# Patient Record
Sex: MALE | Race: BLACK OR AFRICAN AMERICAN | NOT HISPANIC OR LATINO | Employment: FULL TIME | URBAN - METROPOLITAN AREA
[De-identification: names, ages, dates, MRNs, and addresses within clinical notes are randomized per-mention and may not be internally consistent; named-entity substitution may affect disease eponyms.]

---

## 2017-01-03 ENCOUNTER — OFFICE VISIT (OUTPATIENT)
Dept: FAMILY MEDICINE | Facility: CLINIC | Age: 62
DRG: 205 | End: 2017-01-03
Payer: COMMERCIAL

## 2017-01-03 ENCOUNTER — HOSPITAL ENCOUNTER (EMERGENCY)
Facility: OTHER | Age: 62
Discharge: HOME | End: 2017-01-03
Attending: EMERGENCY MEDICINE
Payer: COMMERCIAL

## 2017-01-03 VITALS
BODY MASS INDEX: 24.91 KG/M2 | WEIGHT: 211 LBS | TEMPERATURE: 98 F | RESPIRATION RATE: 17 BRPM | OXYGEN SATURATION: 97 % | HEIGHT: 77 IN | DIASTOLIC BLOOD PRESSURE: 87 MMHG | SYSTOLIC BLOOD PRESSURE: 124 MMHG | HEART RATE: 87 BPM

## 2017-01-03 VITALS
TEMPERATURE: 99 F | HEART RATE: 116 BPM | HEIGHT: 77 IN | OXYGEN SATURATION: 95 % | BODY MASS INDEX: 22.82 KG/M2 | DIASTOLIC BLOOD PRESSURE: 96 MMHG | WEIGHT: 193.31 LBS | SYSTOLIC BLOOD PRESSURE: 131 MMHG

## 2017-01-03 DIAGNOSIS — J42 CHRONIC BRONCHITIS, UNSPECIFIED CHRONIC BRONCHITIS TYPE: ICD-10-CM

## 2017-01-03 DIAGNOSIS — C34.2 PRIMARY CANCER OF RIGHT MIDDLE LOBE OF LUNG: ICD-10-CM

## 2017-01-03 DIAGNOSIS — R06.02 SOB (SHORTNESS OF BREATH): Primary | ICD-10-CM

## 2017-01-03 DIAGNOSIS — J44.1 COPD EXACERBATION: Primary | ICD-10-CM

## 2017-01-03 PROCEDURE — 87040 BLOOD CULTURE FOR BACTERIA: CPT | Mod: 59

## 2017-01-03 PROCEDURE — 25000242 PHARM REV CODE 250 ALT 637 W/ HCPCS: Performed by: EMERGENCY MEDICINE

## 2017-01-03 PROCEDURE — 1159F MED LIST DOCD IN RCRD: CPT | Mod: S$GLB,,, | Performed by: NURSE PRACTITIONER

## 2017-01-03 PROCEDURE — 96365 THER/PROPH/DIAG IV INF INIT: CPT

## 2017-01-03 PROCEDURE — 99999 PR PBB SHADOW E&M-EST. PATIENT-LVL III: CPT | Mod: PBBFAC,,, | Performed by: NURSE PRACTITIONER

## 2017-01-03 PROCEDURE — 3075F SYST BP GE 130 - 139MM HG: CPT | Mod: S$GLB,,, | Performed by: NURSE PRACTITIONER

## 2017-01-03 PROCEDURE — 25000003 PHARM REV CODE 250: Performed by: EMERGENCY MEDICINE

## 2017-01-03 PROCEDURE — 99214 OFFICE O/P EST MOD 30 MIN: CPT | Mod: S$GLB,,, | Performed by: NURSE PRACTITIONER

## 2017-01-03 PROCEDURE — 3078F DIAST BP <80 MM HG: CPT | Mod: S$GLB,,, | Performed by: NURSE PRACTITIONER

## 2017-01-03 PROCEDURE — 94640 AIRWAY INHALATION TREATMENT: CPT

## 2017-01-03 PROCEDURE — 63600175 PHARM REV CODE 636 W HCPCS: Performed by: EMERGENCY MEDICINE

## 2017-01-03 PROCEDURE — 85025 COMPLETE CBC W/AUTO DIFF WBC: CPT

## 2017-01-03 PROCEDURE — 99284 EMERGENCY DEPT VISIT MOD MDM: CPT | Mod: 25

## 2017-01-03 RX ORDER — IPRATROPIUM BROMIDE AND ALBUTEROL SULFATE 2.5; .5 MG/3ML; MG/3ML
3 SOLUTION RESPIRATORY (INHALATION)
Status: COMPLETED | OUTPATIENT
Start: 2017-01-03 | End: 2017-01-03

## 2017-01-03 RX ORDER — ALBUTEROL SULFATE 90 UG/1
1 AEROSOL, METERED RESPIRATORY (INHALATION) EVERY 4 HOURS PRN
Qty: 1 INHALER | Refills: 1 | Status: ON HOLD | OUTPATIENT
Start: 2017-01-03 | End: 2017-01-07 | Stop reason: HOSPADM

## 2017-01-03 RX ORDER — AMOXICILLIN AND CLAVULANATE POTASSIUM 500; 125 MG/1; MG/1
1 TABLET, FILM COATED ORAL 3 TIMES DAILY
Qty: 21 TABLET | Refills: 0 | Status: SHIPPED | OUTPATIENT
Start: 2017-01-03 | End: 2017-01-03 | Stop reason: CLARIF

## 2017-01-03 RX ORDER — AMOXICILLIN AND CLAVULANATE POTASSIUM 875; 125 MG/1; MG/1
1 TABLET, FILM COATED ORAL 2 TIMES DAILY
Qty: 14 TABLET | Refills: 0 | Status: ON HOLD | OUTPATIENT
Start: 2017-01-03 | End: 2017-01-07 | Stop reason: HOSPADM

## 2017-01-03 RX ORDER — AZITHROMYCIN 250 MG/1
500 TABLET, FILM COATED ORAL DAILY
Qty: 5 TABLET | Refills: 0 | Status: ON HOLD | OUTPATIENT
Start: 2017-01-03 | End: 2017-01-07 | Stop reason: HOSPADM

## 2017-01-03 RX ORDER — AZITHROMYCIN 250 MG/1
500 TABLET, FILM COATED ORAL
Status: COMPLETED | OUTPATIENT
Start: 2017-01-03 | End: 2017-01-03

## 2017-01-03 RX ADMIN — AZITHROMYCIN 500 MG: 250 TABLET, FILM COATED ORAL at 01:01

## 2017-01-03 RX ADMIN — CEFTRIAXONE 1 G: 1 INJECTION, SOLUTION INTRAVENOUS at 01:01

## 2017-01-03 RX ADMIN — IPRATROPIUM BROMIDE AND ALBUTEROL SULFATE 3 ML: .5; 3 SOLUTION RESPIRATORY (INHALATION) at 12:01

## 2017-01-03 NOTE — PROGRESS NOTES
Subjective:       Patient ID: Angel Torres is a 61 y.o. male.    Chief Complaint: Shortness of Breath    Shortness of Breath   This is a recurrent problem. The current episode started in the past 7 days. The problem occurs intermittently. The problem has been gradually worsening. Associated symptoms include coryza and wheezing. Pertinent negatives include no abdominal pain, chest pain, claudication, ear pain, fever, headaches, hemoptysis, leg swelling, neck pain, orthopnea, PND, rhinorrhea, sore throat, sputum production, swollen glands, syncope or vomiting. The symptoms are aggravated by weather changes. The patient has no known risk factors for DVT/PE. He has tried nothing for the symptoms. (History of lung cancer)     Review of Systems   Constitutional: Negative for fever.   HENT: Negative for ear pain, rhinorrhea and sore throat.    Respiratory: Positive for shortness of breath and wheezing. Negative for hemoptysis and sputum production.    Cardiovascular: Negative for chest pain, orthopnea, claudication, leg swelling, syncope and PND.   Gastrointestinal: Negative for abdominal pain and vomiting.   Musculoskeletal: Negative for neck pain.   Neurological: Negative for headaches.       Objective:      Physical Exam   Constitutional: He is oriented to person, place, and time. Vital signs are normal. He appears well-developed and well-nourished.   HENT:   Head: Normocephalic and atraumatic.   Right Ear: Tympanic membrane, external ear and ear canal normal.   Left Ear: Tympanic membrane, external ear and ear canal normal.   Nose: Mucosal edema and rhinorrhea present. Right sinus exhibits no maxillary sinus tenderness and no frontal sinus tenderness. Left sinus exhibits no maxillary sinus tenderness and no frontal sinus tenderness.   Mouth/Throat: Oropharynx is clear and moist and mucous membranes are normal. No oropharyngeal exudate or posterior oropharyngeal erythema.   Cardiovascular: Normal rate, regular rhythm and  normal heart sounds.    Pulmonary/Chest: Effort normal. No respiratory distress. He has decreased breath sounds. He has wheezes. He has no rales.   Lymphadenopathy:     He has no cervical adenopathy.   Neurological: He is alert and oriented to person, place, and time.   Skin: Skin is warm, dry and intact. No rash noted.   Psychiatric: He has a normal mood and affect.   Vitals reviewed.      Assessment:       1. SOB (shortness of breath)    2. Primary cancer of right middle lobe of lung    3. Chronic bronchitis, unspecified chronic bronchitis type        Plan:       Angel was seen today for shortness of breath.    Diagnoses and all orders for this visit:    SOB (shortness of breath)    Primary cancer of right middle lobe of lung    Chronic bronchitis, unspecified chronic bronchitis type    Patient sent to ED for further evaluation of severe SOB and cough. Patient  Has a history of lung Cancer.

## 2017-01-03 NOTE — DISCHARGE INSTRUCTIONS
COPD Flare    You have had a flare-up of your COPD.  COPD, or chronic obstructive pulmonary disease, is a common lung disease. It causes your airways to become irritated and narrower. This makes it harder for you to breathe. Emphysema and chronic bronchitis are both types of COPD. This is a chronic condition, which means you always have it. Sometimes it gets worse. When this happens, it is called a flare-up.  Symptoms of COPD  People with COPD may have symptoms most of the time. In a flare-up, your symptoms get worse. These symptoms may mean you are having a flare-up:  · Shortness of breath, shallow or rapid breathing, or wheezing that gets worse  · Lung infection  · Cough that gets worse  · More mucus, thicker mucus or mucus of a different color  · Tiredness, decreased energy, or trouble doing your usual activities  · Fever  · Chest tightness  · Your symptoms dont get better even when you use your usual medicines, inhalers, and nebulizer  · Trouble talking  · You feel confused  Causes of flare-ups  Unfortunately, a flare-up can happen even though you did everything right, and you followed your doctors instructions. Some causes of flare-ups are:  · Smoking or secondhand smoke  · Colds, the flu, or respiratory infections  · Air pollution  · Sudden change in the weather  · Dust, irritating chemicals, or strong fumes  · Not taking your medicines as prescribed  Home care  Here are some things you can do at home to treat a flare-up:  · Try not to panic. This makes it harder to breathe, and keeps you from doing the right things.  · Dont smoke or be around others who are smoking.  · Try to drink more fluids than usual during a flare-up, unless your doctor has told you not to because of heart and kidney problems. More fluids can help loosen the mucus.  · Use your inhalers and nebulizer, if you have one, as you have been told to.  · If you were given antibiotics, take them until they are used up or your doctor tells  you to stop. Its important to finish the antibiotics, even though you feel better. This will make sure the infection has cleared.  · If you were given prednisone or another steroid, finish it even if you feel better.  Preventing a flare-up  Even though flare-ups happen, the best way to treat one is to prevent it before it starts. Here are some pointers:  · Dont smoke or be around others who are smoking.  · Take your medicines as you have been told.  · Talk with your doctor about getting a flu shot every year. Also find out if you need a pneumonia shot.  · If there is a weather advisory warning to stay indoors, try to stay inside when possible.  · Try to eat healthy and get plenty of sleep.  · Try to avoid things that usually set you off, like dust, chemical fumes, hairsprays, or strong perfumes.  Follow-up care  Follow up with your healthcare provider.  If a culture was done, you will be told if your treatment needs to be changed. You can call as directed for the results.  If X-rays were done, and a radiologist had not seen them while you were there, they will be reviewed. You will be told if there is a change in the reading, especially if it affects your treatment.  Call 911  Call 911 if any of these occur:  · You have trouble breathing  · You feel confused or its difficult to wake you up  · You faint or lose consciousness  · You have a rapid heart rate  · You have new pain in your chest, arm, shoulder, neck or upper back  When to seek medical advice  Call your healthcare provider right away if any of these occur:  · Wheezing or shortness of breath gets worse  · You need to use your inhalers more often than usual without relief  · Fever of 100.4°F (38ºC) or higher, or as directed  · Coughing up lots of dark-colored or bloody sputum (mucus)  · Chest pain with each breath  · You do not start to get better within 24 hours  · Swelling or your ankles gets worse  · Dizziness or weakness     © 3907-6319 [facility]  [employer] [address] [place] [address], [place] [address]. All rights reserved. This information is not intended as a substitute for professional medical care. Always follow your healthcare professional's instructions.

## 2017-01-03 NOTE — ED AVS SNAPSHOT
[facility] EMERGENCY DEPARTMENT  [address] [place] [address]               Angel Torres   1/3/2017 12:15 PM   ED    Description:  Male : 1955   Department:  [facility] Emergency Department           Your Care was Coordinated By:     Provider Role From To    Espinoza Phillips MD Attending Provider 17 1223 --      Reason for Visit     Shortness of Breath           Diagnoses this Visit        Comments    COPD exacerbation    -  Primary       ED Disposition     ED Disposition Condition Comment    Discharge             To Do List           Follow-up Information     Follow up with RADHA Moreno MD In 1 week(s).    Specialties:  Internal Medicine, Pediatrics    Contact information:    [address] [place] [address]  [phone]          Follow up with RADHA Moreno MD In 3 days.    Specialties:  Internal Medicine, Pediatrics    Contact information:    [address] [place] [address]  [phone]         These Medications        Disp Refills Start End    azithromycin (ZITHROMAX Z-RAYMUNDO) 250 MG tablet 5 tablet 0 1/3/2017 2017    Take 2 tablets (500 mg total) by mouth once daily. 1 tablet by mouth daily - Oral    [facility] [address] [place] [address] [address] AT [facility] [place] [address] Ph #: [phone]       albuterol 90 mcg/actuation inhaler 1 Inhaler 1 1/3/2017 1/3/2018    Inhale 1 puff into the lungs every 4 (four) hours as needed for Wheezing. - Inhalation    [facility] [address] [place] [address] [address] AT [facility] [address] Ph #: [phone]       amoxicillin-clavulanate 500-125mg (AUGMENTIN) 500-125 mg Tab 21 tablet 0 1/3/2017 1/10/2017    Take 1 tablet (500 mg total) by mouth 3 (three) times daily. - Oral    Pharmacy: [facility] [employer] [address] [place] [address] [address] AT [facility] [address] [facility] Ph #: [phone]         [facility]      [facility] - 24/7 Assistance  Registered nurses in the [facility] provide clinical advisement, health education, appointment booking, and other advisory services.  Call for this free service at [phone].             Medications           Message regarding Medications     Verify the changes and/or additions to your medication regime listed below are the same as discussed with your clinician today.  If any of these changes or additions are incorrect, please notify your healthcare provider.        START taking these NEW medications        Refills    azithromycin (ZITHROMAX Z-RAYMUNDO) 250 MG tablet 0    Sig: Take 2 tablets (500 mg total) by mouth once daily. 1 tablet by mouth daily    Class: Print    Route: Oral    albuterol 90 mcg/actuation inhaler 1    Sig: Inhale 1 puff into the lungs every 4 (four) hours as needed for Wheezing.    Class: Print    Route: Inhalation    amoxicillin-clavulanate 500-125mg (AUGMENTIN) 500-125 mg Tab 0    Sig: Take 1 tablet (500 mg total) by mouth 3 (three) times daily.    Class: Print    Route: Oral      These medications were administered today        Dose Freq    albuterol-ipratropium 2.5mg-0.5mg/3mL nebulizer solution 3 mL 3 mL ED 1 Time    Sig: Take 3 mLs by nebulization ED 1 Time.    Class: Normal    Route: Nebulization    cefTRIAXone (ROCEPHIN) 1 g in dextrose 5 % 50 mL IVPB 1 g ED 1 Time    Sig: Inject 50 mLs (1 g total) into the vein ED 1 Time.    Class: Normal    Route: Intravenous    azithromycin tablet 500 mg 500 mg ED 1 Time    Sig: Take 2 tablets (500 mg total) by mouth ED 1 Time.    Class: Normal    Route: Oral           Verify that the below list of medications is an accurate representation of the medications you are currently taking.  If none reported, the list may be blank. If incorrect, please contact your healthcare provider. Carry this list with you in case of emergency.           Current Medications     albuterol 90 mcg/actuation  "inhaler Inhale 2 puffs into the lungs every 6 (six) hours as needed for Wheezing.    amlodipine (NORVASC) 10 MG tablet TAKE 1 TABLET BY MOUTH EVERY DAY    folic acid (FOLVITE) 400 MCG tablet Take 1 tablet (400 mcg total) by mouth once daily.    albuterol 90 mcg/actuation inhaler Inhale 2 puffs into the lungs every 6 (six) hours as needed for Wheezing.    albuterol 90 mcg/actuation inhaler Inhale 1 puff into the lungs every 4 (four) hours as needed for Wheezing.    amoxicillin-clavulanate 500-125mg (AUGMENTIN) 500-125 mg Tab Take 1 tablet (500 mg total) by mouth 3 (three) times daily.    azithromycin (ZITHROMAX Z-RAYMUNDO) 250 MG tablet Take 2 tablets (500 mg total) by mouth once daily. 1 tablet by mouth daily    cefTRIAXone (ROCEPHIN) 1 g in dextrose 5 % 50 mL IVPB Inject 50 mLs (1 g total) into the vein ED 1 Time.    dexamethasone (DECADRON) 4 MG Tab Take 1 tablet (4 mg total) by mouth every 12 (twelve) hours. Take 1 tab twice daily starting the day prior to chemotherapy for 3 days.    docusate sodium (COLACE) 100 MG capsule Take 1 capsule (100 mg total) by mouth 2 (two) times daily as needed for Constipation.    hydrOXYzine HCl (ATARAX) 25 MG tablet Take 1 tablet (25 mg total) by mouth 3 (three) times daily as needed for Itching.    ondansetron (ZOFRAN, AS HYDROCHLORIDE,) 4 MG tablet Take 1 tablet (4 mg total) by mouth every 8 (eight) hours as needed for Nausea.    ondansetron (ZOFRAN-ODT) 8 MG TbDL Dissolve under tongue every 8 hours as needed for nausea    triamcinolone acetonide 0.1% (KENALOG) 0.1 % cream Apply topically 2 (two) times daily.           Clinical Reference Information           Your Vitals Were     BP Pulse Temp Resp Height Weight    147/87 (BP Location: Left arm, Patient Position: Sitting, BP Method: Automatic) 107 97.5 °F (36.4 °C) (Oral) 17 6' 5" (1.956 m) 95.7 kg (211 lb)    SpO2 BMI             98% 25.02 kg/m2         Allergies as of 1/3/2017     No Known Allergies      Immunizations Administered " on Date of Encounter - 1/3/2017     None      ED Micro, Lab, POCT     Start Ordered       Status Ordering Provider    01/03/17 1307 01/03/17 1307  POCT CBC  Once      Acknowledged     01/03/17 1307 01/03/17 1307  Blood culture #1 (Send Out)  STAT     Comments:  Blood Culture #1    In process     01/03/17 1307 01/03/17 1307  Blood culture #2 (Send Out)  STAT     Comments:  Blood Culture #2    In process       ED Imaging Orders     Start Ordered       Status Ordering Provider    01/03/17 1227 01/03/17 1226  X-Ray Chest PA And Lateral  1 time imaging      Final result         Discharge Instructions           COPD Flare    You have had a flare-up of your COPD.  COPD, or chronic obstructive pulmonary disease, is a common lung disease. It causes your airways to become irritated and narrower. This makes it harder for you to breathe. Emphysema and chronic bronchitis are both types of COPD. This is a chronic condition, which means you always have it. Sometimes it gets worse. When this happens, it is called a flare-up.  Symptoms of COPD  People with COPD may have symptoms most of the time. In a flare-up, your symptoms get worse. These symptoms may mean you are having a flare-up:  · Shortness of breath, shallow or rapid breathing, or wheezing that gets worse  · Lung infection  · Cough that gets worse  · More mucus, thicker mucus or mucus of a different color  · Tiredness, decreased energy, or trouble doing your usual activities  · Fever  · Chest tightness  · Your symptoms dont get better even when you use your usual medicines, inhalers, and nebulizer  · Trouble talking  · You feel confused  Causes of flare-ups  Unfortunately, a flare-up can happen even though you did everything right, and you followed your doctors instructions. Some causes of flare-ups are:  · Smoking or secondhand smoke  · Colds, the flu, or respiratory infections  · Air pollution  · Sudden change in the weather  · Dust, irritating chemicals, or strong  fumes  · Not taking your medicines as prescribed  Home care  Here are some things you can do at home to treat a flare-up:  · Try not to panic. This makes it harder to breathe, and keeps you from doing the right things.  · Dont smoke or be around others who are smoking.  · Try to drink more fluids than usual during a flare-up, unless your doctor has told you not to because of heart and kidney problems. More fluids can help loosen the mucus.  · Use your inhalers and nebulizer, if you have one, as you have been told to.  · If you were given antibiotics, take them until they are used up or your doctor tells you to stop. Its important to finish the antibiotics, even though you feel better. This will make sure the infection has cleared.  · If you were given prednisone or another steroid, finish it even if you feel better.  Preventing a flare-up  Even though flare-ups happen, the best way to treat one is to prevent it before it starts. Here are some pointers:  · Dont smoke or be around others who are smoking.  · Take your medicines as you have been told.  · Talk with your doctor about getting a flu shot every year. Also find out if you need a pneumonia shot.  · If there is a weather advisory warning to stay indoors, try to stay inside when possible.  · Try to eat healthy and get plenty of sleep.  · Try to avoid things that usually set you off, like dust, chemical fumes, hairsprays, or strong perfumes.  Follow-up care  Follow up with your healthcare provider.  If a culture was done, you will be told if your treatment needs to be changed. You can call as directed for the results.  If X-rays were done, and a radiologist had not seen them while you were there, they will be reviewed. You will be told if there is a change in the reading, especially if it affects your treatment.  Call 911  Call 911 if any of these occur:  · You have trouble breathing  · You feel confused or its difficult to wake you up  · You faint or lose  consciousness  · You have a rapid heart rate  · You have new pain in your chest, arm, shoulder, neck or upper back  When to seek medical advice  Call your healthcare provider right away if any of these occur:  · Wheezing or shortness of breath gets worse  · You need to use your inhalers more often than usual without relief  · Fever of 100.4°F (38ºC) or higher, or as directed  · Coughing up lots of dark-colored or bloody sputum (mucus)  · Chest pain with each breath  · You do not start to get better within 24 hours  · Swelling or your ankles gets worse  · Dizziness or weakness     © 7731-0782 [employer] [address] [place] [address], [place] [address]. All rights reserved. This information is not intended as a substitute for professional medical care. Always follow your healthcare professional's instructions.          Your Scheduled Appointments     Jan 09, 2017  8:00 AM CST   Pet CT Whole Body with [facility] PET CT LIMIT 400 LBS   [facility]    [address] [facility] [id number] [phone]            Jan 09, 2017 12:00 PM CST   Non-Fasting Lab with LAB, HEMONC SAME DAY   [facility] ([facility])    [facility]  [place] [id number] [phone]            Jan 09, 2017  1:00 PM CST   Established Patient Visit with Dano Fernandez DO, FACP [facility] ([facility])    [facility]  [place] [id number] [phone]              Smoking Cessation     If you would like to quit smoking:   You may be eligible for free services if you are a [place] resident and started smoking cigarettes before September 1, 1988.  Call the Smoking Cessation Trust (SCT) toll free at (969) 925-0524 or (407) 540-4851.   Call 1-800-QUIT-NOW if you do not meet the above criteria.             [facility] complies with applicable Federal civil rights laws and does not  discriminate on the basis of race, color, national origin, age, disability, or sex.        Language Assistance Services     ATTENTION: Language assistance services are available, free of charge. Please call [phone].      ATENCIÓN: Si sarah dallas, tiene a zhang disposición servicios gratuitos de asistencia lingüística. Llame al [phone].     CHÚ Ý: N?u b?n nói Ti?ng Vi?t, có các d?ch v? h? tr? ngôn ng? mi?n phí dành cho b?n. G?i s? [phone].

## 2017-01-03 NOTE — ED PROVIDER NOTES
Encounter Date: 1/3/2017       History     Chief Complaint   Patient presents with    Shortness of Breath     reports began Saturday, reports pmhx of COPD, reports using inhalers with no relief     Review of patient's allergies indicates:  No Known Allergies  Patient is a 61 y.o. male presenting with the following complaint: shortness of breath. The history is provided by the patient.   Shortness of Breath   This is a recurrent problem. The problem occurs intermittently.The current episode started more than 2 days ago. The problem has not changed since onset.Associated symptoms include wheezing. Pertinent negatives include no sore throat, no cough, no sputum production, no PND, no orthopnea, no chest pain, no syncope, no leg pain, no leg swelling and no claudication. The problem's precipitants include weather changes. He has tried nothing for the symptoms. He has had no prior hospitalizations. He has had prior ED visits. He has had no prior ICU admissions. Associated medical issues include COPD.     Past Medical History   Diagnosis Date    Chemotherapy follow-up examination 9/7/2016    COPD (chronic obstructive pulmonary disease)     Hearing loss     Hypertension 2/9/2015    Primary cancer of right middle lobe of lung 7/11/2016    Rash 9/7/2016     Past Medical History Pertinent Negatives   Diagnosis Date Noted    Amblyopia 11/10/2014    Arthritis 11/10/2014    Cataract 11/10/2014    Diabetes mellitus 11/10/2014    Diabetic retinopathy 11/10/2014    Glaucoma 11/10/2014    Macular degeneration 11/10/2014    Retinal detachment 11/10/2014    Sickle cell anemia 2/9/2015    Sickle cell trait 2/9/2015    Strabismus 11/10/2014    Uveitis 11/10/2014     Past Surgical History   Procedure Laterality Date    Knee surgery      Inguinal hernia repair Bilateral      Family History   Problem Relation Age of Onset    Cancer Mother      lung, breast    Cancer Sister      lung    Cancer Maternal Grandfather      No Known Problems Father     No Known Problems Brother     No Known Problems Maternal Aunt     No Known Problems Maternal Uncle     No Known Problems Paternal Aunt     No Known Problems Paternal Uncle     No Known Problems Maternal Grandmother     No Known Problems Paternal Grandmother     No Known Problems Paternal Grandfather     Amblyopia Neg Hx     Blindness Neg Hx     Cataracts Neg Hx     Diabetes Neg Hx     Glaucoma Neg Hx     Hypertension Neg Hx     Macular degeneration Neg Hx     Retinal detachment Neg Hx     Strabismus Neg Hx     Stroke Neg Hx     Thyroid disease Neg Hx      Social History   Substance Use Topics    Smoking status: Former Smoker     Packs/day: 2.00     Years: 40.00     Quit date: 11/16/2013    Smokeless tobacco: None    Alcohol use No     Review of Systems   Constitutional: Negative.    HENT: Negative.  Negative for sore throat.    Eyes: Negative.    Respiratory: Positive for shortness of breath and wheezing. Negative for cough and sputum production.    Cardiovascular: Negative.  Negative for chest pain, orthopnea, claudication, leg swelling, syncope and PND.   Gastrointestinal: Negative.    Endocrine: Negative.    Genitourinary: Negative.    Musculoskeletal: Negative.    Skin: Negative.    Allergic/Immunologic: Negative.    Neurological: Negative.    Hematological: Negative.    Psychiatric/Behavioral: Negative.    All other systems reviewed and are negative.      Physical Exam   Initial Vitals   BP Pulse Resp Temp SpO2   01/03/17 1216 01/03/17 1216 01/03/17 1216 01/03/17 1216 01/03/17 1216   123/86 105 20 97.5 °F (36.4 °C) 97 %     Physical Exam    Nursing note and vitals reviewed.  Constitutional: Vital signs are normal. He appears well-developed. He is active and cooperative.   HENT:   Head: Normocephalic and atraumatic.   Eyes: Conjunctivae, EOM and lids are normal. Pupils are equal, round, and reactive to light.   Neck: Trachea normal and full passive range of  motion without pain. Neck supple. No thyroid mass present.   Cardiovascular: Normal rate, regular rhythm, S1 normal, S2 normal, normal heart sounds, intact distal pulses and normal pulses.   Pulmonary/Chest: No respiratory distress. He has wheezes. He has no rhonchi. He has no rales. He exhibits no tenderness.   Abdominal: Soft. Normal appearance, normal aorta and bowel sounds are normal.   Musculoskeletal: Normal range of motion.   Lymphadenopathy:     He has no axillary adenopathy.   Neurological: He is alert and oriented to person, place, and time.   Skin: Skin is warm, dry and intact.   Psychiatric: He has a normal mood and affect. His speech is normal and behavior is normal. Judgment and thought content normal. Cognition and memory are normal.         ED Course   Procedures  Labs Reviewed - No data to display          Medical Decision Making:   Clinical Tests:   Lab Tests: Ordered and Reviewed  The following lab test(s) were unremarkable: CBC       <> Summary of Lab: CBC was unremarkable  Radiological Study: Ordered and Reviewed  ED Management:  X-ray revealed a right lower lobe pneumonia.  The patient was not hypoxic decision was made to treat him as an outpatient.  Patient received Rocephin 1 g IV and 500 mg azithromycin by mouth while in the department.  He will be discharged and accompanied by his daughter.                   ED Course     Clinical Impression:   The encounter diagnosis was COPD exacerbation.          Espinoza Phillips MD  01/03/17 5512

## 2017-01-03 NOTE — ED TRIAGE NOTES
Pt reports having SOB since Saturday. Reports SOB is worst on exertion. Reports using inhalers but meds not effective. Pt reports going to [facility] before coming here and was told to come here for further evaluation.

## 2017-01-04 ENCOUNTER — HOSPITAL ENCOUNTER (EMERGENCY)
Facility: OTHER | Age: 62
Discharge: ANOTHER HOSPITAL | End: 2017-01-05
Attending: EMERGENCY MEDICINE
Payer: COMMERCIAL

## 2017-01-04 DIAGNOSIS — R06.02 SOB (SHORTNESS OF BREATH): Primary | ICD-10-CM

## 2017-01-04 DIAGNOSIS — C34.2 MALIGNANT NEOPLASM OF MIDDLE LOBE OF RIGHT LUNG: ICD-10-CM

## 2017-01-04 DIAGNOSIS — J18.9 PNEUMONIA OF RIGHT MIDDLE LOBE DUE TO INFECTIOUS ORGANISM: ICD-10-CM

## 2017-01-04 PROCEDURE — 96365 THER/PROPH/DIAG IV INF INIT: CPT

## 2017-01-04 PROCEDURE — 25000242 PHARM REV CODE 250 ALT 637 W/ HCPCS: Performed by: EMERGENCY MEDICINE

## 2017-01-04 PROCEDURE — 63600175 PHARM REV CODE 636 W HCPCS: Performed by: EMERGENCY MEDICINE

## 2017-01-04 PROCEDURE — 93010 ELECTROCARDIOGRAM REPORT: CPT | Mod: ,,, | Performed by: INTERNAL MEDICINE

## 2017-01-04 PROCEDURE — 96366 THER/PROPH/DIAG IV INF ADDON: CPT

## 2017-01-04 PROCEDURE — 94640 AIRWAY INHALATION TREATMENT: CPT

## 2017-01-04 PROCEDURE — 99285 EMERGENCY DEPT VISIT HI MDM: CPT

## 2017-01-04 PROCEDURE — 93005 ELECTROCARDIOGRAM TRACING: CPT

## 2017-01-04 RX ORDER — MOXIFLOXACIN HYDROCHLORIDE 400 MG/250ML
400 INJECTION, SOLUTION INTRAVENOUS
Status: COMPLETED | OUTPATIENT
Start: 2017-01-04 | End: 2017-01-05

## 2017-01-04 RX ORDER — ALBUTEROL SULFATE 2.5 MG/.5ML
15 SOLUTION RESPIRATORY (INHALATION) ONCE
Status: COMPLETED | OUTPATIENT
Start: 2017-01-04 | End: 2017-01-04

## 2017-01-04 RX ADMIN — MOXIFLOXACIN HYDROCHLORIDE 400 MG: 400 INJECTION, SOLUTION INTRAVENOUS at 11:01

## 2017-01-04 RX ADMIN — ALBUTEROL SULFATE 15 MG: 2.5 SOLUTION RESPIRATORY (INHALATION) at 10:01

## 2017-01-05 ENCOUNTER — HOSPITAL ENCOUNTER (INPATIENT)
Facility: HOSPITAL | Age: 62
LOS: 2 days | Discharge: HOME | DRG: 205 | End: 2017-01-07
Attending: EMERGENCY MEDICINE | Admitting: HOSPITALIST
Payer: COMMERCIAL

## 2017-01-05 VITALS
TEMPERATURE: 99 F | SYSTOLIC BLOOD PRESSURE: 129 MMHG | WEIGHT: 211 LBS | OXYGEN SATURATION: 97 % | BODY MASS INDEX: 24.91 KG/M2 | RESPIRATION RATE: 20 BRPM | HEART RATE: 114 BPM | DIASTOLIC BLOOD PRESSURE: 77 MMHG | HEIGHT: 77 IN

## 2017-01-05 DIAGNOSIS — J18.9 PNEUMONIA: ICD-10-CM

## 2017-01-05 DIAGNOSIS — J18.9 PNEUMONIA OF RIGHT LOWER LOBE DUE TO INFECTIOUS ORGANISM: Primary | ICD-10-CM

## 2017-01-05 DIAGNOSIS — A41.9 SEPSIS, DUE TO UNSPECIFIED ORGANISM: ICD-10-CM

## 2017-01-05 PROBLEM — J44.9 COPD (CHRONIC OBSTRUCTIVE PULMONARY DISEASE): Status: ACTIVE | Noted: 2017-01-05

## 2017-01-05 PROBLEM — R91.1 LUNG NODULE: Status: ACTIVE | Noted: 2017-01-05

## 2017-01-05 PROBLEM — R06.02 SHORTNESS OF BREATH: Status: ACTIVE | Noted: 2017-01-05

## 2017-01-05 PROBLEM — R09.81 SINUS CONGESTION: Status: ACTIVE | Noted: 2017-01-05

## 2017-01-05 LAB
ABO + RH BLD: NORMAL
ALBUMIN SERPL BCP-MCNC: 3.1 G/DL
ALP SERPL-CCNC: 66 U/L
ALT SERPL W/O P-5'-P-CCNC: 12 U/L
ANION GAP SERPL CALC-SCNC: 13 MMOL/L
APTT BLDCRRT: <21 SEC
AST SERPL-CCNC: 14 U/L
BASOPHILS # BLD AUTO: 0.03 K/UL
BASOPHILS NFR BLD: 0.4 %
BILIRUB SERPL-MCNC: 0.4 MG/DL
BILIRUB UR QL STRIP: NEGATIVE
BLD GP AB SCN CELLS X3 SERPL QL: NORMAL
BNP SERPL-MCNC: <10 PG/ML
BUN SERPL-MCNC: 15 MG/DL
CALCIUM SERPL-MCNC: 8.6 MG/DL
CHLORIDE SERPL-SCNC: 108 MMOL/L
CLARITY UR REFRACT.AUTO: CLEAR
CO2 SERPL-SCNC: 18 MMOL/L
COLOR UR AUTO: YELLOW
CREAT SERPL-MCNC: 1 MG/DL
DIFFERENTIAL METHOD: ABNORMAL
EOSINOPHIL # BLD AUTO: 0 K/UL
EOSINOPHIL NFR BLD: 0.5 %
ERYTHROCYTE [DISTWIDTH] IN BLOOD BY AUTOMATED COUNT: 12.7 %
EST. GFR  (AFRICAN AMERICAN): >60 ML/MIN/1.73 M^2
EST. GFR  (NON AFRICAN AMERICAN): >60 ML/MIN/1.73 M^2
FLUAV AG SPEC QL IA: NEGATIVE
FLUBV AG SPEC QL IA: NEGATIVE
GLUCOSE SERPL-MCNC: 90 MG/DL
GLUCOSE UR QL STRIP: NEGATIVE
HCT VFR BLD AUTO: 39.5 %
HGB BLD-MCNC: 13.4 G/DL
HGB UR QL STRIP: NEGATIVE
INR PPP: 1
KETONES UR QL STRIP: ABNORMAL
LACTATE SERPL-SCNC: 1 MMOL/L
LACTATE SERPL-SCNC: 1.8 MMOL/L
LEUKOCYTE ESTERASE UR QL STRIP: NEGATIVE
LIPASE SERPL-CCNC: 8 U/L
LYMPHOCYTES # BLD AUTO: 0.6 K/UL
LYMPHOCYTES NFR BLD: 7 %
MAGNESIUM SERPL-MCNC: 2 MG/DL
MCH RBC QN AUTO: 29.8 PG
MCHC RBC AUTO-ENTMCNC: 33.9 %
MCV RBC AUTO: 88 FL
MONOCYTES # BLD AUTO: 0.8 K/UL
MONOCYTES NFR BLD: 9.4 %
NEUTROPHILS # BLD AUTO: 6.7 K/UL
NEUTROPHILS NFR BLD: 82.1 %
NITRITE UR QL STRIP: NEGATIVE
PH UR STRIP: 6 [PH] (ref 5–8)
PHOSPHATE SERPL-MCNC: 3.9 MG/DL
PLATELET # BLD AUTO: 343 K/UL
PMV BLD AUTO: 10.2 FL
POTASSIUM SERPL-SCNC: 3.9 MMOL/L
PROT SERPL-MCNC: 7.7 G/DL
PROT UR QL STRIP: NEGATIVE
PROTHROMBIN TIME: 11 SEC
RBC # BLD AUTO: 4.5 M/UL
SODIUM SERPL-SCNC: 139 MMOL/L
SP GR UR STRIP: 1.01 (ref 1–1.03)
SPECIMEN SOURCE: NORMAL
TROPONIN I SERPL DL<=0.01 NG/ML-MCNC: <0.006 NG/ML
URN SPEC COLLECT METH UR: ABNORMAL
UROBILINOGEN UR STRIP-ACNC: NEGATIVE EU/DL
WBC # BLD AUTO: 8.18 K/UL

## 2017-01-05 PROCEDURE — 86850 RBC ANTIBODY SCREEN: CPT

## 2017-01-05 PROCEDURE — 99223 1ST HOSP IP/OBS HIGH 75: CPT | Mod: ,,, | Performed by: HOSPITALIST

## 2017-01-05 PROCEDURE — 99285 EMERGENCY DEPT VISIT HI MDM: CPT | Mod: ,,, | Performed by: EMERGENCY MEDICINE

## 2017-01-05 PROCEDURE — 87086 URINE CULTURE/COLONY COUNT: CPT

## 2017-01-05 PROCEDURE — 87040 BLOOD CULTURE FOR BACTERIA: CPT

## 2017-01-05 PROCEDURE — 25500020 PHARM REV CODE 255: Performed by: HOSPITALIST

## 2017-01-05 PROCEDURE — 87205 SMEAR GRAM STAIN: CPT

## 2017-01-05 PROCEDURE — 83880 ASSAY OF NATRIURETIC PEPTIDE: CPT

## 2017-01-05 PROCEDURE — 85025 COMPLETE CBC W/AUTO DIFF WBC: CPT

## 2017-01-05 PROCEDURE — 27000221 HC OXYGEN, UP TO 24 HOURS

## 2017-01-05 PROCEDURE — 87070 CULTURE OTHR SPECIMN AEROBIC: CPT

## 2017-01-05 PROCEDURE — 94760 N-INVAS EAR/PLS OXIMETRY 1: CPT

## 2017-01-05 PROCEDURE — 80053 COMPREHEN METABOLIC PANEL: CPT

## 2017-01-05 PROCEDURE — 25000003 PHARM REV CODE 250: Performed by: STUDENT IN AN ORGANIZED HEALTH CARE EDUCATION/TRAINING PROGRAM

## 2017-01-05 PROCEDURE — 25000003 PHARM REV CODE 250: Performed by: EMERGENCY MEDICINE

## 2017-01-05 PROCEDURE — 25000242 PHARM REV CODE 250 ALT 637 W/ HCPCS: Performed by: INTERNAL MEDICINE

## 2017-01-05 PROCEDURE — 94640 AIRWAY INHALATION TREATMENT: CPT

## 2017-01-05 PROCEDURE — 87400 INFLUENZA A/B EACH AG IA: CPT

## 2017-01-05 PROCEDURE — 83690 ASSAY OF LIPASE: CPT

## 2017-01-05 PROCEDURE — 84484 ASSAY OF TROPONIN QUANT: CPT

## 2017-01-05 PROCEDURE — 25000003 PHARM REV CODE 250: Performed by: INTERNAL MEDICINE

## 2017-01-05 PROCEDURE — 11000001 HC ACUTE MED/SURG PRIVATE ROOM

## 2017-01-05 PROCEDURE — 83735 ASSAY OF MAGNESIUM: CPT

## 2017-01-05 PROCEDURE — 86900 BLOOD TYPING SEROLOGIC ABO: CPT

## 2017-01-05 PROCEDURE — 93010 ELECTROCARDIOGRAM REPORT: CPT | Mod: ,,, | Performed by: INTERNAL MEDICINE

## 2017-01-05 PROCEDURE — 85610 PROTHROMBIN TIME: CPT

## 2017-01-05 PROCEDURE — 85730 THROMBOPLASTIN TIME PARTIAL: CPT

## 2017-01-05 PROCEDURE — 63600175 PHARM REV CODE 636 W HCPCS: Performed by: INTERNAL MEDICINE

## 2017-01-05 PROCEDURE — 83605 ASSAY OF LACTIC ACID: CPT

## 2017-01-05 PROCEDURE — 81003 URINALYSIS AUTO W/O SCOPE: CPT

## 2017-01-05 PROCEDURE — 84100 ASSAY OF PHOSPHORUS: CPT

## 2017-01-05 RX ORDER — AMLODIPINE BESYLATE 10 MG/1
10 TABLET ORAL DAILY
Status: DISCONTINUED | OUTPATIENT
Start: 2017-01-06 | End: 2017-01-07 | Stop reason: HOSPADM

## 2017-01-05 RX ORDER — GLUCAGON 1 MG
1 KIT INJECTION
Status: DISCONTINUED | OUTPATIENT
Start: 2017-01-05 | End: 2017-01-07 | Stop reason: HOSPADM

## 2017-01-05 RX ORDER — FOLIC ACID 1 MG/1
1 TABLET ORAL DAILY
Status: DISCONTINUED | OUTPATIENT
Start: 2017-01-05 | End: 2017-01-07 | Stop reason: HOSPADM

## 2017-01-05 RX ORDER — IBUPROFEN 200 MG
16 TABLET ORAL
Status: DISCONTINUED | OUTPATIENT
Start: 2017-01-05 | End: 2017-01-07 | Stop reason: HOSPADM

## 2017-01-05 RX ORDER — FLUTICASONE PROPIONATE 50 MCG
2 SPRAY, SUSPENSION (ML) NASAL DAILY
Status: DISCONTINUED | OUTPATIENT
Start: 2017-01-06 | End: 2017-01-07 | Stop reason: HOSPADM

## 2017-01-05 RX ORDER — MOXIFLOXACIN HYDROCHLORIDE 400 MG/250ML
400 INJECTION, SOLUTION INTRAVENOUS
Status: DISCONTINUED | OUTPATIENT
Start: 2017-01-05 | End: 2017-01-05

## 2017-01-05 RX ORDER — ONDANSETRON 8 MG/1
8 TABLET, ORALLY DISINTEGRATING ORAL EVERY 8 HOURS PRN
Status: DISCONTINUED | OUTPATIENT
Start: 2017-01-05 | End: 2017-01-05

## 2017-01-05 RX ORDER — ENOXAPARIN SODIUM 100 MG/ML
40 INJECTION SUBCUTANEOUS EVERY 24 HOURS
Status: DISCONTINUED | OUTPATIENT
Start: 2017-01-05 | End: 2017-01-07 | Stop reason: HOSPADM

## 2017-01-05 RX ORDER — MOXIFLOXACIN HYDROCHLORIDE 400 MG/1
400 TABLET ORAL DAILY
Status: DISCONTINUED | OUTPATIENT
Start: 2017-01-05 | End: 2017-01-06

## 2017-01-05 RX ORDER — IPRATROPIUM BROMIDE AND ALBUTEROL SULFATE 2.5; .5 MG/3ML; MG/3ML
3 SOLUTION RESPIRATORY (INHALATION) EVERY 6 HOURS PRN
Status: DISCONTINUED | OUTPATIENT
Start: 2017-01-05 | End: 2017-01-06

## 2017-01-05 RX ORDER — IBUPROFEN 200 MG
24 TABLET ORAL
Status: DISCONTINUED | OUTPATIENT
Start: 2017-01-05 | End: 2017-01-07 | Stop reason: HOSPADM

## 2017-01-05 RX ORDER — ACETAMINOPHEN 325 MG/1
650 TABLET ORAL EVERY 4 HOURS PRN
Status: DISCONTINUED | OUTPATIENT
Start: 2017-01-05 | End: 2017-01-07 | Stop reason: HOSPADM

## 2017-01-05 RX ORDER — GUAIFENESIN 600 MG/1
600 TABLET, EXTENDED RELEASE ORAL 2 TIMES DAILY
Status: DISCONTINUED | OUTPATIENT
Start: 2017-01-05 | End: 2017-01-07 | Stop reason: HOSPADM

## 2017-01-05 RX ADMIN — IPRATROPIUM BROMIDE AND ALBUTEROL SULFATE 3 ML: .5; 3 SOLUTION RESPIRATORY (INHALATION) at 06:01

## 2017-01-05 RX ADMIN — ENOXAPARIN SODIUM 40 MG: 100 INJECTION SUBCUTANEOUS at 02:01

## 2017-01-05 RX ADMIN — FOLIC ACID 1 MG: 1 TABLET ORAL at 10:01

## 2017-01-05 RX ADMIN — PANTOPRAZOLE SODIUM 600 MG: 40 TABLET, DELAYED RELEASE ORAL at 09:01

## 2017-01-05 RX ADMIN — MOXIFLOXACIN HYDROCHLORIDE 400 MG: 400 TABLET, FILM COATED ORAL at 09:01

## 2017-01-05 RX ADMIN — IOHEXOL 75 ML: 350 INJECTION, SOLUTION INTRAVENOUS at 01:01

## 2017-01-05 RX ADMIN — SODIUM CHLORIDE 1000 ML: 0.9 INJECTION, SOLUTION INTRAVENOUS at 03:01

## 2017-01-05 NOTE — ED NOTES
Pt identifiers checked and correct. Verified name and .     LOC: The patient is awake, alert and aware of environment with an appropriate affect, the patient is oriented x 3 and speaking appropriately.   APPEARANCE: Patient appears to be in no acute distress, patient is clean and well groomed, patient's clothing is properly fastened.   SKIN: The skin is warm and dry, color consistent with ethnicity, patient has normal skin turgor and moist mucus membranes, skin intact, no breakdown or bruising noted.   MUSCULOSKELETAL: Patient moving all extremities spontaneously, no obvious swelling or deformities noted.   RESPIRATORY: Airway is open and patent, respirations are spontaneous, patient has a normal effort and rate, no accessory muscle use noted. Diminished lung sounds  CARDIAC: Patient has a normal rate and regular rhythm, no periphreal edema noted, capillary refill < 3 seconds.   ABDOMEN: Soft and non tender to palpation, no distention noted, active bowel sounds present in all four quadrants.   NEUROLOGIC: PERRL, Eyes open spontaneously, behavior appropriate to situation, follows commands, facial expression symmetrical, bilateral hand grasp equal and even, purposeful motor response noted, normal sensation in all extremities when touched with a finger.

## 2017-01-05 NOTE — ED NOTES
md speaking to transfer line. Pt accepted by gavino dillard at [facility]. awaiting bed assignment.

## 2017-01-05 NOTE — ED NOTES
[facility] transfers line called to check bed status states in progress. Will call once bed is obtained. Family at bedside updated on status.

## 2017-01-05 NOTE — SUBJECTIVE & OBJECTIVE
Past Medical History   Diagnosis Date    Chemotherapy follow-up examination 9/7/2016    COPD (chronic obstructive pulmonary disease)     Hearing loss     Hypertension 2/9/2015    Primary cancer of right middle lobe of lung 7/11/2016    Rash 9/7/2016       Past Surgical History   Procedure Laterality Date    Knee surgery      Inguinal hernia repair Bilateral        Review of patient's allergies indicates:  No Known Allergies    Current Facility-Administered Medications on File Prior to Encounter   Medication    [COMPLETED] albuterol sulfate nebulizer solution 15 mg    cyanocobalamin injection 1,000 mcg    [COMPLETED] moxifloxacin 400 mg/250 mL IVPB 400 mg     Current Outpatient Prescriptions on File Prior to Encounter   Medication Sig    albuterol 90 mcg/actuation inhaler Inhale 2 puffs into the lungs every 6 (six) hours as needed for Wheezing.    albuterol 90 mcg/actuation inhaler Inhale 2 puffs into the lungs every 6 (six) hours as needed for Wheezing.    albuterol 90 mcg/actuation inhaler Inhale 1 puff into the lungs every 4 (four) hours as needed for Wheezing.    amlodipine (NORVASC) 10 MG tablet TAKE 1 TABLET BY MOUTH EVERY DAY    amoxicillin-clavulanate 875-125mg (AUGMENTIN) 875-125 mg per tablet Take 1 tablet by mouth 2 (two) times daily.    azithromycin (ZITHROMAX Z-RAYMUNDO) 250 MG tablet Take 2 tablets (500 mg total) by mouth once daily. 1 tablet by mouth daily    dexamethasone (DECADRON) 4 MG Tab Take 1 tablet (4 mg total) by mouth every 12 (twelve) hours. Take 1 tab twice daily starting the day prior to chemotherapy for 3 days.    docusate sodium (COLACE) 100 MG capsule Take 1 capsule (100 mg total) by mouth 2 (two) times daily as needed for Constipation.    folic acid (FOLVITE) 400 MCG tablet Take 1 tablet (400 mcg total) by mouth once daily.    hydrOXYzine HCl (ATARAX) 25 MG tablet Take 1 tablet (25 mg total) by mouth 3 (three) times daily as needed for Itching.    ondansetron (ZOFRAN,  AS HYDROCHLORIDE,) 4 MG tablet Take 1 tablet (4 mg total) by mouth every 8 (eight) hours as needed for Nausea.    ondansetron (ZOFRAN-ODT) 8 MG TbDL Dissolve under tongue every 8 hours as needed for nausea    triamcinolone acetonide 0.1% (KENALOG) 0.1 % cream Apply topically 2 (two) times daily.     Family History     Problem Relation (Age of Onset)    Cancer Mother, Sister, Maternal Grandfather    No Known Problems Father, Brother, Maternal Aunt, Maternal Uncle, Paternal Aunt, Paternal Uncle, Maternal Grandmother, Paternal Grandmother, Paternal Grandfather        Social History Main Topics    Smoking status: Former Smoker     Packs/day: 2.00     Years: 40.00     Quit date: 11/16/2013    Smokeless tobacco: Not on file    Alcohol use No    Drug use: No    Sexual activity: Yes     Partners: Female     Review of Systems   Constitutional: Negative for chills and fever.   HENT: Positive for congestion.    Respiratory: Positive for cough and shortness of breath.    Cardiovascular: Negative for chest pain, palpitations and leg swelling.   Gastrointestinal: Negative for abdominal pain, constipation and diarrhea.   Genitourinary: Negative for dysuria.   Musculoskeletal: Negative for myalgias.   Neurological: Negative for dizziness, weakness and headaches.     Objective:     Vital Signs (Most Recent):  Pulse: 108 (01/05/17 1301)  Resp: 20 (01/05/17 1301)  BP: 135/83 (01/05/17 1301)  SpO2: 97 % (01/05/17 1301) Vital Signs (24h Range):  Temp:  [97.7 °F (36.5 °C)-99.3 °F (37.4 °C)] 99.3 °F (37.4 °C)  Pulse:  [101-125] 108  Resp:  [18-45] 20  BP: (109-161)/(62-93) 135/83     Weight: 95.7 kg (211 lb)  Body mass index is 27.84 kg/(m^2).    Physical Exam   Constitutional: He is oriented to person, place, and time. He appears well-developed and well-nourished.   HENT:   Head: Normocephalic and atraumatic.   Right Ear: External ear normal.   Left Ear: External ear normal.   Eyes: EOM are normal.   Neck: Normal range of motion.  Neck supple. No JVD present.   Cardiovascular: Regular rhythm and intact distal pulses.    Tachycardic. Somewhat distant heart sounds   Pulmonary/Chest: Effort normal. No respiratory distress. He exhibits no tenderness.   Decreased breath sounds in right lower lung    Abdominal: Soft. Bowel sounds are normal. There is no tenderness.   Musculoskeletal: Normal range of motion. He exhibits no edema.   Lymphadenopathy:     He has no cervical adenopathy (no palpable cervical or supraclavicular lymphadenopathy ).   Neurological: He is alert and oriented to person, place, and time.   Skin: Skin is warm and dry.   Psychiatric: He has a normal mood and affect. His behavior is normal.        Significant Labs: All pertinent labs within the past 24 hours have been reviewed.    Significant Imaging: I have reviewed all pertinent imaging results/findings within the past 24 hours.

## 2017-01-05 NOTE — ED TRIAGE NOTES
Pt presents to ED via EMS from [facility] for SOB. Pt stated he has been SOB for the past 3 days. Pt was diagnosed with pneumonia at [facility] and was transferred here. Pt stated that SOB is intermittent. Pt denies CP, N/V, and cough.     LOC: Patient name and date of birth verified.  The patient is awake, alert and aware of environment with an appropriate affect, the patient is oriented x 3 and speaking appropriately.  Pt in NAD.    APPEARANCE: Patient resting comfortably and in no acute distress, patient is clean and well groomed, patient's clothing is properly fastened.  SKIN: The skin is warm and dry, color consistent with ethnicity, patient has normal skin turgor and moist mucus membranes, skin intact, no breakdown or brusing noted.  MUSCULOSKELETAL: Patient moving all extremities well, no obvious swelling or deformities noted.  RESPIRATORY: Airway is open and patent, respirations are spontaneous, patient has a normal effort and rate, no accessory muscle use noted.  CARDIAC: Patient has a normal rate and rhythm, no periphreal edema noted, capillary refill < 3 seconds.  ABDOMEN: Soft and non tender to palpation, no distention noted. Bowel sounds present in all four quadrants.  NEUROLOGIC: Eyes open spontaneously, behavior appropriate to situation, follows commands, facial expression symmetrical, bilateral hand grasp equal and even, purposeful motor response noted, normal sensation in all extremities when touched with a finger.

## 2017-01-05 NOTE — H&P
[facility] Hospital Medicine  History & Physical    Patient Name: Angel Torres  MRN: [id number]  Admission Date: 1/5/2017  Attending Physician: Yessenia Patton MD   Primary Care Provider: RADHA Moreno MD    Hospital Medicine Team: [facility] HOSP MED 5 Bashir Kruse MD     Patient information was obtained from patient and ER records.     Subjective:     Principal Problem:Pneumonia    Chief Complaint:  Shortness of breath      HPI: 61 male with PMH of adenocarcinoma of lung (s/p chemo and radiation)and COPD who presents to [facility] as a transfer from [facility] ED for dyspnea on exertion and failed outpatient treatment of pneumonia. He has been having shortness of breath on Saturday which he attributed to nasal congestion that he had been having the day prior. He tried to use his albuterol inhaler but it did not give him much  He noted that following day that his shortness of breath increased while climbing a set of stairs. Patient also says that he had been having intermittent productive cough of yellow sputum but not for the past 2 days. He denies fevers or chills during these episodes. Patient visited ED on 1/2, was diagnosed with pneumonia, treated with IV rocephin and po azithromycin, and discharged on po augmentin. Patient returned to ED for persistent shortness of breath with no improvement and was transferred to [facility] for further management and treatment.    Patient was diagnosed with Stage III adenocarcinoma of lung in 6/2016. He is s/p chemo and radiation, last dose of chemo with concurrent radiation was 10/3/16. Patient of Dr. Fernandez.    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 He has an 80 pack-year smoking history.      Past Medical History   Diagnosis Date    Chemotherapy follow-up examination 9/7/2016    COPD (chronic obstructive pulmonary disease)     Hearing loss     Hypertension 2/9/2015    Primary cancer  of right middle lobe of lung 7/11/2016    Rash 9/7/2016       Past Surgical History   Procedure Laterality Date    Knee surgery      Inguinal hernia repair Bilateral        Review of patient's allergies indicates:  No Known Allergies    Current Facility-Administered Medications on File Prior to Encounter   Medication    [COMPLETED] albuterol sulfate nebulizer solution 15 mg    cyanocobalamin injection 1,000 mcg    [COMPLETED] moxifloxacin 400 mg/250 mL IVPB 400 mg     Current Outpatient Prescriptions on File Prior to Encounter   Medication Sig    albuterol 90 mcg/actuation inhaler Inhale 2 puffs into the lungs every 6 (six) hours as needed for Wheezing.    albuterol 90 mcg/actuation inhaler Inhale 2 puffs into the lungs every 6 (six) hours as needed for Wheezing.    albuterol 90 mcg/actuation inhaler Inhale 1 puff into the lungs every 4 (four) hours as needed for Wheezing.    amlodipine (NORVASC) 10 MG tablet TAKE 1 TABLET BY MOUTH EVERY DAY    amoxicillin-clavulanate 875-125mg (AUGMENTIN) 875-125 mg per tablet Take 1 tablet by mouth 2 (two) times daily.    azithromycin (ZITHROMAX Z-RAYMUNDO) 250 MG tablet Take 2 tablets (500 mg total) by mouth once daily. 1 tablet by mouth daily    dexamethasone (DECADRON) 4 MG Tab Take 1 tablet (4 mg total) by mouth every 12 (twelve) hours. Take 1 tab twice daily starting the day prior to chemotherapy for 3 days.    docusate sodium (COLACE) 100 MG capsule Take 1 capsule (100 mg total) by mouth 2 (two) times daily as needed for Constipation.    folic acid (FOLVITE) 400 MCG tablet Take 1 tablet (400 mcg total) by mouth once daily.    hydrOXYzine HCl (ATARAX) 25 MG tablet Take 1 tablet (25 mg total) by mouth 3 (three) times daily as needed for Itching.    ondansetron (ZOFRAN, AS HYDROCHLORIDE,) 4 MG tablet Take 1 tablet (4 mg total) by mouth every 8 (eight) hours as needed for Nausea.    ondansetron (ZOFRAN-ODT) 8 MG TbDL Dissolve under tongue every 8 hours as needed for  nausea    triamcinolone acetonide 0.1% (KENALOG) 0.1 % cream Apply topically 2 (two) times daily.     Family History     Problem Relation (Age of Onset)    Cancer Mother, Sister, Maternal Grandfather    No Known Problems Father, Brother, Maternal Aunt, Maternal Uncle, Paternal Aunt, Paternal Uncle, Maternal Grandmother, Paternal Grandmother, Paternal Grandfather        Social History Main Topics    Smoking status: Former Smoker     Packs/day: 2.00     Years: 40.00     Quit date: 11/16/2013    Smokeless tobacco: Not on file    Alcohol use No    Drug use: No    Sexual activity: Yes     Partners: Female     Review of Systems   Constitutional: Negative for chills and fever.   HENT: Positive for congestion.    Respiratory: Positive for cough and shortness of breath.    Cardiovascular: Negative for chest pain, palpitations and leg swelling.   Gastrointestinal: Negative for abdominal pain, constipation and diarrhea.   Genitourinary: Negative for dysuria.   Musculoskeletal: Negative for myalgias.   Neurological: Negative for dizziness, weakness and headaches.     Objective:     Vital Signs (Most Recent):  Pulse: 108 (01/05/17 1301)  Resp: 20 (01/05/17 1301)  BP: 135/83 (01/05/17 1301)  SpO2: 97 % (01/05/17 1301) Vital Signs (24h Range):  Temp:  [97.7 °F (36.5 °C)-99.3 °F (37.4 °C)] 99.3 °F (37.4 °C)  Pulse:  [101-125] 108  Resp:  [18-45] 20  BP: (109-161)/(62-93) 135/83     Weight: 95.7 kg (211 lb)  Body mass index is 27.84 kg/(m^2).    Physical Exam   Constitutional: He is oriented to person, place, and time. He appears well-developed and well-nourished.   HENT:   Head: Normocephalic and atraumatic.   Right Ear: External ear normal.   Left Ear: External ear normal.   Eyes: EOM are normal.   Neck: Normal range of motion. Neck supple. No JVD present.   Cardiovascular: Regular rhythm and intact distal pulses.    Tachycardic. Somewhat distant heart sounds   Pulmonary/Chest: Effort normal. No respiratory distress. He  exhibits no tenderness.   Decreased breath sounds in right lower lung    Abdominal: Soft. Bowel sounds are normal. There is no tenderness.   Musculoskeletal: Normal range of motion. He exhibits no edema.   Lymphadenopathy:     He has no cervical adenopathy (no palpable cervical or supraclavicular lymphadenopathy ).   Neurological: He is alert and oriented to person, place, and time.   Skin: Skin is warm and dry.   Psychiatric: He has a normal mood and affect. His behavior is normal.        Significant Labs: All pertinent labs within the past 24 hours have been reviewed.    Significant Imaging: I have reviewed all pertinent imaging results/findings within the past 24 hours.    Assessment/Plan:     * Pneumonia  - CXR shows right middle lobe opacity concerning for pneumonia, though there appears to be persistent consolidation in that region (scarring from radiation?). No pleural effusion  - CURB-65 score is 1, but patient failed outpatient therapy  - Given IV moxifloxacin 400 at [facility]  - Will transition to po moxifloxacin       Essential hypertension  - continue home amlodipine 10 mg    COPD (chronic obstructive pulmonary disease)  - no PFTs in records. Takes albuterol at home as needed  - duo-nebs prn       Shortness of breath  - History of adenocarcinoma of right lung. 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

## 2017-01-05 NOTE — ED NOTES
Call back received from [facility] states no inpatient med surgical beds available to ask patient if they would like to go to [facility] pt stated they would like to go to [facility] where his primary  md is. md notified. Pt accepted by dr. Espinoza Sandhu pt informed of long wait in ed and unknown time frame of bed assignment pt verbalized understanding.

## 2017-01-05 NOTE — ED NOTES
Pt report called given to azael skinner at [facility] . Awaiting aasi to [facility].

## 2017-01-05 NOTE — PROGRESS NOTES
Attempted to call report to report to Allie. Nurse stated she was cleaning someone up and would call back.

## 2017-01-05 NOTE — IP AVS SNAPSHOT
[address]  [place] [address]  Phone: [phone]           I have received a copy of my After Visit Summary and discharge instructions from [facility].    INSTRUCTIONS RECEIVED AND UNDERSTOOD BY:                     Patient/Patient Representative: ________________________________________________________________     Date/Time: ________________________________________________________________                     Instructions Given By: ________________________________________________________________     Date/Time: ________________________________________________________________

## 2017-01-05 NOTE — ED PROVIDER NOTES
Encounter Date: 1/4/2017       History     Chief Complaint   Patient presents with    Shortness of Breath     pt. seen yesterday in the ER for SOB. Dx with Pneumonia Hx of lung cancer and COPD c/o of progressive SOB tonight . Patient able to speak in complete sentences. + yellow sputum. Walking making him feel more SOB.  + congestion after sleeping flat unable to lie flat for a long period.      Review of patient's allergies indicates:  No Known Allergies  The history is provided by the patient, a relative and medical records.   61 -year-old gentleman who complains of shortness of breath.  Patient was seen here yesterday and diagnosed with a right middle lobe pneumonia.  He was treated with Rocephin and azithromycin and discharged home on Augmentin and an inhaler.  Patient said that he still feels short of breath at times.  He's had a cough productive of yellow sputum.  No fever.  He reports a normal appetite.  Patient's shortness of breath worsens with exertion.  Patient has a history of COPD.  He is a former smoker.  He is not on home oxygen.  He also has a history of lung cancer and is status post radiation and chemotherapy to the right lung.  Patient .  He denies chest  pain.  No pain or swelling to his legs. he did not have surgery on the lung.    Past Medical History   Diagnosis Date    Chemotherapy follow-up examination 9/7/2016    COPD (chronic obstructive pulmonary disease)     Hearing loss     Hypertension 2/9/2015    Primary cancer of right middle lobe of lung 7/11/2016    Rash 9/7/2016     Past Medical History Pertinent Negatives   Diagnosis Date Noted    Amblyopia 11/10/2014    Arthritis 11/10/2014    Cataract 11/10/2014    Diabetes mellitus 11/10/2014    Diabetic retinopathy 11/10/2014    Glaucoma 11/10/2014    Macular degeneration 11/10/2014    Retinal detachment 11/10/2014    Sickle cell anemia 2/9/2015    Sickle cell trait 2/9/2015    Strabismus 11/10/2014    Uveitis 11/10/2014      Past Surgical History   Procedure Laterality Date    Knee surgery      Inguinal hernia repair Bilateral      Family History   Problem Relation Age of Onset    Cancer Mother      lung, breast    Cancer Sister      lung    Cancer Maternal Grandfather     No Known Problems Father     No Known Problems Brother     No Known Problems Maternal Aunt     No Known Problems Maternal Uncle     No Known Problems Paternal Aunt     No Known Problems Paternal Uncle     No Known Problems Maternal Grandmother     No Known Problems Paternal Grandmother     No Known Problems Paternal Grandfather     Amblyopia Neg Hx     Blindness Neg Hx     Cataracts Neg Hx     Diabetes Neg Hx     Glaucoma Neg Hx     Hypertension Neg Hx     Macular degeneration Neg Hx     Retinal detachment Neg Hx     Strabismus Neg Hx     Stroke Neg Hx     Thyroid disease Neg Hx      Social History   Substance Use Topics    Smoking status: Former Smoker     Packs/day: 2.00     Years: 40.00     Quit date: 11/16/2013    Smokeless tobacco: None    Alcohol use No     Review of Systems   Constitutional: Negative for appetite change and fever.   HENT: Positive for congestion. Negative for sore throat.    Eyes: Negative for pain.   Respiratory: Positive for cough and shortness of breath.    Cardiovascular: Negative for chest pain.   Gastrointestinal: Negative for abdominal pain.   Genitourinary: Negative for dysuria.   Musculoskeletal: Negative for back pain.   Skin: Negative for rash.   Neurological: Negative for headaches.       Physical Exam   Initial Vitals   BP Pulse Resp Temp SpO2   01/04/17 1920 01/04/17 1920 01/04/17 1920 01/04/17 1920 01/04/17 1920   124/87 111 18 97.7 °F (36.5 °C) 96 %     Physical Exam    Nursing note and vitals reviewed.  Constitutional: He appears well-developed and well-nourished.   HENT:   Head: Normocephalic and atraumatic.   Eyes: EOM are normal. Pupils are equal, round, and reactive to light.   Neck: Normal  range of motion. Neck supple.   Cardiovascular: Normal rate and regular rhythm.   Pulmonary/Chest: Breath sounds normal. No respiratory distress.   Decreased breath sounds bilateral lower lungs   Abdominal: Soft. There is no rebound and no guarding.   Musculoskeletal: Normal range of motion. He exhibits no edema or tenderness.   Neurological: He is alert and oriented to person, place, and time. No cranial nerve deficit.   Skin: Skin is warm and dry.   Psychiatric: He has a normal mood and affect.         ED Course   Critical Care  Date/Time: 1/5/2017 1:03 AM  Performed by: ABRAHAM DAILY.  Authorized by: ABRAHAM DAILY   Direct patient critical care time: 20 minutes  Ordering / reviewing critical care time: 8 minutes  Documentation critical care time: 8 minutes  Consulting other physicians critical care time: 5 minutes  Total critical care time (exclusive of procedural time) : 41 minutes  Critical care was time spent personally by me on the following activities: development of treatment plan with patient or surrogate, discussions with consultants, examination of patient, ordering and review of radiographic studies, ordering and review of laboratory studies, evaluation of patient's response to treatment, re-evaluation of patient's condition, review of old charts, obtaining history from patient or surrogate and pulse oximetry.        Labs Reviewed   POCT CMP   POCT CBC     EKG Readings: (Independently Interpreted)   Sinus tachycardia, heart rate 117, right bundle-branch block, nonspecific ST changes, no ST segment elevation          Medical Decision Making:   Initial Assessment:   7-year-old with a history of lung cancer and recent pneumonia complaining of continued shortness of breath despite inhalers and antibiotics.  Patient is speaking in complete sentences.  He has decreased breath sounds to bilateral lower lobes.  Oxygen saturation 96%.  Clinical Tests:   Lab Tests: Ordered and Reviewed  The following lab  test(s) were unremarkable: CBC and CMP  Radiological Study: Ordered and Reviewed  Medical Tests: Ordered and Reviewed  ED Management:  Patient was given a nebulizer treatment.  Labs were without significant abnormalities.  Repeat chest x-ray continues to show the right middle lobe pneumonia.  Secondary to the patient's recurrence of symptoms and his history I feel that he should be admitted.  I discussed the patient with Dr. Sol at [facility] who accepts  the patient in transfer.  Patient appears well                   ED Course     Clinical Impression:   The primary encounter diagnosis was SOB (shortness of breath). Diagnoses of Pneumonia of right middle lobe due to infectious organism and Malignant neoplasm of middle lobe of right lung were also pertinent to this visit.          Tracy Carrillo MD  01/05/17 0103

## 2017-01-05 NOTE — ED NOTES
Pt resting comfortably and independently repositioned in stretcher with bed locked in lowest position for safety. NAD noted at this time. Respirations even and unlabored and visible chest rise noted.  Patient offered bathroom assistance and denies need at this time. Pt instructed to call if assistance is needed. Pt on continuous cardiac, BP, and O2 monitoring. Call light within reach. Family at bedside. No needs at this time. Will continue to monitor.

## 2017-01-05 NOTE — PROGRESS NOTES
Attempted to call nurse to get report, unable to reach her at this time  stated nurse may be in stroke code. Gave ED  spectra link to call back.

## 2017-01-05 NOTE — IP AVS SNAPSHOT
[facility]  [address]  [facility] [address]  Phone: [phone]           Patient Discharge Instructions     Our goal is to set you up for success. This packet includes information on your condition, medications, and your home care. It will help you to care for yourself so you don't get sicker and need to go back to the hospital.     Please ask your nurse if you have any questions.        There are many details to remember when preparing to leave the hospital. Here is what you will need to do:    1. Take your medicine. If you are prescribed medications, review your Medication List in the following pages. You may have new medications to  at the pharmacy and others that you'll need to stop taking. Review the instructions for how and when to take your medications. Talk with your doctor or nurses if you are unsure of what to do.     2. Go to your follow-up appointments. Specific follow-up information is listed in the following pages. Your may be contacted by a transition nurse or clinical provider about future appointments. Be sure we have all of the phone numbers to reach you, if needed. Please contact your provider's office if you are unable to make an appointment.     3. Watch for warning signs. Your doctor or nurse will give you detailed warning signs to watch for and when to call for assistance. These instructions may also include educational information about your condition. If you experience any of warning signs to your health, call your doctor.               [facility]  Unless otherwise directed by your provider, please contact [facility], our nurse care line that is available for 24/7 assistance.     [phone] (toll-free)    Registered nurses in the [facility] provide clinical advisement, health education, appointment booking, and other advisory services.                    ** Verify the list of medication(s) below is accurate and up  to date. Carry this with you in case of emergency. If your medications have changed, please notify your healthcare provider.             Medication List      START taking these medications        Additional Info    Begin Date AM Noon PM Bedtime    cetirizine 10 MG tablet   Commonly known as:  ZYRTEC   Refills:  0   Dose:  10 mg    Last time this was given:  10 mg on 1/7/2017  9:40 AM   Instructions:  Take 1 tablet (10 mg total) by mouth once daily.         9am 11/8                   doxycycline 100 MG tablet   Commonly known as:  VIBRA-TABS   Quantity:  8 tablet   Refills:  0   Dose:  100 mg   Indications:  Pneumonia   Notes to Patient:  Antibiotic    Last time this was given:  100 mg on 1/7/2017  9:40 AM   Instructions:  Take 1 tablet (100 mg total) by mouth every 12 (twelve) hours.                 9pm 11/7           fluticasone 50 mcg/actuation nasal spray   Commonly known as:  FLONASE   Quantity:  1 Bottle   Refills:  0   Dose:  2 spray    Last time this was given:  2 sprays on 1/7/2017  9:42 AM   Instructions:  2 sprays by Each Nare route once daily.         9am 11/8                   guaifenesin 600 mg 12 hr tablet   Commonly known as:  MUCINEX   Quantity:  20 tablet   Refills:  0   Dose:  600 mg    Last time this was given:  600 mg on 1/7/2017  9:40 AM   Instructions:  Take 1 tablet (600 mg total) by mouth 2 (two) times daily.                 9pm 11/7           predniSONE 20 MG tablet   Commonly known as:  DELTASONE   Quantity:  8 tablet   Refills:  0   Dose:  40 mg    Last time this was given:  40 mg on 1/7/2017  9:40 AM   Instructions:  Take 2 tablets (40 mg total) by mouth once daily.         9am 11/8                        CHANGE how you take these medications        Additional Info    Begin Date AM Noon PM Bedtime    albuterol 90 mcg/actuation inhaler   Quantity:  18 g   Refills:  0   Dose:  2 puff   What changed:  Another medication with the same name was removed. Continue taking this medication, and  follow the directions you see here.    Instructions:  Inhale 2 puffs into the lungs every 6 (six) hours as needed for Wheezing.                              CONTINUE taking these medications        Additional Info    Begin Date AM Noon PM Bedtime    amlodipine 10 MG tablet   Commonly known as:  NORVASC   Quantity:  30 tablet   Refills:  0    Last time this was given:  10 mg on 1/7/2017  9:40 AM   Instructions:  TAKE 1 TABLET BY MOUTH EVERY DAY                            dexamethasone 4 MG Tab   Commonly known as:  DECADRON   Quantity:  24 tablet   Refills:  1   Dose:  4 mg    Instructions:  Take 1 tablet (4 mg total) by mouth every 12 (twelve) hours. Take 1 tab twice daily starting the day prior to chemotherapy for 3 days.                            docusate sodium 100 MG capsule   Commonly known as:  COLACE   Quantity:  60 capsule   Refills:  0   Dose:  100 mg    Instructions:  Take 1 capsule (100 mg total) by mouth 2 (two) times daily as needed for Constipation.                            folic acid 400 MCG tablet   Commonly known as:  FOLVITE   Quantity:  100 tablet   Refills:  3   Dose:  400 mcg    Last time this was given:  1 mg on 1/7/2017  9:40 AM   Instructions:  Take 1 tablet (400 mcg total) by mouth once daily.                            hydrOXYzine HCl 25 MG tablet   Commonly known as:  ATARAX   Quantity:  20 tablet   Refills:  0   Dose:  25 mg    Instructions:  Take 1 tablet (25 mg total) by mouth 3 (three) times daily as needed for Itching.                            ondansetron 4 MG tablet   Commonly known as:  ZOFRAN (AS HYDROCHLORIDE)   Quantity:  60 tablet   Refills:  1   Dose:  4 mg    Instructions:  Take 1 tablet (4 mg total) by mouth every 8 (eight) hours as needed for Nausea.                              STOP taking these medications     amoxicillin-clavulanate 875-125mg 875-125 mg per tablet   Commonly known as:  AUGMENTIN       azithromycin 250 MG tablet   Commonly known as:  ZITHROMAX Z-RAYMUNDO        ondansetron 8 MG Tbdl   Commonly known as:  ZOFRAN-ODT       triamcinolone acetonide 0.1% 0.1 % cream   Commonly known as:  KENALOG            Where to Get Your Medications      These medications were sent to [id number] - [place], [place] - [address] AT [facility]  [address], [place] [address]    Hours:  24-hours Phone:  [phone]     doxycycline 100 MG tablet    fluticasone 50 mcg/actuation nasal spray    predniSONE 20 MG tablet         You can get these medications from any pharmacy     You don't need a prescription for these medications     cetirizine 10 MG tablet    guaifenesin 600 mg 12 hr tablet                  Please bring to all follow up appointments:    1. A copy of your discharge instructions.  2. All medicines you are currently taking in their original bottles.  3. Identification and insurance card.    Please arrive 15 minutes ahead of scheduled appointment time.    Please call 24 hours in advance if you must reschedule your appointment and/or time.        Your Scheduled Appointments     Jan 09, 2017  8:00 AM CST   Pet CT Whole Body with [facility] PET CT LIMIT 400 LBS   [facility] ([facility])    [address] [facility] [address] [phone]            Jan 09, 2017 12:00 PM CST   Non-Fasting Lab with LAB, HEMONC SAME DAY   [facility] ([facility])    [address]  [place] [address] [phone]            Jan 09, 2017  1:00 PM CST   Established Patient Visit with Dano Fernandez DO, FACP   [facility] ([facility])    [address]  [place] [address] [phone]              Follow-up Information     Follow up with Dano Fernandez DO, FACP In 2 days.    Specialty:  Hematology and Oncology    Contact information:    [facility] [facility] [address] [phone]          Discharge  "Instructions     Future Orders    Diet general     Questions:    Total calories:      Fat restriction, if any:      Protein restriction, if any:      Na restriction, if any:      Fluid restriction:      Additional restrictions:          Primary Diagnosis     Your primary diagnosis was:  Radiation Disease      Admission Information     Date & Time Provider Department CSN    1/5/2017  3:17 AM Yessenia Patton MD [facility] [id number]      Care Providers     Provider Role Specialty Primary office phone    Yessenia Patton MD Attending Provider Hospitalist [phone]    Yessenia Patton MD Team Attending  Hospitalist [phone]      Your Vitals Were     BP Pulse Temp Resp Height Weight    119/72 (BP Location: Right arm, Patient Position: Lying, BP Method: Automatic) 80 97.4 °F (36.3 °C) (Oral) 18 6' 1" (1.854 m) 95.7 kg (211 lb)    SpO2 BMI             96% 27.84 kg/m2         Recent Lab Values     No lab values to display.      Pending Labs     Order Current Status    Troponin I In process    Blood culture x two cultures. Draw prior to antibiotics. Preliminary result    Blood culture x two cultures. Draw prior to antibiotics. Preliminary result      Allergies as of 1/7/2017     No Known Allergies      Advance Directives     An advance directive is a document which, in the event you are no longer able to make decisions for yourself, tells your healthcare team what kind of treatment you do or do not want to receive, or who you would like to make those decisions for you.  If you do not currently have an advance directive, [facility] encourages you to create one.  For more information call:  [phone]), [phone] ([phone]),  or log on to [email or web address].        Smoking Cessation     If you would like to quit smoking:   You may be eligible for free services if you are a [place] resident and started smoking cigarettes before September 1, 1988.  Call the Smoking Cessation " Trust ([facility]) toll free at (100) 884-4232 or (916) 541-5366.   Call 1-800-QUIT-NOW if you do not meet the above criteria.            Language Assistance Services     ATTENTION: Language assistance services are available, free of charge. Please call [phone].      ATENCIÓN: Si habla español, tiene a zhang disposición servicios gratuitos de asistencia lingüística. Llame al [phone].     CHÚ Ý: N?u b?n nói Ti?ng Vi?t, có các d?ch v? h? tr? ngôn ng? mi?n phí dành cho b?n. G?i s? [phone].        Pneumonmia Discharge Instructions                 [facility] complies with applicable Federal civil rights laws and does not discriminate on the basis of race, color, national origin, age, disability, or sex.

## 2017-01-05 NOTE — ED NOTES
Report received from Hattie. Patient awake and alert. Side rails up x2. Call light within reach. Numeric pain scale 0. Will continue to monitor

## 2017-01-05 NOTE — ASSESSMENT & PLAN NOTE
- History of adenocarcinoma of right lung. 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

## 2017-01-05 NOTE — ASSESSMENT & PLAN NOTE
- CXR shows right middle lobe opacity concerning for pneumonia, though there appears to be persistent consolidation in that region (scarring from radiation?). No pleural effusion  - CURB-65 score is 1, but patient failed outpatient therapy  - Given IV moxifloxacin 400 at [place] [facility]  - Will transition to po moxifloxacin

## 2017-01-06 PROBLEM — F41.9 ANXIETY: Status: ACTIVE | Noted: 2017-01-06

## 2017-01-06 PROBLEM — J96.01 ACUTE HYPOXEMIC RESPIRATORY FAILURE: Status: ACTIVE | Noted: 2017-01-06

## 2017-01-06 PROBLEM — J70.0 POST-RADIATION PNEUMONITIS: Status: ACTIVE | Noted: 2017-01-06

## 2017-01-06 LAB
ALBUMIN SERPL BCP-MCNC: 3 G/DL
ALP SERPL-CCNC: 61 U/L
ALT SERPL W/O P-5'-P-CCNC: 9 U/L
ANION GAP SERPL CALC-SCNC: 9 MMOL/L
AST SERPL-CCNC: 14 U/L
BACTERIA UR CULT: NO GROWTH
BASOPHILS # BLD AUTO: 0.02 K/UL
BASOPHILS NFR BLD: 0.3 %
BILIRUB SERPL-MCNC: 0.3 MG/DL
BUN SERPL-MCNC: 12 MG/DL
CALCIUM SERPL-MCNC: 9.1 MG/DL
CHLORIDE SERPL-SCNC: 109 MMOL/L
CO2 SERPL-SCNC: 20 MMOL/L
CREAT SERPL-MCNC: 1.1 MG/DL
DIASTOLIC DYSFUNCTION: NO
DIFFERENTIAL METHOD: ABNORMAL
EOSINOPHIL # BLD AUTO: 0.2 K/UL
EOSINOPHIL NFR BLD: 2.1 %
ERYTHROCYTE [DISTWIDTH] IN BLOOD BY AUTOMATED COUNT: 12.7 %
EST. GFR  (AFRICAN AMERICAN): >60 ML/MIN/1.73 M^2
EST. GFR  (NON AFRICAN AMERICAN): >60 ML/MIN/1.73 M^2
ESTIMATED PA SYSTOLIC PRESSURE: 20
GLUCOSE SERPL-MCNC: 77 MG/DL
HCT VFR BLD AUTO: 36.3 %
HGB BLD-MCNC: 12.2 G/DL
LYMPHOCYTES # BLD AUTO: 0.8 K/UL
LYMPHOCYTES NFR BLD: 11.3 %
MAGNESIUM SERPL-MCNC: 2.1 MG/DL
MCH RBC QN AUTO: 29.6 PG
MCHC RBC AUTO-ENTMCNC: 33.6 %
MCV RBC AUTO: 88 FL
MITRAL VALVE MOBILITY: NORMAL
MONOCYTES # BLD AUTO: 0.8 K/UL
MONOCYTES NFR BLD: 10.6 %
NEUTROPHILS # BLD AUTO: 5.5 K/UL
NEUTROPHILS NFR BLD: 75.4 %
PHOSPHATE SERPL-MCNC: 3.9 MG/DL
PLATELET # BLD AUTO: 398 K/UL
PMV BLD AUTO: 9.8 FL
POTASSIUM SERPL-SCNC: 4.5 MMOL/L
PROT SERPL-MCNC: 7.1 G/DL
RBC # BLD AUTO: 4.12 M/UL
RETIRED EF AND QEF - SEE NOTES: 50 (ref 55–65)
SODIUM SERPL-SCNC: 138 MMOL/L
TRICUSPID VALVE REGURGITATION: NORMAL
WBC # BLD AUTO: 7.28 K/UL

## 2017-01-06 PROCEDURE — 85025 COMPLETE CBC W/AUTO DIFF WBC: CPT

## 2017-01-06 PROCEDURE — 83735 ASSAY OF MAGNESIUM: CPT

## 2017-01-06 PROCEDURE — 93010 ELECTROCARDIOGRAM REPORT: CPT | Mod: ,,, | Performed by: INTERNAL MEDICINE

## 2017-01-06 PROCEDURE — 63600175 PHARM REV CODE 636 W HCPCS: Performed by: INTERNAL MEDICINE

## 2017-01-06 PROCEDURE — 84100 ASSAY OF PHOSPHORUS: CPT

## 2017-01-06 PROCEDURE — 93005 ELECTROCARDIOGRAM TRACING: CPT

## 2017-01-06 PROCEDURE — 93306 TTE W/DOPPLER COMPLETE: CPT | Mod: 26,,, | Performed by: INTERNAL MEDICINE

## 2017-01-06 PROCEDURE — 93306 TTE W/DOPPLER COMPLETE: CPT

## 2017-01-06 PROCEDURE — 11000001 HC ACUTE MED/SURG PRIVATE ROOM

## 2017-01-06 PROCEDURE — 25000003 PHARM REV CODE 250: Performed by: INTERNAL MEDICINE

## 2017-01-06 PROCEDURE — 80053 COMPREHEN METABOLIC PANEL: CPT

## 2017-01-06 PROCEDURE — 25000003 PHARM REV CODE 250: Performed by: STUDENT IN AN ORGANIZED HEALTH CARE EDUCATION/TRAINING PROGRAM

## 2017-01-06 PROCEDURE — 99253 IP/OBS CNSLTJ NEW/EST LOW 45: CPT | Mod: ,,, | Performed by: INTERNAL MEDICINE

## 2017-01-06 PROCEDURE — 36415 COLL VENOUS BLD VENIPUNCTURE: CPT

## 2017-01-06 PROCEDURE — 99233 SBSQ HOSP IP/OBS HIGH 50: CPT | Mod: ,,, | Performed by: HOSPITALIST

## 2017-01-06 RX ORDER — PREDNISONE 20 MG/1
40 TABLET ORAL DAILY
Status: DISCONTINUED | OUTPATIENT
Start: 2017-01-06 | End: 2017-01-07 | Stop reason: HOSPADM

## 2017-01-06 RX ORDER — DOXYCYCLINE HYCLATE 100 MG
100 TABLET ORAL EVERY 12 HOURS
Status: DISCONTINUED | OUTPATIENT
Start: 2017-01-06 | End: 2017-01-07 | Stop reason: HOSPADM

## 2017-01-06 RX ORDER — CETIRIZINE HYDROCHLORIDE 5 MG/1
10 TABLET ORAL DAILY
Status: DISCONTINUED | OUTPATIENT
Start: 2017-01-06 | End: 2017-01-07 | Stop reason: HOSPADM

## 2017-01-06 RX ORDER — IPRATROPIUM BROMIDE AND ALBUTEROL SULFATE 2.5; .5 MG/3ML; MG/3ML
3 SOLUTION RESPIRATORY (INHALATION) EVERY 4 HOURS PRN
Status: DISCONTINUED | OUTPATIENT
Start: 2017-01-06 | End: 2017-01-07 | Stop reason: HOSPADM

## 2017-01-06 RX ORDER — HYDROXYZINE HYDROCHLORIDE 25 MG/1
25 TABLET, FILM COATED ORAL 3 TIMES DAILY PRN
Status: DISCONTINUED | OUTPATIENT
Start: 2017-01-06 | End: 2017-01-07 | Stop reason: HOSPADM

## 2017-01-06 RX ORDER — OXYMETAZOLINE HCL 0.05 %
2 SPRAY, NON-AEROSOL (ML) NASAL 2 TIMES DAILY
Status: DISCONTINUED | OUTPATIENT
Start: 2017-01-06 | End: 2017-01-07 | Stop reason: HOSPADM

## 2017-01-06 RX ADMIN — PREDNISONE 40 MG: 20 TABLET ORAL at 01:01

## 2017-01-06 RX ADMIN — DOXYCYCLINE HYCLATE 100 MG: 100 TABLET, COATED ORAL at 12:01

## 2017-01-06 RX ADMIN — OXYMETAZOLINE HYDROCHLORIDE 2 SPRAY: 5 SPRAY NASAL at 08:01

## 2017-01-06 RX ADMIN — FOLIC ACID 1 MG: 1 TABLET ORAL at 08:01

## 2017-01-06 RX ADMIN — DOXYCYCLINE HYCLATE 100 MG: 100 TABLET, COATED ORAL at 08:01

## 2017-01-06 RX ADMIN — ENOXAPARIN SODIUM 40 MG: 100 INJECTION SUBCUTANEOUS at 12:01

## 2017-01-06 RX ADMIN — AMLODIPINE BESYLATE 10 MG: 10 TABLET ORAL at 08:01

## 2017-01-06 RX ADMIN — FLUTICASONE PROPIONATE 2 SPRAY: 50 SPRAY, METERED NASAL at 08:01

## 2017-01-06 RX ADMIN — OXYMETAZOLINE HYDROCHLORIDE 2 SPRAY: 5 SPRAY NASAL at 01:01

## 2017-01-06 RX ADMIN — PANTOPRAZOLE SODIUM 600 MG: 40 TABLET, DELAYED RELEASE ORAL at 08:01

## 2017-01-06 RX ADMIN — CETIRIZINE HYDROCHLORIDE 10 MG: 5 TABLET, FILM COATED ORAL at 12:01

## 2017-01-06 NOTE — PLAN OF CARE
Problem: Patient Care Overview  Goal: Plan of Care Review  Outcome: Ongoing (interventions implemented as appropriate)  Pt free of falls/trauma/injuries. Bed in low position, wheels locked, and call light within reach. Skin integrity remains unchanged. VSS and afebrile. No distress noted or reported at this time. Will continue to monitor.

## 2017-01-06 NOTE — PLAN OF CARE
01/06/17 0956   Discharge Assessment   Assessment Type Discharge Planning Assessment   Confirmed/corrected address and phone number on facesheet? Yes   Assessment information obtained from? Patient   Prior to hospitilization cognitive status: Alert/Oriented   Prior to hospitalization functional status: Independent   Current cognitive status: Alert/Oriented   Current Functional Status: Independent   [facility] (see comments)  [facility]   Lives With child(mayo), adult   Able to Return to Prior Arrangements yes   Is patient able to care for self after discharge? Yes   How many people do you have in your home that can help with your care after discharge? 1  (Magaly WILL) [phone])   Patient's perception of discharge disposition home or selfcare   Readmission Within The Last 30 Days no previous admission in last 30 days   Patient currently being followed by outpatient case management? No   Patient currently receives home health services? No   Does the patient currently use HME? No   Patient currently receives private duty nursing? No   Patient currently receives any other outside agency services? No   Equipment Currently Used at Home none   Do you have any problems affording any of your prescribed medications? No   Is the patient taking medications as prescribed? yes   Do you have any financial concerns preventing you from receiving the healthcare you need? No   Does the patient have transportation to healthcare appointments? Yes   Transportation Available car;family or friend will provide   On Dialysis? No   Does the patient receive services at the [facility]mad[facility] Clinic? No   Are there any open cases? No   Discharge Plan A Home;Home with family   Discharge Plan B Home;Home with family;Home Health   Patient/Family In Agreement With Plan yes

## 2017-01-06 NOTE — CONSULTS
Consult Note  Hematology/Oncology      Consult Requested By: Yessenia Patton MD    Reason for Consult:     SUBJECTIVE:     History of Present Illness:  Patient is a 61 y.o. male pmhx adenocarcinom of lung s/p chemo and radiation, COPD presents with dyspnea on exertion. BURDEN began on Saturday, a/w productive cough, yellow sputum. Patient tried increasing his inhaler use without any improvement. Patient went to OSH ED was given IV rocephin and azithromycin, discharged on PO augmentin. Patient returned home and did not have any improvement in sx.     Patient of Dr. Fernandez,  Stage III adenocarcinoma of lung received Cisplatin and Pemetrexed 8/15/16 last dose 10/3/16. Completed 64 Gy of concurrent radiation. Patient scheduled to see Dr. Fernandez in clinic on Monday.    Patient has 80 yr smoking history. Quit smoking approx 4 yrs ago.  Continuous Infusions:   Scheduled Meds:   amlodipine  10 mg Oral Daily    enoxaparin  40 mg Subcutaneous Daily    fluticasone  2 spray Each Nare Daily    folic acid  1 mg Oral Daily    guaifenesin  600 mg Oral BID    moxifloxacin  400 mg Oral Daily     PRN Meds:acetaminophen, albuterol-ipratropium 2.5mg-0.5mg/3mL, dextrose 50%, dextrose 50%, glucagon (human recombinant), glucose, glucose    Past Medical History   Diagnosis Date    Chemotherapy follow-up examination 9/7/2016    COPD (chronic obstructive pulmonary disease)     Hearing loss     Hypertension 2/9/2015    Primary cancer of right middle lobe of lung 7/11/2016    Rash 9/7/2016     Past Surgical History   Procedure Laterality Date    Knee surgery      Inguinal hernia repair Bilateral      Family History   Problem Relation Age of Onset    Cancer Mother      lung, breast    Cancer Sister      lung    Cancer Maternal Grandfather     No Known Problems Father     No Known Problems Brother     No Known Problems Maternal Aunt     No Known Problems Maternal Uncle     No Known Problems Paternal Aunt     No Known  Problems Paternal Uncle     No Known Problems Maternal Grandmother     No Known Problems Paternal Grandmother     No Known Problems Paternal Grandfather     Amblyopia Neg Hx     Blindness Neg Hx     Cataracts Neg Hx     Diabetes Neg Hx     Glaucoma Neg Hx     Hypertension Neg Hx     Macular degeneration Neg Hx     Retinal detachment Neg Hx     Strabismus Neg Hx     Stroke Neg Hx     Thyroid disease Neg Hx      Social History   Substance Use Topics    Smoking status: Former Smoker     Packs/day: 2.00     Years: 40.00     Quit date: 2013    Smokeless tobacco: None    Alcohol use No       Review of patient's allergies indicates:  No Known Allergies     Review of Systems:  Constitutional: no fever or chills, ECO  Eyes: no visual changes  ENT: no nasal congestion or sore throat  Respiratory: positive for cough and dyspnea on exertion, negative for hemoptysis and pleurisy/chest pain  Cardiovascular: no chest pain or palpitations  Gastrointestinal: no nausea or vomiting, no abdominal pain or change in bowel habits  Genitourinary: no hematuria or dysuria  Hematologic/Lymphatic: no easy bruising or lymphadenopathy  Musculoskeletal: no arthralgias or myalgias  Neurological: no seizures or tremors  Behavioral/Psych: no auditory or visual hallucinations    OBJECTIVE:     Vital Signs (Most Recent)  Temp: 99.1 °F (37.3 °C) (17 0748)  Pulse: 110 (17 0900)  Resp: 17 (17 0748)  BP: 133/69 (17 0748)  SpO2: 96 % (17)    Pain Assessment: No pain reported at this time    Vital Signs Range (Last 24H):  Temp:  [97.5 °F (36.4 °C)-99.1 °F (37.3 °C)]   Pulse:  []   Resp:  [16-45]   BP: (114-161)/(69-93)   SpO2:  [94 %-99 %]     Physical Exam:  General: well developed, well nourished  HENT: Head:normocephalic, atraumatic. Ears:bilateral TM's and external ear canals normal, not examined. Nose: Nares normal. Septum midline. Mucosa normal. No drainage or sinus tenderness., no  discharge. Throat: lips, mucosa, and tongue normal; teeth and gums normal and no throat erythema.  Eyes: conjunctivae/corneas clear. PERRL.   Neck: supple, symmetrical, trachea midline, no JVD and thyroid not enlarged, symmetric, no tenderness/mass/nodules  Lungs:  normal respiratory effort and wheezes apex - left  Cardiovascular: Heart: regular rate and rhythm, S1, S2 normal, no murmur, click, rub or gallop. Chest Wall: no tenderness. Extremities: no cyanosis or edema, or clubbing. Pulses: 2+ and symmetric.  Abdomen/Rectal: Abdomen: soft, non-tender non-distented; bowel sounds normal; no masses,  no organomegaly. Rectal: normal tone, no masses or tenderness and not examined  Skin: Skin color, texture, turgor normal. No rashes or lesions  Musculoskeletal:full range of motion of joints: right upper extremity, left upper extremity, right lower extremity and left lower extremity    Lymph Nodes: No cervical or supraclavicular adenopathy  Neurologic: Normal strength and tone. No focal numbness or weakness  Psych/Behavioral:  Normal. and Alert and oriented, appropriate affect.    Laboratory:  CBC with Differential:    Recent Labs  Lab 01/06/17 0430   WBC 7.28   LYMPH 11.3*  0.8*   BASOPHIL 0.3   RBC 4.12*   HCT 36.3*   HGB 12.2*   MCV 88   MCH 29.6   RDW 12.7   *   MPV 9.8     CMP:    Recent Labs  Lab 01/06/17  0430   GLU 77   CALCIUM 9.1   ALBUMIN 3.0*   PROT 7.1      K 4.5   CO2 20*      BUN 12   CREATININE 1.1   ALKPHOS 61   ALT 9*   AST 14   BILITOT 0.3     BMP:   Recent Labs  Lab 01/06/17  0430   GLU 77   CALCIUM 9.1      K 4.5   CO2 20*      BUN 12   CREATININE 1.1     LFTs:   Recent Labs  Lab 01/06/17  0430   ALT 9*   AST 14   ALKPHOS 61   BILITOT 0.3   PROT 7.1   ALBUMIN 3.0*       Diagnostic Results:  CT Chest    1. No evidence of pulmonary thromboembolism. No evidence of pulmonary parenchymal infarction.    2. Improving opacification/consolidation within the right middle lobe  with improved patency of the right middle lobe bronchus and medial segmental bronchus, suggesting response to ongoing radiation therapy in this patient with a history of lung cancer.    3. Persistent reticulation of the basal segments of the right lower lobe likely represents post radiation change.    4. 1.1 cm nodule within the right lower lobe medial basal segment. Unclear if new or course blunting to disease activity on previous PET/CT. Further followup as clinically warranted.    5. Nonspecific, unchanged hepatic hypodensities, not well evaluated secondary to the exam technique. If clinically warranted, further evaluation could be pursued with dedicated CT abdomen, liver mass protocol.    6. Unchanged left adrenal hypodensity.  ASSESSMENT/PLAN:     62 yo AA male pmhx stage III adenocarcinoma s/p chemo and radiation, COPD presenting with BURDEN with some wheezing on exam.     Plan:   -Agree with treatment for COPD exacerbation-steroids, inhalers and abx. Can consider adding breo for COPD management long term.  -Possible that this is radiation pneumonitis, reviewed film with radiology  -1 cm nodule in RLL is stable from prior imaging    Thank you for this consult. Case discussed with staff.    Espinoza Thomas MD PGY-1        ATTENDING NOTE, HEMATOLOGY CONSULT TEAM    As above; please refer to my note of same day for our recommendations.  Case also discussed with the inpatient attending.

## 2017-01-06 NOTE — PROGRESS NOTES
ATTENDING NOTE, ONCOLOGY CONSULT TEAM      Patient seen and examined, chart reviewed, radiologic studies reviewed in the Radiology Department.  Full note to follow by house staff.  He states that his breathing today is better compared to when he came in.  On examination he is alert, oriented, in NAD.  He has crackles in the right base posteriorly.  Abdomen is soft, nontender.  Labs reviewed.    RECOMMEND  Agree with current management with steroids, inhalers, and empiric antibiotics.  It is possible that this is radiation pneumonitis judging from the linear distribution within the right lung field.  The 1 cm nodule in the RLL was present on his PET scan in July and appears unchanged.  It would be reasonable to transfer this patient to the oncology Service since his presentation and his symptoms may be related to his prior treatment.  His questions were answered to his satisfaction.

## 2017-01-07 VITALS
WEIGHT: 211 LBS | HEIGHT: 73 IN | RESPIRATION RATE: 18 BRPM | DIASTOLIC BLOOD PRESSURE: 72 MMHG | OXYGEN SATURATION: 96 % | BODY MASS INDEX: 27.96 KG/M2 | SYSTOLIC BLOOD PRESSURE: 119 MMHG | TEMPERATURE: 97 F | HEART RATE: 112 BPM

## 2017-01-07 LAB
ALBUMIN SERPL BCP-MCNC: 3 G/DL
ALP SERPL-CCNC: 64 U/L
ALT SERPL W/O P-5'-P-CCNC: 9 U/L
ANION GAP SERPL CALC-SCNC: 11 MMOL/L
AST SERPL-CCNC: 14 U/L
BACTERIA SPEC AEROBE CULT: NORMAL
BASOPHILS # BLD AUTO: 0.01 K/UL
BASOPHILS NFR BLD: 0.1 %
BILIRUB SERPL-MCNC: 0.2 MG/DL
BUN SERPL-MCNC: 14 MG/DL
CALCIUM SERPL-MCNC: 9.3 MG/DL
CHLORIDE SERPL-SCNC: 108 MMOL/L
CO2 SERPL-SCNC: 20 MMOL/L
CREAT SERPL-MCNC: 1.1 MG/DL
DIFFERENTIAL METHOD: ABNORMAL
EOSINOPHIL # BLD AUTO: 0 K/UL
EOSINOPHIL NFR BLD: 0.3 %
ERYTHROCYTE [DISTWIDTH] IN BLOOD BY AUTOMATED COUNT: 12.5 %
EST. GFR  (AFRICAN AMERICAN): >60 ML/MIN/1.73 M^2
EST. GFR  (NON AFRICAN AMERICAN): >60 ML/MIN/1.73 M^2
GLUCOSE SERPL-MCNC: 124 MG/DL
GRAM STN SPEC: NORMAL
HCT VFR BLD AUTO: 37.6 %
HGB BLD-MCNC: 12.5 G/DL
LYMPHOCYTES # BLD AUTO: 0.7 K/UL
LYMPHOCYTES NFR BLD: 9.3 %
MAGNESIUM SERPL-MCNC: 2.2 MG/DL
MCH RBC QN AUTO: 29.6 PG
MCHC RBC AUTO-ENTMCNC: 33.2 %
MCV RBC AUTO: 89 FL
MONOCYTES # BLD AUTO: 0.5 K/UL
MONOCYTES NFR BLD: 6.8 %
NEUTROPHILS # BLD AUTO: 6.2 K/UL
NEUTROPHILS NFR BLD: 83.4 %
PHOSPHATE SERPL-MCNC: 3.8 MG/DL
PLATELET # BLD AUTO: 404 K/UL
PMV BLD AUTO: 9.6 FL
POTASSIUM SERPL-SCNC: 4.2 MMOL/L
PROT SERPL-MCNC: 7.4 G/DL
RBC # BLD AUTO: 4.23 M/UL
SODIUM SERPL-SCNC: 139 MMOL/L
WBC # BLD AUTO: 7.4 K/UL

## 2017-01-07 PROCEDURE — 25000003 PHARM REV CODE 250: Performed by: INTERNAL MEDICINE

## 2017-01-07 PROCEDURE — 25000003 PHARM REV CODE 250: Performed by: STUDENT IN AN ORGANIZED HEALTH CARE EDUCATION/TRAINING PROGRAM

## 2017-01-07 PROCEDURE — 99238 HOSP IP/OBS DSCHRG MGMT 30/<: CPT | Mod: ,,, | Performed by: HOSPITALIST

## 2017-01-07 PROCEDURE — 80053 COMPREHEN METABOLIC PANEL: CPT

## 2017-01-07 PROCEDURE — 83735 ASSAY OF MAGNESIUM: CPT

## 2017-01-07 PROCEDURE — 36415 COLL VENOUS BLD VENIPUNCTURE: CPT

## 2017-01-07 PROCEDURE — 85025 COMPLETE CBC W/AUTO DIFF WBC: CPT

## 2017-01-07 PROCEDURE — 84100 ASSAY OF PHOSPHORUS: CPT

## 2017-01-07 PROCEDURE — 63600175 PHARM REV CODE 636 W HCPCS: Performed by: INTERNAL MEDICINE

## 2017-01-07 RX ORDER — GUAIFENESIN 600 MG/1
600 TABLET, EXTENDED RELEASE ORAL 2 TIMES DAILY
Qty: 20 TABLET | Refills: 0 | COMMUNITY
Start: 2017-01-07 | End: 2017-01-17

## 2017-01-07 RX ORDER — PREDNISONE 20 MG/1
40 TABLET ORAL DAILY
Qty: 8 TABLET | Refills: 0 | Status: SHIPPED | OUTPATIENT
Start: 2017-01-07 | End: 2017-01-11

## 2017-01-07 RX ORDER — CETIRIZINE HYDROCHLORIDE 10 MG/1
10 TABLET ORAL DAILY
Refills: 0 | COMMUNITY
Start: 2017-01-07 | End: 2018-01-07

## 2017-01-07 RX ORDER — DOXYCYCLINE HYCLATE 100 MG
100 TABLET ORAL EVERY 12 HOURS
Qty: 8 TABLET | Refills: 0 | Status: SHIPPED | OUTPATIENT
Start: 2017-01-07 | End: 2017-01-11

## 2017-01-07 RX ORDER — FLUTICASONE PROPIONATE 50 MCG
2 SPRAY, SUSPENSION (ML) NASAL DAILY
Qty: 1 BOTTLE | Refills: 0 | Status: SHIPPED | OUTPATIENT
Start: 2017-01-07 | End: 2017-02-03 | Stop reason: SDUPTHER

## 2017-01-07 RX ADMIN — FOLIC ACID 1 MG: 1 TABLET ORAL at 09:01

## 2017-01-07 RX ADMIN — DOXYCYCLINE HYCLATE 100 MG: 100 TABLET, COATED ORAL at 09:01

## 2017-01-07 RX ADMIN — FLUTICASONE PROPIONATE 2 SPRAY: 50 SPRAY, METERED NASAL at 09:01

## 2017-01-07 RX ADMIN — PANTOPRAZOLE SODIUM 600 MG: 40 TABLET, DELAYED RELEASE ORAL at 09:01

## 2017-01-07 RX ADMIN — CETIRIZINE HYDROCHLORIDE 10 MG: 5 TABLET, FILM COATED ORAL at 09:01

## 2017-01-07 RX ADMIN — PREDNISONE 40 MG: 20 TABLET ORAL at 09:01

## 2017-01-07 RX ADMIN — OXYMETAZOLINE HYDROCHLORIDE 2 SPRAY: 5 SPRAY NASAL at 09:01

## 2017-01-07 RX ADMIN — AMLODIPINE BESYLATE 10 MG: 10 TABLET ORAL at 09:01

## 2017-01-07 NOTE — ASSESSMENT & PLAN NOTE
- Will discharge patient on prednisone 40 mg daily for 4 more days.  - can continue his albuterol inhaler on discharge.   - Can discuss options for a LABA with his oncologist as outpatient. SOB more related to anxiety and sinus congestion rather than overt wheezing.

## 2017-01-07 NOTE — ASSESSMENT & PLAN NOTE
-Patient received radiation concurrently with chemo from 8/2016 to 10/2016 for right lung cancer (adenocarcinoma)  -Consulted Hem/Onc. They believe radiation pneumonitis is likely given judging from the linear distribution within the right lung field on CT  -Continue prednisone (5 days) and duo nebs prn

## 2017-01-07 NOTE — ASSESSMENT & PLAN NOTE
- more likely radiation pneumonitis rather than CAP. Will continue patient on doxycycline for 4 more days and then discharge  - patient currently on room air.

## 2017-01-07 NOTE — PROGRESS NOTES
[facility] Hospital Medicine  Progress Note    Patient Name: Angel Torres  MRN: [id number]  Patient Class: IP- Inpatient   Admission Date: 1/5/2017  Length of Stay: 2 days  Expected Discharge Date: 1/7/2017  Attending Physician: Yessenia Patton MD  Primary Care Provider: RADHA Moreno MD    [facility] Medicine Team: [facility] [place] HOSP MED 5 Ila Higgins MD    Subjective:     Principal Problem:Post-radiation pneumonitis    HPI:  61 male with PMH of adenocarcinoma of lung (s/p chemo and radiation)and COPD who presents to [facility] [place] as a transfer from [facility] ED for dyspnea on exertion and failed outpatient treatment of pneumonia. He has been having shortness of breath on Saturday which he attributed to nasal congestion that he had been having the day prior. He tried to use his albuterol inhaler but it did not give him much  He noted that following day that his shortness of breath increased while climbing a set of stairs. Patient also says that he had been having intermittent productive cough of yellow sputum but not for the past 2 days. He denies fevers or chills during these episodes. Patient visited ED on 1/2, was diagnosed with pneumonia, treated with IV rocephin and po azithromycin, and discharged on po augmentin. Patient returned to ED for persistent shortness of breath with no improvement and was transferred to [facility] [place] for further management and treatment.    Patient was diagnosed with Stage III adenocarcinoma of lung in 6/2016. He is s/p chemo and radiation, last dose of chemo with concurrent radiation was 10/3/16. Patient of Dr. Fernandez.    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 He has an 80 pack-year smoking history.      Hospital Course:  CTA was ordered to rule out PE given recent cancer history and history of immobility with work () but was negative for PE. Patient was started on po moxifloxacin but changed to po  doxycycline. He complained of nasal congestion and cough so was started on flonase and mucinex He remained anxious during admission about his health status. 1/6/17 patient de-sated to 90% on room air while sitting on side of bed but continued to maintain O2 sat in mid 90s on 2 L NC while resting. He was started on prednisone 40 gm daily for 5 days starting 1/6/17 to cover for possible COPD exacerbation.    On 01/07/17 the patient was discharged on a course of 4 more days of prednisone, 4 more days of doxycycline, mucinex and flonase. Patient was told to continue his zyrtec. In addition the patient was told to follow up with his Oncologist, Dr. Fernandez on Monday in Clinic for additional options for inhalers (possibly a Breo Inhaler).       Interval History:   Patient has no complaints currently and states that his congestion and SOB are much improved.     Review of Systems   Constitutional: Negative for chills and fever.   HENT: Positive for congestion.         Congestion improved.    Respiratory: Negative for cough and shortness of breath.    Cardiovascular: Negative for chest pain, palpitations and leg swelling.   Gastrointestinal: Negative for abdominal pain, constipation and diarrhea.   Genitourinary: Negative for dysuria.   Musculoskeletal: Negative for myalgias.   Neurological: Negative for dizziness, weakness and headaches.     Objective:     Vital Signs (Most Recent):  Temp: 97.4 °F (36.3 °C) (01/07/17 0814)  Pulse: 80 (01/07/17 0814)  Resp: 18 (01/07/17 0814)  BP: 119/72 (01/07/17 0814)  SpO2: 96 % (01/07/17 0814) Vital Signs (24h Range):  Temp:  [97.4 °F (36.3 °C)-98.9 °F (37.2 °C)] 97.4 °F (36.3 °C)  Pulse:  [] 80  Resp:  [17-18] 18  BP: (119-134)/(72-88) 119/72     Weight: 95.7 kg (211 lb)  Body mass index is 27.84 kg/(m^2).  No intake or output data in the 24 hours ending 01/07/17 1140   Physical Exam   Constitutional: He is oriented to person, place, and time. He appears well-developed and  well-nourished.   HENT:   Head: Normocephalic and atraumatic.   Right Ear: External ear normal.   Left Ear: External ear normal.   Eyes: EOM are normal.   Neck: Normal range of motion. Neck supple. No JVD present.   Cardiovascular: Regular rhythm, normal heart sounds and intact distal pulses.    Pulmonary/Chest: Effort normal and breath sounds normal. No respiratory distress. He exhibits no tenderness.   Abdominal: Soft. Bowel sounds are normal. There is no tenderness.   Musculoskeletal: Normal range of motion. He exhibits no edema.   Lymphadenopathy:     He has no cervical adenopathy (no palpable cervical or supraclavicular lymphadenopathy ).   Neurological: He is alert and oriented to person, place, and time.   Skin: Skin is warm and dry.   Psychiatric: He has a normal mood and affect. His behavior is normal.       Significant Labs: All pertinent labs within the past 24 hours have been reviewed.    Significant Imaging: I have reviewed and interpreted all pertinent imaging results/findings within the past 24 hours.    Assessment/Plan:      * Post-radiation pneumonitis  -Patient received radiation concurrently with chemo from 8/2016 to 10/2016 for right lung cancer (adenocarcinoma)  -Consulted Hem/Onc. They believe radiation pneumonitis is likely given judging from the linear distribution within the right lung field on CT  -Continue prednisone (4 days) and albuterol inhaler on discharge.       Essential hypertension  - continue home amlodipine 10 mg    Primary cancer of right middle lobe of lung  F/u with Dr. Fernandez on Monday clinic.       Pneumonia  - more likely radiation pneumonitis rather than CAP. Will continue patient on doxycycline for 4 more days and then discharge  - patient currently on room air.       COPD (chronic obstructive pulmonary disease)  - Will discharge patient on prednisone 40 mg daily for 4 more days.  - can continue his albuterol inhaler on discharge.   - Can discuss options for a LABA with  his oncologist as outpatient. SOB more related to anxiety and sinus congestion rather than overt wheezing.       Lung nodule  F/u with Dr. Fernandez on Monday.       Sinus congestion  - continue Flonase and mucinex  - added Afrin spray to daily regiment       Acute hypoxemic respiratory failure  - Patient desatted to 90% on admission.   - Resolved on discharge  - History of adenocarcinoma of right lung. Last chem+radiation 10/3/16  - DDx: pneumonia vs PE vs pericardial effusion vs COPD exacerbation vs radiation scarring (see section about radiation pneumonitis most likely)  - Pneumonia does not seem convincing given that patient is afebrile, denies cough, and has no elev WBC, but will continued patient on po doxycycline 100 mg for 4 more days   - CTA negative for PE  - 2D Echo negative for effusion  - WIll treat for a possible COPD exacerbation with steroids (prednisone 40 x 4 days)  - Could also be exacerbated  anxiety about health status along with nasal congestion     Anxiety  - hydroxyzine prn continued on discharge.      VTE Risk Mitigation         Ordered     enoxaparin injection 40 mg  Daily     Route:  Subcutaneous        01/05/17 1437     Medium Risk of VTE  Once      01/05/17 0737          Ila Higgins MD  Department of Hospital Medicine   [facility]

## 2017-01-07 NOTE — ASSESSMENT & PLAN NOTE
- Patient desatted to 90% on admission.   - Resolved on discharge  - History of adenocarcinoma of right lung. Last chem+radiation 10/3/16  - DDx: pneumonia vs PE vs pericardial effusion vs COPD exacerbation vs radiation scarring (see section about radiation pneumonitis most likely)  - Pneumonia does not seem convincing given that patient is afebrile, denies cough, and has no elev WBC, but will continued patient on po doxycycline 100 mg for 4 more days   - CTA negative for PE  - 2D Echo negative for effusion  - WIll treat for a possible COPD exacerbation with steroids (prednisone 40 x 4 days)  - Could also be exacerbated  anxiety about health status along with nasal congestion

## 2017-01-07 NOTE — ASSESSMENT & PLAN NOTE
-Patient received radiation concurrently with chemo from 8/2016 to 10/2016 for right lung cancer (adenocarcinoma)  -Consulted Hem/Onc. They believe radiation pneumonitis is likely given judging from the linear distribution within the right lung field on CT  -Continue prednisone (4 days) and albuterol inhaler on discharge.

## 2017-01-07 NOTE — SUBJECTIVE & OBJECTIVE
Interval History:   Patient has no complaints currently and states that his congestion and SOB are much improved.     Review of Systems   Constitutional: Negative for chills and fever.   HENT: Positive for congestion.         Congestion improved.    Respiratory: Negative for cough and shortness of breath.    Cardiovascular: Negative for chest pain, palpitations and leg swelling.   Gastrointestinal: Negative for abdominal pain, constipation and diarrhea.   Genitourinary: Negative for dysuria.   Musculoskeletal: Negative for myalgias.   Neurological: Negative for dizziness, weakness and headaches.     Objective:     Vital Signs (Most Recent):  Temp: 97.4 °F (36.3 °C) (01/07/17 0814)  Pulse: 80 (01/07/17 0814)  Resp: 18 (01/07/17 0814)  BP: 119/72 (01/07/17 0814)  SpO2: 96 % (01/07/17 0814) Vital Signs (24h Range):  Temp:  [97.4 °F (36.3 °C)-98.9 °F (37.2 °C)] 97.4 °F (36.3 °C)  Pulse:  [] 80  Resp:  [17-18] 18  BP: (119-134)/(72-88) 119/72     Weight: 95.7 kg (211 lb)  Body mass index is 27.84 kg/(m^2).  No intake or output data in the 24 hours ending 01/07/17 1140   Physical Exam   Constitutional: He is oriented to person, place, and time. He appears well-developed and well-nourished.   HENT:   Head: Normocephalic and atraumatic.   Right Ear: External ear normal.   Left Ear: External ear normal.   Eyes: EOM are normal.   Neck: Normal range of motion. Neck supple. No JVD present.   Cardiovascular: Regular rhythm, normal heart sounds and intact distal pulses.    Pulmonary/Chest: Effort normal and breath sounds normal. No respiratory distress. He exhibits no tenderness.   Abdominal: Soft. Bowel sounds are normal. There is no tenderness.   Musculoskeletal: Normal range of motion. He exhibits no edema.   Lymphadenopathy:     He has no cervical adenopathy (no palpable cervical or supraclavicular lymphadenopathy ).   Neurological: He is alert and oriented to person, place, and time.   Skin: Skin is warm and dry.    Psychiatric: He has a normal mood and affect. His behavior is normal.       Significant Labs: All pertinent labs within the past 24 hours have been reviewed.    Significant Imaging: I have reviewed and interpreted all pertinent imaging results/findings within the past 24 hours.

## 2017-01-07 NOTE — PROGRESS NOTES
Pt awaiting his ride.Discharge instructions explained. Verbalizes understanding. VSS. Denies pain.

## 2017-01-07 NOTE — ASSESSMENT & PLAN NOTE
- Patient desatted to 90% on RA today  - History of adenocarcinoma of right lung. Last chem+radiation 10/3/16  - DDx: pneumonia vs PE vs pericardial effusion vs COPD exacerbation vs radiation scarring (see section about radiation pneumonitis)  - Pneumonia does not seem convincing given that patient is afebrile, denies cough, and has no elev WBC, but will continue patient on po doxycycline 100 mg for 5 more days (received 2 doses of moxifloxacin)  - CTA negative for PE  - 2D Echo negative for effusion  - WIll treat for a possible COPD exacerbation with steroids (prednisone 40 x 5 days)  - Could also be exacerbated  anxiety about health status along with nasal congestion

## 2017-01-07 NOTE — PLAN OF CARE
01/07/17 1228   Final Note   Assessment Type Final Discharge Note   Discharge Disposition Home   Hospital Follow Up  Appt(s) scheduled? Yes   Offered [facility] -- Bedside Delivery? n/a   Discharge/Hospital Encounter Summary to (non[facility]) PCP n/a   Referral to Outpatient Case Management complete? n/a   Referral to / orders for Home Health Complete? n/a   30 day supply of medicines given at discharge, if documented non-compliance / non-adherence? n/a   Any social issues identified prior to discharge? n/a   Did you assess the readiness or willingness of the family or caregiver to support self management of care? n/a   Right Care Referral Info   Post Acute Recommendation No Care

## 2017-01-07 NOTE — PROGRESS NOTES
[facility] Hospital Medicine  Progress Note    Patient Name: Angel Torres  MRN: [id number]  Patient Class: IP- Inpatient   Admission Date: 1/5/2017  Length of Stay: 1 days  Expected Discharge Date: 1/8/2017  Attending Physician: Yessenia Patton MD  Primary Care Provider: RADHA Moreno MD    Hospital Medicine Team: [facility] HOSP MED 5 Bashir Kruse MD    Subjective:     Principal Problem:Post-radiation pneumonitis    HPI:  61 male with PMH of adenocarcinoma of lung (s/p chemo and radiation)and COPD who presents to [facility] as a transfer from [facility] ED for dyspnea on exertion and failed outpatient treatment of pneumonia. He has been having shortness of breath on Saturday which he attributed to nasal congestion that he had been having the day prior. He tried to use his albuterol inhaler but it did not give him much  He noted that following day that his shortness of breath increased while climbing a set of stairs. Patient also says that he had been having intermittent productive cough of yellow sputum but not for the past 2 days. He denies fevers or chills during these episodes. Patient visited ED on 1/2, was diagnosed with pneumonia, treated with IV rocephin and po azithromycin, and discharged on po augmentin. Patient returned to ED for persistent shortness of breath with no improvement and was transferred to [facility] for further management and treatment.    Patient was diagnosed with Stage III adenocarcinoma of lung in 6/2016. He is s/p chemo and radiation, last dose of chemo with concurrent radiation was 10/3/16. Patient of Dr. Fernandez.    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 He has an 80 pack-year smoking history.      Hospital Course:  CTA was ordered to rule out PE given recent cancer history and history of immobility with work () but was negative for PE. Patient was started on po moxifloxacin but changed to  "po doxycycline. He complained of nasal congestion and cough so was started on flonase and mucinex He remained anxious during admission about his health status. 1/6/17 patient de-sated to 90% on room air while sitting on side of bed but continued to maintain O2 sat in mid 90s on 2 L NC while resting. He was started on prednisone 40 gm daily for 5 days starting 1/6/17 to cover for possible COPD exacerbation     Interval History: Patient was anxious overnight about breathing. He denies shortness of breath at rest but endorses it when moving. He complains about "congestion" in his left nares    Review of Systems   Constitutional: Negative for chills and fever.   HENT: Positive for congestion.    Respiratory: Positive for shortness of breath. Negative for cough.    Cardiovascular: Negative for chest pain, palpitations and leg swelling.   Gastrointestinal: Negative for abdominal pain, constipation and diarrhea.   Genitourinary: Negative for dysuria.   Musculoskeletal: Negative for myalgias.   Neurological: Negative for dizziness, weakness and headaches.     Objective:     Vital Signs (Most Recent):  Temp: 98.3 °F (36.8 °C) (01/06/17 1948)  Pulse: 96 (01/06/17 1948)  Resp: 18 (01/06/17 1948)  BP: 133/88 (01/06/17 1948)  SpO2: 97 % (01/06/17 1948) Vital Signs (24h Range):  Temp:  [97.7 °F (36.5 °C)-99.1 °F (37.3 °C)] 98.3 °F (36.8 °C)  Pulse:  [] 96  Resp:  [16-18] 18  BP: (116-134)/(69-88) 133/88     Weight: 95.7 kg (211 lb)  Body mass index is 27.84 kg/(m^2).    Intake/Output Summary (Last 24 hours) at 01/06/17 2112  Last data filed at 01/05/17 2335   Gross per 24 hour   Intake                0 ml   Output              200 ml   Net             -200 ml      Physical Exam   Constitutional: He is oriented to person, place, and time. He appears well-developed and well-nourished.   HENT:   Head: Normocephalic and atraumatic.   Right Ear: External ear normal.   Left Ear: External ear normal.   Eyes: EOM are normal. "   Neck: Normal range of motion. Neck supple. No JVD present.   Cardiovascular: Regular rhythm, normal heart sounds and intact distal pulses.    Tachycardic   Pulmonary/Chest: Effort normal. No respiratory distress. He exhibits no tenderness.   Decreased breath sounds in right lower lung    Abdominal: Soft. Bowel sounds are normal. There is no tenderness.   Musculoskeletal: Normal range of motion. He exhibits no edema.   Lymphadenopathy:     He has no cervical adenopathy (no palpable cervical or supraclavicular lymphadenopathy ).   Neurological: He is alert and oriented to person, place, and time.   Skin: Skin is warm and dry.   Psychiatric: He has a normal mood and affect. His behavior is normal.       Significant Labs: All pertinent labs within the past 24 hours have been reviewed.    Significant Imaging: I have reviewed all pertinent imaging results/findings within the past 24 hours.    Assessment/Plan:      * Post-radiation pneumonitis  -Patient received radiation concurrently with chemo from 8/2016 to 10/2016 for right lung cancer (adenocarcinoma)  -Consulted Hem/Onc. They believe radiation pneumonitis is likely given judging from the linear distribution within the right lung field on CT  -Continue prednisone (5 days) and duo nebs prn       Essential hypertension  - continue home amlodipine 10 mg    Pneumonia  - CXR shows right middle lobe opacity concerning for pneumonia, though there appears to be persistent consolidation in that region (scarring from radiation?). No pleural effusion  - CURB-65 score is 1, but patient failed outpatient therapy  - Will change moxifloxacin to doxycycline 100 mg q12      COPD (chronic obstructive pulmonary disease)  - Will add prednisone 40 mg daily for 5 days  - duo-nebs prn       Shortness of breath          Sinus congestion  - continue Flonase and mucinex  - added Afrin spray to daily regiment       Acute hypoxemic respiratory failure  - Patient desatted to 90% on RA today  -  History of adenocarcinoma of right lung. Last chem+radiation 10/3/16  - DDx: pneumonia vs PE vs pericardial effusion vs COPD exacerbation vs radiation scarring (see section about radiation pneumonitis)  - Pneumonia does not seem convincing given that patient is afebrile, denies cough, and has no elev WBC, but will continue patient on po doxycycline 100 mg for 5 more days (received 2 doses of moxifloxacin)  - CTA negative for PE  - 2D Echo negative for effusion  - WIll treat for a possible COPD exacerbation with steroids (prednisone 40 x 5 days)  - Could also be exacerbated  anxiety about health status along with nasal congestion     Anxiety  - hydroxyzine prn        VTE Risk Mitigation         Ordered     enoxaparin injection 40 mg  Daily     Route:  Subcutaneous        01/05/17 1437     Medium Risk of VTE  Once      01/05/17 0737          Bashir Kruse MD  Department of Hospital Medicine   [facility]

## 2017-01-07 NOTE — DISCHARGE SUMMARY
[facility] Hospital Medicine  Discharge Summary      Patient Name: Angel Torres  MRN: [id number]  Admission Date: 1/5/2017  Hospital Length of Stay: 2 days  Discharge Date and Time: 1/7/2017 11:50 AM  Attending Physician: Yessenia Patton MD   Discharging Provider: Ila Higgins MD  Primary Care Provider: RADHA Moreno MD  [facility] Medicine Team: [facility] HOSP MED 5 Ila Higgins MD    HPI:   61 male with PMH of adenocarcinoma of lung (s/p chemo and radiation)and COPD who presents to [facility] as a transfer from [facility] for dyspnea on exertion and failed outpatient treatment of pneumonia. He has been having shortness of breath on Saturday which he attributed to nasal congestion that he had been having the day prior. He tried to use his albuterol inhaler but it did not give him much  He noted that following day that his shortness of breath increased while climbing a set of stairs. Patient also says that he had been having intermittent productive cough of yellow sputum but not for the past 2 days. He denies fevers or chills during these episodes. Patient visited ED on 1/2, was diagnosed with pneumonia, treated with IV rocephin and po azithromycin, and discharged on po augmentin. Patient returned to ED for persistent shortness of breath with no improvement and was transferred to [facility] for further management and treatment.    Patient was diagnosed with Stage III adenocarcinoma of lung in 6/2016. He is s/p chemo and radiation, last dose of chemo with concurrent radiation was 10/3/16. Patient of Dr. Fernandez.    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 He has an 80 pack-year smoking history.      * No surgery found *      Indwelling Lines/Drains at time of discharge:   Lines/Drains/Airways          No matching active lines, drains, or airways        Hospital Course:   CTA was ordered to rule out PE given recent cancer history and  history of immobility with work () but was negative for PE. Patient was started on po moxifloxacin but changed to po doxycycline. He complained of nasal congestion and cough so was started on flonase and mucinex He remained anxious during admission about his health status. 1/6/17 patient de-sated to 90% on room air while sitting on side of bed but continued to maintain O2 sat in mid 90s on 2 L NC while resting. He was started on prednisone 40 gm daily for 5 days starting 1/6/17 to cover for possible COPD exacerbation.    On 01/07/17 the patient was discharged on a course of 4 more days of prednisone, 4 more days of doxycycline, mucinex and flonase. Patient was told to continue his zyrtec. In addition the patient was told to follow up with his Oncologist, Dr. Fernandez on Monday in Clinic for additional options for inhalers (possibly a Breo Inhaler).        Consults:   Consults         Status Ordering Provider     Inpatient consult to Hematology/Oncology  Once     Provider:  (Not yet assigned)    MOSES Woodruff          Significant Diagnostic Studies: Labs:   BMP:   Recent Labs  Lab 01/06/17  0430 01/07/17  0412   GLU 77 124*    139   K 4.5 4.2    108   CO2 20* 20*   BUN 12 14   CREATININE 1.1 1.1   CALCIUM 9.1 9.3   MG 2.1 2.2   , CBC   Recent Labs  Lab 01/06/17  0430 01/07/17  0412   WBC 7.28 7.40   HGB 12.2* 12.5*   HCT 36.3* 37.6*   * 404*    and INR   Lab Results   Component Value Date    INR 1.0 01/05/2017     Microbiology:   Sputum Culture   Lab Results   Component Value Date    GSRESP <10 epithelial cells per low power field. 01/05/2017    GSRESP No WBC's 01/05/2017    GSRESP Rare yeast 01/05/2017    GSRESP Rare Gram positive cocci 01/05/2017    RESPIRATORYC Normal respiratory ruslan 01/05/2017     Radiology: X-Ray: CXR: X-Ray Chest 1 View (CXR): No results found for this visit on 01/05/17.    Pending Diagnostic Studies:     Procedure Component Value Units  Date/Time    Troponin I [[id number]] Collected:  01/05/17 1330    Order Status:  Sent Lab Status:  In process Updated:  01/05/17 1353    Specimen:  Blood from Blood         Final Active Diagnoses:    Diagnosis Date Noted POA    PRINCIPAL PROBLEM:  Post-radiation pneumonitis [J70.0] 01/06/2017 Yes    Acute hypoxemic respiratory failure [J96.01] 01/06/2017 Yes    Anxiety [F41.9] 01/06/2017 Yes    COPD (chronic obstructive pulmonary disease) [J44.9] 01/05/2017 Yes    Lung nodule [R91.1] 01/05/2017 Yes    Sinus congestion [R09.81] 01/05/2017 Yes    Pneumonia [J18.9] 01/04/2017 Yes    Primary cancer of right middle lobe of lung [C34.2] 07/11/2016 Yes    Essential hypertension [I10] 02/09/2015 Yes     Chronic      Problems Resolved During this Admission:    Diagnosis Date Noted Date Resolved POA      * Post-radiation pneumonitis  -Patient received radiation concurrently with chemo from 8/2016 to 10/2016 for right lung cancer (adenocarcinoma)  -Consulted Hem/Onc. They believe radiation pneumonitis is likely given judging from the linear distribution within the right lung field on CT  -Continue prednisone (4 days) and albuterol inhaler on discharge.       Essential hypertension  - continue home amlodipine 10 mg    Primary cancer of right middle lobe of lung  F/u with Dr. Fernandez on Monday clinic.       Pneumonia  - more likely radiation pneumonitis rather than CAP. Will continue patient on doxycycline for 4 more days and then discharge  - patient currently on room air.       COPD (chronic obstructive pulmonary disease)  - Will discharge patient on prednisone 40 mg daily for 4 more days.  - can continue his albuterol inhaler on discharge.   - Can discuss options for a LABA with his oncologist as outpatient. SOB more related to anxiety and sinus congestion rather than overt wheezing.       Lung nodule  F/u with Dr. Fernandez on Monday.       Sinus congestion  - continue Flonase and mucinex  - added Afrin spray to daily  regiment       Acute hypoxemic respiratory failure  - Patient desatted to 90% on admission.   - Resolved on discharge  - History of adenocarcinoma of right lung. Last chem+radiation 10/3/16  - DDx: pneumonia vs PE vs pericardial effusion vs COPD exacerbation vs radiation scarring (see section about radiation pneumonitis most likely)  - Pneumonia does not seem convincing given that patient is afebrile, denies cough, and has no elev WBC, but will continued patient on po doxycycline 100 mg for 4 more days   - CTA negative for PE  - 2D Echo negative for effusion  - WIll treat for a possible COPD exacerbation with steroids (prednisone 40 x 4 days)  - Could also be exacerbated  anxiety about health status along with nasal congestion     Anxiety  - hydroxyzine prn continued on discharge.        Discharged Condition: good    Disposition: Home or Self Care    Follow Up:  Follow-up Information     Follow up with Dano Fernandez DO, FACP In 2 days.    Specialty:  Hematology and Oncology    Contact information:    [address] [facility] [address]  [phone]          Patient Instructions:     Diet general       Medications:  Reconciled Home Medications:   Current Discharge Medication List      START taking these medications    Details   cetirizine (ZYRTEC) 10 MG tablet Take 1 tablet (10 mg total) by mouth once daily.  Refills: 0      doxycycline (VIBRA-TABS) 100 MG tablet Take 1 tablet (100 mg total) by mouth every 12 (twelve) hours.  Qty: 8 tablet, Refills: 0      fluticasone (FLONASE) 50 mcg/actuation nasal spray 2 sprays by Each Nare route once daily.  Qty: 1 Bottle, Refills: 0      guaifenesin (MUCINEX) 600 mg 12 hr tablet Take 1 tablet (600 mg total) by mouth 2 (two) times daily.  Qty: 20 tablet, Refills: 0      predniSONE (DELTASONE) 20 MG tablet Take 2 tablets (40 mg total) by mouth once daily.  Qty: 8 tablet, Refills: 0         CONTINUE these medications which have NOT CHANGED    Details   albuterol 90  mcg/actuation inhaler Inhale 2 puffs into the lungs every 6 (six) hours as needed for Wheezing.  Qty: 18 g, Refills: 0    Associated Diagnoses: Chronic obstructive pulmonary disease, unspecified COPD type      amlodipine (NORVASC) 10 MG tablet TAKE 1 TABLET BY MOUTH EVERY DAY  Qty: 30 tablet, Refills: 0      dexamethasone (DECADRON) 4 MG Tab Take 1 tablet (4 mg total) by mouth every 12 (twelve) hours. Take 1 tab twice daily starting the day prior to chemotherapy for 3 days.  Qty: 24 tablet, Refills: 1    Associated Diagnoses: Chronic obstructive pulmonary disease, unspecified COPD type; Primary cancer of right middle lobe of lung; Mediastinal adenopathy      docusate sodium (COLACE) 100 MG capsule Take 1 capsule (100 mg total) by mouth 2 (two) times daily as needed for Constipation.  Qty: 60 capsule, Refills: 0      folic acid (FOLVITE) 400 MCG tablet Take 1 tablet (400 mcg total) by mouth once daily.  Qty: 100 tablet, Refills: 3    Associated Diagnoses: Chronic obstructive pulmonary disease, unspecified COPD type; Primary cancer of right middle lobe of lung; Mediastinal adenopathy      hydrOXYzine HCl (ATARAX) 25 MG tablet Take 1 tablet (25 mg total) by mouth 3 (three) times daily as needed for Itching.  Qty: 20 tablet, Refills: 0      ondansetron (ZOFRAN, AS HYDROCHLORIDE,) 4 MG tablet Take 1 tablet (4 mg total) by mouth every 8 (eight) hours as needed for Nausea.  Qty: 60 tablet, Refills: 1    Associated Diagnoses: Chronic obstructive pulmonary disease, unspecified COPD type; Primary cancer of right middle lobe of lung; Mediastinal adenopathy         STOP taking these medications       amoxicillin-clavulanate 875-125mg (AUGMENTIN) 875-125 mg per tablet Comments:   Reason for Stopping:         azithromycin (ZITHROMAX Z-RAYMUNDO) 250 MG tablet Comments:   Reason for Stopping:         ondansetron (ZOFRAN-ODT) 8 MG TbDL Comments:   Reason for Stopping:         triamcinolone acetonide 0.1% (KENALOG) 0.1 % cream Comments:    Reason for Stopping:             Time spent on the discharge of patient: 15 minutes    Ila Higgins MD  Department of Hospital Medicine  [facility]

## 2017-01-07 NOTE — SUBJECTIVE & OBJECTIVE
"Interval History: Patient was anxious overnight about breathing. He denies shortness of breath at rest but endorses it when moving. He complains about "congestion" in his left nares    Review of Systems   Constitutional: Negative for chills and fever.   HENT: Positive for congestion.    Respiratory: Positive for shortness of breath. Negative for cough.    Cardiovascular: Negative for chest pain, palpitations and leg swelling.   Gastrointestinal: Negative for abdominal pain, constipation and diarrhea.   Genitourinary: Negative for dysuria.   Musculoskeletal: Negative for myalgias.   Neurological: Negative for dizziness, weakness and headaches.     Objective:     Vital Signs (Most Recent):  Temp: 98.3 °F (36.8 °C) (01/06/17 1948)  Pulse: 96 (01/06/17 1948)  Resp: 18 (01/06/17 1948)  BP: 133/88 (01/06/17 1948)  SpO2: 97 % (01/06/17 1948) Vital Signs (24h Range):  Temp:  [97.7 °F (36.5 °C)-99.1 °F (37.3 °C)] 98.3 °F (36.8 °C)  Pulse:  [] 96  Resp:  [16-18] 18  BP: (116-134)/(69-88) 133/88     Weight: 95.7 kg (211 lb)  Body mass index is 27.84 kg/(m^2).    Intake/Output Summary (Last 24 hours) at 01/06/17 2112  Last data filed at 01/05/17 2335   Gross per 24 hour   Intake                0 ml   Output              200 ml   Net             -200 ml      Physical Exam   Constitutional: He is oriented to person, place, and time. He appears well-developed and well-nourished.   HENT:   Head: Normocephalic and atraumatic.   Right Ear: External ear normal.   Left Ear: External ear normal.   Eyes: EOM are normal.   Neck: Normal range of motion. Neck supple. No JVD present.   Cardiovascular: Regular rhythm, normal heart sounds and intact distal pulses.    Tachycardic   Pulmonary/Chest: Effort normal. No respiratory distress. He exhibits no tenderness.   Decreased breath sounds in right lower lung    Abdominal: Soft. Bowel sounds are normal. There is no tenderness.   Musculoskeletal: Normal range of motion. He exhibits no " edema.   Lymphadenopathy:     He has no cervical adenopathy (no palpable cervical or supraclavicular lymphadenopathy ).   Neurological: He is alert and oriented to person, place, and time.   Skin: Skin is warm and dry.   Psychiatric: He has a normal mood and affect. His behavior is normal.       Significant Labs: All pertinent labs within the past 24 hours have been reviewed.    Significant Imaging: I have reviewed all pertinent imaging results/findings within the past 24 hours.

## 2017-01-08 LAB
BACTERIA BLD CULT: NORMAL
BACTERIA BLD CULT: NORMAL

## 2017-01-08 RX ORDER — AMLODIPINE BESYLATE 10 MG/1
TABLET ORAL
Qty: 30 TABLET | Refills: 0 | Status: SHIPPED | OUTPATIENT
Start: 2017-01-08 | End: 2017-02-03 | Stop reason: SDUPTHER

## 2017-01-09 ENCOUNTER — HOSPITAL ENCOUNTER (OUTPATIENT)
Dept: RADIOLOGY | Facility: HOSPITAL | Age: 62
Discharge: HOME | End: 2017-01-09
Attending: INTERNAL MEDICINE
Payer: COMMERCIAL

## 2017-01-09 ENCOUNTER — LAB VISIT (OUTPATIENT)
Dept: LAB | Facility: HOSPITAL | Age: 62
End: 2017-01-09
Attending: INTERNAL MEDICINE
Payer: COMMERCIAL

## 2017-01-09 ENCOUNTER — OFFICE VISIT (OUTPATIENT)
Dept: HEMATOLOGY/ONCOLOGY | Facility: CLINIC | Age: 62
End: 2017-01-09
Payer: COMMERCIAL

## 2017-01-09 VITALS
BODY MASS INDEX: 23.43 KG/M2 | OXYGEN SATURATION: 93 % | HEART RATE: 112 BPM | TEMPERATURE: 98 F | RESPIRATION RATE: 18 BRPM | HEIGHT: 77 IN | DIASTOLIC BLOOD PRESSURE: 70 MMHG | WEIGHT: 198.44 LBS | SYSTOLIC BLOOD PRESSURE: 111 MMHG

## 2017-01-09 DIAGNOSIS — R59.0 MEDIASTINAL ADENOPATHY: ICD-10-CM

## 2017-01-09 DIAGNOSIS — C34.2 PRIMARY CANCER OF RIGHT MIDDLE LOBE OF LUNG: ICD-10-CM

## 2017-01-09 DIAGNOSIS — M89.9 BONE LESION: ICD-10-CM

## 2017-01-09 DIAGNOSIS — Z09 CHEMOTHERAPY FOLLOW-UP EXAMINATION: ICD-10-CM

## 2017-01-09 DIAGNOSIS — C34.2 PRIMARY CANCER OF RIGHT MIDDLE LOBE OF LUNG: Primary | ICD-10-CM

## 2017-01-09 DIAGNOSIS — J44.9 CHRONIC OBSTRUCTIVE PULMONARY DISEASE, UNSPECIFIED COPD TYPE: ICD-10-CM

## 2017-01-09 LAB
ALBUMIN SERPL BCP-MCNC: 3.2 G/DL
ALP SERPL-CCNC: 65 U/L
ALT SERPL W/O P-5'-P-CCNC: 18 U/L
ANION GAP SERPL CALC-SCNC: 8 MMOL/L
AST SERPL-CCNC: 18 U/L
BILIRUB SERPL-MCNC: 0.2 MG/DL
BUN SERPL-MCNC: 16 MG/DL
CALCIUM SERPL-MCNC: 9.6 MG/DL
CHLORIDE SERPL-SCNC: 106 MMOL/L
CO2 SERPL-SCNC: 26 MMOL/L
CREAT SERPL-MCNC: 1.2 MG/DL
ERYTHROCYTE [DISTWIDTH] IN BLOOD BY AUTOMATED COUNT: 12.6 %
EST. GFR  (AFRICAN AMERICAN): >60 ML/MIN/1.73 M^2
EST. GFR  (NON AFRICAN AMERICAN): >60 ML/MIN/1.73 M^2
GLUCOSE SERPL-MCNC: 88 MG/DL
HCT VFR BLD AUTO: 39.6 %
HGB BLD-MCNC: 13.2 G/DL
MAGNESIUM SERPL-MCNC: 2.1 MG/DL
MCH RBC QN AUTO: 29.4 PG
MCHC RBC AUTO-ENTMCNC: 33.3 %
MCV RBC AUTO: 88 FL
NEUTROPHILS # BLD AUTO: 7.4 K/UL
PLATELET # BLD AUTO: 414 K/UL
PMV BLD AUTO: 9.6 FL
POTASSIUM SERPL-SCNC: 3.8 MMOL/L
PROT SERPL-MCNC: 7.7 G/DL
RBC # BLD AUTO: 4.49 M/UL
SODIUM SERPL-SCNC: 140 MMOL/L
WBC # BLD AUTO: 9.65 K/UL

## 2017-01-09 PROCEDURE — 1159F MED LIST DOCD IN RCRD: CPT | Mod: S$GLB,,, | Performed by: INTERNAL MEDICINE

## 2017-01-09 PROCEDURE — A9552 F18 FDG: HCPCS

## 2017-01-09 PROCEDURE — 3078F DIAST BP <80 MM HG: CPT | Mod: S$GLB,,, | Performed by: INTERNAL MEDICINE

## 2017-01-09 PROCEDURE — 83735 ASSAY OF MAGNESIUM: CPT

## 2017-01-09 PROCEDURE — 36415 COLL VENOUS BLD VENIPUNCTURE: CPT

## 2017-01-09 PROCEDURE — 3074F SYST BP LT 130 MM HG: CPT | Mod: S$GLB,,, | Performed by: INTERNAL MEDICINE

## 2017-01-09 PROCEDURE — 99214 OFFICE O/P EST MOD 30 MIN: CPT | Mod: S$GLB,,, | Performed by: INTERNAL MEDICINE

## 2017-01-09 PROCEDURE — 80053 COMPREHEN METABOLIC PANEL: CPT

## 2017-01-09 PROCEDURE — 99999 PR PBB SHADOW E&M-EST. PATIENT-LVL IV: CPT | Mod: PBBFAC,,, | Performed by: INTERNAL MEDICINE

## 2017-01-09 PROCEDURE — 78815 PET IMAGE W/CT SKULL-THIGH: CPT | Mod: 26,PS,, | Performed by: RADIOLOGY

## 2017-01-09 PROCEDURE — 85027 COMPLETE CBC AUTOMATED: CPT

## 2017-01-09 PROCEDURE — 78815 PET IMAGE W/CT SKULL-THIGH: CPT | Mod: TC

## 2017-01-09 RX ORDER — AZITHROMYCIN 500 MG/1
TABLET, FILM COATED ORAL
COMMUNITY
Start: 2017-01-03 | End: 2017-04-24 | Stop reason: ALTCHOICE

## 2017-01-09 NOTE — PROGRESS NOTES
PATIENT: Angel Torres  MRN: [id number]  DATE: 1/9/2017      Diagnosis:   1. Primary cancer of right middle lobe of lung    2. Bone lesion        Chief Complaint: Lung Cancer      Oncologic History:      Oncologic History Non-small cell lung cancer diagnosed 6/2016     Oncologic Treatment Cisplatin/Pemetrexed initiated 8/15/16 with concurrent radiation completed 3 cycles 10/3/16; 64 Gy    Pathology Adenocarcinoma, T2b, N2, M0, Stage IIIA          Subjective:    Interval History: Mr. Torres is a 61 y.o. male who was seen in follow-up for lung cancer.  He was hospitalized recently with a COPD exacerbation and pneumonia.  He states since discharge she has been feeling better and less short of breath.  No other new complaints.    His history dates to approximately 4/2016 when he noted a worsening cough which was associated with clear sputum.  He had a chest x-ray in 5/16 showing a right middle lobe opacity.  Subsequent CT of the chest was performed on 5/30/16 showing a 5.6 cm lesion in the right middle lobe with hilar and mediastinal lymphadenopathy along with hepatic lesions and a left adrenal mass..  He underwent bronchoscopy which was nonrevealing and transbronchial biopsies were nondiagnostic.  He then underwent EBUS with biopsy of mediastinal lymph nodes with pathology showing non-small cell lung cancer consistent with adenocarcinoma.  He was started on concurrent chemotherapy and radiation with cisplatin and pemetrexed on 8/15/16.  He completed 64 Gy of concurrent radiation with cisplatin and pemetrexed with chemotherapy completed on 10/3/16.    Past Medical History:   Past Medical History   Diagnosis Date    Chemotherapy follow-up examination 9/7/2016    COPD (chronic obstructive pulmonary disease)     Hearing loss     Hypertension 2/9/2015    Primary cancer of right middle lobe of lung 7/11/2016    Rash 9/7/2016       Past Surgical HIstory:   Past Surgical History   Procedure Laterality Date    Knee surgery       Inguinal hernia repair Bilateral        Family History:   Family History   Problem Relation Age of Onset    Cancer Mother      lung, breast    Cancer Sister      lung    Cancer Maternal Grandfather     No Known Problems Father     No Known Problems Brother     No Known Problems Maternal Aunt     No Known Problems Maternal Uncle     No Known Problems Paternal Aunt     No Known Problems Paternal Uncle     No Known Problems Maternal Grandmother     No Known Problems Paternal Grandmother     No Known Problems Paternal Grandfather     Amblyopia Neg Hx     Blindness Neg Hx     Cataracts Neg Hx     Diabetes Neg Hx     Glaucoma Neg Hx     Hypertension Neg Hx     Macular degeneration Neg Hx     Retinal detachment Neg Hx     Strabismus Neg Hx     Stroke Neg Hx     Thyroid disease Neg Hx        Social History:  reports that he quit smoking about 3 years ago. He has a 80.00 pack-year smoking history. He does not have any smokeless tobacco history on file. He reports that he does not drink alcohol or use illicit drugs.    Allergies:  Review of patient's allergies indicates:  No Known Allergies    Medications:  Current Outpatient Prescriptions   Medication Sig Dispense Refill    albuterol 90 mcg/actuation inhaler Inhale 2 puffs into the lungs every 6 (six) hours as needed for Wheezing. 18 g 0    amlodipine (NORVASC) 10 MG tablet TAKE 1 TABLET BY MOUTH EVERY DAY 30 tablet 0    cetirizine (ZYRTEC) 10 MG tablet Take 1 tablet (10 mg total) by mouth once daily.  0    dexamethasone (DECADRON) 4 MG Tab Take 1 tablet (4 mg total) by mouth every 12 (twelve) hours. Take 1 tab twice daily starting the day prior to chemotherapy for 3 days. 24 tablet 1    docusate sodium (COLACE) 100 MG capsule Take 1 capsule (100 mg total) by mouth 2 (two) times daily as needed for Constipation. 60 capsule 0    doxycycline (VIBRA-TABS) 100 MG tablet Take 1 tablet (100 mg total) by mouth every 12 (twelve) hours. 8 tablet 0     fluticasone (FLONASE) 50 mcg/actuation nasal spray 2 sprays by Each Nare route once daily. 1 Bottle 0    folic acid (FOLVITE) 400 MCG tablet Take 1 tablet (400 mcg total) by mouth once daily. 100 tablet 3    guaifenesin (MUCINEX) 600 mg 12 hr tablet Take 1 tablet (600 mg total) by mouth 2 (two) times daily. 20 tablet 0    hydrOXYzine HCl (ATARAX) 25 MG tablet Take 1 tablet (25 mg total) by mouth 3 (three) times daily as needed for Itching. 20 tablet 0    ondansetron (ZOFRAN, AS HYDROCHLORIDE,) 4 MG tablet Take 1 tablet (4 mg total) by mouth every 8 (eight) hours as needed for Nausea. 60 tablet 1    predniSONE (DELTASONE) 20 MG tablet Take 2 tablets (40 mg total) by mouth once daily. 8 tablet 0    azithromycin (ZITHROMAX) 500 MG tablet        Current Facility-Administered Medications   Medication Dose Route Frequency Provider Last Rate Last Dose    cyanocobalamin injection 1,000 mcg  1,000 mcg Intramuscular 1 time in Clinic/HOD Dano Fernandze DO, FACP           Review of Systems   Constitutional: Negative for appetite change, chills, fatigue, fever and unexpected weight change.        Normal weight 220   HENT: Negative for dental problem, sinus pressure, sneezing and trouble swallowing.    Eyes: Negative for visual disturbance.   Respiratory: Positive for cough. Negative for choking, chest tightness, shortness of breath and wheezing.    Cardiovascular: Negative for chest pain and leg swelling.   Gastrointestinal: Negative for abdominal pain, blood in stool, constipation, diarrhea and nausea.   Genitourinary: Negative for difficulty urinating and dysuria.   Musculoskeletal: Negative for arthralgias and back pain.   Skin: Negative for rash and wound.   Neurological: Negative for dizziness, light-headedness and headaches.   Hematological: Negative for adenopathy. Does not bruise/bleed easily.   Psychiatric/Behavioral: Negative for sleep disturbance. The patient is not nervous/anxious.        ECOG Performance  "Status: 0   Objective:      Vitals:   Vitals:    01/09/17 1342   BP: 111/70   Pulse: (!) 112   Resp: 18   Temp: 97.9 °F (36.6 °C)   TempSrc: Oral   SpO2: (!) 93%   Weight: 90 kg (198 lb 6.6 oz)   Height: 6' 5" (1.956 m)     BMI: Body mass index is 23.53 kg/(m^2).    Physical Exam   Constitutional: He is oriented to person, place, and time. He appears well-developed and well-nourished.   HENT:   Head: Normocephalic and atraumatic.   Eyes: Pupils are equal, round, and reactive to light.   Neck: Normal range of motion. Neck supple.   Cardiovascular: Normal rate and regular rhythm.    Pulmonary/Chest: Effort normal. No respiratory distress. He has no rales.   Abdominal: Soft. He exhibits no distension. There is no tenderness.   Musculoskeletal: He exhibits no edema or tenderness.   Lymphadenopathy:     He has no cervical adenopathy.   Neurological: He is alert and oriented to person, place, and time. No cranial nerve deficit.   Skin: Skin is warm and dry. No rash noted.   Psychiatric: He has a normal mood and affect. His behavior is normal.       Laboratory Data:  Lab Visit on 01/09/2017   Component Date Value Ref Range Status    WBC 01/09/2017 9.65  3.90 - 12.70 K/uL Final    RBC 01/09/2017 4.49* 4.60 - 6.20 M/uL Final    Hemoglobin 01/09/2017 13.2* 14.0 - 18.0 g/dL Final    Hematocrit 01/09/2017 39.6* 40.0 - 54.0 % Final    MCV 01/09/2017 88  82 - 98 fL Final    MCH 01/09/2017 29.4  27.0 - 31.0 pg Final    MCHC 01/09/2017 33.3  32.0 - 36.0 % Final    RDW 01/09/2017 12.6  11.5 - 14.5 % Final    Platelets 01/09/2017 414* 150 - 350 K/uL Final    MPV 01/09/2017 9.6  9.2 - 12.9 fL Final    Gran # 01/09/2017 7.4  1.8 - 7.7 K/uL Final    Comment: The ANC is based on a white cell differential from an   automated cell counter. It has not been microscopically   reviewed for the presence of abnormal cells. Clinical   correlation is required.      Sodium 01/09/2017 140  136 - 145 mmol/L Final    Potassium 01/09/2017 " 3.8  3.5 - 5.1 mmol/L Final    Chloride 01/09/2017 106  95 - 110 mmol/L Final    CO2 01/09/2017 26  23 - 29 mmol/L Final    Glucose 01/09/2017 88  70 - 110 mg/dL Final    BUN, Bld 01/09/2017 16  6 - 20 mg/dL Final    Creatinine 01/09/2017 1.2  0.5 - 1.4 mg/dL Final    Calcium 01/09/2017 9.6  8.7 - 10.5 mg/dL Final    Total Protein 01/09/2017 7.7  6.0 - 8.4 g/dL Final    Albumin 01/09/2017 3.2* 3.5 - 5.2 g/dL Final    Total Bilirubin 01/09/2017 0.2  0.1 - 1.0 mg/dL Final    Comment: For infants and newborns, interpretation of results should be based  on gestational age, weight and in agreement with clinical  observations.  Premature Infant recommended reference ranges:  Up to 24 hours.............<8.0 mg/dL  Up to 48 hours............<12.0 mg/dL  3-5 days..................<15.0 mg/dL  6-29 days.................<15.0 mg/dL      Alkaline Phosphatase 01/09/2017 65  55 - 135 U/L Final    AST 01/09/2017 18  10 - 40 U/L Final    ALT 01/09/2017 18  10 - 44 U/L Final    Anion Gap 01/09/2017 8  8 - 16 mmol/L Final    eGFR if African American 01/09/2017 >60.0  >60 mL/min/1.73 m^2 Final    eGFR if non African American 01/09/2017 >60.0  >60 mL/min/1.73 m^2 Final    Comment: Calculation used to obtain the estimated glomerular filtration  rate (eGFR) is the CKD-EPI equation. Since race is unknown   in our information system, the eGFR values for   -American and Non--American patients are given   for each creatinine result.      Magnesium 01/09/2017 2.1  1.6 - 2.6 mg/dL Final   Admission on 01/05/2017, Discharged on 01/07/2017   Component Date Value Ref Range Status    aPTT 01/05/2017 <21.0  21.0 - 32.0 sec Final    aPTT therapeutic range = 39-69 seconds    Blood Culture, Routine 01/05/2017 No Growth to date   Preliminary    Blood Culture, Routine 01/05/2017 No Growth to date   Preliminary    Blood Culture, Routine 01/05/2017 No Growth to date   Preliminary    Blood Culture, Routine 01/05/2017 No  Growth to date   Preliminary    Blood Culture, Routine 01/05/2017 No Growth to date   Preliminary    Blood Culture, Routine 01/05/2017 No Growth to date   Preliminary    Blood Culture, Routine 01/05/2017 No Growth to date   Preliminary    Blood Culture, Routine 01/05/2017 No Growth to date   Preliminary    Blood Culture, Routine 01/05/2017 No Growth to date   Preliminary    Blood Culture, Routine 01/05/2017 No Growth to date   Preliminary    WBC 01/05/2017 8.18  3.90 - 12.70 K/uL Final    RBC 01/05/2017 4.50* 4.60 - 6.20 M/uL Final    Hemoglobin 01/05/2017 13.4* 14.0 - 18.0 g/dL Final    Hematocrit 01/05/2017 39.5* 40.0 - 54.0 % Final    MCV 01/05/2017 88  82 - 98 fL Final    MCH 01/05/2017 29.8  27.0 - 31.0 pg Final    MCHC 01/05/2017 33.9  32.0 - 36.0 % Final    RDW 01/05/2017 12.7  11.5 - 14.5 % Final    Platelets 01/05/2017 343  150 - 350 K/uL Final    MPV 01/05/2017 10.2  9.2 - 12.9 fL Final    Gran # 01/05/2017 6.7  1.8 - 7.7 K/uL Final    Lymph # 01/05/2017 0.6* 1.0 - 4.8 K/uL Final    Mono # 01/05/2017 0.8  0.3 - 1.0 K/uL Final    Eos # 01/05/2017 0.0  0.0 - 0.5 K/uL Final    Baso # 01/05/2017 0.03  0.00 - 0.20 K/uL Final    Gran% 01/05/2017 82.1* 38.0 - 73.0 % Final    Lymph% 01/05/2017 7.0* 18.0 - 48.0 % Final    Mono% 01/05/2017 9.4  4.0 - 15.0 % Final    Eosinophil% 01/05/2017 0.5  0.0 - 8.0 % Final    Basophil% 01/05/2017 0.4  0.0 - 1.9 % Final    Differential Method 01/05/2017 Automated   Final    Sodium 01/05/2017 139  136 - 145 mmol/L Final    Potassium 01/05/2017 3.9  3.5 - 5.1 mmol/L Final    Chloride 01/05/2017 108  95 - 110 mmol/L Final    CO2 01/05/2017 18* 23 - 29 mmol/L Final    Glucose 01/05/2017 90  70 - 110 mg/dL Final    BUN, Bld 01/05/2017 15  6 - 20 mg/dL Final    Creatinine 01/05/2017 1.0  0.5 - 1.4 mg/dL Final    Calcium 01/05/2017 8.6* 8.7 - 10.5 mg/dL Final    Total Protein 01/05/2017 7.7  6.0 - 8.4 g/dL Final    Albumin 01/05/2017 3.1* 3.5 - 5.2  g/dL Final    Total Bilirubin 01/05/2017 0.4  0.1 - 1.0 mg/dL Final    Comment: For infants and newborns, interpretation of results should be based  on gestational age, weight and in agreement with clinical  observations.  Premature Infant recommended reference ranges:  Up to 24 hours.............<8.0 mg/dL  Up to 48 hours............<12.0 mg/dL  3-5 days..................<15.0 mg/dL  6-29 days.................<15.0 mg/dL      Alkaline Phosphatase 01/05/2017 66  55 - 135 U/L Final    AST 01/05/2017 14  10 - 40 U/L Final    ALT 01/05/2017 12  10 - 44 U/L Final    Anion Gap 01/05/2017 13  8 - 16 mmol/L Final    eGFR if African American 01/05/2017 >60.0  >60 mL/min/1.73 m^2 Final    eGFR if non African American 01/05/2017 >60.0  >60 mL/min/1.73 m^2 Final    Comment: Calculation used to obtain the estimated glomerular filtration  rate (eGFR) is the CKD-EPI equation. Since race is unknown   in our information system, the eGFR values for   -American and Non--American patients are given   for each creatinine result.      Lactate (Lactic Acid) 01/05/2017 1.8  0.5 - 2.2 mmol/L Final    Comment: Falsely low lactic acid results can be found in samples   containing >=13.0 mg/dL total bilirubin and/or >=3.5 mg/dL   direct bilirubin.      Magnesium 01/05/2017 2.0  1.6 - 2.6 mg/dL Final    Phosphorus 01/05/2017 3.9  2.7 - 4.5 mg/dL Final    Prothrombin Time 01/05/2017 11.0  9.0 - 12.5 sec Final    INR 01/05/2017 1.0  0.8 - 1.2 Final    Comment: Coumadin Therapy:  2.0 - 3.0 for INR for all indicators except mechanical heart valves  and antiphospholipid syndromes which should use 2.5 - 3.5.      Troponin I 01/05/2017 <0.006  0.000 - 0.026 ng/mL Final    Comment: The reference interval for Troponin I represents the 99th percentile   cutoff   for our facility and is consistent with 3rd generation assay   performance.      Group & Rh 01/05/2017 O POS   Final    Indirect Esteban 01/05/2017 NEG   Final    BNP  01/05/2017 <10  0 - 99 pg/mL Final    Values of less than 100 pg/ml are consistent with non-CHF populations.    Influenza A Ag, EIA 01/05/2017 Negative  Negative Final    Influenza B Ag, EIA 01/05/2017 Negative  Negative Final    Flu A & B Source 01/05/2017 Nasopharyngeal Swab   Final    Lipase 01/05/2017 8  4 - 60 U/L Final    Urine Culture, Routine 01/05/2017 No growth   Final    Specimen UA 01/05/2017 Urine, Clean Catch   Final    Color, UA 01/05/2017 Yellow  Yellow, Straw, Lauren Final    Appearance, UA 01/05/2017 Clear  Clear Final    pH, UA 01/05/2017 6.0  5.0 - 8.0 Final    Specific Gravity, UA 01/05/2017 1.015  1.005 - 1.030 Final    Protein, UA 01/05/2017 Negative  Negative Final    Comment: Recommend a 24 hour urine protein or a urine   protein/creatinine ratio if globulin induced proteinuria is  clinically suspected.      Glucose, UA 01/05/2017 Negative  Negative Final    Ketones, UA 01/05/2017 1+* Negative Final    Bilirubin (UA) 01/05/2017 Negative  Negative Final    Occult Blood UA 01/05/2017 Negative  Negative Final    Nitrite, UA 01/05/2017 Negative  Negative Final    Urobilinogen, UA 01/05/2017 Negative  <2.0 EU/dL Final    Leukocytes, UA 01/05/2017 Negative  Negative Final    Lactate (Lactic Acid) 01/05/2017 1.0  0.5 - 2.2 mmol/L Final    Comment: Falsely low lactic acid results can be found in samples   containing >=13.0 mg/dL total bilirubin and/or >=3.5 mg/dL   direct bilirubin.      Respiratory Culture 01/05/2017 Normal respiratory ruslan   Final    Gram Stain (Respiratory) 01/05/2017 <10 epithelial cells per low power field.   Final    Gram Stain (Respiratory) 01/05/2017 No WBC's   Final    Gram Stain (Respiratory) 01/05/2017 Rare yeast   Final    Gram Stain (Respiratory) 01/05/2017 Rare Gram positive cocci   Final    EF 01/06/2017 50  55 - 65 Final    Diastolic Dysfunction 01/06/2017 No   Final    Est. PA Systolic Pressure 01/06/2017 20   Final    Mitral Valve  Mobility 01/06/2017 NORMAL   Final    Tricuspid Valve Regurgitation 01/06/2017 TRIVIAL   Final    Sodium 01/06/2017 138  136 - 145 mmol/L Final    Potassium 01/06/2017 4.5  3.5 - 5.1 mmol/L Final    Chloride 01/06/2017 109  95 - 110 mmol/L Final    CO2 01/06/2017 20* 23 - 29 mmol/L Final    Glucose 01/06/2017 77  70 - 110 mg/dL Final    BUN, Bld 01/06/2017 12  6 - 20 mg/dL Final    Creatinine 01/06/2017 1.1  0.5 - 1.4 mg/dL Final    Calcium 01/06/2017 9.1  8.7 - 10.5 mg/dL Final    Total Protein 01/06/2017 7.1  6.0 - 8.4 g/dL Final    Albumin 01/06/2017 3.0* 3.5 - 5.2 g/dL Final    Total Bilirubin 01/06/2017 0.3  0.1 - 1.0 mg/dL Final    Comment: For infants and newborns, interpretation of results should be based  on gestational age, weight and in agreement with clinical  observations.  Premature Infant recommended reference ranges:  Up to 24 hours.............<8.0 mg/dL  Up to 48 hours............<12.0 mg/dL  3-5 days..................<15.0 mg/dL  6-29 days.................<15.0 mg/dL      Alkaline Phosphatase 01/06/2017 61  55 - 135 U/L Final    AST 01/06/2017 14  10 - 40 U/L Final    ALT 01/06/2017 9* 10 - 44 U/L Final    Anion Gap 01/06/2017 9  8 - 16 mmol/L Final    eGFR if African American 01/06/2017 >60.0  >60 mL/min/1.73 m^2 Final    eGFR if non African American 01/06/2017 >60.0  >60 mL/min/1.73 m^2 Final    Comment: Calculation used to obtain the estimated glomerular filtration  rate (eGFR) is the CKD-EPI equation. Since race is unknown   in our information system, the eGFR values for   -American and Non--American patients are given   for each creatinine result.      Magnesium 01/06/2017 2.1  1.6 - 2.6 mg/dL Final    Phosphorus 01/06/2017 3.9  2.7 - 4.5 mg/dL Final    WBC 01/06/2017 7.28  3.90 - 12.70 K/uL Final    RBC 01/06/2017 4.12* 4.60 - 6.20 M/uL Final    Hemoglobin 01/06/2017 12.2* 14.0 - 18.0 g/dL Final    Hematocrit 01/06/2017 36.3* 40.0 - 54.0 % Final    MCV  01/06/2017 88  82 - 98 fL Final    MCH 01/06/2017 29.6  27.0 - 31.0 pg Final    MCHC 01/06/2017 33.6  32.0 - 36.0 % Final    RDW 01/06/2017 12.7  11.5 - 14.5 % Final    Platelets 01/06/2017 398* 150 - 350 K/uL Final    MPV 01/06/2017 9.8  9.2 - 12.9 fL Final    Gran # 01/06/2017 5.5  1.8 - 7.7 K/uL Final    Lymph # 01/06/2017 0.8* 1.0 - 4.8 K/uL Final    Mono # 01/06/2017 0.8  0.3 - 1.0 K/uL Final    Eos # 01/06/2017 0.2  0.0 - 0.5 K/uL Final    Baso # 01/06/2017 0.02  0.00 - 0.20 K/uL Final    Gran% 01/06/2017 75.4* 38.0 - 73.0 % Final    Lymph% 01/06/2017 11.3* 18.0 - 48.0 % Final    Mono% 01/06/2017 10.6  4.0 - 15.0 % Final    Eosinophil% 01/06/2017 2.1  0.0 - 8.0 % Final    Basophil% 01/06/2017 0.3  0.0 - 1.9 % Final    Differential Method 01/06/2017 Automated   Final    Sodium 01/07/2017 139  136 - 145 mmol/L Final    Potassium 01/07/2017 4.2  3.5 - 5.1 mmol/L Final    Chloride 01/07/2017 108  95 - 110 mmol/L Final    CO2 01/07/2017 20* 23 - 29 mmol/L Final    Glucose 01/07/2017 124* 70 - 110 mg/dL Final    BUN, Bld 01/07/2017 14  6 - 20 mg/dL Final    Creatinine 01/07/2017 1.1  0.5 - 1.4 mg/dL Final    Calcium 01/07/2017 9.3  8.7 - 10.5 mg/dL Final    Total Protein 01/07/2017 7.4  6.0 - 8.4 g/dL Final    Albumin 01/07/2017 3.0* 3.5 - 5.2 g/dL Final    Total Bilirubin 01/07/2017 0.2  0.1 - 1.0 mg/dL Final    Comment: For infants and newborns, interpretation of results should be based  on gestational age, weight and in agreement with clinical  observations.  Premature Infant recommended reference ranges:  Up to 24 hours.............<8.0 mg/dL  Up to 48 hours............<12.0 mg/dL  3-5 days..................<15.0 mg/dL  6-29 days.................<15.0 mg/dL      Alkaline Phosphatase 01/07/2017 64  55 - 135 U/L Final    AST 01/07/2017 14  10 - 40 U/L Final    ALT 01/07/2017 9* 10 - 44 U/L Final    Anion Gap 01/07/2017 11  8 - 16 mmol/L Final    eGFR if  01/07/2017  >60.0  >60 mL/min/1.73 m^2 Final    eGFR if non African American 01/07/2017 >60.0  >60 mL/min/1.73 m^2 Final    Comment: Calculation used to obtain the estimated glomerular filtration  rate (eGFR) is the CKD-EPI equation. Since race is unknown   in our information system, the eGFR values for   -American and Non--American patients are given   for each creatinine result.      Magnesium 01/07/2017 2.2  1.6 - 2.6 mg/dL Final    Phosphorus 01/07/2017 3.8  2.7 - 4.5 mg/dL Final    WBC 01/07/2017 7.40  3.90 - 12.70 K/uL Final    RBC 01/07/2017 4.23* 4.60 - 6.20 M/uL Final    Hemoglobin 01/07/2017 12.5* 14.0 - 18.0 g/dL Final    Hematocrit 01/07/2017 37.6* 40.0 - 54.0 % Final    MCV 01/07/2017 89  82 - 98 fL Final    MCH 01/07/2017 29.6  27.0 - 31.0 pg Final    MCHC 01/07/2017 33.2  32.0 - 36.0 % Final    RDW 01/07/2017 12.5  11.5 - 14.5 % Final    Platelets 01/07/2017 404* 150 - 350 K/uL Final    MPV 01/07/2017 9.6  9.2 - 12.9 fL Final    Gran # 01/07/2017 6.2  1.8 - 7.7 K/uL Final    Lymph # 01/07/2017 0.7* 1.0 - 4.8 K/uL Final    Mono # 01/07/2017 0.5  0.3 - 1.0 K/uL Final    Eos # 01/07/2017 0.0  0.0 - 0.5 K/uL Final    Baso # 01/07/2017 0.01  0.00 - 0.20 K/uL Final    Gran% 01/07/2017 83.4* 38.0 - 73.0 % Final    Lymph% 01/07/2017 9.3* 18.0 - 48.0 % Final    Mono% 01/07/2017 6.8  4.0 - 15.0 % Final    Eosinophil% 01/07/2017 0.3  0.0 - 8.0 % Final    Basophil% 01/07/2017 0.1  0.0 - 1.9 % Final    Differential Method 01/07/2017 Automated   Final   Admission on 01/03/2017, Discharged on 01/03/2017   Component Date Value Ref Range Status    Blood Culture, Routine 01/03/2017 No growth after 5 days.   Final    Blood Culture, Routine 01/03/2017 No growth after 5 days.   Final     Supplemental Diagnosis 6/30/16  Immunohistochemical stains are completed on the Station 4L lymph node cell block material and reveal the tumor  cells to stain positively with cytokeratin 7 and TTF-1. The tumor  cells show nonspecific blush staining with  cytokeratin 5/6 and are negative for p63. This staining profile supports a diagnosis of non-small cell carcinoma  consistent with adenocarcinoma.  Cell block material will be sent for EGFR, ALK and ROS-1 testing and results will follow in a supplemental report.  (Electronically Signed: 2016-06-30 11:50:31 )  Diagnosed by: Irene Amaro M.D.  FINAL PATHOLOGIC DIAGNOSIS  1. Lymph node, Station 4L, EBUS guided fine needle aspiration:  Positive for malignant cells, non-small cell carcinoma.  Rare background lymphocytes are present.  Immunohistochemical staining be completed to further characterize this malignancy and results will follow in a  supplemental report.  2. Lymph node, Station 7, EBUS guided fine needle aspiration:  Positive for malignant cells, non-small cell carcinoma tumor identical to that seen in specimen #1.  Background lymphocytes are present.    Imaging:   PET/CT 1/9/17  FDG PET-CT IMAGING :     History: Lung cancer, right middle lobe    Additional clinical history: Status post chemoradiation    Comparison: CTA chest 1/5/2017, PET/CT 7/2016    Technique:   The fasting blood glucose level was 75 mg/dl. 10.59 mCi of F18-FDG was administered intravenously in the right hand. After an approximately 60 min distribution time, PET/CT images were acquired from the skull base to the mid thigh. Transmission images were acquired to correct for attenuation using a whole body low-dose CT scan without contrast with the arms positioned above the patient's head.     Findings:    Quality of the study: Adequate.     There is increased radiotracer uptake in the region of the right lower lobe infiltrate SUV max 5.95 (previously 8.66). Subcarinal and right lower pretracheal lymph nodes have returned to normal background activity and have decreased in size compared to prior PET/CT. Focus of increased radiotracer uptake at the anterolateral right 8th rib likely post traumatic.   Focal increased uptake underlying the lower rectus muscule likely reflects misregistration artifact.    Physiologic uptake of the tracer within the salivary glands, myocardium, GI, and  tracts is seen.     Incidental CT findings:  Prominent right hilar node without increased hypermetabolic activity, geographic right lower lobe reticular opacity likely post radiation change, noncalcified pulmonary opacity in the medial basal segment of the right lower lobe measuring 0.8 cm (previously 1.1 cm 1/5/2017) without increased hypermetabolic activity, aortic atherosclerotic calcification, focal low attenuation area in the spleen with simple fluid attenuation likely a cyst, few low attenuation foci in both hepatic lobes too small to characterize, most likely hepatic cysts, mild prostatomegaly with dystrophic calcification, probable bladder diverticulum,   Impression       Interval improvement in hypermetabolic activity in the right middle lobe consolidation corresponding to known lung primary status post chemoradiation.     Subcarinal and pretracheal lymph nodes demonstrate normal background activity with interval decrease in size compared to prior PET/CT.    Geographic right lower lobe reticular opacity likely post radiation change.                      Assessment:       1. Primary cancer of right middle lobe of lung    2. Bone lesion           Plan:     Mr. Torres is improving from his COPD exacerbation.  Review of his PET/CT shows improvement, however, he does have a bone lesion in his right eighth rib.  He denies trauma.  I will discuss his case in our thoracic tumor Board for further recommendations.  Otherwise, plan to repeat CT of the chest in 3 months and see back at that time.    Dano Fernandez DO, FACP  Hematology & Oncology  [facility] [place] [address]  ph. [phone]  Fax. [phone]    25 minutes were spent in coordination of patient's care, record review and counseling.  More than 50% of  the time was face-to-face.

## 2017-01-10 LAB
BACTERIA BLD CULT: NORMAL
BACTERIA BLD CULT: NORMAL

## 2017-01-12 ENCOUNTER — DOCUMENTATION ONLY (OUTPATIENT)
Dept: CARDIOTHORACIC SURGERY | Facility: CLINIC | Age: 62
End: 2017-01-12

## 2017-01-12 NOTE — PATIENT CARE CONFERENCE
"[facility]      THORACIC MULTIDISCIPLINARY TUMOR BOARD  PATIENT REVIEW FORM  ________________________________________________________________________    CLINIC #: [id number]  DATE: 1/11/2016    TUMOR SITE:   NSCLC    PRESENTER:   Dr. Fernandez    PATIENT SUMMARY:   62 y/o who presented with worsening cough in 4/2016. CXR noted a right middle lobe opacity. Subsequent CT of the chest was performed on 5/30/16 showing a 5.6 cm lesion in the right middle lobe with hilar and mediastinal lymphadenopathy along with hepatic lesions and a left adrenal mass. Underwent bronchoscopy which was nonrevealing and transbronchial biopsies were nondiagnostic. Underwent EBUS with biopsy of mediastinal lymph nodes with pathology showing non-small cell lung cancer consistent with adenocarcinoma. He was started on concurrent chemotherapy and radiation with cisplatin and pemetrexed on 8/15/16. He completed 64 Gy of concurrent radiation with cisplatin and pemetrexed with chemotherapy completed on 10/3/16.   - CT chest on 1/5/17 revealed "Improving opacification/consolidation within the right middle lobe with improved patency of the right middle lobe bronchus and medial segmental bronchus, suggesting response to ongoing radiation therapy in this patient with a history of lung cancer. 1.1 cm nodule within the right lower lobe medial basal segment. Unclear if new or course blunting to disease activity on previous PET/CT."   - PET on 1/9/17 revealed "increased radiotracer uptake in the region of the right lower lobe infiltrate SUV max 5.95 (previously 8.66). Prominent right hilar node without increased hypermetabolic activity, geographic right lower lobe reticular opacity likely post radiation change, noncalcified pulmonary opacity in the medial basal segment of the right lower lobe measuring 0.8 cm (previously 1.1 cm 1/5/2017)".    BOARD RECOMMENDATIONS:   Repeat CT chest in 2-3 months.    CONSULT NEEDED:     [] Surgery    [] Hem/Onc    [] " Rad/Onc    [] Dietary                 [] Social Service    [] Psychology       [] Pulmonology    Clinical Stage: Tumor  Node(s)  Metastasis   Pathologic Stage: T2b, N2, M0  Thyroid transcription factor (TTF): Positive   CK7: Positive      GROUP STAGE:     [] O    [] 1A    [] IB    [] IIA    [] IIB     [x] IIIA     [] IIIB     [] IIIC    [] IV                               [] Local recurrence     [] Regional recurrence     [] Distant recurrence                   [x] NSCLC     [] SCLC     Tumor type: Adenocarcinoma    Unstageable:      [] Yes     [] No  Metastatic site(s):          [x] Marlene'l Treatment Guidelines reviewed and care planned is consistent with guidelines.         (i.e., NCCN, NCI, PD, ACO, AUA, etc.)    PRESENTATION AT CANCER CONFERENCE:         [] Prospective    [] Retrospective     [] Follow-Up          [] Eligible for clinical trial

## 2017-02-03 RX ORDER — AMLODIPINE BESYLATE 10 MG/1
TABLET ORAL
Qty: 30 TABLET | Refills: 0 | Status: SHIPPED | OUTPATIENT
Start: 2017-02-03 | End: 2017-03-05 | Stop reason: SDUPTHER

## 2017-02-05 ENCOUNTER — HOSPITAL ENCOUNTER (EMERGENCY)
Facility: HOSPITAL | Age: 62
Discharge: HOME | End: 2017-02-06
Attending: EMERGENCY MEDICINE
Payer: COMMERCIAL

## 2017-02-05 DIAGNOSIS — R09.81 SINUS CONGESTION: ICD-10-CM

## 2017-02-05 DIAGNOSIS — R06.02 SOB (SHORTNESS OF BREATH): Primary | ICD-10-CM

## 2017-02-05 LAB
ALBUMIN SERPL BCP-MCNC: 3.4 G/DL
ALP SERPL-CCNC: 76 U/L
ALT SERPL W/O P-5'-P-CCNC: 18 U/L
ANION GAP SERPL CALC-SCNC: 7 MMOL/L
AST SERPL-CCNC: 15 U/L
BASOPHILS # BLD AUTO: 0.02 K/UL
BASOPHILS NFR BLD: 0.3 %
BILIRUB SERPL-MCNC: 0.3 MG/DL
BUN SERPL-MCNC: 12 MG/DL
CALCIUM SERPL-MCNC: 9.2 MG/DL
CHLORIDE SERPL-SCNC: 106 MMOL/L
CO2 SERPL-SCNC: 25 MMOL/L
CREAT SERPL-MCNC: 1.1 MG/DL
DIFFERENTIAL METHOD: ABNORMAL
EOSINOPHIL # BLD AUTO: 0.1 K/UL
EOSINOPHIL NFR BLD: 1.2 %
ERYTHROCYTE [DISTWIDTH] IN BLOOD BY AUTOMATED COUNT: 13 %
EST. GFR  (AFRICAN AMERICAN): >60 ML/MIN/1.73 M^2
EST. GFR  (NON AFRICAN AMERICAN): >60 ML/MIN/1.73 M^2
GLUCOSE SERPL-MCNC: 103 MG/DL
HCT VFR BLD AUTO: 38.5 %
HGB BLD-MCNC: 12.9 G/DL
LYMPHOCYTES # BLD AUTO: 0.8 K/UL
LYMPHOCYTES NFR BLD: 12.1 %
MCH RBC QN AUTO: 28.8 PG
MCHC RBC AUTO-ENTMCNC: 33.5 %
MCV RBC AUTO: 86 FL
MONOCYTES # BLD AUTO: 0.6 K/UL
MONOCYTES NFR BLD: 9.1 %
NEUTROPHILS # BLD AUTO: 5 K/UL
NEUTROPHILS NFR BLD: 77 %
PLATELET # BLD AUTO: 408 K/UL
PMV BLD AUTO: 9.2 FL
POTASSIUM SERPL-SCNC: 3.8 MMOL/L
PROT SERPL-MCNC: 7.4 G/DL
RBC # BLD AUTO: 4.48 M/UL
SODIUM SERPL-SCNC: 138 MMOL/L
WBC # BLD AUTO: 6.47 K/UL

## 2017-02-05 PROCEDURE — 93010 ELECTROCARDIOGRAM REPORT: CPT | Mod: ,,, | Performed by: INTERNAL MEDICINE

## 2017-02-05 PROCEDURE — 80053 COMPREHEN METABOLIC PANEL: CPT

## 2017-02-05 PROCEDURE — 85025 COMPLETE CBC W/AUTO DIFF WBC: CPT

## 2017-02-05 PROCEDURE — 93005 ELECTROCARDIOGRAM TRACING: CPT

## 2017-02-05 PROCEDURE — 99284 EMERGENCY DEPT VISIT MOD MDM: CPT | Mod: 25

## 2017-02-05 PROCEDURE — 99284 EMERGENCY DEPT VISIT MOD MDM: CPT | Mod: ,,, | Performed by: EMERGENCY MEDICINE

## 2017-02-05 RX ORDER — PREDNISONE 20 MG/1
40 TABLET ORAL
Status: COMPLETED | OUTPATIENT
Start: 2017-02-05 | End: 2017-02-06

## 2017-02-05 RX ORDER — PREDNISONE 20 MG/1
40 TABLET ORAL DAILY
Qty: 8 TABLET | Refills: 0 | Status: SHIPPED | OUTPATIENT
Start: 2017-02-05 | End: 2017-02-09

## 2017-02-05 RX ORDER — DOXYCYCLINE 100 MG/1
100 CAPSULE ORAL 2 TIMES DAILY
Qty: 14 CAPSULE | Refills: 0 | Status: SHIPPED | OUTPATIENT
Start: 2017-02-05 | End: 2017-02-12

## 2017-02-05 RX ORDER — FLUTICASONE PROPIONATE 50 MCG
SPRAY, SUSPENSION (ML) NASAL
Qty: 1 BOTTLE | Refills: 1 | Status: SHIPPED | OUTPATIENT
Start: 2017-02-05 | End: 2017-04-24 | Stop reason: SDUPTHER

## 2017-02-05 RX ORDER — DOXYCYCLINE HYCLATE 100 MG
100 TABLET ORAL
Status: COMPLETED | OUTPATIENT
Start: 2017-02-05 | End: 2017-02-06

## 2017-02-05 NOTE — ED AVS SNAPSHOT
[facility]  [address] [address]               Angel Torres   2017 10:11 PM   ED    Description:  Male : 1955   Department:  [facility]           Your Care was Coordinated By:     Provider Role From To    Jack Murray MD Attending Provider 17 --    Ramesh Vargas MD Resident 17 --      Reason for Visit     Shortness of Breath           Diagnoses this Visit        Comments    SOB (shortness of breath)    -  Primary     Sinus congestion           ED Disposition     ED Disposition Condition Comment    Discharge             To Do List           Follow-up Information     Schedule an appointment as soon as possible for a visit with Dano Fernandez DO, FACP.    Specialty:  Hematology and Oncology    Contact information:    [address] [place] [address]  [phone]          [facility]    Specialty:  Emergency Medicine    Why:  As needed, If symptoms worsen    Contact information:    [address] [place] [id number] [phone]       These Medications        Disp Refills Start End    predniSONE (DELTASONE) 20 MG tablet 8 tablet 0 2017    Take 2 tablets (40 mg total) by mouth once daily. - Oral    Pharmacy: [facility] [address] [place] [address] [address] AT [facility] Ph #: [phone]       doxycycline (VIBRAMYCIN) 100 MG Cap 14 capsule 0 2017    Take 1 capsule (100 mg total) by mouth 2 (two) times daily. - Oral    Pharmacy: [facility] [id number] [place], [place] [address] [address] [facility] Ph #: [phone]         [facility]     [facility] -  Assistance  Registered nurses in the [facility] provide clinical advisement, health education, appointment booking, and other advisory  services.  Call for this free service at [phone].             Medications           Message regarding Medications     Verify the changes and/or additions to your medication regime listed below are the same as discussed with your clinician today.  If any of these changes or additions are incorrect, please notify your healthcare provider.        START taking these NEW medications        Refills    predniSONE (DELTASONE) 20 MG tablet 0    Sig: Take 2 tablets (40 mg total) by mouth once daily.    Class: Print    Route: Oral    doxycycline (VIBRAMYCIN) 100 MG Cap 0    Sig: Take 1 capsule (100 mg total) by mouth 2 (two) times daily.    Class: Print    Route: Oral      These medications were administered today        Dose Freq    predniSONE tablet 40 mg 40 mg ED 1 Time    Sig: Take 2 tablets (40 mg total) by mouth ED 1 Time.    Class: Normal    Route: Oral    doxycycline tablet 100 mg 100 mg ED 1 Time    Sig: Take 1 tablet (100 mg total) by mouth ED 1 Time.    Class: Normal    Route: Oral           Verify that the below list of medications is an accurate representation of the medications you are currently taking.  If none reported, the list may be blank. If incorrect, please contact your healthcare provider. Carry this list with you in case of emergency.           Current Medications     albuterol 90 mcg/actuation inhaler Inhale 2 puffs into the lungs every 6 (six) hours as needed for Wheezing.    amlodipine (NORVASC) 10 MG tablet TAKE 1 TABLET BY MOUTH EVERY DAY    azithromycin (ZITHROMAX) 500 MG tablet     cetirizine (ZYRTEC) 10 MG tablet Take 1 tablet (10 mg total) by mouth once daily.    cyanocobalamin injection 1,000 mcg Inject 1 mL (1,000 mcg total) into the muscle one time.    dexamethasone (DECADRON) 4 MG Tab Take 1 tablet (4 mg total) by mouth every 12 (twelve) hours. Take 1 tab twice daily starting the day prior to chemotherapy for 3 days.    docusate sodium (COLACE) 100 MG capsule Take 1 capsule (100 mg  "total) by mouth 2 (two) times daily as needed for Constipation.    doxycycline (VIBRAMYCIN) 100 MG Cap Take 1 capsule (100 mg total) by mouth 2 (two) times daily.    doxycycline tablet 100 mg Take 1 tablet (100 mg total) by mouth ED 1 Time.    fluticasone (FLONASE) 50 mcg/actuation nasal spray USE 2 SPRAYS IN EACH NOSTRIL ONCE DAILY    folic acid (FOLVITE) 400 MCG tablet Take 1 tablet (400 mcg total) by mouth once daily.    hydrOXYzine HCl (ATARAX) 25 MG tablet Take 1 tablet (25 mg total) by mouth 3 (three) times daily as needed for Itching.    ondansetron (ZOFRAN, AS HYDROCHLORIDE,) 4 MG tablet Take 1 tablet (4 mg total) by mouth every 8 (eight) hours as needed for Nausea.    predniSONE (DELTASONE) 20 MG tablet Take 2 tablets (40 mg total) by mouth once daily.    predniSONE tablet 40 mg Take 2 tablets (40 mg total) by mouth ED 1 Time.           Clinical Reference Information           Your Vitals Were     BP Pulse Temp Resp Height Weight    124/83 (BP Location: Left arm, Patient Position: Lying, BP Method: Automatic) 102 98.4 °F (36.9 °C) (Oral) 16 6' 5" (1.956 m) 88.5 kg (195 lb)    SpO2 BMI             94% 23.12 kg/m2         Allergies as of 2/5/2017     No Known Allergies      Immunizations Administered on Date of Encounter - 2/5/2017     None      ED Micro, Lab, POCT     Start Ordered       Status Ordering Provider    02/05/17 2224 02/05/17 2224  CBC auto differential  STAT      Final result     02/05/17 2224 02/05/17 2224  Comprehensive metabolic panel  STAT      Final result       ED Imaging Orders     Start Ordered       Status Ordering Provider    02/05/17 2223 02/05/17 2224  X-Ray Chest PA And Lateral  1 time imaging      Final result         Discharge Instructions         Sinusitis (Antibiotic Treatment)    The sinuses are air-filled spaces within the bones of the face. They connect to the inside of the nose. Sinusitis is an inflammation of the tissue lining the sinus cavity. Sinus inflammation can occur " during a cold. It can also be due to allergies to pollens and other particles in the air. Sinusitis can cause symptoms of sinus congestion and fullness. A sinus infection causes fever, headache and facial pain. There is often green or yellow drainage from the nose or into the back of the throat (post-nasal drip). You have been given antibiotics to treat this condition.  Home care:  · Take the full course of antibiotics as instructed. Do not stop taking them, even if you feel better.  · Drink plenty of water, hot tea, and other liquids. This may help thin mucus. It also may promote sinus drainage.  · Heat may help soothe painful areas of the face. Use a towel soaked in hot water. Or,  the shower and direct the hot spray onto your face. Using a vaporizer along with a menthol rub at night may also help.   · An expectorant containing guaifenesin may help thin the mucus and promote drainage from the sinuses.  · Over-the-counter decongestants may be used unless a similar medicine was prescribed. Nasal sprays work the fastest. Use one that contains phenylephrine or oxymetazoline. First blow the nose gently. Then use the spray. Do not use these medicines more often than directed on the label or symptoms may get worse. You may also use tablets containing pseudoephedrine. Avoid products that combine ingredients, because side effects may be increased. Read labels. You can also ask the pharmacist for help. (NOTE: Persons with high blood pressure should not use decongestants. They can raise blood pressure.)  · Over-the-counter antihistamines may help if allergies contributed to your sinusitis.    · Do not use nasal rinses or irrigation during an acute sinus infection, unless told to by your health care provider. Rinsing may spread the infection to other sinuses.  · Use acetaminophen or ibuprofen to control pain, unless another pain medicine was prescribed. (If you have chronic liver or kidney disease or ever had a  stomach ulcer, talk with your doctor before using these medicines. Aspirin should never be used in anyone under 18 years of age who is ill with a fever. It may cause severe liver damage.)  · Don't smoke. This can worsen symptoms.  Follow-up care  Follow up with your healthcare provider or our staff if you are not improving within the next week.  When to seek medical advice  Call your healthcare provider if any of these occur:  · Facial pain or headache becoming more severe  · Stiff neck  · Unusual drowsiness or confusion  · Swelling of the forehead or eyelids  · Vision problems, including blurred or double vision  · Fever of 100.4ºF (38ºC) or higher, or as directed by your healthcare provider  · Seizure  · Breathing problems  · Symptoms not resolving within 10 days  Date Last Reviewed: 4/13/2015  © 0380-1979 [employer]. [address] [place] [address]. All rights reserved. This information is not intended as a substitute for professional medical care. Always follow your healthcare professional's instructions.          Your Scheduled Appointments     Mar 13, 2017 10:15 AM CDT   New Patient with [place] MD Alejandro Colno III [facility] - Otorhinolaryngology ([facility] )    [address]  [place] [address] [phone]            Apr 12, 2017  8:45 AM CDT   CT CHEST W/OUT CONTRAST with [facility] CT2 64- LIMIT 600 LBS   [facility] ([facility] )    [address] [facility] [id number] [phone]              Smoking Cessation     If you would like to quit smoking:   You may be eligible for free services if you are a [place] resident and started smoking cigarettes before September 1, 1988.  Call the Smoking Cessation Trust (SCT) toll free at (399) 855-5992 or (898) 380-6434.   Call 1-800-QUIT-NOW if you do not meet the above criteria.             [facility] complies with applicable Federal civil rights laws and does not  discriminate on the basis of race, color, national origin, age, disability, or sex.        Language Assistance Services     ATTENTION: Language assistance services are available, free of charge. Please call [phone].      ATENCIÓN: Si sarah dallas, tiene a zhang disposición servicios gratuitos de asistencia lingüística. Llame al [phone].     CHÚ Ý: N?u b?n nói Ti?ng Vi?t, có các d?ch v? h? tr? ngôn ng? mi?n phí dành cho b?n. G?i s? [phone].

## 2017-02-06 VITALS
RESPIRATION RATE: 16 BRPM | HEIGHT: 77 IN | TEMPERATURE: 99 F | OXYGEN SATURATION: 94 % | HEART RATE: 104 BPM | BODY MASS INDEX: 23.02 KG/M2 | SYSTOLIC BLOOD PRESSURE: 146 MMHG | DIASTOLIC BLOOD PRESSURE: 93 MMHG | WEIGHT: 195 LBS

## 2017-02-06 PROCEDURE — 63600175 PHARM REV CODE 636 W HCPCS: Performed by: EMERGENCY MEDICINE

## 2017-02-06 PROCEDURE — 25000003 PHARM REV CODE 250: Performed by: EMERGENCY MEDICINE

## 2017-02-06 RX ADMIN — PREDNISONE 40 MG: 20 TABLET ORAL at 12:02

## 2017-02-06 RX ADMIN — DOXYCYCLINE HYCLATE 100 MG: 100 TABLET, COATED ORAL at 12:02

## 2017-02-06 NOTE — DISCHARGE INSTRUCTIONS
Sinusitis (Antibiotic Treatment)    The sinuses are air-filled spaces within the bones of the face. They connect to the inside of the nose. Sinusitis is an inflammation of the tissue lining the sinus cavity. Sinus inflammation can occur during a cold. It can also be due to allergies to pollens and other particles in the air. Sinusitis can cause symptoms of sinus congestion and fullness. A sinus infection causes fever, headache and facial pain. There is often green or yellow drainage from the nose or into the back of the throat (post-nasal drip). You have been given antibiotics to treat this condition.  Home care:  · Take the full course of antibiotics as instructed. Do not stop taking them, even if you feel better.  · Drink plenty of water, hot tea, and other liquids. This may help thin mucus. It also may promote sinus drainage.  · Heat may help soothe painful areas of the face. Use a towel soaked in hot water. Or,  the shower and direct the hot spray onto your face. Using a vaporizer along with a menthol rub at night may also help.   · An expectorant containing guaifenesin may help thin the mucus and promote drainage from the sinuses.  · Over-the-counter decongestants may be used unless a similar medicine was prescribed. Nasal sprays work the fastest. Use one that contains phenylephrine or oxymetazoline. First blow the nose gently. Then use the spray. Do not use these medicines more often than directed on the label or symptoms may get worse. You may also use tablets containing pseudoephedrine. Avoid products that combine ingredients, because side effects may be increased. Read labels. You can also ask the pharmacist for help. (NOTE: Persons with high blood pressure should not use decongestants. They can raise blood pressure.)  · Over-the-counter antihistamines may help if allergies contributed to your sinusitis.    · Do not use nasal rinses or irrigation during an acute sinus infection, unless told to by  your health care provider. Rinsing may spread the infection to other sinuses.  · Use acetaminophen or ibuprofen to control pain, unless another pain medicine was prescribed. (If you have chronic liver or kidney disease or ever had a stomach ulcer, talk with your doctor before using these medicines. Aspirin should never be used in anyone under 18 years of age who is ill with a fever. It may cause severe liver damage.)  · Don't smoke. This can worsen symptoms.  Follow-up care  Follow up with your healthcare provider or our staff if you are not improving within the next week.  When to seek medical advice  Call your healthcare provider if any of these occur:  · Facial pain or headache becoming more severe  · Stiff neck  · Unusual drowsiness or confusion  · Swelling of the forehead or eyelids  · Vision problems, including blurred or double vision  · Fever of 100.4ºF (38ºC) or higher, or as directed by your healthcare provider  · Seizure  · Breathing problems  · Symptoms not resolving within 10 days  Date Last Reviewed: 4/13/2015  © 8195-8886 [employer]. [address] [place] [address], [place] [address]. All rights reserved. This information is not intended as a substitute for professional medical care. Always follow your healthcare professional's instructions.

## 2017-02-06 NOTE — PROVIDER PROGRESS NOTES - EMERGENCY DEPT.
Encounter Date: 2/5/2017    ED Physician Progress Notes         EKG - STEMI Decision  Initial Reading: No STEMI present.  Response: No Action Needed.

## 2017-02-06 NOTE — ED NOTES
LOC: The patient is awake, alert, and oriented to place, time, situation. Affect is appropriate. Speech is appropriate and clear.       APPEARANCE: Patient resting comfortably in no acute distress. Patient is clean and well groomed.     SKIN: The skin is warm and dry; color consistent with ethnicity. Patient has normal skin turgor and moist mucus membranes. Skin intact; no breakdown or bruising noted.      MUSCULOSKELETAL: Patient moving upper and lower extremities without difficulty. Denies weakness.       RESPIRATORY: Airway is open and patent. Respirations spontaneous, even, easy, and non-labored. Patient has a normal effort and rate. No accessory muscle use noted. Reports productive cough. Crackles to the right lower lobe.      CARDIAC: Tachycardic. . No peripheral edema noted. No complaints of chest pain.       ABDOMEN: Soft and non tender to palpation. No distention noted.       NEUROLOGIC: Eyes open spontaneously. Behavior appropriate to situation. Follows commands; facial expression symmetrical. Purposeful motor response noted; normal sensation in all extremities.

## 2017-02-06 NOTE — ED PROVIDER NOTES
Encounter Date: 2/5/2017    SCRIBE #1 NOTE: I, Suzy Arthur, am scribing for, and in the presence of,  Dr. Murray. I have scribed the following portions of the note - the Resident attestation and the EKG reading. Other sections scribed: X-ray.       History     Chief Complaint   Patient presents with    Shortness of Breath     Pt reports SOB since January. Has recently been dx with pneumonia. Hx of Lung Cancer. Denies chest pain.     Review of patient's allergies indicates:  No Known Allergies  HPI Comments: Patient is a 62 y.o. Male with history of lung cancer s/p chemo and radiation presents with SOB and sinus congestion x1 month. Patient says he feels fullness in his maxillary sinus and feels congested. When he is congested, he feels like he is SOB. SOB improves with blowing his nose. He has had a chronic 2/2 malignancy that has greatly improved since treatment. No acute change in cough or sputum. He denies fever, chills, and chest pain. He had been on a nasal spray but has been out for 2-3 weeks.     Past Medical History   Diagnosis Date    Chemotherapy follow-up examination 9/7/2016    COPD (chronic obstructive pulmonary disease)     Hearing loss     Hypertension 2/9/2015    Primary cancer of right middle lobe of lung 7/11/2016    Rash 9/7/2016     Past Medical History Pertinent Negatives   Diagnosis Date Noted    Amblyopia 11/10/2014    Arthritis 11/10/2014    Cataract 11/10/2014    Diabetes mellitus 11/10/2014    Diabetic retinopathy 11/10/2014    Glaucoma 11/10/2014    Macular degeneration 11/10/2014    Retinal detachment 11/10/2014    Sickle cell anemia 2/9/2015    Sickle cell trait 2/9/2015    Strabismus 11/10/2014    Uveitis 11/10/2014     Past Surgical History   Procedure Laterality Date    Knee surgery      Inguinal hernia repair Bilateral      Family History   Problem Relation Age of Onset    Cancer Mother      lung, breast    Cancer Sister      lung    Cancer Maternal  Grandfather     No Known Problems Father     No Known Problems Brother     No Known Problems Maternal Aunt     No Known Problems Maternal Uncle     No Known Problems Paternal Aunt     No Known Problems Paternal Uncle     No Known Problems Maternal Grandmother     No Known Problems Paternal Grandmother     No Known Problems Paternal Grandfather     Amblyopia Neg Hx     Blindness Neg Hx     Cataracts Neg Hx     Diabetes Neg Hx     Glaucoma Neg Hx     Hypertension Neg Hx     Macular degeneration Neg Hx     Retinal detachment Neg Hx     Strabismus Neg Hx     Stroke Neg Hx     Thyroid disease Neg Hx      Social History   Substance Use Topics    Smoking status: Former Smoker     Packs/day: 2.00     Years: 40.00     Quit date: 11/16/2013    Smokeless tobacco: None    Alcohol use No     Review of Systems   Constitutional: Negative for fever.   HENT: Positive for congestion. Negative for sore throat.    Respiratory: Positive for cough and shortness of breath.    Cardiovascular: Negative for chest pain, palpitations and leg swelling.   Gastrointestinal: Negative for nausea.   Genitourinary: Negative for dysuria.   Musculoskeletal: Negative for back pain.   Skin: Negative for rash.   Neurological: Negative for weakness.   Hematological: Does not bruise/bleed easily.       Physical Exam   Initial Vitals   BP Pulse Resp Temp SpO2   02/05/17 2203 02/05/17 2203 02/05/17 2203 02/05/17 2203 02/05/17 2203   133/78 98 20 98.4 °F (36.9 °C) 91 %     Physical Exam    Nursing note and vitals reviewed.  Constitutional: He appears well-developed and well-nourished. He is not diaphoretic. No distress.   HENT:   Head: Normocephalic and atraumatic.   Nose: Mucosal edema and rhinorrhea present.   Mouth/Throat: Oropharynx is clear and moist. No oropharyngeal exudate, posterior oropharyngeal edema, posterior oropharyngeal erythema or tonsillar abscesses.   Boggy nasal turbinates   Eyes: Conjunctivae and EOM are normal.  Pupils are equal, round, and reactive to light.   Neck: Normal range of motion. Neck supple.   Cardiovascular: Normal rate, regular rhythm, normal heart sounds and intact distal pulses. Exam reveals no gallop and no friction rub.    No murmur heard.  Pulmonary/Chest: Effort normal. No accessory muscle usage or stridor. No tachypnea. No respiratory distress. He has no wheezes. He has no rhonchi. He has rales in the right lower field.   Speaking in full sentences   Abdominal: Soft. Bowel sounds are normal. He exhibits no distension. There is no tenderness. There is no rebound and no guarding.   Musculoskeletal: He exhibits no edema or tenderness.   Neurological: He is alert and oriented to person, place, and time. He displays normal reflexes. No cranial nerve deficit or sensory deficit.   Skin: Skin is warm and dry.         ED Course   Procedures  Labs Reviewed   CBC W/ AUTO DIFFERENTIAL - Abnormal; Notable for the following:        Result Value    RBC 4.48 (*)     Hemoglobin 12.9 (*)     Hematocrit 38.5 (*)     Platelets 408 (*)     Lymph # 0.8 (*)     Gran% 77.0 (*)     Lymph% 12.1 (*)     All other components within normal limits   COMPREHENSIVE METABOLIC PANEL - Abnormal; Notable for the following:     Albumin 3.4 (*)     Anion Gap 7 (*)     All other components within normal limits     EKG Readings: (Independently Interpreted)   Sinus tachycardia with right axis deviation and right bundle branch block. Rate 111. Occasional PACs.        X-Rays:   Independently Interpreted Readings:   Chest X-Ray: Focal opacity noted to right middle lobe consistent with history of malignancy     Imaging Results         X-Ray Chest PA And Lateral (Final result) Result time:  02/05/17 23:05:12    Final result by Ector Cano MD (02/05/17 23:05:12)    Impression:        Similar opacity within the right middle lobe in keeping with known malignancy.  No pneumothorax.    Emphysema.      Electronically signed by: ECTOR CANO  MD  Date:     02/05/17  Time:    23:05     Narrative:    Technique: PA and LAT chest radiographs.    Comparison: PET/CT 01/09/2017, CTA 01/05/2017, radiograph 01/05/2017.    Findings:    Mediastinal structures are midline.  Cardiac silhouette is normal in size.  Lung volumes are relatively symmetric.  There is a persistent focal subsegmental opacity within the right middle lobe which was noted to be hypermetabolic on previous PET/CT and demonstrates subtle increased the mass lesion when compared to the previous radiograph.  Emphysematous lung changes identified.  No pneumothorax or pleural effusions.  Note is made of scattered gas adjacent to the left hemidiaphragm, presumably within the stomach bubble.  No acute osseous abnormalities.              Medical Decision Making:   History:   Old Medical Records: I decided to obtain old medical records.  Independently Interpreted Test(s):   I have ordered and independently interpreted X-rays - see prior notes.  I have ordered and independently interpreted EKG Reading(s) - see prior notes  Clinical Tests:   Lab Tests: Ordered and Reviewed  Radiological Study: Ordered and Reviewed  Medical Tests: Ordered and Reviewed       APC / Resident Notes:   Patient is a 62 y.o. Male with history of lung CA presenting with SOB and sinus congestion. DDX includes but not limited to PNA, PTX, worsening tumor burden, sinusitis, rhinitis. CXR shows stable RLL mass with no acute abnormalities. Labs reveal normal white count, baseline anemia, normal lytes, and normal renal function. Triage O2 sat 91% but patient 97% on RA in room and does not desat with exertion. Will t reat with Doxycycline and Prednisone for sinusitis. PCP called in Flonase PTA. Advised to f/u with PCP and given return precautions.  Ramesh Vargas M.D.  02/06/2017 12:03 AM         Scribe Attestation:   Scribe #1: I performed the above scribed service and the documentation accurately describes the services I performed. I attest  to the accuracy of the note.    Attending Attestation:   Physician Attestation Statement for Resident:  As the supervising MD   Physician Attestation Statement: I have personally seen and examined this patient.   I agree with the above history. -: 62 y.o. male with history of COPD and lung cancer presents for evaluation of sinus congestion and increased cough.    As the supervising MD I agree with the above PE.    As the supervising MD I agree with the above treatment, course, plan, and disposition.  I have reviewed and agree with the residents interpretation of the following: lab data, x-rays and EKG.          Physician Attestation for Scribe:  Physician Attestation Statement for Scribe #1: I, Dr. Murray, reviewed documentation, as scribed by Suzy Arthur in my presence, and it is both accurate and complete.                 ED Course     Clinical Impression:   The primary encounter diagnosis was SOB (shortness of breath). A diagnosis of Sinus congestion was also pertinent to this visit.    Disposition:   Disposition: Discharged  Condition: Stable       Ramesh Vargas MD  Resident  02/06/17 0006       Jack Murray MD  02/06/17 0026

## 2017-02-07 NOTE — TELEPHONE ENCOUNTER
Called patient advised of physician's recommendation. Patient verbalized an understanding. patient states  He will call for an appointlmlent.

## 2017-03-06 ENCOUNTER — PATIENT MESSAGE (OUTPATIENT)
Dept: FAMILY MEDICINE | Facility: CLINIC | Age: 62
End: 2017-03-06

## 2017-03-06 RX ORDER — AMLODIPINE BESYLATE 10 MG/1
TABLET ORAL
Qty: 30 TABLET | Refills: 0 | Status: SHIPPED | OUTPATIENT
Start: 2017-03-06 | End: 2017-04-24 | Stop reason: SDUPTHER

## 2017-03-13 ENCOUNTER — OFFICE VISIT (OUTPATIENT)
Dept: OTOLARYNGOLOGY | Facility: CLINIC | Age: 62
End: 2017-03-13
Payer: COMMERCIAL

## 2017-03-13 VITALS
HEART RATE: 105 BPM | SYSTOLIC BLOOD PRESSURE: 139 MMHG | HEIGHT: 77 IN | WEIGHT: 199.06 LBS | DIASTOLIC BLOOD PRESSURE: 94 MMHG | BODY MASS INDEX: 23.5 KG/M2

## 2017-03-13 DIAGNOSIS — J32.9 CHRONIC RECURRENT SINUSITIS: Primary | ICD-10-CM

## 2017-03-13 DIAGNOSIS — R04.0 EPISTAXIS: ICD-10-CM

## 2017-03-13 DIAGNOSIS — G47.9 SLEEP DISTURBANCE: ICD-10-CM

## 2017-03-13 PROCEDURE — 3075F SYST BP GE 130 - 139MM HG: CPT | Mod: S$GLB,,, | Performed by: OTOLARYNGOLOGY

## 2017-03-13 PROCEDURE — 99999 PR PBB SHADOW E&M-EST. PATIENT-LVL III: CPT | Mod: PBBFAC,,, | Performed by: OTOLARYNGOLOGY

## 2017-03-13 PROCEDURE — 3080F DIAST BP >= 90 MM HG: CPT | Mod: S$GLB,,, | Performed by: OTOLARYNGOLOGY

## 2017-03-13 PROCEDURE — 1160F RVW MEDS BY RX/DR IN RCRD: CPT | Mod: S$GLB,,, | Performed by: OTOLARYNGOLOGY

## 2017-03-13 PROCEDURE — 99204 OFFICE O/P NEW MOD 45 MIN: CPT | Mod: S$GLB,,, | Performed by: OTOLARYNGOLOGY

## 2017-03-16 NOTE — CONSULTS
"Mr. Torres presents for consultation.    VITAL SIGNS:  Per nurses' notes.    CHIEF COMPLAINT:  Nasal obstruction and "sinus congestion."    HISTORY OF PRESENT ILLNESS:  This is a 62-year-old black male who complains of   symptoms consistent with sleep disturbance.  He does awaken himself at night,   does have persistent nasal obstruction.  He also has symptoms of chronic sinus   infections with pains in his maxillary region and bridge of nose.  He has been   on three weeks of Flonase and has developed some crusting and nasal bleeding.    He has a significant past history of lung cancer and has undergone six weeks of   radiation therapy and three of chemotherapy in 2016.  Additional history   includes COPD as well as hypertension.    REVIEW OF SYSTEMS:  CONSTITUTIONAL: Weight loss or weight gain: Negative.  ALLERGY/IMMUNOLOGIC: Negative.  ENT/Mouth:  Hearing Loss/Dizziness/Tinnitus: Negative.  Ear Infections/Otalgia: Negative.  Rhinitis/Sinusitis/Epistaxis: Negative.  Headache/Facial Pain: Negative.  Nasal Obstruction/Snoring/JAROD: Negative.  Throat: Infections/Pain: Negative.  Hoarseness/Speech Disturbance: Negative.  Salivary Glands Disorder: Negative.  Trauma: Hx: Negative.    Cardiovascular:  MI/Angina: Negative.  Hypertension: Negative.  Endo: DM/Steroids: Negative.  Eyes: Negative.  GI: Dysphagia/Reflux: Negative.  : GYN Pregnancy: Negative.      Renal: Dialysis: Negative.  Lymph: Neck Mass/Lymphadenopathy: Negative.  Musculoskeletal: Negative.  Hem: Bleeding Disorders/Anemia: Negative.  Neuro: Cranial/Neuralgia: Negative.  Pulm: Asthma/SOB/Cough: Negative.  Skin/Breast: Negative.    PAST MEDICAL/FAMILY/SOCIAL HISTORY:    Past Medical History   ENT Surgery: Negative.   Occupational Exposure: Negative.    Problems: Negative.   Cancer: Negative.    Past Family History   Family history hearing loss: Negative.   Family history cancer: Negative.    Past Social History   Tobacco: He is a former smoker " who quit in 2013.   Alcohol: Negative.    MEDICATIONS:  Per MedCard.    ALLERGIES:  Per Med[employer].    EXAMINATION:  General Appearance:  Well-developed, well-nourished 62-year-old black male in no   apparent distress.  Communication Ability:  Good.  EARS, NOSE, THROAT, MOUTH;  EARS: Clear.   External auditory canals: Clear.   Hearing: Grossly Intact.   Tympanic membranes: Clear.  NOSE:   External: Normal.   Intranasal: He has bilateral erosions of his anterior septum with crusting.  MOUTH:   Intraorally: Lips, teeth and gums: Normal.   Oropharynx: Normal.   Mucosa: Normal.  THROAT:   Tongue: Normal.   Palate: Enlarged with a redundant uvula.   Tonsils: 1-2+.   Posterior pharynx: Normal.  HEAD/FACE INSPECTION: Normal and atraumatic.   Palpation/Percussion:  He is somewhat tender to palpation in the maxillary and   ethmoid regions.   Facial Strength: Normal and symmetric.   Salivary glands: Normal.    NECK: Supple.  THYROID: No masses.  LYMPHATICS: No nodes.  RESPIRATORY:   Effort: Normal.  EYES:   Ocular Mobility: Normal.   Vision: Grossly intact.  NEURO/PSYCH:   Cranial nerves: 2-12 grossly intact.   Orientation: x3.   Mood/Affect: Normal.    RECOMMENDATION:  I have discussed my findings with he and his wife in detail as   well as my recommendations for treatment.  I have discussed epistaxis   precautions with him and I have gone over these in detail.  We have also   discussed sinus rinses utilizing distilled water and I have given him   information on this.  I have asked him to discontinue his Flonase for the time   being.  We will obtain a CT scan of his sinuses.  He will return to clinic in   three to four weeks to allow for healing of his septum and he will return for   reevaluation at that time.      HG/HN  dd: 03/15/2017 08:09:37 (CDT)  td: 03/16/2017 01:25:46 (CDT)  Doc ID   #[id number]  Job ID #[id number]    CC:

## 2017-04-02 RX ORDER — FLUTICASONE PROPIONATE 50 MCG
SPRAY, SUSPENSION (ML) NASAL
Refills: 0 | OUTPATIENT
Start: 2017-04-02

## 2017-04-11 ENCOUNTER — TELEPHONE (OUTPATIENT)
Dept: FAMILY MEDICINE | Facility: CLINIC | Age: 62
End: 2017-04-11

## 2017-04-11 NOTE — TELEPHONE ENCOUNTER
----- Message from Pepper Begum sent at 4/11/2017 11:45 AM CDT -----  Contact: [phone] Diamond (wife)  Pt wife is returning the phone call Please call at your earliest convenience.  Thanks !

## 2017-04-11 NOTE — TELEPHONE ENCOUNTER
Left a message asking pt to call back regarding scheduling a new PCP so that we could send in his medication to his pharmacy.

## 2017-04-12 ENCOUNTER — PATIENT MESSAGE (OUTPATIENT)
Dept: OTOLARYNGOLOGY | Facility: CLINIC | Age: 62
End: 2017-04-12

## 2017-04-12 ENCOUNTER — TELEPHONE (OUTPATIENT)
Dept: ADMINISTRATIVE | Facility: OTHER | Age: 62
End: 2017-04-12

## 2017-04-12 ENCOUNTER — HOSPITAL ENCOUNTER (OUTPATIENT)
Dept: RADIOLOGY | Facility: HOSPITAL | Age: 62
Discharge: HOME | End: 2017-04-12
Attending: INTERNAL MEDICINE
Payer: COMMERCIAL

## 2017-04-12 ENCOUNTER — HOSPITAL ENCOUNTER (OUTPATIENT)
Dept: RADIOLOGY | Facility: HOSPITAL | Age: 62
Discharge: HOME | End: 2017-04-12
Attending: OTOLARYNGOLOGY
Payer: COMMERCIAL

## 2017-04-12 DIAGNOSIS — J32.9 CHRONIC RECURRENT SINUSITIS: ICD-10-CM

## 2017-04-12 DIAGNOSIS — M89.9 BONE LESION: ICD-10-CM

## 2017-04-12 DIAGNOSIS — C34.2 PRIMARY CANCER OF RIGHT MIDDLE LOBE OF LUNG: ICD-10-CM

## 2017-04-12 PROCEDURE — 71250 CT THORAX DX C-: CPT | Mod: 26,,, | Performed by: RADIOLOGY

## 2017-04-12 PROCEDURE — 70486 CT MAXILLOFACIAL W/O DYE: CPT | Mod: TC

## 2017-04-12 PROCEDURE — 70486 CT MAXILLOFACIAL W/O DYE: CPT | Mod: 26,,, | Performed by: RADIOLOGY

## 2017-04-12 PROCEDURE — 71250 CT THORAX DX C-: CPT | Mod: TC

## 2017-04-12 NOTE — TELEPHONE ENCOUNTER
----- Message from Caleb Haji sent at 4/11/2017  4:21 PM CDT -----  Contact: pt's wife Diamond [phone]      ----- Message -----     From: Susan Faria     Sent: 4/11/2017   3:49 PM       To: Jim Matson Staff-[facility]    Pt's wife called after noticing that her  was not scheduled to see Jim after the CT scan like he normally is.  Pt's wife stated that Dr Fernandez normally checks on him after the test.  Ms Fields is asking for a call from staff at [phone]  Thanks  bradley  Pt's wife asked that I make this message High priority

## 2017-04-18 ENCOUNTER — TELEPHONE (OUTPATIENT)
Dept: HEMATOLOGY/ONCOLOGY | Facility: CLINIC | Age: 62
End: 2017-04-18

## 2017-04-18 NOTE — TELEPHONE ENCOUNTER
----- Message from Cecilia Ladd sent at 4/18/2017  1:02 PM CDT -----  Contact: Wife   Needs help understanding test results. Please call Diamond [phone]

## 2017-04-21 RX ORDER — FOLIC ACID 0.4 MG
TABLET ORAL
COMMUNITY
Start: 2017-02-05 | End: 2017-04-24 | Stop reason: SDUPTHER

## 2017-04-21 RX ORDER — FOLIC ACID 0.4 MG
TABLET ORAL
COMMUNITY
Start: 2017-03-06 | End: 2017-04-24 | Stop reason: SDUPTHER

## 2017-04-24 ENCOUNTER — OFFICE VISIT (OUTPATIENT)
Dept: FAMILY MEDICINE | Facility: CLINIC | Age: 62
End: 2017-04-24
Payer: COMMERCIAL

## 2017-04-24 ENCOUNTER — OFFICE VISIT (OUTPATIENT)
Dept: HEMATOLOGY/ONCOLOGY | Facility: CLINIC | Age: 62
End: 2017-04-24
Payer: COMMERCIAL

## 2017-04-24 VITALS
DIASTOLIC BLOOD PRESSURE: 83 MMHG | HEIGHT: 77 IN | TEMPERATURE: 98 F | RESPIRATION RATE: 18 BRPM | HEART RATE: 101 BPM | OXYGEN SATURATION: 97 % | SYSTOLIC BLOOD PRESSURE: 138 MMHG | BODY MASS INDEX: 23.69 KG/M2 | WEIGHT: 200.63 LBS

## 2017-04-24 VITALS
OXYGEN SATURATION: 95 % | TEMPERATURE: 98 F | HEART RATE: 96 BPM | WEIGHT: 200.31 LBS | BODY MASS INDEX: 23.65 KG/M2 | SYSTOLIC BLOOD PRESSURE: 130 MMHG | DIASTOLIC BLOOD PRESSURE: 84 MMHG | HEIGHT: 77 IN

## 2017-04-24 DIAGNOSIS — R06.02 SHORTNESS OF BREATH: ICD-10-CM

## 2017-04-24 DIAGNOSIS — J30.9 ALLERGIC RHINITIS, UNSPECIFIED ALLERGIC RHINITIS TRIGGER, UNSPECIFIED RHINITIS SEASONALITY: ICD-10-CM

## 2017-04-24 DIAGNOSIS — R51.9 HEADACHE, UNSPECIFIED HEADACHE TYPE: ICD-10-CM

## 2017-04-24 DIAGNOSIS — C34.2 PRIMARY CANCER OF RIGHT MIDDLE LOBE OF LUNG: Primary | ICD-10-CM

## 2017-04-24 DIAGNOSIS — J43.2 CENTRILOBULAR EMPHYSEMA: ICD-10-CM

## 2017-04-24 DIAGNOSIS — R91.1 LUNG NODULE: ICD-10-CM

## 2017-04-24 DIAGNOSIS — J44.9 CHRONIC OBSTRUCTIVE PULMONARY DISEASE, UNSPECIFIED COPD TYPE: Primary | ICD-10-CM

## 2017-04-24 DIAGNOSIS — I10 ESSENTIAL HYPERTENSION: Chronic | ICD-10-CM

## 2017-04-24 PROBLEM — J70.0 POST-RADIATION PNEUMONITIS: Status: RESOLVED | Noted: 2017-01-06 | Resolved: 2017-04-24

## 2017-04-24 PROBLEM — J96.01 ACUTE HYPOXEMIC RESPIRATORY FAILURE: Status: RESOLVED | Noted: 2017-01-06 | Resolved: 2017-04-24

## 2017-04-24 PROBLEM — R09.81 SINUS CONGESTION: Status: RESOLVED | Noted: 2017-01-05 | Resolved: 2017-04-24

## 2017-04-24 PROBLEM — J18.9 PNEUMONIA: Status: RESOLVED | Noted: 2017-01-04 | Resolved: 2017-04-24

## 2017-04-24 PROCEDURE — 3075F SYST BP GE 130 - 139MM HG: CPT | Mod: S$GLB,,, | Performed by: FAMILY MEDICINE

## 2017-04-24 PROCEDURE — 1160F RVW MEDS BY RX/DR IN RCRD: CPT | Mod: S$GLB,,, | Performed by: INTERNAL MEDICINE

## 2017-04-24 PROCEDURE — 3079F DIAST BP 80-89 MM HG: CPT | Mod: S$GLB,,, | Performed by: INTERNAL MEDICINE

## 2017-04-24 PROCEDURE — 3079F DIAST BP 80-89 MM HG: CPT | Mod: S$GLB,,, | Performed by: FAMILY MEDICINE

## 2017-04-24 PROCEDURE — 3075F SYST BP GE 130 - 139MM HG: CPT | Mod: S$GLB,,, | Performed by: INTERNAL MEDICINE

## 2017-04-24 PROCEDURE — 99214 OFFICE O/P EST MOD 30 MIN: CPT | Mod: S$GLB,,, | Performed by: INTERNAL MEDICINE

## 2017-04-24 PROCEDURE — 99999 PR PBB SHADOW E&M-EST. PATIENT-LVL III: CPT | Mod: PBBFAC,,, | Performed by: FAMILY MEDICINE

## 2017-04-24 PROCEDURE — 99214 OFFICE O/P EST MOD 30 MIN: CPT | Mod: S$GLB,,, | Performed by: FAMILY MEDICINE

## 2017-04-24 PROCEDURE — 99999 PR PBB SHADOW E&M-EST. PATIENT-LVL IV: CPT | Mod: PBBFAC,,, | Performed by: INTERNAL MEDICINE

## 2017-04-24 PROCEDURE — 1160F RVW MEDS BY RX/DR IN RCRD: CPT | Mod: S$GLB,,, | Performed by: FAMILY MEDICINE

## 2017-04-24 RX ORDER — AMLODIPINE BESYLATE 10 MG/1
10 TABLET ORAL DAILY
Qty: 90 TABLET | Refills: 3 | Status: SHIPPED | OUTPATIENT
Start: 2017-04-24 | End: 2018-05-25 | Stop reason: SDUPTHER

## 2017-04-24 RX ORDER — FOLIC ACID 0.4 MG
TABLET ORAL
COMMUNITY
Start: 2017-04-12 | End: 2017-04-24 | Stop reason: SDUPTHER

## 2017-04-24 RX ORDER — FLUTICASONE PROPIONATE 50 MCG
2 SPRAY, SUSPENSION (ML) NASAL DAILY
Qty: 1 BOTTLE | Refills: 3 | Status: SHIPPED | OUTPATIENT
Start: 2017-04-24 | End: 2017-05-21 | Stop reason: SDUPTHER

## 2017-04-24 RX ORDER — ALBUTEROL SULFATE 90 UG/1
2 AEROSOL, METERED RESPIRATORY (INHALATION) EVERY 6 HOURS PRN
Qty: 18 G | Refills: 0 | Status: SHIPPED | OUTPATIENT
Start: 2017-04-24 | End: 2017-12-27 | Stop reason: SDUPTHER

## 2017-04-24 RX ORDER — NAPROXEN 500 MG/1
500 TABLET ORAL 2 TIMES DAILY PRN
Qty: 60 TABLET | Refills: 0 | Status: SHIPPED | OUTPATIENT
Start: 2017-04-24 | End: 2017-08-16 | Stop reason: SDUPTHER

## 2017-04-24 RX ORDER — IPRATROPIUM BROMIDE AND ALBUTEROL SULFATE 2.5; .5 MG/3ML; MG/3ML
3 SOLUTION RESPIRATORY (INHALATION) EVERY 6 HOURS PRN
Qty: 100 VIAL | Refills: 1 | Status: SHIPPED | OUTPATIENT
Start: 2017-04-24 | End: 2017-08-02 | Stop reason: SDUPTHER

## 2017-04-24 NOTE — PROGRESS NOTES
[facility]  Progress Note    SUBJECTIVE:     Chief Complaint   Patient presents with    Hypertension       HPI   Angel Torres  is a 62 y.o. male with lung cancer of right middle lobe, s/p chemo and radiation, here to establish care. He says having shortness of breath on exertion. Resting does make it better. The albuterol inhaler somewhat helps but not on any maintenance therapy. He drives for a living. He has appt with oncology this afternoon.    Review of patient's allergies indicates:  No Known Allergies    Past Medical History:   Diagnosis Date    Chemotherapy follow-up examination 9/7/2016    COPD (chronic obstructive pulmonary disease)     Hearing loss     Hypertension 2/9/2015    Primary cancer of right middle lobe of lung 7/11/2016    Rash 9/7/2016     Past Surgical History:   Procedure Laterality Date    INGUINAL HERNIA REPAIR Bilateral     KNEE SURGERY       Family History   Problem Relation Age of Onset    Cancer Mother      lung, breast    Cancer Sister      lung    Cancer Maternal Grandfather     No Known Problems Father     No Known Problems Brother     No Known Problems Maternal Aunt     No Known Problems Maternal Uncle     No Known Problems Paternal Aunt     No Known Problems Paternal Uncle     No Known Problems Maternal Grandmother     No Known Problems Paternal Grandmother     No Known Problems Paternal Grandfather     Amblyopia Neg Hx     Blindness Neg Hx     Cataracts Neg Hx     Diabetes Neg Hx     Glaucoma Neg Hx     Hypertension Neg Hx     Macular degeneration Neg Hx     Retinal detachment Neg Hx     Strabismus Neg Hx     Stroke Neg Hx     Thyroid disease Neg Hx      Social History   Substance Use Topics    Smoking status: Former Smoker     Packs/day: 2.00     Years: 40.00     Quit date: 11/16/2013    Smokeless tobacco: None    Alcohol use No        Review of Systems   Constitutional: Negative for chills, fever and malaise/fatigue.   Respiratory:  Positive for shortness of breath (on exertion). Negative for cough, hemoptysis, sputum production and wheezing.    Cardiovascular: Negative.  Negative for chest pain.   Gastrointestinal: Negative.  Negative for abdominal pain, nausea and vomiting.   Genitourinary: Negative.    Neurological: Positive for headaches. Negative for weakness.   All other systems reviewed and are negative.    OBJECTIVE:     Vitals:    04/24/17 1045   BP: 130/84   Pulse: 96   Temp: 97.8 °F (36.6 °C)     Body mass index is 23.75 kg/(m^2).    Physical Exam   Constitutional: He is oriented to person, place, and time and well-developed, well-nourished, and in no distress. No distress.   HENT:   Head: Normocephalic and atraumatic.   Eyes: Conjunctivae and EOM are normal.   Cardiovascular: Normal rate, regular rhythm and normal heart sounds.  Exam reveals no gallop and no friction rub.    No murmur heard.  Pulmonary/Chest: Effort normal and breath sounds normal. No respiratory distress. He has no wheezes. He has no rales.   Abdominal: Soft. Bowel sounds are normal. He exhibits no distension. There is no tenderness.   Neurological: He is alert and oriented to person, place, and time.   CN 2-12 grossly intact   Skin: Skin is warm. He is not diaphoretic.       Old records were reviewed. Labs and/or images were independently reviewed.    ASSESSMENT     1. Chronic obstructive pulmonary disease, unspecified COPD type    2. Allergic rhinitis, unspecified allergic rhinitis trigger, unspecified rhinitis seasonality    3. Essential hypertension    4. Headache, unspecified headache type        PLAN:     Chronic obstructive pulmonary disease, unspecified COPD type  -     albuterol 90 mcg/actuation inhaler; Inhale 2 puffs into the lungs every 6 (six) hours as needed for Wheezing.  Dispense: 18 g; Refill: 0  -     NEBULIZER FOR HOME USE  -     NEBULIZER KIT (SUPPLIES) FOR HOME USE  -     albuterol-ipratropium 2.5mg-0.5mg/3mL (DUO-NEB) 0.5 mg-3 mg(2.5 mg  base)/3 mL nebulizer solution; Take 3 mLs by nebulization every 6 (six) hours as needed for Wheezing or Shortness of Breath.  Dispense: 100 vial; Refill: 1  -     Rx for nebulizer, advised to take to [facility]. Will give duonebs to see if this helps with patient's breathing. Consider maintenance therapy.     Allergic rhinitis, unspecified allergic rhinitis trigger, unspecified rhinitis seasonality  -     fluticasone (FLONASE) 50 mcg/actuation nasal spray; 2 sprays by Each Nare route once daily.  Dispense: 1 Bottle; Refill: 3  -     Stable. Continue current regimen.    Essential hypertension  -     amlodipine (NORVASC) 10 MG tablet; Take 1 tablet (10 mg total) by mouth once daily.  Dispense: 90 tablet; Refill: 3  -     Stable. Continue current regimen.    Headache, unspecified headache type  -     naproxen (NAPROSYN) 500 MG tablet; Take 1 tablet (500 mg total) by mouth 2 (two) times daily as needed (headache).  Dispense: 60 tablet; Refill: 0    RTC EMILI Beltran MD  04/24/2017 11:13 AM

## 2017-04-24 NOTE — PROGRESS NOTES
PATIENT: Angel Torres  MRN: [id number]  DATE: 4/24/2017      Diagnosis:   1. Primary cancer of right middle lobe of lung    2. Centrilobular emphysema    3. Lung nodule    4. Shortness of breath        Chief Complaint: Lung Cancer      Oncologic History:      Oncologic History Non-small cell lung cancer diagnosed 6/2016     Oncologic Treatment Cisplatin/Pemetrexed initiated 8/15/16 with concurrent radiation completed 3 cycles 10/3/16; 64 Gy    Pathology Adenocarcinoma, T2b, N2, M0, Stage IIIA          Subjective:    Interval History: Mr. Torres is a 62 y.o. male who was seen in follow-up for lung cancer.  He complains of some ongoing shortness of breath and cough which is relatively unchanged.  His weight has been stable.  He is without other new complaints.    His history dates to approximately 4/2016 when he noted a worsening cough which was associated with clear sputum.  He had a chest x-ray in 5/16 showing a right middle lobe opacity.  Subsequent CT of the chest was performed on 5/30/16 showing a 5.6 cm lesion in the right middle lobe with hilar and mediastinal lymphadenopathy along with hepatic lesions and a left adrenal mass..  He underwent bronchoscopy which was nonrevealing and transbronchial biopsies were nondiagnostic.  He then underwent EBUS with biopsy of mediastinal lymph nodes with pathology showing non-small cell lung cancer consistent with adenocarcinoma.  He was started on concurrent chemotherapy and radiation with cisplatin and pemetrexed on 8/15/16.  He completed 64 Gy of concurrent radiation with cisplatin and pemetrexed with chemotherapy completed on 10/3/16.    Past Medical History:   Past Medical History:   Diagnosis Date    Chemotherapy follow-up examination 9/7/2016    COPD (chronic obstructive pulmonary disease)     Hearing loss     Hypertension 2/9/2015    Primary cancer of right middle lobe of lung 7/11/2016    Rash 9/7/2016       Past Surgical HIstory:   Past Surgical History:    Procedure Laterality Date    INGUINAL HERNIA REPAIR Bilateral     KNEE SURGERY         Family History:   Family History   Problem Relation Age of Onset    Cancer Mother      lung, breast    Cancer Sister      lung    Cancer Maternal Grandfather     No Known Problems Father     No Known Problems Brother     No Known Problems Maternal Aunt     No Known Problems Maternal Uncle     No Known Problems Paternal Aunt     No Known Problems Paternal Uncle     No Known Problems Maternal Grandmother     No Known Problems Paternal Grandmother     No Known Problems Paternal Grandfather     Amblyopia Neg Hx     Blindness Neg Hx     Cataracts Neg Hx     Diabetes Neg Hx     Glaucoma Neg Hx     Hypertension Neg Hx     Macular degeneration Neg Hx     Retinal detachment Neg Hx     Strabismus Neg Hx     Stroke Neg Hx     Thyroid disease Neg Hx        Social History:  reports that he quit smoking about 3 years ago. He has a 80.00 pack-year smoking history. He does not have any smokeless tobacco history on file. He reports that he does not drink alcohol or use illicit drugs.    Allergies:  Review of patient's allergies indicates:  No Known Allergies    Medications:  Current Outpatient Prescriptions   Medication Sig Dispense Refill    albuterol 90 mcg/actuation inhaler Inhale 2 puffs into the lungs every 6 (six) hours as needed for Wheezing. 18 g 0    albuterol-ipratropium 2.5mg-0.5mg/3mL (DUO-NEB) 0.5 mg-3 mg(2.5 mg base)/3 mL nebulizer solution Take 3 mLs by nebulization every 6 (six) hours as needed for Wheezing or Shortness of Breath. 100 vial 1    amlodipine (NORVASC) 10 MG tablet Take 1 tablet (10 mg total) by mouth once daily. 90 tablet 3    cetirizine (ZYRTEC) 10 MG tablet Take 1 tablet (10 mg total) by mouth once daily.  0    dexamethasone (DECADRON) 4 MG Tab Take 1 tablet (4 mg total) by mouth every 12 (twelve) hours. Take 1 tab twice daily starting the day prior to chemotherapy for 3 days. 24  tablet 1    docusate sodium (COLACE) 100 MG capsule Take 1 capsule (100 mg total) by mouth 2 (two) times daily as needed for Constipation. 60 capsule 0    fluticasone (FLONASE) 50 mcg/actuation nasal spray 2 sprays by Each Nare route once daily. 1 Bottle 3    folic acid (FOLVITE) 400 MCG tablet Take 1 tablet (400 mcg total) by mouth once daily. 100 tablet 3    hydrOXYzine HCl (ATARAX) 25 MG tablet Take 1 tablet (25 mg total) by mouth 3 (three) times daily as needed for Itching. 20 tablet 0    naproxen (NAPROSYN) 500 MG tablet Take 1 tablet (500 mg total) by mouth 2 (two) times daily as needed (headache). 60 tablet 0    ondansetron (ZOFRAN, AS HYDROCHLORIDE,) 4 MG tablet Take 1 tablet (4 mg total) by mouth every 8 (eight) hours as needed for Nausea. 60 tablet 1     Current Facility-Administered Medications   Medication Dose Route Frequency Provider Last Rate Last Dose    cyanocobalamin injection 1,000 mcg  1,000 mcg Intramuscular 1 time in Clinic/HOD Dano Fernandez DO, FACP           Review of Systems   Constitutional: Negative for appetite change, chills, fatigue, fever and unexpected weight change.        Normal weight 220   HENT: Negative for dental problem, sinus pressure, sneezing and trouble swallowing.    Eyes: Negative for visual disturbance.   Respiratory: Positive for cough and shortness of breath. Negative for choking, chest tightness and wheezing.    Cardiovascular: Negative for chest pain and leg swelling.   Gastrointestinal: Negative for abdominal pain, blood in stool, constipation, diarrhea and nausea.   Genitourinary: Negative for difficulty urinating and dysuria.   Musculoskeletal: Negative for arthralgias and back pain.   Skin: Negative for rash and wound.   Neurological: Negative for dizziness, light-headedness and headaches.   Hematological: Negative for adenopathy. Does not bruise/bleed easily.   Psychiatric/Behavioral: Negative for sleep disturbance. The patient is not  "nervous/anxious.        ECOG Performance Status: 0   Objective:      Vitals:   Vitals:    04/24/17 1449   BP: 138/83   BP Location: Right arm   Patient Position: Sitting   BP Method: Automatic   Pulse: 101   Resp: 18   Temp: 98 °F (36.7 °C)   TempSrc: Oral   SpO2: 97%   Weight: 91 kg (200 lb 9.9 oz)   Height: 6' 5" (1.956 m)     BMI: Body mass index is 23.79 kg/(m^2).    Physical Exam   Constitutional: He is oriented to person, place, and time. He appears well-developed and well-nourished.   HENT:   Head: Normocephalic and atraumatic.   Eyes: Pupils are equal, round, and reactive to light.   Neck: Normal range of motion. Neck supple.   Cardiovascular: Normal rate and regular rhythm.    Pulmonary/Chest: Effort normal. No respiratory distress. He has no rales.   Abdominal: Soft. He exhibits no distension. There is no tenderness.   Musculoskeletal: He exhibits no edema or tenderness.   Lymphadenopathy:     He has no cervical adenopathy.   Neurological: He is alert and oriented to person, place, and time. No cranial nerve deficit.   Skin: Skin is warm and dry. No rash noted.   Psychiatric: He has a normal mood and affect. His behavior is normal.       Laboratory Data:  No visits with results within 1 Month(s) from this visit.  Latest known visit with results is:    Admission on 02/05/2017, Discharged on 02/06/2017   Component Date Value Ref Range Status    WBC 02/05/2017 6.47  3.90 - 12.70 K/uL Final    RBC 02/05/2017 4.48* 4.60 - 6.20 M/uL Final    Hemoglobin 02/05/2017 12.9* 14.0 - 18.0 g/dL Final    Hematocrit 02/05/2017 38.5* 40.0 - 54.0 % Final    MCV 02/05/2017 86  82 - 98 fL Final    MCH 02/05/2017 28.8  27.0 - 31.0 pg Final    MCHC 02/05/2017 33.5  32.0 - 36.0 % Final    RDW 02/05/2017 13.0  11.5 - 14.5 % Final    Platelets 02/05/2017 408* 150 - 350 K/uL Final    MPV 02/05/2017 9.2  9.2 - 12.9 fL Final    Gran # 02/05/2017 5.0  1.8 - 7.7 K/uL Final    Lymph # 02/05/2017 0.8* 1.0 - 4.8 K/uL Final    " Mono # 02/05/2017 0.6  0.3 - 1.0 K/uL Final    Eos # 02/05/2017 0.1  0.0 - 0.5 K/uL Final    Baso # 02/05/2017 0.02  0.00 - 0.20 K/uL Final    Gran% 02/05/2017 77.0* 38.0 - 73.0 % Final    Lymph% 02/05/2017 12.1* 18.0 - 48.0 % Final    Mono% 02/05/2017 9.1  4.0 - 15.0 % Final    Eosinophil% 02/05/2017 1.2  0.0 - 8.0 % Final    Basophil% 02/05/2017 0.3  0.0 - 1.9 % Final    Differential Method 02/05/2017 Automated   Final    Sodium 02/05/2017 138  136 - 145 mmol/L Final    Potassium 02/05/2017 3.8  3.5 - 5.1 mmol/L Final    Chloride 02/05/2017 106  95 - 110 mmol/L Final    CO2 02/05/2017 25  23 - 29 mmol/L Final    Glucose 02/05/2017 103  70 - 110 mg/dL Final    BUN, Bld 02/05/2017 12  8 - 23 mg/dL Final    Creatinine 02/05/2017 1.1  0.5 - 1.4 mg/dL Final    Calcium 02/05/2017 9.2  8.7 - 10.5 mg/dL Final    Total Protein 02/05/2017 7.4  6.0 - 8.4 g/dL Final    Albumin 02/05/2017 3.4* 3.5 - 5.2 g/dL Final    Total Bilirubin 02/05/2017 0.3  0.1 - 1.0 mg/dL Final    Comment: For infants and newborns, interpretation of results should be based  on gestational age, weight and in agreement with clinical  observations.  Premature Infant recommended reference ranges:  Up to 24 hours.............<8.0 mg/dL  Up to 48 hours............<12.0 mg/dL  3-5 days..................<15.0 mg/dL  6-29 days.................<15.0 mg/dL      Alkaline Phosphatase 02/05/2017 76  55 - 135 U/L Final    AST 02/05/2017 15  10 - 40 U/L Final    ALT 02/05/2017 18  10 - 44 U/L Final    Anion Gap 02/05/2017 7* 8 - 16 mmol/L Final    eGFR if African American 02/05/2017 >60.0  >60 mL/min/1.73 m^2 Final    eGFR if non African American 02/05/2017 >60.0  >60 mL/min/1.73 m^2 Final    Comment: Calculation used to obtain the estimated glomerular filtration  rate (eGFR) is the CKD-EPI equation. Since race is unknown   in our information system, the eGFR values for   -American and Non--American patients are given   for each  creatinine result.       Supplemental Diagnosis 6/30/16  Immunohistochemical stains are completed on the Station 4L lymph node cell block material and reveal the tumor  cells to stain positively with cytokeratin 7 and TTF-1. The tumor cells show nonspecific blush staining with  cytokeratin 5/6 and are negative for p63. This staining profile supports a diagnosis of non-small cell carcinoma  consistent with adenocarcinoma.  Cell block material will be sent for EGFR, ALK and ROS-1 testing and results will follow in a supplemental report.  (Electronically Signed: 2016-06-30 11:50:31 )  Diagnosed by: Irene Amaro M.D.  FINAL PATHOLOGIC DIAGNOSIS  1. Lymph node, Station 4L, EBUS guided fine needle aspiration:  Positive for malignant cells, non-small cell carcinoma.  Rare background lymphocytes are present.  Immunohistochemical staining be completed to further characterize this malignancy and results will follow in a  supplemental report.  2. Lymph node, Station 7, EBUS guided fine needle aspiration:  Positive for malignant cells, non-small cell carcinoma tumor identical to that seen in specimen #1.  Background lymphocytes are present.    Imaging:   CT 4/12/17  Findings:  Visualized neck is unremarkable.  The mediastinal structures are midline.  There is a small quantity of right pleural fluid.  Trace pericardial fluid also present.  There are coronary artery calcifications.  The aorta maintains normal caliber and there is no mediastinal, axillary, or hilar adenopathy.    Subcentimeter hypodensities in both lobes of the liver are similar to prior studies and not fully characterize this examination.  Stable left adrenal adenoma.  The bones show no abnormalities.  Superficial soft tissues appear within normal limits.    The large airways are patent.  The left lung is slightly hyperexpanded compared to the right.  There is a 4.1 x 2.4 cm soft tissue mass in the right middle lobe (previously 5.6 x 4.8 cm).  This mass is  occluding the lateral segment right middle lobe bronchus and encasing the medial segment right middle lobe bronchus.  Otherwise the right lung demonstrates scattered groundglass and reticular opacities in keeping with radiation change.  There is emphysema.  The left lung is clear.  A 1.2 cm solid pulmonary nodule is present in the medial basal segment of the right lower lobe (0.8 cm on 1/9/17).  Other small nodules are noted in this region, some of which have increased in size compared to prior.  There is pleural thickening along the right lung base.  Several areas of pleural nodularity/thickening are seen about the right lower lobe laterally as well.   Impression     4.1 x 2.4 cm right middle lobe mass has decreased in size compared to 1/5/17.  However, it is difficult to assess how much of this may be related to adjacent atelectasis noting that this mass obliterates the lateral segmental right middle lobe bronchus.    Extensive reticular and nodular opacities in the right lower lobe are probably in part related to radiation change.  However, several areas of nodularity involving pleural margins raise the question of lymphangitic carcinomatosis.  At least one medial basal segment right lower lobe nodule has increased in size.    Stable left adenoma and nonspecific hepatic hypodensities.  ______________________________________               Assessment:       1. Primary cancer of right middle lobe of lung    2. Centrilobular emphysema    3. Lung nodule    4. Shortness of breath           Plan:     Mr. Torres is clinically unchanged, however, it does appear that he has some pulmonary nodules that appear to have increased in size.  I am concerned about disease progression.  I will discuss his case in thoracic tumor Board.  I will request PD-L1 testing on his prior biopsy specimen.  Follow back up with me in 3 weeks.    Dano Fernandez DO, FACP  Hematology & Oncology  [facility] [place] [address]  ph.  [phone]  Fax. [phone]    25 minutes were spent in coordination of patient's care, record review and counseling.  More than 50% of the time was face-to-face.

## 2017-04-24 NOTE — Clinical Note
Please have pathology send PD-L1 testing on his biopsy from 6/28/16 Follow-up with me in 3 weeks Taylor, please add him to thoracic tumor Board.  We can discuss next week since its late in the week already

## 2017-04-26 ENCOUNTER — DOCUMENTATION ONLY (OUTPATIENT)
Dept: CARDIOTHORACIC SURGERY | Facility: CLINIC | Age: 62
End: 2017-04-26

## 2017-04-26 NOTE — PATIENT CARE CONFERENCE
[facility]      THORACIC MULTIDISCIPLINARY TUMOR BOARD  PATIENT REVIEW FORM  ________________________________________________________________________    CLINIC #: [id number]  DATE: 04/26/2017    TUMOR SITE:   NSCLC    PRESENTER:   Dr. Fernandez    PATIENT SUMMARY:   63 y/o who presented with worsening cough in 4/2016. CXR noted a right middle lobe opacity. Subsequent CT of the chest was performed on 5/30/16 showing a 5.6 cm lesion in the right middle lobe with hilar and mediastinal lymphadenopathy along with hepatic lesions and a left adrenal mass.   Underwent bronchoscopy which was nonrevealing and transbronchial biopsies were nondiagnostic.   Underwent EBUS with biopsy of mediastinal lymph nodes with pathology showing non-small cell lung cancer consistent with adenocarcinoma. He was started on concurrent chemotherapy and radiation with cisplatin and pemetrexed on 8/15/16. He completed 64 Gy of concurrent radiation with cisplatin and pemetrexed with chemotherapy completed on 10/3/16.   Patients case was reviewed in thoracic tumor board conference on 1/12/17, it was recommended to repeat CT chest in 3 months based on imaging at that time.   CT chest on 4/12/17 4.1 x 2.4 cm right middle lobe mass has decreased in size compared to 1/5/17.  However, it is difficult to assess how much of this may be related to adjacent atelectasis noting that this mass obliterates the lateral segmental right middle lobe bronchus. Extensive reticular and nodular opacities in the right lower lobe are probably in part related to radiation change. However, several areas of nodularity involving pleural margins raise the question of lymphangitic carcinomatosis. At least one medial basal segment right lower lobe nodule has increased in size.     BOARD RECOMMENDATIONS:   Send for PD-L1, start systemic treatment.      CONSULT NEEDED:     [] Surgery    [] Hem/Onc    [] Rad/Onc    [] Dietary                 [] Social Service    []  Psychology       [] Pulmonology    Clinical Stage:  Stage IIIA  Pathologic Stage: T2b, N2, M0  Thyroid transcription factor (TTF): Positive  CK7: Positive      GROUP STAGE:     [] O    [] 1A    [] IB    [] IIA    [] IIB     [] IIIA     [] IIIB     [] IIIC    [] IV                               [] Local recurrence     [] Regional recurrence     [] Distant recurrence                   [x] NSCLC     [] SCLC     Tumor type: Adenocarcinoma    Unstageable:      [] Yes     [] No  Metastatic site(s):          [x] [facility]lene[facility] Treatment Guidelines reviewed and care planned is consistent with guidelines.         (i.e., NCCN, NCI, PD, ACO, AUA, etc.)    PRESENTATION AT CANCER CONFERENCE:         [] Prospective    [] Retrospective     [] Follow-Up          [] Eligible for clinical trial

## 2017-05-01 ENCOUNTER — TELEPHONE (OUTPATIENT)
Dept: FAMILY MEDICINE | Facility: CLINIC | Age: 62
End: 2017-05-01

## 2017-05-01 ENCOUNTER — TELEPHONE (OUTPATIENT)
Dept: ADMINISTRATIVE | Facility: OTHER | Age: 62
End: 2017-05-01

## 2017-05-01 NOTE — TELEPHONE ENCOUNTER
----- Message from Caleb Haji sent at 4/26/2017  4:15 PM CDT -----  Contact: pt's wife [phone]      ----- Message -----     From: Susan Faria     Sent: 4/26/2017   4:07 PM       To: Jim Matson [facility]    Pt's wife called requesting a call back about her 's upcoming apt in may.  Mrs Torres states she is only off on Wednesdays.  Pt hoping she can work out Monday when she may be able to leave early.    Pt can be contacted at this temporary phone [phone]    Thanks  bradley

## 2017-05-01 NOTE — TELEPHONE ENCOUNTER
----- Message from Kim Avelar sent at 5/1/2017  1:28 PM CDT -----  Contact: Armani Gavin  Please fax clinical notes to [phone]. Please call Romaine to confirm @ [phone]

## 2017-05-21 DIAGNOSIS — J30.9 ALLERGIC RHINITIS, UNSPECIFIED ALLERGIC RHINITIS TRIGGER, UNSPECIFIED RHINITIS SEASONALITY: ICD-10-CM

## 2017-05-22 RX ORDER — FLUTICASONE PROPIONATE 50 MCG
SPRAY, SUSPENSION (ML) NASAL
Qty: 16 G | Refills: 1 | Status: SHIPPED | OUTPATIENT
Start: 2017-05-22 | End: 2017-08-06 | Stop reason: SDUPTHER

## 2017-05-31 ENCOUNTER — OFFICE VISIT (OUTPATIENT)
Dept: HEMATOLOGY/ONCOLOGY | Facility: CLINIC | Age: 62
End: 2017-05-31
Payer: COMMERCIAL

## 2017-05-31 VITALS
TEMPERATURE: 99 F | HEART RATE: 94 BPM | SYSTOLIC BLOOD PRESSURE: 128 MMHG | HEIGHT: 77 IN | WEIGHT: 202.63 LBS | OXYGEN SATURATION: 94 % | RESPIRATION RATE: 18 BRPM | BODY MASS INDEX: 23.92 KG/M2 | DIASTOLIC BLOOD PRESSURE: 69 MMHG

## 2017-05-31 DIAGNOSIS — C34.2 PRIMARY CANCER OF RIGHT MIDDLE LOBE OF LUNG: Primary | ICD-10-CM

## 2017-05-31 DIAGNOSIS — R91.1 LUNG NODULE: ICD-10-CM

## 2017-05-31 PROCEDURE — 99214 OFFICE O/P EST MOD 30 MIN: CPT | Mod: PBBFAC | Performed by: INTERNAL MEDICINE

## 2017-05-31 PROCEDURE — 99999 PR PBB SHADOW E&M-EST. PATIENT-LVL IV: CPT | Mod: PBBFAC,,, | Performed by: INTERNAL MEDICINE

## 2017-05-31 PROCEDURE — 99215 OFFICE O/P EST HI 40 MIN: CPT | Mod: S$GLB,,, | Performed by: INTERNAL MEDICINE

## 2017-05-31 NOTE — PROGRESS NOTES
PATIENT: Angel Torres  MRN: [id number]  DATE: 5/31/2017      Diagnosis:   1. Primary cancer of right middle lobe of lung    2. Lung nodule        Chief Complaint: Lung Cancer      Oncologic History:      Oncologic History Non-small cell lung cancer diagnosed 6/2016     Oncologic Treatment Cisplatin/Pemetrexed initiated 8/15/16 with concurrent radiation completed 3 cycles 10/3/16; 64 Gy    Pathology Adenocarcinoma, T2b, N2, M0, Stage IIIA          Subjective:    Interval History: Mr. Torres is a 62 y.o. male who was seen in follow-up for lung cancer.  He still has some cough which is relatively unchanged.  The shortness of breath has improved.  No other new complaints.    His history dates to approximately 4/2016 when he noted a worsening cough which was associated with clear sputum.  He had a chest x-ray in 5/16 showing a right middle lobe opacity.  Subsequent CT of the chest was performed on 5/30/16 showing a 5.6 cm lesion in the right middle lobe with hilar and mediastinal lymphadenopathy along with hepatic lesions and a left adrenal mass..  He underwent bronchoscopy which was nonrevealing and transbronchial biopsies were nondiagnostic.  He then underwent EBUS with biopsy of mediastinal lymph nodes with pathology showing non-small cell lung cancer consistent with adenocarcinoma.  He was started on concurrent chemotherapy and radiation with cisplatin and pemetrexed on 8/15/16.  He completed 64 Gy of concurrent radiation with cisplatin and pemetrexed with chemotherapy completed on 10/3/16.  Scans in 4/2016 had indicated progressive disease in the lung.    Past Medical History:   Past Medical History:   Diagnosis Date    Chemotherapy follow-up examination 9/7/2016    COPD (chronic obstructive pulmonary disease)     Hearing loss     Hypertension 2/9/2015    Primary cancer of right middle lobe of lung 7/11/2016    Rash 9/7/2016       Past Surgical HIstory:   Past Surgical History:   Procedure Laterality Date     INGUINAL HERNIA REPAIR Bilateral     KNEE SURGERY         Family History:   Family History   Problem Relation Age of Onset    Cancer Mother      lung, breast    Cancer Sister      lung    Cancer Maternal Grandfather     No Known Problems Father     No Known Problems Brother     No Known Problems Maternal Aunt     No Known Problems Maternal Uncle     No Known Problems Paternal Aunt     No Known Problems Paternal Uncle     No Known Problems Maternal Grandmother     No Known Problems Paternal Grandmother     No Known Problems Paternal Grandfather     Amblyopia Neg Hx     Blindness Neg Hx     Cataracts Neg Hx     Diabetes Neg Hx     Glaucoma Neg Hx     Hypertension Neg Hx     Macular degeneration Neg Hx     Retinal detachment Neg Hx     Strabismus Neg Hx     Stroke Neg Hx     Thyroid disease Neg Hx        Social History:  reports that he quit smoking about 3 years ago. He has a 80.00 pack-year smoking history. He does not have any smokeless tobacco history on file. He reports that he does not drink alcohol or use drugs.    Allergies:  Review of patient's allergies indicates:  No Known Allergies    Medications:  Current Outpatient Prescriptions   Medication Sig Dispense Refill    albuterol 90 mcg/actuation inhaler Inhale 2 puffs into the lungs every 6 (six) hours as needed for Wheezing. 18 g 0    albuterol-ipratropium 2.5mg-0.5mg/3mL (DUO-NEB) 0.5 mg-3 mg(2.5 mg base)/3 mL nebulizer solution Take 3 mLs by nebulization every 6 (six) hours as needed for Wheezing or Shortness of Breath. 100 vial 1    amlodipine (NORVASC) 10 MG tablet Take 1 tablet (10 mg total) by mouth once daily. 90 tablet 3    cetirizine (ZYRTEC) 10 MG tablet Take 1 tablet (10 mg total) by mouth once daily.  0    dexamethasone (DECADRON) 4 MG Tab Take 1 tablet (4 mg total) by mouth every 12 (twelve) hours. Take 1 tab twice daily starting the day prior to chemotherapy for 3 days. 24 tablet 1    docusate sodium (COLACE) 100 MG  capsule Take 1 capsule (100 mg total) by mouth 2 (two) times daily as needed for Constipation. 60 capsule 0    fluticasone (FLONASE) 50 mcg/actuation nasal spray SHAKE LIQUID AND USE 2 SPRAYS IN EACH NOSTRIL EVERY DAY 16 g 1    folic acid (FOLVITE) 400 MCG tablet Take 1 tablet (400 mcg total) by mouth once daily. 100 tablet 3    hydrOXYzine HCl (ATARAX) 25 MG tablet Take 1 tablet (25 mg total) by mouth 3 (three) times daily as needed for Itching. 20 tablet 0    naproxen (NAPROSYN) 500 MG tablet Take 1 tablet (500 mg total) by mouth 2 (two) times daily as needed (headache). 60 tablet 0    ondansetron (ZOFRAN, AS HYDROCHLORIDE,) 4 MG tablet Take 1 tablet (4 mg total) by mouth every 8 (eight) hours as needed for Nausea. 60 tablet 1     Current Facility-Administered Medications   Medication Dose Route Frequency Provider Last Rate Last Dose    cyanocobalamin injection 1,000 mcg  1,000 mcg Intramuscular 1 time in Clinic/HOD Dano Fernandez DO, FACP           Review of Systems   Constitutional: Negative for appetite change, chills, fatigue, fever and unexpected weight change.        Normal weight 220   HENT: Negative for dental problem, sinus pressure, sneezing and trouble swallowing.    Eyes: Negative for visual disturbance.   Respiratory: Positive for cough and shortness of breath. Negative for choking, chest tightness and wheezing.    Cardiovascular: Negative for chest pain and leg swelling.   Gastrointestinal: Negative for abdominal pain, blood in stool, constipation, diarrhea and nausea.   Genitourinary: Negative for difficulty urinating and dysuria.   Musculoskeletal: Negative for arthralgias and back pain.   Skin: Negative for rash and wound.   Neurological: Negative for dizziness, light-headedness and headaches.   Hematological: Negative for adenopathy. Does not bruise/bleed easily.   Psychiatric/Behavioral: Negative for sleep disturbance. The patient is not nervous/anxious.        ECOG Performance Status:  "1   Objective:      Vitals:   Vitals:    05/31/17 1108   BP: 128/69   BP Location: Right arm   Patient Position: Sitting   BP Method: Automatic   Pulse: 94   Resp: 18   Temp: 99 °F (37.2 °C)   TempSrc: Oral   SpO2: (!) 94%   Weight: 91.9 kg (202 lb 9.6 oz)   Height: 6' 5" (1.956 m)     BMI: Body mass index is 24.03 kg/m².    Physical Exam   Constitutional: He is oriented to person, place, and time. He appears well-developed and well-nourished.   HENT:   Head: Normocephalic and atraumatic.   Eyes: Pupils are equal, round, and reactive to light.   Neck: Normal range of motion. Neck supple.   Cardiovascular: Normal rate and regular rhythm.    Pulmonary/Chest: Effort normal. No respiratory distress. He has no rales.   Abdominal: Soft. He exhibits no distension. There is no tenderness.   Musculoskeletal: He exhibits no edema or tenderness.   Lymphadenopathy:     He has no cervical adenopathy.   Neurological: He is alert and oriented to person, place, and time. No cranial nerve deficit.   Skin: Skin is warm and dry. No rash noted.   Psychiatric: He has a normal mood and affect. His behavior is normal.       Laboratory Data:  No visits with results within 1 Month(s) from this visit.   Latest known visit with results is:   Admission on 02/05/2017, Discharged on 02/06/2017   Component Date Value Ref Range Status    WBC 02/05/2017 6.47  3.90 - 12.70 K/uL Final    RBC 02/05/2017 4.48* 4.60 - 6.20 M/uL Final    Hemoglobin 02/05/2017 12.9* 14.0 - 18.0 g/dL Final    Hematocrit 02/05/2017 38.5* 40.0 - 54.0 % Final    MCV 02/05/2017 86  82 - 98 fL Final    MCH 02/05/2017 28.8  27.0 - 31.0 pg Final    MCHC 02/05/2017 33.5  32.0 - 36.0 % Final    RDW 02/05/2017 13.0  11.5 - 14.5 % Final    Platelets 02/05/2017 408* 150 - 350 K/uL Final    MPV 02/05/2017 9.2  9.2 - 12.9 fL Final    Gran # 02/05/2017 5.0  1.8 - 7.7 K/uL Final    Lymph # 02/05/2017 0.8* 1.0 - 4.8 K/uL Final    Mono # 02/05/2017 0.6  0.3 - 1.0 K/uL Final    " Eos # 02/05/2017 0.1  0.0 - 0.5 K/uL Final    Baso # 02/05/2017 0.02  0.00 - 0.20 K/uL Final    Gran% 02/05/2017 77.0* 38.0 - 73.0 % Final    Lymph% 02/05/2017 12.1* 18.0 - 48.0 % Final    Mono% 02/05/2017 9.1  4.0 - 15.0 % Final    Eosinophil% 02/05/2017 1.2  0.0 - 8.0 % Final    Basophil% 02/05/2017 0.3  0.0 - 1.9 % Final    Differential Method 02/05/2017 Automated   Final    Sodium 02/05/2017 138  136 - 145 mmol/L Final    Potassium 02/05/2017 3.8  3.5 - 5.1 mmol/L Final    Chloride 02/05/2017 106  95 - 110 mmol/L Final    CO2 02/05/2017 25  23 - 29 mmol/L Final    Glucose 02/05/2017 103  70 - 110 mg/dL Final    BUN, Bld 02/05/2017 12  8 - 23 mg/dL Final    Creatinine 02/05/2017 1.1  0.5 - 1.4 mg/dL Final    Calcium 02/05/2017 9.2  8.7 - 10.5 mg/dL Final    Total Protein 02/05/2017 7.4  6.0 - 8.4 g/dL Final    Albumin 02/05/2017 3.4* 3.5 - 5.2 g/dL Final    Total Bilirubin 02/05/2017 0.3  0.1 - 1.0 mg/dL Final    Comment: For infants and newborns, interpretation of results should be based  on gestational age, weight and in agreement with clinical  observations.  Premature Infant recommended reference ranges:  Up to 24 hours.............<8.0 mg/dL  Up to 48 hours............<12.0 mg/dL  3-5 days..................<15.0 mg/dL  6-29 days.................<15.0 mg/dL      Alkaline Phosphatase 02/05/2017 76  55 - 135 U/L Final    AST 02/05/2017 15  10 - 40 U/L Final    ALT 02/05/2017 18  10 - 44 U/L Final    Anion Gap 02/05/2017 7* 8 - 16 mmol/L Final    eGFR if African American 02/05/2017 >60.0  >60 mL/min/1.73 m^2 Final    eGFR if non African American 02/05/2017 >60.0  >60 mL/min/1.73 m^2 Final    Comment: Calculation used to obtain the estimated glomerular filtration  rate (eGFR) is the CKD-EPI equation. Since race is unknown   in our information system, the eGFR values for   -American and Non--American patients are given   for each creatinine result.       Supplemental Diagnosis  6/30/16  Immunohistochemical stains are completed on the Station 4L lymph node cell block material and reveal the tumor  cells to stain positively with cytokeratin 7 and TTF-1. The tumor cells show nonspecific blush staining with  cytokeratin 5/6 and are negative for p63. This staining profile supports a diagnosis of non-small cell carcinoma  consistent with adenocarcinoma.  Cell block material will be sent for EGFR, ALK and ROS-1 testing and results will follow in a supplemental report.  (Electronically Signed: 2016-06-30 11:50:31 )  Diagnosed by: Irene Amaro M.D.  FINAL PATHOLOGIC DIAGNOSIS  1. Lymph node, Station 4L, EBUS guided fine needle aspiration:  Positive for malignant cells, non-small cell carcinoma.  Rare background lymphocytes are present.  Immunohistochemical staining be completed to further characterize this malignancy and results will follow in a  supplemental report.  2. Lymph node, Station 7, EBUS guided fine needle aspiration:  Positive for malignant cells, non-small cell carcinoma tumor identical to that seen in specimen #1.  Background lymphocytes are present.    Imaging:   CT 4/12/17  Findings:  Visualized neck is unremarkable.  The mediastinal structures are midline.  There is a small quantity of right pleural fluid.  Trace pericardial fluid also present.  There are coronary artery calcifications.  The aorta maintains normal caliber and there is no mediastinal, axillary, or hilar adenopathy.    Subcentimeter hypodensities in both lobes of the liver are similar to prior studies and not fully characterize this examination.  Stable left adrenal adenoma.  The bones show no abnormalities.  Superficial soft tissues appear within normal limits.    The large airways are patent.  The left lung is slightly hyperexpanded compared to the right.  There is a 4.1 x 2.4 cm soft tissue mass in the right middle lobe (previously 5.6 x 4.8 cm).  This mass is occluding the lateral segment right middle lobe  bronchus and encasing the medial segment right middle lobe bronchus.  Otherwise the right lung demonstrates scattered groundglass and reticular opacities in keeping with radiation change.  There is emphysema.  The left lung is clear.  A 1.2 cm solid pulmonary nodule is present in the medial basal segment of the right lower lobe (0.8 cm on 1/9/17).  Other small nodules are noted in this region, some of which have increased in size compared to prior.  There is pleural thickening along the right lung base.  Several areas of pleural nodularity/thickening are seen about the right lower lobe laterally as well.   Impression     4.1 x 2.4 cm right middle lobe mass has decreased in size compared to 1/5/17.  However, it is difficult to assess how much of this may be related to adjacent atelectasis noting that this mass obliterates the lateral segmental right middle lobe bronchus.    Extensive reticular and nodular opacities in the right lower lobe are probably in part related to radiation change.  However, several areas of nodularity involving pleural margins raise the question of lymphangitic carcinomatosis.  At least one medial basal segment right lower lobe nodule has increased in size.    Stable left adenoma and nonspecific hepatic hypodensities.  ______________________________________               Assessment:       1. Primary cancer of right middle lobe of lung    2. Lung nodule           Plan:     I have had a long discussion with Mr. Torres and his wife today concerning tumor Board recommendations for systemic therapy for recurrent disease.  I have discussed the role of chemotherapy with or without combining immunotherapy.  I've also discussed the role of clinical trials and we may have options for him.  If he wants to proceed with standard of care I would recommend placing him back on carboplatin, pemetrexed and adding pembrolizumab.  I've discussed the rationale, alternatives and potential side effects of treatment  with him.  I've given him written information on this regimen.  He wishes to think about this more before making a final decision.  Additionally, he is interested in hearing more about clinical trials and I will have our research nurse reach out to him to explain his trial options which may require another biopsy.  I will plan to see him back in one week for further discussion.    Dano Fernandez DO, FACP  Hematology & Oncology  [facility] [place] [address]  ph. [phone]  Fax. [phone]    40 minutes were spent in coordination of patient's care, record review and counseling.  More than 50% of the time was face-to-face.

## 2017-06-02 ENCOUNTER — TELEPHONE (OUTPATIENT)
Dept: HEMATOLOGY/ONCOLOGY | Facility: CLINIC | Age: 62
End: 2017-06-02

## 2017-06-02 ENCOUNTER — RESEARCH ENCOUNTER (OUTPATIENT)
Dept: RESEARCH | Facility: HOSPITAL | Age: 62
End: 2017-06-02

## 2017-06-02 NOTE — TELEPHONE ENCOUNTER
----- Message from Monika Arias sent at 6/2/2017 11:41 AM CDT -----  Contact: Pt's wife   Pt's wife is calling to r/s harinder ton 6/7/17 for a later time if possible and can be reached at [phone].    Thank you  Per patient wife, appointment changed to 6/14 at 10:00 AM.

## 2017-06-02 NOTE — PROGRESS NOTES
Dr. Fernandez told patient that someone from research with contact him regarding possible clinical trials. I reached out to patient on his home number to make him aware of clinical trials that may be available to him. My contact information was left on patient's answering machine for any questions he may have.

## 2017-06-14 ENCOUNTER — LAB VISIT (OUTPATIENT)
Dept: LAB | Facility: HOSPITAL | Age: 62
End: 2017-06-14
Attending: INTERNAL MEDICINE
Payer: MEDICAID

## 2017-06-14 ENCOUNTER — OFFICE VISIT (OUTPATIENT)
Dept: HEMATOLOGY/ONCOLOGY | Facility: CLINIC | Age: 62
End: 2017-06-14
Payer: COMMERCIAL

## 2017-06-14 VITALS
RESPIRATION RATE: 18 BRPM | HEART RATE: 87 BPM | SYSTOLIC BLOOD PRESSURE: 137 MMHG | BODY MASS INDEX: 23.72 KG/M2 | WEIGHT: 200 LBS | TEMPERATURE: 98 F | DIASTOLIC BLOOD PRESSURE: 75 MMHG | OXYGEN SATURATION: 100 %

## 2017-06-14 DIAGNOSIS — C34.2 PRIMARY CANCER OF RIGHT MIDDLE LOBE OF LUNG: Primary | ICD-10-CM

## 2017-06-14 DIAGNOSIS — C34.2 PRIMARY CANCER OF RIGHT MIDDLE LOBE OF LUNG: ICD-10-CM

## 2017-06-14 DIAGNOSIS — R59.0 MEDIASTINAL ADENOPATHY: ICD-10-CM

## 2017-06-14 LAB
ALBUMIN SERPL BCP-MCNC: 3.8 G/DL
ALP SERPL-CCNC: 61 U/L
ALT SERPL W/O P-5'-P-CCNC: 16 U/L
ANION GAP SERPL CALC-SCNC: 8 MMOL/L
AST SERPL-CCNC: 17 U/L
BILIRUB SERPL-MCNC: 0.3 MG/DL
BUN SERPL-MCNC: 15 MG/DL
CALCIUM SERPL-MCNC: 9.4 MG/DL
CHLORIDE SERPL-SCNC: 107 MMOL/L
CO2 SERPL-SCNC: 25 MMOL/L
CREAT SERPL-MCNC: 1.1 MG/DL
ERYTHROCYTE [DISTWIDTH] IN BLOOD BY AUTOMATED COUNT: 14.1 %
EST. GFR  (AFRICAN AMERICAN): >60 ML/MIN/1.73 M^2
EST. GFR  (NON AFRICAN AMERICAN): >60 ML/MIN/1.73 M^2
GLUCOSE SERPL-MCNC: 92 MG/DL
HCT VFR BLD AUTO: 41.9 %
HGB BLD-MCNC: 13.5 G/DL
MAGNESIUM SERPL-MCNC: 2.2 MG/DL
MCH RBC QN AUTO: 27.9 PG
MCHC RBC AUTO-ENTMCNC: 32.2 %
MCV RBC AUTO: 87 FL
NEUTROPHILS # BLD AUTO: 3.7 K/UL
PLATELET # BLD AUTO: 268 K/UL
PMV BLD AUTO: 9.6 FL
POTASSIUM SERPL-SCNC: 4.3 MMOL/L
PROT SERPL-MCNC: 7.7 G/DL
RBC # BLD AUTO: 4.84 M/UL
SODIUM SERPL-SCNC: 140 MMOL/L
WBC # BLD AUTO: 5.34 K/UL

## 2017-06-14 PROCEDURE — 83735 ASSAY OF MAGNESIUM: CPT

## 2017-06-14 PROCEDURE — 36415 COLL VENOUS BLD VENIPUNCTURE: CPT

## 2017-06-14 PROCEDURE — 80053 COMPREHEN METABOLIC PANEL: CPT

## 2017-06-14 PROCEDURE — 99214 OFFICE O/P EST MOD 30 MIN: CPT | Mod: S$GLB,,, | Performed by: INTERNAL MEDICINE

## 2017-06-14 PROCEDURE — 85027 COMPLETE CBC AUTOMATED: CPT

## 2017-06-14 PROCEDURE — 99999 PR PBB SHADOW E&M-EST. PATIENT-LVL III: CPT | Mod: PBBFAC,,, | Performed by: INTERNAL MEDICINE

## 2017-06-14 RX ORDER — CYANOCOBALAMIN 1000 UG/ML
1000 INJECTION, SOLUTION INTRAMUSCULAR; SUBCUTANEOUS
Status: COMPLETED | OUTPATIENT
Start: 2017-06-14 | End: 2017-06-14

## 2017-06-14 RX ADMIN — CYANOCOBALAMIN 1000 MCG: 1000 INJECTION, SOLUTION INTRAMUSCULAR; SUBCUTANEOUS at 11:06

## 2017-06-14 NOTE — PROGRESS NOTES
PATIENT: Angel Torres  MRN: [id number]  DATE: 6/14/2017      Diagnosis:   1. Primary cancer of right middle lobe of lung    2. Mediastinal adenopathy        Chief Complaint: Lung Cancer      Oncologic History:      Oncologic History Non-small cell lung cancer diagnosed 6/2016   Recurrent disease to lung 4/2017     Oncologic Treatment Cisplatin/Pemetrexed initiated 8/15/16 with concurrent radiation completed 3 cycles 10/3/16; 64 Gy    Pathology Adenocarcinoma, T2b, N2, M0, Stage IIIA          Subjective:    Interval History: Mr. Torres is a 62 y.o. male who was seen in follow-up for lung cancer.  States he has ongoing cough and some intermittent shortness of breath.  No other new complaints.    His history dates to approximately 4/2016 when he noted a worsening cough which was associated with clear sputum.  He had a chest x-ray in 5/16 showing a right middle lobe opacity.  Subsequent CT of the chest was performed on 5/30/16 showing a 5.6 cm lesion in the right middle lobe with hilar and mediastinal lymphadenopathy along with hepatic lesions and a left adrenal mass..  He underwent bronchoscopy which was nonrevealing and transbronchial biopsies were nondiagnostic.  He then underwent EBUS with biopsy of mediastinal lymph nodes with pathology showing non-small cell lung cancer consistent with adenocarcinoma.  He was started on concurrent chemotherapy and radiation with cisplatin and pemetrexed on 8/15/16.  He completed 64 Gy of concurrent radiation with cisplatin and pemetrexed with chemotherapy completed on 10/3/16.  Scans in 4/2017 had indicated progressive disease in the lung.    Past Medical History:   Past Medical History:   Diagnosis Date    Chemotherapy follow-up examination 9/7/2016    COPD (chronic obstructive pulmonary disease)     Hearing loss     Hypertension 2/9/2015    Primary cancer of right middle lobe of lung 7/11/2016    Rash 9/7/2016       Past Surgical HIstory:   Past Surgical History:   Procedure  Laterality Date    INGUINAL HERNIA REPAIR Bilateral     KNEE SURGERY         Family History:   Family History   Problem Relation Age of Onset    Cancer Mother      lung, breast    Cancer Sister      lung    Cancer Maternal Grandfather     No Known Problems Father     No Known Problems Brother     No Known Problems Maternal Aunt     No Known Problems Maternal Uncle     No Known Problems Paternal Aunt     No Known Problems Paternal Uncle     No Known Problems Maternal Grandmother     No Known Problems Paternal Grandmother     No Known Problems Paternal Grandfather     Amblyopia Neg Hx     Blindness Neg Hx     Cataracts Neg Hx     Diabetes Neg Hx     Glaucoma Neg Hx     Hypertension Neg Hx     Macular degeneration Neg Hx     Retinal detachment Neg Hx     Strabismus Neg Hx     Stroke Neg Hx     Thyroid disease Neg Hx        Social History:  reports that he quit smoking about 3 years ago. He has a 80.00 pack-year smoking history. He does not have any smokeless tobacco history on file. He reports that he does not drink alcohol or use drugs.    Allergies:  Review of patient's allergies indicates:  No Known Allergies    Medications:  Current Outpatient Prescriptions   Medication Sig Dispense Refill    albuterol 90 mcg/actuation inhaler Inhale 2 puffs into the lungs every 6 (six) hours as needed for Wheezing. 18 g 0    albuterol-ipratropium 2.5mg-0.5mg/3mL (DUO-NEB) 0.5 mg-3 mg(2.5 mg base)/3 mL nebulizer solution Take 3 mLs by nebulization every 6 (six) hours as needed for Wheezing or Shortness of Breath. 100 vial 1    amlodipine (NORVASC) 10 MG tablet Take 1 tablet (10 mg total) by mouth once daily. 90 tablet 3    cetirizine (ZYRTEC) 10 MG tablet Take 1 tablet (10 mg total) by mouth once daily.  0    dexamethasone (DECADRON) 4 MG Tab Take 1 tablet (4 mg total) by mouth every 12 (twelve) hours. Take 1 tab twice daily starting the day prior to chemotherapy for 3 days. 24 tablet 1    docusate  sodium (COLACE) 100 MG capsule Take 1 capsule (100 mg total) by mouth 2 (two) times daily as needed for Constipation. 60 capsule 0    fluticasone (FLONASE) 50 mcg/actuation nasal spray SHAKE LIQUID AND USE 2 SPRAYS IN EACH NOSTRIL EVERY DAY 16 g 1    folic acid (FOLVITE) 400 MCG tablet Take 1 tablet (400 mcg total) by mouth once daily. 100 tablet 3    hydrOXYzine HCl (ATARAX) 25 MG tablet Take 1 tablet (25 mg total) by mouth 3 (three) times daily as needed for Itching. 20 tablet 0    naproxen (NAPROSYN) 500 MG tablet Take 1 tablet (500 mg total) by mouth 2 (two) times daily as needed (headache). 60 tablet 0    ondansetron (ZOFRAN, AS HYDROCHLORIDE,) 4 MG tablet Take 1 tablet (4 mg total) by mouth every 8 (eight) hours as needed for Nausea. 60 tablet 1     Current Facility-Administered Medications   Medication Dose Route Frequency Provider Last Rate Last Dose    cyanocobalamin injection 1,000 mcg  1,000 mcg Intramuscular 1 time in Clinic/HOD Dano Fernandez DO, DIONISIO        cyanocobalamin injection 1,000 mcg  1,000 mcg Intramuscular 1 time in Clinic/HOD Dano Fernandez DO, FACP           Review of Systems   Constitutional: Negative for appetite change, chills, fatigue, fever and unexpected weight change.        Normal weight 220   HENT: Negative for dental problem, sinus pressure, sneezing and trouble swallowing.    Eyes: Negative for visual disturbance.   Respiratory: Positive for cough and shortness of breath. Negative for choking, chest tightness and wheezing.    Cardiovascular: Negative for chest pain and leg swelling.   Gastrointestinal: Negative for abdominal pain, blood in stool, constipation, diarrhea and nausea.   Genitourinary: Negative for difficulty urinating and dysuria.   Musculoskeletal: Negative for arthralgias and back pain.   Skin: Negative for rash and wound.   Neurological: Negative for dizziness, light-headedness and headaches.   Hematological: Negative for adenopathy. Does not  bruise/bleed easily.   Psychiatric/Behavioral: Negative for sleep disturbance. The patient is not nervous/anxious.        ECOG Performance Status: 1   Objective:      Vitals:   Vitals:    06/14/17 1040   BP: 137/75   BP Location: Right arm   Patient Position: Sitting   BP Method: Automatic   Pulse: 87   Resp: 18   Temp: 98 °F (36.7 °C)   TempSrc: Oral   SpO2: 100%   Weight: 90.7 kg (200 lb)     BMI: Body mass index is 23.72 kg/m².    Physical Exam   Constitutional: He is oriented to person, place, and time. He appears well-developed and well-nourished.   HENT:   Head: Normocephalic and atraumatic.   Eyes: Pupils are equal, round, and reactive to light.   Neck: Normal range of motion. Neck supple.   Cardiovascular: Normal rate and regular rhythm.    Pulmonary/Chest: Effort normal. No respiratory distress. He has no rales.   Abdominal: Soft. He exhibits no distension. There is no tenderness.   Musculoskeletal: He exhibits no edema or tenderness.   Lymphadenopathy:     He has no cervical adenopathy.   Neurological: He is alert and oriented to person, place, and time. No cranial nerve deficit.   Skin: Skin is warm and dry. No rash noted.   Psychiatric: He has a normal mood and affect. His behavior is normal.       Laboratory Data:  No visits with results within 1 Month(s) from this visit.   Latest known visit with results is:   Admission on 02/05/2017, Discharged on 02/06/2017   Component Date Value Ref Range Status    WBC 02/05/2017 6.47  3.90 - 12.70 K/uL Final    RBC 02/05/2017 4.48* 4.60 - 6.20 M/uL Final    Hemoglobin 02/05/2017 12.9* 14.0 - 18.0 g/dL Final    Hematocrit 02/05/2017 38.5* 40.0 - 54.0 % Final    MCV 02/05/2017 86  82 - 98 fL Final    MCH 02/05/2017 28.8  27.0 - 31.0 pg Final    MCHC 02/05/2017 33.5  32.0 - 36.0 % Final    RDW 02/05/2017 13.0  11.5 - 14.5 % Final    Platelets 02/05/2017 408* 150 - 350 K/uL Final    MPV 02/05/2017 9.2  9.2 - 12.9 fL Final    Gran # 02/05/2017 5.0  1.8 - 7.7  K/uL Final    Lymph # 02/05/2017 0.8* 1.0 - 4.8 K/uL Final    Mono # 02/05/2017 0.6  0.3 - 1.0 K/uL Final    Eos # 02/05/2017 0.1  0.0 - 0.5 K/uL Final    Baso # 02/05/2017 0.02  0.00 - 0.20 K/uL Final    Gran% 02/05/2017 77.0* 38.0 - 73.0 % Final    Lymph% 02/05/2017 12.1* 18.0 - 48.0 % Final    Mono% 02/05/2017 9.1  4.0 - 15.0 % Final    Eosinophil% 02/05/2017 1.2  0.0 - 8.0 % Final    Basophil% 02/05/2017 0.3  0.0 - 1.9 % Final    Differential Method 02/05/2017 Automated   Final    Sodium 02/05/2017 138  136 - 145 mmol/L Final    Potassium 02/05/2017 3.8  3.5 - 5.1 mmol/L Final    Chloride 02/05/2017 106  95 - 110 mmol/L Final    CO2 02/05/2017 25  23 - 29 mmol/L Final    Glucose 02/05/2017 103  70 - 110 mg/dL Final    BUN, Bld 02/05/2017 12  8 - 23 mg/dL Final    Creatinine 02/05/2017 1.1  0.5 - 1.4 mg/dL Final    Calcium 02/05/2017 9.2  8.7 - 10.5 mg/dL Final    Total Protein 02/05/2017 7.4  6.0 - 8.4 g/dL Final    Albumin 02/05/2017 3.4* 3.5 - 5.2 g/dL Final    Total Bilirubin 02/05/2017 0.3  0.1 - 1.0 mg/dL Final    Comment: For infants and newborns, interpretation of results should be based  on gestational age, weight and in agreement with clinical  observations.  Premature Infant recommended reference ranges:  Up to 24 hours.............<8.0 mg/dL  Up to 48 hours............<12.0 mg/dL  3-5 days..................<15.0 mg/dL  6-29 days.................<15.0 mg/dL      Alkaline Phosphatase 02/05/2017 76  55 - 135 U/L Final    AST 02/05/2017 15  10 - 40 U/L Final    ALT 02/05/2017 18  10 - 44 U/L Final    Anion Gap 02/05/2017 7* 8 - 16 mmol/L Final    eGFR if African American 02/05/2017 >60.0  >60 mL/min/1.73 m^2 Final    eGFR if non African American 02/05/2017 >60.0  >60 mL/min/1.73 m^2 Final    Comment: Calculation used to obtain the estimated glomerular filtration  rate (eGFR) is the CKD-EPI equation. Since race is unknown   in our information system, the eGFR values for    -American and Non--American patients are given   for each creatinine result.       Supplemental Diagnosis 6/30/16  Immunohistochemical stains are completed on the Station 4L lymph node cell block material and reveal the tumor  cells to stain positively with cytokeratin 7 and TTF-1. The tumor cells show nonspecific blush staining with  cytokeratin 5/6 and are negative for p63. This staining profile supports a diagnosis of non-small cell carcinoma  consistent with adenocarcinoma.  Cell block material will be sent for EGFR, ALK and ROS-1 testing and results will follow in a supplemental report.  (Electronically Signed: 2016-06-30 11:50:31 )  Diagnosed by: Irene Amaro M.D.  FINAL PATHOLOGIC DIAGNOSIS  1. Lymph node, Station 4L, EBUS guided fine needle aspiration:  Positive for malignant cells, non-small cell carcinoma.  Rare background lymphocytes are present.  Immunohistochemical staining be completed to further characterize this malignancy and results will follow in a  supplemental report.  2. Lymph node, Station 7, EBUS guided fine needle aspiration:  Positive for malignant cells, non-small cell carcinoma tumor identical to that seen in specimen #1.  Background lymphocytes are present.    Imaging:   CT 4/12/17  Findings:  Visualized neck is unremarkable.  The mediastinal structures are midline.  There is a small quantity of right pleural fluid.  Trace pericardial fluid also present.  There are coronary artery calcifications.  The aorta maintains normal caliber and there is no mediastinal, axillary, or hilar adenopathy.    Subcentimeter hypodensities in both lobes of the liver are similar to prior studies and not fully characterize this examination.  Stable left adrenal adenoma.  The bones show no abnormalities.  Superficial soft tissues appear within normal limits.    The large airways are patent.  The left lung is slightly hyperexpanded compared to the right.  There is a 4.1 x 2.4 cm soft tissue  mass in the right middle lobe (previously 5.6 x 4.8 cm).  This mass is occluding the lateral segment right middle lobe bronchus and encasing the medial segment right middle lobe bronchus.  Otherwise the right lung demonstrates scattered groundglass and reticular opacities in keeping with radiation change.  There is emphysema.  The left lung is clear.  A 1.2 cm solid pulmonary nodule is present in the medial basal segment of the right lower lobe (0.8 cm on 1/9/17).  Other small nodules are noted in this region, some of which have increased in size compared to prior.  There is pleural thickening along the right lung base.  Several areas of pleural nodularity/thickening are seen about the right lower lobe laterally as well.   Impression     4.1 x 2.4 cm right middle lobe mass has decreased in size compared to 1/5/17.  However, it is difficult to assess how much of this may be related to adjacent atelectasis noting that this mass obliterates the lateral segmental right middle lobe bronchus.    Extensive reticular and nodular opacities in the right lower lobe are probably in part related to radiation change.  However, several areas of nodularity involving pleural margins raise the question of lymphangitic carcinomatosis.  At least one medial basal segment right lower lobe nodule has increased in size.    Stable left adenoma and nonspecific hepatic hypodensities.  ______________________________________               Assessment:       1. Primary cancer of right middle lobe of lung    2. Mediastinal adenopathy           Plan:     Mr. Torres is doing well clinically clinical perspective, however, review of his images does show progressive disease.  I discussed this in detail with him and his wife today and have recommended systemic therapy.  He was also discussed in thoracic tumor Board with this recommendation.  I have recommended combination treatment with carboplatin, pemetrexed and pembrolizumab.  I've previously given  him written information on this regimen.  He is agreeable to proceed and has signed informed consent.  He will get a B12 injection today.  He will continue on with folic acid.  I will check imaging on him prior to initiation of treatment including a PET/CT and also MRI the brain.  I discussed the role of clinical trials with him and he is not interested in the trials that we have.  Follow back up with me with cycle 2.  Multiple questions were answered and he is agreeable with this plan.    Dano Fernandez DO, FACP  Hematology & Oncology  [address] [place] [id number]  ph. [phone]  Fax. [phone]    25 minutes were spent in coordination of patient's care, record review and counseling.  More than 50% of the time was face-to-face.

## 2017-06-14 NOTE — Clinical Note
Not sure if i sent already. b12 injection today Schedule pet/MRI Chemo to start (jose is building protocol) F/u with cycle 2 (3 week cycles)

## 2017-06-14 NOTE — Clinical Note
Labs today B12 injection today Schedule MRI and PET/CT, they wish to have this next Wednesday Then schedule chemotherapy

## 2017-06-15 RX ORDER — HEPARIN 100 UNIT/ML
500 SYRINGE INTRAVENOUS
Status: CANCELLED | OUTPATIENT
Start: 2017-06-19

## 2017-06-15 RX ORDER — SODIUM CHLORIDE 0.9 % (FLUSH) 0.9 %
10 SYRINGE (ML) INJECTION
Status: CANCELLED | OUTPATIENT
Start: 2017-06-19

## 2017-06-23 ENCOUNTER — TELEPHONE (OUTPATIENT)
Dept: ADMINISTRATIVE | Facility: OTHER | Age: 62
End: 2017-06-23

## 2017-06-23 NOTE — TELEPHONE ENCOUNTER
----- Message from Dano Fernandez DO, FACP sent at 6/14/2017  3:56 PM CDT -----  Not sure if i sent already.  b12 injection today  Schedule pet/MRI  Chemo to start (jose is building protocol)  F/u with cycle 2 (3 week cycles)

## 2017-06-28 ENCOUNTER — HOSPITAL ENCOUNTER (OUTPATIENT)
Dept: RADIOLOGY | Facility: HOSPITAL | Age: 62
Discharge: HOME | End: 2017-06-28
Attending: INTERNAL MEDICINE
Payer: MEDICAID

## 2017-06-28 DIAGNOSIS — C34.2 PRIMARY CANCER OF RIGHT MIDDLE LOBE OF LUNG: ICD-10-CM

## 2017-06-28 DIAGNOSIS — R59.0 MEDIASTINAL ADENOPATHY: ICD-10-CM

## 2017-06-28 PROCEDURE — 70553 MRI BRAIN STEM W/O & W/DYE: CPT | Mod: TC

## 2017-06-28 PROCEDURE — 70553 MRI BRAIN STEM W/O & W/DYE: CPT | Mod: 26,,, | Performed by: RADIOLOGY

## 2017-06-28 PROCEDURE — 25500020 PHARM REV CODE 255: Performed by: INTERNAL MEDICINE

## 2017-06-28 PROCEDURE — A9585 GADOBUTROL INJECTION: HCPCS | Performed by: INTERNAL MEDICINE

## 2017-06-28 RX ORDER — GADOBUTROL 604.72 MG/ML
10 INJECTION INTRAVENOUS
Status: COMPLETED | OUTPATIENT
Start: 2017-06-28 | End: 2017-06-28

## 2017-06-28 RX ADMIN — GADOBUTROL 10 ML: 604.72 INJECTION INTRAVENOUS at 07:06

## 2017-06-29 ENCOUNTER — TELEPHONE (OUTPATIENT)
Dept: HEMATOLOGY/ONCOLOGY | Facility: CLINIC | Age: 62
End: 2017-06-29

## 2017-06-29 ENCOUNTER — DOCUMENTATION ONLY (OUTPATIENT)
Dept: INFUSION THERAPY | Facility: HOSPITAL | Age: 62
End: 2017-06-29

## 2017-06-29 NOTE — TELEPHONE ENCOUNTER
Called by Jayashree in chemo infusion who stated she spoke to patient this morning and he will not be coming to chemo because he has not had his PET scan yet.  I called the patient and spoke to his wife and I noted in his chart that he had Medicaid.  I asked the wife about this and she said they called with their new insurance information and have been waiting for PET/CT department to call her. I told her I would investigate but asked if they could still come in for chemo.  She reports they made different plans and we will reschedule.

## 2017-06-29 NOTE — PROGRESS NOTES
Called patient regarding 0800 chemo appointment. Spoke with wife. Patient was supposed to have mri and pet scan prior to starting chemo so did not plan on coming in today due to not having either done yet due to insurance issues. Called and spoke with Lauren Vegas RN to update. Will reschedule appointments and contact patient.

## 2017-07-10 ENCOUNTER — TELEPHONE (OUTPATIENT)
Dept: HEMATOLOGY/ONCOLOGY | Facility: CLINIC | Age: 62
End: 2017-07-10

## 2017-07-10 DIAGNOSIS — C34.2 MALIGNANT NEOPLASM OF MIDDLE LOBE, BRONCHUS OR LUNG: Primary | ICD-10-CM

## 2017-07-10 NOTE — TELEPHONE ENCOUNTER
Spoke to patient's wife about PET scan.  Medicaid is still listed as patient's primary, she states that it is a secondary insurance and [employer] is the primary insurance. I gave her Batsheva's number to call to give her his correct insurance.  He Ok'd a CT of the chest prior to chemo starting while we wait for a PET scan.  I relayed this to the patient's wife

## 2017-07-10 NOTE — TELEPHONE ENCOUNTER
----- Message from Susan Faria sent at 7/10/2017  9:57 AM CDT -----  Contact: pt's wife Diamond [phone]  Pt's wife called requesting a call back from Dr Fernandez' nurse about pt's PET scan.    Please call her at [phone]    Thanks  [place]

## 2017-07-12 ENCOUNTER — HOSPITAL ENCOUNTER (OUTPATIENT)
Dept: RADIOLOGY | Facility: HOSPITAL | Age: 62
Discharge: HOME | End: 2017-07-12
Attending: INTERNAL MEDICINE
Payer: COMMERCIAL

## 2017-07-12 DIAGNOSIS — C34.2 MALIGNANT NEOPLASM OF MIDDLE LOBE, BRONCHUS OR LUNG: ICD-10-CM

## 2017-07-12 PROCEDURE — 71250 CT THORAX DX C-: CPT | Mod: TC

## 2017-07-12 PROCEDURE — 71250 CT THORAX DX C-: CPT | Mod: 26,,, | Performed by: RADIOLOGY

## 2017-07-13 ENCOUNTER — TELEPHONE (OUTPATIENT)
Dept: ADMINISTRATIVE | Facility: OTHER | Age: 62
End: 2017-07-13

## 2017-07-13 NOTE — TELEPHONE ENCOUNTER
----- Message from Cecilia Ladd sent at 7/13/2017 12:20 PM CDT -----  Contact: Self   Pt needs to have chemo on Weds 07/19 since the wife will be off that day.   Call Diamond [phone]

## 2017-07-17 DIAGNOSIS — R59.0 MEDIASTINAL ADENOPATHY: ICD-10-CM

## 2017-07-17 DIAGNOSIS — J44.9 CHRONIC OBSTRUCTIVE PULMONARY DISEASE, UNSPECIFIED COPD TYPE: ICD-10-CM

## 2017-07-17 DIAGNOSIS — C34.2 PRIMARY CANCER OF RIGHT MIDDLE LOBE OF LUNG: ICD-10-CM

## 2017-07-17 RX ORDER — DEXAMETHASONE 4 MG/1
4 TABLET ORAL EVERY 12 HOURS
Qty: 24 TABLET | Refills: 1 | Status: SHIPPED | OUTPATIENT
Start: 2017-07-17 | End: 2017-11-08

## 2017-07-17 NOTE — TELEPHONE ENCOUNTER
"Patient's wife called to see if he can hold off on chemo until he has his PET/CT.  I told her he is having the scan the day prior to his chemo and we can either have Dr. Church call him with the results or he can get them the following week when he sees Dr. Fernandez.  Either way, the scan will tell him "where the cancer has gone" prior to him starting chemo.     "

## 2017-07-17 NOTE — TELEPHONE ENCOUNTER
Spoke to Krystle Roy about auth for PET because wife states [employer] is the primary insurance for patient, but it is  when run for auth.      LVM for patient's wife that if there is a new [employer] card, we need it ASAP, if not, we need to order a CT of Abd/Pel.  Phone number given.

## 2017-07-17 NOTE — TELEPHONE ENCOUNTER
----- Message from Cecilia Ladd sent at 7/17/2017  9:26 AM CDT -----  Contact: Wife  They want to know if the pt can take the chemo next week, after they get pet scan results.   Call Diamond [phone]

## 2017-07-18 ENCOUNTER — TELEPHONE (OUTPATIENT)
Dept: HEMATOLOGY/ONCOLOGY | Facility: CLINIC | Age: 62
End: 2017-07-18

## 2017-07-18 ENCOUNTER — DOCUMENTATION ONLY (OUTPATIENT)
Dept: HEMATOLOGY/ONCOLOGY | Facility: CLINIC | Age: 62
End: 2017-07-18

## 2017-07-18 NOTE — TELEPHONE ENCOUNTER
----- Message from Gosia Sandhu sent at 7/18/2017  9:45 AM CDT -----  Contact: pt wife  Pt wife would like to speak with someone in regards to pt chemo schedule         Pt can be contacted at [phone]

## 2017-07-18 NOTE — PROGRESS NOTES
Diagnosis:   1. Primary cancer of right middle lobe of lung    2. Mediastinal adenopathy          Chief Complaint: Lung Cancer        Oncologic History:       Oncologic History Non-small cell lung cancer diagnosed 6/2016   Recurrent disease to lung 4/2017     Oncologic Treatment Cisplatin/Pemetrexed initiated 8/15/16 with concurrent radiation completed 3 cycles 10/3/16; 64 Gy    Pathology Adenocarcinoma, T2b, N2, M0, Stage IIIA       Interval History: Mr. Torres is a 62 y.o. male who was seen in follow-up for lung cancer.  States he has ongoing cough and some intermittent shortness of breath.  No other new complaints.     His history dates to approximately 4/2016 when he noted a worsening cough which was associated with clear sputum.  He had a chest x-ray in 5/16 showing a right middle lobe opacity.  Subsequent CT of the chest was performed on 5/30/16 showing a 5.6 cm lesion in the right middle lobe with hilar and mediastinal lymphadenopathy along with hepatic lesions and a left adrenal mass..  He underwent bronchoscopy which was nonrevealing and transbronchial biopsies were nondiagnostic.  He then underwent EBUS with biopsy of mediastinal lymph nodes with pathology showing non-small cell lung cancer consistent with adenocarcinoma.  He was started on concurrent chemotherapy and radiation with cisplatin and pemetrexed on 8/15/16.  He completed 64 Gy of concurrent radiation with cisplatin and pemetrexed with chemotherapy completed on 10/3/16.  Scans in 4/2017 had indicated progressive disease in the lung.    Review of Systems   Constitutional: Negative for chills, fever, malaise/fatigue and weight loss.   HENT: Negative for congestion and nosebleeds.    Eyes: Negative for blurred vision and double vision.   Respiratory: Negative for cough, hemoptysis and sputum production.    Cardiovascular: Negative for chest pain, palpitations and leg swelling.   Gastrointestinal: Negative for abdominal pain, diarrhea, heartburn  and nausea.   Genitourinary: Negative for dysuria.   Musculoskeletal: Negative for myalgias and neck pain.   Neurological: Negative for dizziness, sensory change, speech change and headaches.   Endo/Heme/Allergies: Does not bruise/bleed easily.   Psychiatric/Behavioral: Negative for depression.   All other systems reviewed and are negative.      Current Outpatient Prescriptions   Medication Sig    albuterol 90 mcg/actuation inhaler Inhale 2 puffs into the lungs every 6 (six) hours as needed for Wheezing.    albuterol-ipratropium 2.5mg-0.5mg/3mL (DUO-NEB) 0.5 mg-3 mg(2.5 mg base)/3 mL nebulizer solution Take 3 mLs by nebulization every 6 (six) hours as needed for Wheezing or Shortness of Breath.    amlodipine (NORVASC) 10 MG tablet Take 1 tablet (10 mg total) by mouth once daily.    cetirizine (ZYRTEC) 10 MG tablet Take 1 tablet (10 mg total) by mouth once daily.    dexamethasone (DECADRON) 4 MG Tab Take 1 tablet (4 mg total) by mouth every 12 (twelve) hours. Take 1 tab twice daily starting the day prior to chemotherapy for 3 days.    docusate sodium (COLACE) 100 MG capsule Take 1 capsule (100 mg total) by mouth 2 (two) times daily as needed for Constipation.    fluticasone (FLONASE) 50 mcg/actuation nasal spray SHAKE LIQUID AND USE 2 SPRAYS IN EACH NOSTRIL EVERY DAY    folic acid (FOLVITE) 400 MCG tablet Take 1 tablet (400 mcg total) by mouth once daily.    hydrOXYzine HCl (ATARAX) 25 MG tablet Take 1 tablet (25 mg total) by mouth 3 (three) times daily as needed for Itching.    naproxen (NAPROSYN) 500 MG tablet Take 1 tablet (500 mg total) by mouth 2 (two) times daily as needed (headache).    ondansetron (ZOFRAN, AS HYDROCHLORIDE,) 4 MG tablet Take 1 tablet (4 mg total) by mouth every 8 (eight) hours as needed for Nausea.     Current Facility-Administered Medications   Medication    cyanocobalamin injection 1,000 mcg       Vitals:    07/19/17 1022   BP: 121/76   Pulse: 97   Resp: 18   Temp: 98.1 °F (36.7  °C)       Physical Exam   Constitutional: He is oriented to person, place, and time. He appears well-developed and well-nourished.   HENT:   Head: Normocephalic and atraumatic.   Eyes: Pupils are equal, round, and reactive to light. No scleral icterus.   Neck: Normal range of motion.   Cardiovascular: Normal rate and regular rhythm.    Pulmonary/Chest: Effort normal and breath sounds normal. No respiratory distress.   Abdominal: Soft.   Musculoskeletal: He exhibits no edema.   Neurological: He is alert and oriented to person, place, and time.   Skin: Skin is warm.   Psychiatric: He has a normal mood and affect.     Component      Latest Ref Rng & Units 7/19/2017   Sodium      136 - 145 mmol/L 139   Potassium      3.5 - 5.1 mmol/L 3.9   Chloride      95 - 110 mmol/L 108   CO2      23 - 29 mmol/L 21 (L)   Glucose      70 - 110 mg/dL 88   BUN, Bld      8 - 23 mg/dL 14   Creatinine      0.5 - 1.4 mg/dL 1.2   Calcium      8.7 - 10.5 mg/dL 9.1   Total Protein      6.0 - 8.4 g/dL 7.2   Albumin      3.5 - 5.2 g/dL 3.7   Total Bilirubin      0.1 - 1.0 mg/dL 0.7   Alkaline Phosphatase      55 - 135 U/L 59   AST      10 - 40 U/L 20   ALT      10 - 44 U/L 19   Anion Gap      8 - 16 mmol/L 10   eGFR if African American      >60 mL/min/1.73 m:2 >60.0   eGFR if non African American      >60 mL/min/1.73 m:2 >60.0   WBC      3.90 - 12.70 K/uL 5.63   RBC      4.60 - 6.20 M/uL 4.60   Hemoglobin      14.0 - 18.0 g/dL 12.8 (L)   Hematocrit      40.0 - 54.0 % 38.3 (L)   MCV      82 - 98 fL 83   MCH      27.0 - 31.0 pg 27.8   MCHC      32.0 - 36.0 % 33.4   RDW      11.5 - 14.5 % 14.6 (H)   Platelets      150 - 350 K/uL 268   MPV      9.2 - 12.9 fL 9.4   Gran #      1.8 - 7.7 K/uL 3.8   Magnesium      1.6 - 2.6 mg/dL 1.9       Supplemental Diagnosis 6/30/16  Immunohistochemical stains are completed on the Station 4L lymph node cell block material and reveal the tumor  cells to stain positively with cytokeratin 7 and TTF-1. The tumor cells  show nonspecific blush staining with  cytokeratin 5/6 and are negative for p63. This staining profile supports a diagnosis of non-small cell carcinoma  consistent with adenocarcinoma.  Cell block material will be sent for EGFR, ALK and ROS-1 testing and results will follow in a supplemental report.  (Electronically Signed: 2016-06-30 11:50:31 )  Diagnosed by: Irene Amaro M.D.  FINAL PATHOLOGIC DIAGNOSIS  1. Lymph node, Station 4L, EBUS guided fine needle aspiration:  Positive for malignant cells, non-small cell carcinoma.  Rare background lymphocytes are present.  Immunohistochemical staining be completed to further characterize this malignancy and results will follow in a  supplemental report.  2. Lymph node, Station 7, EBUS guided fine needle aspiration:  Positive for malignant cells, non-small cell carcinoma tumor identical to that seen in specimen #1.  Background lymphocytes are present.     Imaging:     CT 4/12/17  Findings:  Visualized neck is unremarkable.  The mediastinal structures are midline.  There is a small quantity of right pleural fluid.  Trace pericardial fluid also present.  There are coronary artery calcifications.  The aorta maintains normal caliber and there is no mediastinal, axillary, or hilar adenopathy.    Subcentimeter hypodensities in both lobes of the liver are similar to prior studies and not fully characterize this examination.  Stable left adrenal adenoma.  The bones show no abnormalities.  Superficial soft tissues appear within normal limits.    The large airways are patent.  The left lung is slightly hyperexpanded compared to the right.  There is a 4.1 x 2.4 cm soft tissue mass in the right middle lobe (previously 5.6 x 4.8 cm).  This mass is occluding the lateral segment right middle lobe bronchus and encasing the medial segment right middle lobe bronchus.  Otherwise the right lung demonstrates scattered groundglass and reticular opacities in keeping with radiation change.   There is emphysema.  The left lung is clear.  A 1.2 cm solid pulmonary nodule is present in the medial basal segment of the right lower lobe (0.8 cm on 1/9/17).  Other small nodules are noted in this region, some of which have increased in size compared to prior.  There is pleural thickening along the right lung base.  Several areas of pleural nodularity/thickening are seen about the right lower lobe laterally as well.   Impression     4.1 x 2.4 cm right middle lobe mass has decreased in size compared to 1/5/17.  However, it is difficult to assess how much of this may be related to adjacent atelectasis noting that this mass obliterates the lateral segmental right middle lobe bronchus.    Extensive reticular and nodular opacities in the right lower lobe are probably in part related to radiation change.  However, several areas of nodularity involving pleural margins raise the question of lymphangitic carcinomatosis.  At least one medial basal segment right lower lobe nodule has increased in size.    Stable left adenoma and nonspecific hepatic hypodensities.       6/28/17 MRI brain    3 mm nodular focus of enhancement along the posterior falx cerebri abutting the right superior parietal lobule, which appears new from prior MRI.  While findings may represent developing dural calcification/ossification, a small meningioma or dural metastasis cannot be excluded. Short-term followup recommended.    Brain appears within normal limits. No new parenchymal enhancement        Assessment:      1. Primary cancer of right middle lobe of lung    2. Mediastinal adenopathy        Plan:    1. Mr. Torres has progressive disease. His case was also discussed in thoracic tumor board.  Combination treatment with carboplatin, pemetrexed and pembrolizumab was recommended.  Written information about this regimen has been previously provided.  He is agreeable to proceed and has signed informed consent.  He has received  B12 injection. He will  continue on with folic acid. MRI of brain on 6/28/17 showed a nodular focus of enhancement along the posterior falx cerebri abutting the right superior parietal lobule .  Follow up before cycle 2.  Multiple questions were answered and he is agreeable with this plan.    2. Abnormal brain MRI: He does not have any focal neurological signs/symptoms. He will be referred to radiation oncology

## 2017-07-18 NOTE — TELEPHONE ENCOUNTER
Spoke with patient's wife. Informed her patient's PETscan situation is still not sorted out--and petscan is denied as of right now.  Wife states she wants to proceed with PETscan and will have [facility] bill [facility] after the fact.  Pre-services notified.

## 2017-07-18 NOTE — TELEPHONE ENCOUNTER
Spoke with patient's wife. Patient's insurance is not entered properly---truly, patient's primary insurance is [employer], secondary is medicaid (so PET should be approved---but the insurance is entered improperly). Wife states she is still trying to sort this out with the financial department---so petscan for tomorrow will probably be cancelled.  Wife is wondering if patient will be able to get treatment if labs permit, even without scans.   Nurse informed patient she would ask MD covering for Dr. Fernandez and would contact her back.  Wife voiced understanding.    Message routed to dr aguilar

## 2017-07-18 NOTE — TELEPHONE ENCOUNTER
Informed wife patient will need PETscan results prior to having next treatment.  Wife will try to sort out financial issues--and will stay in touch with nurse--so patient's schedule can be coordinated accordingly.  Wife voiced understanding.

## 2017-07-18 NOTE — TELEPHONE ENCOUNTER
----- Message from Hayde Scott sent at 7/17/2017  3:20 PM CDT -----  Contact: Pt's Wife Mrs Torres [phone]  Pt missed a call from the Dr's Office and would like a return call from the nurse.    Pt can be reached at [phone].    Thanks

## 2017-07-19 ENCOUNTER — HOSPITAL ENCOUNTER (OUTPATIENT)
Dept: RADIOLOGY | Facility: HOSPITAL | Age: 62
Discharge: HOME | End: 2017-07-19
Attending: INTERNAL MEDICINE
Payer: COMMERCIAL

## 2017-07-19 ENCOUNTER — OFFICE VISIT (OUTPATIENT)
Dept: HEMATOLOGY/ONCOLOGY | Facility: CLINIC | Age: 62
End: 2017-07-19
Payer: MEDICAID

## 2017-07-19 VITALS
TEMPERATURE: 98 F | RESPIRATION RATE: 18 BRPM | OXYGEN SATURATION: 94 % | HEIGHT: 77 IN | BODY MASS INDEX: 23.76 KG/M2 | WEIGHT: 201.25 LBS | DIASTOLIC BLOOD PRESSURE: 76 MMHG | HEART RATE: 97 BPM | SYSTOLIC BLOOD PRESSURE: 121 MMHG

## 2017-07-19 DIAGNOSIS — C34.2 PRIMARY CANCER OF RIGHT MIDDLE LOBE OF LUNG: ICD-10-CM

## 2017-07-19 DIAGNOSIS — C34.80 MALIGNANT NEOPLASM OF OVERLAPPING SITES OF LUNG, UNSPECIFIED LATERALITY: Primary | ICD-10-CM

## 2017-07-19 DIAGNOSIS — R59.0 MEDIASTINAL ADENOPATHY: ICD-10-CM

## 2017-07-19 PROCEDURE — 78815 PET IMAGE W/CT SKULL-THIGH: CPT | Mod: 26,PS,, | Performed by: NUCLEAR MEDICINE

## 2017-07-19 PROCEDURE — 99999 PR PBB SHADOW E&M-EST. PATIENT-LVL IV: CPT | Mod: PBBFAC,,, | Performed by: INTERNAL MEDICINE

## 2017-07-19 PROCEDURE — A9552 F18 FDG: HCPCS

## 2017-07-19 PROCEDURE — 99213 OFFICE O/P EST LOW 20 MIN: CPT | Mod: S$PBB,,, | Performed by: INTERNAL MEDICINE

## 2017-07-20 ENCOUNTER — INFUSION (OUTPATIENT)
Dept: INFUSION THERAPY | Facility: HOSPITAL | Age: 62
End: 2017-07-20
Attending: INTERNAL MEDICINE
Payer: COMMERCIAL

## 2017-07-20 VITALS
RESPIRATION RATE: 20 BRPM | DIASTOLIC BLOOD PRESSURE: 77 MMHG | SYSTOLIC BLOOD PRESSURE: 144 MMHG | WEIGHT: 201.75 LBS | BODY MASS INDEX: 23.82 KG/M2 | HEART RATE: 105 BPM | TEMPERATURE: 98 F | HEIGHT: 77 IN

## 2017-07-20 DIAGNOSIS — C34.2 PRIMARY CANCER OF RIGHT MIDDLE LOBE OF LUNG: Primary | ICD-10-CM

## 2017-07-20 DIAGNOSIS — R59.0 MEDIASTINAL ADENOPATHY: ICD-10-CM

## 2017-07-20 PROCEDURE — 96411 CHEMO IV PUSH ADDL DRUG: CPT

## 2017-07-20 PROCEDURE — 96417 CHEMO IV INFUS EACH ADDL SEQ: CPT

## 2017-07-20 PROCEDURE — 96413 CHEMO IV INFUSION 1 HR: CPT

## 2017-07-20 PROCEDURE — 63600175 PHARM REV CODE 636 W HCPCS: Performed by: INTERNAL MEDICINE

## 2017-07-20 PROCEDURE — 96367 TX/PROPH/DG ADDL SEQ IV INF: CPT

## 2017-07-20 PROCEDURE — 25000003 PHARM REV CODE 250: Performed by: INTERNAL MEDICINE

## 2017-07-20 RX ORDER — HEPARIN 100 UNIT/ML
500 SYRINGE INTRAVENOUS
Status: DISCONTINUED | OUTPATIENT
Start: 2017-07-20 | End: 2017-07-20 | Stop reason: HOSPADM

## 2017-07-20 RX ORDER — SODIUM CHLORIDE 0.9 % (FLUSH) 0.9 %
10 SYRINGE (ML) INJECTION
Status: DISCONTINUED | OUTPATIENT
Start: 2017-07-20 | End: 2017-07-20 | Stop reason: HOSPADM

## 2017-07-20 RX ADMIN — CARBOPLATIN 540 MG: 10 INJECTION, SOLUTION INTRAVENOUS at 10:07

## 2017-07-20 RX ADMIN — SODIUM CHLORIDE 150 MG: 9 INJECTION, SOLUTION INTRAVENOUS at 09:07

## 2017-07-20 RX ADMIN — SODIUM CHLORIDE: 0.9 INJECTION, SOLUTION INTRAVENOUS at 08:07

## 2017-07-20 RX ADMIN — SODIUM CHLORIDE 200 MG: 9 INJECTION, SOLUTION INTRAVENOUS at 08:07

## 2017-07-20 RX ADMIN — DEXAMETHASONE SODIUM PHOSPHATE: 4 INJECTION, SOLUTION INTRAMUSCULAR; INTRAVENOUS at 09:07

## 2017-07-20 RX ADMIN — SODIUM CHLORIDE 1100 MG: 9 INJECTION, SOLUTION INTRAVENOUS at 09:07

## 2017-07-20 NOTE — PLAN OF CARE
Problem: Patient Care Overview (Adult)  Goal: Plan of Care Review  Outcome: Ongoing (interventions implemented as appropriate)  Pt educated on side effects of various meds and to call MD for N/V, diarrhea, fever greater than 100.4, SOB, and any other problems. Pt received Keytruda, Alimta, and Carbo with no complications. VSS. AVS given to patient and patient discharged home.

## 2017-07-26 ENCOUNTER — OFFICE VISIT (OUTPATIENT)
Dept: RADIATION ONCOLOGY | Facility: CLINIC | Age: 62
End: 2017-07-26
Payer: MEDICAID

## 2017-07-26 ENCOUNTER — OFFICE VISIT (OUTPATIENT)
Dept: HEMATOLOGY/ONCOLOGY | Facility: CLINIC | Age: 62
End: 2017-07-26
Payer: COMMERCIAL

## 2017-07-26 VITALS
OXYGEN SATURATION: 100 % | TEMPERATURE: 98 F | WEIGHT: 200.63 LBS | SYSTOLIC BLOOD PRESSURE: 124 MMHG | BODY MASS INDEX: 23.79 KG/M2 | RESPIRATION RATE: 18 BRPM | DIASTOLIC BLOOD PRESSURE: 74 MMHG | HEART RATE: 108 BPM

## 2017-07-26 VITALS
RESPIRATION RATE: 16 BRPM | BODY MASS INDEX: 23.62 KG/M2 | HEIGHT: 77 IN | SYSTOLIC BLOOD PRESSURE: 111 MMHG | HEART RATE: 98 BPM | WEIGHT: 200 LBS | DIASTOLIC BLOOD PRESSURE: 78 MMHG | TEMPERATURE: 97 F

## 2017-07-26 DIAGNOSIS — C79.51 SECONDARY ADENOCARCINOMA OF BONE: ICD-10-CM

## 2017-07-26 DIAGNOSIS — Z09 CHEMOTHERAPY FOLLOW-UP EXAMINATION: ICD-10-CM

## 2017-07-26 DIAGNOSIS — C34.2 PRIMARY CANCER OF RIGHT MIDDLE LOBE OF LUNG: Primary | ICD-10-CM

## 2017-07-26 PROCEDURE — 99999 PR PBB SHADOW E&M-EST. PATIENT-LVL III: CPT | Mod: PBBFAC,,, | Performed by: INTERNAL MEDICINE

## 2017-07-26 PROCEDURE — 99213 OFFICE O/P EST LOW 20 MIN: CPT | Mod: S$PBB,,, | Performed by: RADIOLOGY

## 2017-07-26 PROCEDURE — 99999 PR PBB SHADOW E&M-EST. PATIENT-LVL III: CPT | Mod: PBBFAC,,, | Performed by: RADIOLOGY

## 2017-07-26 PROCEDURE — 99214 OFFICE O/P EST MOD 30 MIN: CPT | Mod: S$GLB,,, | Performed by: INTERNAL MEDICINE

## 2017-07-26 RX ORDER — SODIUM CHLORIDE 0.9 % (FLUSH) 0.9 %
10 SYRINGE (ML) INJECTION
Status: CANCELLED | OUTPATIENT
Start: 2017-08-09

## 2017-07-26 RX ORDER — HEPARIN 100 UNIT/ML
500 SYRINGE INTRAVENOUS
Status: CANCELLED | OUTPATIENT
Start: 2017-08-09

## 2017-07-26 NOTE — Clinical Note
Return on August 30 for chemotherapy and office visits.  He will need a CT prior to this. He gets chemotherapy also in another 2 weeks, I will not need to see him but he should have lab work done.

## 2017-07-26 NOTE — PROGRESS NOTES
Mr Torres is a 61 years old man treated with radiation and concurrent chemotherapy for a stage [id number] (IIIb) adenocarcinoma of the right middle lobe of the lung ending 9/30/16.     He is without complaint. He continues to work driving a truck.     PET/CT on 7/19/17 shows multiple metastatic sites in the skeleton not previously known to be present. In particular, in the right side of T1, in the left iliac at the SI joint, in the left iliac wing, in the right first rib and in the left 2nd rib or left axilla.     MRI of the brain on 6/28/17 shows a 3 mm enhancing nodule in the posterior parafalcine parietal lobe of the brain. I could only identify it with the help of the radiologist. There is no associated edema.     PHYSICAL EXAM: There are no evident neurologic deficits.     IMPRESSION: The brain lesion is new since 7/18/16 scan and is possibly a metastatic site.  It and the bone metastasis are asymptomatic.     PLAN: Will repeat MRI of the brain in 2 months and see patient after the scan.

## 2017-07-26 NOTE — PROGRESS NOTES
PATIENT: Angel Torres  MRN: [id number]  DATE: 7/26/2017      Diagnosis:   1. Primary cancer of right middle lobe of lung    2. Secondary adenocarcinoma of bone    3. Chemotherapy follow-up examination        Chief Complaint: No chief complaint on file.      Oncologic History:      Oncologic History Non-small cell lung cancer diagnosed 6/2016   Recurrent disease to lung 4/2017     Oncologic Treatment Cisplatin/Pemetrexed initiated 8/15/16 with concurrent radiation completed 3 cycles 10/3/16; 64 Gy  Carboplatin/pemetrexed/pembrolizumab 7/2017    Pathology Adenocarcinoma, T2b, N2, M0, Stage IIIA          Subjective:    Interval History: Mr. Torres is a 62 y.o. male who was seen in follow-up for lung cancer.  Since I had seen him last he is started on chemotherapy.  He is tolerated this well but did have some nausea.  He states he is more active and is no longer short of breath.  He is lifting weights.  He is without other new complaints.    His history dates to approximately 4/2016 when he noted a worsening cough which was associated with clear sputum.  He had a chest x-ray in 5/16 showing a right middle lobe opacity.  Subsequent CT of the chest was performed on 5/30/16 showing a 5.6 cm lesion in the right middle lobe with hilar and mediastinal lymphadenopathy along with hepatic lesions and a left adrenal mass..  He underwent bronchoscopy which was nonrevealing and transbronchial biopsies were nondiagnostic.  He then underwent EBUS with biopsy of mediastinal lymph nodes with pathology showing non-small cell lung cancer consistent with adenocarcinoma.  He was started on concurrent chemotherapy and radiation with cisplatin and pemetrexed on 8/15/16.  He completed 64 Gy of concurrent radiation with cisplatin and pemetrexed with chemotherapy completed on 10/3/16.  Scans in 4/2017 had indicated progressive disease in the lung.  Subsequent PET scan in 7/2017 had shown spread of disease to bone.  He was started on carboplatin  and pemetrexed and pembrolizumab in 7/2017.    Past Medical History:   Past Medical History:   Diagnosis Date    Chemotherapy follow-up examination 9/7/2016    COPD (chronic obstructive pulmonary disease)     Hearing loss     Hypertension 2/9/2015    Primary cancer of right middle lobe of lung 7/11/2016    Rash 9/7/2016    Secondary adenocarcinoma of bone 7/26/2017       Past Surgical HIstory:   Past Surgical History:   Procedure Laterality Date    INGUINAL HERNIA REPAIR Bilateral     KNEE SURGERY         Family History:   Family History   Problem Relation Age of Onset    Cancer Mother      lung, breast    Cancer Sister      lung    Cancer Maternal Grandfather     No Known Problems Father     No Known Problems Brother     No Known Problems Maternal Aunt     No Known Problems Maternal Uncle     No Known Problems Paternal Aunt     No Known Problems Paternal Uncle     No Known Problems Maternal Grandmother     No Known Problems Paternal Grandmother     No Known Problems Paternal Grandfather     Amblyopia Neg Hx     Blindness Neg Hx     Cataracts Neg Hx     Diabetes Neg Hx     Glaucoma Neg Hx     Hypertension Neg Hx     Macular degeneration Neg Hx     Retinal detachment Neg Hx     Strabismus Neg Hx     Stroke Neg Hx     Thyroid disease Neg Hx        Social History:  reports that he quit smoking about 3 years ago. He has a 80.00 pack-year smoking history. He does not have any smokeless tobacco history on file. He reports that he does not drink alcohol or use drugs.    Allergies:  Review of patient's allergies indicates:  No Known Allergies    Medications:  Current Outpatient Prescriptions   Medication Sig Dispense Refill    albuterol 90 mcg/actuation inhaler Inhale 2 puffs into the lungs every 6 (six) hours as needed for Wheezing. 18 g 0    albuterol-ipratropium 2.5mg-0.5mg/3mL (DUO-NEB) 0.5 mg-3 mg(2.5 mg base)/3 mL nebulizer solution Take 3 mLs by nebulization every 6 (six) hours as  needed for Wheezing or Shortness of Breath. 100 vial 1    amlodipine (NORVASC) 10 MG tablet Take 1 tablet (10 mg total) by mouth once daily. 90 tablet 3    cetirizine (ZYRTEC) 10 MG tablet Take 1 tablet (10 mg total) by mouth once daily.  0    dexamethasone (DECADRON) 4 MG Tab Take 1 tablet (4 mg total) by mouth every 12 (twelve) hours. Take 1 tab twice daily starting the day prior to chemotherapy for 3 days. 24 tablet 1    docusate sodium (COLACE) 100 MG capsule Take 1 capsule (100 mg total) by mouth 2 (two) times daily as needed for Constipation. 60 capsule 0    fluticasone (FLONASE) 50 mcg/actuation nasal spray SHAKE LIQUID AND USE 2 SPRAYS IN EACH NOSTRIL EVERY DAY 16 g 1    folic acid (FOLVITE) 400 MCG tablet Take 1 tablet (400 mcg total) by mouth once daily. 100 tablet 3    hydrOXYzine HCl (ATARAX) 25 MG tablet Take 1 tablet (25 mg total) by mouth 3 (three) times daily as needed for Itching. 20 tablet 0    naproxen (NAPROSYN) 500 MG tablet Take 1 tablet (500 mg total) by mouth 2 (two) times daily as needed (headache). 60 tablet 0     Current Facility-Administered Medications   Medication Dose Route Frequency Provider Last Rate Last Dose    cyanocobalamin injection 1,000 mcg  1,000 mcg Intramuscular 1 time in Clinic/HOD Dano Fernandez DO, FACP           Review of Systems   Constitutional: Negative for appetite change, chills, fatigue, fever and unexpected weight change.        Normal weight 220   HENT: Negative for dental problem, sinus pressure, sneezing and trouble swallowing.    Eyes: Negative for visual disturbance.   Respiratory: Negative for cough, choking, chest tightness, shortness of breath and wheezing.    Cardiovascular: Negative for chest pain and leg swelling.   Gastrointestinal: Positive for nausea. Negative for abdominal pain, blood in stool, constipation and diarrhea.   Genitourinary: Negative for difficulty urinating and dysuria.   Musculoskeletal: Negative for arthralgias and back  pain.   Skin: Negative for rash and wound.   Neurological: Negative for dizziness, light-headedness and headaches.   Hematological: Negative for adenopathy. Does not bruise/bleed easily.   Psychiatric/Behavioral: Negative for sleep disturbance. The patient is not nervous/anxious.        ECOG Performance Status: 1   Objective:      Vitals:   Vitals:    07/26/17 1019   BP: 124/74   BP Location: Right arm   Patient Position: Sitting   BP Method: Automatic   Pulse: 108   Resp: 18   Temp: 98 °F (36.7 °C)   TempSrc: Oral   SpO2: 100%   Weight: 91 kg (200 lb 9.9 oz)     BMI: Body mass index is 23.79 kg/m².    Physical Exam   Constitutional: He is oriented to person, place, and time. He appears well-developed and well-nourished.   HENT:   Head: Normocephalic and atraumatic.   Eyes: Pupils are equal, round, and reactive to light.   Neck: Normal range of motion. Neck supple.   Cardiovascular: Normal rate and regular rhythm.    Pulmonary/Chest: Effort normal. No respiratory distress. He has no rales.   Abdominal: Soft. He exhibits no distension. There is no tenderness.   Musculoskeletal: He exhibits no edema or tenderness.   Lymphadenopathy:     He has no cervical adenopathy.   Neurological: He is alert and oriented to person, place, and time. No cranial nerve deficit.   Skin: Skin is warm and dry. No rash noted.   Psychiatric: He has a normal mood and affect. His behavior is normal.       Laboratory Data:  Lab Visit on 07/26/2017   Component Date Value Ref Range Status    WBC 07/26/2017 5.86  3.90 - 12.70 K/uL Final    RBC 07/26/2017 4.57* 4.60 - 6.20 M/uL Final    Hemoglobin 07/26/2017 13.0* 14.0 - 18.0 g/dL Final    Hematocrit 07/26/2017 38.2* 40.0 - 54.0 % Final    MCV 07/26/2017 84  82 - 98 fL Final    MCH 07/26/2017 28.4  27.0 - 31.0 pg Final    MCHC 07/26/2017 34.0  32.0 - 36.0 g/dL Final    RDW 07/26/2017 14.4  11.5 - 14.5 % Final    Platelets 07/26/2017 292  150 - 350 K/uL Final    MPV 07/26/2017 9.2  9.2 -  12.9 fL Final    Gran # 07/26/2017 4.5  1.8 - 7.7 K/uL Final    Comment: The ANC is based on a white cell differential from an   automated cell counter. It has not been microscopically   reviewed for the presence of abnormal cells. Clinical   correlation is required.      Sodium 07/26/2017 138  136 - 145 mmol/L Final    Potassium 07/26/2017 4.3  3.5 - 5.1 mmol/L Final    Chloride 07/26/2017 104  95 - 110 mmol/L Final    CO2 07/26/2017 28  23 - 29 mmol/L Final    Glucose 07/26/2017 84  70 - 110 mg/dL Final    BUN, Bld 07/26/2017 17  8 - 23 mg/dL Final    Creatinine 07/26/2017 1.1  0.5 - 1.4 mg/dL Final    Calcium 07/26/2017 9.3  8.7 - 10.5 mg/dL Final    Total Protein 07/26/2017 7.3  6.0 - 8.4 g/dL Final    Albumin 07/26/2017 3.6  3.5 - 5.2 g/dL Final    Total Bilirubin 07/26/2017 0.5  0.1 - 1.0 mg/dL Final    Comment: For infants and newborns, interpretation of results should be based  on gestational age, weight and in agreement with clinical  observations.  Premature Infant recommended reference ranges:  Up to 24 hours.............<8.0 mg/dL  Up to 48 hours............<12.0 mg/dL  3-5 days..................<15.0 mg/dL  6-29 days.................<15.0 mg/dL      Alkaline Phosphatase 07/26/2017 56  55 - 135 U/L Final    AST 07/26/2017 17  10 - 40 U/L Final    ALT 07/26/2017 24  10 - 44 U/L Final    Anion Gap 07/26/2017 6* 8 - 16 mmol/L Final    eGFR if African American 07/26/2017 >60.0  >60 mL/min/1.73 m^2 Final    eGFR if non African American 07/26/2017 >60.0  >60 mL/min/1.73 m^2 Final    Comment: Calculation used to obtain the estimated glomerular filtration  rate (eGFR) is the CKD-EPI equation. Since race is unknown   in our information system, the eGFR values for   -American and Non--American patients are given   for each creatinine result.      Magnesium 07/26/2017 2.2  1.6 - 2.6 mg/dL Final   Lab Visit on 07/19/2017   Component Date Value Ref Range Status    WBC 07/19/2017 5.63   3.90 - 12.70 K/uL Final    RBC 07/19/2017 4.60  4.60 - 6.20 M/uL Final    Hemoglobin 07/19/2017 12.8* 14.0 - 18.0 g/dL Final    Hematocrit 07/19/2017 38.3* 40.0 - 54.0 % Final    MCV 07/19/2017 83  82 - 98 fL Final    MCH 07/19/2017 27.8  27.0 - 31.0 pg Final    MCHC 07/19/2017 33.4  32.0 - 36.0 % Final    RDW 07/19/2017 14.6* 11.5 - 14.5 % Final    Platelets 07/19/2017 268  150 - 350 K/uL Final    MPV 07/19/2017 9.4  9.2 - 12.9 fL Final    Gran # 07/19/2017 3.8  1.8 - 7.7 K/uL Final    Comment: The ANC is based on a white cell differential from an   automated cell counter. It has not been microscopically   reviewed for the presence of abnormal cells. Clinical   correlation is required.      Sodium 07/19/2017 139  136 - 145 mmol/L Final    Potassium 07/19/2017 3.9  3.5 - 5.1 mmol/L Final    Chloride 07/19/2017 108  95 - 110 mmol/L Final    CO2 07/19/2017 21* 23 - 29 mmol/L Final    Glucose 07/19/2017 88  70 - 110 mg/dL Final    BUN, Bld 07/19/2017 14  8 - 23 mg/dL Final    Creatinine 07/19/2017 1.2  0.5 - 1.4 mg/dL Final    Calcium 07/19/2017 9.1  8.7 - 10.5 mg/dL Final    Total Protein 07/19/2017 7.2  6.0 - 8.4 g/dL Final    Albumin 07/19/2017 3.7  3.5 - 5.2 g/dL Final    Total Bilirubin 07/19/2017 0.7  0.1 - 1.0 mg/dL Final    Comment: For infants and newborns, interpretation of results should be based  on gestational age, weight and in agreement with clinical  observations.  Premature Infant recommended reference ranges:  Up to 24 hours.............<8.0 mg/dL  Up to 48 hours............<12.0 mg/dL  3-5 days..................<15.0 mg/dL  6-29 days.................<15.0 mg/dL      Alkaline Phosphatase 07/19/2017 59  55 - 135 U/L Final    AST 07/19/2017 20  10 - 40 U/L Final    ALT 07/19/2017 19  10 - 44 U/L Final    Anion Gap 07/19/2017 10  8 - 16 mmol/L Final    eGFR if African American 07/19/2017 >60.0  >60 mL/min/1.73 m^2 Final    eGFR if non African American 07/19/2017 >60.0  >60  mL/min/1.73 m^2 Final    Comment: Calculation used to obtain the estimated glomerular filtration  rate (eGFR) is the CKD-EPI equation. Since race is unknown   in our information system, the eGFR values for   -American and Non--American patients are given   for each creatinine result.      Magnesium 07/19/2017 1.9  1.6 - 2.6 mg/dL Final     Supplemental Diagnosis 6/30/16  Immunohistochemical stains are completed on the Station 4L lymph node cell block material and reveal the tumor  cells to stain positively with cytokeratin 7 and TTF-1. The tumor cells show nonspecific blush staining with  cytokeratin 5/6 and are negative for p63. This staining profile supports a diagnosis of non-small cell carcinoma  consistent with adenocarcinoma.  Cell block material will be sent for EGFR, ALK and ROS-1 testing and results will follow in a supplemental report.  (Electronically Signed: 2016-06-30 11:50:31 )  Diagnosed by: Irene Amaro M.D.  FINAL PATHOLOGIC DIAGNOSIS  1. Lymph node, Station 4L, EBUS guided fine needle aspiration:  Positive for malignant cells, non-small cell carcinoma.  Rare background lymphocytes are present.  Immunohistochemical staining be completed to further characterize this malignancy and results will follow in a  supplemental report.  2. Lymph node, Station 7, EBUS guided fine needle aspiration:  Positive for malignant cells, non-small cell carcinoma tumor identical to that seen in specimen #1.  Background lymphocytes are present.    Imaging:   PET/CT 7/19/17  FDG PET-CT IMAGING :     History: Right middle lobe lung cancer    Comparison: 1/9/2017    Technique:   The fasting blood glucose level was 123 mg/dl. 14.6 mCi of F18-FDG was administered intravenously in the right arm. After an approximately 60 min distribution time, PET/CT images were acquired from the skull base to the mid thigh. Transmission images were acquired to correct for attenuation using a whole body low-dose CT scan  without contrast with the arms positioned above the patient's head.     Findings:    Quality of the study: Adequate.     Physiologic uptake of the tracer within the brain, salivary glands, myocardium, GI, and  tracts is seen.     There has been interval decreased FDG uptake in the right lower lobe infiltrate with a maximum SUV of 2.4 on today's exam, compared to 5.9 on prior study.  The previously identified subcarinal and right paratracheal lymph nodes are similar to background.    Interval development of multiple areas of hypermetabolic activity in the osseous structures, namely in the T1 vertebral body with a maximum SUV of 6.0 and left iliac bone with a maximum SUV of 5.7.  Focal area of increased FDG avidity at the right 8th costochondral junction.  While this finding may represent an osseous metastasis, underlying fracture cannot be excluded.    Diffuse right ventricular muscular uptake, likely reflective of right heart strain.    Incidental CT findings:  The pulmonary parenchyma demonstrates centrilobular and paraseptal emphysematous changes.  Fibrotic changes are seen in the right lower lobe, likely related to prior radiation therapy.  Multiple subcentimeter pulmonary nodules seen in the right upper lobe and right lower lobe, below the threshold for FDG uptake.  Focal hypodensities in the right hepatic lobe, similar to prior exam which may reflect hepatic cysts.   Impression         In this patient with a known history of lung cancer, PET/CT findings compatible with progression of disease with new osseous metastasis.    Probable right eighth rib fracture.    Multiple subcentimeter pulmonary nodules, below the threshold for FDG uptake.                      Assessment:       1. Primary cancer of right middle lobe of lung    2. Secondary adenocarcinoma of bone    3. Chemotherapy follow-up examination           Plan:     Mr. Torres is doing better clinically and tolerating chemotherapy.  It is noted that he has  developed bone metastasis and have recommended we initiate zoledronic acid.  Have discussed the rationale and potential side effects of this treatment with him and he is agreeable to proceed.  Plan to initiate this with his next treatment.  He will need lab work before his next chemotherapy and I will plan to repeat a CT prior to cycle 3.  All questions were answered and he is agreeable with this plan.    Dano Fernandez DO, [facility]  Hematology & Oncology  [facility] [place] [address]  ph. [phone]  Fax. [phone]    25 minutes were spent in coordination of patient's care, record review and counseling.  More than 50% of the time was face-to-face.

## 2017-07-28 DIAGNOSIS — C79.31 METASTASIS TO BRAIN: Primary | ICD-10-CM

## 2017-08-02 DIAGNOSIS — J44.9 CHRONIC OBSTRUCTIVE PULMONARY DISEASE, UNSPECIFIED COPD TYPE: ICD-10-CM

## 2017-08-02 NOTE — TELEPHONE ENCOUNTER
----- Message from Ly Lugo sent at 8/2/2017  1:03 PM CDT -----  Contact: wife  Pt needs medication for breathing machine refilled.. Please call [phone].. Thanks   Pt is currently out..     albuterol-ipratropium 2.5mg-0.5mg/3mL (DUO-NEB) 0.5 mg-3 mg(2.5 mg base)/3 mL nebulizer solution

## 2017-08-03 RX ORDER — IPRATROPIUM BROMIDE AND ALBUTEROL SULFATE 2.5; .5 MG/3ML; MG/3ML
3 SOLUTION RESPIRATORY (INHALATION) EVERY 6 HOURS PRN
Qty: 100 VIAL | Refills: 1 | Status: SHIPPED | OUTPATIENT
Start: 2017-08-03 | End: 2018-05-25 | Stop reason: SDUPTHER

## 2017-08-06 DIAGNOSIS — J30.9 ALLERGIC RHINITIS: ICD-10-CM

## 2017-08-06 RX ORDER — FLUTICASONE PROPIONATE 50 MCG
SPRAY, SUSPENSION (ML) NASAL
Qty: 16 G | Refills: 1 | Status: SHIPPED | OUTPATIENT
Start: 2017-08-06

## 2017-08-07 ENCOUNTER — PATIENT MESSAGE (OUTPATIENT)
Dept: HEMATOLOGY/ONCOLOGY | Facility: CLINIC | Age: 62
End: 2017-08-07

## 2017-08-07 ENCOUNTER — TELEPHONE (OUTPATIENT)
Dept: HEMATOLOGY/ONCOLOGY | Facility: CLINIC | Age: 62
End: 2017-08-07

## 2017-08-07 NOTE — TELEPHONE ENCOUNTER
----- Message from Mel Brizuela sent at 8/7/2017  1:42 PM CDT -----  Contact: Spouse  Would like to know if patient chemo appt can be scheduled on Wednesday?     Call [phone]

## 2017-08-07 NOTE — TELEPHONE ENCOUNTER
Spoke to patient's wife and let her know that Dr. Fernandze is OK for Mr. Torres to have chemo on Wed as long as his labs are OK.   made aware.

## 2017-08-07 NOTE — TELEPHONE ENCOUNTER
----- Message from Yola Castro sent at 8/7/2017 11:54 AM CDT -----  Contact: wife  Patient's wife needs to speak with the nurse to reschedule her 's Chemo to Wednesday as she brings him & that's the only day she can come.  Mrs Torres stated that she left a message on last Thursday & Friday but no one returned her call to take of this.  Please call wife to reschedule @ [phone]

## 2017-08-08 NOTE — TELEPHONE ENCOUNTER
----- Message from Mel Brizuela sent at 8/8/2017 10:39 AM CDT -----  Contact: Spouse  Would like someone to call her regarding patient cancer.    She has a few questions, and concerns. She would like a call today. States he is having severe headaches for three days.     Call [phone]

## 2017-08-08 NOTE — TELEPHONE ENCOUNTER
Patient's wife reports he is having sever headaches. They want to know if there is anything you can prescribe for it.

## 2017-08-08 NOTE — TELEPHONE ENCOUNTER
----- Message from Breana May sent at 8/8/2017  9:57 AM CDT -----  Contact: Pt's wife Diamond Fields would like for the nurse, Michael, to give her a call back regarding pt's chemo.        Please call Diamond at [phone].        Thanks!

## 2017-08-09 ENCOUNTER — INFUSION (OUTPATIENT)
Dept: INFUSION THERAPY | Facility: HOSPITAL | Age: 62
End: 2017-08-09
Attending: INTERNAL MEDICINE
Payer: COMMERCIAL

## 2017-08-09 VITALS
WEIGHT: 199.31 LBS | DIASTOLIC BLOOD PRESSURE: 71 MMHG | BODY MASS INDEX: 23.53 KG/M2 | HEART RATE: 99 BPM | HEIGHT: 77 IN | TEMPERATURE: 98 F | RESPIRATION RATE: 20 BRPM | SYSTOLIC BLOOD PRESSURE: 119 MMHG

## 2017-08-09 DIAGNOSIS — C79.51 SECONDARY ADENOCARCINOMA OF BONE: Primary | ICD-10-CM

## 2017-08-09 DIAGNOSIS — C34.2 PRIMARY CANCER OF RIGHT MIDDLE LOBE OF LUNG: ICD-10-CM

## 2017-08-09 DIAGNOSIS — R59.0 MEDIASTINAL ADENOPATHY: ICD-10-CM

## 2017-08-09 PROCEDURE — 96411 CHEMO IV PUSH ADDL DRUG: CPT

## 2017-08-09 PROCEDURE — 96367 TX/PROPH/DG ADDL SEQ IV INF: CPT

## 2017-08-09 PROCEDURE — 63600175 PHARM REV CODE 636 W HCPCS: Performed by: INTERNAL MEDICINE

## 2017-08-09 PROCEDURE — 25000003 PHARM REV CODE 250: Performed by: INTERNAL MEDICINE

## 2017-08-09 PROCEDURE — 96413 CHEMO IV INFUSION 1 HR: CPT

## 2017-08-09 PROCEDURE — 96417 CHEMO IV INFUS EACH ADDL SEQ: CPT

## 2017-08-09 RX ORDER — SODIUM CHLORIDE 0.9 % (FLUSH) 0.9 %
10 SYRINGE (ML) INJECTION
Status: DISCONTINUED | OUTPATIENT
Start: 2017-08-09 | End: 2017-08-09 | Stop reason: HOSPADM

## 2017-08-09 RX ORDER — HEPARIN 100 UNIT/ML
500 SYRINGE INTRAVENOUS
Status: DISCONTINUED | OUTPATIENT
Start: 2017-08-09 | End: 2017-08-09 | Stop reason: HOSPADM

## 2017-08-09 RX ORDER — HEPARIN 100 UNIT/ML
500 SYRINGE INTRAVENOUS
Status: CANCELLED | OUTPATIENT
Start: 2017-08-09

## 2017-08-09 RX ORDER — SODIUM CHLORIDE 0.9 % (FLUSH) 0.9 %
10 SYRINGE (ML) INJECTION
Status: CANCELLED | OUTPATIENT
Start: 2017-08-09

## 2017-08-09 RX ADMIN — CARBOPLATIN 540 MG: 10 INJECTION, SOLUTION INTRAVENOUS at 04:08

## 2017-08-09 RX ADMIN — SODIUM CHLORIDE 4 MG: 9 INJECTION, SOLUTION INTRAVENOUS at 02:08

## 2017-08-09 RX ADMIN — SODIUM CHLORIDE 200 MG: 9 INJECTION, SOLUTION INTRAVENOUS at 04:08

## 2017-08-09 RX ADMIN — SODIUM CHLORIDE: 9 INJECTION, SOLUTION INTRAVENOUS at 02:08

## 2017-08-09 RX ADMIN — SODIUM CHLORIDE 1100 MG: 9 INJECTION, SOLUTION INTRAVENOUS at 03:08

## 2017-08-09 RX ADMIN — SODIUM CHLORIDE 150 MG: 9 INJECTION, SOLUTION INTRAVENOUS at 03:08

## 2017-08-09 RX ADMIN — DEXAMETHASONE SODIUM PHOSPHATE: 4 INJECTION, SOLUTION INTRAMUSCULAR; INTRAVENOUS at 03:08

## 2017-08-09 NOTE — PLAN OF CARE
Problem: Patient Care Overview  Goal: Plan of Care Review  Outcome: Ongoing (interventions implemented as appropriate)  Tolerated treatment well.  Advised to call MD for any problems or concerns.  AVS given.  RTC in 3 weeks.  NAD noted upon discharge.

## 2017-08-10 NOTE — PLAN OF CARE
Problem: Patient Care Overview  Goal: Plan of Care Review  Pt received Zometa, Alimta, and Keytruda with no complications. Carboplatin currently infusing. NAD. Report given to JESUSITA Roberts.

## 2017-08-13 DIAGNOSIS — R59.0 MEDIASTINAL ADENOPATHY: ICD-10-CM

## 2017-08-13 DIAGNOSIS — C34.2 PRIMARY CANCER OF RIGHT MIDDLE LOBE OF LUNG: ICD-10-CM

## 2017-08-13 DIAGNOSIS — J44.9 CHRONIC OBSTRUCTIVE PULMONARY DISEASE, UNSPECIFIED COPD TYPE: ICD-10-CM

## 2017-08-13 RX ORDER — FOLIC ACID 0.4 MG
TABLET ORAL
Qty: 100 TABLET | Refills: 0 | Status: SHIPPED | OUTPATIENT
Start: 2017-08-13 | End: 2019-05-08

## 2017-08-14 ENCOUNTER — PATIENT MESSAGE (OUTPATIENT)
Dept: HEMATOLOGY/ONCOLOGY | Facility: CLINIC | Age: 62
End: 2017-08-14

## 2017-08-16 DIAGNOSIS — R51.9 HEADACHE, UNSPECIFIED HEADACHE TYPE: ICD-10-CM

## 2017-08-16 RX ORDER — NAPROXEN 500 MG/1
500 TABLET ORAL 2 TIMES DAILY PRN
Qty: 60 TABLET | Refills: 0 | Status: SHIPPED | OUTPATIENT
Start: 2017-08-16 | End: 2018-05-25 | Stop reason: SDUPTHER

## 2017-08-16 NOTE — TELEPHONE ENCOUNTER
----- Message from Debbie Gonzáles sent at 8/16/2017  1:34 PM CDT -----  Contact: david-spouse  Pt's spouse is requesting a refill for naproxen (NAPROSYN) 500 MG tablet . [phone]

## 2017-08-25 DIAGNOSIS — Z12.11 COLON CANCER SCREENING: ICD-10-CM

## 2017-08-29 ENCOUNTER — TELEPHONE (OUTPATIENT)
Dept: HEMATOLOGY/ONCOLOGY | Facility: CLINIC | Age: 62
End: 2017-08-29

## 2017-08-29 NOTE — TELEPHONE ENCOUNTER
Let the patient's wife know that I didn't get the forms back from the medical records department but as soon as I did it would be faxed where it needs to go.

## 2017-08-29 NOTE — TELEPHONE ENCOUNTER
----- Message from Nat Lynne sent at 8/29/2017  3:37 PM CDT -----  Contact: spouse  Pt contact [phone]    Pt calling to see if FMLA papers been faxed back?    Please call pt with an update

## 2017-08-30 ENCOUNTER — OFFICE VISIT (OUTPATIENT)
Dept: HEMATOLOGY/ONCOLOGY | Facility: CLINIC | Age: 62
End: 2017-08-30
Payer: COMMERCIAL

## 2017-08-30 ENCOUNTER — HOSPITAL ENCOUNTER (OUTPATIENT)
Dept: RADIOLOGY | Facility: HOSPITAL | Age: 62
Discharge: HOME | End: 2017-08-30
Attending: INTERNAL MEDICINE
Payer: COMMERCIAL

## 2017-08-30 ENCOUNTER — TELEPHONE (OUTPATIENT)
Dept: HEMATOLOGY/ONCOLOGY | Facility: CLINIC | Age: 62
End: 2017-08-30

## 2017-08-30 VITALS
HEART RATE: 107 BPM | SYSTOLIC BLOOD PRESSURE: 132 MMHG | BODY MASS INDEX: 22.89 KG/M2 | WEIGHT: 193 LBS | TEMPERATURE: 98 F | OXYGEN SATURATION: 96 % | DIASTOLIC BLOOD PRESSURE: 71 MMHG | RESPIRATION RATE: 18 BRPM

## 2017-08-30 DIAGNOSIS — C34.2 PRIMARY CANCER OF RIGHT MIDDLE LOBE OF LUNG: Primary | ICD-10-CM

## 2017-08-30 DIAGNOSIS — Z09 CHEMOTHERAPY FOLLOW-UP EXAMINATION: ICD-10-CM

## 2017-08-30 DIAGNOSIS — C34.2 PRIMARY CANCER OF RIGHT MIDDLE LOBE OF LUNG: ICD-10-CM

## 2017-08-30 DIAGNOSIS — C79.51 SECONDARY ADENOCARCINOMA OF BONE: ICD-10-CM

## 2017-08-30 PROCEDURE — 99214 OFFICE O/P EST MOD 30 MIN: CPT | Mod: S$GLB,,, | Performed by: INTERNAL MEDICINE

## 2017-08-30 PROCEDURE — 3075F SYST BP GE 130 - 139MM HG: CPT | Mod: S$GLB,,, | Performed by: INTERNAL MEDICINE

## 2017-08-30 PROCEDURE — 74177 CT ABD & PELVIS W/CONTRAST: CPT | Mod: TC

## 2017-08-30 PROCEDURE — 3008F BODY MASS INDEX DOCD: CPT | Mod: S$GLB,,, | Performed by: INTERNAL MEDICINE

## 2017-08-30 PROCEDURE — 71260 CT THORAX DX C+: CPT | Mod: 26,,, | Performed by: RADIOLOGY

## 2017-08-30 PROCEDURE — 74177 CT ABD & PELVIS W/CONTRAST: CPT | Mod: 26,,, | Performed by: RADIOLOGY

## 2017-08-30 PROCEDURE — 99999 PR PBB SHADOW E&M-EST. PATIENT-LVL III: CPT | Mod: PBBFAC,,, | Performed by: INTERNAL MEDICINE

## 2017-08-30 PROCEDURE — 71260 CT THORAX DX C+: CPT | Mod: TC

## 2017-08-30 PROCEDURE — 99213 OFFICE O/P EST LOW 20 MIN: CPT | Mod: PBBFAC,25 | Performed by: INTERNAL MEDICINE

## 2017-08-30 PROCEDURE — 25500020 PHARM REV CODE 255: Performed by: INTERNAL MEDICINE

## 2017-08-30 PROCEDURE — 3078F DIAST BP <80 MM HG: CPT | Mod: S$GLB,,, | Performed by: INTERNAL MEDICINE

## 2017-08-30 RX ADMIN — IOHEXOL 15 ML: 350 INJECTION, SOLUTION INTRAVENOUS at 02:08

## 2017-08-30 RX ADMIN — IOHEXOL 100 ML: 350 INJECTION, SOLUTION INTRAVENOUS at 05:08

## 2017-08-30 RX ADMIN — IOHEXOL 15 ML: 350 INJECTION, SOLUTION INTRAVENOUS at 03:08

## 2017-08-30 NOTE — PROGRESS NOTES
PATIENT: Angel Torres  MRN: [id number]  DATE: 8/30/2017      Diagnosis:   1. Primary cancer of right middle lobe of lung    2. Secondary adenocarcinoma of bone    3. Chemotherapy follow-up examination        Chief Complaint: Lung Cancer      Oncologic History:      Oncologic History Non-small cell lung cancer diagnosed 6/2016   Recurrent disease to lung 4/2017     Oncologic Treatment Cisplatin/Pemetrexed initiated 8/15/16 with concurrent radiation completed 3 cycles 10/3/16; 64 Gy  Carboplatin/pemetrexed/pembrolizumab 7/2017    Pathology Adenocarcinoma, T2b, N2, M0, Stage IIIA          Subjective:    Interval History: Mr. Torres is a 62 y.o. male who was seen in follow-up for lung cancer.  He has completed 2 cycles of treatment with carboplatin and pemetrexed and pembrolizumab.  Following cycle 2 he developed flulike syndrome with myalgias and fatigue.  He states he has been coughing more and some sinus drainage.  Some mild shortness of breath.  Decreased activity.  He did receive zoledronic acid with cycle 2.    His history dates to approximately 4/2016 when he noted a worsening cough which was associated with clear sputum.  He had a chest x-ray in 5/16 showing a right middle lobe opacity.  Subsequent CT of the chest was performed on 5/30/16 showing a 5.6 cm lesion in the right middle lobe with hilar and mediastinal lymphadenopathy along with hepatic lesions and a left adrenal mass..  He underwent bronchoscopy which was nonrevealing and transbronchial biopsies were nondiagnostic.  He then underwent EBUS with biopsy of mediastinal lymph nodes with pathology showing non-small cell lung cancer consistent with adenocarcinoma.  He was started on concurrent chemotherapy and radiation with cisplatin and pemetrexed on 8/15/16.  He completed 64 Gy of concurrent radiation with cisplatin and pemetrexed with chemotherapy completed on 10/3/16.  Scans in 4/2017 had indicated progressive disease in the lung.  Subsequent PET scan in  7/2017 had shown spread of disease to bone.  He was started on carboplatin and pemetrexed and pembrolizumab in 7/2017.    Past Medical History:   Past Medical History:   Diagnosis Date    Chemotherapy follow-up examination 9/7/2016    COPD (chronic obstructive pulmonary disease)     Hearing loss     Hypertension 2/9/2015    Primary cancer of right middle lobe of lung 7/11/2016    Rash 9/7/2016    Secondary adenocarcinoma of bone 7/26/2017       Past Surgical HIstory:   Past Surgical History:   Procedure Laterality Date    INGUINAL HERNIA REPAIR Bilateral     KNEE SURGERY         Family History:   Family History   Problem Relation Age of Onset    Cancer Mother      lung, breast    Cancer Sister      lung    Cancer Maternal Grandfather     No Known Problems Father     No Known Problems Brother     No Known Problems Maternal Aunt     No Known Problems Maternal Uncle     No Known Problems Paternal Aunt     No Known Problems Paternal Uncle     No Known Problems Maternal Grandmother     No Known Problems Paternal Grandmother     No Known Problems Paternal Grandfather     Amblyopia Neg Hx     Blindness Neg Hx     Cataracts Neg Hx     Diabetes Neg Hx     Glaucoma Neg Hx     Hypertension Neg Hx     Macular degeneration Neg Hx     Retinal detachment Neg Hx     Strabismus Neg Hx     Stroke Neg Hx     Thyroid disease Neg Hx        Social History:  reports that he quit smoking about 3 years ago. He has a 80.00 pack-year smoking history. He does not have any smokeless tobacco history on file. He reports that he does not drink alcohol or use drugs.    Allergies:  Review of patient's allergies indicates:  No Known Allergies    Medications:  Current Outpatient Prescriptions   Medication Sig Dispense Refill    albuterol 90 mcg/actuation inhaler Inhale 2 puffs into the lungs every 6 (six) hours as needed for Wheezing. 18 g 0    albuterol-ipratropium 2.5mg-0.5mg/3mL (DUO-NEB) 0.5 mg-3 mg(2.5 mg base)/3  mL nebulizer solution Take 3 mLs by nebulization every 6 (six) hours as needed for Wheezing or Shortness of Breath. 100 vial 1    amlodipine (NORVASC) 10 MG tablet Take 1 tablet (10 mg total) by mouth once daily. 90 tablet 3    cetirizine (ZYRTEC) 10 MG tablet Take 1 tablet (10 mg total) by mouth once daily.  0    dexamethasone (DECADRON) 4 MG Tab Take 1 tablet (4 mg total) by mouth every 12 (twelve) hours. Take 1 tab twice daily starting the day prior to chemotherapy for 3 days. 24 tablet 1    docusate sodium (COLACE) 100 MG capsule Take 1 capsule (100 mg total) by mouth 2 (two) times daily as needed for Constipation. 60 capsule 0    fluticasone (FLONASE) 50 mcg/actuation nasal spray SHAKE LIQUID AND USE 2 SPRAYS IN EACH NOSTRIL EVERY DAY 16 g 1    folic acid (FOLVITE) 400 MCG tablet TAKE 1 TABLET(400 MCG) BY MOUTH EVERY  tablet 0    hydrOXYzine HCl (ATARAX) 25 MG tablet Take 1 tablet (25 mg total) by mouth 3 (three) times daily as needed for Itching. 20 tablet 0    naproxen (NAPROSYN) 500 MG tablet Take 1 tablet (500 mg total) by mouth 2 (two) times daily as needed (headache). 60 tablet 0     Current Facility-Administered Medications   Medication Dose Route Frequency Provider Last Rate Last Dose    cyanocobalamin injection 1,000 mcg  1,000 mcg Intramuscular 1 time in Clinic/HOD Dano Fernandez DO, FACP           Review of Systems   Constitutional: Positive for activity change and fatigue. Negative for appetite change, chills, fever and unexpected weight change.        Normal weight 220   HENT: Negative for dental problem, sinus pressure, sneezing and trouble swallowing.    Eyes: Negative for visual disturbance.   Respiratory: Positive for cough and shortness of breath. Negative for choking, chest tightness and wheezing.    Cardiovascular: Negative for chest pain and leg swelling.   Gastrointestinal: Positive for nausea. Negative for abdominal pain, blood in stool, constipation and diarrhea.    Genitourinary: Negative for difficulty urinating and dysuria.   Musculoskeletal: Positive for myalgias. Negative for arthralgias and back pain.   Skin: Negative for rash and wound.   Neurological: Negative for dizziness, light-headedness and headaches.   Hematological: Negative for adenopathy. Does not bruise/bleed easily.   Psychiatric/Behavioral: Negative for sleep disturbance. The patient is not nervous/anxious.        ECOG Performance Status: 1   Objective:      Vitals:   Vitals:    08/30/17 1058   BP: 132/71   BP Location: Right arm   Patient Position: Sitting   BP Method: Medium (Automatic)   Pulse: 107   Resp: 18   Temp: 98 °F (36.7 °C)   TempSrc: Oral   SpO2: 96%   Weight: 87.5 kg (193 lb)     BMI: Body mass index is 22.89 kg/m².    Physical Exam   Constitutional: He is oriented to person, place, and time. He appears well-developed and well-nourished.   HENT:   Head: Normocephalic and atraumatic.   Eyes: Pupils are equal, round, and reactive to light.   Neck: Normal range of motion. Neck supple.   Cardiovascular: Normal rate and regular rhythm.    Pulmonary/Chest: Effort normal. No respiratory distress. He has no rales.   Abdominal: Soft. He exhibits no distension. There is no tenderness.   Musculoskeletal: He exhibits no edema or tenderness.   Lymphadenopathy:     He has no cervical adenopathy.   Neurological: He is alert and oriented to person, place, and time. No cranial nerve deficit.   Skin: Skin is warm and dry. No rash noted.   Psychiatric: He has a normal mood and affect. His behavior is normal.       Laboratory Data:  Lab Visit on 08/30/2017   Component Date Value Ref Range Status    WBC 08/30/2017 10.05  3.90 - 12.70 K/uL Final    RBC 08/30/2017 3.88* 4.60 - 6.20 M/uL Final    Hemoglobin 08/30/2017 10.7* 14.0 - 18.0 g/dL Final    Hematocrit 08/30/2017 32.0* 40.0 - 54.0 % Final    MCV 08/30/2017 83  82 - 98 fL Final    MCH 08/30/2017 27.6  27.0 - 31.0 pg Final    MCHC 08/30/2017 33.4  32.0  - 36.0 g/dL Final    RDW 08/30/2017 15.6* 11.5 - 14.5 % Final    Platelets 08/30/2017 524* 150 - 350 K/uL Final    MPV 08/30/2017 8.3* 9.2 - 12.9 fL Final    Gran # 08/30/2017 8.1* 1.8 - 7.7 K/uL Final    Comment: The ANC is based on a white cell differential from an   automated cell counter. It has not been microscopically   reviewed for the presence of abnormal cells. Clinical   correlation is required.     Lab Visit on 08/09/2017   Component Date Value Ref Range Status    WBC 08/09/2017 4.75  3.90 - 12.70 K/uL Final    RBC 08/09/2017 4.47* 4.60 - 6.20 M/uL Final    Hemoglobin 08/09/2017 12.5* 14.0 - 18.0 g/dL Final    Hematocrit 08/09/2017 37.1* 40.0 - 54.0 % Final    MCV 08/09/2017 83  82 - 98 fL Final    MCH 08/09/2017 28.0  27.0 - 31.0 pg Final    MCHC 08/09/2017 33.7  32.0 - 36.0 g/dL Final    RDW 08/09/2017 15.1* 11.5 - 14.5 % Final    Platelets 08/09/2017 463* 150 - 350 K/uL Final    MPV 08/09/2017 9.1* 9.2 - 12.9 fL Final    Gran # 08/09/2017 3.0  1.8 - 7.7 K/uL Final    Lymph # 08/09/2017 1.1  1.0 - 4.8 K/uL Final    Mono # 08/09/2017 0.7  0.3 - 1.0 K/uL Final    Eos # 08/09/2017 0.0  0.0 - 0.5 K/uL Final    Baso # 08/09/2017 0.00  0.00 - 0.20 K/uL Final    Gran% 08/09/2017 63.2  38.0 - 73.0 % Final    Lymph% 08/09/2017 22.1  18.0 - 48.0 % Final    Mono% 08/09/2017 14.5  4.0 - 15.0 % Final    Eosinophil% 08/09/2017 0.0  0.0 - 8.0 % Final    Basophil% 08/09/2017 0.0  0.0 - 1.9 % Final    Differential Method 08/09/2017 Automated   Final    Sodium 08/09/2017 141  136 - 145 mmol/L Final    Potassium 08/09/2017 4.3  3.5 - 5.1 mmol/L Final    Chloride 08/09/2017 110  95 - 110 mmol/L Final    CO2 08/09/2017 19* 23 - 29 mmol/L Final    Glucose 08/09/2017 106  70 - 110 mg/dL Final    BUN, Bld 08/09/2017 18  8 - 23 mg/dL Final    Creatinine 08/09/2017 1.2  0.5 - 1.4 mg/dL Final    Calcium 08/09/2017 9.9  8.7 - 10.5 mg/dL Final    Total Protein 08/09/2017 7.6  6.0 - 8.4 g/dL Final     Albumin 08/09/2017 3.7  3.5 - 5.2 g/dL Final    Total Bilirubin 08/09/2017 0.3  0.1 - 1.0 mg/dL Final    Comment: For infants and newborns, interpretation of results should be based  on gestational age, weight and in agreement with clinical  observations.  Premature Infant recommended reference ranges:  Up to 24 hours.............<8.0 mg/dL  Up to 48 hours............<12.0 mg/dL  3-5 days..................<15.0 mg/dL  6-29 days.................<15.0 mg/dL      Alkaline Phosphatase 08/09/2017 67  55 - 135 U/L Final    AST 08/09/2017 28  10 - 40 U/L Final    ALT 08/09/2017 38  10 - 44 U/L Final    Anion Gap 08/09/2017 12  8 - 16 mmol/L Final    eGFR if African American 08/09/2017 >60.0  >60 mL/min/1.73 m^2 Final    eGFR if non African American 08/09/2017 >60.0  >60 mL/min/1.73 m^2 Final    Comment: Calculation used to obtain the estimated glomerular filtration  rate (eGFR) is the CKD-EPI equation. Since race is unknown   in our information system, the eGFR values for   -American and Non--American patients are given   for each creatinine result.      Magnesium 08/09/2017 2.0  1.6 - 2.6 mg/dL Final     Supplemental Diagnosis 6/30/16  Immunohistochemical stains are completed on the Station 4L lymph node cell block material and reveal the tumor  cells to stain positively with cytokeratin 7 and TTF-1. The tumor cells show nonspecific blush staining with  cytokeratin 5/6 and are negative for p63. This staining profile supports a diagnosis of non-small cell carcinoma  consistent with adenocarcinoma.  Cell block material will be sent for EGFR, ALK and ROS-1 testing and results will follow in a supplemental report.  (Electronically Signed: 2016-06-30 11:50:31 )  Diagnosed by: Irene Amaro M.D.  FINAL PATHOLOGIC DIAGNOSIS  1. Lymph node, Station 4L, EBUS guided fine needle aspiration:  Positive for malignant cells, non-small cell carcinoma.  Rare background lymphocytes are  present.  Immunohistochemical staining be completed to further characterize this malignancy and results will follow in a  supplemental report.  2. Lymph node, Station 7, EBUS guided fine needle aspiration:  Positive for malignant cells, non-small cell carcinoma tumor identical to that seen in specimen #1.  Background lymphocytes are present.    Imaging:   PET/CT 7/19/17  FDG PET-CT IMAGING :     History: Right middle lobe lung cancer    Comparison: 1/9/2017    Technique:   The fasting blood glucose level was 123 mg/dl. 14.6 mCi of F18-FDG was administered intravenously in the right arm. After an approximately 60 min distribution time, PET/CT images were acquired from the skull base to the mid thigh. Transmission images were acquired to correct for attenuation using a whole body low-dose CT scan without contrast with the arms positioned above the patient's head.     Findings:    Quality of the study: Adequate.     Physiologic uptake of the tracer within the brain, salivary glands, myocardium, GI, and  tracts is seen.     There has been interval decreased FDG uptake in the right lower lobe infiltrate with a maximum SUV of 2.4 on today's exam, compared to 5.9 on prior study.  The previously identified subcarinal and right paratracheal lymph nodes are similar to background.    Interval development of multiple areas of hypermetabolic activity in the osseous structures, namely in the T1 vertebral body with a maximum SUV of 6.0 and left iliac bone with a maximum SUV of 5.7.  Focal area of increased FDG avidity at the right 8th costochondral junction.  While this finding may represent an osseous metastasis, underlying fracture cannot be excluded.    Diffuse right ventricular muscular uptake, likely reflective of right heart strain.    Incidental CT findings:  The pulmonary parenchyma demonstrates centrilobular and paraseptal emphysematous changes.  Fibrotic changes are seen in the right lower lobe, likely related to prior  radiation therapy.  Multiple subcentimeter pulmonary nodules seen in the right upper lobe and right lower lobe, below the threshold for FDG uptake.  Focal hypodensities in the right hepatic lobe, similar to prior exam which may reflect hepatic cysts.   Impression         In this patient with a known history of lung cancer, PET/CT findings compatible with progression of disease with new osseous metastasis.    Probable right eighth rib fracture.    Multiple subcentimeter pulmonary nodules, below the threshold for FDG uptake.                      Assessment:       1. Primary cancer of right middle lobe of lung    2. Secondary adenocarcinoma of bone    3. Chemotherapy follow-up examination           Plan:     Mr. Torres has developed worsening symptoms which may be related to use of zoledronic acid versus progressive disease versus infection.  He was supposed to have a CT done yesterday, however, this was not done and we'll check a CT today.  Hold chemotherapy for now.  We'll notify him of results and steps moving forward.    Dano Fernandez DO, FACP  Hematology & Oncology  [facility] [place] [address]  ph. [phone]  Fax. [phone]    25 minutes were spent in coordination of patient's care, record review and counseling.  More than 50% of the time was face-to-face.

## 2017-08-30 NOTE — Clinical Note
CT today Hold chemotherapy today We'll schedule follow-up for chemotherapy depending on results of CT

## 2017-08-31 ENCOUNTER — TELEPHONE (OUTPATIENT)
Dept: HEMATOLOGY/ONCOLOGY | Facility: CLINIC | Age: 62
End: 2017-08-31

## 2017-09-01 ENCOUNTER — TELEPHONE (OUTPATIENT)
Dept: HEMATOLOGY/ONCOLOGY | Facility: CLINIC | Age: 62
End: 2017-09-01

## 2017-09-01 NOTE — TELEPHONE ENCOUNTER
----- Message from Maximo Faria sent at 9/1/2017  1:02 PM CDT -----  Contact: Pt wife (Diamond)   Pt wife wants to know if something can be prescribed for his cough. Diamond number [phone]      Patient instructed to call pulmonary MD. Wife said that patient's cough is due to COPD.

## 2017-09-06 ENCOUNTER — INFUSION (OUTPATIENT)
Dept: INFUSION THERAPY | Facility: HOSPITAL | Age: 62
End: 2017-09-06
Attending: INTERNAL MEDICINE
Payer: COMMERCIAL

## 2017-09-06 ENCOUNTER — LAB VISIT (OUTPATIENT)
Dept: LAB | Facility: HOSPITAL | Age: 62
End: 2017-09-06
Attending: INTERNAL MEDICINE
Payer: COMMERCIAL

## 2017-09-06 ENCOUNTER — TELEPHONE (OUTPATIENT)
Dept: HEMATOLOGY/ONCOLOGY | Facility: CLINIC | Age: 62
End: 2017-09-06

## 2017-09-06 VITALS
SYSTOLIC BLOOD PRESSURE: 121 MMHG | RESPIRATION RATE: 18 BRPM | HEART RATE: 90 BPM | DIASTOLIC BLOOD PRESSURE: 69 MMHG | TEMPERATURE: 98 F

## 2017-09-06 DIAGNOSIS — C34.2 PRIMARY CANCER OF RIGHT MIDDLE LOBE OF LUNG: Primary | ICD-10-CM

## 2017-09-06 DIAGNOSIS — C34.2 PRIMARY CANCER OF RIGHT MIDDLE LOBE OF LUNG: ICD-10-CM

## 2017-09-06 DIAGNOSIS — R59.0 MEDIASTINAL ADENOPATHY: ICD-10-CM

## 2017-09-06 LAB
ALBUMIN SERPL BCP-MCNC: 3.2 G/DL
ALP SERPL-CCNC: 72 U/L
ALT SERPL W/O P-5'-P-CCNC: 24 U/L
ANION GAP SERPL CALC-SCNC: 11 MMOL/L
AST SERPL-CCNC: 20 U/L
BILIRUB SERPL-MCNC: 0.2 MG/DL
BUN SERPL-MCNC: 27 MG/DL
CALCIUM SERPL-MCNC: 9.9 MG/DL
CHLORIDE SERPL-SCNC: 104 MMOL/L
CO2 SERPL-SCNC: 21 MMOL/L
CREAT SERPL-MCNC: 1.3 MG/DL
ERYTHROCYTE [DISTWIDTH] IN BLOOD BY AUTOMATED COUNT: 16.7 %
EST. GFR  (AFRICAN AMERICAN): >60 ML/MIN/1.73 M^2
EST. GFR  (NON AFRICAN AMERICAN): 58.9 ML/MIN/1.73 M^2
GLUCOSE SERPL-MCNC: 114 MG/DL
HCT VFR BLD AUTO: 36.1 %
HGB BLD-MCNC: 12.1 G/DL
MAGNESIUM SERPL-MCNC: 2.3 MG/DL
MCH RBC QN AUTO: 28.3 PG
MCHC RBC AUTO-ENTMCNC: 33.5 G/DL
MCV RBC AUTO: 85 FL
NEUTROPHILS # BLD AUTO: 12.4 K/UL
PLATELET # BLD AUTO: 505 K/UL
PMV BLD AUTO: 9 FL
POTASSIUM SERPL-SCNC: 4.9 MMOL/L
PROT SERPL-MCNC: 9 G/DL
RBC # BLD AUTO: 4.27 M/UL
SODIUM SERPL-SCNC: 136 MMOL/L
WBC # BLD AUTO: 14.04 K/UL

## 2017-09-06 PROCEDURE — 96367 TX/PROPH/DG ADDL SEQ IV INF: CPT

## 2017-09-06 PROCEDURE — 96411 CHEMO IV PUSH ADDL DRUG: CPT

## 2017-09-06 PROCEDURE — 80053 COMPREHEN METABOLIC PANEL: CPT

## 2017-09-06 PROCEDURE — 83735 ASSAY OF MAGNESIUM: CPT

## 2017-09-06 PROCEDURE — 63600175 PHARM REV CODE 636 W HCPCS: Performed by: INTERNAL MEDICINE

## 2017-09-06 PROCEDURE — 25000003 PHARM REV CODE 250: Performed by: INTERNAL MEDICINE

## 2017-09-06 PROCEDURE — 96372 THER/PROPH/DIAG INJ SC/IM: CPT

## 2017-09-06 PROCEDURE — 96417 CHEMO IV INFUS EACH ADDL SEQ: CPT

## 2017-09-06 PROCEDURE — 85027 COMPLETE CBC AUTOMATED: CPT

## 2017-09-06 PROCEDURE — 36415 COLL VENOUS BLD VENIPUNCTURE: CPT

## 2017-09-06 PROCEDURE — 96413 CHEMO IV INFUSION 1 HR: CPT

## 2017-09-06 RX ORDER — HEPARIN 100 UNIT/ML
500 SYRINGE INTRAVENOUS
Status: CANCELLED | OUTPATIENT
Start: 2017-09-06

## 2017-09-06 RX ORDER — CYANOCOBALAMIN 1000 UG/ML
1000 INJECTION, SOLUTION INTRAMUSCULAR; SUBCUTANEOUS
Status: CANCELLED | OUTPATIENT
Start: 2017-09-06

## 2017-09-06 RX ORDER — SODIUM CHLORIDE 0.9 % (FLUSH) 0.9 %
10 SYRINGE (ML) INJECTION
Status: DISCONTINUED | OUTPATIENT
Start: 2017-09-06 | End: 2017-09-06 | Stop reason: HOSPADM

## 2017-09-06 RX ORDER — HEPARIN 100 UNIT/ML
500 SYRINGE INTRAVENOUS
Status: DISCONTINUED | OUTPATIENT
Start: 2017-09-06 | End: 2017-09-06 | Stop reason: HOSPADM

## 2017-09-06 RX ORDER — SODIUM CHLORIDE 0.9 % (FLUSH) 0.9 %
10 SYRINGE (ML) INJECTION
Status: CANCELLED | OUTPATIENT
Start: 2017-09-06

## 2017-09-06 RX ORDER — CYANOCOBALAMIN 1000 UG/ML
1000 INJECTION, SOLUTION INTRAMUSCULAR; SUBCUTANEOUS
Status: COMPLETED | OUTPATIENT
Start: 2017-09-06 | End: 2017-09-06

## 2017-09-06 RX ADMIN — DEXAMETHASONE SODIUM PHOSPHATE: 4 INJECTION, SOLUTION INTRAMUSCULAR; INTRAVENOUS at 03:09

## 2017-09-06 RX ADMIN — SODIUM CHLORIDE 150 MG: 9 INJECTION, SOLUTION INTRAVENOUS at 03:09

## 2017-09-06 RX ADMIN — CARBOPLATIN 510 MG: 10 INJECTION, SOLUTION INTRAVENOUS at 04:09

## 2017-09-06 RX ADMIN — SODIUM CHLORIDE 200 MG: 9 INJECTION, SOLUTION INTRAVENOUS at 03:09

## 2017-09-06 RX ADMIN — SODIUM CHLORIDE 1100 MG: 9 INJECTION, SOLUTION INTRAVENOUS at 04:09

## 2017-09-06 RX ADMIN — CYANOCOBALAMIN 1000 MCG: 1000 INJECTION, SOLUTION INTRAMUSCULAR at 05:09

## 2017-09-06 NOTE — TELEPHONE ENCOUNTER
"Spoke to patient and he states that "they" changed his insurance from [employer] to [employer] and he will bring his card in today.     "

## 2017-09-06 NOTE — PLAN OF CARE
Problem: Chemotherapy Effects (Adult)  Goal: Signs and Symptoms of Listed Potential Problems Will be Absent, Minimized or Managed (Chemotherapy Effects)  Signs and symptoms of listed potential problems will be absent, minimized or managed by discharge/transition of care (reference Chemotherapy Effects (Adult) CPG).   Outcome: Ongoing (interventions implemented as appropriate)  Pt. Here today for Chemo. Will receive b12 injection. Taking folic acid and dex at home as ordered. Peripheral IV started, blood return present, infusing without difficulty.

## 2017-09-06 NOTE — PLAN OF CARE
Problem: Patient Care Overview  Goal: Plan of Care Review  Outcome: Ongoing (interventions implemented as appropriate)  Pt. Tolerated infusion. VSS. Peripheral Flushed and removed. Given b12 injection in R. Arm. Tolerated well. No questions at this time.

## 2017-09-07 NOTE — TELEPHONE ENCOUNTER
Left voicemail for patient to call back at their convenience. Phone number given. Released lab results on portal.

## 2017-09-07 NOTE — TELEPHONE ENCOUNTER
----- Message from Leanne Jain sent at 9/7/2017 12:33 PM CDT -----  Contact: Diamond can be reached [phone]  Diamond is calling to get pt test results, Diamond would like Lauren to give her a call back.      Thank you!

## 2017-09-22 ENCOUNTER — TELEPHONE (OUTPATIENT)
Dept: HEMATOLOGY/ONCOLOGY | Facility: CLINIC | Age: 62
End: 2017-09-22

## 2017-09-22 NOTE — TELEPHONE ENCOUNTER
----- Message from Luna Sterling sent at 9/22/2017 12:04 PM CDT -----  The pre-authorization request for KEYTRUDA  have been denied by the patient's insurance company for the following reason:    DOES NOT MEET CRITERIA      The complete denial letter with instructions for submitting an appeal will be uploaded in EPIC under the patient's documents. If you would like me to fax a copy to your office, please call/ IM and notify me at [phone].     Thank you,     Luna Sterling LPN  Pre service/CRT

## 2017-09-27 ENCOUNTER — OFFICE VISIT (OUTPATIENT)
Dept: HEMATOLOGY/ONCOLOGY | Facility: CLINIC | Age: 62
End: 2017-09-27
Payer: COMMERCIAL

## 2017-09-27 ENCOUNTER — INFUSION (OUTPATIENT)
Dept: INFUSION THERAPY | Facility: HOSPITAL | Age: 62
End: 2017-09-27
Attending: INTERNAL MEDICINE
Payer: COMMERCIAL

## 2017-09-27 VITALS
RESPIRATION RATE: 16 BRPM | DIASTOLIC BLOOD PRESSURE: 85 MMHG | TEMPERATURE: 98 F | SYSTOLIC BLOOD PRESSURE: 131 MMHG | HEART RATE: 94 BPM

## 2017-09-27 VITALS
WEIGHT: 200.19 LBS | BODY MASS INDEX: 23.74 KG/M2 | HEART RATE: 97 BPM | TEMPERATURE: 98 F | SYSTOLIC BLOOD PRESSURE: 133 MMHG | OXYGEN SATURATION: 99 % | RESPIRATION RATE: 18 BRPM | DIASTOLIC BLOOD PRESSURE: 78 MMHG

## 2017-09-27 DIAGNOSIS — C34.2 PRIMARY CANCER OF RIGHT MIDDLE LOBE OF LUNG: Primary | ICD-10-CM

## 2017-09-27 DIAGNOSIS — C79.51 SECONDARY ADENOCARCINOMA OF BONE: ICD-10-CM

## 2017-09-27 DIAGNOSIS — R59.0 MEDIASTINAL ADENOPATHY: ICD-10-CM

## 2017-09-27 DIAGNOSIS — R21 RASH: ICD-10-CM

## 2017-09-27 DIAGNOSIS — Z09 CHEMOTHERAPY FOLLOW-UP EXAMINATION: ICD-10-CM

## 2017-09-27 PROCEDURE — 25000003 PHARM REV CODE 250: Performed by: INTERNAL MEDICINE

## 2017-09-27 PROCEDURE — 3078F DIAST BP <80 MM HG: CPT | Mod: S$GLB,,, | Performed by: INTERNAL MEDICINE

## 2017-09-27 PROCEDURE — 99214 OFFICE O/P EST MOD 30 MIN: CPT | Mod: S$GLB,,, | Performed by: INTERNAL MEDICINE

## 2017-09-27 PROCEDURE — 96367 TX/PROPH/DG ADDL SEQ IV INF: CPT

## 2017-09-27 PROCEDURE — 63600175 PHARM REV CODE 636 W HCPCS: Performed by: INTERNAL MEDICINE

## 2017-09-27 PROCEDURE — 99999 PR PBB SHADOW E&M-EST. PATIENT-LVL III: CPT | Mod: PBBFAC,,, | Performed by: INTERNAL MEDICINE

## 2017-09-27 PROCEDURE — 99213 OFFICE O/P EST LOW 20 MIN: CPT | Mod: PBBFAC,25 | Performed by: INTERNAL MEDICINE

## 2017-09-27 PROCEDURE — 96413 CHEMO IV INFUSION 1 HR: CPT

## 2017-09-27 PROCEDURE — 3008F BODY MASS INDEX DOCD: CPT | Mod: S$GLB,,, | Performed by: INTERNAL MEDICINE

## 2017-09-27 PROCEDURE — 96411 CHEMO IV PUSH ADDL DRUG: CPT

## 2017-09-27 PROCEDURE — 3075F SYST BP GE 130 - 139MM HG: CPT | Mod: S$GLB,,, | Performed by: INTERNAL MEDICINE

## 2017-09-27 RX ORDER — HEPARIN 100 UNIT/ML
500 SYRINGE INTRAVENOUS
Status: CANCELLED | OUTPATIENT
Start: 2017-09-27

## 2017-09-27 RX ORDER — SODIUM CHLORIDE 0.9 % (FLUSH) 0.9 %
10 SYRINGE (ML) INJECTION
Status: CANCELLED | OUTPATIENT
Start: 2017-09-27

## 2017-09-27 RX ORDER — CYANOCOBALAMIN 1000 UG/ML
1000 INJECTION, SOLUTION INTRAMUSCULAR; SUBCUTANEOUS
Status: DISCONTINUED | OUTPATIENT
Start: 2017-09-27 | End: 2018-11-28

## 2017-09-27 RX ORDER — HYDROCORTISONE 1 %
CREAM (GRAM) TOPICAL
Qty: 30 G | Refills: 1 | Status: SHIPPED | OUTPATIENT
Start: 2017-09-27 | End: 2017-11-08

## 2017-09-27 RX ADMIN — DEXAMETHASONE SODIUM PHOSPHATE: 4 INJECTION, SOLUTION INTRAMUSCULAR; INTRAVENOUS at 10:09

## 2017-09-27 RX ADMIN — SODIUM CHLORIDE 150 MG: 9 INJECTION, SOLUTION INTRAVENOUS at 09:09

## 2017-09-27 RX ADMIN — CARBOPLATIN 580 MG: 10 INJECTION, SOLUTION INTRAVENOUS at 10:09

## 2017-09-27 RX ADMIN — SODIUM CHLORIDE 1100 MG: 9 INJECTION, SOLUTION INTRAVENOUS at 10:09

## 2017-09-27 RX ADMIN — SODIUM CHLORIDE: 0.9 INJECTION, SOLUTION INTRAVENOUS at 09:09

## 2017-09-27 NOTE — PLAN OF CARE
Problem: Chemotherapy Effects (Adult)  Goal: Signs and Symptoms of Listed Potential Problems Will be Absent, Minimized or Managed (Chemotherapy Effects)  Signs and symptoms of listed potential problems will be absent, minimized or managed by discharge/transition of care (reference Chemotherapy Effects (Adult) CPG).   Outcome: Ongoing (interventions implemented as appropriate)  Patient here for Carbo/Alimta.  Originally scheduled to get Keytruda as well, but insurance has denied it.  Assessment complete and labs reviewed.  Patient endorsed taking home folic acid and dexamethasone.  VSS.  Chair reclined and blanket offered.  No needs expressed at this time.  Will continue to monitor.

## 2017-09-27 NOTE — PROGRESS NOTES
PATIENT: Angel Torres  MRN: [id number]  DATE: 9/27/2017      Diagnosis:   1. Primary cancer of right middle lobe of lung    2. Secondary adenocarcinoma of bone    3. Chemotherapy follow-up examination    4. Rash        Chief Complaint: Lung Cancer      Oncologic History:      Oncologic History Non-small cell lung cancer diagnosed 6/2016   Recurrent disease to lung 4/2017     Oncologic Treatment Cisplatin/Pemetrexed initiated 8/15/16 with concurrent radiation completed 3 cycles 10/3/16; 64 Gy  Carboplatin/pemetrexed/pembrolizumab 7/2017    Pathology Adenocarcinoma, T2b, N2, M0, Stage IIIA          Subjective:    Interval History: Mr. Torres is a 62 y.o. male who was seen in follow-up for lung cancer.  He states he generally feels well.  He has noted an improvement in his breathing.  Still has some cough.  He has noted a rash on his abdomen over the last several days which itches.  He is more active.  No other new complaints.    His history dates to approximately 4/2016 when he noted a worsening cough which was associated with clear sputum.  He had a chest x-ray in 5/16 showing a right middle lobe opacity.  Subsequent CT of the chest was performed on 5/30/16 showing a 5.6 cm lesion in the right middle lobe with hilar and mediastinal lymphadenopathy along with hepatic lesions and a left adrenal mass..  He underwent bronchoscopy which was nonrevealing and transbronchial biopsies were nondiagnostic.  He then underwent EBUS with biopsy of mediastinal lymph nodes with pathology showing non-small cell lung cancer consistent with adenocarcinoma.  He was started on concurrent chemotherapy and radiation with cisplatin and pemetrexed on 8/15/16.  He completed 64 Gy of concurrent radiation with cisplatin and pemetrexed with chemotherapy completed on 10/3/16.  Scans in 4/2017 had indicated progressive disease in the lung.  Subsequent PET scan in 7/2017 had shown spread of disease to bone.  He was started on carboplatin and  pemetrexed and pembrolizumab in 7/2017.  Repeat imaging in 8/2017 had shown stability of disease.    Past Medical History:   Past Medical History:   Diagnosis Date    Chemotherapy follow-up examination 9/7/2016    COPD (chronic obstructive pulmonary disease)     Hearing loss     Hypertension 2/9/2015    Primary cancer of right middle lobe of lung 7/11/2016    Rash 9/7/2016    Secondary adenocarcinoma of bone 7/26/2017       Past Surgical HIstory:   Past Surgical History:   Procedure Laterality Date    INGUINAL HERNIA REPAIR Bilateral     KNEE SURGERY         Family History:   Family History   Problem Relation Age of Onset    Cancer Mother      lung, breast    Cancer Sister      lung    Cancer Maternal Grandfather     No Known Problems Father     No Known Problems Brother     No Known Problems Maternal Aunt     No Known Problems Maternal Uncle     No Known Problems Paternal Aunt     No Known Problems Paternal Uncle     No Known Problems Maternal Grandmother     No Known Problems Paternal Grandmother     No Known Problems Paternal Grandfather     Amblyopia Neg Hx     Blindness Neg Hx     Cataracts Neg Hx     Diabetes Neg Hx     Glaucoma Neg Hx     Hypertension Neg Hx     Macular degeneration Neg Hx     Retinal detachment Neg Hx     Strabismus Neg Hx     Stroke Neg Hx     Thyroid disease Neg Hx        Social History:  reports that he quit smoking about 3 years ago. He has a 80.00 pack-year smoking history. He has quit using smokeless tobacco. He reports that he does not drink alcohol or use drugs.    Allergies:  Review of patient's allergies indicates:  No Known Allergies    Medications:  Current Outpatient Prescriptions   Medication Sig Dispense Refill    albuterol 90 mcg/actuation inhaler Inhale 2 puffs into the lungs every 6 (six) hours as needed for Wheezing. 18 g 0    albuterol-ipratropium 2.5mg-0.5mg/3mL (DUO-NEB) 0.5 mg-3 mg(2.5 mg base)/3 mL nebulizer solution Take 3 mLs by  nebulization every 6 (six) hours as needed for Wheezing or Shortness of Breath. 100 vial 1    amlodipine (NORVASC) 10 MG tablet Take 1 tablet (10 mg total) by mouth once daily. 90 tablet 3    cetirizine (ZYRTEC) 10 MG tablet Take 1 tablet (10 mg total) by mouth once daily.  0    dexamethasone (DECADRON) 4 MG Tab Take 1 tablet (4 mg total) by mouth every 12 (twelve) hours. Take 1 tab twice daily starting the day prior to chemotherapy for 3 days. 24 tablet 1    docusate sodium (COLACE) 100 MG capsule Take 1 capsule (100 mg total) by mouth 2 (two) times daily as needed for Constipation. 60 capsule 0    fluticasone (FLONASE) 50 mcg/actuation nasal spray SHAKE LIQUID AND USE 2 SPRAYS IN EACH NOSTRIL EVERY DAY 16 g 1    folic acid (FOLVITE) 400 MCG tablet TAKE 1 TABLET(400 MCG) BY MOUTH EVERY  tablet 0    hydrocortisone 1 % cream Apply to affected area 2 times daily 30 g 1    hydrOXYzine HCl (ATARAX) 25 MG tablet Take 1 tablet (25 mg total) by mouth 3 (three) times daily as needed for Itching. 20 tablet 0    naproxen (NAPROSYN) 500 MG tablet Take 1 tablet (500 mg total) by mouth 2 (two) times daily as needed (headache). 60 tablet 0     Current Facility-Administered Medications   Medication Dose Route Frequency Provider Last Rate Last Dose    cyanocobalamin injection 1,000 mcg  1,000 mcg Intramuscular 1 time in Clinic/HOD Dano Fernandez DO, FACP           Review of Systems   Constitutional: Negative for activity change, appetite change, chills, fatigue, fever and unexpected weight change.        Normal weight 220   HENT: Negative for dental problem, sinus pressure, sneezing and trouble swallowing.    Eyes: Negative for visual disturbance.   Respiratory: Positive for cough. Negative for choking, chest tightness, shortness of breath and wheezing.    Cardiovascular: Negative for chest pain and leg swelling.   Gastrointestinal: Negative for abdominal pain, blood in stool, constipation, diarrhea and nausea.    Genitourinary: Negative for difficulty urinating and dysuria.   Musculoskeletal: Positive for myalgias. Negative for arthralgias and back pain.   Skin: Positive for rash. Negative for wound.   Neurological: Negative for dizziness, light-headedness and headaches.   Hematological: Negative for adenopathy. Does not bruise/bleed easily.   Psychiatric/Behavioral: Negative for sleep disturbance. The patient is not nervous/anxious.        ECOG Performance Status: 1   Objective:      Vitals:   Vitals:    09/27/17 0821   BP: 133/78   BP Location: Right arm   Patient Position: Sitting   BP Method: Medium (Automatic)   Pulse: 97   Resp: 18   Temp: 97.6 °F (36.4 °C)   TempSrc: Oral   SpO2: 99%   Weight: 90.8 kg (200 lb 2.8 oz)     BMI: Body mass index is 23.74 kg/m².    Physical Exam   Constitutional: He is oriented to person, place, and time. He appears well-developed and well-nourished.   HENT:   Head: Normocephalic and atraumatic.   Eyes: Pupils are equal, round, and reactive to light.   Neck: Normal range of motion. Neck supple.   Cardiovascular: Normal rate and regular rhythm.    Pulmonary/Chest: Effort normal. No respiratory distress. He has no rales.   Abdominal: Soft. He exhibits no distension. There is no tenderness.   Musculoskeletal: He exhibits no edema or tenderness.   Lymphadenopathy:     He has no cervical adenopathy.   Neurological: He is alert and oriented to person, place, and time. No cranial nerve deficit.   Skin: Skin is warm and dry. Rash noted.   Psychiatric: He has a normal mood and affect. His behavior is normal.       Laboratory Data:  Lab Visit on 09/27/2017   Component Date Value Ref Range Status    WBC 09/27/2017 4.71  3.90 - 12.70 K/uL Final    RBC 09/27/2017 3.98* 4.60 - 6.20 M/uL Final    Hemoglobin 09/27/2017 11.1* 14.0 - 18.0 g/dL Final    Hematocrit 09/27/2017 33.1* 40.0 - 54.0 % Final    MCV 09/27/2017 83  82 - 98 fL Final    MCH 09/27/2017 27.9  27.0 - 31.0 pg Final    MCHC  09/27/2017 33.5  32.0 - 36.0 g/dL Final    RDW 09/27/2017 18.0* 11.5 - 14.5 % Final    Platelets 09/27/2017 470* 150 - 350 K/uL Final    MPV 09/27/2017 8.5* 9.2 - 12.9 fL Final    Gran # 09/27/2017 3.7  1.8 - 7.7 K/uL Final    Comment: The ANC is based on a white cell differential from an   automated cell counter. It has not been microscopically   reviewed for the presence of abnormal cells. Clinical   correlation is required.      Sodium 09/27/2017 137  136 - 145 mmol/L Final    Potassium 09/27/2017 4.4  3.5 - 5.1 mmol/L Final    Chloride 09/27/2017 105  95 - 110 mmol/L Final    CO2 09/27/2017 24  23 - 29 mmol/L Final    Glucose 09/27/2017 140* 70 - 110 mg/dL Final    BUN, Bld 09/27/2017 16  8 - 23 mg/dL Final    Creatinine 09/27/2017 1.1  0.5 - 1.4 mg/dL Final    Calcium 09/27/2017 9.7  8.7 - 10.5 mg/dL Final    Total Protein 09/27/2017 8.3  6.0 - 8.4 g/dL Final    Albumin 09/27/2017 3.4* 3.5 - 5.2 g/dL Final    Total Bilirubin 09/27/2017 0.3  0.1 - 1.0 mg/dL Final    Comment: For infants and newborns, interpretation of results should be based  on gestational age, weight and in agreement with clinical  observations.  Premature Infant recommended reference ranges:  Up to 24 hours.............<8.0 mg/dL  Up to 48 hours............<12.0 mg/dL  3-5 days..................<15.0 mg/dL  6-29 days.................<15.0 mg/dL      Alkaline Phosphatase 09/27/2017 66  55 - 135 U/L Final    AST 09/27/2017 18  10 - 40 U/L Final    ALT 09/27/2017 18  10 - 44 U/L Final    Anion Gap 09/27/2017 8  8 - 16 mmol/L Final    eGFR if African American 09/27/2017 >60.0  >60 mL/min/1.73 m^2 Final    eGFR if non African American 09/27/2017 >60.0  >60 mL/min/1.73 m^2 Final    Comment: Calculation used to obtain the estimated glomerular filtration  rate (eGFR) is the CKD-EPI equation. Since race is unknown   in our information system, the eGFR values for   -American and Non--American patients are given   for  each creatinine result.      Magnesium 09/27/2017 2.0  1.6 - 2.6 mg/dL Final   Lab Visit on 09/06/2017   Component Date Value Ref Range Status    WBC 09/06/2017 14.04* 3.90 - 12.70 K/uL Final    RBC 09/06/2017 4.27* 4.60 - 6.20 M/uL Final    Hemoglobin 09/06/2017 12.1* 14.0 - 18.0 g/dL Final    Hematocrit 09/06/2017 36.1* 40.0 - 54.0 % Final    MCV 09/06/2017 85  82 - 98 fL Final    MCH 09/06/2017 28.3  27.0 - 31.0 pg Final    MCHC 09/06/2017 33.5  32.0 - 36.0 g/dL Final    RDW 09/06/2017 16.7* 11.5 - 14.5 % Final    Platelets 09/06/2017 505* 150 - 350 K/uL Final    MPV 09/06/2017 9.0* 9.2 - 12.9 fL Final    Gran # 09/06/2017 12.4* 1.8 - 7.7 K/uL Final    Comment: The ANC is based on a white cell differential from an   automated cell counter. It has not been microscopically   reviewed for the presence of abnormal cells. Clinical   correlation is required.      Sodium 09/06/2017 136  136 - 145 mmol/L Final    Potassium 09/06/2017 4.9  3.5 - 5.1 mmol/L Final    Chloride 09/06/2017 104  95 - 110 mmol/L Final    CO2 09/06/2017 21* 23 - 29 mmol/L Final    Glucose 09/06/2017 114* 70 - 110 mg/dL Final    BUN, Bld 09/06/2017 27* 8 - 23 mg/dL Final    Creatinine 09/06/2017 1.3  0.5 - 1.4 mg/dL Final    Calcium 09/06/2017 9.9  8.7 - 10.5 mg/dL Final    Total Protein 09/06/2017 9.0* 6.0 - 8.4 g/dL Final    Albumin 09/06/2017 3.2* 3.5 - 5.2 g/dL Final    Total Bilirubin 09/06/2017 0.2  0.1 - 1.0 mg/dL Final    Comment: For infants and newborns, interpretation of results should be based  on gestational age, weight and in agreement with clinical  observations.  Premature Infant recommended reference ranges:  Up to 24 hours.............<8.0 mg/dL  Up to 48 hours............<12.0 mg/dL  3-5 days..................<15.0 mg/dL  6-29 days.................<15.0 mg/dL      Alkaline Phosphatase 09/06/2017 72  55 - 135 U/L Final    AST 09/06/2017 20  10 - 40 U/L Final    ALT 09/06/2017 24  10 - 44 U/L Final     Anion Gap 09/06/2017 11  8 - 16 mmol/L Final    eGFR if African American 09/06/2017 >60.0  >60 mL/min/1.73 m^2 Final    eGFR if non African American 09/06/2017 58.9* >60 mL/min/1.73 m^2 Final    Comment: Calculation used to obtain the estimated glomerular filtration  rate (eGFR) is the CKD-EPI equation. Since race is unknown   in our information system, the eGFR values for   -American and Non--American patients are given   for each creatinine result.      Magnesium 09/06/2017 2.3  1.6 - 2.6 mg/dL Final   Lab Visit on 08/30/2017   Component Date Value Ref Range Status    WBC 08/30/2017 10.05  3.90 - 12.70 K/uL Final    RBC 08/30/2017 3.88* 4.60 - 6.20 M/uL Final    Hemoglobin 08/30/2017 10.7* 14.0 - 18.0 g/dL Final    Hematocrit 08/30/2017 32.0* 40.0 - 54.0 % Final    MCV 08/30/2017 83  82 - 98 fL Final    MCH 08/30/2017 27.6  27.0 - 31.0 pg Final    MCHC 08/30/2017 33.4  32.0 - 36.0 g/dL Final    RDW 08/30/2017 15.6* 11.5 - 14.5 % Final    Platelets 08/30/2017 524* 150 - 350 K/uL Final    MPV 08/30/2017 8.3* 9.2 - 12.9 fL Final    Gran # 08/30/2017 8.1* 1.8 - 7.7 K/uL Final    Comment: The ANC is based on a white cell differential from an   automated cell counter. It has not been microscopically   reviewed for the presence of abnormal cells. Clinical   correlation is required.      Sodium 08/30/2017 137  136 - 145 mmol/L Final    Potassium 08/30/2017 4.4  3.5 - 5.1 mmol/L Final    Chloride 08/30/2017 105  95 - 110 mmol/L Final    CO2 08/30/2017 23  23 - 29 mmol/L Final    Glucose 08/30/2017 114* 70 - 110 mg/dL Final    BUN, Bld 08/30/2017 17  8 - 23 mg/dL Final    Creatinine 08/30/2017 1.0  0.5 - 1.4 mg/dL Final    Calcium 08/30/2017 9.8  8.7 - 10.5 mg/dL Final    Total Protein 08/30/2017 7.9  6.0 - 8.4 g/dL Final    Albumin 08/30/2017 2.9* 3.5 - 5.2 g/dL Final    Total Bilirubin 08/30/2017 0.2  0.1 - 1.0 mg/dL Final    Comment: For infants and newborns, interpretation of  results should be based  on gestational age, weight and in agreement with clinical  observations.  Premature Infant recommended reference ranges:  Up to 24 hours.............<8.0 mg/dL  Up to 48 hours............<12.0 mg/dL  3-5 days..................<15.0 mg/dL  6-29 days.................<15.0 mg/dL      Alkaline Phosphatase 08/30/2017 59  55 - 135 U/L Final    AST 08/30/2017 28  10 - 40 U/L Final    ALT 08/30/2017 31  10 - 44 U/L Final    Anion Gap 08/30/2017 9  8 - 16 mmol/L Final    eGFR if African American 08/30/2017 >60.0  >60 mL/min/1.73 m^2 Final    eGFR if non African American 08/30/2017 >60.0  >60 mL/min/1.73 m^2 Final    Comment: Calculation used to obtain the estimated glomerular filtration  rate (eGFR) is the CKD-EPI equation. Since race is unknown   in our information system, the eGFR values for   -American and Non--American patients are given   for each creatinine result.      Magnesium 08/30/2017 1.9  1.6 - 2.6 mg/dL Final     Supplemental Diagnosis 6/30/16  Immunohistochemical stains are completed on the Station 4L lymph node cell block material and reveal the tumor  cells to stain positively with cytokeratin 7 and TTF-1. The tumor cells show nonspecific blush staining with  cytokeratin 5/6 and are negative for p63. This staining profile supports a diagnosis of non-small cell carcinoma  consistent with adenocarcinoma.  Cell block material will be sent for EGFR, ALK and ROS-1 testing and results will follow in a supplemental report.  (Electronically Signed: 2016-06-30 11:50:31 )  Diagnosed by: Irene Amaro M.D.  FINAL PATHOLOGIC DIAGNOSIS  1. Lymph node, Station 4L, EBUS guided fine needle aspiration:  Positive for malignant cells, non-small cell carcinoma.  Rare background lymphocytes are present.  Immunohistochemical staining be completed to further characterize this malignancy and results will follow in a  supplemental report.  2. Lymph node, Station 7, EBUS guided fine  needle aspiration:  Positive for malignant cells, non-small cell carcinoma tumor identical to that seen in specimen #1.  Background lymphocytes are present.    Imaging:   CT 8/30/17  Comparison: PET/CT 7/19/17; CT chest 7/12/17, 4/12/17.    Clinical History: Lung cancer.     Findings:    Examination of the vascular and soft tissue structures at the base of the neck is unremarkable.    The thoracic aorta maintains normal caliber, contour, and course with minimal atherosclerotic calcification within its course.  The heart is not enlarged and there is no evidence of pericardial effusion.    The esophagus maintains a normal course and caliber. There is no axillary or mediastinal lymphadenopathy.  Small right hilar lymph node identified.    There is slight rightward mediastinal shift secondary to right-sided volume loss. Findings of significant centrilobular versus panlobular emphysema with an upper lobe predominance again noted. Additionally, there are chronic changes of postradiation therapy with scattered reticulation and architectural distortion within the right middle and lower lobes.     The trachea and proximal airways appear patent, noting persistent occlusion of the lateral right middle lobe segmental bronchus by a 4.0 x 2.5 cm soft tissue mass (series 2, image 41) (previously 4.0 x 2.3 cm) at the junction of the major and minor fissures. Again seen are several nodular opacities within the medial basal segment of the right lower lobe near the pleural margin, which were better visualized on the previous chest CT dated 7/12/17. The largest nodular soft tissue opacity measures approximately 1.2 cm in the medial basal segment of the right lower lobe (series 2, image 51), previously 1.4 cm.     The previously identified soft tissue opacifications along the inferior aspect of the posterior basal segment of the right lower lobe are not well-visualized on today's study.The interlobular septal thickening within the  peripheral aspect of the right lower lobe is better visualized on the prior dedicated chest CT. No significant focal consolidation identified within the left lung. No pneumothorax.    The liver is normal in size and attenuation. There are multiple hypodensities scattered throughout the hepatic parenchyma most which are subcentimeter in size and cannot be definitively characterized. The largest measures 1.2 cm in hepatic segment VI, and is most consistent with a hepatic cyst. The gallbladder shows no evidence of stones or pericholecystic fluid.  There is no intra-or extrahepatic biliary ductal dilatation.    The stomach and pancreas are unremarkable. There is a 1.6 cm hypodensity in the anterior superior margin of the spleen (series 2, image 55) which contains internal septations and has decreased in size when compared to the prior PET/CT dated 7/2017, concerning for metastatic disease.    There is a 1.3 cm hypodense nodule in the left adrenal gland which demonstrated low attenuation on the prior chest CT consistent with an adenoma. The right adrenal gland is unremarkable.    The kidneys are normal in size and location and concentrate and excrete contrast properly on delayed imaging. Bilateral subcentimeter renal hypodensities are seen, which are to small to characterize may represent cysts. There is no evidence of hydronephrosis.  The ureters appear normal in course and caliber without evidence of ureteral dilatation. There is a small outpouching of the anterior superior aspect of the urinary bladder, suggestive of a small urachal diverticulum. The prostate gland is mildly enlarged and contains dystrophic calcifications.    The abdominal aorta is normal in course and caliber with moderate atherosclerotic calcifications.    The visualized loops of small and large bowel show no evidence of obstruction or inflammation.  There is no ascites, free fluid, or intraperitoneal free air. There are numerous scattered small  periaortic lymph nodes visualized.    Osseous structures demonstrate several sclerotic lesions which demonstrated hypermetabolic activity on the most recent PET/CT scan concerning for metastatic disease. There is a 2.2 cm sclerotic lesion along the right lateral aspect of the T1 vertebral body (series 2, image 4) extending into the posterior elements. Additionally, there are two subtle sclerotic lesions within the left iliac wing concerning for metastatic disease.     These The extraperitoneal soft tissues are unremarkable.   Impression         1. In this patient with a history of metastatic lung cancer, there are sclerotic osseous lesions in the T1 vertebral body and left iliac wing identified, which demonstrated hypermetabolic activity on the previous PET/CT, concerning for osseous metastatic disease. Please see below for RECIST summary.    2. Relatively stable appearance of the right middle lobe soft tissue mass with persistent occlusion of the lateral middle lobe segmental bronchus.    3. Multiple right lower lobe nodular soft tissue opacities concerning for metastatic disease, which appear less conspicuous on today's study when compared to the previous chest CT dated 7/12/17.    4. Hypodense splenic lesion with internal septations, which has decreased in size when compared to the prior PET/CT 7/19/17, concerning for metastatic disease.    5. Multiple hypodense hepatic lesions, most which are too small to definitely characterize.     6. Hypodense left adrenal nodule, consistent with adenoma.    7. 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      RECIST SUMMARY:  Lesion 1: 4.0 x 2.5 cm right middle lobe soft tissue mass (series 2, image 41); previously measuring 4.0 x 2.3 cm.   Lesion 2: 1.2 cm soft tissue nodule in the mediobasal segment of the right lower lobe (series 2, image 51); previously measuring 1.4 cm.  Lesion 3: 2.2 cm sclerotic lesion in the T1 vertebral body (series 2, image 4). No prior  measurement.  Lesion 4: 1.6 cm hypodense splenic lesion (series 2, image 55); previously measuring 2.3 cm.                 Assessment:       1. Primary cancer of right middle lobe of lung    2. Secondary adenocarcinoma of bone    3. Chemotherapy follow-up examination    4. Rash           Plan:     Mr. Torres is doing better clinically than when I had seen him last.  Review of his CT scan shows stability of disease.  Proceed onto cycle 4 of carboplatin, pemetrexed, pembrolizumab.  Following this we'll see back in 3 weeks for initiation of maintenance pembrolizumab.  I have written him for hydrocortisone topical for his rash.    Dano Fernandez DO, FACP  Hematology & Oncology  [facility] [place] [address]  ph. [phone]  Fax. [phone]    25 minutes were spent in coordination of patient's care, record review and counseling.  More than 50% of the time was face-to-face.

## 2017-09-27 NOTE — Clinical Note
Chemotherapy today pending labs Follow-up in 3 weeks for initiation of maintenance pembrolizumab Needs B12 injection today if he has not received in the last 9 weeks

## 2017-09-29 ENCOUNTER — TELEPHONE (OUTPATIENT)
Dept: HEMATOLOGY/ONCOLOGY | Facility: CLINIC | Age: 62
End: 2017-09-29

## 2017-09-29 ENCOUNTER — TELEPHONE (OUTPATIENT)
Dept: RADIATION ONCOLOGY | Facility: CLINIC | Age: 62
End: 2017-09-29

## 2017-09-29 NOTE — TELEPHONE ENCOUNTER
----- Message from Maximo Faria sent at 9/29/2017 12:04 PM CDT -----  Contact: Spouse  Will like a call regarding info from 9/27 appt     Contact::[phone]

## 2017-09-29 NOTE — TELEPHONE ENCOUNTER
Explained to the patient's wife Akshat, the fact that it was denied and the [employer] assistance fund and application.  She verbalized understanding. She states that he still has issues breathing.  She says she believes that it is his COPD that was diagnosed by Dr. Vance and wanted to know where to go for that.  He previously saw Dr. Vance and I told her that he or his PCP may be a good resource for them.  According to Dr. Fernandez, after the last scans, his cancer is stable.  Will forward the information to Dr. Fernandez.

## 2017-09-29 NOTE — TELEPHONE ENCOUNTER
----- Message from Cass Love sent at 9/29/2017 12:12 PM CDT -----  Contact: Patient's wife  Re: Needs to reschedule his MRI and EP with Kalen on Monday. Can only come on Wednesdays.    Please call.    Thanks,  Di  Return call to patient  appt changed and confirmed

## 2017-10-03 ENCOUNTER — TELEPHONE (OUTPATIENT)
Dept: HEMATOLOGY/ONCOLOGY | Facility: CLINIC | Age: 62
End: 2017-10-03

## 2017-10-03 NOTE — TELEPHONE ENCOUNTER
Question form filled out by Dr. Fernandez faxed back to [facility] Access Program

## 2017-10-03 NOTE — TELEPHONE ENCOUNTER
----- Message from Maximo Faria sent at 10/3/2017  4:08 PM CDT -----  Contact: [employer]  F/u up on worksheet for [employer] workshop     Services can not be provided until paperwork is filled and sent back     Contact::[phone] Ext::[phone]

## 2017-10-05 ENCOUNTER — TELEPHONE (OUTPATIENT)
Dept: HEMATOLOGY/ONCOLOGY | Facility: CLINIC | Age: 62
End: 2017-10-05

## 2017-10-05 NOTE — TELEPHONE ENCOUNTER
----- Message from Tameka Urban sent at 10/4/2017  2:49 PM CDT -----  Contact: [facility]   MsDonna Jared is calling to speak with Lauren Schulz regarding the enrollment for for the pt.    She can be reached at [phone].    Thank you.

## 2017-10-05 NOTE — TELEPHONE ENCOUNTER
Notified by Majo with [employer] that an appeal has to be made after the benefits authorization was denied because the use of the medication is an on-label use.  They will call and provide the information for the appeal in a call and a fax.

## 2017-10-11 ENCOUNTER — TELEPHONE (OUTPATIENT)
Dept: HEMATOLOGY/ONCOLOGY | Facility: CLINIC | Age: 62
End: 2017-10-11

## 2017-10-11 ENCOUNTER — HOSPITAL ENCOUNTER (OUTPATIENT)
Dept: RADIOLOGY | Facility: HOSPITAL | Age: 62
Discharge: HOME | End: 2017-10-11
Attending: RADIOLOGY
Payer: COMMERCIAL

## 2017-10-11 ENCOUNTER — OFFICE VISIT (OUTPATIENT)
Dept: RADIATION ONCOLOGY | Facility: CLINIC | Age: 62
End: 2017-10-11
Payer: COMMERCIAL

## 2017-10-11 VITALS
BODY MASS INDEX: 23.81 KG/M2 | TEMPERATURE: 97 F | DIASTOLIC BLOOD PRESSURE: 74 MMHG | SYSTOLIC BLOOD PRESSURE: 126 MMHG | HEART RATE: 107 BPM | WEIGHT: 200.81 LBS | RESPIRATION RATE: 20 BRPM

## 2017-10-11 DIAGNOSIS — C79.31 METASTASIS TO BRAIN: ICD-10-CM

## 2017-10-11 DIAGNOSIS — C34.2 PRIMARY CANCER OF RIGHT MIDDLE LOBE OF LUNG: Primary | ICD-10-CM

## 2017-10-11 DIAGNOSIS — Z09 CHEMOTHERAPY FOLLOW-UP EXAMINATION: ICD-10-CM

## 2017-10-11 DIAGNOSIS — C79.51 SECONDARY ADENOCARCINOMA OF BONE: ICD-10-CM

## 2017-10-11 PROCEDURE — 70552 MRI BRAIN STEM W/DYE: CPT | Mod: TC

## 2017-10-11 PROCEDURE — 99213 OFFICE O/P EST LOW 20 MIN: CPT | Mod: PBBFAC,25 | Performed by: RADIOLOGY

## 2017-10-11 PROCEDURE — 99999 PR PBB SHADOW E&M-EST. PATIENT-LVL III: CPT | Mod: PBBFAC,,, | Performed by: RADIOLOGY

## 2017-10-11 PROCEDURE — A9585 GADOBUTROL INJECTION: HCPCS | Performed by: RADIOLOGY

## 2017-10-11 PROCEDURE — 70552 MRI BRAIN STEM W/DYE: CPT | Mod: 26,,, | Performed by: RADIOLOGY

## 2017-10-11 PROCEDURE — 25500020 PHARM REV CODE 255: Performed by: RADIOLOGY

## 2017-10-11 PROCEDURE — 99212 OFFICE O/P EST SF 10 MIN: CPT | Mod: S$GLB,,, | Performed by: RADIOLOGY

## 2017-10-11 RX ORDER — GADOBUTROL 604.72 MG/ML
10 INJECTION INTRAVENOUS
Status: COMPLETED | OUTPATIENT
Start: 2017-10-11 | End: 2017-10-11

## 2017-10-11 RX ADMIN — GADOBUTROL 10 ML: 604.72 INJECTION INTRAVENOUS at 09:10

## 2017-10-11 NOTE — PROGRESS NOTES
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  He has no complaints. In particular no significant site of pain.     PET/CT on 7/19/17 shows multiple metastatic sites in the skeleton not previously known to be present. In particular, in the right side of T1, in the left iliac at the SI joint, in the left iliac wing, in the right first rib and in the left 2nd rib or left axilla.       MRI of the brain on 6/28/17 shows a 3 mm enhancing nodule that appears to have a risen on the right side of the posterior cerebral falx related to the parietal lobe. This was not seen on MRI of 7/18/16. However it is small enough that routine non stealth CT may not have visualized it. Stealth MRI of the brain today shows the posterior falcine nodule unchanged supporting the view that it is likely a small meningioma.      PHYSICAL EXAM: There are no evident neurologic deficits.     IMPRESSION: No evidence of brain metastasis. Surprisingly asymptomatic with recent development of multiple bone metastasis from adenocarcinoma of the lung. The primary site in the right middle lobe and mediastinal nodes has resolved.     PLAN: Patient is followed and treated with systemic therapy in oncology clinic by Dr Fernandez. He will return here as needed.

## 2017-10-11 NOTE — TELEPHONE ENCOUNTER
----- Message from Tameka Urban sent at 10/11/2017 12:51 PM CDT -----  Contact: Jules Machuca  Mr. Machuca is calling to speak with Ms. Lauren Vegas regarding an enrollment form from Dr. Fernandez.    He can be reached at [phone].    Thank you.

## 2017-10-16 ENCOUNTER — TELEPHONE (OUTPATIENT)
Dept: HEMATOLOGY/ONCOLOGY | Facility: CLINIC | Age: 62
End: 2017-10-16

## 2017-10-16 RX ORDER — SODIUM CHLORIDE 0.9 % (FLUSH) 0.9 %
10 SYRINGE (ML) INJECTION
Status: CANCELLED | OUTPATIENT
Start: 2017-10-18

## 2017-10-16 RX ORDER — HEPARIN 100 UNIT/ML
500 SYRINGE INTRAVENOUS
Status: CANCELLED | OUTPATIENT
Start: 2017-10-18

## 2017-10-16 NOTE — TELEPHONE ENCOUNTER
----- Message from Angela Denny sent at 10/16/2017  4:13 PM CDT -----  Contact: Jenifer with [employer] access   Jenifer calling regarding an appeal that was supposed to be submitted     Jenifer call back number  [phone]

## 2017-10-16 NOTE — TELEPHONE ENCOUNTER
----- Message from Georgette Martinez sent at 10/16/2017  9:47 AM CDT -----  Contact: Magaly Louise (Spouse)  Magaly would like to speak with a nurse regarding her 's chemo.  Magaly wants to know what type of chemo is Angel taking this time around.    Please contact Magaly back at [phone]    Thank you

## 2017-10-18 ENCOUNTER — LAB VISIT (OUTPATIENT)
Dept: LAB | Facility: HOSPITAL | Age: 62
End: 2017-10-18
Attending: INTERNAL MEDICINE
Payer: COMMERCIAL

## 2017-10-18 ENCOUNTER — OFFICE VISIT (OUTPATIENT)
Dept: HEMATOLOGY/ONCOLOGY | Facility: CLINIC | Age: 62
End: 2017-10-18
Payer: COMMERCIAL

## 2017-10-18 ENCOUNTER — TELEPHONE (OUTPATIENT)
Dept: HEMATOLOGY/ONCOLOGY | Facility: CLINIC | Age: 62
End: 2017-10-18

## 2017-10-18 VITALS
RESPIRATION RATE: 18 BRPM | BODY MASS INDEX: 23.95 KG/M2 | HEART RATE: 111 BPM | SYSTOLIC BLOOD PRESSURE: 122 MMHG | TEMPERATURE: 98 F | WEIGHT: 202 LBS | DIASTOLIC BLOOD PRESSURE: 75 MMHG | OXYGEN SATURATION: 95 %

## 2017-10-18 DIAGNOSIS — R59.0 MEDIASTINAL ADENOPATHY: ICD-10-CM

## 2017-10-18 DIAGNOSIS — C34.2 PRIMARY CANCER OF RIGHT MIDDLE LOBE OF LUNG: ICD-10-CM

## 2017-10-18 DIAGNOSIS — C34.2 PRIMARY CANCER OF RIGHT MIDDLE LOBE OF LUNG: Primary | ICD-10-CM

## 2017-10-18 DIAGNOSIS — C79.51 SECONDARY ADENOCARCINOMA OF BONE: ICD-10-CM

## 2017-10-18 DIAGNOSIS — R06.02 SHORTNESS OF BREATH: ICD-10-CM

## 2017-10-18 LAB
ALBUMIN SERPL BCP-MCNC: 3.5 G/DL
ALP SERPL-CCNC: 63 U/L
ALT SERPL W/O P-5'-P-CCNC: 15 U/L
ANION GAP SERPL CALC-SCNC: 9 MMOL/L
AST SERPL-CCNC: 18 U/L
BILIRUB SERPL-MCNC: 0.3 MG/DL
BUN SERPL-MCNC: 16 MG/DL
CALCIUM SERPL-MCNC: 10.1 MG/DL
CHLORIDE SERPL-SCNC: 106 MMOL/L
CO2 SERPL-SCNC: 25 MMOL/L
CREAT SERPL-MCNC: 1.1 MG/DL
ERYTHROCYTE [DISTWIDTH] IN BLOOD BY AUTOMATED COUNT: 19.6 %
EST. GFR  (AFRICAN AMERICAN): >60 ML/MIN/1.73 M^2
EST. GFR  (NON AFRICAN AMERICAN): >60 ML/MIN/1.73 M^2
GLUCOSE SERPL-MCNC: 88 MG/DL
HCT VFR BLD AUTO: 34 %
HGB BLD-MCNC: 11 G/DL
MAGNESIUM SERPL-MCNC: 2 MG/DL
MCH RBC QN AUTO: 29.2 PG
MCHC RBC AUTO-ENTMCNC: 32.4 G/DL
MCV RBC AUTO: 90 FL
NEUTROPHILS # BLD AUTO: 2.5 K/UL
PLATELET # BLD AUTO: 472 K/UL
PMV BLD AUTO: 9.1 FL
POTASSIUM SERPL-SCNC: 4.2 MMOL/L
PROT SERPL-MCNC: 8 G/DL
RBC # BLD AUTO: 3.77 M/UL
SODIUM SERPL-SCNC: 140 MMOL/L
WBC # BLD AUTO: 4.37 K/UL

## 2017-10-18 PROCEDURE — 99214 OFFICE O/P EST MOD 30 MIN: CPT | Mod: S$GLB,,, | Performed by: INTERNAL MEDICINE

## 2017-10-18 PROCEDURE — 99999 PR PBB SHADOW E&M-EST. PATIENT-LVL IV: CPT | Mod: PBBFAC,,, | Performed by: INTERNAL MEDICINE

## 2017-10-18 PROCEDURE — 99214 OFFICE O/P EST MOD 30 MIN: CPT | Mod: PBBFAC | Performed by: INTERNAL MEDICINE

## 2017-10-18 PROCEDURE — 83735 ASSAY OF MAGNESIUM: CPT

## 2017-10-18 PROCEDURE — 80053 COMPREHEN METABOLIC PANEL: CPT

## 2017-10-18 PROCEDURE — 36415 COLL VENOUS BLD VENIPUNCTURE: CPT

## 2017-10-18 PROCEDURE — 85027 COMPLETE CBC AUTOMATED: CPT

## 2017-10-18 NOTE — TELEPHONE ENCOUNTER
----- Message from Linnea Vegas RN sent at 10/18/2017  2:23 PM CDT -----  Patient Angel Torres MR # [id number] needs to be rescheduled for Zometa infusion. Wife wants you to call  her with new date. He could not wait any longer because he is due at work for 400 and has to bring wife home first. Thanks Latisha

## 2017-10-18 NOTE — TELEPHONE ENCOUNTER
----- Message from Angela Denny sent at 10/18/2017  1:30 PM CDT -----  Contact: Pt  Pt calling stating that he had an infusion appt for 1pm and they still have not been called to the back because the  is stating that they have no knowledge of pt getting treatment today    Pt call back number [phone]

## 2017-10-18 NOTE — PROGRESS NOTES
PATIENT: Angel Torres  MRN: [id number]  DATE: 10/18/2017      Diagnosis:   1. Primary cancer of right middle lobe of lung    2. Secondary adenocarcinoma of bone    3. Mediastinal adenopathy    4. Shortness of breath        Chief Complaint: Lung Cancer      Oncologic History:      Oncologic History Non-small cell lung cancer diagnosed 6/2016   Recurrent disease to lung 4/2017     Oncologic Treatment Cisplatin/Pemetrexed initiated 8/15/16 with concurrent radiation completed 3 cycles 10/3/16; 64 Gy  Carboplatin/pemetrexed/pembrolizumab 7/2017    Pathology Adenocarcinoma, T2b, N2, M0, Stage IIIA          Subjective:    Interval History: Mr. Torers is a 62 y.o. male who was seen in follow-up for lung cancer.  He states he has generally been feeling well.  He has some intermittent shortness of breath and cough.  He continues to work.  He is without other new complaints.    His history dates to approximately 4/2016 when he noted a worsening cough which was associated with clear sputum.  He had a chest x-ray in 5/16 showing a right middle lobe opacity.  Subsequent CT of the chest was performed on 5/30/16 showing a 5.6 cm lesion in the right middle lobe with hilar and mediastinal lymphadenopathy along with hepatic lesions and a left adrenal mass..  He underwent bronchoscopy which was nonrevealing and transbronchial biopsies were nondiagnostic.  He then underwent EBUS with biopsy of mediastinal lymph nodes with pathology showing non-small cell lung cancer consistent with adenocarcinoma.  He was started on concurrent chemotherapy and radiation with cisplatin and pemetrexed on 8/15/16.  He completed 64 Gy of concurrent radiation with cisplatin and pemetrexed with chemotherapy completed on 10/3/16.  Scans in 4/2017 had indicated progressive disease in the lung.  Subsequent PET scan in 7/2017 had shown spread of disease to bone.  He was started on carboplatin and pemetrexed and pembrolizumab in 7/2017.  Repeat imaging in 8/2017 had  shown stability of disease.    Past Medical History:   Past Medical History:   Diagnosis Date    Chemotherapy follow-up examination 9/7/2016    COPD (chronic obstructive pulmonary disease)     Hearing loss     Hypertension 2/9/2015    Primary cancer of right middle lobe of lung 7/11/2016    Rash 9/7/2016    Secondary adenocarcinoma of bone 7/26/2017       Past Surgical HIstory:   Past Surgical History:   Procedure Laterality Date    INGUINAL HERNIA REPAIR Bilateral     KNEE SURGERY         Family History:   Family History   Problem Relation Age of Onset    Cancer Mother      lung, breast    Cancer Sister      lung    Cancer Maternal Grandfather     No Known Problems Father     No Known Problems Brother     No Known Problems Maternal Aunt     No Known Problems Maternal Uncle     No Known Problems Paternal Aunt     No Known Problems Paternal Uncle     No Known Problems Maternal Grandmother     No Known Problems Paternal Grandmother     No Known Problems Paternal Grandfather     Amblyopia Neg Hx     Blindness Neg Hx     Cataracts Neg Hx     Diabetes Neg Hx     Glaucoma Neg Hx     Hypertension Neg Hx     Macular degeneration Neg Hx     Retinal detachment Neg Hx     Strabismus Neg Hx     Stroke Neg Hx     Thyroid disease Neg Hx        Social History:  reports that he quit smoking about 3 years ago. He has a 80.00 pack-year smoking history. He has quit using smokeless tobacco. He reports that he does not drink alcohol or use drugs.    Allergies:  Review of patient's allergies indicates:  No Known Allergies    Medications:  Current Outpatient Prescriptions   Medication Sig Dispense Refill    albuterol 90 mcg/actuation inhaler Inhale 2 puffs into the lungs every 6 (six) hours as needed for Wheezing. 18 g 0    albuterol-ipratropium 2.5mg-0.5mg/3mL (DUO-NEB) 0.5 mg-3 mg(2.5 mg base)/3 mL nebulizer solution Take 3 mLs by nebulization every 6 (six) hours as needed for Wheezing or Shortness of  Breath. 100 vial 1    amlodipine (NORVASC) 10 MG tablet Take 1 tablet (10 mg total) by mouth once daily. 90 tablet 3    cetirizine (ZYRTEC) 10 MG tablet Take 1 tablet (10 mg total) by mouth once daily.  0    dexamethasone (DECADRON) 4 MG Tab Take 1 tablet (4 mg total) by mouth every 12 (twelve) hours. Take 1 tab twice daily starting the day prior to chemotherapy for 3 days. 24 tablet 1    docusate sodium (COLACE) 100 MG capsule Take 1 capsule (100 mg total) by mouth 2 (two) times daily as needed for Constipation. 60 capsule 0    fluticasone (FLONASE) 50 mcg/actuation nasal spray SHAKE LIQUID AND USE 2 SPRAYS IN EACH NOSTRIL EVERY DAY 16 g 1    folic acid (FOLVITE) 400 MCG tablet TAKE 1 TABLET(400 MCG) BY MOUTH EVERY  tablet 0    hydrocortisone 1 % cream Apply to affected area 2 times daily 30 g 1    hydrOXYzine HCl (ATARAX) 25 MG tablet Take 1 tablet (25 mg total) by mouth 3 (three) times daily as needed for Itching. 20 tablet 0    naproxen (NAPROSYN) 500 MG tablet Take 1 tablet (500 mg total) by mouth 2 (two) times daily as needed (headache). 60 tablet 0     Current Facility-Administered Medications   Medication Dose Route Frequency Provider Last Rate Last Dose    cyanocobalamin injection 1,000 mcg  1,000 mcg Intramuscular 1 time in Clinic/HOD Dano Fernandez DO, FACP        cyanocobalamin injection 1,000 mcg  1,000 mcg Intramuscular 1 time in Clinic/HOD Dano Fernandez DO, FACP           Review of Systems   Constitutional: Negative for activity change, appetite change, chills, fatigue, fever and unexpected weight change.        Normal weight 220   HENT: Negative for dental problem, sinus pressure, sneezing and trouble swallowing.    Eyes: Negative for visual disturbance.   Respiratory: Positive for cough and shortness of breath. Negative for choking, chest tightness and wheezing.    Cardiovascular: Negative for chest pain and leg swelling.   Gastrointestinal: Negative for abdominal pain,  blood in stool, constipation, diarrhea and nausea.   Genitourinary: Negative for difficulty urinating and dysuria.   Musculoskeletal: Positive for myalgias. Negative for arthralgias and back pain.   Skin: Negative for rash and wound.   Neurological: Negative for dizziness, light-headedness and headaches.   Hematological: Negative for adenopathy. Does not bruise/bleed easily.   Psychiatric/Behavioral: Negative for sleep disturbance. The patient is not nervous/anxious.        ECOG Performance Status: 1   Objective:      Vitals:   Vitals:    10/18/17 1103   BP: 122/75   BP Location: Right arm   Patient Position: Sitting   BP Method: Medium (Automatic)   Pulse: (!) 111   Resp: 18   Temp: 98 °F (36.7 °C)   TempSrc: Oral   SpO2: 95%   Weight: 91.6 kg (202 lb)     BMI: Body mass index is 23.95 kg/m².    Physical Exam   Constitutional: He is oriented to person, place, and time. He appears well-developed and well-nourished.   HENT:   Head: Normocephalic and atraumatic.   Eyes: Pupils are equal, round, and reactive to light.   Neck: Normal range of motion. Neck supple.   Cardiovascular: Normal rate and regular rhythm.    Pulmonary/Chest: Effort normal. No respiratory distress. He has no rales.   Abdominal: Soft. He exhibits no distension. There is no tenderness.   Musculoskeletal: He exhibits no edema or tenderness.   Lymphadenopathy:     He has no cervical adenopathy.   Neurological: He is alert and oriented to person, place, and time. No cranial nerve deficit.   Skin: Skin is warm and dry. Rash noted.   Psychiatric: He has a normal mood and affect. His behavior is normal.       Laboratory Data:  Lab Visit on 10/18/2017   Component Date Value Ref Range Status    WBC 10/18/2017 4.37  3.90 - 12.70 K/uL Final    RBC 10/18/2017 3.77* 4.60 - 6.20 M/uL Final    Hemoglobin 10/18/2017 11.0* 14.0 - 18.0 g/dL Final    Hematocrit 10/18/2017 34.0* 40.0 - 54.0 % Final    MCV 10/18/2017 90  82 - 98 fL Final    MCH 10/18/2017 29.2   27.0 - 31.0 pg Final    MCHC 10/18/2017 32.4  32.0 - 36.0 g/dL Final    RDW 10/18/2017 19.6* 11.5 - 14.5 % Final    Platelets 10/18/2017 472* 150 - 350 K/uL Final    MPV 10/18/2017 9.1* 9.2 - 12.9 fL Final    Gran # 10/18/2017 2.5  1.8 - 7.7 K/uL Final    Comment: The ANC is based on a white cell differential from an   automated cell counter. It has not been microscopically   reviewed for the presence of abnormal cells. Clinical   correlation is required.      Sodium 10/18/2017 140  136 - 145 mmol/L Final    Potassium 10/18/2017 4.2  3.5 - 5.1 mmol/L Final    Chloride 10/18/2017 106  95 - 110 mmol/L Final    CO2 10/18/2017 25  23 - 29 mmol/L Final    Glucose 10/18/2017 88  70 - 110 mg/dL Final    BUN, Bld 10/18/2017 16  8 - 23 mg/dL Final    Creatinine 10/18/2017 1.1  0.5 - 1.4 mg/dL Final    Calcium 10/18/2017 10.1  8.7 - 10.5 mg/dL Final    Total Protein 10/18/2017 8.0  6.0 - 8.4 g/dL Final    Albumin 10/18/2017 3.5  3.5 - 5.2 g/dL Final    Total Bilirubin 10/18/2017 0.3  0.1 - 1.0 mg/dL Final    Comment: For infants and newborns, interpretation of results should be based  on gestational age, weight and in agreement with clinical  observations.  Premature Infant recommended reference ranges:  Up to 24 hours.............<8.0 mg/dL  Up to 48 hours............<12.0 mg/dL  3-5 days..................<15.0 mg/dL  6-29 days.................<15.0 mg/dL      Alkaline Phosphatase 10/18/2017 63  55 - 135 U/L Final    AST 10/18/2017 18  10 - 40 U/L Final    ALT 10/18/2017 15  10 - 44 U/L Final    Anion Gap 10/18/2017 9  8 - 16 mmol/L Final    eGFR if African American 10/18/2017 >60.0  >60 mL/min/1.73 m^2 Final    eGFR if non African American 10/18/2017 >60.0  >60 mL/min/1.73 m^2 Final    Comment: Calculation used to obtain the estimated glomerular filtration  rate (eGFR) is the CKD-EPI equation. Since race is unknown   in our information system, the eGFR values for   -American and  Non--American patients are given   for each creatinine result.      Magnesium 10/18/2017 2.0  1.6 - 2.6 mg/dL Final   Lab Visit on 09/27/2017   Component Date Value Ref Range Status    WBC 09/27/2017 4.71  3.90 - 12.70 K/uL Final    RBC 09/27/2017 3.98* 4.60 - 6.20 M/uL Final    Hemoglobin 09/27/2017 11.1* 14.0 - 18.0 g/dL Final    Hematocrit 09/27/2017 33.1* 40.0 - 54.0 % Final    MCV 09/27/2017 83  82 - 98 fL Final    MCH 09/27/2017 27.9  27.0 - 31.0 pg Final    MCHC 09/27/2017 33.5  32.0 - 36.0 g/dL Final    RDW 09/27/2017 18.0* 11.5 - 14.5 % Final    Platelets 09/27/2017 470* 150 - 350 K/uL Final    MPV 09/27/2017 8.5* 9.2 - 12.9 fL Final    Gran # 09/27/2017 3.7  1.8 - 7.7 K/uL Final    Comment: The ANC is based on a white cell differential from an   automated cell counter. It has not been microscopically   reviewed for the presence of abnormal cells. Clinical   correlation is required.      Sodium 09/27/2017 137  136 - 145 mmol/L Final    Potassium 09/27/2017 4.4  3.5 - 5.1 mmol/L Final    Chloride 09/27/2017 105  95 - 110 mmol/L Final    CO2 09/27/2017 24  23 - 29 mmol/L Final    Glucose 09/27/2017 140* 70 - 110 mg/dL Final    BUN, Bld 09/27/2017 16  8 - 23 mg/dL Final    Creatinine 09/27/2017 1.1  0.5 - 1.4 mg/dL Final    Calcium 09/27/2017 9.7  8.7 - 10.5 mg/dL Final    Total Protein 09/27/2017 8.3  6.0 - 8.4 g/dL Final    Albumin 09/27/2017 3.4* 3.5 - 5.2 g/dL Final    Total Bilirubin 09/27/2017 0.3  0.1 - 1.0 mg/dL Final    Comment: For infants and newborns, interpretation of results should be based  on gestational age, weight and in agreement with clinical  observations.  Premature Infant recommended reference ranges:  Up to 24 hours.............<8.0 mg/dL  Up to 48 hours............<12.0 mg/dL  3-5 days..................<15.0 mg/dL  6-29 days.................<15.0 mg/dL      Alkaline Phosphatase 09/27/2017 66  55 - 135 U/L Final    AST 09/27/2017 18  10 - 40 U/L Final     ALT 09/27/2017 18  10 - 44 U/L Final    Anion Gap 09/27/2017 8  8 - 16 mmol/L Final    eGFR if African American 09/27/2017 >60.0  >60 mL/min/1.73 m^2 Final    eGFR if non African American 09/27/2017 >60.0  >60 mL/min/1.73 m^2 Final    Comment: Calculation used to obtain the estimated glomerular filtration  rate (eGFR) is the CKD-EPI equation. Since race is unknown   in our information system, the eGFR values for   -American and Non--American patients are given   for each creatinine result.      Magnesium 09/27/2017 2.0  1.6 - 2.6 mg/dL Final     Supplemental Diagnosis 6/30/16  Immunohistochemical stains are completed on the Station 4L lymph node cell block material and reveal the tumor  cells to stain positively with cytokeratin 7 and TTF-1. The tumor cells show nonspecific blush staining with  cytokeratin 5/6 and are negative for p63. This staining profile supports a diagnosis of non-small cell carcinoma  consistent with adenocarcinoma.  Cell block material will be sent for EGFR, ALK and ROS-1 testing and results will follow in a supplemental report.  (Electronically Signed: 2016-06-30 11:50:31 )  Diagnosed by: Irene Amaro M.D.  FINAL PATHOLOGIC DIAGNOSIS  1. Lymph node, Station 4L, EBUS guided fine needle aspiration:  Positive for malignant cells, non-small cell carcinoma.  Rare background lymphocytes are present.  Immunohistochemical staining be completed to further characterize this malignancy and results will follow in a  supplemental report.  2. Lymph node, Station 7, EBUS guided fine needle aspiration:  Positive for malignant cells, non-small cell carcinoma tumor identical to that seen in specimen #1.  Background lymphocytes are present.    Imaging:   CT 8/30/17  Comparison: PET/CT 7/19/17; CT chest 7/12/17, 4/12/17.    Clinical History: Lung cancer.     Findings:    Examination of the vascular and soft tissue structures at the base of the neck is unremarkable.    The thoracic aorta  maintains normal caliber, contour, and course with minimal atherosclerotic calcification within its course.  The heart is not enlarged and there is no evidence of pericardial effusion.    The esophagus maintains a normal course and caliber. There is no axillary or mediastinal lymphadenopathy.  Small right hilar lymph node identified.    There is slight rightward mediastinal shift secondary to right-sided volume loss. Findings of significant centrilobular versus panlobular emphysema with an upper lobe predominance again noted. Additionally, there are chronic changes of postradiation therapy with scattered reticulation and architectural distortion within the right middle and lower lobes.     The trachea and proximal airways appear patent, noting persistent occlusion of the lateral right middle lobe segmental bronchus by a 4.0 x 2.5 cm soft tissue mass (series 2, image 41) (previously 4.0 x 2.3 cm) at the junction of the major and minor fissures. Again seen are several nodular opacities within the medial basal segment of the right lower lobe near the pleural margin, which were better visualized on the previous chest CT dated 7/12/17. The largest nodular soft tissue opacity measures approximately 1.2 cm in the medial basal segment of the right lower lobe (series 2, image 51), previously 1.4 cm.     The previously identified soft tissue opacifications along the inferior aspect of the posterior basal segment of the right lower lobe are not well-visualized on today's study.The interlobular septal thickening within the peripheral aspect of the right lower lobe is better visualized on the prior dedicated chest CT. No significant focal consolidation identified within the left lung. No pneumothorax.    The liver is normal in size and attenuation. There are multiple hypodensities scattered throughout the hepatic parenchyma most which are subcentimeter in size and cannot be definitively characterized. The largest measures 1.2 cm  in hepatic segment VI, and is most consistent with a hepatic cyst. The gallbladder shows no evidence of stones or pericholecystic fluid.  There is no intra-or extrahepatic biliary ductal dilatation.    The stomach and pancreas are unremarkable. There is a 1.6 cm hypodensity in the anterior superior margin of the spleen (series 2, image 55) which contains internal septations and has decreased in size when compared to the prior PET/CT dated 7/2017, concerning for metastatic disease.    There is a 1.3 cm hypodense nodule in the left adrenal gland which demonstrated low attenuation on the prior chest CT consistent with an adenoma. The right adrenal gland is unremarkable.    The kidneys are normal in size and location and concentrate and excrete contrast properly on delayed imaging. Bilateral subcentimeter renal hypodensities are seen, which are to small to characterize may represent cysts. There is no evidence of hydronephrosis.  The ureters appear normal in course and caliber without evidence of ureteral dilatation. There is a small outpouching of the anterior superior aspect of the urinary bladder, suggestive of a small urachal diverticulum. The prostate gland is mildly enlarged and contains dystrophic calcifications.    The abdominal aorta is normal in course and caliber with moderate atherosclerotic calcifications.    The visualized loops of small and large bowel show no evidence of obstruction or inflammation.  There is no ascites, free fluid, or intraperitoneal free air. There are numerous scattered small periaortic lymph nodes visualized.    Osseous structures demonstrate several sclerotic lesions which demonstrated hypermetabolic activity on the most recent PET/CT scan concerning for metastatic disease. There is a 2.2 cm sclerotic lesion along the right lateral aspect of the T1 vertebral body (series 2, image 4) extending into the posterior elements. Additionally, there are two subtle sclerotic lesions within  the left iliac wing concerning for metastatic disease.     These The extraperitoneal soft tissues are unremarkable.   Impression         1. In this patient with a history of metastatic lung cancer, there are sclerotic osseous lesions in the T1 vertebral body and left iliac wing identified, which demonstrated hypermetabolic activity on the previous PET/CT, concerning for osseous metastatic disease. Please see below for RECIST summary.    2. Relatively stable appearance of the right middle lobe soft tissue mass with persistent occlusion of the lateral middle lobe segmental bronchus.    3. Multiple right lower lobe nodular soft tissue opacities concerning for metastatic disease, which appear less conspicuous on today's study when compared to the previous chest CT dated 7/12/17.    4. Hypodense splenic lesion with internal septations, which has decreased in size when compared to the prior PET/CT 7/19/17, concerning for metastatic disease.    5. Multiple hypodense hepatic lesions, most which are too small to definitely characterize.     6. Hypodense left adrenal nodule, consistent with adenoma.    7. 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      RECIST SUMMARY:  Lesion 1: 4.0 x 2.5 cm right middle lobe soft tissue mass (series 2, image 41); previously measuring 4.0 x 2.3 cm.   Lesion 2: 1.2 cm soft tissue nodule in the mediobasal segment of the right lower lobe (series 2, image 51); previously measuring 1.4 cm.  Lesion 3: 2.2 cm sclerotic lesion in the T1 vertebral body (series 2, image 4). No prior measurement.  Lesion 4: 1.6 cm hypodense splenic lesion (series 2, image 55); previously measuring 2.3 cm.                 Assessment:       1. Primary cancer of right middle lobe of lung    2. Secondary adenocarcinoma of bone    3. Mediastinal adenopathy    4. Shortness of breath           Plan:     Mr. Torres was to start maintenance pembrolizumab, however, we are awaiting on insurance approval for this.  Once this is approved  we will proceed with treatment and I will plan to see him back with his second cycle.  His shortness of breath is relatively unchanged and we'll monitor.  He will receive Zometa infusion today.  All questions were answered.    Dano Fernandez DO, [facility]  Hematology & Oncology  [facility] [place] [address]  ph. [phone]  Fax. [phone]    25 minutes were spent in coordination of patient's care, record review and counseling.  More than 50% of the time was face-to-face.

## 2017-10-19 NOTE — TELEPHONE ENCOUNTER
----- Message from Maximo Faria sent at 10/19/2017 12:41 PM CDT -----  Contact: [employer]  Will like a call regarding followed up on appeal for prior authorization Akshat     Contact::[phone]

## 2017-10-24 ENCOUNTER — TELEPHONE (OUTPATIENT)
Dept: NEUROLOGY | Facility: HOSPITAL | Age: 62
End: 2017-10-24

## 2017-10-24 NOTE — TELEPHONE ENCOUNTER
----- Message from Hayde Tyler sent at 10/24/2017 12:36 PM CDT -----  Contact: Mr Machuca with [facility] [facility] [phone]   Mr Machuca with [facility] [facility] [employer] [facility] called to speak to the nurse (Lauren Vegas) regarding the pt and would like a call back. Mr Machuca stated that if his call isn't returned the pt's services will be terminated.    Mr Machuca can be reached at [phone].    Thanks

## 2017-10-24 NOTE — TELEPHONE ENCOUNTER
Spoke to [facility] and she is going to send a request that patient get coverage for Keytruda while we wait for the appeal from [employer]. She will have Sven call me tomorrow with specifics of what we can do to get the patient the medications within the next week.

## 2017-10-25 ENCOUNTER — INFUSION (OUTPATIENT)
Dept: INFUSION THERAPY | Facility: HOSPITAL | Age: 62
End: 2017-10-25
Attending: INTERNAL MEDICINE
Payer: COMMERCIAL

## 2017-10-25 VITALS
DIASTOLIC BLOOD PRESSURE: 77 MMHG | SYSTOLIC BLOOD PRESSURE: 134 MMHG | HEART RATE: 113 BPM | TEMPERATURE: 98 F | RESPIRATION RATE: 16 BRPM

## 2017-10-25 DIAGNOSIS — C79.51 SECONDARY ADENOCARCINOMA OF BONE: Primary | ICD-10-CM

## 2017-10-25 PROCEDURE — 25000003 PHARM REV CODE 250: Performed by: INTERNAL MEDICINE

## 2017-10-25 PROCEDURE — 96365 THER/PROPH/DIAG IV INF INIT: CPT

## 2017-10-25 PROCEDURE — 63600175 PHARM REV CODE 636 W HCPCS: Performed by: INTERNAL MEDICINE

## 2017-10-25 RX ORDER — SODIUM CHLORIDE 0.9 % (FLUSH) 0.9 %
10 SYRINGE (ML) INJECTION
Status: CANCELLED | OUTPATIENT
Start: 2018-01-24

## 2017-10-25 RX ORDER — HEPARIN 100 UNIT/ML
500 SYRINGE INTRAVENOUS
Status: CANCELLED | OUTPATIENT
Start: 2017-11-01

## 2017-10-25 RX ORDER — SODIUM CHLORIDE 0.9 % (FLUSH) 0.9 %
10 SYRINGE (ML) INJECTION
Status: DISCONTINUED | OUTPATIENT
Start: 2017-10-25 | End: 2017-10-25 | Stop reason: HOSPADM

## 2017-10-25 RX ORDER — SODIUM CHLORIDE 0.9 % (FLUSH) 0.9 %
10 SYRINGE (ML) INJECTION
Status: CANCELLED | OUTPATIENT
Start: 2017-11-01

## 2017-10-25 RX ORDER — HEPARIN 100 UNIT/ML
500 SYRINGE INTRAVENOUS
Status: CANCELLED | OUTPATIENT
Start: 2018-01-24

## 2017-10-25 RX ADMIN — SODIUM CHLORIDE 4 MG: 9 INJECTION, SOLUTION INTRAVENOUS at 09:10

## 2017-10-25 NOTE — PLAN OF CARE
Problem: Patient Care Overview  Goal: Plan of Care Review  Outcome: Ongoing (interventions implemented as appropriate)  Pt tolerated zometa without adverse effects. VSS. Pending auth for keytruda.  Provided AVS & verbalized understanding of RTC date. DC with family ambulating independently.

## 2017-10-27 ENCOUNTER — TELEPHONE (OUTPATIENT)
Dept: HEMATOLOGY/ONCOLOGY | Facility: CLINIC | Age: 62
End: 2017-10-27

## 2017-10-27 NOTE — TELEPHONE ENCOUNTER
----- Message from Jenifer Limon sent at 10/27/2017 12:21 PM CDT -----  ROSE MARIE Machuca from the [employer] is calling because he needs a copy of the appeal of prior authorization that was submitted to the insurance company faxed to [phone] or call [phone].

## 2017-10-30 ENCOUNTER — TELEPHONE (OUTPATIENT)
Dept: HEMATOLOGY/ONCOLOGY | Facility: CLINIC | Age: 62
End: 2017-10-30

## 2017-10-30 NOTE — TELEPHONE ENCOUNTER
----- Message from Angela Denny sent at 10/30/2017  2:14 PM CDT -----  Contact: Diamond pt wife  Pt wife calling regarding pt appts. She states that she missed a call and she also has some updated insurance information    Diamond call back number [phone]

## 2017-10-31 NOTE — TELEPHONE ENCOUNTER
----- Message from Linnea Vegas RN sent at 10/30/2017  6:16 PM CDT -----  Contact: Diamond pt wife  She said she missed a call about appts.  Also, let them know to just bring info about insurance when she comes.  Tell her to call again about Keytruda, I can update, just not at 6pm  ----- Message -----  From: Angela Denny  Sent: 10/30/2017   2:14 PM  To: Jim Matson [facility]    Pt wife calling regarding pt appts. She states that she missed a call and she also has some updated insurance information    Diamond call back number [phone]

## 2017-11-01 ENCOUNTER — TELEPHONE (OUTPATIENT)
Dept: HEMATOLOGY/ONCOLOGY | Facility: CLINIC | Age: 62
End: 2017-11-01

## 2017-11-01 NOTE — TELEPHONE ENCOUNTER
----- Message from Maki Goldberg sent at 10/31/2017 12:16 PM CDT -----  Contact: [phone] ext [phone] Lyssa with [facility]  Lyssa with [facility] states that [facility] need clinicals for the patient because the Rx for Latuda was denied. Doctor can call [facility] at [phone] authorization number [id number] for clarification. Please advise.

## 2017-11-01 NOTE — TELEPHONE ENCOUNTER
Spoke to rep with [employer] and they are saying that because the patient received the regimen of carbo/alimpta/keytruda as a second line treatment, Keytruda does not meet the criteria for treatment.    They want to set up a peer to peer but they want a time and phone number to call Dr. Fernandez so they can contact him before Nov. 8.

## 2017-11-01 NOTE — TELEPHONE ENCOUNTER
----- Message from Maximo Faria sent at 10/31/2017 11:05 AM CDT -----  Contact: Pharmacy   Will like to know were pervious dosages paid for by other insurance for medication Keytruda     Appeal is due on Nov.8    Contact::[phone] [phone]

## 2017-11-01 NOTE — TELEPHONE ENCOUNTER
LVM that I didn't know if the previous Keytruda doses were paid, I don't do authorizations for chemotherapy.  Asked to get a call back.

## 2017-11-02 RX ORDER — FLUTICASONE PROPIONATE 50 MCG
SPRAY, SUSPENSION (ML) NASAL
Refills: 0 | OUTPATIENT
Start: 2017-11-02

## 2017-11-02 NOTE — TELEPHONE ENCOUNTER
Spoke to [facility], the oncologist to call Dr. Fernandez had an appointment this morning and will be available around 10.  I told her that would be fine, I would notify her if it wasn't.

## 2017-11-02 NOTE — TELEPHONE ENCOUNTER
----- Message from Maximo Faria sent at 11/2/2017  8:20 AM CDT -----  Contact: Catarina Elise is returning [facility]'s call regarding peer to peer for medication Akshat     Will like to know other availability due to oncologist not being there. Anytime before Nov.8    Contact::[phone] [phone]

## 2017-11-02 NOTE — TELEPHONE ENCOUNTER
----- Message from Angela Denny sent at 11/2/2017  2:42 PM CDT -----  Contact: Lucila andrade stating that the appeal has been approved    Gosia call back number [phone] ext [phone]

## 2017-11-02 NOTE — TELEPHONE ENCOUNTER
----- Message from Jossy Guadarrama sent at 11/2/2017  1:50 PM CDT -----  Contact: Sven with [employer]  RR- Sven with [employer] Patient assistance program called stating the patient only has a temporary approval for two treatments for Keytruda. Sven states they will need verification of submission of appeal . Please call Sven back today at [phone].

## 2017-11-02 NOTE — TELEPHONE ENCOUNTER
Was unable to speak to a .  I let the rep I spoke to know that there was an appeal approval this afternoon.  I will call with an auth number as soon as one is received from [employer].

## 2017-11-03 ENCOUNTER — TELEPHONE (OUTPATIENT)
Dept: HEMATOLOGY/ONCOLOGY | Facility: CLINIC | Age: 62
End: 2017-11-03

## 2017-11-03 NOTE — TELEPHONE ENCOUNTER
----- Message from Breana May sent at 11/3/2017  8:53 AM CDT -----  Contact: [facility]  Gosia is returning the nurse, Lauren, phone call.      Gosia can be reached at [phone].      Thank you

## 2017-11-03 NOTE — TELEPHONE ENCOUNTER
Spoke to Ms. Fields, let her know that the Keytruda was approved with [employer].      She also complained of him feeling sick after getting the zometa.  I told her this was common and to make sure that he was taking calcium daily.

## 2017-11-03 NOTE — TELEPHONE ENCOUNTER
----- Message from Maximo Faria sent at 11/2/2017  3:08 PM CDT -----  Contact: Spouse-Magaly   Will like a call from [facility] regarding insurance    Contact::[phone]

## 2017-11-03 NOTE — TELEPHONE ENCOUNTER
----- Message from Linnea Vegas RN sent at 11/3/2017 11:02 AM CDT -----  He definitely needs labs.  Dr. Fernandez does he need a visit prior to his Keytruda?     ----- Message -----  From: Michael Parson  Sent: 11/3/2017  10:31 AM  To: Linnea Vegas RN    Hello, please ask Dr. Fernandez if he will need to see Mr. Torres and get  Labs? I have him schedule for 9am   ----- Message -----  From: Linnea Vegas RN  Sent: 11/3/2017  10:04 AM  To: Michael Parson    I got his approval from his insurance company for his keytruda, gave the approval codes and dates to auth, please schedule him for his infusion this coming Wednesday if possible.  Dr. Fernandez wants him to get treatment ASAP, his wife Diamond said Wed are still the best days.

## 2017-11-03 NOTE — TELEPHONE ENCOUNTER
Received approval code from Gosia from [employer]  [id number].  Code given to auth, will call [facility] with auth number.

## 2017-11-07 ENCOUNTER — TELEPHONE (OUTPATIENT)
Dept: HEMATOLOGY/ONCOLOGY | Facility: CLINIC | Age: 62
End: 2017-11-07

## 2017-11-07 NOTE — TELEPHONE ENCOUNTER
Spoke with Sven with [facility].  Gave him the approval codes and dates for approval from [employer].  Explained that while they approved it the authorizations department is unsure if he is still under that policy.  The wife was to call and give a new number.  I left a message with both Jania Boudreaux and Luna Sterling, who are working on authorizations. Will fax final result to Sven at [facility] at [phone]

## 2017-11-08 ENCOUNTER — INFUSION (OUTPATIENT)
Dept: INFUSION THERAPY | Facility: HOSPITAL | Age: 62
End: 2017-11-08
Attending: INTERNAL MEDICINE
Payer: COMMERCIAL

## 2017-11-08 ENCOUNTER — OFFICE VISIT (OUTPATIENT)
Dept: HEMATOLOGY/ONCOLOGY | Facility: CLINIC | Age: 62
End: 2017-11-08
Payer: COMMERCIAL

## 2017-11-08 VITALS
BODY MASS INDEX: 23.38 KG/M2 | SYSTOLIC BLOOD PRESSURE: 118 MMHG | WEIGHT: 198 LBS | HEART RATE: 105 BPM | HEIGHT: 77 IN | DIASTOLIC BLOOD PRESSURE: 70 MMHG | RESPIRATION RATE: 20 BRPM

## 2017-11-08 VITALS
TEMPERATURE: 97 F | BODY MASS INDEX: 23.4 KG/M2 | SYSTOLIC BLOOD PRESSURE: 147 MMHG | RESPIRATION RATE: 10 BRPM | WEIGHT: 198.19 LBS | HEART RATE: 114 BPM | DIASTOLIC BLOOD PRESSURE: 85 MMHG | OXYGEN SATURATION: 96 % | HEIGHT: 77 IN

## 2017-11-08 DIAGNOSIS — R63.0 DECREASED APPETITE: ICD-10-CM

## 2017-11-08 DIAGNOSIS — R59.0 MEDIASTINAL ADENOPATHY: ICD-10-CM

## 2017-11-08 DIAGNOSIS — C34.2 PRIMARY CANCER OF RIGHT MIDDLE LOBE OF LUNG: Primary | ICD-10-CM

## 2017-11-08 DIAGNOSIS — C79.51 SECONDARY ADENOCARCINOMA OF BONE: ICD-10-CM

## 2017-11-08 PROCEDURE — 99214 OFFICE O/P EST MOD 30 MIN: CPT | Mod: S$GLB,,, | Performed by: INTERNAL MEDICINE

## 2017-11-08 PROCEDURE — 96413 CHEMO IV INFUSION 1 HR: CPT

## 2017-11-08 PROCEDURE — 25000003 PHARM REV CODE 250: Performed by: INTERNAL MEDICINE

## 2017-11-08 PROCEDURE — 63600175 PHARM REV CODE 636 W HCPCS: Performed by: INTERNAL MEDICINE

## 2017-11-08 PROCEDURE — 99999 PR PBB SHADOW E&M-EST. PATIENT-LVL IV: CPT | Mod: PBBFAC,,, | Performed by: INTERNAL MEDICINE

## 2017-11-08 RX ORDER — DRONABINOL 5 MG/1
5 CAPSULE ORAL
Qty: 60 CAPSULE | Refills: 0 | Status: SHIPPED | OUTPATIENT
Start: 2017-11-08 | End: 2017-12-27 | Stop reason: SDUPTHER

## 2017-11-08 RX ORDER — SODIUM CHLORIDE 0.9 % (FLUSH) 0.9 %
10 SYRINGE (ML) INJECTION
Status: DISCONTINUED | OUTPATIENT
Start: 2017-11-08 | End: 2017-11-08 | Stop reason: HOSPADM

## 2017-11-08 RX ORDER — HEPARIN 100 UNIT/ML
500 SYRINGE INTRAVENOUS
Status: DISCONTINUED | OUTPATIENT
Start: 2017-11-08 | End: 2017-11-08 | Stop reason: HOSPADM

## 2017-11-08 RX ADMIN — SODIUM CHLORIDE: 9 INJECTION, SOLUTION INTRAVENOUS at 09:11

## 2017-11-08 RX ADMIN — SODIUM CHLORIDE 200 MG: 9 INJECTION, SOLUTION INTRAVENOUS at 09:11

## 2017-11-08 NOTE — TELEPHONE ENCOUNTER
----- Message from Dano Fernandez DO, FACP sent at 11/8/2017  9:08 AM CST -----  Pembrolizumab today  CT scan this week if possible  See me in 3 weeks

## 2017-11-08 NOTE — PLAN OF CARE
Problem: Patient Care Overview  Goal: Plan of Care Review  Outcome: Outcome(s) achieved Date Met: 11/08/17  Pt arrived for Keytruda (6/14/17) chemo infusion. #24g PIV X 1 attempt to RH, + blood return noted, flushes easily. Pt tolerated treatment very well. PIV D/C'd w/o difficulty. Pt D/C'd home with family & instructions

## 2017-11-08 NOTE — PROGRESS NOTES
PATIENT: Angel Torres  MRN: [id number]  DATE: 11/8/2017      Diagnosis:   1. Primary cancer of right middle lobe of lung    2. Secondary adenocarcinoma of bone    3. Decreased appetite        Chief Complaint: Malignant neoplasm of overlapping sites of lung, unspecified      Oncologic History:      Oncologic History Non-small cell lung cancer diagnosed 6/2016   Recurrent disease to lung 4/2017     Oncologic Treatment Cisplatin/Pemetrexed initiated 8/15/16 with concurrent radiation completed 3 cycles 10/3/16; 64 Gy  Carboplatin/pemetrexed/pembrolizumab 7/2017 - 9/2017  Pembrolizumab maintenance 11/2017     Pathology Adenocarcinoma, T2b, N2, M0, Stage IIIA          Subjective:    Interval History: Mr. Torres is a 62 y.o. male who was seen in follow-up for lung cancer.  He states he has generally been feeling well.  He states he is having some ongoing shortness of breath and his noted a decrease in his appetite.  He has no other new complaints.    His history dates to approximately 4/2016 when he noted a worsening cough which was associated with clear sputum.  He had a chest x-ray in 5/16 showing a right middle lobe opacity.  Subsequent CT of the chest was performed on 5/30/16 showing a 5.6 cm lesion in the right middle lobe with hilar and mediastinal lymphadenopathy along with hepatic lesions and a left adrenal mass..  He underwent bronchoscopy which was nonrevealing and transbronchial biopsies were nondiagnostic.  He then underwent EBUS with biopsy of mediastinal lymph nodes with pathology showing non-small cell lung cancer consistent with adenocarcinoma.  He was started on concurrent chemotherapy and radiation with cisplatin and pemetrexed on 8/15/16.  He completed 64 Gy of concurrent radiation with cisplatin and pemetrexed with chemotherapy completed on 10/3/16.  Scans in 4/2017 had indicated progressive disease in the lung.  Subsequent PET scan in 7/2017 had shown spread of disease to bone.  He was started on  carboplatin and pemetrexed and pembrolizumab in 7/2017.  Repeat imaging in 8/2017 had shown stability of disease.  He was started on pembrolizumab maintenance in 11/2017 after a delay due to his insurance.    Past Medical History:   Past Medical History:   Diagnosis Date    Chemotherapy follow-up examination 9/7/2016    COPD (chronic obstructive pulmonary disease)     Decreased appetite 11/8/2017    Hearing loss     Hypertension 2/9/2015    Primary cancer of right middle lobe of lung 7/11/2016    Rash 9/7/2016    Secondary adenocarcinoma of bone 7/26/2017       Past Surgical HIstory:   Past Surgical History:   Procedure Laterality Date    INGUINAL HERNIA REPAIR Bilateral     KNEE SURGERY         Family History:   Family History   Problem Relation Age of Onset    Cancer Mother      lung, breast    Cancer Sister      lung    Cancer Maternal Grandfather     No Known Problems Father     No Known Problems Brother     No Known Problems Maternal Aunt     No Known Problems Maternal Uncle     No Known Problems Paternal Aunt     No Known Problems Paternal Uncle     No Known Problems Maternal Grandmother     No Known Problems Paternal Grandmother     No Known Problems Paternal Grandfather     Amblyopia Neg Hx     Blindness Neg Hx     Cataracts Neg Hx     Diabetes Neg Hx     Glaucoma Neg Hx     Hypertension Neg Hx     Macular degeneration Neg Hx     Retinal detachment Neg Hx     Strabismus Neg Hx     Stroke Neg Hx     Thyroid disease Neg Hx        Social History:  reports that he quit smoking about 3 years ago. He has a 80.00 pack-year smoking history. He has quit using smokeless tobacco. He reports that he does not drink alcohol or use drugs.    Allergies:  Review of patient's allergies indicates:  No Known Allergies    Medications:  Current Outpatient Prescriptions   Medication Sig Dispense Refill    albuterol 90 mcg/actuation inhaler Inhale 2 puffs into the lungs every 6 (six) hours as  needed for Wheezing. 18 g 0    albuterol-ipratropium 2.5mg-0.5mg/3mL (DUO-NEB) 0.5 mg-3 mg(2.5 mg base)/3 mL nebulizer solution Take 3 mLs by nebulization every 6 (six) hours as needed for Wheezing or Shortness of Breath. 100 vial 1    amlodipine (NORVASC) 10 MG tablet Take 1 tablet (10 mg total) by mouth once daily. 90 tablet 3    cetirizine (ZYRTEC) 10 MG tablet Take 1 tablet (10 mg total) by mouth once daily.  0    fluticasone (FLONASE) 50 mcg/actuation nasal spray SHAKE LIQUID AND USE 2 SPRAYS IN EACH NOSTRIL EVERY DAY 16 g 1    folic acid (FOLVITE) 400 MCG tablet TAKE 1 TABLET(400 MCG) BY MOUTH EVERY  tablet 0    naproxen (NAPROSYN) 500 MG tablet Take 1 tablet (500 mg total) by mouth 2 (two) times daily as needed (headache). 60 tablet 0    dronabinol (MARINOL) 5 MG capsule Take 1 capsule (5 mg total) by mouth 2 (two) times daily before meals. 60 capsule 0     Current Facility-Administered Medications   Medication Dose Route Frequency Provider Last Rate Last Dose    cyanocobalamin injection 1,000 mcg  1,000 mcg Intramuscular 1 time in Clinic/HOD Dano Fernandez DO, DIONISIO        cyanocobalamin injection 1,000 mcg  1,000 mcg Intramuscular 1 time in Clinic/HOD Dano Fernandez DO, FACDENNIS           Review of Systems   Constitutional: Negative for activity change, appetite change, chills, fatigue, fever and unexpected weight change.        Normal weight 220   HENT: Negative for dental problem, sinus pressure, sneezing and trouble swallowing.    Eyes: Negative for visual disturbance.   Respiratory: Positive for cough and shortness of breath. Negative for choking, chest tightness and wheezing.    Cardiovascular: Negative for chest pain and leg swelling.   Gastrointestinal: Negative for abdominal pain, blood in stool, constipation, diarrhea and nausea.   Genitourinary: Negative for difficulty urinating and dysuria.   Musculoskeletal: Positive for myalgias. Negative for arthralgias and back pain.  "  Skin: Negative for rash and wound.   Neurological: Negative for dizziness, light-headedness and headaches.   Hematological: Negative for adenopathy. Does not bruise/bleed easily.   Psychiatric/Behavioral: Negative for sleep disturbance. The patient is not nervous/anxious.        ECOG Performance Status: 1   Objective:      Vitals:   Vitals:    11/08/17 0833   BP: (!) 147/85   BP Location: Right arm   Patient Position: Sitting   BP Method: X-Large (Automatic)   Pulse: (!) 114   Resp: 10   Temp: 97.4 °F (36.3 °C)   TempSrc: Oral   SpO2: 96%   Weight: 89.9 kg (198 lb 3.1 oz)   Height: 6' 5" (1.956 m)     BMI: Body mass index is 23.5 kg/m².    Physical Exam   Constitutional: He is oriented to person, place, and time. He appears well-developed and well-nourished.   HENT:   Head: Normocephalic and atraumatic.   Eyes: Pupils are equal, round, and reactive to light.   Neck: Normal range of motion. Neck supple.   Cardiovascular: Normal rate and regular rhythm.    Pulmonary/Chest: Effort normal. No respiratory distress. He has no rales.   Abdominal: Soft. He exhibits no distension. There is no tenderness.   Musculoskeletal: He exhibits no edema or tenderness.   Lymphadenopathy:     He has no cervical adenopathy.   Neurological: He is alert and oriented to person, place, and time. No cranial nerve deficit.   Skin: Skin is warm and dry. Rash noted.   Psychiatric: He has a normal mood and affect. His behavior is normal.       Laboratory Data:  Lab Visit on 11/08/2017   Component Date Value Ref Range Status    WBC 11/08/2017 10.36  3.90 - 12.70 K/uL Final    RBC 11/08/2017 3.93* 4.60 - 6.20 M/uL Final    Hemoglobin 11/08/2017 11.5* 14.0 - 18.0 g/dL Final    Hematocrit 11/08/2017 35.4* 40.0 - 54.0 % Final    MCV 11/08/2017 90  82 - 98 fL Final    MCH 11/08/2017 29.3  27.0 - 31.0 pg Final    MCHC 11/08/2017 32.5  32.0 - 36.0 g/dL Final    RDW 11/08/2017 16.9* 11.5 - 14.5 % Final    Platelets 11/08/2017 521* 150 - 350 " K/uL Final    MPV 11/08/2017 9.3  9.2 - 12.9 fL Final    Gran # 11/08/2017 8.3* 1.8 - 7.7 K/uL Final    Comment: The ANC is based on a white cell differential from an   automated cell counter. It has not been microscopically   reviewed for the presence of abnormal cells. Clinical   correlation is required.      Immature Grans (Abs) 11/08/2017 0.05* 0.00 - 0.04 K/uL Final    Sodium 11/08/2017 140  136 - 145 mmol/L Final    Potassium 11/08/2017 4.9  3.5 - 5.1 mmol/L Final    Chloride 11/08/2017 106  95 - 110 mmol/L Final    CO2 11/08/2017 24  23 - 29 mmol/L Final    Glucose 11/08/2017 102  70 - 110 mg/dL Final    BUN, Bld 11/08/2017 11  8 - 23 mg/dL Final    Creatinine 11/08/2017 1.2  0.5 - 1.4 mg/dL Final    Calcium 11/08/2017 10.1  8.7 - 10.5 mg/dL Final    Total Protein 11/08/2017 9.1* 6.0 - 8.4 g/dL Final    Albumin 11/08/2017 3.7  3.5 - 5.2 g/dL Final    Total Bilirubin 11/08/2017 0.4  0.1 - 1.0 mg/dL Final    Comment: For infants and newborns, interpretation of results should be based  on gestational age, weight and in agreement with clinical  observations.  Premature Infant recommended reference ranges:  Up to 24 hours.............<8.0 mg/dL  Up to 48 hours............<12.0 mg/dL  3-5 days..................<15.0 mg/dL  6-29 days.................<15.0 mg/dL      Alkaline Phosphatase 11/08/2017 75  55 - 135 U/L Final    AST 11/08/2017 20  10 - 40 U/L Final    ALT 11/08/2017 18  10 - 44 U/L Final    Anion Gap 11/08/2017 10  8 - 16 mmol/L Final    eGFR if African American 11/08/2017 >60.0  >60 mL/min/1.73 m^2 Final    eGFR if non African American 11/08/2017 >60.0  >60 mL/min/1.73 m^2 Final    Comment: Calculation used to obtain the estimated glomerular filtration  rate (eGFR) is the CKD-EPI equation.       Magnesium 11/08/2017 2.5  1.6 - 2.6 mg/dL Final   Lab Visit on 10/18/2017   Component Date Value Ref Range Status    WBC 10/18/2017 4.37  3.90 - 12.70 K/uL Final    RBC 10/18/2017 3.77*  4.60 - 6.20 M/uL Final    Hemoglobin 10/18/2017 11.0* 14.0 - 18.0 g/dL Final    Hematocrit 10/18/2017 34.0* 40.0 - 54.0 % Final    MCV 10/18/2017 90  82 - 98 fL Final    MCH 10/18/2017 29.2  27.0 - 31.0 pg Final    MCHC 10/18/2017 32.4  32.0 - 36.0 g/dL Final    RDW 10/18/2017 19.6* 11.5 - 14.5 % Final    Platelets 10/18/2017 472* 150 - 350 K/uL Final    MPV 10/18/2017 9.1* 9.2 - 12.9 fL Final    Gran # 10/18/2017 2.5  1.8 - 7.7 K/uL Final    Comment: The ANC is based on a white cell differential from an   automated cell counter. It has not been microscopically   reviewed for the presence of abnormal cells. Clinical   correlation is required.      Sodium 10/18/2017 140  136 - 145 mmol/L Final    Potassium 10/18/2017 4.2  3.5 - 5.1 mmol/L Final    Chloride 10/18/2017 106  95 - 110 mmol/L Final    CO2 10/18/2017 25  23 - 29 mmol/L Final    Glucose 10/18/2017 88  70 - 110 mg/dL Final    BUN, Bld 10/18/2017 16  8 - 23 mg/dL Final    Creatinine 10/18/2017 1.1  0.5 - 1.4 mg/dL Final    Calcium 10/18/2017 10.1  8.7 - 10.5 mg/dL Final    Total Protein 10/18/2017 8.0  6.0 - 8.4 g/dL Final    Albumin 10/18/2017 3.5  3.5 - 5.2 g/dL Final    Total Bilirubin 10/18/2017 0.3  0.1 - 1.0 mg/dL Final    Comment: For infants and newborns, interpretation of results should be based  on gestational age, weight and in agreement with clinical  observations.  Premature Infant recommended reference ranges:  Up to 24 hours.............<8.0 mg/dL  Up to 48 hours............<12.0 mg/dL  3-5 days..................<15.0 mg/dL  6-29 days.................<15.0 mg/dL      Alkaline Phosphatase 10/18/2017 63  55 - 135 U/L Final    AST 10/18/2017 18  10 - 40 U/L Final    ALT 10/18/2017 15  10 - 44 U/L Final    Anion Gap 10/18/2017 9  8 - 16 mmol/L Final    eGFR if African American 10/18/2017 >60.0  >60 mL/min/1.73 m^2 Final    eGFR if non African American 10/18/2017 >60.0  >60 mL/min/1.73 m^2 Final    Comment: Calculation used  to obtain the estimated glomerular filtration  rate (eGFR) is the CKD-EPI equation. Since race is unknown   in our information system, the eGFR values for   -American and Non--American patients are given   for each creatinine result.      Magnesium 10/18/2017 2.0  1.6 - 2.6 mg/dL Final     Supplemental Diagnosis 6/30/16  Immunohistochemical stains are completed on the Station 4L lymph node cell block material and reveal the tumor  cells to stain positively with cytokeratin 7 and TTF-1. The tumor cells show nonspecific blush staining with  cytokeratin 5/6 and are negative for p63. This staining profile supports a diagnosis of non-small cell carcinoma  consistent with adenocarcinoma.  Cell block material will be sent for EGFR, ALK and ROS-1 testing and results will follow in a supplemental report.  (Electronically Signed: 2016-06-30 11:50:31 )  Diagnosed by: Irene Amaro M.D.  FINAL PATHOLOGIC DIAGNOSIS  1. Lymph node, Station 4L, EBUS guided fine needle aspiration:  Positive for malignant cells, non-small cell carcinoma.  Rare background lymphocytes are present.  Immunohistochemical staining be completed to further characterize this malignancy and results will follow in a  supplemental report.  2. Lymph node, Station 7, EBUS guided fine needle aspiration:  Positive for malignant cells, non-small cell carcinoma tumor identical to that seen in specimen #1.  Background lymphocytes are present.    Imaging:   CT 8/30/17  Comparison: PET/CT 7/19/17; CT chest 7/12/17, 4/12/17.    Clinical History: Lung cancer.     Findings:    Examination of the vascular and soft tissue structures at the base of the neck is unremarkable.    The thoracic aorta maintains normal caliber, contour, and course with minimal atherosclerotic calcification within its course.  The heart is not enlarged and there is no evidence of pericardial effusion.    The esophagus maintains a normal course and caliber. There is no axillary or  mediastinal lymphadenopathy.  Small right hilar lymph node identified.    There is slight rightward mediastinal shift secondary to right-sided volume loss. Findings of significant centrilobular versus panlobular emphysema with an upper lobe predominance again noted. Additionally, there are chronic changes of postradiation therapy with scattered reticulation and architectural distortion within the right middle and lower lobes.     The trachea and proximal airways appear patent, noting persistent occlusion of the lateral right middle lobe segmental bronchus by a 4.0 x 2.5 cm soft tissue mass (series 2, image 41) (previously 4.0 x 2.3 cm) at the junction of the major and minor fissures. Again seen are several nodular opacities within the medial basal segment of the right lower lobe near the pleural margin, which were better visualized on the previous chest CT dated 7/12/17. The largest nodular soft tissue opacity measures approximately 1.2 cm in the medial basal segment of the right lower lobe (series 2, image 51), previously 1.4 cm.     The previously identified soft tissue opacifications along the inferior aspect of the posterior basal segment of the right lower lobe are not well-visualized on today's study.The interlobular septal thickening within the peripheral aspect of the right lower lobe is better visualized on the prior dedicated chest CT. No significant focal consolidation identified within the left lung. No pneumothorax.    The liver is normal in size and attenuation. There are multiple hypodensities scattered throughout the hepatic parenchyma most which are subcentimeter in size and cannot be definitively characterized. The largest measures 1.2 cm in hepatic segment VI, and is most consistent with a hepatic cyst. The gallbladder shows no evidence of stones or pericholecystic fluid.  There is no intra-or extrahepatic biliary ductal dilatation.    The stomach and pancreas are unremarkable. There is a 1.6 cm  hypodensity in the anterior superior margin of the spleen (series 2, image 55) which contains internal septations and has decreased in size when compared to the prior PET/CT dated 7/2017, concerning for metastatic disease.    There is a 1.3 cm hypodense nodule in the left adrenal gland which demonstrated low attenuation on the prior chest CT consistent with an adenoma. The right adrenal gland is unremarkable.    The kidneys are normal in size and location and concentrate and excrete contrast properly on delayed imaging. Bilateral subcentimeter renal hypodensities are seen, which are to small to characterize may represent cysts. There is no evidence of hydronephrosis.  The ureters appear normal in course and caliber without evidence of ureteral dilatation. There is a small outpouching of the anterior superior aspect of the urinary bladder, suggestive of a small urachal diverticulum. The prostate gland is mildly enlarged and contains dystrophic calcifications.    The abdominal aorta is normal in course and caliber with moderate atherosclerotic calcifications.    The visualized loops of small and large bowel show no evidence of obstruction or inflammation.  There is no ascites, free fluid, or intraperitoneal free air. There are numerous scattered small periaortic lymph nodes visualized.    Osseous structures demonstrate several sclerotic lesions which demonstrated hypermetabolic activity on the most recent PET/CT scan concerning for metastatic disease. There is a 2.2 cm sclerotic lesion along the right lateral aspect of the T1 vertebral body (series 2, image 4) extending into the posterior elements. Additionally, there are two subtle sclerotic lesions within the left iliac wing concerning for metastatic disease.     These The extraperitoneal soft tissues are unremarkable.   Impression         1. In this patient with a history of metastatic lung cancer, there are sclerotic osseous lesions in the T1 vertebral body and  left iliac wing identified, which demonstrated hypermetabolic activity on the previous PET/CT, concerning for osseous metastatic disease. Please see below for RECIST summary.    2. Relatively stable appearance of the right middle lobe soft tissue mass with persistent occlusion of the lateral middle lobe segmental bronchus.    3. Multiple right lower lobe nodular soft tissue opacities concerning for metastatic disease, which appear less conspicuous on today's study when compared to the previous chest CT dated 7/12/17.    4. Hypodense splenic lesion with internal septations, which has decreased in size when compared to the prior PET/CT 7/19/17, concerning for metastatic disease.    5. Multiple hypodense hepatic lesions, most which are too small to definitely characterize.     6. Hypodense left adrenal nodule, consistent with adenoma.    7. 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      RECIST SUMMARY:  Lesion 1: 4.0 x 2.5 cm right middle lobe soft tissue mass (series 2, image 41); previously measuring 4.0 x 2.3 cm.   Lesion 2: 1.2 cm soft tissue nodule in the mediobasal segment of the right lower lobe (series 2, image 51); previously measuring 1.4 cm.  Lesion 3: 2.2 cm sclerotic lesion in the T1 vertebral body (series 2, image 4). No prior measurement.  Lesion 4: 1.6 cm hypodense splenic lesion (series 2, image 55); previously measuring 2.3 cm.                 Assessment:       1. Primary cancer of right middle lobe of lung    2. Secondary adenocarcinoma of bone    3. Decreased appetite           Plan:     Mr. Torres was finally approved for maintenance a pembrolizumab and will start this today.  He has not been scanned since the end of August and I will repeat scans to get a new baseline on him.  He does have a decreased appetite and has lost weight and I will send Marinol to his pharmacy.  He will continue on with Zometa every 3 months.  Follow back up with me in 3 weeks or sooner if needed.    Dano Fernandez DO,  FACP  Hematology & Oncology  [address] [place] [address]  ph. [phone]  Fax. [phone]    25 minutes were spent in coordination of patient's care, record review and counseling.  More than 50% of the time was face-to-face.

## 2017-11-09 ENCOUNTER — TELEPHONE (OUTPATIENT)
Dept: HEMATOLOGY/ONCOLOGY | Facility: CLINIC | Age: 62
End: 2017-11-09

## 2017-11-09 NOTE — TELEPHONE ENCOUNTER
Spoke to Ada and let her know that the appeal was successful and the patient's insurance company will pay for the medication.  They will close out the account.

## 2017-11-09 NOTE — TELEPHONE ENCOUNTER
----- Message from Angela Denny sent at 11/9/2017  2:48 PM CST -----  Contact: [facility] pt assistance program  Ada calling regarding pt chemo medication    Ada call back number [phone] opt 2

## 2017-11-11 ENCOUNTER — HOSPITAL ENCOUNTER (OUTPATIENT)
Dept: RADIOLOGY | Facility: HOSPITAL | Age: 62
Discharge: HOME | End: 2017-11-11
Attending: INTERNAL MEDICINE
Payer: COMMERCIAL

## 2017-11-11 DIAGNOSIS — C79.51 SECONDARY ADENOCARCINOMA OF BONE: ICD-10-CM

## 2017-11-11 DIAGNOSIS — R63.0 DECREASED APPETITE: ICD-10-CM

## 2017-11-11 DIAGNOSIS — C34.2 PRIMARY CANCER OF RIGHT MIDDLE LOBE OF LUNG: ICD-10-CM

## 2017-11-14 ENCOUNTER — TELEPHONE (OUTPATIENT)
Dept: HEMATOLOGY/ONCOLOGY | Facility: CLINIC | Age: 62
End: 2017-11-14

## 2017-11-14 NOTE — TELEPHONE ENCOUNTER
----- Message from Leanne Jain sent at 11/14/2017  4:28 PM CST -----  Contact: Pt wife Diamond can be reached a t [phone]  Pt is calling to confirm appt for 11/15/17, pt is not sure if he is to do Chemo. Pt did Chemo last week.      Thank you!

## 2017-11-14 NOTE — TELEPHONE ENCOUNTER
Let patient's wife know that he was scheduled to get Zometa but it was not scheduled correctly.  They have it schedule the next time he gets Keytruda.  The patient's wife wants to keep it that way then go from there.

## 2017-11-29 ENCOUNTER — HOSPITAL ENCOUNTER (OUTPATIENT)
Dept: RADIOLOGY | Facility: HOSPITAL | Age: 62
Discharge: HOME | End: 2017-11-29
Attending: INTERNAL MEDICINE
Payer: COMMERCIAL

## 2017-11-29 DIAGNOSIS — C79.51 SECONDARY ADENOCARCINOMA OF BONE: ICD-10-CM

## 2017-11-29 DIAGNOSIS — R63.0 DECREASED APPETITE: ICD-10-CM

## 2017-11-29 DIAGNOSIS — C34.2 PRIMARY CANCER OF RIGHT MIDDLE LOBE OF LUNG: ICD-10-CM

## 2017-11-29 PROCEDURE — 25500020 PHARM REV CODE 255: Performed by: INTERNAL MEDICINE

## 2017-11-29 PROCEDURE — 74177 CT ABD & PELVIS W/CONTRAST: CPT | Mod: 26,,, | Performed by: RADIOLOGY

## 2017-11-29 PROCEDURE — 71260 CT THORAX DX C+: CPT | Mod: 26,,, | Performed by: RADIOLOGY

## 2017-11-29 PROCEDURE — 74177 CT ABD & PELVIS W/CONTRAST: CPT | Mod: TC

## 2017-11-29 PROCEDURE — 71260 CT THORAX DX C+: CPT | Mod: TC

## 2017-11-29 RX ADMIN — IOHEXOL 15 ML: 350 INJECTION, SOLUTION INTRAVENOUS at 08:11

## 2017-11-29 RX ADMIN — IOHEXOL 15 ML: 350 INJECTION, SOLUTION INTRAVENOUS at 09:11

## 2017-11-29 RX ADMIN — IOHEXOL 100 ML: 350 INJECTION, SOLUTION INTRAVENOUS at 11:11

## 2017-11-30 ENCOUNTER — TELEPHONE (OUTPATIENT)
Dept: HEMATOLOGY/ONCOLOGY | Facility: CLINIC | Age: 62
End: 2017-11-30

## 2017-11-30 NOTE — TELEPHONE ENCOUNTER
Spoke to patient's wife Diamond and she stated that the patient started having chest pain SOB upon exertion of any kind, even standing up.  I told her the recommendation would be to go to the hospital for that.  The patient was refusing to go.  She said that conversation was a few hours ago that he still had the pains in his chest but he said it was his COPD.  He is still SOB with exertion but he otherwise as of now is having no chest pains.  I reinforced that if they come back they are to got to the ER.  She stated that she would bring him if it started again.     She asked about his CT scan and after asking Dr. Fernandez let her know that it was considered stable.

## 2017-12-06 ENCOUNTER — LAB VISIT (OUTPATIENT)
Dept: LAB | Facility: HOSPITAL | Age: 62
End: 2017-12-06
Attending: INTERNAL MEDICINE
Payer: COMMERCIAL

## 2017-12-06 ENCOUNTER — INFUSION (OUTPATIENT)
Dept: INFUSION THERAPY | Facility: HOSPITAL | Age: 62
End: 2017-12-06
Attending: INTERNAL MEDICINE
Payer: COMMERCIAL

## 2017-12-06 ENCOUNTER — OFFICE VISIT (OUTPATIENT)
Dept: HEMATOLOGY/ONCOLOGY | Facility: CLINIC | Age: 62
End: 2017-12-06
Payer: COMMERCIAL

## 2017-12-06 VITALS
RESPIRATION RATE: 15 BRPM | OXYGEN SATURATION: 97 % | SYSTOLIC BLOOD PRESSURE: 120 MMHG | WEIGHT: 189.63 LBS | DIASTOLIC BLOOD PRESSURE: 85 MMHG | TEMPERATURE: 98 F | HEIGHT: 77 IN | BODY MASS INDEX: 22.39 KG/M2 | HEART RATE: 115 BPM

## 2017-12-06 VITALS
SYSTOLIC BLOOD PRESSURE: 133 MMHG | DIASTOLIC BLOOD PRESSURE: 78 MMHG | TEMPERATURE: 98 F | HEART RATE: 111 BPM | RESPIRATION RATE: 16 BRPM

## 2017-12-06 DIAGNOSIS — C34.2 PRIMARY CANCER OF RIGHT MIDDLE LOBE OF LUNG: Primary | ICD-10-CM

## 2017-12-06 DIAGNOSIS — R59.0 MEDIASTINAL ADENOPATHY: ICD-10-CM

## 2017-12-06 DIAGNOSIS — R06.02 SHORTNESS OF BREATH: ICD-10-CM

## 2017-12-06 DIAGNOSIS — C79.51 SECONDARY ADENOCARCINOMA OF BONE: ICD-10-CM

## 2017-12-06 DIAGNOSIS — C79.51 SECONDARY ADENOCARCINOMA OF BONE: Primary | ICD-10-CM

## 2017-12-06 DIAGNOSIS — C34.2 PRIMARY CANCER OF RIGHT MIDDLE LOBE OF LUNG: ICD-10-CM

## 2017-12-06 DIAGNOSIS — Z09 CHEMOTHERAPY FOLLOW-UP EXAMINATION: ICD-10-CM

## 2017-12-06 LAB
ALBUMIN SERPL BCP-MCNC: 3.5 G/DL
ALP SERPL-CCNC: 63 U/L
ALT SERPL W/O P-5'-P-CCNC: 17 U/L
ANION GAP SERPL CALC-SCNC: 9 MMOL/L
AST SERPL-CCNC: 20 U/L
BILIRUB SERPL-MCNC: 0.3 MG/DL
BUN SERPL-MCNC: 11 MG/DL
CALCIUM SERPL-MCNC: 9.9 MG/DL
CHLORIDE SERPL-SCNC: 108 MMOL/L
CO2 SERPL-SCNC: 25 MMOL/L
CREAT SERPL-MCNC: 1 MG/DL
ERYTHROCYTE [DISTWIDTH] IN BLOOD BY AUTOMATED COUNT: 15.4 %
EST. GFR  (AFRICAN AMERICAN): >60 ML/MIN/1.73 M^2
EST. GFR  (NON AFRICAN AMERICAN): >60 ML/MIN/1.73 M^2
GLUCOSE SERPL-MCNC: 97 MG/DL
HCT VFR BLD AUTO: 37.7 %
HGB BLD-MCNC: 12 G/DL
IMM GRANULOCYTES # BLD AUTO: 0.02 K/UL
MAGNESIUM SERPL-MCNC: 2.4 MG/DL
MCH RBC QN AUTO: 28.1 PG
MCHC RBC AUTO-ENTMCNC: 31.8 G/DL
MCV RBC AUTO: 88 FL
NEUTROPHILS # BLD AUTO: 5 K/UL
PLATELET # BLD AUTO: 457 K/UL
PMV BLD AUTO: 9.4 FL
POTASSIUM SERPL-SCNC: 4.7 MMOL/L
PROT SERPL-MCNC: 8.4 G/DL
RBC # BLD AUTO: 4.27 M/UL
SODIUM SERPL-SCNC: 142 MMOL/L
WBC # BLD AUTO: 7.24 K/UL

## 2017-12-06 PROCEDURE — 99214 OFFICE O/P EST MOD 30 MIN: CPT | Mod: S$GLB,,, | Performed by: INTERNAL MEDICINE

## 2017-12-06 PROCEDURE — 83735 ASSAY OF MAGNESIUM: CPT

## 2017-12-06 PROCEDURE — 36415 COLL VENOUS BLD VENIPUNCTURE: CPT

## 2017-12-06 PROCEDURE — 80053 COMPREHEN METABOLIC PANEL: CPT

## 2017-12-06 PROCEDURE — 85027 COMPLETE CBC AUTOMATED: CPT

## 2017-12-06 PROCEDURE — 99999 PR PBB SHADOW E&M-EST. PATIENT-LVL III: CPT | Mod: PBBFAC,,, | Performed by: INTERNAL MEDICINE

## 2017-12-06 PROCEDURE — 96365 THER/PROPH/DIAG IV INF INIT: CPT

## 2017-12-06 PROCEDURE — 25000003 PHARM REV CODE 250: Performed by: INTERNAL MEDICINE

## 2017-12-06 PROCEDURE — 63600175 PHARM REV CODE 636 W HCPCS: Performed by: INTERNAL MEDICINE

## 2017-12-06 RX ORDER — SODIUM CHLORIDE 0.9 % (FLUSH) 0.9 %
10 SYRINGE (ML) INJECTION
Status: CANCELLED | OUTPATIENT
Start: 2017-12-06

## 2017-12-06 RX ORDER — HEPARIN 100 UNIT/ML
500 SYRINGE INTRAVENOUS
Status: CANCELLED | OUTPATIENT
Start: 2017-12-06

## 2017-12-06 RX ADMIN — SODIUM CHLORIDE 4 MG: 9 INJECTION, SOLUTION INTRAVENOUS at 09:12

## 2017-12-06 RX ADMIN — SODIUM CHLORIDE: 9 INJECTION, SOLUTION INTRAVENOUS at 09:12

## 2017-12-06 NOTE — PLAN OF CARE
Problem: Patient Care Overview  Goal: Plan of Care Review  Outcome: Ongoing (interventions implemented as appropriate)  Patient here for Zometa.  Assessment complete and labs reviewed.  Patient states he was taking home calcium and vitamin D but has recently ran out; encouraged patient to resume supplements today.  Denies jaw pain or any recent dental surgery.  VSS.  Chair reclined and blankets offered.  No needs expressed at this time.  Will continue to monitor.

## 2017-12-06 NOTE — PLAN OF CARE
Problem: Patient Care Overview  Goal: Plan of Care Review  Outcome: Ongoing (interventions implemented as appropriate)  Patient tolerated infusion well.  No reaction suspected.  AVS given to patient.  Patient ambulated off unit unassisted.

## 2017-12-06 NOTE — PROGRESS NOTES
PATIENT: Angel Torres  MRN: [id number]  DATE: 12/6/2017      Diagnosis:   1. Primary cancer of right middle lobe of lung    2. Secondary adenocarcinoma of bone    3. Shortness of breath    4. Chemotherapy follow-up examination        Chief Complaint: Malignant neoplasm of overlapping sites of lung, unspecified      Oncologic History:      Oncologic History Non-small cell lung cancer diagnosed 6/2016   Recurrent disease to lung 4/2017     Oncologic Treatment Cisplatin/Pemetrexed initiated 8/15/16 with concurrent radiation completed 3 cycles 10/3/16; 64 Gy  Carboplatin/pemetrexed/pembrolizumab 7/2017 - 9/2017  Pembrolizumab maintenance 11/2017     Pathology Adenocarcinoma, T2b, N2, M0, Stage IIIA          Subjective:    Interval History: Mr. Torres is a 62 y.o. male who was seen in follow-up for lung cancer.  He states he has generally been feeling well.  He called earlier this week and stated he had episodes of chest pain and shortness of breath and was advised to go to the emergency room, however, this resolved on its own and he refused to go.  He has had episodes like this in the past and also refused to go to the ER for evaluation.  He states he has some nosebleeds and sinus issues periodically.  No other new complaints.    His history dates to approximately 4/2016 when he noted a worsening cough which was associated with clear sputum.  He had a chest x-ray in 5/16 showing a right middle lobe opacity.  Subsequent CT of the chest was performed on 5/30/16 showing a 5.6 cm lesion in the right middle lobe with hilar and mediastinal lymphadenopathy along with hepatic lesions and a left adrenal mass..  He underwent bronchoscopy which was nonrevealing and transbronchial biopsies were nondiagnostic.  He then underwent EBUS with biopsy of mediastinal lymph nodes with pathology showing non-small cell lung cancer consistent with adenocarcinoma.  He was started on concurrent chemotherapy and radiation with cisplatin and  pemetrexed on 8/15/16.  He completed 64 Gy of concurrent radiation with cisplatin and pemetrexed with chemotherapy completed on 10/3/16.  Scans in 4/2017 had indicated progressive disease in the lung.  Subsequent PET scan in 7/2017 had shown spread of disease to bone.  He was started on carboplatin and pemetrexed and pembrolizumab in 7/2017.  Repeat imaging in 8/2017 had shown stability of disease.  He was started on pembrolizumab maintenance in 11/2017 after a delay due to his insurance.  Repeat imaging in 12/2016 had shown mild progressive disease, however, it was felt that due to the excessive delay in treatment we would proceed on rather than change therapy at this time.    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       Past Surgical HIstory:   Past Surgical History:   Procedure Laterality Date    INGUINAL HERNIA REPAIR Bilateral     KNEE SURGERY         Family History:   Family History   Problem Relation Age of Onset    Cancer Mother      lung, breast    Cancer Sister      lung    Cancer Maternal Grandfather     No Known Problems Father     No Known Problems Brother     No Known Problems Maternal Aunt     No Known Problems Maternal Uncle     No Known Problems Paternal Aunt     No Known Problems Paternal Uncle     No Known Problems Maternal Grandmother     No Known Problems Paternal Grandmother     No Known Problems Paternal Grandfather     Amblyopia Neg Hx     Blindness Neg Hx     Cataracts Neg Hx     Diabetes Neg Hx     Glaucoma Neg Hx     Hypertension Neg Hx     Macular degeneration Neg Hx     Retinal detachment Neg Hx      Strabismus Neg Hx     Stroke Neg Hx     Thyroid disease Neg Hx        Social History:  reports that he quit smoking about 4 years ago. He has a 80.00 pack-year smoking history. He has quit using smokeless tobacco. He reports that he does not drink alcohol or use drugs.    Allergies:  Review of patient's allergies indicates:  No Known Allergies    Medications:  Current Outpatient Prescriptions   Medication Sig Dispense Refill    albuterol 90 mcg/actuation inhaler Inhale 2 puffs into the lungs every 6 (six) hours as needed for Wheezing. 18 g 0    albuterol-ipratropium 2.5mg-0.5mg/3mL (DUO-NEB) 0.5 mg-3 mg(2.5 mg base)/3 mL nebulizer solution Take 3 mLs by nebulization every 6 (six) hours as needed for Wheezing or Shortness of Breath. 100 vial 1    amlodipine (NORVASC) 10 MG tablet Take 1 tablet (10 mg total) by mouth once daily. 90 tablet 3    cetirizine (ZYRTEC) 10 MG tablet Take 1 tablet (10 mg total) by mouth once daily.  0    dronabinol (MARINOL) 5 MG capsule Take 1 capsule (5 mg total) by mouth 2 (two) times daily before meals. 60 capsule 0    fluticasone (FLONASE) 50 mcg/actuation nasal spray SHAKE LIQUID AND USE 2 SPRAYS IN EACH NOSTRIL EVERY DAY 16 g 1    folic acid (FOLVITE) 400 MCG tablet TAKE 1 TABLET(400 MCG) BY MOUTH EVERY  tablet 0    naproxen (NAPROSYN) 500 MG tablet Take 1 tablet (500 mg total) by mouth 2 (two) times daily as needed (headache). 60 tablet 0     Current Facility-Administered Medications   Medication Dose Route Frequency Provider Last Rate Last Dose    cyanocobalamin injection 1,000 mcg  1,000 mcg Intramuscular 1 time in Clinic/HOD Dano Fernandez DO, FACP        cyanocobalamin injection 1,000 mcg  1,000 mcg Intramuscular 1 time in Clinic/HOD Dano Fernandez DO, FACP           Review of Systems   Constitutional: Negative for activity change, appetite change, chills, fatigue, fever and unexpected weight change.        Normal weight 220   HENT: Positive for  "nosebleeds. Negative for dental problem, sinus pressure, sneezing and trouble swallowing.    Eyes: Negative for visual disturbance.   Respiratory: Positive for cough and shortness of breath. Negative for choking, chest tightness and wheezing.    Cardiovascular: Positive for chest pain. Negative for leg swelling.   Gastrointestinal: Negative for abdominal pain, blood in stool, constipation, diarrhea and nausea.   Genitourinary: Negative for difficulty urinating and dysuria.   Musculoskeletal: Positive for myalgias. Negative for arthralgias and back pain.   Skin: Negative for rash and wound.   Neurological: Negative for dizziness, light-headedness and headaches.   Hematological: Negative for adenopathy. Does not bruise/bleed easily.   Psychiatric/Behavioral: Negative for sleep disturbance. The patient is not nervous/anxious.        ECOG Performance Status: 1   Objective:      Vitals:   Vitals:    12/06/17 0847   BP: 120/85   BP Location: Left arm   Patient Position: Sitting   BP Method: X-Large (Automatic)   Pulse: (!) 115   Resp: 15   Temp: 97.7 °F (36.5 °C)   TempSrc: Oral   SpO2: 97%   Weight: 86 kg (189 lb 9.5 oz)   Height: 6' 5" (1.956 m)     BMI: Body mass index is 22.48 kg/m².    Physical Exam   Constitutional: He is oriented to person, place, and time. He appears well-developed and well-nourished.   HENT:   Head: Normocephalic and atraumatic.   Eyes: Pupils are equal, round, and reactive to light.   Neck: Normal range of motion. Neck supple.   Cardiovascular: Normal rate and regular rhythm.    Pulmonary/Chest: Effort normal. No respiratory distress. He has no rales.   Abdominal: Soft. He exhibits no distension. There is no tenderness.   Musculoskeletal: He exhibits no edema or tenderness.   Lymphadenopathy:     He has no cervical adenopathy.   Neurological: He is alert and oriented to person, place, and time. No cranial nerve deficit.   Skin: Skin is warm and dry. Rash noted.   Psychiatric: He has a normal " mood and affect. His behavior is normal.       Laboratory Data:  Lab Visit on 12/06/2017   Component Date Value Ref Range Status    WBC 12/06/2017 7.24  3.90 - 12.70 K/uL Final    RBC 12/06/2017 4.27* 4.60 - 6.20 M/uL Final    Hemoglobin 12/06/2017 12.0* 14.0 - 18.0 g/dL Final    Hematocrit 12/06/2017 37.7* 40.0 - 54.0 % Final    MCV 12/06/2017 88  82 - 98 fL Final    MCH 12/06/2017 28.1  27.0 - 31.0 pg Final    MCHC 12/06/2017 31.8* 32.0 - 36.0 g/dL Final    RDW 12/06/2017 15.4* 11.5 - 14.5 % Final    Platelets 12/06/2017 457* 150 - 350 K/uL Final    MPV 12/06/2017 9.4  9.2 - 12.9 fL Final    Gran # 12/06/2017 5.0  1.8 - 7.7 K/uL Final    Comment: The ANC is based on a white cell differential from an   automated cell counter. It has not been microscopically   reviewed for the presence of abnormal cells. Clinical   correlation is required.      Immature Grans (Abs) 12/06/2017 0.02  0.00 - 0.04 K/uL Final   Lab Visit on 11/08/2017   Component Date Value Ref Range Status    WBC 11/08/2017 10.36  3.90 - 12.70 K/uL Final    RBC 11/08/2017 3.93* 4.60 - 6.20 M/uL Final    Hemoglobin 11/08/2017 11.5* 14.0 - 18.0 g/dL Final    Hematocrit 11/08/2017 35.4* 40.0 - 54.0 % Final    MCV 11/08/2017 90  82 - 98 fL Final    MCH 11/08/2017 29.3  27.0 - 31.0 pg Final    MCHC 11/08/2017 32.5  32.0 - 36.0 g/dL Final    RDW 11/08/2017 16.9* 11.5 - 14.5 % Final    Platelets 11/08/2017 521* 150 - 350 K/uL Final    MPV 11/08/2017 9.3  9.2 - 12.9 fL Final    Gran # 11/08/2017 8.3* 1.8 - 7.7 K/uL Final    Comment: The ANC is based on a white cell differential from an   automated cell counter. It has not been microscopically   reviewed for the presence of abnormal cells. Clinical   correlation is required.      Immature Grans (Abs) 11/08/2017 0.05* 0.00 - 0.04 K/uL Final    Sodium 11/08/2017 140  136 - 145 mmol/L Final    Potassium 11/08/2017 4.9  3.5 - 5.1 mmol/L Final    Chloride 11/08/2017 106  95 - 110 mmol/L  Final    CO2 11/08/2017 24  23 - 29 mmol/L Final    Glucose 11/08/2017 102  70 - 110 mg/dL Final    BUN, Bld 11/08/2017 11  8 - 23 mg/dL Final    Creatinine 11/08/2017 1.2  0.5 - 1.4 mg/dL Final    Calcium 11/08/2017 10.1  8.7 - 10.5 mg/dL Final    Total Protein 11/08/2017 9.1* 6.0 - 8.4 g/dL Final    Albumin 11/08/2017 3.7  3.5 - 5.2 g/dL Final    Total Bilirubin 11/08/2017 0.4  0.1 - 1.0 mg/dL Final    Comment: For infants and newborns, interpretation of results should be based  on gestational age, weight and in agreement with clinical  observations.  Premature Infant recommended reference ranges:  Up to 24 hours.............<8.0 mg/dL  Up to 48 hours............<12.0 mg/dL  3-5 days..................<15.0 mg/dL  6-29 days.................<15.0 mg/dL      Alkaline Phosphatase 11/08/2017 75  55 - 135 U/L Final    AST 11/08/2017 20  10 - 40 U/L Final    ALT 11/08/2017 18  10 - 44 U/L Final    Anion Gap 11/08/2017 10  8 - 16 mmol/L Final    eGFR if African American 11/08/2017 >60.0  >60 mL/min/1.73 m^2 Final    eGFR if non African American 11/08/2017 >60.0  >60 mL/min/1.73 m^2 Final    Comment: Calculation used to obtain the estimated glomerular filtration  rate (eGFR) is the CKD-EPI equation.       Magnesium 11/08/2017 2.5  1.6 - 2.6 mg/dL Final     Supplemental Diagnosis 6/30/16  Immunohistochemical stains are completed on the Station 4L lymph node cell block material and reveal the tumor  cells to stain positively with cytokeratin 7 and TTF-1. The tumor cells show nonspecific blush staining with  cytokeratin 5/6 and are negative for p63. This staining profile supports a diagnosis of non-small cell carcinoma  consistent with adenocarcinoma.  Cell block material will be sent for EGFR, ALK and ROS-1 testing and results will follow in a supplemental report.  (Electronically Signed: 2016-06-30 11:50:31 )  Diagnosed by: Irene Amaro M.D.  FINAL PATHOLOGIC DIAGNOSIS  1. Lymph node, Station 4L, EBUS  guided fine needle aspiration:  Positive for malignant cells, non-small cell carcinoma.  Rare background lymphocytes are present.  Immunohistochemical staining be completed to further characterize this malignancy and results will follow in a  supplemental report.  2. Lymph node, Station 7, EBUS guided fine needle aspiration:  Positive for malignant cells, non-small cell carcinoma tumor identical to that seen in specimen #1.  Background lymphocytes are present.    Imaging:     CT 11/29/17  Findings: Comparison is 8/30/2017. Patient was administered 100 cc of Omnipaque 350 and p.o. contrast. No mediastinal, hilar, or axillary adenopathy is seen. Right middle lobe mass now abutting the right atrial border measures 4 x 4 cm, previously 2.5 x 3.9 cm. This is larger. There are coronary artery calcifications. Nodular areas on the right somewhat larger worrisome for metastatic disease. There is emphysema. Small liver hypodensities unchanged possibly cysts. There is a small hiatal hernia. There is a small left adrenal adenoma. Spleen, stomach, pancreas, duodenum, right adrenal gland, and kidneys show nothing unusual. No adenopathy is seen. Kidneys enhance and excrete normally. Right T1 vertebral body sclerotic lesion 2 cm, previously 2.22 cm. There is a small urachal diverticulum.   Impression      Right middle lobe soft tissue mass larger worrisome for progression.    Right lower lobe nodules larger somewhat irregular and patchy suggesting inflammation.      Stable T1 vertebral body metastasis.    Splenic lesion is no longer seen.    Hypodense hepatic lesions likely cysts.    Left adrenal adenoma.    Ureteral diverticulum.    Index lesions: Right middle lobe mass 4 x 4 centimeters, previously 2.5 x 3.9.    T1 vertebral body 2 cm, previously 2.22 cm.    Splenic lesion the upper seen.    Right lower lobe lesions slightly larger. May be metastatic but could be inflammatory.                Assessment:       1. Primary cancer of  right middle lobe of lung    2. Secondary adenocarcinoma of bone    3. Shortness of breath    4. Chemotherapy follow-up examination           Plan:     Mr. Torres will proceed onto cycle 2 of pembrolizumab today.  He did have a CT scan which showed mild growth of his primary tumor but slight regression of his bone metastasis.  I do not necessarily think that this is a treatment failure rather likely related to his long delay in initiation of treatment.  He is agreeable to proceed on with pembrolizumab.  I am awaiting complete lab work from today.  If he has any further chest pain or shortness of breath he is to the emergency room.  Follow-up in 3 weeks.    Dano Fernandez DO, [facility]  Hematology & Oncology  [facility] [place] [address]  ph. [phone]  Fax. [phone]    25 minutes were spent in coordination of patient's care, record review and counseling.  More than 50% of the time was face-to-face.

## 2017-12-26 NOTE — PROGRESS NOTES
Diagnosis:  1.Primary cancer of right middle lobe of lung   2.Secondary adenocarcinoma of bone   3.Shortness of breath   4. Chemotherapy follow up visit    Chief Complaint: Metastatic lung cancer    Oncologic History: Non-small cell lung cancer diagnosed 6/2016 ; Recurrent disease to lung 4/2017     Treatment History:     Cisplatin/Pemetrexed initiated 8/15/16 with concurrent radiation completed 3 cycles 10/3/16; 64 Gy  Carboplatin/pemetrexed/pembrolizumab 7/2017 - 9/2017  Pembrolizumab maintenance 11/2017     Pathology:     Pathology Adenocarcinoma, T2b, N2, M0, Stage IIIA         Interval History: Mr. Torres is a 62 y.o. male who was seen in follow-up for lung cancer.  He states he has generally been feeling well.  He continues to have shortness of breath. No significant chest pain.  He states he has some nosebleeds and sinus issues periodically.  No other new complaints.     HPI: His history dates to approximately 4/2016 when he noted a worsening cough which was associated with clear sputum.  He had a chest x-ray in 5/16 showing a right middle lobe opacity.  Subsequent CT of the chest was performed on 5/30/16 showing a 5.6 cm lesion in the right middle lobe with hilar and mediastinal lymphadenopathy along with hepatic lesions and a left adrenal mass..  He underwent bronchoscopy which was nonrevealing and transbronchial biopsies were nondiagnostic.  He then underwent EBUS with biopsy of mediastinal lymph nodes with pathology showing non-small cell lung cancer consistent with adenocarcinoma.  He was started on concurrent chemotherapy and radiation with cisplatin and pemetrexed on 8/15/16.  He completed 64 Gy of concurrent radiation with cisplatin and pemetrexed with chemotherapy completed on 10/3/16.  Scans in 4/2017 had indicated progressive disease in the lung.  Subsequent PET scan in 7/2017 had shown spread of disease to bone.  He was started on carboplatin and pemetrexed and pembrolizumab in 7/2017.  Repeat  imaging in 8/2017 had shown stability of disease.  He was started on pembrolizumab maintenance in 11/2017 after a delay due to his insurance.  Repeat imaging in 12/2016 had shown mild progressive disease, however, it was felt that due to the excessive delay in treatment we would proceed on rather than change therapy at this time.    Review of Systems   Constitutional: Positive for malaise/fatigue. Negative for fever.   HENT: Negative for congestion and nosebleeds.    Eyes: Negative for blurred vision.   Respiratory: Positive for cough, sputum production, shortness of breath and wheezing.    Cardiovascular: Negative for chest pain and leg swelling.   Gastrointestinal: Negative for abdominal pain, diarrhea and heartburn.   Genitourinary: Negative for dysuria.   Musculoskeletal: Negative for myalgias.   Neurological: Negative for dizziness, sensory change, speech change and weakness.   Endo/Heme/Allergies: Does not bruise/bleed easily.   Psychiatric/Behavioral: Negative for depression.       Current Outpatient Prescriptions   Medication Sig    albuterol 90 mcg/actuation inhaler Inhale 2 puffs into the lungs every 6 (six) hours as needed for Wheezing.    albuterol-ipratropium 2.5mg-0.5mg/3mL (DUO-NEB) 0.5 mg-3 mg(2.5 mg base)/3 mL nebulizer solution Take 3 mLs by nebulization every 6 (six) hours as needed for Wheezing or Shortness of Breath.    amlodipine (NORVASC) 10 MG tablet Take 1 tablet (10 mg total) by mouth once daily.    cetirizine (ZYRTEC) 10 MG tablet Take 1 tablet (10 mg total) by mouth once daily.    dronabinol (MARINOL) 5 MG capsule Take 1 capsule (5 mg total) by mouth 2 (two) times daily before meals.    fluticasone (FLONASE) 50 mcg/actuation nasal spray SHAKE LIQUID AND USE 2 SPRAYS IN EACH NOSTRIL EVERY DAY    folic acid (FOLVITE) 400 MCG tablet TAKE 1 TABLET(400 MCG) BY MOUTH EVERY DAY    naproxen (NAPROSYN) 500 MG tablet Take 1 tablet (500 mg total) by mouth 2 (two) times daily as needed  (headache).     Current Facility-Administered Medications   Medication    cyanocobalamin injection 1,000 mcg    cyanocobalamin injection 1,000 mcg      11/08/2017 2.5  1.6 - 2.6 mg/dL Final        Vitals:    12/27/17 1340   BP: 132/81   Pulse: 104   Resp: 20   Temp: 97.7 °F (36.5 °C)       Physical Exam   Constitutional: He appears well-developed. No distress.   Has ongoing weight loss   HENT:   Head: Normocephalic.   Mouth/Throat: No oropharyngeal exudate.   Eyes: Pupils are equal, round, and reactive to light. No scleral icterus.   Neck: Normal range of motion.   Cardiovascular: Normal rate and regular rhythm.    No murmur heard.  Pulmonary/Chest: Effort normal. No respiratory distress. He has no rales. He exhibits no tenderness.   He has globally diminished breath sounds   Abdominal: Soft. There is no tenderness. There is no rebound.   Musculoskeletal: He exhibits no edema.   Lymphadenopathy:     He has no cervical adenopathy.   Neurological: He is alert. No cranial nerve deficit.   Skin: Skin is warm.       Supplemental Diagnosis 6/30/16  Immunohistochemical stains are completed on the Station 4L lymph node cell block material and reveal the tumor  cells to stain positively with cytokeratin 7 and TTF-1. The tumor cells show nonspecific blush staining with  cytokeratin 5/6 and are negative for p63. This staining profile supports a diagnosis of non-small cell carcinoma  consistent with adenocarcinoma.  Cell block material will be sent for EGFR, ALK and ROS-1 testing and results will follow in a supplemental report.  (Electronically Signed: 2016-06-30 11:50:31 )  Diagnosed by: Irene Amaro M.D.  FINAL PATHOLOGIC DIAGNOSIS  1. Lymph node, Station 4L, EBUS guided fine needle aspiration:  Positive for malignant cells, non-small cell carcinoma.  Rare background lymphocytes are present.  Immunohistochemical staining be completed to further characterize this malignancy and results will follow in a  supplemental  report.  2. Lymph node, Station 7, EBUS guided fine needle aspiration:  Positive for malignant cells, non-small cell carcinoma tumor identical to that seen in specimen #1.  Background lymphocytes are present.     Imaging:      CT 11/29/17  Findings: Comparison is 8/30/2017. Patient was administered 100 cc of Omnipaque 350 and p.o. contrast. No mediastinal, hilar, or axillary adenopathy is seen. Right middle lobe mass now abutting the right atrial border measures 4 x 4 cm, previously 2.5 x 3.9 cm. This is larger. There are coronary artery calcifications. Nodular areas on the right somewhat larger worrisome for metastatic disease. There is emphysema. Small liver hypodensities unchanged possibly cysts. There is a small hiatal hernia. There is a small left adrenal adenoma. Spleen, stomach, pancreas, duodenum, right adrenal gland, and kidneys show nothing unusual. No adenopathy is seen. Kidneys enhance and excrete normally. Right T1 vertebral body sclerotic lesion 2 cm, previously 2.22 cm. There is a small urachal diverticulum.   Impression       Right middle lobe soft tissue mass larger worrisome for progression.    Right lower lobe nodules larger somewhat irregular and patchy suggesting inflammation.      Stable T1 vertebral body metastasis.    Splenic lesion is no longer seen.    Hypodense hepatic lesions likely cysts.    Left adrenal adenoma.    Ureteral diverticulum.    Index lesions: Right middle lobe mass 4 x 4 centimeters, previously 2.5 x 3.9.    T1 vertebral body 2 cm, previously 2.22 cm.    Splenic lesion the upper seen.    Right lower lobe lesions slightly larger. May be metastatic but could be inflammatory.         Component      Latest Ref Rng & Units 12/27/2017   WBC      3.90 - 12.70 K/uL 6.88   RBC      4.60 - 6.20 M/uL 4.82   Hemoglobin      14.0 - 18.0 g/dL 13.0 (L)   Hematocrit      40.0 - 54.0 % 41.1   MCV      82 - 98 fL 85   MCH      27.0 - 31.0 pg 27.0   MCHC      32.0 - 36.0 g/dL 31.6 (L)   RDW       11.5 - 14.5 % 15.2 (H)   Platelets      150 - 350 K/uL 457 (H)   MPV      9.2 - 12.9 fL 9.4   Gran #      1.8 - 7.7 K/uL 5.1   Immature Grans (Abs)      0.00 - 0.04 K/uL 0.02        Assessment:    1.Primary cancer of right middle lobe of lung   2.Secondary adenocarcinoma of bone   3.Shortness of breath   4. Cough with expectoration  5. Weight loss  6. Chemotherapy follow up visit     Plan:    1,2:Mr. Torres will proceed onto cycle 3 of pembrolizumab today.  Tolerating Pembro well so far.He did have a CT scan which showed mild growth of his primary tumor but slight regression of his bone metastasis.  Follow-up in 3 weeks.    3,4: From underlying malignancy. No signs of effusion today. No fever. Use inhalers. O2 saturation normal.    5. He has Dronabinol which he has not started yet.       Distress Screening Results: Psychosocial Distress screening score of Distress Score: 0 noted and reviewed. No intervention indicated.

## 2017-12-27 ENCOUNTER — INFUSION (OUTPATIENT)
Dept: INFUSION THERAPY | Facility: HOSPITAL | Age: 62
End: 2017-12-27
Attending: INTERNAL MEDICINE
Payer: COMMERCIAL

## 2017-12-27 ENCOUNTER — OFFICE VISIT (OUTPATIENT)
Dept: HEMATOLOGY/ONCOLOGY | Facility: CLINIC | Age: 62
End: 2017-12-27
Payer: COMMERCIAL

## 2017-12-27 ENCOUNTER — LAB VISIT (OUTPATIENT)
Dept: LAB | Facility: HOSPITAL | Age: 62
End: 2017-12-27
Attending: INTERNAL MEDICINE
Payer: COMMERCIAL

## 2017-12-27 VITALS
HEIGHT: 77 IN | BODY MASS INDEX: 22.08 KG/M2 | SYSTOLIC BLOOD PRESSURE: 108 MMHG | DIASTOLIC BLOOD PRESSURE: 72 MMHG | RESPIRATION RATE: 18 BRPM | TEMPERATURE: 98 F | WEIGHT: 187 LBS | HEART RATE: 96 BPM

## 2017-12-27 VITALS
OXYGEN SATURATION: 97 % | RESPIRATION RATE: 20 BRPM | SYSTOLIC BLOOD PRESSURE: 132 MMHG | TEMPERATURE: 98 F | DIASTOLIC BLOOD PRESSURE: 81 MMHG | WEIGHT: 187.19 LBS | BODY MASS INDEX: 22.2 KG/M2 | HEART RATE: 104 BPM

## 2017-12-27 DIAGNOSIS — C34.2 PRIMARY CANCER OF RIGHT MIDDLE LOBE OF LUNG: Primary | ICD-10-CM

## 2017-12-27 DIAGNOSIS — R53.0 NEOPLASTIC MALIGNANT RELATED FATIGUE: ICD-10-CM

## 2017-12-27 DIAGNOSIS — C34.2 PRIMARY CANCER OF RIGHT MIDDLE LOBE OF LUNG: ICD-10-CM

## 2017-12-27 DIAGNOSIS — R63.0 DECREASED APPETITE: ICD-10-CM

## 2017-12-27 DIAGNOSIS — J44.9 CHRONIC OBSTRUCTIVE PULMONARY DISEASE, UNSPECIFIED COPD TYPE: ICD-10-CM

## 2017-12-27 DIAGNOSIS — R59.0 MEDIASTINAL ADENOPATHY: ICD-10-CM

## 2017-12-27 DIAGNOSIS — C79.51 SECONDARY ADENOCARCINOMA OF BONE: ICD-10-CM

## 2017-12-27 LAB
ALBUMIN SERPL BCP-MCNC: 3.8 G/DL
ALP SERPL-CCNC: 62 U/L
ALT SERPL W/O P-5'-P-CCNC: 20 U/L
ANION GAP SERPL CALC-SCNC: 9 MMOL/L
AST SERPL-CCNC: 22 U/L
BILIRUB SERPL-MCNC: 0.3 MG/DL
BUN SERPL-MCNC: 11 MG/DL
CALCIUM SERPL-MCNC: 9.5 MG/DL
CHLORIDE SERPL-SCNC: 106 MMOL/L
CO2 SERPL-SCNC: 24 MMOL/L
CREAT SERPL-MCNC: 1.1 MG/DL
ERYTHROCYTE [DISTWIDTH] IN BLOOD BY AUTOMATED COUNT: 15.2 %
EST. GFR  (AFRICAN AMERICAN): >60 ML/MIN/1.73 M^2
EST. GFR  (NON AFRICAN AMERICAN): >60 ML/MIN/1.73 M^2
GLUCOSE SERPL-MCNC: 92 MG/DL
HCT VFR BLD AUTO: 41.1 %
HGB BLD-MCNC: 13 G/DL
IMM GRANULOCYTES # BLD AUTO: 0.02 K/UL
MAGNESIUM SERPL-MCNC: 2 MG/DL
MCH RBC QN AUTO: 27 PG
MCHC RBC AUTO-ENTMCNC: 31.6 G/DL
MCV RBC AUTO: 85 FL
NEUTROPHILS # BLD AUTO: 5.1 K/UL
PLATELET # BLD AUTO: 457 K/UL
PMV BLD AUTO: 9.4 FL
POTASSIUM SERPL-SCNC: 4.1 MMOL/L
PROT SERPL-MCNC: 8.6 G/DL
RBC # BLD AUTO: 4.82 M/UL
SODIUM SERPL-SCNC: 139 MMOL/L
WBC # BLD AUTO: 6.88 K/UL

## 2017-12-27 PROCEDURE — 80053 COMPREHEN METABOLIC PANEL: CPT

## 2017-12-27 PROCEDURE — 99999 PR PBB SHADOW E&M-EST. PATIENT-LVL III: CPT | Mod: PBBFAC,,, | Performed by: INTERNAL MEDICINE

## 2017-12-27 PROCEDURE — 83735 ASSAY OF MAGNESIUM: CPT

## 2017-12-27 PROCEDURE — 36415 COLL VENOUS BLD VENIPUNCTURE: CPT

## 2017-12-27 PROCEDURE — 25000003 PHARM REV CODE 250: Performed by: INTERNAL MEDICINE

## 2017-12-27 PROCEDURE — 96413 CHEMO IV INFUSION 1 HR: CPT

## 2017-12-27 PROCEDURE — 99214 OFFICE O/P EST MOD 30 MIN: CPT | Mod: S$GLB,,, | Performed by: INTERNAL MEDICINE

## 2017-12-27 PROCEDURE — 63600175 PHARM REV CODE 636 W HCPCS: Performed by: INTERNAL MEDICINE

## 2017-12-27 PROCEDURE — 85027 COMPLETE CBC AUTOMATED: CPT

## 2017-12-27 RX ORDER — HEPARIN 100 UNIT/ML
500 SYRINGE INTRAVENOUS
Status: DISCONTINUED | OUTPATIENT
Start: 2017-12-27 | End: 2017-12-27 | Stop reason: HOSPADM

## 2017-12-27 RX ORDER — SODIUM CHLORIDE 0.9 % (FLUSH) 0.9 %
10 SYRINGE (ML) INJECTION
Status: CANCELLED | OUTPATIENT
Start: 2017-12-28

## 2017-12-27 RX ORDER — ALBUTEROL SULFATE 90 UG/1
2 AEROSOL, METERED RESPIRATORY (INHALATION) EVERY 6 HOURS PRN
Qty: 18 G | Refills: 0 | Status: SHIPPED | OUTPATIENT
Start: 2017-12-27 | End: 2018-02-16 | Stop reason: SDUPTHER

## 2017-12-27 RX ORDER — SODIUM CHLORIDE 0.9 % (FLUSH) 0.9 %
10 SYRINGE (ML) INJECTION
Status: DISCONTINUED | OUTPATIENT
Start: 2017-12-27 | End: 2017-12-27 | Stop reason: HOSPADM

## 2017-12-27 RX ORDER — HEPARIN 100 UNIT/ML
500 SYRINGE INTRAVENOUS
Status: CANCELLED | OUTPATIENT
Start: 2017-12-28

## 2017-12-27 RX ORDER — DRONABINOL 5 MG/1
5 CAPSULE ORAL
Qty: 60 CAPSULE | Refills: 0 | Status: SHIPPED | OUTPATIENT
Start: 2017-12-27 | End: 2019-10-30 | Stop reason: SDUPTHER

## 2017-12-27 RX ADMIN — SODIUM CHLORIDE 200 MG: 9 INJECTION, SOLUTION INTRAVENOUS at 03:12

## 2017-12-27 RX ADMIN — SODIUM CHLORIDE: 900 INJECTION, SOLUTION INTRAVENOUS at 03:12

## 2017-12-27 NOTE — PLAN OF CARE
Problem: Chemotherapy Effects (Adult)  Goal: Signs and Symptoms of Listed Potential Problems Will be Absent, Minimized or Managed (Chemotherapy Effects)  Signs and symptoms of listed potential problems will be absent, minimized or managed by discharge/transition of care (reference Chemotherapy Effects (Adult) CPG).   Outcome: Ongoing (interventions implemented as appropriate)  Patient here for Keytruda.  Assessment complete and labs reviewed.  VSS.  Chair reclined and blanket offered.  No needs expressed at this time.  Will continue to monitor.

## 2017-12-27 NOTE — PLAN OF CARE
Problem: Patient Care Overview  Goal: Plan of Care Review  Outcome: Ongoing (interventions implemented as appropriate)  Patient tolerated treatment well.  No reaction suspected.  AVS given to patient.  No questions or concerns.  Patient ambulated off unit unassisted.

## 2018-01-17 ENCOUNTER — TELEPHONE (OUTPATIENT)
Dept: HEMATOLOGY/ONCOLOGY | Facility: CLINIC | Age: 63
End: 2018-01-17

## 2018-01-17 NOTE — TELEPHONE ENCOUNTER
----- Message from Jayashree Henry MA sent at 1/17/2018  9:42 AM CST -----  Contact: pt  Pt called saying he is unable to make his apt, and would like to be r/s.

## 2018-01-24 ENCOUNTER — OFFICE VISIT (OUTPATIENT)
Dept: HEMATOLOGY/ONCOLOGY | Facility: CLINIC | Age: 63
End: 2018-01-24
Payer: COMMERCIAL

## 2018-01-24 ENCOUNTER — LAB VISIT (OUTPATIENT)
Dept: LAB | Facility: HOSPITAL | Age: 63
End: 2018-01-24
Attending: INTERNAL MEDICINE
Payer: COMMERCIAL

## 2018-01-24 ENCOUNTER — INFUSION (OUTPATIENT)
Dept: INFUSION THERAPY | Facility: HOSPITAL | Age: 63
End: 2018-01-24
Attending: INTERNAL MEDICINE
Payer: COMMERCIAL

## 2018-01-24 VITALS
BODY MASS INDEX: 20.69 KG/M2 | HEIGHT: 77 IN | DIASTOLIC BLOOD PRESSURE: 68 MMHG | RESPIRATION RATE: 16 BRPM | WEIGHT: 175.25 LBS | SYSTOLIC BLOOD PRESSURE: 118 MMHG | HEART RATE: 108 BPM | TEMPERATURE: 98 F | OXYGEN SATURATION: 96 %

## 2018-01-24 VITALS
SYSTOLIC BLOOD PRESSURE: 126 MMHG | HEART RATE: 132 BPM | RESPIRATION RATE: 18 BRPM | DIASTOLIC BLOOD PRESSURE: 67 MMHG | TEMPERATURE: 98 F

## 2018-01-24 DIAGNOSIS — C79.51 SECONDARY ADENOCARCINOMA OF BONE: ICD-10-CM

## 2018-01-24 DIAGNOSIS — R59.0 MEDIASTINAL ADENOPATHY: ICD-10-CM

## 2018-01-24 DIAGNOSIS — Z09 CHEMOTHERAPY FOLLOW-UP EXAMINATION: ICD-10-CM

## 2018-01-24 DIAGNOSIS — C34.2 PRIMARY CANCER OF RIGHT MIDDLE LOBE OF LUNG: ICD-10-CM

## 2018-01-24 DIAGNOSIS — C34.2 PRIMARY CANCER OF RIGHT MIDDLE LOBE OF LUNG: Primary | ICD-10-CM

## 2018-01-24 LAB
ALBUMIN SERPL BCP-MCNC: 3.5 G/DL
ALP SERPL-CCNC: 58 U/L
ALT SERPL W/O P-5'-P-CCNC: 13 U/L
ANION GAP SERPL CALC-SCNC: 8 MMOL/L
AST SERPL-CCNC: 15 U/L
BILIRUB SERPL-MCNC: 0.3 MG/DL
BUN SERPL-MCNC: 14 MG/DL
CALCIUM SERPL-MCNC: 9.5 MG/DL
CHLORIDE SERPL-SCNC: 107 MMOL/L
CO2 SERPL-SCNC: 25 MMOL/L
CREAT SERPL-MCNC: 1 MG/DL
ERYTHROCYTE [DISTWIDTH] IN BLOOD BY AUTOMATED COUNT: 15.9 %
EST. GFR  (AFRICAN AMERICAN): >60 ML/MIN/1.73 M^2
EST. GFR  (NON AFRICAN AMERICAN): >60 ML/MIN/1.73 M^2
GLUCOSE SERPL-MCNC: 86 MG/DL
HCT VFR BLD AUTO: 38.1 %
HGB BLD-MCNC: 12.3 G/DL
IMM GRANULOCYTES # BLD AUTO: 0.01 K/UL
MAGNESIUM SERPL-MCNC: 2.2 MG/DL
MCH RBC QN AUTO: 27.2 PG
MCHC RBC AUTO-ENTMCNC: 32.3 G/DL
MCV RBC AUTO: 84 FL
NEUTROPHILS # BLD AUTO: 5.2 K/UL
PLATELET # BLD AUTO: 394 K/UL
PMV BLD AUTO: 9.4 FL
POTASSIUM SERPL-SCNC: 4.3 MMOL/L
PROT SERPL-MCNC: 7.8 G/DL
RBC # BLD AUTO: 4.52 M/UL
SODIUM SERPL-SCNC: 140 MMOL/L
WBC # BLD AUTO: 7.24 K/UL

## 2018-01-24 PROCEDURE — 80053 COMPREHEN METABOLIC PANEL: CPT

## 2018-01-24 PROCEDURE — 36415 COLL VENOUS BLD VENIPUNCTURE: CPT

## 2018-01-24 PROCEDURE — 85027 COMPLETE CBC AUTOMATED: CPT

## 2018-01-24 PROCEDURE — 99999 PR PBB SHADOW E&M-EST. PATIENT-LVL IV: CPT | Mod: PBBFAC,,, | Performed by: INTERNAL MEDICINE

## 2018-01-24 PROCEDURE — 83735 ASSAY OF MAGNESIUM: CPT

## 2018-01-24 PROCEDURE — 25000003 PHARM REV CODE 250: Performed by: INTERNAL MEDICINE

## 2018-01-24 PROCEDURE — A4216 STERILE WATER/SALINE, 10 ML: HCPCS | Performed by: INTERNAL MEDICINE

## 2018-01-24 PROCEDURE — 63600175 PHARM REV CODE 636 W HCPCS: Mod: JG | Performed by: INTERNAL MEDICINE

## 2018-01-24 PROCEDURE — 99214 OFFICE O/P EST MOD 30 MIN: CPT | Mod: S$GLB,,, | Performed by: INTERNAL MEDICINE

## 2018-01-24 PROCEDURE — 96413 CHEMO IV INFUSION 1 HR: CPT

## 2018-01-24 RX ORDER — SODIUM CHLORIDE 0.9 % (FLUSH) 0.9 %
10 SYRINGE (ML) INJECTION
Status: DISCONTINUED | OUTPATIENT
Start: 2018-01-24 | End: 2018-01-24 | Stop reason: HOSPADM

## 2018-01-24 RX ADMIN — SODIUM CHLORIDE: 900 INJECTION, SOLUTION INTRAVENOUS at 10:01

## 2018-01-24 RX ADMIN — SODIUM CHLORIDE 200 MG: 9 INJECTION, SOLUTION INTRAVENOUS at 10:01

## 2018-01-24 RX ADMIN — SODIUM CHLORIDE, PRESERVATIVE FREE 10 ML: 5 INJECTION INTRAVENOUS at 11:01

## 2018-01-24 NOTE — PLAN OF CARE
Problem: Patient Care Overview  Goal: Individualization & Mutuality  PIV  Warm blankets     Outcome: Ongoing (interventions implemented as appropriate)  6542 --  Patient's labs, history, allergies, and medication reviewed.  Patient seen by MD prior to visit and scheduled to receive Keytruda.  Discussed plan of care with patient.  Patient in agreement.  PIV set and good blood return noted.  Will monitor.

## 2018-01-24 NOTE — PROGRESS NOTES
PATIENT: Angel Torres  MRN: [id number]  DATE: 1/24/2018      Diagnosis:   1. Primary cancer of right middle lobe of lung    2. Secondary adenocarcinoma of bone    3. Chemotherapy follow-up examination        Chief Complaint: Chronic obstructive pulmonary disease, unspecified COPD type      Oncologic History:      Oncologic History Non-small cell lung cancer diagnosed 6/2016   Recurrent disease to lung 4/2017     Oncologic Treatment Cisplatin/Pemetrexed initiated 8/15/16 with concurrent radiation completed 3 cycles 10/3/16; 64 Gy  Carboplatin/pemetrexed/pembrolizumab 7/2017 - 9/2017  Pembrolizumab maintenance 11/2017     Pathology  6/2016 : Adenocarcinoma, T2b, N2, M0, Stage IIIA          Subjective:    Interval History: Mr. Torres is a 63 y.o. male who was seen in follow-up for lung cancer.  He states he has generally been feeling well.  His main complaints are related to weight loss.  He states that he has a decreased appetite and fills up quickly.  He has some ongoing shortness of breath and cough which are relatively unchanged.  His no other new complaints.    His history dates to approximately 4/2016 when he noted a worsening cough which was associated with clear sputum.  He had a chest x-ray in 5/16 showing a right middle lobe opacity.  Subsequent CT of the chest was performed on 5/30/16 showing a 5.6 cm lesion in the right middle lobe with hilar and mediastinal lymphadenopathy along with hepatic lesions and a left adrenal mass..  He underwent bronchoscopy which was nonrevealing and transbronchial biopsies were nondiagnostic.  He then underwent EBUS with biopsy of mediastinal lymph nodes with pathology showing non-small cell lung cancer consistent with adenocarcinoma.  He was started on concurrent chemotherapy and radiation with cisplatin and pemetrexed on 8/15/16.  He completed 64 Gy of concurrent radiation with cisplatin and pemetrexed with chemotherapy completed on 10/3/16.  Scans in 4/2017 had indicated  progressive disease in the lung.  Subsequent PET scan in 7/2017 had shown spread of disease to bone.  He was started on carboplatin and pemetrexed and pembrolizumab in 7/2017.  Repeat imaging in 8/2017 had shown stability of disease.  He was started on pembrolizumab maintenance in 11/2017 after a delay due to his insurance.  Repeat imaging in 12/2016 had shown mild progressive disease, however, it was felt that due to the excessive delay in treatment we would proceed on rather than change therapy at this time.    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       Past Surgical HIstory:   Past Surgical History:   Procedure Laterality Date    INGUINAL HERNIA REPAIR Bilateral     KNEE SURGERY         Family History:   Family History   Problem Relation Age of Onset    Cancer Mother      lung, breast    Cancer Sister      lung    Cancer Maternal Grandfather     No Known Problems Father     No Known Problems Brother     No Known Problems Maternal Aunt     No Known Problems Maternal Uncle     No Known Problems Paternal Aunt     No Known Problems Paternal Uncle     No Known Problems Maternal Grandmother     No Known Problems Paternal Grandmother     No Known Problems Paternal Grandfather     Amblyopia Neg Hx     Blindness Neg Hx     Cataracts Neg Hx     Diabetes Neg Hx     Glaucoma Neg Hx     Hypertension Neg Hx     Macular degeneration Neg Hx     Retinal detachment Neg Hx     Strabismus Neg Hx     Stroke Neg Hx     Thyroid disease Neg Hx        Social History:  reports that he quit smoking about 4 years ago. He has a 80.00 pack-year smoking  history. He has quit using smokeless tobacco. He reports that he does not drink alcohol or use drugs.    Allergies:  Review of patient's allergies indicates:  No Known Allergies    Medications:  Current Outpatient Prescriptions   Medication Sig Dispense Refill    albuterol 90 mcg/actuation inhaler Inhale 2 puffs into the lungs every 6 (six) hours as needed for Wheezing. 18 g 0    albuterol-ipratropium 2.5mg-0.5mg/3mL (DUO-NEB) 0.5 mg-3 mg(2.5 mg base)/3 mL nebulizer solution Take 3 mLs by nebulization every 6 (six) hours as needed for Wheezing or Shortness of Breath. 100 vial 1    amlodipine (NORVASC) 10 MG tablet Take 1 tablet (10 mg total) by mouth once daily. 90 tablet 3    dronabinol (MARINOL) 5 MG capsule Take 1 capsule (5 mg total) by mouth 2 (two) times daily before meals. 60 capsule 0    fluticasone (FLONASE) 50 mcg/actuation nasal spray SHAKE LIQUID AND USE 2 SPRAYS IN EACH NOSTRIL EVERY DAY 16 g 1    folic acid (FOLVITE) 400 MCG tablet TAKE 1 TABLET(400 MCG) BY MOUTH EVERY  tablet 0    naproxen (NAPROSYN) 500 MG tablet Take 1 tablet (500 mg total) by mouth 2 (two) times daily as needed (headache). 60 tablet 0    cetirizine (ZYRTEC) 10 MG tablet Take 1 tablet (10 mg total) by mouth once daily.  0     Current Facility-Administered Medications   Medication Dose Route Frequency Provider Last Rate Last Dose    cyanocobalamin injection 1,000 mcg  1,000 mcg Intramuscular 1 time in Clinic/HOD Dano Fernandez DO, FACP        cyanocobalamin injection 1,000 mcg  1,000 mcg Intramuscular 1 time in Clinic/HOD Dano Fernandez DO, FACP           Review of Systems   Constitutional: Positive for appetite change and unexpected weight change. Negative for activity change, chills, fatigue and fever.        Normal weight 220   HENT: Negative for dental problem, nosebleeds, sinus pressure, sneezing and trouble swallowing.    Eyes: Negative for visual disturbance.   Respiratory: Positive for cough and  "shortness of breath. Negative for choking, chest tightness and wheezing.    Cardiovascular: Negative for chest pain and leg swelling.   Gastrointestinal: Negative for abdominal pain, blood in stool, constipation, diarrhea and nausea.   Genitourinary: Negative for difficulty urinating and dysuria.   Musculoskeletal: Positive for myalgias. Negative for arthralgias and back pain.   Skin: Negative for rash and wound.   Neurological: Negative for dizziness, light-headedness and headaches.   Hematological: Negative for adenopathy. Does not bruise/bleed easily.   Psychiatric/Behavioral: Negative for sleep disturbance. The patient is not nervous/anxious.        ECOG Performance Status: 1   Objective:      Vitals:   Vitals:    01/24/18 0902   BP: 118/68   BP Location: Left arm   Patient Position: Sitting   BP Method: X-Large (Automatic)   Pulse: 108   Resp: 16   Temp: 97.6 °F (36.4 °C)   TempSrc: Oral   SpO2: 96%   Weight: 79.5 kg (175 lb 4.3 oz)   Height: 6' 5" (1.956 m)     BMI: Body mass index is 20.78 kg/m².    Physical Exam   Constitutional: He is oriented to person, place, and time. He appears well-developed and well-nourished.   HENT:   Head: Normocephalic and atraumatic.   Eyes: Pupils are equal, round, and reactive to light.   Neck: Normal range of motion. Neck supple.   Cardiovascular: Normal rate and regular rhythm.    Pulmonary/Chest: Effort normal. No respiratory distress. He has no rales.   Abdominal: Soft. He exhibits no distension. There is no tenderness.   Musculoskeletal: He exhibits no edema or tenderness.   Lymphadenopathy:     He has no cervical adenopathy.   Neurological: He is alert and oriented to person, place, and time. No cranial nerve deficit.   Skin: Skin is warm and dry. Rash noted.   Psychiatric: He has a normal mood and affect. His behavior is normal.       Laboratory Data:  Lab Visit on 01/24/2018   Component Date Value Ref Range Status    WBC 01/24/2018 7.24  3.90 - 12.70 K/uL Final    RBC " 01/24/2018 4.52* 4.60 - 6.20 M/uL Final    Hemoglobin 01/24/2018 12.3* 14.0 - 18.0 g/dL Final    Hematocrit 01/24/2018 38.1* 40.0 - 54.0 % Final    MCV 01/24/2018 84  82 - 98 fL Final    MCH 01/24/2018 27.2  27.0 - 31.0 pg Final    MCHC 01/24/2018 32.3  32.0 - 36.0 g/dL Final    RDW 01/24/2018 15.9* 11.5 - 14.5 % Final    Platelets 01/24/2018 394* 150 - 350 K/uL Final    MPV 01/24/2018 9.4  9.2 - 12.9 fL Final    Gran # 01/24/2018 5.2  1.8 - 7.7 K/uL Final    Comment: The ANC is based on a white cell differential from an   automated cell counter. It has not been microscopically   reviewed for the presence of abnormal cells. Clinical   correlation is required.      Immature Grans (Abs) 01/24/2018 0.01  0.00 - 0.04 K/uL Final   Lab Visit on 12/27/2017   Component Date Value Ref Range Status    WBC 12/27/2017 6.88  3.90 - 12.70 K/uL Final    RBC 12/27/2017 4.82  4.60 - 6.20 M/uL Final    Hemoglobin 12/27/2017 13.0* 14.0 - 18.0 g/dL Final    Hematocrit 12/27/2017 41.1  40.0 - 54.0 % Final    MCV 12/27/2017 85  82 - 98 fL Final    MCH 12/27/2017 27.0  27.0 - 31.0 pg Final    MCHC 12/27/2017 31.6* 32.0 - 36.0 g/dL Final    RDW 12/27/2017 15.2* 11.5 - 14.5 % Final    Platelets 12/27/2017 457* 150 - 350 K/uL Final    MPV 12/27/2017 9.4  9.2 - 12.9 fL Final    Gran # 12/27/2017 5.1  1.8 - 7.7 K/uL Final    Comment: The ANC is based on a white cell differential from an   automated cell counter. It has not been microscopically   reviewed for the presence of abnormal cells. Clinical   correlation is required.      Immature Grans (Abs) 12/27/2017 0.02  0.00 - 0.04 K/uL Final    Sodium 12/27/2017 139  136 - 145 mmol/L Final    Potassium 12/27/2017 4.1  3.5 - 5.1 mmol/L Final    Chloride 12/27/2017 106  95 - 110 mmol/L Final    CO2 12/27/2017 24  23 - 29 mmol/L Final    Glucose 12/27/2017 92  70 - 110 mg/dL Final    BUN, Bld 12/27/2017 11  8 - 23 mg/dL Final    Creatinine 12/27/2017 1.1  0.5 - 1.4  mg/dL Final    Calcium 12/27/2017 9.5  8.7 - 10.5 mg/dL Final    Total Protein 12/27/2017 8.6* 6.0 - 8.4 g/dL Final    Albumin 12/27/2017 3.8  3.5 - 5.2 g/dL Final    Total Bilirubin 12/27/2017 0.3  0.1 - 1.0 mg/dL Final    Comment: For infants and newborns, interpretation of results should be based  on gestational age, weight and in agreement with clinical  observations.  Premature Infant recommended reference ranges:  Up to 24 hours.............<8.0 mg/dL  Up to 48 hours............<12.0 mg/dL  3-5 days..................<15.0 mg/dL  6-29 days.................<15.0 mg/dL      Alkaline Phosphatase 12/27/2017 62  55 - 135 U/L Final    AST 12/27/2017 22  10 - 40 U/L Final    ALT 12/27/2017 20  10 - 44 U/L Final    Anion Gap 12/27/2017 9  8 - 16 mmol/L Final    eGFR if African American 12/27/2017 >60.0  >60 mL/min/1.73 m^2 Final    eGFR if non African American 12/27/2017 >60.0  >60 mL/min/1.73 m^2 Final    Comment: Calculation used to obtain the estimated glomerular filtration  rate (eGFR) is the CKD-EPI equation.       Magnesium 12/27/2017 2.0  1.6 - 2.6 mg/dL Final     Supplemental Diagnosis 6/30/16  Immunohistochemical stains are completed on the Station 4L lymph node cell block material and reveal the tumor  cells to stain positively with cytokeratin 7 and TTF-1. The tumor cells show nonspecific blush staining with  cytokeratin 5/6 and are negative for p63. This staining profile supports a diagnosis of non-small cell carcinoma  consistent with adenocarcinoma.  Cell block material will be sent for EGFR, ALK and ROS-1 testing and results will follow in a supplemental report.  (Electronically Signed: 2016-06-30 11:50:31 )  Diagnosed by: Irene Amaro M.D.  FINAL PATHOLOGIC DIAGNOSIS  1. Lymph node, Station 4L, EBUS guided fine needle aspiration:  Positive for malignant cells, non-small cell carcinoma.  Rare background lymphocytes are present.  Immunohistochemical staining be completed to further  characterize this malignancy and results will follow in a  supplemental report.  2. Lymph node, Station 7, EBUS guided fine needle aspiration:  Positive for malignant cells, non-small cell carcinoma tumor identical to that seen in specimen #1.  Background lymphocytes are present.    Imaging:     CT 11/29/17  Findings: Comparison is 8/30/2017. Patient was administered 100 cc of Omnipaque 350 and p.o. contrast. No mediastinal, hilar, or axillary adenopathy is seen. Right middle lobe mass now abutting the right atrial border measures 4 x 4 cm, previously 2.5 x 3.9 cm. This is larger. There are coronary artery calcifications. Nodular areas on the right somewhat larger worrisome for metastatic disease. There is emphysema. Small liver hypodensities unchanged possibly cysts. There is a small hiatal hernia. There is a small left adrenal adenoma. Spleen, stomach, pancreas, duodenum, right adrenal gland, and kidneys show nothing unusual. No adenopathy is seen. Kidneys enhance and excrete normally. Right T1 vertebral body sclerotic lesion 2 cm, previously 2.22 cm. There is a small urachal diverticulum.   Impression      Right middle lobe soft tissue mass larger worrisome for progression.    Right lower lobe nodules larger somewhat irregular and patchy suggesting inflammation.      Stable T1 vertebral body metastasis.    Splenic lesion is no longer seen.    Hypodense hepatic lesions likely cysts.    Left adrenal adenoma.    Ureteral diverticulum.    Index lesions: Right middle lobe mass 4 x 4 centimeters, previously 2.5 x 3.9.    T1 vertebral body 2 cm, previously 2.22 cm.    Splenic lesion the upper seen.    Right lower lobe lesions slightly larger. May be metastatic but could be inflammatory.                Assessment:       1. Primary cancer of right middle lobe of lung    2. Secondary adenocarcinoma of bone    3. Chemotherapy follow-up examination           Plan:     Mr. Torres will proceed on with his next cycle  pembrolizumab today.  His last imaging was in 11/2017 and will plan to repeat imaging in another 3 weeks and see back at that time for his next cycle.  If he has ongoing progressive disease in the next step in treatment would be docetaxel.  Follow back up with me in another 3 weeks.  Report any new symptoms in the interim.    Dano Fernandez DO, FACP  Hematology & Oncology  [facility] [place] [address]  ph. [phone]  Fax. [phone]    25 minutes were spent in coordination of patient's care, record review and counseling.  More than 50% of the time was face-to-face.

## 2018-01-24 NOTE — PLAN OF CARE
Problem: Patient Care Overview  Goal: Plan of Care Review  Outcome: Ongoing (interventions implemented as appropriate)  1108 -- Patient tolerated treatment well.  Discharged without S/S of adverse effects.  AVS given.  Patient instructed to call provider with any questions or concerns.

## 2018-01-25 ENCOUNTER — DOCUMENTATION ONLY (OUTPATIENT)
Dept: HEMATOLOGY/ONCOLOGY | Facility: CLINIC | Age: 63
End: 2018-01-25

## 2018-01-25 NOTE — PROGRESS NOTES
RD called pt to schedule appt for unintentional weight loss. Pt states he will call back once he speaks to wife. RD contact information provided.

## 2018-02-07 ENCOUNTER — DOCUMENTATION ONLY (OUTPATIENT)
Dept: INFUSION THERAPY | Facility: HOSPITAL | Age: 63
End: 2018-02-07

## 2018-02-07 NOTE — PROGRESS NOTES
Spoke to patient.  Patient states he did not think he was supposed to be scheduled today.  Messaged Lauren at Dr. Fernandez's office.

## 2018-02-08 ENCOUNTER — TELEPHONE (OUTPATIENT)
Dept: HEMATOLOGY/ONCOLOGY | Facility: CLINIC | Age: 63
End: 2018-02-08

## 2018-02-08 DIAGNOSIS — C34.2 PRIMARY CANCER OF RIGHT MIDDLE LOBE OF LUNG: Primary | ICD-10-CM

## 2018-02-08 DIAGNOSIS — C79.51 SECONDARY ADENOCARCINOMA OF BONE: ICD-10-CM

## 2018-02-08 NOTE — TELEPHONE ENCOUNTER
----- Message from Michael Parson sent at 2/7/2018  3:43 PM CST -----  I cancel all other appt. Until we know what to schedule, please put in scans if need be. Thanks   ----- Message -----  From: Linnea Vegas RN  Sent: 2/7/2018   3:23 PM  To: Michael Parson, Dano Fernandez, DO, FACP    Mr. Torres didn't show up today because he was supposed to be seen 3 weeks with scans from his last visit and infusion which was 1/24.  He needs to have scans, labs and Keytruda next Wed. The problem, Dr. Fernandez, is with you being on hospital service you don't have any openings next Wed. What would you like to do about his appointment?

## 2018-02-14 ENCOUNTER — OFFICE VISIT (OUTPATIENT)
Dept: HEMATOLOGY/ONCOLOGY | Facility: CLINIC | Age: 63
End: 2018-02-14
Payer: COMMERCIAL

## 2018-02-14 ENCOUNTER — INFUSION (OUTPATIENT)
Dept: INFUSION THERAPY | Facility: HOSPITAL | Age: 63
End: 2018-02-14
Attending: INTERNAL MEDICINE
Payer: COMMERCIAL

## 2018-02-14 ENCOUNTER — LAB VISIT (OUTPATIENT)
Dept: LAB | Facility: HOSPITAL | Age: 63
End: 2018-02-14
Attending: INTERNAL MEDICINE
Payer: COMMERCIAL

## 2018-02-14 VITALS
TEMPERATURE: 98 F | WEIGHT: 184.94 LBS | SYSTOLIC BLOOD PRESSURE: 123 MMHG | DIASTOLIC BLOOD PRESSURE: 80 MMHG | HEART RATE: 100 BPM | HEIGHT: 77 IN | BODY MASS INDEX: 21.84 KG/M2

## 2018-02-14 DIAGNOSIS — Z09 CHEMOTHERAPY FOLLOW-UP EXAMINATION: ICD-10-CM

## 2018-02-14 DIAGNOSIS — C34.2 PRIMARY CANCER OF RIGHT MIDDLE LOBE OF LUNG: Primary | ICD-10-CM

## 2018-02-14 DIAGNOSIS — R59.0 MEDIASTINAL ADENOPATHY: ICD-10-CM

## 2018-02-14 DIAGNOSIS — C79.51 SECONDARY ADENOCARCINOMA OF BONE: ICD-10-CM

## 2018-02-14 DIAGNOSIS — C34.2 PRIMARY CANCER OF RIGHT MIDDLE LOBE OF LUNG: ICD-10-CM

## 2018-02-14 LAB
ALBUMIN SERPL BCP-MCNC: 3.5 G/DL
ALP SERPL-CCNC: 54 U/L
ALT SERPL W/O P-5'-P-CCNC: 14 U/L
ANION GAP SERPL CALC-SCNC: 7 MMOL/L
AST SERPL-CCNC: 16 U/L
BILIRUB SERPL-MCNC: 0.5 MG/DL
BUN SERPL-MCNC: 15 MG/DL
CALCIUM SERPL-MCNC: 9.6 MG/DL
CHLORIDE SERPL-SCNC: 106 MMOL/L
CO2 SERPL-SCNC: 26 MMOL/L
CREAT SERPL-MCNC: 1 MG/DL
ERYTHROCYTE [DISTWIDTH] IN BLOOD BY AUTOMATED COUNT: 17 %
EST. GFR  (AFRICAN AMERICAN): >60 ML/MIN/1.73 M^2
EST. GFR  (NON AFRICAN AMERICAN): >60 ML/MIN/1.73 M^2
GLUCOSE SERPL-MCNC: 85 MG/DL
HCT VFR BLD AUTO: 38.6 %
HGB BLD-MCNC: 12.5 G/DL
IMM GRANULOCYTES # BLD AUTO: 0.02 K/UL
MCH RBC QN AUTO: 26.5 PG
MCHC RBC AUTO-ENTMCNC: 32.4 G/DL
MCV RBC AUTO: 82 FL
NEUTROPHILS # BLD AUTO: 5.4 K/UL
PLATELET # BLD AUTO: 375 K/UL
PMV BLD AUTO: 9.8 FL
POTASSIUM SERPL-SCNC: 4.4 MMOL/L
PROT SERPL-MCNC: 8.2 G/DL
RBC # BLD AUTO: 4.72 M/UL
SODIUM SERPL-SCNC: 139 MMOL/L
T4 FREE SERPL-MCNC: 1.38 NG/DL
TSH SERPL DL<=0.005 MIU/L-ACNC: 2.12 UIU/ML
WBC # BLD AUTO: 7.41 K/UL

## 2018-02-14 PROCEDURE — 63600175 PHARM REV CODE 636 W HCPCS: Mod: JG | Performed by: INTERNAL MEDICINE

## 2018-02-14 PROCEDURE — 85027 COMPLETE CBC AUTOMATED: CPT

## 2018-02-14 PROCEDURE — 84443 ASSAY THYROID STIM HORMONE: CPT

## 2018-02-14 PROCEDURE — 99999 PR PBB SHADOW E&M-EST. PATIENT-LVL III: CPT | Mod: PBBFAC,,, | Performed by: INTERNAL MEDICINE

## 2018-02-14 PROCEDURE — 99214 OFFICE O/P EST MOD 30 MIN: CPT | Mod: S$GLB,,, | Performed by: INTERNAL MEDICINE

## 2018-02-14 PROCEDURE — 96413 CHEMO IV INFUSION 1 HR: CPT

## 2018-02-14 PROCEDURE — 80053 COMPREHEN METABOLIC PANEL: CPT

## 2018-02-14 PROCEDURE — 36415 COLL VENOUS BLD VENIPUNCTURE: CPT

## 2018-02-14 PROCEDURE — 3008F BODY MASS INDEX DOCD: CPT | Mod: S$GLB,,, | Performed by: INTERNAL MEDICINE

## 2018-02-14 PROCEDURE — 25000003 PHARM REV CODE 250: Performed by: INTERNAL MEDICINE

## 2018-02-14 PROCEDURE — 84439 ASSAY OF FREE THYROXINE: CPT

## 2018-02-14 RX ORDER — HEPARIN 100 UNIT/ML
500 SYRINGE INTRAVENOUS
Status: CANCELLED | OUTPATIENT
Start: 2018-02-14

## 2018-02-14 RX ORDER — SODIUM CHLORIDE 0.9 % (FLUSH) 0.9 %
10 SYRINGE (ML) INJECTION
Status: DISCONTINUED | OUTPATIENT
Start: 2018-02-14 | End: 2018-02-14 | Stop reason: HOSPADM

## 2018-02-14 RX ORDER — SODIUM CHLORIDE 0.9 % (FLUSH) 0.9 %
10 SYRINGE (ML) INJECTION
Status: CANCELLED | OUTPATIENT
Start: 2018-02-14

## 2018-02-14 RX ADMIN — SODIUM CHLORIDE: 0.9 INJECTION, SOLUTION INTRAVENOUS at 11:02

## 2018-02-14 RX ADMIN — SODIUM CHLORIDE 200 MG: 900 INJECTION, SOLUTION INTRAVENOUS at 11:02

## 2018-02-14 NOTE — PLAN OF CARE
Problem: Patient Care Overview  Goal: Plan of Care Review  Outcome: Ongoing (interventions implemented as appropriate)  Patient tolerated treatment well today. NAD noted upon discharge. Verbalized understanding to call MD for any questions/concerns. PIV removed, catheter tip intact. AVS given. Discharged home, ambulated independently.

## 2018-02-14 NOTE — PROGRESS NOTES
PATIENT: Angel Torres  MRN: [id number]  DATE: 2/14/2018      Diagnosis:   1. Primary cancer of right middle lobe of lung    2. Secondary adenocarcinoma of bone    3. Chemotherapy follow-up examination        Chief Complaint: Lung Cancer      Oncologic History:      Oncologic History Non-small cell lung cancer diagnosed 6/2016   Recurrent disease to lung 4/2017     Oncologic Treatment Cisplatin/Pemetrexed initiated 8/15/16 with concurrent radiation completed 3 cycles 10/3/16; 64 Gy  Carboplatin/pemetrexed/pembrolizumab 7/2017 - 9/2017  Pembrolizumab maintenance 11/2017     Pathology  6/2016 : Adenocarcinoma, T2b, N2, M0, Stage IIIA          Subjective:    Interval History: Mr. Torres is a 63 y.o. male who was seen in follow-up for lung cancer.  He states he has generally been feeling well.  His weight has stabilized and he is actually gained 5 pounds over the last 3 weeks.  He states that he has noted some cough with chest congestion 2 weeks previously but this has improved.  Patient shortness of breath is at his baseline.  He has no new complaints.     His history dates to approximately 4/2016 when he noted a worsening cough which was associated with clear sputum.  He had a chest x-ray in 5/16 showing a right middle lobe opacity.  Subsequent CT of the chest was performed on 5/30/16 showing a 5.6 cm lesion in the right middle lobe with hilar and mediastinal lymphadenopathy along with hepatic lesions and a left adrenal mass..  He underwent bronchoscopy which was nonrevealing and transbronchial biopsies were nondiagnostic.  He then underwent EBUS with biopsy of mediastinal lymph nodes with pathology showing non-small cell lung cancer consistent with adenocarcinoma.  He was started on concurrent chemotherapy and radiation with cisplatin and pemetrexed on 8/15/16.  He completed 64 Gy of concurrent radiation with cisplatin and pemetrexed with chemotherapy completed on 10/3/16.  Scans in 4/2017 had indicated progressive  disease in the lung.  Subsequent PET scan in 7/2017 had shown spread of disease to bone.  He was started on carboplatin and pemetrexed and pembrolizumab in 7/2017.  Repeat imaging in 8/2017 had shown stability of disease.  He was started on pembrolizumab maintenance in 11/2017 after a delay due to his insurance.  Repeat imaging in 12/2016 had shown mild progressive disease, however, it was felt that due to the excessive delay in treatment we would proceed on rather than change therapy at this time.    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       Past Surgical HIstory:   Past Surgical History:   Procedure Laterality Date    INGUINAL HERNIA REPAIR Bilateral     KNEE SURGERY         Family History:   Family History   Problem Relation Age of Onset    Cancer Mother      lung, breast    Cancer Sister      lung    Cancer Maternal Grandfather     No Known Problems Father     No Known Problems Brother     No Known Problems Maternal Aunt     No Known Problems Maternal Uncle     No Known Problems Paternal Aunt     No Known Problems Paternal Uncle     No Known Problems Maternal Grandmother     No Known Problems Paternal Grandmother     No Known Problems Paternal Grandfather     Amblyopia Neg Hx     Blindness Neg Hx     Cataracts Neg Hx     Diabetes Neg Hx     Glaucoma Neg Hx     Hypertension Neg Hx     Macular degeneration Neg Hx     Retinal detachment Neg Hx     Strabismus Neg Hx     Stroke Neg Hx     Thyroid disease Neg Hx        Social History:  reports that he quit smoking about 4 years ago. He has a 80.00 pack-year smoking history. He  has quit using smokeless tobacco. He reports that he does not drink alcohol or use drugs.    Allergies:  Review of patient's allergies indicates:  No Known Allergies    Medications:  Current Outpatient Prescriptions   Medication Sig Dispense Refill    albuterol 90 mcg/actuation inhaler Inhale 2 puffs into the lungs every 6 (six) hours as needed for Wheezing. 18 g 0    albuterol-ipratropium 2.5mg-0.5mg/3mL (DUO-NEB) 0.5 mg-3 mg(2.5 mg base)/3 mL nebulizer solution Take 3 mLs by nebulization every 6 (six) hours as needed for Wheezing or Shortness of Breath. 100 vial 1    amlodipine (NORVASC) 10 MG tablet Take 1 tablet (10 mg total) by mouth once daily. 90 tablet 3    dronabinol (MARINOL) 5 MG capsule Take 1 capsule (5 mg total) by mouth 2 (two) times daily before meals. 60 capsule 0    fluticasone (FLONASE) 50 mcg/actuation nasal spray SHAKE LIQUID AND USE 2 SPRAYS IN EACH NOSTRIL EVERY DAY 16 g 1    folic acid (FOLVITE) 400 MCG tablet TAKE 1 TABLET(400 MCG) BY MOUTH EVERY  tablet 0    naproxen (NAPROSYN) 500 MG tablet Take 1 tablet (500 mg total) by mouth 2 (two) times daily as needed (headache). 60 tablet 0    cetirizine (ZYRTEC) 10 MG tablet Take 1 tablet (10 mg total) by mouth once daily.  0     Current Facility-Administered Medications   Medication Dose Route Frequency Provider Last Rate Last Dose    cyanocobalamin injection 1,000 mcg  1,000 mcg Intramuscular 1 time in Clinic/HOD Dano Fernandez DO, FACP        cyanocobalamin injection 1,000 mcg  1,000 mcg Intramuscular 1 time in Clinic/HOD Dano Fernandez DO, FACP           Review of Systems   Constitutional: Negative for activity change, appetite change, chills, fatigue, fever and unexpected weight change.        Normal weight 220   HENT: Negative for dental problem, nosebleeds, sinus pressure, sneezing and trouble swallowing.    Eyes: Negative for visual disturbance.   Respiratory: Positive for cough and shortness of breath. Negative for  "choking, chest tightness and wheezing.    Cardiovascular: Negative for chest pain and leg swelling.   Gastrointestinal: Negative for abdominal pain, blood in stool, constipation, diarrhea and nausea.   Genitourinary: Negative for difficulty urinating and dysuria.   Musculoskeletal: Positive for myalgias. Negative for arthralgias and back pain.   Skin: Negative for rash and wound.   Neurological: Negative for dizziness, light-headedness and headaches.   Hematological: Negative for adenopathy. Does not bruise/bleed easily.   Psychiatric/Behavioral: Negative for sleep disturbance. The patient is not nervous/anxious.        ECOG Performance Status: 1   Objective:      Vitals:   Vitals:    02/14/18 0948   BP: 123/80   Pulse: 100   Temp: 97.5 °F (36.4 °C)   Weight: 83.9 kg (184 lb 15.5 oz)   Height: 6' 5" (1.956 m)     BMI: Body mass index is 21.93 kg/m².    Physical Exam   Constitutional: He is oriented to person, place, and time. He appears well-developed and well-nourished.   HENT:   Head: Normocephalic and atraumatic.   Eyes: Pupils are equal, round, and reactive to light.   Neck: Normal range of motion. Neck supple.   Cardiovascular: Normal rate and regular rhythm.    Pulmonary/Chest: Effort normal. No respiratory distress. He has no rales.   Abdominal: Soft. He exhibits no distension. There is no tenderness.   Musculoskeletal: He exhibits no edema or tenderness.   Lymphadenopathy:     He has no cervical adenopathy.   Neurological: He is alert and oriented to person, place, and time. No cranial nerve deficit.   Skin: Skin is warm and dry. Rash noted.   Psychiatric: He has a normal mood and affect. His behavior is normal.       Laboratory Data:  Lab Visit on 02/14/2018   Component Date Value Ref Range Status    WBC 02/14/2018 7.41  3.90 - 12.70 K/uL Final    RBC 02/14/2018 4.72  4.60 - 6.20 M/uL Final    Hemoglobin 02/14/2018 12.5* 14.0 - 18.0 g/dL Final    Hematocrit 02/14/2018 38.6* 40.0 - 54.0 % Final    MCV " 02/14/2018 82  82 - 98 fL Final    MCH 02/14/2018 26.5* 27.0 - 31.0 pg Final    MCHC 02/14/2018 32.4  32.0 - 36.0 g/dL Final    RDW 02/14/2018 17.0* 11.5 - 14.5 % Final    Platelets 02/14/2018 375* 150 - 350 K/uL Final    MPV 02/14/2018 9.8  9.2 - 12.9 fL Final    Gran # (ANC) 02/14/2018 5.4  1.8 - 7.7 K/uL Final    Comment: The ANC is based on a white cell differential from an   automated cell counter. It has not been microscopically   reviewed for the presence of abnormal cells. Clinical   correlation is required.      Immature Grans (Abs) 02/14/2018 0.02  0.00 - 0.04 K/uL Final    Comment: Mild elevation in immature granulocytes is non specific and   can be seen in a variety of conditions including stress response,   acute inflammation, trauma and pregnancy. Correlation with other   laboratory and clinical findings is essential.      Sodium 02/14/2018 139  136 - 145 mmol/L Final    Potassium 02/14/2018 4.4  3.5 - 5.1 mmol/L Final    Chloride 02/14/2018 106  95 - 110 mmol/L Final    CO2 02/14/2018 26  23 - 29 mmol/L Final    Glucose 02/14/2018 85  70 - 110 mg/dL Final    BUN, Bld 02/14/2018 15  8 - 23 mg/dL Final    Creatinine 02/14/2018 1.0  0.5 - 1.4 mg/dL Final    Calcium 02/14/2018 9.6  8.7 - 10.5 mg/dL Final    Total Protein 02/14/2018 8.2  6.0 - 8.4 g/dL Final    Albumin 02/14/2018 3.5  3.5 - 5.2 g/dL Final    Total Bilirubin 02/14/2018 0.5  0.1 - 1.0 mg/dL Final    Comment: For infants and newborns, interpretation of results should be based  on gestational age, weight and in agreement with clinical  observations.  Premature Infant recommended reference ranges:  Up to 24 hours.............<8.0 mg/dL  Up to 48 hours............<12.0 mg/dL  3-5 days..................<15.0 mg/dL  6-29 days.................<15.0 mg/dL      Alkaline Phosphatase 02/14/2018 54* 55 - 135 U/L Final    AST 02/14/2018 16  10 - 40 U/L Final    ALT 02/14/2018 14  10 - 44 U/L Final    Anion Gap 02/14/2018 7* 8 - 16  mmol/L Final    eGFR if African American 02/14/2018 >60.0  >60 mL/min/1.73 m^2 Final    eGFR if non African American 02/14/2018 >60.0  >60 mL/min/1.73 m^2 Final    Comment: Calculation used to obtain the estimated glomerular filtration  rate (eGFR) is the CKD-EPI equation.      Lab Visit on 01/24/2018   Component Date Value Ref Range Status    WBC 01/24/2018 7.24  3.90 - 12.70 K/uL Final    RBC 01/24/2018 4.52* 4.60 - 6.20 M/uL Final    Hemoglobin 01/24/2018 12.3* 14.0 - 18.0 g/dL Final    Hematocrit 01/24/2018 38.1* 40.0 - 54.0 % Final    MCV 01/24/2018 84  82 - 98 fL Final    MCH 01/24/2018 27.2  27.0 - 31.0 pg Final    MCHC 01/24/2018 32.3  32.0 - 36.0 g/dL Final    RDW 01/24/2018 15.9* 11.5 - 14.5 % Final    Platelets 01/24/2018 394* 150 - 350 K/uL Final    MPV 01/24/2018 9.4  9.2 - 12.9 fL Final    Gran # (ANC) 01/24/2018 5.2  1.8 - 7.7 K/uL Final    Comment: The ANC is based on a white cell differential from an   automated cell counter. It has not been microscopically   reviewed for the presence of abnormal cells. Clinical   correlation is required.      Immature Grans (Abs) 01/24/2018 0.01  0.00 - 0.04 K/uL Final    Sodium 01/24/2018 140  136 - 145 mmol/L Final    Potassium 01/24/2018 4.3  3.5 - 5.1 mmol/L Final    Chloride 01/24/2018 107  95 - 110 mmol/L Final    CO2 01/24/2018 25  23 - 29 mmol/L Final    Glucose 01/24/2018 86  70 - 110 mg/dL Final    BUN, Bld 01/24/2018 14  8 - 23 mg/dL Final    Creatinine 01/24/2018 1.0  0.5 - 1.4 mg/dL Final    Calcium 01/24/2018 9.5  8.7 - 10.5 mg/dL Final    Total Protein 01/24/2018 7.8  6.0 - 8.4 g/dL Final    Albumin 01/24/2018 3.5  3.5 - 5.2 g/dL Final    Total Bilirubin 01/24/2018 0.3  0.1 - 1.0 mg/dL Final    Comment: For infants and newborns, interpretation of results should be based  on gestational age, weight and in agreement with clinical  observations.  Premature Infant recommended reference ranges:  Up to 24 hours.............<8.0  mg/dL  Up to 48 hours............<12.0 mg/dL  3-5 days..................<15.0 mg/dL  6-29 days.................<15.0 mg/dL      Alkaline Phosphatase 01/24/2018 58  55 - 135 U/L Final    AST 01/24/2018 15  10 - 40 U/L Final    ALT 01/24/2018 13  10 - 44 U/L Final    Anion Gap 01/24/2018 8  8 - 16 mmol/L Final    eGFR if African American 01/24/2018 >60.0  >60 mL/min/1.73 m^2 Final    eGFR if non African American 01/24/2018 >60.0  >60 mL/min/1.73 m^2 Final    Comment: Calculation used to obtain the estimated glomerular filtration  rate (eGFR) is the CKD-EPI equation.       Magnesium 01/24/2018 2.2  1.6 - 2.6 mg/dL Final     Supplemental Diagnosis 6/30/16  Immunohistochemical stains are completed on the Station 4L lymph node cell block material and reveal the tumor  cells to stain positively with cytokeratin 7 and TTF-1. The tumor cells show nonspecific blush staining with  cytokeratin 5/6 and are negative for p63. This staining profile supports a diagnosis of non-small cell carcinoma  consistent with adenocarcinoma.  Cell block material will be sent for EGFR, ALK and ROS-1 testing and results will follow in a supplemental report.  (Electronically Signed: 2016-06-30 11:50:31 )  Diagnosed by: Irene Amaro M.D.  FINAL PATHOLOGIC DIAGNOSIS  1. Lymph node, Station 4L, EBUS guided fine needle aspiration:  Positive for malignant cells, non-small cell carcinoma.  Rare background lymphocytes are present.  Immunohistochemical staining be completed to further characterize this malignancy and results will follow in a  supplemental report.  2. Lymph node, Station 7, EBUS guided fine needle aspiration:  Positive for malignant cells, non-small cell carcinoma tumor identical to that seen in specimen #1.  Background lymphocytes are present.    Imaging:     CT 11/29/17  Findings: Comparison is 8/30/2017. Patient was administered 100 cc of Omnipaque 350 and p.o. contrast. No mediastinal, hilar, or axillary adenopathy is seen.  Right middle lobe mass now abutting the right atrial border measures 4 x 4 cm, previously 2.5 x 3.9 cm. This is larger. There are coronary artery calcifications. Nodular areas on the right somewhat larger worrisome for metastatic disease. There is emphysema. Small liver hypodensities unchanged possibly cysts. There is a small hiatal hernia. There is a small left adrenal adenoma. Spleen, stomach, pancreas, duodenum, right adrenal gland, and kidneys show nothing unusual. No adenopathy is seen. Kidneys enhance and excrete normally. Right T1 vertebral body sclerotic lesion 2 cm, previously 2.22 cm. There is a small urachal diverticulum.   Impression      Right middle lobe soft tissue mass larger worrisome for progression.    Right lower lobe nodules larger somewhat irregular and patchy suggesting inflammation.      Stable T1 vertebral body metastasis.    Splenic lesion is no longer seen.    Hypodense hepatic lesions likely cysts.    Left adrenal adenoma.    Ureteral diverticulum.    Index lesions: Right middle lobe mass 4 x 4 centimeters, previously 2.5 x 3.9.    T1 vertebral body 2 cm, previously 2.22 cm.    Splenic lesion the upper seen.    Right lower lobe lesions slightly larger. May be metastatic but could be inflammatory.                Assessment:       1. Primary cancer of right middle lobe of lung    2. Secondary adenocarcinoma of bone    3. Chemotherapy follow-up examination           Plan:     Mr. Torres will proceed on with his next cycle of pembrolizumab today.  Clinically, he has improved and will treat despite him not getting scan she had, however, we'll need scans prior to next visit.  Follow back up with me in 3 weeks and report any new symptoms in the interim.    Dano Fernandez DO, FACP  Hematology & Oncology  [facility] [place] [address]  ph. [phone]  Fax. [phone]    25 minutes were spent in coordination of patient's care, record review and counseling.  More than 50% of the  time was face-to-face.

## 2018-02-16 DIAGNOSIS — J44.9 CHRONIC OBSTRUCTIVE PULMONARY DISEASE, UNSPECIFIED COPD TYPE: ICD-10-CM

## 2018-02-23 ENCOUNTER — TELEPHONE (OUTPATIENT)
Dept: HEMATOLOGY/ONCOLOGY | Facility: CLINIC | Age: 63
End: 2018-02-23

## 2018-02-23 NOTE — TELEPHONE ENCOUNTER
----- Message from Maximo Faria sent at 2/23/2018  3:47 PM CST -----  Contact: Spouse-Magaly   Will like to know why chemo appt was cancel for 3/21    Contact::[phone]

## 2018-02-27 ENCOUNTER — TELEPHONE (OUTPATIENT)
Dept: HEMATOLOGY/ONCOLOGY | Facility: CLINIC | Age: 63
End: 2018-02-27

## 2018-02-28 NOTE — TELEPHONE ENCOUNTER
LVM that patient is not due for Keytruda until next week and to call so we can schedule the appt.

## 2018-02-28 NOTE — TELEPHONE ENCOUNTER
----- Message from Tameka Urban sent at 2/27/2018  2:40 PM CST -----  Contact: Wife  Wife is calling to speak with Staff regarding the pt receiving Keytruda.  Wife says pt did not receive it and she wants to know why?    She can be reached at [phone].    Thank you.

## 2018-03-06 ENCOUNTER — TELEPHONE (OUTPATIENT)
Dept: HEMATOLOGY/ONCOLOGY | Facility: CLINIC | Age: 63
End: 2018-03-06

## 2018-03-06 NOTE — TELEPHONE ENCOUNTER
Spoke to Diamond, patient's significant other.  She rescheduled the patient's scans for 4pm on Wed.  There is no one to see the patient to speak about scans and then patient be able to receive Keytruda.  Patient's SO asked if he can have his scans, have Dr. Fernandez call her, see someone next week and then have his Keytruda. Dr. Fernandez.agreed.

## 2018-03-07 ENCOUNTER — HOSPITAL ENCOUNTER (OUTPATIENT)
Dept: RADIOLOGY | Facility: HOSPITAL | Age: 63
Discharge: HOME | End: 2018-03-07
Attending: INTERNAL MEDICINE
Payer: COMMERCIAL

## 2018-03-07 DIAGNOSIS — C34.2 PRIMARY CANCER OF RIGHT MIDDLE LOBE OF LUNG: ICD-10-CM

## 2018-03-07 DIAGNOSIS — C79.51 SECONDARY ADENOCARCINOMA OF BONE: ICD-10-CM

## 2018-03-07 PROCEDURE — 74177 CT ABD & PELVIS W/CONTRAST: CPT | Mod: 26,,, | Performed by: RADIOLOGY

## 2018-03-07 PROCEDURE — 74177 CT ABD & PELVIS W/CONTRAST: CPT | Mod: TC

## 2018-03-07 PROCEDURE — 25500020 PHARM REV CODE 255: Performed by: INTERNAL MEDICINE

## 2018-03-07 PROCEDURE — 71260 CT THORAX DX C+: CPT | Mod: 26,,, | Performed by: RADIOLOGY

## 2018-03-07 RX ORDER — ALBUTEROL SULFATE 90 UG/1
AEROSOL, METERED RESPIRATORY (INHALATION)
Qty: 17 G | Refills: 0 | Status: SHIPPED | OUTPATIENT
Start: 2018-03-07 | End: 2019-04-03 | Stop reason: SDUPTHER

## 2018-03-07 RX ADMIN — IOHEXOL 15 ML: 350 INJECTION, SOLUTION INTRAVENOUS at 04:03

## 2018-03-07 RX ADMIN — IOHEXOL 100 ML: 350 INJECTION, SOLUTION INTRAVENOUS at 06:03

## 2018-03-07 NOTE — TELEPHONE ENCOUNTER
Spoke to patient's wife Diamond and told her about the previous plan and that Dr. Fernandez would call with the scan results and let him know if he would stay on Keytruda, he would then have an appt with his NP ROSEMARY Burgos

## 2018-03-08 RX ORDER — HEPARIN 100 UNIT/ML
500 SYRINGE INTRAVENOUS
Status: CANCELLED | OUTPATIENT
Start: 2018-03-08

## 2018-03-08 RX ORDER — SODIUM CHLORIDE 0.9 % (FLUSH) 0.9 %
10 SYRINGE (ML) INJECTION
Status: CANCELLED | OUTPATIENT
Start: 2018-03-08

## 2018-03-08 NOTE — PROGRESS NOTES
PATIENT: Angel Torres  MRN: [id number]  DATE: 3/15/2018      Diagnosis:   1. Primary cancer of right middle lobe of lung    2. Secondary adenocarcinoma of bone    3. Anxiety    4. Centrilobular emphysema        Chief Complaint: Primary cancer of right middle lobe of lung      Oncologic History:      Oncologic History Non-small cell lung cancer diagnosed 6/2016   Recurrent disease to lung 4/2017     Oncologic Treatment Cisplatin/Pemetrexed initiated 8/15/16 with concurrent radiation completed 3 cycles 10/3/16; 64 Gy  Carboplatin/pemetrexed/pembrolizumab 7/2017 - 9/2017  Pembrolizumab maintenance 11/2017     Pathology  6/2016 : Adenocarcinoma, T2b, N2, M0, Stage IIIA          Subjective:    Interval History: Mr. Torres is a 63 y.o. male who was seen in follow-up for lung cancer.  His recent CT scans were reviewed by Dr. Fernandez and are stable. He is here for Keytruda. He states that he has generally been feeling well.  His weight has stabilized and he has actually gained 3 pounds since his last visit.  He reports that his cough is improving and the mucus is less and is now clear.  His shortness of breath is at his baseline.  He continues to have a dry mouth despite increased hydration. He has no new complaints.     His history dates to approximately 4/2016 when he noted a worsening cough which was associated with clear sputum.  He had a chest x-ray in 5/16 showing a right middle lobe opacity.  Subsequent CT of the chest was performed on 5/30/16 showing a 5.6 cm lesion in the right middle lobe with hilar and mediastinal lymphadenopathy along with hepatic lesions and a left adrenal mass..  He underwent bronchoscopy which was nonrevealing and transbronchial biopsies were nondiagnostic.  He then underwent EBUS with biopsy of mediastinal lymph nodes with pathology showing non-small cell lung cancer consistent with adenocarcinoma.  He was started on concurrent chemotherapy and radiation with cisplatin and pemetrexed on  8/15/16.  He completed 64 Gy of concurrent radiation with cisplatin and pemetrexed with chemotherapy completed on 10/3/16.  Scans in 4/2017 had indicated progressive disease in the lung.  Subsequent PET scan in 7/2017 had shown spread of disease to bone.  He was started on carboplatin and pemetrexed and pembrolizumab in 7/2017.  Repeat imaging in 8/2017 had shown stability of disease.  He was started on pembrolizumab maintenance in 11/2017 after a delay due to his insurance.  Repeat imaging in 12/2016 had shown mild progressive disease, however, it was felt that due to the excessive delay in treatment we would proceed on rather than change therapy at this time.    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       Past Surgical HIstory:   Past Surgical History:   Procedure Laterality Date    INGUINAL HERNIA REPAIR Bilateral     KNEE SURGERY         Family History:   Family History   Problem Relation Age of Onset    Cancer Mother      lung, breast    Cancer Sister      lung    Cancer Maternal Grandfather     No Known Problems Father     No Known Problems Brother     No Known Problems Maternal Aunt     No Known Problems Maternal Uncle     No Known Problems Paternal Aunt     No Known Problems Paternal Uncle     No Known Problems Maternal Grandmother     No Known Problems Paternal Grandmother     No Known Problems Paternal Grandfather     Amblyopia Neg Hx     Blindness Neg Hx     Cataracts Neg Hx     Diabetes Neg Hx     Glaucoma Neg Hx     Hypertension Neg Hx     Macular degeneration Neg Hx     Retinal detachment Neg Hx     Strabismus Neg  Hx     Stroke Neg Hx     Thyroid disease Neg Hx        Social History:  reports that he quit smoking about 4 years ago. He has a 80.00 pack-year smoking history. He has quit using smokeless tobacco. He reports that he does not drink alcohol or use drugs.    Allergies:  Review of patient's allergies indicates:  No Known Allergies    Medications:  Current Outpatient Prescriptions   Medication Sig Dispense Refill    albuterol-ipratropium 2.5mg-0.5mg/3mL (DUO-NEB) 0.5 mg-3 mg(2.5 mg base)/3 mL nebulizer solution Take 3 mLs by nebulization every 6 (six) hours as needed for Wheezing or Shortness of Breath. 100 vial 1    amlodipine (NORVASC) 10 MG tablet Take 1 tablet (10 mg total) by mouth once daily. 90 tablet 3    dronabinol (MARINOL) 5 MG capsule Take 1 capsule (5 mg total) by mouth 2 (two) times daily before meals. 60 capsule 0    fluticasone (FLONASE) 50 mcg/actuation nasal spray SHAKE LIQUID AND USE 2 SPRAYS IN EACH NOSTRIL EVERY DAY 16 g 1    folic acid (FOLVITE) 400 MCG tablet TAKE 1 TABLET(400 MCG) BY MOUTH EVERY  tablet 0    naproxen (NAPROSYN) 500 MG tablet Take 1 tablet (500 mg total) by mouth 2 (two) times daily as needed (headache). 60 tablet 0    PROAIR HFA 90 mcg/actuation inhaler INHALE 2 PUFFS INTO THE LUNGS EVERY 6 HOURS AS NEEDED FOR WHEEZING 17 g 0    cetirizine (ZYRTEC) 10 MG tablet Take 1 tablet (10 mg total) by mouth once daily.  0     Current Facility-Administered Medications   Medication Dose Route Frequency Provider Last Rate Last Dose    cyanocobalamin injection 1,000 mcg  1,000 mcg Intramuscular 1 time in Clinic/HOD Dano Fernandez DO, FACP        cyanocobalamin injection 1,000 mcg  1,000 mcg Intramuscular 1 time in Clinic/HOD Dano Fernandez DO, FACDENNIS           Review of Systems   Constitutional: Negative for activity change, appetite change, chills, fatigue, fever and unexpected weight change.        Normal weight 220   HENT: Negative for dental problem, nosebleeds,  "sinus pressure, sneezing and trouble swallowing.  Dry mouth   Eyes: Negative for visual disturbance.   Respiratory: Positive for cough and shortness of breath. Negative for choking, chest tightness and wheezing.    Cardiovascular: Negative for chest pain and leg swelling.   Gastrointestinal: Negative for abdominal pain, blood in stool, constipation, diarrhea and nausea.   Genitourinary: Negative for difficulty urinating and dysuria.   Musculoskeletal:  Negative for arthralgias and back pain.   Skin: Negative for rash and wound.   Neurological: Negative for dizziness, light-headedness and headaches.   Hematological: Negative for adenopathy. Does not bruise/bleed easily.   Psychiatric/Behavioral: Negative for sleep disturbance. The patient is not nervous/anxious.        ECOG Performance Status: 1   Objective:      Vitals:   Vitals:    03/15/18 1341   BP: (!) 142/79   BP Location: Right arm   Patient Position: Sitting   BP Method: Large (Automatic)   Pulse: 107   Resp: 16   Temp: 97.7 °F (36.5 °C)   TempSrc: Oral   SpO2: 97%   Weight: 84.9 kg (187 lb 2.7 oz)   Height: 6' 5" (1.956 m)     BMI: Body mass index is 22.2 kg/m².    Physical Exam   Constitutional: He is oriented to person, place, and time. He appears well-developed and well-nourished.   HENT:   Head: Normocephalic and atraumatic.   Eyes: Pupils are equal, round, and reactive to light.   Neck: Normal range of motion. Neck supple.   Cardiovascular: Normal rate and regular rhythm.    Pulmonary/Chest: Effort normal. No respiratory distress. He has no rales.   Abdominal: Soft. He exhibits no distension. There is no tenderness.   Musculoskeletal: He exhibits no edema or tenderness.   Lymphadenopathy:     He has no cervical adenopathy.   Neurological: He is alert and oriented to person, place, and time. No cranial nerve deficit.   Skin: Skin is warm and dry.  No rash noted.    Psychiatric: He has a normal mood and affect. His behavior is normal.       Laboratory " Data:  Lab Visit on 03/15/2018   Component Date Value Ref Range Status    WBC 03/15/2018 6.23  3.90 - 12.70 K/uL Final    RBC 03/15/2018 5.02  4.60 - 6.20 M/uL Final    Hemoglobin 03/15/2018 13.8* 14.0 - 18.0 g/dL Final    Hematocrit 03/15/2018 42.2  40.0 - 54.0 % Final    MCV 03/15/2018 84  82 - 98 fL Final    MCH 03/15/2018 27.5  27.0 - 31.0 pg Final    MCHC 03/15/2018 32.7  32.0 - 36.0 g/dL Final    RDW 03/15/2018 17.1* 11.5 - 14.5 % Final    Platelets 03/15/2018 304  150 - 350 K/uL Final    MPV 03/15/2018 8.9* 9.2 - 12.9 fL Final    Gran # (ANC) 03/15/2018 4.2  1.8 - 7.7 K/uL Final    Comment: The ANC is based on a white cell differential from an   automated cell counter. It has not been microscopically   reviewed for the presence of abnormal cells. Clinical   correlation is required.      Immature Grans (Abs) 03/15/2018 0.01  0.00 - 0.04 K/uL Final    Comment: Mild elevation in immature granulocytes is non specific and   can be seen in a variety of conditions including stress response,   acute inflammation, trauma and pregnancy. Correlation with other   laboratory and clinical findings is essential.         Chemistry        Component Value Date/Time     02/14/2018 0812    K 4.4 02/14/2018 0812     02/14/2018 0812    CO2 26 02/14/2018 0812    BUN 15 02/14/2018 0812    CREATININE 1.0 02/14/2018 0812    GLU 85 02/14/2018 0812        Component Value Date/Time    CALCIUM 9.6 02/14/2018 0812    ALKPHOS 54 (L) 02/14/2018 0812    AST 16 02/14/2018 0812    ALT 14 02/14/2018 0812    BILITOT 0.5 02/14/2018 0812    ESTGFRAFRICA >60.0 02/14/2018 0812    EGFRNONAA >60.0 02/14/2018 0812        TSH   Date Value Ref Range Status   02/14/2018 2.123 0.400 - 4.000 uIU/mL Final     Free T4   Date Value Ref Range Status   02/14/2018 1.38 0.71 - 1.51 ng/dL Final           Supplemental Diagnosis 6/30/16  Immunohistochemical stains are completed on the Station 4L lymph node cell block material and reveal the  tumor  cells to stain positively with cytokeratin 7 and TTF-1. The tumor cells show nonspecific blush staining with  cytokeratin 5/6 and are negative for p63. This staining profile supports a diagnosis of non-small cell carcinoma  consistent with adenocarcinoma.  Cell block material will be sent for EGFR, ALK and ROS-1 testing and results will follow in a supplemental report.  (Electronically Signed: 2016-06-30 11:50:31 )  Diagnosed by: Irene Amaro M.D.  FINAL PATHOLOGIC DIAGNOSIS  1. Lymph node, Station 4L, EBUS guided fine needle aspiration:  Positive for malignant cells, non-small cell carcinoma.  Rare background lymphocytes are present.  Immunohistochemical staining be completed to further characterize this malignancy and results will follow in a  supplemental report.  2. Lymph node, Station 7, EBUS guided fine needle aspiration:  Positive for malignant cells, non-small cell carcinoma tumor identical to that seen in specimen #1.  Background lymphocytes are present.    Imaging:     CT 11/29/17  Findings: Comparison is 8/30/2017. Patient was administered 100 cc of Omnipaque 350 and p.o. contrast. No mediastinal, hilar, or axillary adenopathy is seen. Right middle lobe mass now abutting the right atrial border measures 4 x 4 cm, previously 2.5 x 3.9 cm. This is larger. There are coronary artery calcifications. Nodular areas on the right somewhat larger worrisome for metastatic disease. There is emphysema. Small liver hypodensities unchanged possibly cysts. There is a small hiatal hernia. There is a small left adrenal adenoma. Spleen, stomach, pancreas, duodenum, right adrenal gland, and kidneys show nothing unusual. No adenopathy is seen. Kidneys enhance and excrete normally. Right T1 vertebral body sclerotic lesion 2 cm, previously 2.22 cm. There is a small urachal diverticulum.   Impression      Right middle lobe soft tissue mass larger worrisome for progression.    Right lower lobe nodules larger somewhat  irregular and patchy suggesting inflammation.      Stable T1 vertebral body metastasis.    Splenic lesion is no longer seen.    Hypodense hepatic lesions likely cysts.    Left adrenal adenoma.    Ureteral diverticulum.    Index lesions: Right middle lobe mass 4 x 4 centimeters, previously 2.5 x 3.9.    T1 vertebral body 2 cm, previously 2.22 cm.    Splenic lesion the upper seen.    Right lower lobe lesions slightly larger. May be metastatic but could be inflammatory.         CT chest/abd/pelvis 3/7/18  COMPARISON: CT chest/abdomen/pelvis with contrast 11/29/2017, 08/30/2017; PET CT 07/19/2017, 01/09/2017     FINDINGS:    Neck Base: Unremarkable.    Heart: No cardiomegaly. No significant pericardial effusion, noting a trace amount of pericardial fluid.  Mediastinum/Axillary : No abnormal masses or lymphadenopathy.  Lungs/airways: 4.1 x 3.8 cm (previously 4.0 x 4.0 cm) soft tissue right middle lobe lesion at the hilum with mass effect resulting in narrowing at the origin of the right middle lobe bronchus, as well as some distal airway obstruction resulting in significant middle lobe volume loss.  Lesion hypermetabolic on prior PET imaging.  Additional, stable, nonspecific patchy consolidative change in the right lower lobe which may represent pneumonitis, or post radiation therapy change.  Tumor less likely given lack of significantly increased PET metabolic activity.  There is bilateral moderate severity emphysema.  Trace right pleural effusion. No new focal consolidation, or pneumothorax.  Chest Wall: Unremarkable.    Peritoneal Space: No ascites. No free air.  Stomach: Unremarkable.  Liver: Normal in size.  Homogenous in attenuation.  Stable, small right hepatic lobe hypodensities, likely cysts, as well as additional hypodensities that are to small to definitely characterize and may represent cysts.    Gallbladder: No calcified gallstones.  Bile Ducts: No evidence of dilated ducts.  Pancreas: No mass or  peripancreatic fat stranding.   Spleen: Previously identified hypodense splenic lesion is barely detectable on today's examination measuring less than 0.5 cm.  Prior PET imaging demonstrated no increased metabolism.  Adrenals: 1.1 cm nodular thickening of the left adrenal gland.  Stable when compared to multiple prior examinations.  No evidence of increased metabolism on PET imaging, and likely representing an adenoma.  Right adrenal gland is unremarkable.  Bowel/Mesentery: Small and large bowel are normal in caliber without evidence of obstruction or acute inflammation.  Appendix is unremarkable.  No significant mesenteric edema or adenopathy.      Urinary Tract: Kidneys are normal in size and position and concentrate/excrete contrast appropriately on delayed imaging. Bilateral kidney hypoattenuations that are too small to definitively characterize, however possibly represent cysts. No solid renal mass.  No nephrolithiasis or obstructive uropathy.  Ureters are unremarkable.  Small diverticulum at the bladder dome/urachus.  Retroperitoneum:  No significant adenopathy.   Pelvis:  No pelvic masses, adenopathy, or free fluid. The prostate is enlarged at 5.7 cm in transverse diameter.  Abdominal wall:  Postoperative change of inguinal mesh hernia repair.  Otherwise unremarkable.     Vasculature: Aortic arch demonstrates 4 branch vessels noting direct origin of the left vertebral artery.  No aneurysm or dissection. Minimal scattered atherosclerotic calcification predominantly at the distal aorta and iliac arteries.     Bones: No acute fracture. Redemonstration of stable T1 (2.1 cm) and left iliac wing sclerotic lesions.  No new lesions.  Age-related degenerative change.   Impression          Stable large right middle lobe soft tissue mass with hypermetabolic activity on prior PET imaging consistent with primary tumor site.  No new pulmonary lesions.    Stable osseous metastatic disease, as above.    Stable patchy  opacification of the right lower lobe likely representing pneumonitis or post radiation therapy change.  Tumor less likely secondary to lack of significant increased metabolic activity    Emphysema, atherosclerosis, hepatic cysts, and other findings as above.     RECIST SUMMARY: Date of prior examination for comparison CT chest/abdomen/pelvis with contrast 01/29/2017    Lesion 1: Right middle lobe 4.1 cm Series 2 Image 48 Prior measurement 4.0 cm            Assessment:       1. Primary cancer of right middle lobe of lung    2. Secondary adenocarcinoma of bone    3. Anxiety    4. Centrilobular emphysema           Plan:     Patient is clinically stable. Labs reviewed. Scans discussed and are stable. Proceed with Pembrolizumab today as scheduled. RTC in 3 weeks to see Dr. Fernandez with a CBC, CMP, TSH, T4 and for Pembrolizumab.   He is to report any new symptoms in the interim. Encouraged to use Biotene gel for dry mouth.   No anxiety noted today. No recent exacerbations noted.     Patient is in agreement with the proposed treatment plan. All questions were answered to the patient's satisfaction. Pt knows to call clinic if anything is needed before the next clinic visit.      JHOAN DíazP-C  Hematology & Oncology  [facility] [place] [address]  ph. [phone]  Fax. [phone]     30 minutes were spent in coordination of patient's care, record review and counseling. More than 50% of the time was face-to-face.

## 2018-03-12 PROBLEM — Z09 CHEMOTHERAPY FOLLOW-UP EXAMINATION: Status: RESOLVED | Noted: 2017-12-06 | Resolved: 2018-03-12

## 2018-03-15 ENCOUNTER — OFFICE VISIT (OUTPATIENT)
Dept: HEMATOLOGY/ONCOLOGY | Facility: CLINIC | Age: 63
End: 2018-03-15
Payer: COMMERCIAL

## 2018-03-15 ENCOUNTER — INFUSION (OUTPATIENT)
Dept: INFUSION THERAPY | Facility: HOSPITAL | Age: 63
End: 2018-03-15
Attending: INTERNAL MEDICINE
Payer: COMMERCIAL

## 2018-03-15 VITALS
DIASTOLIC BLOOD PRESSURE: 79 MMHG | WEIGHT: 187.19 LBS | OXYGEN SATURATION: 97 % | TEMPERATURE: 98 F | RESPIRATION RATE: 16 BRPM | SYSTOLIC BLOOD PRESSURE: 142 MMHG | HEIGHT: 77 IN | HEART RATE: 107 BPM | BODY MASS INDEX: 22.1 KG/M2

## 2018-03-15 VITALS
SYSTOLIC BLOOD PRESSURE: 134 MMHG | HEART RATE: 101 BPM | TEMPERATURE: 98 F | RESPIRATION RATE: 18 BRPM | DIASTOLIC BLOOD PRESSURE: 65 MMHG

## 2018-03-15 DIAGNOSIS — F41.9 ANXIETY: ICD-10-CM

## 2018-03-15 DIAGNOSIS — C34.2 PRIMARY CANCER OF RIGHT MIDDLE LOBE OF LUNG: Primary | ICD-10-CM

## 2018-03-15 DIAGNOSIS — R59.0 MEDIASTINAL ADENOPATHY: ICD-10-CM

## 2018-03-15 DIAGNOSIS — C79.51 SECONDARY ADENOCARCINOMA OF BONE: ICD-10-CM

## 2018-03-15 DIAGNOSIS — J43.2 CENTRILOBULAR EMPHYSEMA: ICD-10-CM

## 2018-03-15 PROCEDURE — 99214 OFFICE O/P EST MOD 30 MIN: CPT | Mod: S$GLB,,, | Performed by: NURSE PRACTITIONER

## 2018-03-15 PROCEDURE — 96413 CHEMO IV INFUSION 1 HR: CPT

## 2018-03-15 PROCEDURE — 3077F SYST BP >= 140 MM HG: CPT | Mod: CPTII,S$GLB,, | Performed by: NURSE PRACTITIONER

## 2018-03-15 PROCEDURE — 25000003 PHARM REV CODE 250: Performed by: INTERNAL MEDICINE

## 2018-03-15 PROCEDURE — 99999 PR PBB SHADOW E&M-EST. PATIENT-LVL IV: CPT | Mod: PBBFAC,,, | Performed by: NURSE PRACTITIONER

## 2018-03-15 PROCEDURE — 63600175 PHARM REV CODE 636 W HCPCS: Mod: JG | Performed by: INTERNAL MEDICINE

## 2018-03-15 PROCEDURE — 3078F DIAST BP <80 MM HG: CPT | Mod: CPTII,S$GLB,, | Performed by: NURSE PRACTITIONER

## 2018-03-15 RX ORDER — SODIUM CHLORIDE 0.9 % (FLUSH) 0.9 %
10 SYRINGE (ML) INJECTION
Status: DISCONTINUED | OUTPATIENT
Start: 2018-03-15 | End: 2018-03-15 | Stop reason: HOSPADM

## 2018-03-15 RX ORDER — HEPARIN 100 UNIT/ML
500 SYRINGE INTRAVENOUS
Status: DISCONTINUED | OUTPATIENT
Start: 2018-03-15 | End: 2018-03-15 | Stop reason: HOSPADM

## 2018-03-15 RX ADMIN — SODIUM CHLORIDE 200 MG: 9 INJECTION, SOLUTION INTRAVENOUS at 03:03

## 2018-03-15 RX ADMIN — SODIUM CHLORIDE: 9 INJECTION, SOLUTION INTRAVENOUS at 02:03

## 2018-03-15 NOTE — PLAN OF CARE
Problem: Patient Care Overview  Goal: Plan of Care Review  Outcome: Ongoing (interventions implemented as appropriate)  Tolerated treatment well.  Advised to call MD for any problems or concerns.  AVS given.  RTC as scheduled.  NAD noted upon discharge.

## 2018-04-03 ENCOUNTER — TELEPHONE (OUTPATIENT)
Dept: HEMATOLOGY/ONCOLOGY | Facility: CLINIC | Age: 63
End: 2018-04-03

## 2018-04-03 NOTE — TELEPHONE ENCOUNTER
----- Message from Linnea Vegas RN sent at 4/3/2018 11:23 AM CDT -----  Contact: pt wife called  What time do they want???  It can be the day before.     ----- Message -----  From: Michael Parson  Sent: 4/3/2018  10:35 AM  To: [facility]    Please see below   ----- Message -----  From: Edel Fay  Sent: 4/3/2018   9:56 AM  To: Michael Parson    Pt wife called and states that can they get a different time for lab appt time is 9:50 4/4  Callback#[phone]  Thank You  ABRAHAN Fay

## 2018-04-04 ENCOUNTER — OFFICE VISIT (OUTPATIENT)
Dept: HEMATOLOGY/ONCOLOGY | Facility: CLINIC | Age: 63
End: 2018-04-04
Payer: COMMERCIAL

## 2018-04-04 ENCOUNTER — LAB VISIT (OUTPATIENT)
Dept: LAB | Facility: HOSPITAL | Age: 63
End: 2018-04-04
Payer: COMMERCIAL

## 2018-04-04 ENCOUNTER — INFUSION (OUTPATIENT)
Dept: INFUSION THERAPY | Facility: HOSPITAL | Age: 63
End: 2018-04-04
Attending: INTERNAL MEDICINE
Payer: COMMERCIAL

## 2018-04-04 VITALS — RESPIRATION RATE: 18 BRPM | SYSTOLIC BLOOD PRESSURE: 112 MMHG | HEART RATE: 106 BPM | DIASTOLIC BLOOD PRESSURE: 79 MMHG

## 2018-04-04 VITALS
OXYGEN SATURATION: 94 % | WEIGHT: 186.94 LBS | RESPIRATION RATE: 18 BRPM | DIASTOLIC BLOOD PRESSURE: 82 MMHG | HEIGHT: 77 IN | BODY MASS INDEX: 22.07 KG/M2 | TEMPERATURE: 98 F | HEART RATE: 111 BPM | SYSTOLIC BLOOD PRESSURE: 126 MMHG

## 2018-04-04 DIAGNOSIS — F41.9 ANXIETY: ICD-10-CM

## 2018-04-04 DIAGNOSIS — J43.2 CENTRILOBULAR EMPHYSEMA: ICD-10-CM

## 2018-04-04 DIAGNOSIS — C34.2 PRIMARY CANCER OF RIGHT MIDDLE LOBE OF LUNG: ICD-10-CM

## 2018-04-04 DIAGNOSIS — J30.1 SEASONAL ALLERGIC RHINITIS DUE TO POLLEN, UNSPECIFIED CHRONICITY: ICD-10-CM

## 2018-04-04 DIAGNOSIS — C34.2 PRIMARY CANCER OF RIGHT MIDDLE LOBE OF LUNG: Primary | ICD-10-CM

## 2018-04-04 DIAGNOSIS — C79.51 SECONDARY ADENOCARCINOMA OF BONE: ICD-10-CM

## 2018-04-04 DIAGNOSIS — R59.0 MEDIASTINAL ADENOPATHY: ICD-10-CM

## 2018-04-04 PROBLEM — J30.9 ALLERGIC RHINITIS: Status: ACTIVE | Noted: 2018-04-04

## 2018-04-04 LAB
ALBUMIN SERPL BCP-MCNC: 3.9 G/DL
ALP SERPL-CCNC: 49 U/L
ALT SERPL W/O P-5'-P-CCNC: 18 U/L
ANION GAP SERPL CALC-SCNC: 7 MMOL/L
AST SERPL-CCNC: 19 U/L
BILIRUB SERPL-MCNC: 0.4 MG/DL
BUN SERPL-MCNC: 16 MG/DL
CALCIUM SERPL-MCNC: 9.7 MG/DL
CHLORIDE SERPL-SCNC: 105 MMOL/L
CO2 SERPL-SCNC: 27 MMOL/L
CREAT SERPL-MCNC: 1.2 MG/DL
ERYTHROCYTE [DISTWIDTH] IN BLOOD BY AUTOMATED COUNT: 16.1 %
EST. GFR  (AFRICAN AMERICAN): >60 ML/MIN/1.73 M^2
EST. GFR  (NON AFRICAN AMERICAN): >60 ML/MIN/1.73 M^2
GLUCOSE SERPL-MCNC: 82 MG/DL
HCT VFR BLD AUTO: 42 %
HGB BLD-MCNC: 13.3 G/DL
IMM GRANULOCYTES # BLD AUTO: 0.03 K/UL
MCH RBC QN AUTO: 27.1 PG
MCHC RBC AUTO-ENTMCNC: 31.7 G/DL
MCV RBC AUTO: 86 FL
NEUTROPHILS # BLD AUTO: 5.6 K/UL
PLATELET # BLD AUTO: 377 K/UL
PMV BLD AUTO: 9.5 FL
POTASSIUM SERPL-SCNC: 4.7 MMOL/L
PROT SERPL-MCNC: 8.1 G/DL
RBC # BLD AUTO: 4.9 M/UL
SODIUM SERPL-SCNC: 139 MMOL/L
T4 FREE SERPL-MCNC: 1.06 NG/DL
TSH SERPL DL<=0.005 MIU/L-ACNC: 1.42 UIU/ML
WBC # BLD AUTO: 7.99 K/UL

## 2018-04-04 PROCEDURE — 36415 COLL VENOUS BLD VENIPUNCTURE: CPT

## 2018-04-04 PROCEDURE — 80053 COMPREHEN METABOLIC PANEL: CPT

## 2018-04-04 PROCEDURE — 84439 ASSAY OF FREE THYROXINE: CPT

## 2018-04-04 PROCEDURE — 84443 ASSAY THYROID STIM HORMONE: CPT

## 2018-04-04 PROCEDURE — 99999 PR PBB SHADOW E&M-EST. PATIENT-LVL IV: CPT | Mod: PBBFAC,,, | Performed by: NURSE PRACTITIONER

## 2018-04-04 PROCEDURE — 85027 COMPLETE CBC AUTOMATED: CPT

## 2018-04-04 PROCEDURE — 99214 OFFICE O/P EST MOD 30 MIN: CPT | Mod: S$GLB,,, | Performed by: NURSE PRACTITIONER

## 2018-04-04 PROCEDURE — 63600175 PHARM REV CODE 636 W HCPCS: Mod: JG | Performed by: INTERNAL MEDICINE

## 2018-04-04 PROCEDURE — 3074F SYST BP LT 130 MM HG: CPT | Mod: CPTII,S$GLB,, | Performed by: NURSE PRACTITIONER

## 2018-04-04 PROCEDURE — 25000003 PHARM REV CODE 250: Performed by: INTERNAL MEDICINE

## 2018-04-04 PROCEDURE — 96413 CHEMO IV INFUSION 1 HR: CPT

## 2018-04-04 PROCEDURE — 3079F DIAST BP 80-89 MM HG: CPT | Mod: CPTII,S$GLB,, | Performed by: NURSE PRACTITIONER

## 2018-04-04 RX ORDER — HEPARIN 100 UNIT/ML
500 SYRINGE INTRAVENOUS
Status: CANCELLED | OUTPATIENT
Start: 2018-04-04

## 2018-04-04 RX ORDER — SODIUM CHLORIDE 0.9 % (FLUSH) 0.9 %
10 SYRINGE (ML) INJECTION
Status: CANCELLED | OUTPATIENT
Start: 2018-04-04

## 2018-04-04 RX ORDER — SODIUM CHLORIDE 0.9 % (FLUSH) 0.9 %
10 SYRINGE (ML) INJECTION
Status: DISCONTINUED | OUTPATIENT
Start: 2018-04-04 | End: 2018-04-04 | Stop reason: HOSPADM

## 2018-04-04 RX ORDER — HEPARIN 100 UNIT/ML
500 SYRINGE INTRAVENOUS
Status: DISCONTINUED | OUTPATIENT
Start: 2018-04-04 | End: 2018-04-04 | Stop reason: HOSPADM

## 2018-04-04 RX ADMIN — SODIUM CHLORIDE 200 MG: 9 INJECTION, SOLUTION INTRAVENOUS at 12:04

## 2018-04-04 RX ADMIN — SODIUM CHLORIDE: 9 INJECTION, SOLUTION INTRAVENOUS at 12:04

## 2018-04-04 NOTE — PROGRESS NOTES
PATIENT: Angel Torres  MRN: [id number]  DATE: 4/4/2018      Diagnosis:   1. Primary cancer of right middle lobe of lung    2. Secondary adenocarcinoma of bone    3. Centrilobular emphysema    4. Anxiety    5. Seasonal allergic rhinitis due to pollen, unspecified chronicity        Chief Complaint: Follow-up      Oncologic History:      Oncologic History Non-small cell lung cancer diagnosed 6/2016   Recurrent disease to lung 4/2017     Oncologic Treatment Cisplatin/Pemetrexed initiated 8/15/16 with concurrent radiation completed 3 cycles 10/3/16; 64 Gy  Carboplatin/pemetrexed/pembrolizumab 7/2017 - 9/2017  Pembrolizumab maintenance 11/2017     Pathology  6/2016 : Adenocarcinoma, T2b, N2, M0, Stage IIIA          Subjective:    Interval History: Mr. Torres is a 63 y.o. male who was seen in follow-up for lung cancer.  He is here for Keytruda. He states that he has generally been feeling well. He continues to have an intermittent cough with clear mucus production but this is no worse than usual. His cough seems to correlate with the amount of pollen in the air as well. His shortness of breath is at his baseline. His appetite is good but jokingly he reports no one will cook for him. He has no new complaints.     His history dates to approximately 4/2016 when he noted a worsening cough which was associated with clear sputum.  He had a chest x-ray in 5/16 showing a right middle lobe opacity.  Subsequent CT of the chest was performed on 5/30/16 showing a 5.6 cm lesion in the right middle lobe with hilar and mediastinal lymphadenopathy along with hepatic lesions and a left adrenal mass..  He underwent bronchoscopy which was nonrevealing and transbronchial biopsies were nondiagnostic.  He then underwent EBUS with biopsy of mediastinal lymph nodes with pathology showing non-small cell lung cancer consistent with adenocarcinoma.  He was started on concurrent chemotherapy and radiation with cisplatin and pemetrexed on 8/15/16.  He  completed 64 Gy of concurrent radiation with cisplatin and pemetrexed with chemotherapy completed on 10/3/16.  Scans in 4/2017 had indicated progressive disease in the lung.  Subsequent PET scan in 7/2017 had shown spread of disease to bone.  He was started on carboplatin and pemetrexed and pembrolizumab in 7/2017.  Repeat imaging in 8/2017 had shown stability of disease.  He was started on pembrolizumab maintenance in 11/2017 after a delay due to his insurance.  Repeat imaging in 12/2016 had shown mild progressive disease, however, it was felt that due to the excessive delay in treatment we would proceed on rather than change therapy at this time.    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       Past Surgical HIstory:   Past Surgical History:   Procedure Laterality Date    INGUINAL HERNIA REPAIR Bilateral     KNEE SURGERY         Family History:   Family History   Problem Relation Age of Onset    Cancer Mother      lung, breast    Cancer Sister      lung    Cancer Maternal Grandfather     No Known Problems Father     No Known Problems Brother     No Known Problems Maternal Aunt     No Known Problems Maternal Uncle     No Known Problems Paternal Aunt     No Known Problems Paternal Uncle     No Known Problems Maternal Grandmother     No Known Problems Paternal Grandmother     No Known Problems Paternal Grandfather     Amblyopia Neg Hx     Blindness Neg Hx     Cataracts Neg Hx     Diabetes Neg Hx     Glaucoma Neg Hx     Hypertension Neg Hx     Macular degeneration Neg Hx     Retinal detachment Neg Hx     Strabismus Neg Hx      Stroke Neg Hx     Thyroid disease Neg Hx        Social History:  reports that he quit smoking about 4 years ago. He has a 80.00 pack-year smoking history. He has quit using smokeless tobacco. He reports that he does not drink alcohol or use drugs.    Allergies:  Review of patient's allergies indicates:  No Known Allergies    Medications:  Current Outpatient Prescriptions   Medication Sig Dispense Refill    albuterol-ipratropium 2.5mg-0.5mg/3mL (DUO-NEB) 0.5 mg-3 mg(2.5 mg base)/3 mL nebulizer solution Take 3 mLs by nebulization every 6 (six) hours as needed for Wheezing or Shortness of Breath. 100 vial 1    amlodipine (NORVASC) 10 MG tablet Take 1 tablet (10 mg total) by mouth once daily. 90 tablet 3    dronabinol (MARINOL) 5 MG capsule Take 1 capsule (5 mg total) by mouth 2 (two) times daily before meals. 60 capsule 0    fluticasone (FLONASE) 50 mcg/actuation nasal spray SHAKE LIQUID AND USE 2 SPRAYS IN EACH NOSTRIL EVERY DAY 16 g 1    folic acid (FOLVITE) 400 MCG tablet TAKE 1 TABLET(400 MCG) BY MOUTH EVERY  tablet 0    naproxen (NAPROSYN) 500 MG tablet Take 1 tablet (500 mg total) by mouth 2 (two) times daily as needed (headache). 60 tablet 0    PROAIR HFA 90 mcg/actuation inhaler INHALE 2 PUFFS INTO THE LUNGS EVERY 6 HOURS AS NEEDED FOR WHEEZING 17 g 0    cetirizine (ZYRTEC) 10 MG tablet Take 1 tablet (10 mg total) by mouth once daily.  0     Current Facility-Administered Medications   Medication Dose Route Frequency Provider Last Rate Last Dose    cyanocobalamin injection 1,000 mcg  1,000 mcg Intramuscular 1 time in Clinic/HOD Dano Fernandez DO, FACP        cyanocobalamin injection 1,000 mcg  1,000 mcg Intramuscular 1 time in Clinic/HOD Dano Fernandez DO, FACP         Facility-Administered Medications Ordered in Other Visits   Medication Dose Route Frequency Provider Last Rate Last Dose    alteplase injection 2 mg  2 mg Intra-Catheter PRN Dano Fernandez DO, FACP        heparin,  "porcine (PF) 100 unit/mL injection flush 500 Units  500 Units Intravenous PRN Dano Fernandez DO, FACP        sodium chloride 0.9% flush 10 mL  10 mL Intravenous PRN Dano Fernandez DO, FACP           Review of Systems   Constitutional: Negative for activity change, appetite change, chills, fatigue, fever and unexpected weight change.        Normal weight 220   HENT: Negative for dental problem, nosebleeds, sinus pressure, sneezing and trouble swallowing.    Eyes: Negative for visual disturbance.   Respiratory: Positive for cough and shortness of breath. Negative for choking, chest tightness and wheezing.    Cardiovascular: Negative for chest pain and leg swelling.   Gastrointestinal: Negative for abdominal pain, blood in stool, constipation, diarrhea and nausea.   Genitourinary: Negative for difficulty urinating and dysuria.   Musculoskeletal:  Negative for arthralgias and back pain.   Skin: Negative for rash and wound.   Neurological: Negative for dizziness, light-headedness and headaches.   Hematological: Negative for adenopathy. Does not bruise/bleed easily.   Psychiatric/Behavioral: Negative for sleep disturbance. The patient is not nervous/anxious.        ECOG Performance Status: 1   Objective:      Vitals:   Vitals:    04/04/18 1129   BP: 126/82   BP Location: Right arm   Patient Position: Sitting   BP Method: Large (Automatic)   Pulse: (!) 111   Resp: 18   Temp: 97.7 °F (36.5 °C)   TempSrc: Oral   SpO2: (!) 94%   Weight: 84.8 kg (186 lb 15.2 oz)   Height: 6' 5" (1.956 m)     BMI: Body mass index is 22.17 kg/m².    Physical Exam   Constitutional: He is oriented to person, place, and time. He appears well-developed and well-nourished.   HENT:   Head: Normocephalic and atraumatic.   Eyes: Pupils are equal, round, and reactive to light.   Neck: Normal range of motion. Neck supple.   Cardiovascular: Normal rate and regular rhythm.    Pulmonary/Chest: Effort normal. No respiratory distress. He has no rales. "   Abdominal: Soft. He exhibits no distension. There is no tenderness.   Musculoskeletal: He exhibits no edema or tenderness.   Lymphadenopathy:     He has no cervical adenopathy.   Neurological: He is alert and oriented to person, place, and time. No cranial nerve deficit.   Skin: Skin is warm and dry.  No rash noted.    Psychiatric: He has a normal mood and affect. His behavior is normal.       Laboratory Data:    WBC   Date Value Ref Range Status   04/04/2018 7.99 3.90 - 12.70 K/uL Final     Hemoglobin   Date Value Ref Range Status   04/04/2018 13.3 (L) 14.0 - 18.0 g/dL Final     Hematocrit   Date Value Ref Range Status   04/04/2018 42.0 40.0 - 54.0 % Final     Platelets   Date Value Ref Range Status   04/04/2018 377 (H) 150 - 350 K/uL Final     Gran # (ANC)   Date Value Ref Range Status   04/04/2018 5.6 1.8 - 7.7 K/uL Final     Comment:     The ANC is based on a white cell differential from an   automated cell counter. It has not been microscopically   reviewed for the presence of abnormal cells. Clinical   correlation is required.           Chemistry        Component Value Date/Time     04/04/2018 1055    K 4.7 04/04/2018 1055     04/04/2018 1055    CO2 27 04/04/2018 1055    BUN 16 04/04/2018 1055    CREATININE 1.2 04/04/2018 1055    GLU 82 04/04/2018 1055        Component Value Date/Time    CALCIUM 9.7 04/04/2018 1055    ALKPHOS 49 (L) 04/04/2018 1055    AST 19 04/04/2018 1055    ALT 18 04/04/2018 1055    BILITOT 0.4 04/04/2018 1055    ESTGFRAFRICA >60.0 04/04/2018 1055    EGFRNONAA >60.0 04/04/2018 1055        TSH   Date Value Ref Range Status   04/04/2018 1.421 0.400 - 4.000 uIU/mL Final     Free T4   Date Value Ref Range Status   04/04/2018 1.06 0.71 - 1.51 ng/dL Final           Supplemental Diagnosis 6/30/16  Immunohistochemical stains are completed on the Station 4L lymph node cell block material and reveal the tumor  cells to stain positively with cytokeratin 7 and TTF-1. The tumor cells show  nonspecific blush staining with  cytokeratin 5/6 and are negative for p63. This staining profile supports a diagnosis of non-small cell carcinoma  consistent with adenocarcinoma.  Cell block material will be sent for EGFR, ALK and ROS-1 testing and results will follow in a supplemental report.  (Electronically Signed: 2016-06-30 11:50:31 )  Diagnosed by: Irene Amaro M.D.  FINAL PATHOLOGIC DIAGNOSIS  1. Lymph node, Station 4L, EBUS guided fine needle aspiration:  Positive for malignant cells, non-small cell carcinoma.  Rare background lymphocytes are present.  Immunohistochemical staining be completed to further characterize this malignancy and results will follow in a  supplemental report.  2. Lymph node, Station 7, EBUS guided fine needle aspiration:  Positive for malignant cells, non-small cell carcinoma tumor identical to that seen in specimen #1.  Background lymphocytes are present.    Imaging:     CT 11/29/17  Findings: Comparison is 8/30/2017. Patient was administered 100 cc of Omnipaque 350 and p.o. contrast. No mediastinal, hilar, or axillary adenopathy is seen. Right middle lobe mass now abutting the right atrial border measures 4 x 4 cm, previously 2.5 x 3.9 cm. This is larger. There are coronary artery calcifications. Nodular areas on the right somewhat larger worrisome for metastatic disease. There is emphysema. Small liver hypodensities unchanged possibly cysts. There is a small hiatal hernia. There is a small left adrenal adenoma. Spleen, stomach, pancreas, duodenum, right adrenal gland, and kidneys show nothing unusual. No adenopathy is seen. Kidneys enhance and excrete normally. Right T1 vertebral body sclerotic lesion 2 cm, previously 2.22 cm. There is a small urachal diverticulum.   Impression      Right middle lobe soft tissue mass larger worrisome for progression.    Right lower lobe nodules larger somewhat irregular and patchy suggesting inflammation.      Stable T1 vertebral body  metastasis.    Splenic lesion is no longer seen.    Hypodense hepatic lesions likely cysts.    Left adrenal adenoma.    Ureteral diverticulum.    Index lesions: Right middle lobe mass 4 x 4 centimeters, previously 2.5 x 3.9.    T1 vertebral body 2 cm, previously 2.22 cm.    Splenic lesion the upper seen.    Right lower lobe lesions slightly larger. May be metastatic but could be inflammatory.         CT chest/abd/pelvis 3/7/18  COMPARISON: CT chest/abdomen/pelvis with contrast 11/29/2017, 08/30/2017; PET CT 07/19/2017, 01/09/2017     FINDINGS:    Neck Base: Unremarkable.    Heart: No cardiomegaly. No significant pericardial effusion, noting a trace amount of pericardial fluid.  Mediastinum/Axillary : No abnormal masses or lymphadenopathy.  Lungs/airways: 4.1 x 3.8 cm (previously 4.0 x 4.0 cm) soft tissue right middle lobe lesion at the hilum with mass effect resulting in narrowing at the origin of the right middle lobe bronchus, as well as some distal airway obstruction resulting in significant middle lobe volume loss.  Lesion hypermetabolic on prior PET imaging.  Additional, stable, nonspecific patchy consolidative change in the right lower lobe which may represent pneumonitis, or post radiation therapy change.  Tumor less likely given lack of significantly increased PET metabolic activity.  There is bilateral moderate severity emphysema.  Trace right pleural effusion. No new focal consolidation, or pneumothorax.  Chest Wall: Unremarkable.    Peritoneal Space: No ascites. No free air.  Stomach: Unremarkable.  Liver: Normal in size.  Homogenous in attenuation.  Stable, small right hepatic lobe hypodensities, likely cysts, as well as additional hypodensities that are to small to definitely characterize and may represent cysts.    Gallbladder: No calcified gallstones.  Bile Ducts: No evidence of dilated ducts.  Pancreas: No mass or peripancreatic fat stranding.   Spleen: Previously identified hypodense splenic lesion  is barely detectable on today's examination measuring less than 0.5 cm.  Prior PET imaging demonstrated no increased metabolism.  Adrenals: 1.1 cm nodular thickening of the left adrenal gland.  Stable when compared to multiple prior examinations.  No evidence of increased metabolism on PET imaging, and likely representing an adenoma.  Right adrenal gland is unremarkable.  Bowel/Mesentery: Small and large bowel are normal in caliber without evidence of obstruction or acute inflammation.  Appendix is unremarkable.  No significant mesenteric edema or adenopathy.      Urinary Tract: Kidneys are normal in size and position and concentrate/excrete contrast appropriately on delayed imaging. Bilateral kidney hypoattenuations that are too small to definitively characterize, however possibly represent cysts. No solid renal mass.  No nephrolithiasis or obstructive uropathy.  Ureters are unremarkable.  Small diverticulum at the bladder dome/urachus.  Retroperitoneum:  No significant adenopathy.   Pelvis:  No pelvic masses, adenopathy, or free fluid. The prostate is enlarged at 5.7 cm in transverse diameter.  Abdominal wall:  Postoperative change of inguinal mesh hernia repair.  Otherwise unremarkable.     Vasculature: Aortic arch demonstrates 4 branch vessels noting direct origin of the left vertebral artery.  No aneurysm or dissection. Minimal scattered atherosclerotic calcification predominantly at the distal aorta and iliac arteries.     Bones: No acute fracture. Redemonstration of stable T1 (2.1 cm) and left iliac wing sclerotic lesions.  No new lesions.  Age-related degenerative change.   Impression          Stable large right middle lobe soft tissue mass with hypermetabolic activity on prior PET imaging consistent with primary tumor site.  No new pulmonary lesions.    Stable osseous metastatic disease, as above.    Stable patchy opacification of the right lower lobe likely representing pneumonitis or post radiation  therapy change.  Tumor less likely secondary to lack of significant increased metabolic activity    Emphysema, atherosclerosis, hepatic cysts, and other findings as above.     RECIST SUMMARY: Date of prior examination for comparison CT chest/abdomen/pelvis with contrast 01/29/2017    Lesion 1: Right middle lobe 4.1 cm Series 2 Image 48 Prior measurement 4.0 cm            Assessment:       1. Primary cancer of right middle lobe of lung    2. Secondary adenocarcinoma of bone    3. Centrilobular emphysema    4. Anxiety    5. Seasonal allergic rhinitis likely due to pollen, unspecified chronicity           Plan:     Patient is clinically stable. Labs reviewed.  Proceed with Pembrolizumab today as scheduled. RTC in 3 weeks to see Dr. Fernandez with a CBC, CMP, TSH, T4 and for Pembrolizumab.   He is to report any new symptoms in the interim. No anxiety noted today. No recent COPD exacerbations noted. Encouraged to take Claritin daily for allergy symptom relief.     Patient is in agreement with the proposed treatment plan. All questions were answered to the patient's satisfaction. Pt knows to call clinic if anything is needed before the next clinic visit.      ALEKSANDRA Díaz-ALFREDITO  Hematology & Oncology  [facility] [place] [address]  ph. [phone]  Fax. [phone]     30 minutes were spent in coordination of patient's care, record review and counseling. More than 50% of the time was face-to-face.      Distress Screening Results: Psychosocial Distress screening score of Distress Score: 0 noted and reviewed. No intervention indicated.

## 2018-04-25 ENCOUNTER — LAB VISIT (OUTPATIENT)
Dept: LAB | Facility: HOSPITAL | Age: 63
End: 2018-04-25
Payer: COMMERCIAL

## 2018-04-25 ENCOUNTER — INFUSION (OUTPATIENT)
Dept: INFUSION THERAPY | Facility: HOSPITAL | Age: 63
End: 2018-04-25
Attending: INTERNAL MEDICINE
Payer: COMMERCIAL

## 2018-04-25 ENCOUNTER — OFFICE VISIT (OUTPATIENT)
Dept: HEMATOLOGY/ONCOLOGY | Facility: CLINIC | Age: 63
End: 2018-04-25
Payer: COMMERCIAL

## 2018-04-25 VITALS
BODY MASS INDEX: 21.87 KG/M2 | DIASTOLIC BLOOD PRESSURE: 81 MMHG | RESPIRATION RATE: 20 BRPM | WEIGHT: 185.19 LBS | HEART RATE: 91 BPM | TEMPERATURE: 98 F | HEART RATE: 100 BPM | SYSTOLIC BLOOD PRESSURE: 128 MMHG | RESPIRATION RATE: 18 BRPM | OXYGEN SATURATION: 97 % | DIASTOLIC BLOOD PRESSURE: 77 MMHG | TEMPERATURE: 98 F | HEIGHT: 77 IN | HEIGHT: 77 IN | SYSTOLIC BLOOD PRESSURE: 120 MMHG | BODY MASS INDEX: 21.87 KG/M2 | WEIGHT: 185.19 LBS

## 2018-04-25 DIAGNOSIS — C79.51 SECONDARY ADENOCARCINOMA OF BONE: ICD-10-CM

## 2018-04-25 DIAGNOSIS — Z09 CHEMOTHERAPY FOLLOW-UP EXAMINATION: ICD-10-CM

## 2018-04-25 DIAGNOSIS — R59.0 MEDIASTINAL ADENOPATHY: ICD-10-CM

## 2018-04-25 DIAGNOSIS — C34.2 PRIMARY CANCER OF RIGHT MIDDLE LOBE OF LUNG: Primary | ICD-10-CM

## 2018-04-25 DIAGNOSIS — C34.2 PRIMARY CANCER OF RIGHT MIDDLE LOBE OF LUNG: ICD-10-CM

## 2018-04-25 LAB
ALBUMIN SERPL BCP-MCNC: 3.6 G/DL
ALP SERPL-CCNC: 49 U/L
ALT SERPL W/O P-5'-P-CCNC: 14 U/L
ANION GAP SERPL CALC-SCNC: 7 MMOL/L
AST SERPL-CCNC: 15 U/L
BILIRUB SERPL-MCNC: 0.4 MG/DL
BUN SERPL-MCNC: 16 MG/DL
CALCIUM SERPL-MCNC: 9.3 MG/DL
CHLORIDE SERPL-SCNC: 107 MMOL/L
CO2 SERPL-SCNC: 24 MMOL/L
CREAT SERPL-MCNC: 1.1 MG/DL
ERYTHROCYTE [DISTWIDTH] IN BLOOD BY AUTOMATED COUNT: 15.2 %
EST. GFR  (AFRICAN AMERICAN): >60 ML/MIN/1.73 M^2
EST. GFR  (NON AFRICAN AMERICAN): >60 ML/MIN/1.73 M^2
GLUCOSE SERPL-MCNC: 90 MG/DL
HCT VFR BLD AUTO: 39.6 %
HGB BLD-MCNC: 12.9 G/DL
IMM GRANULOCYTES # BLD AUTO: 0.01 K/UL
MAGNESIUM SERPL-MCNC: 2.1 MG/DL
MCH RBC QN AUTO: 27.9 PG
MCHC RBC AUTO-ENTMCNC: 32.6 G/DL
MCV RBC AUTO: 86 FL
NEUTROPHILS # BLD AUTO: 4.8 K/UL
PLATELET # BLD AUTO: 358 K/UL
PMV BLD AUTO: 9.3 FL
POTASSIUM SERPL-SCNC: 3.9 MMOL/L
PROT SERPL-MCNC: 7.6 G/DL
RBC # BLD AUTO: 4.63 M/UL
SODIUM SERPL-SCNC: 138 MMOL/L
T4 FREE SERPL-MCNC: 1.1 NG/DL
TSH SERPL DL<=0.005 MIU/L-ACNC: 2.28 UIU/ML
WBC # BLD AUTO: 6.76 K/UL

## 2018-04-25 PROCEDURE — 3074F SYST BP LT 130 MM HG: CPT | Mod: CPTII,S$GLB,, | Performed by: INTERNAL MEDICINE

## 2018-04-25 PROCEDURE — 36415 COLL VENOUS BLD VENIPUNCTURE: CPT

## 2018-04-25 PROCEDURE — 25000003 PHARM REV CODE 250: Performed by: INTERNAL MEDICINE

## 2018-04-25 PROCEDURE — 63600175 PHARM REV CODE 636 W HCPCS: Mod: JG | Performed by: INTERNAL MEDICINE

## 2018-04-25 PROCEDURE — 83735 ASSAY OF MAGNESIUM: CPT

## 2018-04-25 PROCEDURE — 84439 ASSAY OF FREE THYROXINE: CPT

## 2018-04-25 PROCEDURE — 80053 COMPREHEN METABOLIC PANEL: CPT

## 2018-04-25 PROCEDURE — 99214 OFFICE O/P EST MOD 30 MIN: CPT | Mod: S$GLB,,, | Performed by: INTERNAL MEDICINE

## 2018-04-25 PROCEDURE — 84443 ASSAY THYROID STIM HORMONE: CPT

## 2018-04-25 PROCEDURE — 99999 PR PBB SHADOW E&M-EST. PATIENT-LVL III: CPT | Mod: PBBFAC,,, | Performed by: INTERNAL MEDICINE

## 2018-04-25 PROCEDURE — 96413 CHEMO IV INFUSION 1 HR: CPT

## 2018-04-25 PROCEDURE — 85027 COMPLETE CBC AUTOMATED: CPT

## 2018-04-25 PROCEDURE — 3079F DIAST BP 80-89 MM HG: CPT | Mod: CPTII,S$GLB,, | Performed by: INTERNAL MEDICINE

## 2018-04-25 RX ORDER — HEPARIN 100 UNIT/ML
500 SYRINGE INTRAVENOUS
Status: CANCELLED | OUTPATIENT
Start: 2018-04-25

## 2018-04-25 RX ORDER — SODIUM CHLORIDE 0.9 % (FLUSH) 0.9 %
10 SYRINGE (ML) INJECTION
Status: CANCELLED | OUTPATIENT
Start: 2018-04-25

## 2018-04-25 RX ORDER — SODIUM CHLORIDE 0.9 % (FLUSH) 0.9 %
10 SYRINGE (ML) INJECTION
Status: DISCONTINUED | OUTPATIENT
Start: 2018-04-25 | End: 2018-04-25 | Stop reason: HOSPADM

## 2018-04-25 RX ADMIN — SODIUM CHLORIDE 200 MG: 9 INJECTION, SOLUTION INTRAVENOUS at 12:04

## 2018-04-25 RX ADMIN — SODIUM CHLORIDE: 9 INJECTION, SOLUTION INTRAVENOUS at 12:04

## 2018-04-25 NOTE — PLAN OF CARE
Problem: Patient Care Overview  Goal: Plan of Care Review  Outcome: Ongoing (interventions implemented as appropriate)  Patient tolerated keytruda well today. NAD noted upon discharge. Verbalized understanding to call MD for any questions/concerns. PIV removed, catheter tip intact. AVS given. Discharged home, ambulated independently with sister by side.

## 2018-04-25 NOTE — PROGRESS NOTES
PATIENT: Angel Torres  MRN: [id number]  DATE: 4/25/2018      Diagnosis:   1. Primary cancer of right middle lobe of lung    2. Secondary adenocarcinoma of bone        Chief Complaint: Follow-up      Oncologic History:      Oncologic History Non-small cell lung cancer diagnosed 6/2016   Recurrent disease to lung 4/2017     Oncologic Treatment Cisplatin/Pemetrexed initiated 8/15/16 with concurrent radiation completed 3 cycles 10/3/16; 64 Gy  Carboplatin/pemetrexed/pembrolizumab 7/2017 - 9/2017  Pembrolizumab maintenance 11/2017     Pathology  6/2016 : Adenocarcinoma, T2b, N2, M0, Stage IIIA          Subjective:    Interval History: Mr. Torres is a 63 y.o. male who was seen in follow-up for lung cancer.  He states he has generally been feeling well.  He does note a decreased appetite.  Still with some ongoing intermittent shortness of breath which is relatively unchanged.  Denies cough.  No other new complaints.    His history dates to approximately 4/2016 when he noted a worsening cough which was associated with clear sputum.  He had a chest x-ray in 5/16 showing a right middle lobe opacity.  Subsequent CT of the chest was performed on 5/30/16 showing a 5.6 cm lesion in the right middle lobe with hilar and mediastinal lymphadenopathy along with hepatic lesions and a left adrenal mass..  He underwent bronchoscopy which was nonrevealing and transbronchial biopsies were nondiagnostic.  He then underwent EBUS with biopsy of mediastinal lymph nodes with pathology showing non-small cell lung cancer consistent with adenocarcinoma.  He was started on concurrent chemotherapy and radiation with cisplatin and pemetrexed on 8/15/16.  He completed 64 Gy of concurrent radiation with cisplatin and pemetrexed with chemotherapy completed on 10/3/16.  Scans in 4/2017 had indicated progressive disease in the lung.  Subsequent PET scan in 7/2017 had shown spread of disease to bone.  He was started on carboplatin and pemetrexed and  pembrolizumab in 7/2017.  Repeat imaging in 8/2017 had shown stability of disease.  He was started on pembrolizumab maintenance in 11/2017 after a delay due to his insurance.  Repeat imaging in 12/2016 had shown mild progressive disease, however, it was felt that due to the excessive delay in treatment we would proceed on rather than change therapy at this time.  CT in 3/2018 had shown stability of his disease.    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       Past Surgical HIstory:   Past Surgical History:   Procedure Laterality Date    INGUINAL HERNIA REPAIR Bilateral     KNEE SURGERY         Family History:   Family History   Problem Relation Age of Onset    Cancer Mother      lung, breast    Cancer Sister      lung    Cancer Maternal Grandfather     No Known Problems Father     No Known Problems Brother     No Known Problems Maternal Aunt     No Known Problems Maternal Uncle     No Known Problems Paternal Aunt     No Known Problems Paternal Uncle     No Known Problems Maternal Grandmother     No Known Problems Paternal Grandmother     No Known Problems Paternal Grandfather     Amblyopia Neg Hx     Blindness Neg Hx     Cataracts Neg Hx     Diabetes Neg Hx     Glaucoma Neg Hx     Hypertension Neg Hx     Macular degeneration Neg Hx     Retinal detachment Neg Hx     Strabismus Neg Hx     Stroke Neg Hx     Thyroid disease Neg Hx        Social History:  reports that he quit smoking about 4 years ago. He has a 80.00 pack-year smoking history. He has quit using smokeless tobacco. He reports that he does not drink alcohol or use  drugs.    Allergies:  Review of patient's allergies indicates:  No Known Allergies    Medications:  Current Outpatient Prescriptions   Medication Sig Dispense Refill    albuterol-ipratropium 2.5mg-0.5mg/3mL (DUO-NEB) 0.5 mg-3 mg(2.5 mg base)/3 mL nebulizer solution Take 3 mLs by nebulization every 6 (six) hours as needed for Wheezing or Shortness of Breath. 100 vial 1    amlodipine (NORVASC) 10 MG tablet Take 1 tablet (10 mg total) by mouth once daily. 90 tablet 3    cetirizine (ZYRTEC) 10 MG tablet Take 1 tablet (10 mg total) by mouth once daily.  0    dronabinol (MARINOL) 5 MG capsule Take 1 capsule (5 mg total) by mouth 2 (two) times daily before meals. 60 capsule 0    fluticasone (FLONASE) 50 mcg/actuation nasal spray SHAKE LIQUID AND USE 2 SPRAYS IN EACH NOSTRIL EVERY DAY 16 g 1    folic acid (FOLVITE) 400 MCG tablet TAKE 1 TABLET(400 MCG) BY MOUTH EVERY  tablet 0    naproxen (NAPROSYN) 500 MG tablet Take 1 tablet (500 mg total) by mouth 2 (two) times daily as needed (headache). 60 tablet 0    PROAIR HFA 90 mcg/actuation inhaler INHALE 2 PUFFS INTO THE LUNGS EVERY 6 HOURS AS NEEDED FOR WHEEZING 17 g 0     Current Facility-Administered Medications   Medication Dose Route Frequency Provider Last Rate Last Dose    cyanocobalamin injection 1,000 mcg  1,000 mcg Intramuscular 1 time in Clinic/HOD Dano Fernandez DO, FACP        cyanocobalamin injection 1,000 mcg  1,000 mcg Intramuscular 1 time in Clinic/HOD Dano Fernandez DO, FACP           Review of Systems   Constitutional: Positive for appetite change. Negative for activity change, chills, fatigue, fever and unexpected weight change.        Normal weight 220   HENT: Negative for dental problem, nosebleeds, sinus pressure, sneezing and trouble swallowing.    Eyes: Negative for visual disturbance.   Respiratory: Positive for shortness of breath. Negative for cough, choking, chest tightness and wheezing.    Cardiovascular: Negative for chest  "pain and leg swelling.   Gastrointestinal: Negative for abdominal pain, blood in stool, constipation, diarrhea and nausea.   Genitourinary: Negative for difficulty urinating and dysuria.   Musculoskeletal: Positive for myalgias. Negative for arthralgias and back pain.   Skin: Negative for rash and wound.   Neurological: Negative for dizziness, light-headedness and headaches.   Hematological: Negative for adenopathy. Does not bruise/bleed easily.   Psychiatric/Behavioral: Negative for sleep disturbance. The patient is not nervous/anxious.        ECOG Performance Status: 1   Objective:      Vitals:   Vitals:    04/25/18 1106   BP: 128/81   BP Location: Left arm   Patient Position: Sitting   BP Method: Large (Automatic)   Pulse: 91   Resp: 18   Temp: 97.8 °F (36.6 °C)   TempSrc: Oral   SpO2: 97%   Weight: 84 kg (185 lb 3 oz)   Height: 6' 5" (1.956 m)     BMI: Body mass index is 21.96 kg/m².    Physical Exam   Constitutional: He is oriented to person, place, and time. He appears well-developed and well-nourished.   HENT:   Head: Normocephalic and atraumatic.   Eyes: Pupils are equal, round, and reactive to light.   Neck: Normal range of motion. Neck supple.   Cardiovascular: Normal rate and regular rhythm.    Pulmonary/Chest: Effort normal. No respiratory distress. He has no rales.   Abdominal: Soft. He exhibits no distension. There is no tenderness.   Musculoskeletal: He exhibits no edema or tenderness.   Lymphadenopathy:     He has no cervical adenopathy.   Neurological: He is alert and oriented to person, place, and time. No cranial nerve deficit.   Skin: Skin is warm and dry. Rash noted.   Psychiatric: He has a normal mood and affect. His behavior is normal.       Laboratory Data:  Lab Visit on 04/25/2018   Component Date Value Ref Range Status    WBC 04/25/2018 6.76  3.90 - 12.70 K/uL Final    RBC 04/25/2018 4.63  4.60 - 6.20 M/uL Final    Hemoglobin 04/25/2018 12.9* 14.0 - 18.0 g/dL Final    Hematocrit " 04/25/2018 39.6* 40.0 - 54.0 % Final    MCV 04/25/2018 86  82 - 98 fL Final    MCH 04/25/2018 27.9  27.0 - 31.0 pg Final    MCHC 04/25/2018 32.6  32.0 - 36.0 g/dL Final    RDW 04/25/2018 15.2* 11.5 - 14.5 % Final    Platelets 04/25/2018 358* 150 - 350 K/uL Final    MPV 04/25/2018 9.3  9.2 - 12.9 fL Final    Gran # (ANC) 04/25/2018 4.8  1.8 - 7.7 K/uL Final    Comment: The ANC is based on a white cell differential from an   automated cell counter. It has not been microscopically   reviewed for the presence of abnormal cells. Clinical   correlation is required.      Immature Grans (Abs) 04/25/2018 0.01  0.00 - 0.04 K/uL Final    Comment: Mild elevation in immature granulocytes is non specific and   can be seen in a variety of conditions including stress response,   acute inflammation, trauma and pregnancy. Correlation with other   laboratory and clinical findings is essential.      Sodium 04/25/2018 138  136 - 145 mmol/L Final    Potassium 04/25/2018 3.9  3.5 - 5.1 mmol/L Final    Chloride 04/25/2018 107  95 - 110 mmol/L Final    CO2 04/25/2018 24  23 - 29 mmol/L Final    Glucose 04/25/2018 90  70 - 110 mg/dL Final    BUN, Bld 04/25/2018 16  8 - 23 mg/dL Final    Creatinine 04/25/2018 1.1  0.5 - 1.4 mg/dL Final    Calcium 04/25/2018 9.3  8.7 - 10.5 mg/dL Final    Total Protein 04/25/2018 7.6  6.0 - 8.4 g/dL Final    Albumin 04/25/2018 3.6  3.5 - 5.2 g/dL Final    Total Bilirubin 04/25/2018 0.4  0.1 - 1.0 mg/dL Final    Comment: For infants and newborns, interpretation of results should be based  on gestational age, weight and in agreement with clinical  observations.  Premature Infant recommended reference ranges:  Up to 24 hours.............<8.0 mg/dL  Up to 48 hours............<12.0 mg/dL  3-5 days..................<15.0 mg/dL  6-29 days.................<15.0 mg/dL      Alkaline Phosphatase 04/25/2018 49* 55 - 135 U/L Final    AST 04/25/2018 15  10 - 40 U/L Final    ALT 04/25/2018 14  10 - 44  U/L Final    Anion Gap 04/25/2018 7* 8 - 16 mmol/L Final    eGFR if African American 04/25/2018 >60.0  >60 mL/min/1.73 m^2 Final    eGFR if non African American 04/25/2018 >60.0  >60 mL/min/1.73 m^2 Final    Comment: Calculation used to obtain the estimated glomerular filtration  rate (eGFR) is the CKD-EPI equation.       Magnesium 04/25/2018 2.1  1.6 - 2.6 mg/dL Final    TSH 04/25/2018 2.284  0.400 - 4.000 uIU/mL Final    Free T4 04/25/2018 1.10  0.71 - 1.51 ng/dL Final   Lab Visit on 04/04/2018   Component Date Value Ref Range Status    WBC 04/04/2018 7.99  3.90 - 12.70 K/uL Final    RBC 04/04/2018 4.90  4.60 - 6.20 M/uL Final    Hemoglobin 04/04/2018 13.3* 14.0 - 18.0 g/dL Final    Hematocrit 04/04/2018 42.0  40.0 - 54.0 % Final    MCV 04/04/2018 86  82 - 98 fL Final    MCH 04/04/2018 27.1  27.0 - 31.0 pg Final    MCHC 04/04/2018 31.7* 32.0 - 36.0 g/dL Final    RDW 04/04/2018 16.1* 11.5 - 14.5 % Final    Platelets 04/04/2018 377* 150 - 350 K/uL Final    MPV 04/04/2018 9.5  9.2 - 12.9 fL Final    Gran # (ANC) 04/04/2018 5.6  1.8 - 7.7 K/uL Final    Comment: The ANC is based on a white cell differential from an   automated cell counter. It has not been microscopically   reviewed for the presence of abnormal cells. Clinical   correlation is required.      Immature Grans (Abs) 04/04/2018 0.03  0.00 - 0.04 K/uL Final    Comment: Mild elevation in immature granulocytes is non specific and   can be seen in a variety of conditions including stress response,   acute inflammation, trauma and pregnancy. Correlation with other   laboratory and clinical findings is essential.      Free T4 04/04/2018 1.06  0.71 - 1.51 ng/dL Final    TSH 04/04/2018 1.421  0.400 - 4.000 uIU/mL Final    Sodium 04/04/2018 139  136 - 145 mmol/L Final    Potassium 04/04/2018 4.7  3.5 - 5.1 mmol/L Final    Chloride 04/04/2018 105  95 - 110 mmol/L Final    CO2 04/04/2018 27  23 - 29 mmol/L Final    Glucose 04/04/2018 82  70 -  110 mg/dL Final    BUN, Bld 04/04/2018 16  8 - 23 mg/dL Final    Creatinine 04/04/2018 1.2  0.5 - 1.4 mg/dL Final    Calcium 04/04/2018 9.7  8.7 - 10.5 mg/dL Final    Total Protein 04/04/2018 8.1  6.0 - 8.4 g/dL Final    Albumin 04/04/2018 3.9  3.5 - 5.2 g/dL Final    Total Bilirubin 04/04/2018 0.4  0.1 - 1.0 mg/dL Final    Comment: For infants and newborns, interpretation of results should be based  on gestational age, weight and in agreement with clinical  observations.  Premature Infant recommended reference ranges:  Up to 24 hours.............<8.0 mg/dL  Up to 48 hours............<12.0 mg/dL  3-5 days..................<15.0 mg/dL  6-29 days.................<15.0 mg/dL      Alkaline Phosphatase 04/04/2018 49* 55 - 135 U/L Final    AST 04/04/2018 19  10 - 40 U/L Final    ALT 04/04/2018 18  10 - 44 U/L Final    Anion Gap 04/04/2018 7* 8 - 16 mmol/L Final    eGFR if African American 04/04/2018 >60.0  >60 mL/min/1.73 m^2 Final    eGFR if non African American 04/04/2018 >60.0  >60 mL/min/1.73 m^2 Final    Comment: Calculation used to obtain the estimated glomerular filtration  rate (eGFR) is the CKD-EPI equation.        Supplemental Diagnosis 6/30/16  Immunohistochemical stains are completed on the Station 4L lymph node cell block material and reveal the tumor  cells to stain positively with cytokeratin 7 and TTF-1. The tumor cells show nonspecific blush staining with  cytokeratin 5/6 and are negative for p63. This staining profile supports a diagnosis of non-small cell carcinoma  consistent with adenocarcinoma.  Cell block material will be sent for EGFR, ALK and ROS-1 testing and results will follow in a supplemental report.  (Electronically Signed: 2016-06-30 11:50:31 )  Diagnosed by: Irene Amaro M.D.  FINAL PATHOLOGIC DIAGNOSIS  1. Lymph node, Station 4L, EBUS guided fine needle aspiration:  Positive for malignant cells, non-small cell carcinoma.  Rare background lymphocytes are  present.  Immunohistochemical staining be completed to further characterize this malignancy and results will follow in a  supplemental report.  2. Lymph node, Station 7, EBUS guided fine needle aspiration:  Positive for malignant cells, non-small cell carcinoma tumor identical to that seen in specimen #1.  Background lymphocytes are present.    Imaging:     CT 3/7/18  HISTORY:  Primary cancer of right middle lobe of lung     TECHNIQUE: Axial CT images acquired from the thoracic inlet through the ischial tuberosities  after administration of 100 cc of Omnipaque 350  IV.  Oral contrast was used.  Multiplanar reconstructions provided for review.    COMPARISON: CT chest/abdomen/pelvis with contrast 11/29/2017, 08/30/2017; PET CT 07/19/2017, 01/09/2017     FINDINGS:    Neck Base: Unremarkable.    Heart: No cardiomegaly. No significant pericardial effusion, noting a trace amount of pericardial fluid.  Mediastinum/Axillary : No abnormal masses or lymphadenopathy.  Lungs/airways: 4.1 x 3.8 cm (previously 4.0 x 4.0 cm) soft tissue right middle lobe lesion at the hilum with mass effect resulting in narrowing at the origin of the right middle lobe bronchus, as well as some distal airway obstruction resulting in significant middle lobe volume loss.  Lesion hypermetabolic on prior PET imaging.  Additional, stable, nonspecific patchy consolidative change in the right lower lobe which may represent pneumonitis, or post radiation therapy change.  Tumor less likely given lack of significantly increased PET metabolic activity.  There is bilateral moderate severity emphysema.  Trace right pleural effusion. No new focal consolidation, or pneumothorax.  Chest Wall: Unremarkable.    Peritoneal Space: No ascites. No free air.  Stomach: Unremarkable.  Liver: Normal in size.  Homogenous in attenuation.  Stable, small right hepatic lobe hypodensities, likely cysts, as well as additional hypodensities that are to small to definitely  characterize and may represent cysts.    Gallbladder: No calcified gallstones.  Bile Ducts: No evidence of dilated ducts.  Pancreas: No mass or peripancreatic fat stranding.   Spleen: Previously identified hypodense splenic lesion is barely detectable on today's examination measuring less than 0.5 cm.  Prior PET imaging demonstrated no increased metabolism.  Adrenals: 1.1 cm nodular thickening of the left adrenal gland.  Stable when compared to multiple prior examinations.  No evidence of increased metabolism on PET imaging, and likely representing an adenoma.  Right adrenal gland is unremarkable.  Bowel/Mesentery: Small and large bowel are normal in caliber without evidence of obstruction or acute inflammation.  Appendix is unremarkable.  No significant mesenteric edema or adenopathy.      Urinary Tract: Kidneys are normal in size and position and concentrate/excrete contrast appropriately on delayed imaging. Bilateral kidney hypoattenuations that are too small to definitively characterize, however possibly represent cysts. No solid renal mass.  No nephrolithiasis or obstructive uropathy.  Ureters are unremarkable.  Small diverticulum at the bladder dome/urachus.  Retroperitoneum:  No significant adenopathy.   Pelvis:  No pelvic masses, adenopathy, or free fluid. The prostate is enlarged at 5.7 cm in transverse diameter.  Abdominal wall:  Postoperative change of inguinal mesh hernia repair.  Otherwise unremarkable.     Vasculature: Aortic arch demonstrates 4 branch vessels noting direct origin of the left vertebral artery.  No aneurysm or dissection. Minimal scattered atherosclerotic calcification predominantly at the distal aorta and iliac arteries.     Bones: No acute fracture. Redemonstration of stable T1 (2.1 cm) and left iliac wing sclerotic lesions.  No new lesions.  Age-related degenerative change.   Impression          Stable large right middle lobe soft tissue mass with hypermetabolic activity on prior PET  imaging consistent with primary tumor site.  No new pulmonary lesions.    Stable osseous metastatic disease, as above.    Stable patchy opacification of the right lower lobe likely representing pneumonitis or post radiation therapy change.  Tumor less likely secondary to lack of significant increased metabolic activity    Emphysema, atherosclerosis, hepatic cysts, and other findings as above.     RECIST SUMMARY: Date of prior examination for comparison CT chest/abdomen/pelvis with contrast 01/29/2017    Lesion 1: Right middle lobe 4.1 cm Series 2 Image 48 Prior measurement 4.0 cm  ______________________________________               Assessment:       1. Primary cancer of right middle lobe of lung    2. Secondary adenocarcinoma of bone           Plan:     Mr. Torres continues to do well on treatment with pembrolizumab.  Labs are appropriate to continue treatment.  He will continue on to cycle #7 today.  I'll plan to give him another cycle in 3 weeks and then repeat scans following this.  Report any new symptoms in the interim.  All questions were answered and he is agreeable with this plan.    Dano Fernandez DO, FACP  Hematology & Oncology  [address] [place] [address]  ph. [phone]  Fax. [phone]    25 minutes were spent in coordination of patient's care, record review and counseling.  More than 50% of the time was face-to-face.

## 2018-05-13 DIAGNOSIS — I10 ESSENTIAL HYPERTENSION: Chronic | ICD-10-CM

## 2018-05-14 RX ORDER — AMLODIPINE BESYLATE 10 MG/1
TABLET ORAL
Qty: 90 TABLET | Refills: 0 | OUTPATIENT
Start: 2018-05-14

## 2018-05-16 ENCOUNTER — LAB VISIT (OUTPATIENT)
Dept: LAB | Facility: HOSPITAL | Age: 63
End: 2018-05-16
Payer: COMMERCIAL

## 2018-05-16 ENCOUNTER — INFUSION (OUTPATIENT)
Dept: INFUSION THERAPY | Facility: HOSPITAL | Age: 63
End: 2018-05-16
Attending: INTERNAL MEDICINE
Payer: COMMERCIAL

## 2018-05-16 ENCOUNTER — OFFICE VISIT (OUTPATIENT)
Dept: HEMATOLOGY/ONCOLOGY | Facility: CLINIC | Age: 63
End: 2018-05-16
Payer: COMMERCIAL

## 2018-05-16 VITALS
DIASTOLIC BLOOD PRESSURE: 78 MMHG | BODY MASS INDEX: 21.89 KG/M2 | TEMPERATURE: 98 F | RESPIRATION RATE: 16 BRPM | OXYGEN SATURATION: 95 % | HEART RATE: 103 BPM | WEIGHT: 185.44 LBS | HEIGHT: 77 IN | SYSTOLIC BLOOD PRESSURE: 118 MMHG

## 2018-05-16 VITALS
HEIGHT: 77 IN | SYSTOLIC BLOOD PRESSURE: 134 MMHG | HEART RATE: 100 BPM | TEMPERATURE: 98 F | BODY MASS INDEX: 21.89 KG/M2 | DIASTOLIC BLOOD PRESSURE: 81 MMHG | WEIGHT: 185.44 LBS | RESPIRATION RATE: 18 BRPM

## 2018-05-16 DIAGNOSIS — R53.0 NEOPLASTIC MALIGNANT RELATED FATIGUE: ICD-10-CM

## 2018-05-16 DIAGNOSIS — R59.0 MEDIASTINAL ADENOPATHY: ICD-10-CM

## 2018-05-16 DIAGNOSIS — D49.89 NEOPLASM OF ABDOMEN: ICD-10-CM

## 2018-05-16 DIAGNOSIS — C79.51 SECONDARY ADENOCARCINOMA OF BONE: ICD-10-CM

## 2018-05-16 DIAGNOSIS — C34.2 PRIMARY CANCER OF RIGHT MIDDLE LOBE OF LUNG: ICD-10-CM

## 2018-05-16 DIAGNOSIS — D49.1 NEOPLASM OF LUNG: ICD-10-CM

## 2018-05-16 DIAGNOSIS — C34.2 PRIMARY CANCER OF RIGHT MIDDLE LOBE OF LUNG: Primary | ICD-10-CM

## 2018-05-16 DIAGNOSIS — Z09 CHEMOTHERAPY FOLLOW-UP EXAMINATION: ICD-10-CM

## 2018-05-16 LAB
ALBUMIN SERPL BCP-MCNC: 3.8 G/DL
ALP SERPL-CCNC: 51 U/L
ALT SERPL W/O P-5'-P-CCNC: 14 U/L
ANION GAP SERPL CALC-SCNC: 8 MMOL/L
AST SERPL-CCNC: 17 U/L
BILIRUB SERPL-MCNC: 0.3 MG/DL
BUN SERPL-MCNC: 18 MG/DL
CALCIUM SERPL-MCNC: 9.4 MG/DL
CHLORIDE SERPL-SCNC: 106 MMOL/L
CO2 SERPL-SCNC: 25 MMOL/L
CREAT SERPL-MCNC: 1.2 MG/DL
ERYTHROCYTE [DISTWIDTH] IN BLOOD BY AUTOMATED COUNT: 15 %
EST. GFR  (AFRICAN AMERICAN): >60 ML/MIN/1.73 M^2
EST. GFR  (NON AFRICAN AMERICAN): >60 ML/MIN/1.73 M^2
GLUCOSE SERPL-MCNC: 100 MG/DL
HCT VFR BLD AUTO: 41.7 %
HGB BLD-MCNC: 13.3 G/DL
IMM GRANULOCYTES # BLD AUTO: 0.02 K/UL
MAGNESIUM SERPL-MCNC: 2.3 MG/DL
MCH RBC QN AUTO: 27.6 PG
MCHC RBC AUTO-ENTMCNC: 31.9 G/DL
MCV RBC AUTO: 87 FL
NEUTROPHILS # BLD AUTO: 4.5 K/UL
PLATELET # BLD AUTO: 306 K/UL
PMV BLD AUTO: 9.7 FL
POTASSIUM SERPL-SCNC: 4 MMOL/L
PROT SERPL-MCNC: 7.8 G/DL
RBC # BLD AUTO: 4.82 M/UL
SODIUM SERPL-SCNC: 139 MMOL/L
T4 FREE SERPL-MCNC: 1.05 NG/DL
TSH SERPL DL<=0.005 MIU/L-ACNC: 2.09 UIU/ML
WBC # BLD AUTO: 6.45 K/UL

## 2018-05-16 PROCEDURE — 80053 COMPREHEN METABOLIC PANEL: CPT

## 2018-05-16 PROCEDURE — 3074F SYST BP LT 130 MM HG: CPT | Mod: CPTII,S$GLB,, | Performed by: INTERNAL MEDICINE

## 2018-05-16 PROCEDURE — 3008F BODY MASS INDEX DOCD: CPT | Mod: CPTII,S$GLB,, | Performed by: INTERNAL MEDICINE

## 2018-05-16 PROCEDURE — 3078F DIAST BP <80 MM HG: CPT | Mod: CPTII,S$GLB,, | Performed by: INTERNAL MEDICINE

## 2018-05-16 PROCEDURE — 96413 CHEMO IV INFUSION 1 HR: CPT

## 2018-05-16 PROCEDURE — 84443 ASSAY THYROID STIM HORMONE: CPT

## 2018-05-16 PROCEDURE — 63600175 PHARM REV CODE 636 W HCPCS: Mod: JG | Performed by: INTERNAL MEDICINE

## 2018-05-16 PROCEDURE — 85027 COMPLETE CBC AUTOMATED: CPT

## 2018-05-16 PROCEDURE — 99999 PR PBB SHADOW E&M-EST. PATIENT-LVL IV: CPT | Mod: PBBFAC,,, | Performed by: INTERNAL MEDICINE

## 2018-05-16 PROCEDURE — 36415 COLL VENOUS BLD VENIPUNCTURE: CPT

## 2018-05-16 PROCEDURE — 25000003 PHARM REV CODE 250: Performed by: INTERNAL MEDICINE

## 2018-05-16 PROCEDURE — 99214 OFFICE O/P EST MOD 30 MIN: CPT | Mod: S$GLB,,, | Performed by: INTERNAL MEDICINE

## 2018-05-16 PROCEDURE — 84439 ASSAY OF FREE THYROXINE: CPT

## 2018-05-16 PROCEDURE — 83735 ASSAY OF MAGNESIUM: CPT

## 2018-05-16 RX ORDER — HEPARIN 100 UNIT/ML
500 SYRINGE INTRAVENOUS
Status: DISCONTINUED | OUTPATIENT
Start: 2018-05-16 | End: 2018-05-16 | Stop reason: HOSPADM

## 2018-05-16 RX ORDER — SODIUM CHLORIDE 0.9 % (FLUSH) 0.9 %
10 SYRINGE (ML) INJECTION
Status: CANCELLED | OUTPATIENT
Start: 2018-05-16

## 2018-05-16 RX ORDER — HEPARIN 100 UNIT/ML
500 SYRINGE INTRAVENOUS
Status: CANCELLED | OUTPATIENT
Start: 2018-05-16

## 2018-05-16 RX ORDER — SODIUM CHLORIDE 0.9 % (FLUSH) 0.9 %
10 SYRINGE (ML) INJECTION
Status: DISCONTINUED | OUTPATIENT
Start: 2018-05-16 | End: 2018-05-16 | Stop reason: HOSPADM

## 2018-05-16 RX ADMIN — SODIUM CHLORIDE: 9 INJECTION, SOLUTION INTRAVENOUS at 11:05

## 2018-05-16 RX ADMIN — SODIUM CHLORIDE 200 MG: 9 INJECTION, SOLUTION INTRAVENOUS at 12:05

## 2018-05-16 NOTE — PROGRESS NOTES
PATIENT: nAgel Torres  MRN: [id number]  DATE: 5/16/2018      Diagnosis:   1. Primary cancer of right middle lobe of lung    2. Secondary adenocarcinoma of bone    3. Chemotherapy follow-up examination    4. Neoplasm of abdomen    5. Neoplasm of lung        Chief Complaint: Primary cancer of right middle lobe of lung      Oncologic History:      Oncologic History Non-small cell lung cancer diagnosed 6/2016   Recurrent disease to lung 4/2017     Oncologic Treatment Cisplatin/Pemetrexed initiated 8/15/16 with concurrent radiation completed 3 cycles 10/3/16; 64 Gy  Carboplatin/pemetrexed/pembrolizumab 7/2017 - 9/2017  Pembrolizumab maintenance 11/2017     Pathology  6/2016 : Adenocarcinoma, T2b, N2, M0, Stage IIIA          Subjective:    Interval History: Mr. Torres is a 63 y.o. male who was seen in follow-up for lung cancer.  He states he has generally been feeling well. He still has some intermittent shortness of breath but things are stable.  No other new complaints.    His history dates to approximately 4/2016 when he noted a worsening cough which was associated with clear sputum.  He had a chest x-ray in 5/16 showing a right middle lobe opacity.  Subsequent CT of the chest was performed on 5/30/16 showing a 5.6 cm lesion in the right middle lobe with hilar and mediastinal lymphadenopathy along with hepatic lesions and a left adrenal mass..  He underwent bronchoscopy which was nonrevealing and transbronchial biopsies were nondiagnostic.  He then underwent EBUS with biopsy of mediastinal lymph nodes with pathology showing non-small cell lung cancer consistent with adenocarcinoma.  He was started on concurrent chemotherapy and radiation with cisplatin and pemetrexed on 8/15/16.  He completed 64 Gy of concurrent radiation with cisplatin and pemetrexed with chemotherapy completed on 10/3/16.  Scans in 4/2017 had indicated progressive disease in the lung.  Subsequent PET scan in 7/2017 had shown spread of disease to bone.   He was started on carboplatin and pemetrexed and pembrolizumab in 7/2017.  Repeat imaging in 8/2017 had shown stability of disease.  He was started on pembrolizumab maintenance in 11/2017 after a delay due to his insurance.  Repeat imaging in 12/2016 had shown mild progressive disease, however, it was felt that due to the excessive delay in treatment we would proceed on rather than change therapy at this time.  CT in 3/2018 had shown stability of his disease.    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       Past Surgical HIstory:   Past Surgical History:   Procedure Laterality Date    INGUINAL HERNIA REPAIR Bilateral     KNEE SURGERY         Family History:   Family History   Problem Relation Age of Onset    Cancer Mother         lung, breast    Cancer Sister         lung    Cancer Maternal Grandfather     No Known Problems Father     No Known Problems Brother     No Known Problems Maternal Aunt     No Known Problems Maternal Uncle     No Known Problems Paternal Aunt     No Known Problems Paternal Uncle     No Known Problems Maternal Grandmother     No Known Problems Paternal Grandmother     No Known Problems Paternal Grandfather     Amblyopia Neg Hx     Blindness Neg Hx     Cataracts Neg Hx     Diabetes Neg Hx     Glaucoma Neg Hx     Hypertension Neg Hx     Macular degeneration Neg Hx     Retinal detachment Neg Hx     Strabismus Neg Hx     Stroke Neg Hx     Thyroid disease Neg Hx        Social History:  reports that he quit smoking about 4 years ago. He has a 80.00 pack-year smoking history. He has quit using smokeless tobacco.  He reports that he does not drink alcohol or use drugs.    Allergies:  Review of patient's allergies indicates:  No Known Allergies    Medications:  Current Outpatient Prescriptions   Medication Sig Dispense Refill    albuterol-ipratropium 2.5mg-0.5mg/3mL (DUO-NEB) 0.5 mg-3 mg(2.5 mg base)/3 mL nebulizer solution Take 3 mLs by nebulization every 6 (six) hours as needed for Wheezing or Shortness of Breath. 100 vial 1    amlodipine (NORVASC) 10 MG tablet Take 1 tablet (10 mg total) by mouth once daily. 90 tablet 3    dronabinol (MARINOL) 5 MG capsule Take 1 capsule (5 mg total) by mouth 2 (two) times daily before meals. 60 capsule 0    fluticasone (FLONASE) 50 mcg/actuation nasal spray SHAKE LIQUID AND USE 2 SPRAYS IN EACH NOSTRIL EVERY DAY 16 g 1    folic acid (FOLVITE) 400 MCG tablet TAKE 1 TABLET(400 MCG) BY MOUTH EVERY  tablet 0    naproxen (NAPROSYN) 500 MG tablet Take 1 tablet (500 mg total) by mouth 2 (two) times daily as needed (headache). 60 tablet 0    PROAIR HFA 90 mcg/actuation inhaler INHALE 2 PUFFS INTO THE LUNGS EVERY 6 HOURS AS NEEDED FOR WHEEZING 17 g 0    cetirizine (ZYRTEC) 10 MG tablet Take 1 tablet (10 mg total) by mouth once daily.  0     Current Facility-Administered Medications   Medication Dose Route Frequency Provider Last Rate Last Dose    cyanocobalamin injection 1,000 mcg  1,000 mcg Intramuscular 1 time in Clinic/HOD Dano Fernandez DO, FACP        cyanocobalamin injection 1,000 mcg  1,000 mcg Intramuscular 1 time in Clinic/HOD Dano Fernandez DO, FACP           Review of Systems   Constitutional: Positive for appetite change. Negative for activity change, chills, fatigue, fever and unexpected weight change.        Normal weight 220   HENT: Negative for dental problem, nosebleeds, sinus pressure, sneezing and trouble swallowing.    Eyes: Negative for visual disturbance.   Respiratory: Positive for shortness of breath. Negative for cough, choking, chest tightness and  "wheezing.    Cardiovascular: Negative for chest pain and leg swelling.   Gastrointestinal: Negative for abdominal pain, blood in stool, constipation, diarrhea and nausea.   Genitourinary: Negative for difficulty urinating and dysuria.   Musculoskeletal: Positive for myalgias. Negative for arthralgias and back pain.   Skin: Negative for rash and wound.   Neurological: Negative for dizziness, light-headedness and headaches.   Hematological: Negative for adenopathy. Does not bruise/bleed easily.   Psychiatric/Behavioral: Negative for sleep disturbance. The patient is not nervous/anxious.        ECOG Performance Status: 1   Objective:      Vitals:   Vitals:    05/16/18 1047   BP: 118/78   BP Location: Right arm   Patient Position: Sitting   BP Method: Large (Automatic)   Pulse: 103   Resp: 16   Temp: 98 °F (36.7 °C)   TempSrc: Oral   SpO2: 95%   Weight: 84.1 kg (185 lb 6.5 oz)   Height: 6' 5" (1.956 m)     BMI: Body mass index is 21.99 kg/m².    Physical Exam   Constitutional: He is oriented to person, place, and time. He appears well-developed and well-nourished.   HENT:   Head: Normocephalic and atraumatic.   Eyes: Pupils are equal, round, and reactive to light.   Neck: Normal range of motion. Neck supple.   Cardiovascular: Normal rate and regular rhythm.    Pulmonary/Chest: Effort normal. No respiratory distress. He has no rales.   Abdominal: Soft. He exhibits no distension. There is no tenderness.   Musculoskeletal: He exhibits no edema or tenderness.   Lymphadenopathy:     He has no cervical adenopathy.   Neurological: He is alert and oriented to person, place, and time. No cranial nerve deficit.   Skin: Skin is warm and dry. Rash noted.   Psychiatric: He has a normal mood and affect. His behavior is normal.       Laboratory Data:  Lab Visit on 05/16/2018   Component Date Value Ref Range Status    WBC 05/16/2018 6.45  3.90 - 12.70 K/uL Final    RBC 05/16/2018 4.82  4.60 - 6.20 M/uL Final    Hemoglobin 05/16/2018 " 13.3* 14.0 - 18.0 g/dL Final    Hematocrit 05/16/2018 41.7  40.0 - 54.0 % Final    MCV 05/16/2018 87  82 - 98 fL Final    MCH 05/16/2018 27.6  27.0 - 31.0 pg Final    MCHC 05/16/2018 31.9* 32.0 - 36.0 g/dL Final    RDW 05/16/2018 15.0* 11.5 - 14.5 % Final    Platelets 05/16/2018 306  150 - 350 K/uL Final    MPV 05/16/2018 9.7  9.2 - 12.9 fL Final    Gran # (ANC) 05/16/2018 4.5  1.8 - 7.7 K/uL Final    Comment: The ANC is based on a white cell differential from an   automated cell counter. It has not been microscopically   reviewed for the presence of abnormal cells. Clinical   correlation is required.      Immature Grans (Abs) 05/16/2018 0.02  0.00 - 0.04 K/uL Final    Comment: Mild elevation in immature granulocytes is non specific and   can be seen in a variety of conditions including stress response,   acute inflammation, trauma and pregnancy. Correlation with other   laboratory and clinical findings is essential.     Lab Visit on 04/25/2018   Component Date Value Ref Range Status    WBC 04/25/2018 6.76  3.90 - 12.70 K/uL Final    RBC 04/25/2018 4.63  4.60 - 6.20 M/uL Final    Hemoglobin 04/25/2018 12.9* 14.0 - 18.0 g/dL Final    Hematocrit 04/25/2018 39.6* 40.0 - 54.0 % Final    MCV 04/25/2018 86  82 - 98 fL Final    MCH 04/25/2018 27.9  27.0 - 31.0 pg Final    MCHC 04/25/2018 32.6  32.0 - 36.0 g/dL Final    RDW 04/25/2018 15.2* 11.5 - 14.5 % Final    Platelets 04/25/2018 358* 150 - 350 K/uL Final    MPV 04/25/2018 9.3  9.2 - 12.9 fL Final    Gran # (ANC) 04/25/2018 4.8  1.8 - 7.7 K/uL Final    Comment: The ANC is based on a white cell differential from an   automated cell counter. It has not been microscopically   reviewed for the presence of abnormal cells. Clinical   correlation is required.      Immature Grans (Abs) 04/25/2018 0.01  0.00 - 0.04 K/uL Final    Comment: Mild elevation in immature granulocytes is non specific and   can be seen in a variety of conditions including stress  response,   acute inflammation, trauma and pregnancy. Correlation with other   laboratory and clinical findings is essential.      Sodium 04/25/2018 138  136 - 145 mmol/L Final    Potassium 04/25/2018 3.9  3.5 - 5.1 mmol/L Final    Chloride 04/25/2018 107  95 - 110 mmol/L Final    CO2 04/25/2018 24  23 - 29 mmol/L Final    Glucose 04/25/2018 90  70 - 110 mg/dL Final    BUN, Bld 04/25/2018 16  8 - 23 mg/dL Final    Creatinine 04/25/2018 1.1  0.5 - 1.4 mg/dL Final    Calcium 04/25/2018 9.3  8.7 - 10.5 mg/dL Final    Total Protein 04/25/2018 7.6  6.0 - 8.4 g/dL Final    Albumin 04/25/2018 3.6  3.5 - 5.2 g/dL Final    Total Bilirubin 04/25/2018 0.4  0.1 - 1.0 mg/dL Final    Comment: For infants and newborns, interpretation of results should be based  on gestational age, weight and in agreement with clinical  observations.  Premature Infant recommended reference ranges:  Up to 24 hours.............<8.0 mg/dL  Up to 48 hours............<12.0 mg/dL  3-5 days..................<15.0 mg/dL  6-29 days.................<15.0 mg/dL      Alkaline Phosphatase 04/25/2018 49* 55 - 135 U/L Final    AST 04/25/2018 15  10 - 40 U/L Final    ALT 04/25/2018 14  10 - 44 U/L Final    Anion Gap 04/25/2018 7* 8 - 16 mmol/L Final    eGFR if African American 04/25/2018 >60.0  >60 mL/min/1.73 m^2 Final    eGFR if non African American 04/25/2018 >60.0  >60 mL/min/1.73 m^2 Final    Comment: Calculation used to obtain the estimated glomerular filtration  rate (eGFR) is the CKD-EPI equation.       Magnesium 04/25/2018 2.1  1.6 - 2.6 mg/dL Final    TSH 04/25/2018 2.284  0.400 - 4.000 uIU/mL Final    Free T4 04/25/2018 1.10  0.71 - 1.51 ng/dL Final     Supplemental Diagnosis 6/30/16  Immunohistochemical stains are completed on the Station 4L lymph node cell block material and reveal the tumor  cells to stain positively with cytokeratin 7 and TTF-1. The tumor cells show nonspecific blush staining with  cytokeratin 5/6 and are  negative for p63. This staining profile supports a diagnosis of non-small cell carcinoma  consistent with adenocarcinoma.  Cell block material will be sent for EGFR, ALK and ROS-1 testing and results will follow in a supplemental report.  (Electronically Signed: 2016-06-30 11:50:31 )  Diagnosed by: Irene Amaro M.D.  FINAL PATHOLOGIC DIAGNOSIS  1. Lymph node, Station 4L, EBUS guided fine needle aspiration:  Positive for malignant cells, non-small cell carcinoma.  Rare background lymphocytes are present.  Immunohistochemical staining be completed to further characterize this malignancy and results will follow in a  supplemental report.  2. Lymph node, Station 7, EBUS guided fine needle aspiration:  Positive for malignant cells, non-small cell carcinoma tumor identical to that seen in specimen #1.  Background lymphocytes are present.    Imaging:     CT 3/7/18  HISTORY:  Primary cancer of right middle lobe of lung     TECHNIQUE: Axial CT images acquired from the thoracic inlet through the ischial tuberosities  after administration of 100 cc of Omnipaque 350  IV.  Oral contrast was used.  Multiplanar reconstructions provided for review.    COMPARISON: CT chest/abdomen/pelvis with contrast 11/29/2017, 08/30/2017; PET CT 07/19/2017, 01/09/2017     FINDINGS:    Neck Base: Unremarkable.    Heart: No cardiomegaly. No significant pericardial effusion, noting a trace amount of pericardial fluid.  Mediastinum/Axillary : No abnormal masses or lymphadenopathy.  Lungs/airways: 4.1 x 3.8 cm (previously 4.0 x 4.0 cm) soft tissue right middle lobe lesion at the hilum with mass effect resulting in narrowing at the origin of the right middle lobe bronchus, as well as some distal airway obstruction resulting in significant middle lobe volume loss.  Lesion hypermetabolic on prior PET imaging.  Additional, stable, nonspecific patchy consolidative change in the right lower lobe which may represent pneumonitis, or post radiation therapy  change.  Tumor less likely given lack of significantly increased PET metabolic activity.  There is bilateral moderate severity emphysema.  Trace right pleural effusion. No new focal consolidation, or pneumothorax.  Chest Wall: Unremarkable.    Peritoneal Space: No ascites. No free air.  Stomach: Unremarkable.  Liver: Normal in size.  Homogenous in attenuation.  Stable, small right hepatic lobe hypodensities, likely cysts, as well as additional hypodensities that are to small to definitely characterize and may represent cysts.    Gallbladder: No calcified gallstones.  Bile Ducts: No evidence of dilated ducts.  Pancreas: No mass or peripancreatic fat stranding.   Spleen: Previously identified hypodense splenic lesion is barely detectable on today's examination measuring less than 0.5 cm.  Prior PET imaging demonstrated no increased metabolism.  Adrenals: 1.1 cm nodular thickening of the left adrenal gland.  Stable when compared to multiple prior examinations.  No evidence of increased metabolism on PET imaging, and likely representing an adenoma.  Right adrenal gland is unremarkable.  Bowel/Mesentery: Small and large bowel are normal in caliber without evidence of obstruction or acute inflammation.  Appendix is unremarkable.  No significant mesenteric edema or adenopathy.      Urinary Tract: Kidneys are normal in size and position and concentrate/excrete contrast appropriately on delayed imaging. Bilateral kidney hypoattenuations that are too small to definitively characterize, however possibly represent cysts. No solid renal mass.  No nephrolithiasis or obstructive uropathy.  Ureters are unremarkable.  Small diverticulum at the bladder dome/urachus.  Retroperitoneum:  No significant adenopathy.   Pelvis:  No pelvic masses, adenopathy, or free fluid. The prostate is enlarged at 5.7 cm in transverse diameter.  Abdominal wall:  Postoperative change of inguinal mesh hernia repair.  Otherwise unremarkable.      Vasculature: Aortic arch demonstrates 4 branch vessels noting direct origin of the left vertebral artery.  No aneurysm or dissection. Minimal scattered atherosclerotic calcification predominantly at the distal aorta and iliac arteries.     Bones: No acute fracture. Redemonstration of stable T1 (2.1 cm) and left iliac wing sclerotic lesions.  No new lesions.  Age-related degenerative change.   Impression          Stable large right middle lobe soft tissue mass with hypermetabolic activity on prior PET imaging consistent with primary tumor site.  No new pulmonary lesions.    Stable osseous metastatic disease, as above.    Stable patchy opacification of the right lower lobe likely representing pneumonitis or post radiation therapy change.  Tumor less likely secondary to lack of significant increased metabolic activity    Emphysema, atherosclerosis, hepatic cysts, and other findings as above.     RECIST SUMMARY: Date of prior examination for comparison CT chest/abdomen/pelvis with contrast 01/29/2017    Lesion 1: Right middle lobe 4.1 cm Series 2 Image 48 Prior measurement 4.0 cm  ______________________________________               Assessment:       1. Primary cancer of right middle lobe of lung    2. Secondary adenocarcinoma of bone    3. Chemotherapy follow-up examination    4. Neoplasm of abdomen    5. Neoplasm of lung           Plan:     Mr. Torres will continue on with treatment with pembrolizumab today.  Plan to repeat imaging on him in another 3 weeks.  Awaiting CMP and will follow up on today.  All questions were answered and he is agreeable with this plan.    Dano Fernandez DO, FACP  Hematology & Oncology  [address] [place] [address]  ph. [phone]  Fax. [phone]    25 minutes were spent in coordination of patient's care, record review and counseling.  More than 50% of the time was face-to-face.

## 2018-05-16 NOTE — PLAN OF CARE
Problem: Patient Care Overview  Goal: Plan of Care Review  Outcome: Ongoing (interventions implemented as appropriate)  Infusion completed, pt tolerated well; pt instructed to remain well hydrated, to contact MD for any needs or concerns; spouse instructed to check [facility] in one week to determine if CT scan authorized and scheduled; printed AVS given to and reviewed with spouse, pt and spouse verbalized understanding of all discussed and when to report next

## 2018-05-18 ENCOUNTER — PATIENT MESSAGE (OUTPATIENT)
Dept: FAMILY MEDICINE | Facility: CLINIC | Age: 63
End: 2018-05-18

## 2018-05-18 DIAGNOSIS — I10 ESSENTIAL HYPERTENSION: Chronic | ICD-10-CM

## 2018-05-20 RX ORDER — AMLODIPINE BESYLATE 10 MG/1
TABLET ORAL
Qty: 90 TABLET | Refills: 0 | OUTPATIENT
Start: 2018-05-20

## 2018-05-21 ENCOUNTER — TELEPHONE (OUTPATIENT)
Dept: FAMILY MEDICINE | Facility: CLINIC | Age: 63
End: 2018-05-21

## 2018-05-21 ENCOUNTER — TELEPHONE (OUTPATIENT)
Dept: HEMATOLOGY/ONCOLOGY | Facility: CLINIC | Age: 63
End: 2018-05-21

## 2018-05-21 NOTE — TELEPHONE ENCOUNTER
Let patient's wife know that Dr. Fernandez can not refill his Norvasc, especially since he hasn't seen the prescribing doctor in over a year.  He needs to see that doctor ASAP for medication management.

## 2018-05-21 NOTE — TELEPHONE ENCOUNTER
----- Message from Edel Fay sent at 5/21/2018  2:47 PM CDT -----  Contact: pt wife  Rx Refill/Request    Who Called: Pt wife Diamond   Refill or New Rx:Refill  RX Name and Strength: amlodipine (NORVASC) 10 MG tablet  How is the patient currently taking it? (ex. 1XDay):    Is this a 30 day or 90 day RX:    Preferred Pharmacy with phone number: [facility] [address] [place] [address] [address] AT [facility]   [phone] (Phone)  [phone] (Fax)  Pt. Contact #:[phone]  Additional Information: Pt wife also state that he been out of medication for 4 days  Thank You  ABRAHAN Fay

## 2018-05-21 NOTE — TELEPHONE ENCOUNTER
Diamond, wife, states the patient's refill request for his BP medication was denied and he is out.  Informed that the patient's last OV was 04/24/2017.  Diamond states the patient is unable to come in as he is a .  Informed that a refill may be provided to last until his office visit if the patient is able to schedule an appointment.  Verbalized understanding and states that she will call back.

## 2018-05-23 ENCOUNTER — TELEPHONE (OUTPATIENT)
Dept: FAMILY MEDICINE | Facility: CLINIC | Age: 63
End: 2018-05-23

## 2018-05-23 NOTE — TELEPHONE ENCOUNTER
----- Message from Dalia Rose sent at 5/23/2018 10:35 AM CDT -----  Contact: Wife-[phone]  REFILL:  amlodipine (NORVASC) 10 MG tablet      PHARMACY:[facility]

## 2018-05-25 ENCOUNTER — OFFICE VISIT (OUTPATIENT)
Dept: FAMILY MEDICINE | Facility: CLINIC | Age: 63
End: 2018-05-25
Payer: COMMERCIAL

## 2018-05-25 ENCOUNTER — TELEPHONE (OUTPATIENT)
Dept: HEMATOLOGY/ONCOLOGY | Facility: CLINIC | Age: 63
End: 2018-05-25

## 2018-05-25 VITALS
HEIGHT: 77 IN | HEART RATE: 107 BPM | OXYGEN SATURATION: 95 % | SYSTOLIC BLOOD PRESSURE: 130 MMHG | BODY MASS INDEX: 21.98 KG/M2 | TEMPERATURE: 98 F | DIASTOLIC BLOOD PRESSURE: 82 MMHG | WEIGHT: 186.19 LBS

## 2018-05-25 DIAGNOSIS — J43.2 CENTRILOBULAR EMPHYSEMA: ICD-10-CM

## 2018-05-25 DIAGNOSIS — I10 ESSENTIAL HYPERTENSION: Chronic | ICD-10-CM

## 2018-05-25 DIAGNOSIS — Z23 PNEUMOCOCCAL VACCINATION ADMINISTERED AT CURRENT VISIT: ICD-10-CM

## 2018-05-25 DIAGNOSIS — Z12.11 ENCOUNTER FOR SCREENING FOR MALIGNANT NEOPLASM OF COLON: ICD-10-CM

## 2018-05-25 DIAGNOSIS — C34.2 PRIMARY CANCER OF RIGHT MIDDLE LOBE OF LUNG: ICD-10-CM

## 2018-05-25 DIAGNOSIS — J44.9 CHRONIC OBSTRUCTIVE PULMONARY DISEASE, UNSPECIFIED COPD TYPE: ICD-10-CM

## 2018-05-25 DIAGNOSIS — R51.9 HEADACHE, UNSPECIFIED HEADACHE TYPE: ICD-10-CM

## 2018-05-25 DIAGNOSIS — Z00.00 ROUTINE PHYSICAL EXAMINATION: Primary | ICD-10-CM

## 2018-05-25 PROCEDURE — 99999 PR PBB SHADOW E&M-EST. PATIENT-LVL III: CPT | Mod: PBBFAC,,, | Performed by: FAMILY MEDICINE

## 2018-05-25 PROCEDURE — 3075F SYST BP GE 130 - 139MM HG: CPT | Mod: CPTII,S$GLB,, | Performed by: FAMILY MEDICINE

## 2018-05-25 PROCEDURE — 90471 IMMUNIZATION ADMIN: CPT | Mod: S$GLB,,, | Performed by: FAMILY MEDICINE

## 2018-05-25 PROCEDURE — 99396 PREV VISIT EST AGE 40-64: CPT | Mod: 25,S$GLB,, | Performed by: FAMILY MEDICINE

## 2018-05-25 PROCEDURE — 3079F DIAST BP 80-89 MM HG: CPT | Mod: CPTII,S$GLB,, | Performed by: FAMILY MEDICINE

## 2018-05-25 PROCEDURE — 90732 PPSV23 VACC 2 YRS+ SUBQ/IM: CPT | Mod: S$GLB,,, | Performed by: FAMILY MEDICINE

## 2018-05-25 RX ORDER — AMLODIPINE BESYLATE 10 MG/1
10 TABLET ORAL DAILY
Qty: 90 TABLET | Refills: 3 | Status: SHIPPED | OUTPATIENT
Start: 2018-05-25

## 2018-05-25 RX ORDER — NAPROXEN 500 MG/1
500 TABLET ORAL 2 TIMES DAILY PRN
Qty: 60 TABLET | Refills: 0 | Status: ON HOLD | OUTPATIENT
Start: 2018-05-25 | End: 2019-11-18 | Stop reason: HOSPADM

## 2018-05-25 RX ORDER — IPRATROPIUM BROMIDE AND ALBUTEROL SULFATE 2.5; .5 MG/3ML; MG/3ML
3 SOLUTION RESPIRATORY (INHALATION) EVERY 6 HOURS PRN
Qty: 100 VIAL | Refills: 2 | Status: SHIPPED | OUTPATIENT
Start: 2018-05-25 | End: 2019-01-29 | Stop reason: SDUPTHER

## 2018-05-25 NOTE — PROGRESS NOTES
[facility]  Progress Note    SUBJECTIVE:     Chief Complaint   Patient presents with    Annual Exam       HPI   Angel Torres  is a 63 y.o. male here for routine physical exam. Doing well on current regimen of medications. Patient has no other new complaints/problems at this time.      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     Past Surgical History:   Procedure Laterality Date    INGUINAL HERNIA REPAIR Bilateral     KNEE SURGERY       Family History   Problem Relation Age of Onset    Cancer Mother         lung, breast    Cancer Sister         lung    Cancer Maternal Grandfather     No Known Problems Father     No Known Problems Brother     No Known Problems Maternal Aunt     No Known Problems Maternal Uncle     No Known Problems Paternal Aunt     No Known Problems Paternal Uncle     No Known Problems Maternal Grandmother     No Known Problems Paternal Grandmother     No Known Problems Paternal Grandfather     Amblyopia Neg Hx     Blindness Neg Hx     Cataracts Neg Hx     Diabetes Neg Hx     Glaucoma Neg Hx     Hypertension Neg Hx     Macular degeneration Neg Hx     Retinal detachment Neg Hx     Strabismus Neg Hx     Stroke Neg Hx     Thyroid disease Neg Hx      Social History   Substance Use Topics    Smoking status: Former Smoker     Packs/day: 2.00     Years: 40.00     Quit date: 11/16/2013    Smokeless tobacco: Former User    Alcohol use No        Review of Systems   Constitutional: Negative for chills, diaphoresis and fever.   HENT: Negative for congestion, ear pain and  sore throat.    Eyes: Negative for photophobia and discharge.   Respiratory: Negative for cough, shortness of breath and wheezing.    Cardiovascular: Negative for chest pain and palpitations.   Gastrointestinal: Negative for abdominal pain, constipation, diarrhea, nausea and vomiting.   Genitourinary: Negative for dysuria and hematuria.   Musculoskeletal: Negative for back pain and myalgias.   Skin: Negative for itching and rash.   Neurological: Negative for dizziness, sensory change, focal weakness, weakness and headaches.   All other systems reviewed and are negative.    OBJECTIVE:     Vitals:    05/25/18 0745   BP: 130/82   Pulse: 107   Temp: 97.7 °F (36.5 °C)     Body mass index is 22.08 kg/m².    Physical Exam   Constitutional: He is oriented to person, place, and time and well-developed, well-nourished, and in no distress. No distress.   HENT:   Head: Normocephalic and atraumatic.   Right Ear: External ear normal. Tympanic membrane is not perforated, not erythematous, not retracted and not bulging. No hemotympanum.   Left Ear: External ear normal. Tympanic membrane is not perforated, not erythematous, not retracted and not bulging. No hemotympanum.   Nose: Nose normal.   Mouth/Throat: Oropharynx is clear and moist. No oropharyngeal exudate.   Eyes: Conjunctivae and EOM are normal.   Cardiovascular: Normal rate, regular rhythm and normal heart sounds.  Exam reveals no gallop and no friction rub.    No murmur heard.  Pulmonary/Chest: Effort normal and breath sounds normal. No respiratory distress. He has no wheezes. He has no rales. He exhibits no tenderness.   Abdominal: Soft. Bowel sounds are normal. He exhibits no distension. There is no tenderness. There is no rebound.   Neurological: He is alert and oriented to person, place, and time.   Skin: Skin is warm. He is not diaphoretic.       Old records were reviewed. Labs and/or images were independently reviewed.    ASSESSMENT     1. Routine physical  examination    2. Headache, unspecified headache type    3. Essential hypertension    4. Primary cancer of right middle lobe of lung    5. Centrilobular emphysema    6. Chronic obstructive pulmonary disease, unspecified COPD type    7. Encounter for screening for malignant neoplasm of colon    8. Pneumococcal vaccination administered at current visit        PLAN:     Routine physical examination  -     Lipid panel; Future  -     Hemoglobin A1c; Future  -     PSA, Screening; Future; Expected date: 05/25/2018  -     We briefly discussed diet, exercise, and routine preventive exams. All questions and comments addressed.    Headache, unspecified headache type  -     naproxen (NAPROSYN) 500 MG tablet; Take 1 tablet (500 mg total) by mouth 2 (two) times daily as needed (headache).  Dispense: 60 tablet; Refill: 0  -     Stable. Continue current regimen.    Essential hypertension  -     amLODIPine (NORVASC) 10 MG tablet; Take 1 tablet (10 mg total) by mouth once daily.  Dispense: 90 tablet; Refill: 3  -     Stable. Continue current regimen.    Primary cancer of right middle lobe of lung   -     F/u with oncology. Continue current regimen.     Centrilobular emphysema   -     Stable. Continue current regimen.    Chronic obstructive pulmonary disease, unspecified COPD type  -     albuterol-ipratropium (DUO-NEB) 2.5 mg-0.5 mg/3 mL nebulizer solution; Take 3 mLs by nebulization every 6 (six) hours as needed for Wheezing or Shortness of Breath.  Dispense: 100 vial; Refill: 2    Encounter for screening for malignant neoplasm of colon  -     Case request GI: COLONOSCOPY    Pneumococcal vaccination administered at current visit  -     Pneumococcal Polysaccharide Vaccine (23 Valent) (SQ/IM)      RTC PRN    Lc Beltran MD  05/25/2018 9:01 AM

## 2018-05-25 NOTE — TELEPHONE ENCOUNTER
LVM letting patient's wife know that we understand he is to have his infusion on the 27th and it will be scheduled.

## 2018-05-25 NOTE — TELEPHONE ENCOUNTER
----- Message from Geena Powell sent at 5/25/2018  3:08 PM CDT -----  Contact: Pt's wife Diamond Gil [phone]  Pt's wife is requesting a call back regarding the patient's appt on 6.27.18.  Pt is just scheduled for labs but she thinks he should have his infusion scheduled that day as well.    Diamond may be reached at [phone].    Thank you.  LC

## 2018-05-30 ENCOUNTER — TELEPHONE (OUTPATIENT)
Dept: HEMATOLOGY/ONCOLOGY | Facility: CLINIC | Age: 63
End: 2018-05-30

## 2018-05-30 ENCOUNTER — HOSPITAL ENCOUNTER (OUTPATIENT)
Dept: RADIOLOGY | Facility: HOSPITAL | Age: 63
Discharge: HOME | End: 2018-05-30
Attending: INTERNAL MEDICINE
Payer: COMMERCIAL

## 2018-05-30 DIAGNOSIS — D49.1 NEOPLASM OF LUNG: ICD-10-CM

## 2018-05-30 DIAGNOSIS — D49.89 NEOPLASM OF ABDOMEN: ICD-10-CM

## 2018-05-30 PROCEDURE — 71260 CT THORAX DX C+: CPT | Mod: 26,,, | Performed by: RADIOLOGY

## 2018-05-30 PROCEDURE — 71260 CT THORAX DX C+: CPT | Mod: TC

## 2018-05-30 PROCEDURE — 74177 CT ABD & PELVIS W/CONTRAST: CPT | Mod: 26,,, | Performed by: RADIOLOGY

## 2018-05-30 PROCEDURE — 25500020 PHARM REV CODE 255: Performed by: INTERNAL MEDICINE

## 2018-05-30 PROCEDURE — 74177 CT ABD & PELVIS W/CONTRAST: CPT | Mod: TC

## 2018-05-30 RX ADMIN — IOHEXOL 100 ML: 350 INJECTION, SOLUTION INTRAVENOUS at 12:05

## 2018-05-30 NOTE — TELEPHONE ENCOUNTER
Gave patient message that his scans were stable per Dr. Fernandez and we would continue on with his therapy.  Confirmed appts for next week.

## 2018-06-01 ENCOUNTER — TELEPHONE (OUTPATIENT)
Dept: HEMATOLOGY/ONCOLOGY | Facility: CLINIC | Age: 63
End: 2018-06-01

## 2018-06-01 NOTE — TELEPHONE ENCOUNTER
----- Message from Edel Fay sent at 6/1/2018  2:09 PM CDT -----  Contact: pt  Pt wife called to speak with nurse Lauren   Callback#[phone]  Thank You  ABRAHAN Fay

## 2018-06-01 NOTE — TELEPHONE ENCOUNTER
----- Message from Edel Fay sent at 6/1/2018  9:58 AM CDT -----  Contact: pt wife Flavia   Pt Wife called to speak with nurse Lauren and wants to know the results of PET scan Pt did not understand   Callback#[phone]  Thank You  ABRAHAN Fay

## 2018-06-01 NOTE — TELEPHONE ENCOUNTER
Gave patient's wife previous message from Dr. Fernandez of scans are stable, will continue on with current therapy as scheduled.

## 2018-06-06 ENCOUNTER — LAB VISIT (OUTPATIENT)
Dept: LAB | Facility: HOSPITAL | Age: 63
End: 2018-06-06
Payer: COMMERCIAL

## 2018-06-06 ENCOUNTER — INFUSION (OUTPATIENT)
Dept: INFUSION THERAPY | Facility: HOSPITAL | Age: 63
End: 2018-06-06
Attending: INTERNAL MEDICINE
Payer: COMMERCIAL

## 2018-06-06 ENCOUNTER — OFFICE VISIT (OUTPATIENT)
Dept: HEMATOLOGY/ONCOLOGY | Facility: CLINIC | Age: 63
End: 2018-06-06
Payer: COMMERCIAL

## 2018-06-06 VITALS
BODY MASS INDEX: 22.31 KG/M2 | HEART RATE: 108 BPM | RESPIRATION RATE: 18 BRPM | TEMPERATURE: 98 F | RESPIRATION RATE: 18 BRPM | BODY MASS INDEX: 22.31 KG/M2 | SYSTOLIC BLOOD PRESSURE: 120 MMHG | HEIGHT: 77 IN | WEIGHT: 188.94 LBS | OXYGEN SATURATION: 94 % | SYSTOLIC BLOOD PRESSURE: 132 MMHG | DIASTOLIC BLOOD PRESSURE: 86 MMHG | DIASTOLIC BLOOD PRESSURE: 81 MMHG | HEART RATE: 131 BPM | HEIGHT: 77 IN | TEMPERATURE: 97 F | WEIGHT: 188.94 LBS

## 2018-06-06 DIAGNOSIS — C34.2 PRIMARY CANCER OF RIGHT MIDDLE LOBE OF LUNG: ICD-10-CM

## 2018-06-06 DIAGNOSIS — Z00.00 ROUTINE PHYSICAL EXAMINATION: ICD-10-CM

## 2018-06-06 DIAGNOSIS — R59.0 MEDIASTINAL ADENOPATHY: ICD-10-CM

## 2018-06-06 DIAGNOSIS — C34.2 PRIMARY CANCER OF RIGHT MIDDLE LOBE OF LUNG: Primary | ICD-10-CM

## 2018-06-06 DIAGNOSIS — C79.51 SECONDARY ADENOCARCINOMA OF BONE: ICD-10-CM

## 2018-06-06 DIAGNOSIS — Z09 CHEMOTHERAPY FOLLOW-UP EXAMINATION: ICD-10-CM

## 2018-06-06 LAB
ALBUMIN SERPL BCP-MCNC: 3.7 G/DL
ALP SERPL-CCNC: 51 U/L
ALT SERPL W/O P-5'-P-CCNC: 16 U/L
ANION GAP SERPL CALC-SCNC: 7 MMOL/L
AST SERPL-CCNC: 18 U/L
BILIRUB SERPL-MCNC: 0.4 MG/DL
BUN SERPL-MCNC: 16 MG/DL
CALCIUM SERPL-MCNC: 9.4 MG/DL
CHLORIDE SERPL-SCNC: 105 MMOL/L
CHOLEST SERPL-MCNC: 170 MG/DL
CHOLEST/HDLC SERPL: 3.3 {RATIO}
CO2 SERPL-SCNC: 27 MMOL/L
COMPLEXED PSA SERPL-MCNC: 1.1 NG/ML
CREAT SERPL-MCNC: 1.2 MG/DL
ERYTHROCYTE [DISTWIDTH] IN BLOOD BY AUTOMATED COUNT: 15.2 %
EST. GFR  (AFRICAN AMERICAN): >60 ML/MIN/1.73 M^2
EST. GFR  (NON AFRICAN AMERICAN): >60 ML/MIN/1.73 M^2
ESTIMATED AVG GLUCOSE: 105 MG/DL
GLUCOSE SERPL-MCNC: 106 MG/DL
HBA1C MFR BLD HPLC: 5.3 %
HCT VFR BLD AUTO: 40.8 %
HDLC SERPL-MCNC: 51 MG/DL
HDLC SERPL: 30 %
HGB BLD-MCNC: 13.1 G/DL
IMM GRANULOCYTES # BLD AUTO: 0.03 K/UL
LDLC SERPL CALC-MCNC: 102.4 MG/DL
MAGNESIUM SERPL-MCNC: 2.2 MG/DL
MCH RBC QN AUTO: 28 PG
MCHC RBC AUTO-ENTMCNC: 32.1 G/DL
MCV RBC AUTO: 87 FL
NEUTROPHILS # BLD AUTO: 5.1 K/UL
NONHDLC SERPL-MCNC: 119 MG/DL
PLATELET # BLD AUTO: 312 K/UL
PMV BLD AUTO: 9.6 FL
POTASSIUM SERPL-SCNC: 4.2 MMOL/L
PROT SERPL-MCNC: 7.7 G/DL
RBC # BLD AUTO: 4.68 M/UL
SODIUM SERPL-SCNC: 139 MMOL/L
T4 FREE SERPL-MCNC: 0.98 NG/DL
TRIGL SERPL-MCNC: 83 MG/DL
TSH SERPL DL<=0.005 MIU/L-ACNC: 1.99 UIU/ML
WBC # BLD AUTO: 7.51 K/UL

## 2018-06-06 PROCEDURE — 96413 CHEMO IV INFUSION 1 HR: CPT

## 2018-06-06 PROCEDURE — 63600175 PHARM REV CODE 636 W HCPCS: Mod: JG | Performed by: INTERNAL MEDICINE

## 2018-06-06 PROCEDURE — 80061 LIPID PANEL: CPT

## 2018-06-06 PROCEDURE — 83735 ASSAY OF MAGNESIUM: CPT

## 2018-06-06 PROCEDURE — 84443 ASSAY THYROID STIM HORMONE: CPT

## 2018-06-06 PROCEDURE — 80053 COMPREHEN METABOLIC PANEL: CPT

## 2018-06-06 PROCEDURE — 99214 OFFICE O/P EST MOD 30 MIN: CPT | Mod: S$GLB,,, | Performed by: INTERNAL MEDICINE

## 2018-06-06 PROCEDURE — 84153 ASSAY OF PSA TOTAL: CPT

## 2018-06-06 PROCEDURE — 3008F BODY MASS INDEX DOCD: CPT | Mod: CPTII,S$GLB,, | Performed by: INTERNAL MEDICINE

## 2018-06-06 PROCEDURE — 85027 COMPLETE CBC AUTOMATED: CPT

## 2018-06-06 PROCEDURE — 84439 ASSAY OF FREE THYROXINE: CPT

## 2018-06-06 PROCEDURE — 99999 PR PBB SHADOW E&M-EST. PATIENT-LVL IV: CPT | Mod: PBBFAC,,, | Performed by: INTERNAL MEDICINE

## 2018-06-06 PROCEDURE — 3074F SYST BP LT 130 MM HG: CPT | Mod: CPTII,S$GLB,, | Performed by: INTERNAL MEDICINE

## 2018-06-06 PROCEDURE — 25000003 PHARM REV CODE 250: Performed by: INTERNAL MEDICINE

## 2018-06-06 PROCEDURE — 83036 HEMOGLOBIN GLYCOSYLATED A1C: CPT

## 2018-06-06 PROCEDURE — 3079F DIAST BP 80-89 MM HG: CPT | Mod: CPTII,S$GLB,, | Performed by: INTERNAL MEDICINE

## 2018-06-06 PROCEDURE — 36415 COLL VENOUS BLD VENIPUNCTURE: CPT

## 2018-06-06 RX ORDER — SODIUM CHLORIDE 0.9 % (FLUSH) 0.9 %
10 SYRINGE (ML) INJECTION
Status: CANCELLED | OUTPATIENT
Start: 2018-06-06

## 2018-06-06 RX ORDER — HEPARIN 100 UNIT/ML
500 SYRINGE INTRAVENOUS
Status: CANCELLED | OUTPATIENT
Start: 2018-06-06

## 2018-06-06 RX ORDER — SODIUM CHLORIDE 0.9 % (FLUSH) 0.9 %
10 SYRINGE (ML) INJECTION
Status: DISCONTINUED | OUTPATIENT
Start: 2018-06-06 | End: 2018-06-06 | Stop reason: HOSPADM

## 2018-06-06 RX ADMIN — SODIUM CHLORIDE: 9 INJECTION, SOLUTION INTRAVENOUS at 11:06

## 2018-06-06 RX ADMIN — SODIUM CHLORIDE 200 MG: 9 INJECTION, SOLUTION INTRAVENOUS at 11:06

## 2018-06-06 NOTE — PROGRESS NOTES
PATIENT: Angel Torres  MRN: [id number]  DATE: 6/6/2018      Diagnosis:   1. Primary cancer of right middle lobe of lung    2. Secondary adenocarcinoma of bone    3. Chemotherapy follow-up examination        Chief Complaint: Primary cancer of right middle lobe of lung      Oncologic History:      Oncologic History Non-small cell lung cancer diagnosed 6/2016   Recurrent disease to lung 4/2017     Oncologic Treatment Cisplatin/Pemetrexed initiated 8/15/16 with concurrent radiation completed 3 cycles 10/3/16; 64 Gy  Carboplatin/pemetrexed/pembrolizumab 7/2017 - 9/2017  Pembrolizumab maintenance 11/2017     Pathology  6/2016 : Adenocarcinoma, T2b, N2, M0, Stage IIIA          Subjective:    Interval History: Mr. Torres is a 63 y.o. male who was seen in follow-up for lung cancer.  He states he has generally been feeling well. He has noted a cough which is nonproductive recently.  Denies shortness of breath.  No other new complaints.    His history dates to approximately 4/2016 when he noted a worsening cough which was associated with clear sputum.  He had a chest x-ray in 5/16 showing a right middle lobe opacity.  Subsequent CT of the chest was performed on 5/30/16 showing a 5.6 cm lesion in the right middle lobe with hilar and mediastinal lymphadenopathy along with hepatic lesions and a left adrenal mass..  He underwent bronchoscopy which was nonrevealing and transbronchial biopsies were nondiagnostic.  He then underwent EBUS with biopsy of mediastinal lymph nodes with pathology showing non-small cell lung cancer consistent with adenocarcinoma.  He was started on concurrent chemotherapy and radiation with cisplatin and pemetrexed on 8/15/16.  He completed 64 Gy of concurrent radiation with cisplatin and pemetrexed with chemotherapy completed on 10/3/16.  Scans in 4/2017 had indicated progressive disease in the lung.  Subsequent PET scan in 7/2017 had shown spread of disease to bone.  He was started on carboplatin and  pemetrexed and pembrolizumab in 7/2017.  Repeat imaging in 8/2017 had shown stability of disease.  He was started on pembrolizumab maintenance in 11/2017 after a delay due to his insurance.  Repeat imaging in 12/2016 had shown mild progressive disease, however, it was felt that due to the excessive delay in treatment we would proceed on rather than change therapy at this time.  CT in 3/2018 had shown stability of his disease.  CT in 05/2018 had shown stable disease.    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       Past Surgical HIstory:   Past Surgical History:   Procedure Laterality Date    INGUINAL HERNIA REPAIR Bilateral     KNEE SURGERY         Family History:   Family History   Problem Relation Age of Onset    Cancer Mother         lung, breast    Cancer Sister         lung    Cancer Maternal Grandfather     No Known Problems Father     No Known Problems Brother     No Known Problems Maternal Aunt     No Known Problems Maternal Uncle     No Known Problems Paternal Aunt     No Known Problems Paternal Uncle     No Known Problems Maternal Grandmother     No Known Problems Paternal Grandmother     No Known Problems Paternal Grandfather     Amblyopia Neg Hx     Blindness Neg Hx     Cataracts Neg Hx     Diabetes Neg Hx     Glaucoma Neg Hx     Hypertension Neg Hx     Macular degeneration Neg Hx     Retinal detachment Neg Hx     Strabismus Neg Hx     Stroke Neg Hx     Thyroid disease Neg Hx        Social History:  reports that he quit smoking about 4 years ago. He has a 80.00 pack-year smoking history. He has quit using smokeless  tobacco. He reports that he does not drink alcohol or use drugs.    Allergies:  Review of patient's allergies indicates:  No Known Allergies    Medications:  Current Outpatient Prescriptions   Medication Sig Dispense Refill    albuterol-ipratropium (DUO-NEB) 2.5 mg-0.5 mg/3 mL nebulizer solution Take 3 mLs by nebulization every 6 (six) hours as needed for Wheezing or Shortness of Breath. 100 vial 2    amLODIPine (NORVASC) 10 MG tablet Take 1 tablet (10 mg total) by mouth once daily. 90 tablet 3    dronabinol (MARINOL) 5 MG capsule Take 1 capsule (5 mg total) by mouth 2 (two) times daily before meals. 60 capsule 0    fluticasone (FLONASE) 50 mcg/actuation nasal spray SHAKE LIQUID AND USE 2 SPRAYS IN EACH NOSTRIL EVERY DAY 16 g 1    folic acid (FOLVITE) 400 MCG tablet TAKE 1 TABLET(400 MCG) BY MOUTH EVERY  tablet 0    naproxen (NAPROSYN) 500 MG tablet Take 1 tablet (500 mg total) by mouth 2 (two) times daily as needed (headache). 60 tablet 0    PROAIR HFA 90 mcg/actuation inhaler INHALE 2 PUFFS INTO THE LUNGS EVERY 6 HOURS AS NEEDED FOR WHEEZING 17 g 0    cetirizine (ZYRTEC) 10 MG tablet Take 1 tablet (10 mg total) by mouth once daily.  0     Current Facility-Administered Medications   Medication Dose Route Frequency Provider Last Rate Last Dose    cyanocobalamin injection 1,000 mcg  1,000 mcg Intramuscular 1 time in Clinic/HOD Dano Fernandez DO, FACP        cyanocobalamin injection 1,000 mcg  1,000 mcg Intramuscular 1 time in Clinic/HOD Dano Fernandez DO, FACP           Review of Systems   Constitutional: Negative for activity change, appetite change, chills, fatigue, fever and unexpected weight change.        Normal weight 220   HENT: Negative for dental problem, nosebleeds, sinus pressure, sneezing and trouble swallowing.    Eyes: Negative for visual disturbance.   Respiratory: Positive for cough. Negative for choking, chest tightness, shortness of breath and wheezing.    Cardiovascular:  "Negative for chest pain and leg swelling.   Gastrointestinal: Negative for abdominal pain, blood in stool, constipation, diarrhea and nausea.   Genitourinary: Negative for difficulty urinating and dysuria.   Musculoskeletal: Positive for myalgias. Negative for arthralgias and back pain.   Skin: Negative for rash and wound.   Neurological: Negative for dizziness, light-headedness and headaches.   Hematological: Negative for adenopathy. Does not bruise/bleed easily.   Psychiatric/Behavioral: Negative for sleep disturbance. The patient is not nervous/anxious.        ECOG Performance Status: 1   Objective:      Vitals:   Vitals:    06/06/18 1103   BP: 120/86   BP Location: Left arm   Patient Position: Sitting   BP Method: Large (Automatic)   Pulse: (!) 131   Resp: 18   Temp: 97.4 °F (36.3 °C)   TempSrc: Oral   SpO2: (!) 94%   Weight: 85.7 kg (188 lb 15 oz)   Height: 6' 5" (1.956 m)     BMI: Body mass index is 22.4 kg/m².    Physical Exam   Constitutional: He is oriented to person, place, and time. He appears well-developed and well-nourished.   HENT:   Head: Normocephalic and atraumatic.   Eyes: Pupils are equal, round, and reactive to light.   Neck: Normal range of motion. Neck supple.   Cardiovascular: Normal rate and regular rhythm.    Pulmonary/Chest: Effort normal. No respiratory distress. He has no rales.   Abdominal: Soft. He exhibits no distension. There is no tenderness.   Musculoskeletal: He exhibits no edema or tenderness.   Lymphadenopathy:     He has no cervical adenopathy.   Neurological: He is alert and oriented to person, place, and time. No cranial nerve deficit.   Skin: Skin is warm and dry. Rash noted.   Psychiatric: He has a normal mood and affect. His behavior is normal.       Laboratory Data:  Lab Visit on 06/06/2018   Component Date Value Ref Range Status    WBC 06/06/2018 7.51  3.90 - 12.70 K/uL Final    RBC 06/06/2018 4.68  4.60 - 6.20 M/uL Final    Hemoglobin 06/06/2018 13.1* 14.0 - 18.0 " g/dL Final    Hematocrit 06/06/2018 40.8  40.0 - 54.0 % Final    MCV 06/06/2018 87  82 - 98 fL Final    MCH 06/06/2018 28.0  27.0 - 31.0 pg Final    MCHC 06/06/2018 32.1  32.0 - 36.0 g/dL Final    RDW 06/06/2018 15.2* 11.5 - 14.5 % Final    Platelets 06/06/2018 312  150 - 350 K/uL Final    MPV 06/06/2018 9.6  9.2 - 12.9 fL Final    Gran # (ANC) 06/06/2018 5.1  1.8 - 7.7 K/uL Final    Comment: The ANC is based on a white cell differential from an   automated cell counter. It has not been microscopically   reviewed for the presence of abnormal cells. Clinical   correlation is required.      Immature Grans (Abs) 06/06/2018 0.03  0.00 - 0.04 K/uL Final    Comment: Mild elevation in immature granulocytes is non specific and   can be seen in a variety of conditions including stress response,   acute inflammation, trauma and pregnancy. Correlation with other   laboratory and clinical findings is essential.      Sodium 06/06/2018 139  136 - 145 mmol/L Final    Potassium 06/06/2018 4.2  3.5 - 5.1 mmol/L Final    Chloride 06/06/2018 105  95 - 110 mmol/L Final    CO2 06/06/2018 27  23 - 29 mmol/L Final    Glucose 06/06/2018 106  70 - 110 mg/dL Final    BUN, Bld 06/06/2018 16  8 - 23 mg/dL Final    Creatinine 06/06/2018 1.2  0.5 - 1.4 mg/dL Final    Calcium 06/06/2018 9.4  8.7 - 10.5 mg/dL Final    Total Protein 06/06/2018 7.7  6.0 - 8.4 g/dL Final    Albumin 06/06/2018 3.7  3.5 - 5.2 g/dL Final    Total Bilirubin 06/06/2018 0.4  0.1 - 1.0 mg/dL Final    Comment: For infants and newborns, interpretation of results should be based  on gestational age, weight and in agreement with clinical  observations.  Premature Infant recommended reference ranges:  Up to 24 hours.............<8.0 mg/dL  Up to 48 hours............<12.0 mg/dL  3-5 days..................<15.0 mg/dL  6-29 days.................<15.0 mg/dL      Alkaline Phosphatase 06/06/2018 51* 55 - 135 U/L Final    AST 06/06/2018 18  10 - 40 U/L Final     ALT 06/06/2018 16  10 - 44 U/L Final    Anion Gap 06/06/2018 7* 8 - 16 mmol/L Final    eGFR if African American 06/06/2018 >60.0  >60 mL/min/1.73 m^2 Final    eGFR if non African American 06/06/2018 >60.0  >60 mL/min/1.73 m^2 Final    Comment: Calculation used to obtain the estimated glomerular filtration  rate (eGFR) is the CKD-EPI equation.       Magnesium 06/06/2018 2.2  1.6 - 2.6 mg/dL Final    TSH 06/06/2018 1.993  0.400 - 4.000 uIU/mL Final    Free T4 06/06/2018 0.98  0.71 - 1.51 ng/dL Final    Cholesterol 06/06/2018 170  120 - 199 mg/dL Final    Comment: The National Cholesterol Education Program (NCEP) has set the  following guidelines (reference ranges) for Cholesterol:  Optimal.....................<200 mg/dL  Borderline High.............200-239 mg/dL  High........................> or = 240 mg/dL      Triglycerides 06/06/2018 83  30 - 150 mg/dL Final    Comment: The National Cholesterol Education Program (NCEP) has set the  following guidelines (reference values) for triglycerides:  Normal......................<150 mg/dL  Borderline High.............150-199 mg/dL  High........................200-499 mg/dL      HDL 06/06/2018 51  40 - 75 mg/dL Final    Comment: The National Cholesterol Education Program (NCEP) has set the  following guidelines (reference values) for HDL Cholesterol:  Low...............<40 mg/dL  Optimal...........>60 mg/dL      LDL Cholesterol 06/06/2018 102.4  63.0 - 159.0 mg/dL Final    Comment: The National Cholesterol Education Program (NCEP) has set the  following guidelines (reference values) for LDL Cholesterol:  Optimal.......................<130 mg/dL  Borderline High...............130-159 mg/dL  High..........................160-189 mg/dL  Very High.....................>190 mg/dL      HDL/Chol Ratio 06/06/2018 30.0  20.0 - 50.0 % Final    Total Cholesterol/HDL Ratio 06/06/2018 3.3  2.0 - 5.0 Final    Non-HDL Cholesterol 06/06/2018 119  mg/dL Final    Comment: Risk  category and Non-HDL cholesterol goals:  Coronary heart disease (CHD)or equivalent (10-year risk of CHD >20%):  Non-HDL cholesterol goal     <130 mg/dL  Two or more CHD risk factors and 10-year risk of CHD <= 20%:  Non-HDL cholesterol goal     <160 mg/dL  0 to 1 CHD risk factor:  Non-HDL cholesterol goal     <190 mg/dL      Hemoglobin A1C 06/06/2018 5.3  4.0 - 5.6 % Final    Comment: ADA Screening Guidelines:  5.7-6.4%  Consistent with prediabetes  >or=6.5%  Consistent with diabetes  High levels of fetal hemoglobin interfere with the HbA1C  assay. Heterozygous hemoglobin variants (HbS, HgC, etc)do  not significantly interfere with this assay.   However, presence of multiple variants may affect accuracy.      Estimated Avg Glucose 06/06/2018 105  68 - 131 mg/dL Final    PSA, SCREEN 06/06/2018 1.1  0.00 - 4.00 ng/mL Final    Comment: PSA Expected levels:  Hormonal Therapy: <0.05 ng/ml  Prostatectomy: <0.01 ng/ml  Radiation Therapy: <1.00 ng/ml     Lab Visit on 05/16/2018   Component Date Value Ref Range Status    WBC 05/16/2018 6.45  3.90 - 12.70 K/uL Final    RBC 05/16/2018 4.82  4.60 - 6.20 M/uL Final    Hemoglobin 05/16/2018 13.3* 14.0 - 18.0 g/dL Final    Hematocrit 05/16/2018 41.7  40.0 - 54.0 % Final    MCV 05/16/2018 87  82 - 98 fL Final    MCH 05/16/2018 27.6  27.0 - 31.0 pg Final    MCHC 05/16/2018 31.9* 32.0 - 36.0 g/dL Final    RDW 05/16/2018 15.0* 11.5 - 14.5 % Final    Platelets 05/16/2018 306  150 - 350 K/uL Final    MPV 05/16/2018 9.7  9.2 - 12.9 fL Final    Gran # (ANC) 05/16/2018 4.5  1.8 - 7.7 K/uL Final    Comment: The ANC is based on a white cell differential from an   automated cell counter. It has not been microscopically   reviewed for the presence of abnormal cells. Clinical   correlation is required.      Immature Grans (Abs) 05/16/2018 0.02  0.00 - 0.04 K/uL Final    Comment: Mild elevation in immature granulocytes is non specific and   can be seen in a variety of conditions  including stress response,   acute inflammation, trauma and pregnancy. Correlation with other   laboratory and clinical findings is essential.      Sodium 05/16/2018 139  136 - 145 mmol/L Final    Potassium 05/16/2018 4.0  3.5 - 5.1 mmol/L Final    Chloride 05/16/2018 106  95 - 110 mmol/L Final    CO2 05/16/2018 25  23 - 29 mmol/L Final    Glucose 05/16/2018 100  70 - 110 mg/dL Final    BUN, Bld 05/16/2018 18  8 - 23 mg/dL Final    Creatinine 05/16/2018 1.2  0.5 - 1.4 mg/dL Final    Calcium 05/16/2018 9.4  8.7 - 10.5 mg/dL Final    Total Protein 05/16/2018 7.8  6.0 - 8.4 g/dL Final    Albumin 05/16/2018 3.8  3.5 - 5.2 g/dL Final    Total Bilirubin 05/16/2018 0.3  0.1 - 1.0 mg/dL Final    Comment: For infants and newborns, interpretation of results should be based  on gestational age, weight and in agreement with clinical  observations.  Premature Infant recommended reference ranges:  Up to 24 hours.............<8.0 mg/dL  Up to 48 hours............<12.0 mg/dL  3-5 days..................<15.0 mg/dL  6-29 days.................<15.0 mg/dL      Alkaline Phosphatase 05/16/2018 51* 55 - 135 U/L Final    AST 05/16/2018 17  10 - 40 U/L Final    ALT 05/16/2018 14  10 - 44 U/L Final    Anion Gap 05/16/2018 8  8 - 16 mmol/L Final    eGFR if African American 05/16/2018 >60.0  >60 mL/min/1.73 m^2 Final    eGFR if non African American 05/16/2018 >60.0  >60 mL/min/1.73 m^2 Final    Comment: Calculation used to obtain the estimated glomerular filtration  rate (eGFR) is the CKD-EPI equation.       Magnesium 05/16/2018 2.3  1.6 - 2.6 mg/dL Final    TSH 05/16/2018 2.088  0.400 - 4.000 uIU/mL Final    Free T4 05/16/2018 1.05  0.71 - 1.51 ng/dL Final     Supplemental Diagnosis 6/30/16  Immunohistochemical stains are completed on the Station 4L lymph node cell block material and reveal the tumor  cells to stain positively with cytokeratin 7 and TTF-1. The tumor cells show nonspecific blush staining with  cytokeratin  5/6 and are negative for p63. This staining profile supports a diagnosis of non-small cell carcinoma  consistent with adenocarcinoma.  Cell block material will be sent for EGFR, ALK and ROS-1 testing and results will follow in a supplemental report.  (Electronically Signed: 2016-06-30 11:50:31 )  Diagnosed by: Irene Amaro M.D.  FINAL PATHOLOGIC DIAGNOSIS  1. Lymph node, Station 4L, EBUS guided fine needle aspiration:  Positive for malignant cells, non-small cell carcinoma.  Rare background lymphocytes are present.  Immunohistochemical staining be completed to further characterize this malignancy and results will follow in a  supplemental report.  2. Lymph node, Station 7, EBUS guided fine needle aspiration:  Positive for malignant cells, non-small cell carcinoma tumor identical to that seen in specimen #1.  Background lymphocytes are present.    Imaging:   CT 05/30/2018  EXAMINATION:  CT ABDOMEN PELVIS WITH CONTRAST; CT CHEST WITH CONTRAST    CLINICAL HISTORY:  Neoplasm: abdomen, metastatic, recurrence, suspected/known;; Neoplasm: chest, lung, recurrence, suspected/known; Neoplasm of unspecified behavior of other specified sites; Neoplasm of unspecified behavior of respiratory system    TECHNIQUE:  Low dose axial images, sagittal and coronal reformations were obtained from the neck base to the pubic symphysis after the administration of 100 cc Omnipaque 350 intravenous contrast.  Oral contrast was not administered.    COMPARISON:  CT chest abdomen pelvis 11/29/2017, 03/07/2018    FINDINGS:  Base of Neck: No significant abnormality.    CHEST:    -Heart: Normal size. No pericardial effusion.  Calcific coronary artery atherosclerosis is present.    -Pulmonary vasculature: Pulmonary arteries distribute normally.  There are four pulmonary veins.    -Denisse/Mediastinum: No pathologic kervin enlargement.    -Trachea and Proximal airways: Patent.    -Lungs/Pleura: Symmetrically expanded without pneumothorax.  There is  severe bilateral emphysematous change.  There is redemonstration of a soft tissue opacity at the right hilum measuring 3.7 x 3.9 cm (previously 4.1 x 3.8 cm).  This continues to result in narrowing at the origin of the right middle lobe bronchus with some distal airway obstruction resulting in significant middle lobe volume loss, unchanged.  There is persistent bandlike, ground-glass, and patchy consolidative opacity within the right lower lobe.  These findings may correlate with pneumonitis or post radiation therapy change.  Tumor is again felt less likely given the lack of increased hypermetabolic activity on PET.  No significant pleural thickening.  There is trace right pleural fluid, unchanged.    -Esophagus: Normal course and caliber.    ABDOMEN:    - Liver: Normal in size and attenuation.  There are multiple hepatic hypodensities, the largest of which demonstrate attenuation compatible with cysts.  Smaller hypodensities are too small to characterize.  The largest lesion measures 1.6 cm within the hepatic segment 6.    - Gallbladder: No calcified gallstones.  No wall thickening or pericholecystic fluid.    - Bile Ducts: No intra or extrahepatic biliary ductal dilatation.    - Stomach/Duodenum: Unremarkable.    - Spleen: Unremarkable.    - Pancreas: Unremarkable.    - Adrenals: There is a stable 1.1 cm left adrenal nodule, unchanged since 03/07/2018.  The right adrenal gland is unremarkable.    - Kidneys/ureters/urinary bladder: Normal in size and location, with appropriate parenchymal opacification.  No hydronephrosis or stones.  There are tiny left renal hypodensities at the inferior pole, which are too small to characterize.  The ureters appear normal in course and caliber without evidence of ureteral dilatation.  The urinary bladder is unremarkable.    - Retroperitoneum: No significant adenopathy.    PELVIS:    -Prostate: Unremarkable.    - Other: No pelvic adenopathy, free fluid, or  mass.    BOWEL/MESENTERY:    No evidence of bowel obstruction or inflammatory process. There is moderate volume stool within the colon.    VASCULATURE: Left-sided aortic arch with 3 branch vessels.  No aneurysm.  There is mild to moderate calcific aortoiliac atherosclerosis.    BONES: No acute fracture. There is stable sclerotic foci within the T1 vertebral body and left iliac wing, compatible with this patient's known osseous metastatic disease.  No new lesions are seen.  There is mild degenerative change of the spine.    EXTRATHORACIC/EXTRAPERITONEAL SOFT TISSUES: Unremarkable.   Impression       In this patient with a history of lung cancer, there is stable appearance of the large right middle lobe mass, previously shown to be hypermetabolic on PET-CT.  No new pulmonary lesions are identified.    Stable osseous metastatic disease within the T1 vertebral body and left iliac wing.    Persistent patchy opacification of the right lower lobe, which may correlate with pneumonitis or postradiation therapy change.    Additional findings include coronary and aortoiliac atherosclerosis, emphysema, hepatic cysts with additional hepatic hypodensities are too small to characterize, stable left adrenal nodule, tiny left renal hypodensities which are too small to characterize, and mild degenerative change of the spine.    RECIST SUMMARY:    Date of prior examination for comparison: 03/07/2018    Lesion 1: Right middle lobe.  3.7 cm. Series 2 image 77.  Prior measurement 4.1 cm.                Assessment:       1. Primary cancer of right middle lobe of lung    2. Secondary adenocarcinoma of bone    3. Chemotherapy follow-up examination           Plan:     Mr. Torres is doing well from an oncologic standpoint and review of his scan shows overall stability of his disease.  Plan to continue on with pembrolizumab.  Follow-up in 3 weeks.    Dano Fernandez DO, FACP  Hematology & Oncology  [facility] [place]  [address]  . [phone]  Fax. [phone]    25 minutes were spent in coordination of patient's care, record review and counseling.  More than 50% of the time was face-to-face.

## 2018-06-06 NOTE — PLAN OF CARE
Problem: Patient Care Overview  Goal: Plan of Care Review  Outcome: Ongoing (interventions implemented as appropriate)  Patient tolerated keytruda well today. NAD noted upon discharge. Verbalized understanding to call MD for any questions/concerns. PIV removed, catheter tip intact. AVS given. Discharged home, ambulated independently with niece by side.

## 2018-06-14 DIAGNOSIS — Z11.59 NEED FOR HEPATITIS C SCREENING TEST: ICD-10-CM

## 2018-06-26 NOTE — PROGRESS NOTES
PATIENT: Angel Torres  MRN: [id number]  DATE: 6/27/2018      Diagnosis:   1. Primary cancer of right middle lobe of lung    2. Secondary adenocarcinoma of bone    3. Centrilobular emphysema    4. Anxiety    5. Hypoglycemia        Chief Complaint: Primary cancer of right middle lobe of lung      Oncologic History:      Oncologic History Non-small cell lung cancer diagnosed 6/2016   Recurrent disease to lung 4/2017     Oncologic Treatment Cisplatin/Pemetrexed initiated 8/15/16 with concurrent radiation completed 3 cycles 10/3/16; 64 Gy  Carboplatin/pemetrexed/pembrolizumab 7/2017 - 9/2017  Pembrolizumab maintenance 11/2017     Pathology  6/2016 : Adenocarcinoma, T2b, N2, M0, Stage IIIA          Subjective:    Interval History: Mr. Torres is a 63 y.o. male who is here for follow-up for lung cancer receiving Pembrolizumab.     He states he has generally been feeling well. He has noted a cough but improved since last visit.  He reports SOB yesterday after being out in the heat but it was short lived.   No fever/chills or other signs of infection. No nausea/vomiting. Bowels normal.  No other new complaints.      His history dates to approximately 4/2016 when he noted a worsening cough which was associated with clear sputum.  He had a chest x-ray in 5/16 showing a right middle lobe opacity.  Subsequent CT of the chest was performed on 5/30/16 showing a 5.6 cm lesion in the right middle lobe with hilar and mediastinal lymphadenopathy along with hepatic lesions and a left adrenal mass..  He underwent bronchoscopy which was nonrevealing and transbronchial biopsies were nondiagnostic.  He then underwent EBUS with biopsy of mediastinal lymph nodes with pathology showing non-small cell lung cancer consistent with adenocarcinoma.  He was started on concurrent chemotherapy and radiation with cisplatin and pemetrexed on 8/15/16.  He completed 64 Gy of concurrent radiation with cisplatin and pemetrexed with chemotherapy completed on  10/3/16.  Scans in 4/2017 had indicated progressive disease in the lung.  Subsequent PET scan in 7/2017 had shown spread of disease to bone.  He was started on carboplatin and pemetrexed and pembrolizumab in 7/2017.  Repeat imaging in 8/2017 had shown stability of disease.  He was started on pembrolizumab maintenance in 11/2017 after a delay due to his insurance.  Repeat imaging in 12/2016 had shown mild progressive disease, however, it was felt that due to the excessive delay in treatment we would proceed on rather than change therapy at this time.  CT in 3/2018 had shown stability of his disease.  CT in 05/2018 had shown stable disease.    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       Past Surgical HIstory:   Past Surgical History:   Procedure Laterality Date    INGUINAL HERNIA REPAIR Bilateral     KNEE SURGERY         Family History:   Family History   Problem Relation Age of Onset    Cancer Mother         lung, breast    Cancer Sister         lung    Cancer Maternal Grandfather     No Known Problems Father     No Known Problems Brother     No Known Problems Maternal Aunt     No Known Problems Maternal Uncle     No Known Problems Paternal Aunt     No Known Problems Paternal Uncle     No Known Problems Maternal Grandmother     No Known Problems Paternal Grandmother     No Known Problems Paternal Grandfather     Amblyopia Neg Hx     Blindness Neg Hx     Cataracts Neg Hx     Diabetes Neg Hx     Glaucoma Neg Hx     Hypertension Neg Hx     Macular degeneration Neg Hx     Retinal detachment Neg Hx     Strabismus Neg Hx     Stroke  Neg Hx     Thyroid disease Neg Hx        Social History:  reports that he quit smoking about 4 years ago. He has a 80.00 pack-year smoking history. He has quit using smokeless tobacco. He reports that he does not drink alcohol or use drugs.    Allergies:  Review of patient's allergies indicates:  No Known Allergies    Medications:  Current Outpatient Prescriptions   Medication Sig Dispense Refill    albuterol-ipratropium (DUO-NEB) 2.5 mg-0.5 mg/3 mL nebulizer solution Take 3 mLs by nebulization every 6 (six) hours as needed for Wheezing or Shortness of Breath. 100 vial 2    amLODIPine (NORVASC) 10 MG tablet Take 1 tablet (10 mg total) by mouth once daily. 90 tablet 3    dronabinol (MARINOL) 5 MG capsule Take 1 capsule (5 mg total) by mouth 2 (two) times daily before meals. 60 capsule 0    fluticasone (FLONASE) 50 mcg/actuation nasal spray SHAKE LIQUID AND USE 2 SPRAYS IN EACH NOSTRIL EVERY DAY 16 g 1    folic acid (FOLVITE) 400 MCG tablet TAKE 1 TABLET(400 MCG) BY MOUTH EVERY  tablet 0    naproxen (NAPROSYN) 500 MG tablet Take 1 tablet (500 mg total) by mouth 2 (two) times daily as needed (headache). 60 tablet 0    PROAIR HFA 90 mcg/actuation inhaler INHALE 2 PUFFS INTO THE LUNGS EVERY 6 HOURS AS NEEDED FOR WHEEZING 17 g 0    cetirizine (ZYRTEC) 10 MG tablet Take 1 tablet (10 mg total) by mouth once daily.  0     Current Facility-Administered Medications   Medication Dose Route Frequency Provider Last Rate Last Dose    cyanocobalamin injection 1,000 mcg  1,000 mcg Intramuscular 1 time in Clinic/HOD Dano Fernandez DO, FACP        cyanocobalamin injection 1,000 mcg  1,000 mcg Intramuscular 1 time in Clinic/HOD Dano Fernandez DO, FACP           Review of Systems   Constitutional: Negative for activity change, appetite change, chills, fatigue, fever and unexpected weight change.        Normal weight 220   HENT: Negative for dental problem, nosebleeds, sinus pressure, sneezing and trouble  "swallowing.    Eyes: Negative for visual disturbance.   Respiratory: Positive for cough and shortness of breath (yesterday after being in the heat. ). Negative for choking, chest tightness and wheezing.    Cardiovascular: Negative for chest pain and leg swelling.   Gastrointestinal: Negative for abdominal pain, blood in stool, constipation, diarrhea and nausea.   Genitourinary: Negative for difficulty urinating and dysuria.   Musculoskeletal: Negative for arthralgias and back pain.   Skin: Negative for rash and wound.   Neurological: Negative for dizziness, light-headedness and headaches.   Hematological: Negative for adenopathy. Does not bruise/bleed easily.   Psychiatric/Behavioral: Negative for sleep disturbance. The patient is not nervous/anxious.        ECOG Performance Status: 1   Objective:      Vitals:   Vitals:    06/27/18 1100   BP: 117/75   BP Location: Right arm   Patient Position: Sitting   BP Method: Large (Automatic)   Pulse: 109   Resp: 16   Temp: 98.3 °F (36.8 °C)   TempSrc: Oral   SpO2: 97%   Weight: 84.3 kg (185 lb 13.6 oz)   Height: 6' 5" (1.956 m)     BMI: Body mass index is 22.04 kg/m².    Physical Exam   Constitutional: He is oriented to person, place, and time. He appears well-developed and well-nourished.   HENT:   Head: Normocephalic and atraumatic.   Eyes: Pupils are equal, round, and reactive to light.   Neck: Normal range of motion. Neck supple.   Cardiovascular: Normal rate and regular rhythm.    Pulmonary/Chest: Effort normal. No respiratory distress. He has no rales.   Abdominal: Soft. He exhibits no distension. There is no tenderness.   Musculoskeletal: He exhibits no edema or tenderness.   Lymphadenopathy:     He has no cervical adenopathy.   Neurological: He is alert and oriented to person, place, and time. No cranial nerve deficit.   Skin: Skin is warm and dry. No rash noted.   Psychiatric: He has a normal mood and affect. His behavior is normal.       Laboratory Data:    WBC "   Date Value Ref Range Status   06/27/2018 14.74 (H) 3.90 - 12.70 K/uL Final     Hemoglobin   Date Value Ref Range Status   06/27/2018 13.6 (L) 14.0 - 18.0 g/dL Final     Hematocrit   Date Value Ref Range Status   06/27/2018 41.9 40.0 - 54.0 % Final     Platelets   Date Value Ref Range Status   06/27/2018 346 150 - 350 K/uL Final     Gran # (ANC)   Date Value Ref Range Status   06/27/2018 12.4 (H) 1.8 - 7.7 K/uL Final     Comment:     The ANC is based on a white cell differential from an   automated cell counter. It has not been microscopically   reviewed for the presence of abnormal cells. Clinical   correlation is required.         Chemistry        Component Value Date/Time     06/27/2018 0932    K 4.6 06/27/2018 0932     06/27/2018 0932    CO2 25 06/27/2018 0932    BUN 19 06/27/2018 0932    CREATININE 1.3 06/27/2018 0932    GLU 56 (L) 06/27/2018 0932        Component Value Date/Time    CALCIUM 10.2 06/27/2018 0932    ALKPHOS 51 (L) 06/27/2018 0932    AST 15 06/27/2018 0932    ALT 21 06/27/2018 0932    BILITOT 0.6 06/27/2018 0932    ESTGFRAFRICA >60.0 06/27/2018 0932    EGFRNONAA 58.5 (A) 06/27/2018 0932        TSH   Date Value Ref Range Status   06/27/2018 0.940 0.400 - 4.000 uIU/mL Final     Free T4   Date Value Ref Range Status   06/06/2018 0.98 0.71 - 1.51 ng/dL Final         Supplemental Diagnosis 6/30/16  Immunohistochemical stains are completed on the Station 4L lymph node cell block material and reveal the tumor  cells to stain positively with cytokeratin 7 and TTF-1. The tumor cells show nonspecific blush staining with  cytokeratin 5/6 and are negative for p63. This staining profile supports a diagnosis of non-small cell carcinoma  consistent with adenocarcinoma.  Cell block material will be sent for EGFR, ALK and ROS-1 testing and results will follow in a supplemental report.  (Electronically Signed: 2016-06-30 11:50:31 )  Diagnosed by: Irene Amaro M.D.  FINAL PATHOLOGIC DIAGNOSIS  1.  Lymph node, Station 4L, EBUS guided fine needle aspiration:  Positive for malignant cells, non-small cell carcinoma.  Rare background lymphocytes are present.  Immunohistochemical staining be completed to further characterize this malignancy and results will follow in a  supplemental report.  2. Lymph node, Station 7, EBUS guided fine needle aspiration:  Positive for malignant cells, non-small cell carcinoma tumor identical to that seen in specimen #1.  Background lymphocytes are present.    Imaging:   CT 05/30/2018  EXAMINATION:  CT ABDOMEN PELVIS WITH CONTRAST; CT CHEST WITH CONTRAST    CLINICAL HISTORY:  Neoplasm: abdomen, metastatic, recurrence, suspected/known;; Neoplasm: chest, lung, recurrence, suspected/known; Neoplasm of unspecified behavior of other specified sites; Neoplasm of unspecified behavior of respiratory system    TECHNIQUE:  Low dose axial images, sagittal and coronal reformations were obtained from the neck base to the pubic symphysis after the administration of 100 cc Omnipaque 350 intravenous contrast.  Oral contrast was not administered.    COMPARISON:  CT chest abdomen pelvis 11/29/2017, 03/07/2018    FINDINGS:  Base of Neck: No significant abnormality.    CHEST:    -Heart: Normal size. No pericardial effusion.  Calcific coronary artery atherosclerosis is present.    -Pulmonary vasculature: Pulmonary arteries distribute normally.  There are four pulmonary veins.    -Denisse/Mediastinum: No pathologic kervin enlargement.    -Trachea and Proximal airways: Patent.    -Lungs/Pleura: Symmetrically expanded without pneumothorax.  There is severe bilateral emphysematous change.  There is redemonstration of a soft tissue opacity at the right hilum measuring 3.7 x 3.9 cm (previously 4.1 x 3.8 cm).  This continues to result in narrowing at the origin of the right middle lobe bronchus with some distal airway obstruction resulting in significant middle lobe volume loss, unchanged.  There is persistent  bandlike, ground-glass, and patchy consolidative opacity within the right lower lobe.  These findings may correlate with pneumonitis or post radiation therapy change.  Tumor is again felt less likely given the lack of increased hypermetabolic activity on PET.  No significant pleural thickening.  There is trace right pleural fluid, unchanged.    -Esophagus: Normal course and caliber.    ABDOMEN:    - Liver: Normal in size and attenuation.  There are multiple hepatic hypodensities, the largest of which demonstrate attenuation compatible with cysts.  Smaller hypodensities are too small to characterize.  The largest lesion measures 1.6 cm within the hepatic segment 6.    - Gallbladder: No calcified gallstones.  No wall thickening or pericholecystic fluid.    - Bile Ducts: No intra or extrahepatic biliary ductal dilatation.    - Stomach/Duodenum: Unremarkable.    - Spleen: Unremarkable.    - Pancreas: Unremarkable.    - Adrenals: There is a stable 1.1 cm left adrenal nodule, unchanged since 03/07/2018.  The right adrenal gland is unremarkable.    - Kidneys/ureters/urinary bladder: Normal in size and location, with appropriate parenchymal opacification.  No hydronephrosis or stones.  There are tiny left renal hypodensities at the inferior pole, which are too small to characterize.  The ureters appear normal in course and caliber without evidence of ureteral dilatation.  The urinary bladder is unremarkable.    - Retroperitoneum: No significant adenopathy.    PELVIS:    -Prostate: Unremarkable.    - Other: No pelvic adenopathy, free fluid, or mass.    BOWEL/MESENTERY:    No evidence of bowel obstruction or inflammatory process. There is moderate volume stool within the colon.    VASCULATURE: Left-sided aortic arch with 3 branch vessels.  No aneurysm.  There is mild to moderate calcific aortoiliac atherosclerosis.    BONES: No acute fracture. There is stable sclerotic foci within the T1 vertebral body and left iliac wing,  compatible with this patient's known osseous metastatic disease.  No new lesions are seen.  There is mild degenerative change of the spine.    EXTRATHORACIC/EXTRAPERITONEAL SOFT TISSUES: Unremarkable.   Impression       In this patient with a history of lung cancer, there is stable appearance of the large right middle lobe mass, previously shown to be hypermetabolic on PET-CT.  No new pulmonary lesions are identified.    Stable osseous metastatic disease within the T1 vertebral body and left iliac wing.    Persistent patchy opacification of the right lower lobe, which may correlate with pneumonitis or postradiation therapy change.    Additional findings include coronary and aortoiliac atherosclerosis, emphysema, hepatic cysts with additional hepatic hypodensities are too small to characterize, stable left adrenal nodule, tiny left renal hypodensities which are too small to characterize, and mild degenerative change of the spine.    RECIST SUMMARY:    Date of prior examination for comparison: 03/07/2018    Lesion 1: Right middle lobe.  3.7 cm. Series 2 image 77.  Prior measurement 4.1 cm.                Assessment:       1. Primary cancer of right middle lobe of lung    2. Secondary adenocarcinoma of bone    3. Centrilobular emphysema    4. Anxiety    5. Hypoglycemia           Plan:     Patient is clinically stable. Labs reviewed.  Proceed with Pembrolizumab today as scheduled. RTC in 3 weeks to see Dr. Fernandez with a CBC, CMP, TSH and for Pembrolizumab.   He is to report any new symptoms in the interim. No anxiety noted today. No recent COPD exacerbations noted.   No s/s hypoglycemia. Encouraged to eat small frequent meals to prevent hypoglycemia. Snack given to patient.          Patient is in agreement with the proposed treatment plan. All questions were answered to the patient's satisfaction. Pt knows to call clinic if anything is needed before the next clinic visit.      ALEKSANDRA Díaz-ALFREDITO  Hematology &  Oncology  [address] [place] [address]  ph. [phone]  Fax. [phone]     30 minutes were spent in coordination of patient's care, record review and counseling. More than 50% of the time was face-to-face.      Distress Screening Results: Psychosocial Distress screening score of Distress Score: 2 noted and reviewed. No intervention indicated.

## 2018-06-27 ENCOUNTER — INFUSION (OUTPATIENT)
Dept: INFUSION THERAPY | Facility: HOSPITAL | Age: 63
End: 2018-06-27
Attending: INTERNAL MEDICINE
Payer: COMMERCIAL

## 2018-06-27 ENCOUNTER — OFFICE VISIT (OUTPATIENT)
Dept: HEMATOLOGY/ONCOLOGY | Facility: CLINIC | Age: 63
End: 2018-06-27
Payer: COMMERCIAL

## 2018-06-27 ENCOUNTER — LAB VISIT (OUTPATIENT)
Dept: LAB | Facility: HOSPITAL | Age: 63
End: 2018-06-27
Payer: COMMERCIAL

## 2018-06-27 VITALS
TEMPERATURE: 98 F | DIASTOLIC BLOOD PRESSURE: 75 MMHG | SYSTOLIC BLOOD PRESSURE: 117 MMHG | RESPIRATION RATE: 16 BRPM | TEMPERATURE: 98 F | DIASTOLIC BLOOD PRESSURE: 79 MMHG | OXYGEN SATURATION: 97 % | WEIGHT: 185.88 LBS | HEART RATE: 108 BPM | HEIGHT: 77 IN | BODY MASS INDEX: 21.95 KG/M2 | SYSTOLIC BLOOD PRESSURE: 112 MMHG | HEART RATE: 109 BPM | RESPIRATION RATE: 16 BRPM

## 2018-06-27 DIAGNOSIS — J43.2 CENTRILOBULAR EMPHYSEMA: ICD-10-CM

## 2018-06-27 DIAGNOSIS — R59.0 MEDIASTINAL ADENOPATHY: ICD-10-CM

## 2018-06-27 DIAGNOSIS — E16.2 HYPOGLYCEMIA: ICD-10-CM

## 2018-06-27 DIAGNOSIS — C34.2 PRIMARY CANCER OF RIGHT MIDDLE LOBE OF LUNG: Primary | ICD-10-CM

## 2018-06-27 DIAGNOSIS — C34.2 PRIMARY CANCER OF RIGHT MIDDLE LOBE OF LUNG: ICD-10-CM

## 2018-06-27 DIAGNOSIS — F41.9 ANXIETY: ICD-10-CM

## 2018-06-27 DIAGNOSIS — C79.51 SECONDARY ADENOCARCINOMA OF BONE: ICD-10-CM

## 2018-06-27 LAB
ALBUMIN SERPL BCP-MCNC: 3.6 G/DL
ALP SERPL-CCNC: 51 U/L
ALT SERPL W/O P-5'-P-CCNC: 21 U/L
ANION GAP SERPL CALC-SCNC: 9 MMOL/L
AST SERPL-CCNC: 15 U/L
BILIRUB SERPL-MCNC: 0.6 MG/DL
BUN SERPL-MCNC: 19 MG/DL
CALCIUM SERPL-MCNC: 10.2 MG/DL
CHLORIDE SERPL-SCNC: 105 MMOL/L
CO2 SERPL-SCNC: 25 MMOL/L
CREAT SERPL-MCNC: 1.3 MG/DL
ERYTHROCYTE [DISTWIDTH] IN BLOOD BY AUTOMATED COUNT: 15 %
EST. GFR  (AFRICAN AMERICAN): >60 ML/MIN/1.73 M^2
EST. GFR  (NON AFRICAN AMERICAN): 58.5 ML/MIN/1.73 M^2
GLUCOSE SERPL-MCNC: 56 MG/DL
HCT VFR BLD AUTO: 41.9 %
HGB BLD-MCNC: 13.6 G/DL
IMM GRANULOCYTES # BLD AUTO: 0.06 K/UL
MAGNESIUM SERPL-MCNC: 2 MG/DL
MCH RBC QN AUTO: 28.1 PG
MCHC RBC AUTO-ENTMCNC: 32.5 G/DL
MCV RBC AUTO: 87 FL
NEUTROPHILS # BLD AUTO: 12.4 K/UL
PLATELET # BLD AUTO: 346 K/UL
PMV BLD AUTO: 9.6 FL
POTASSIUM SERPL-SCNC: 4.6 MMOL/L
PROT SERPL-MCNC: 7.9 G/DL
RBC # BLD AUTO: 4.84 M/UL
SODIUM SERPL-SCNC: 139 MMOL/L
TSH SERPL DL<=0.005 MIU/L-ACNC: 0.94 UIU/ML
WBC # BLD AUTO: 14.74 K/UL

## 2018-06-27 PROCEDURE — 3078F DIAST BP <80 MM HG: CPT | Mod: CPTII,S$GLB,, | Performed by: NURSE PRACTITIONER

## 2018-06-27 PROCEDURE — 99214 OFFICE O/P EST MOD 30 MIN: CPT | Mod: S$GLB,,, | Performed by: NURSE PRACTITIONER

## 2018-06-27 PROCEDURE — 84443 ASSAY THYROID STIM HORMONE: CPT

## 2018-06-27 PROCEDURE — 80053 COMPREHEN METABOLIC PANEL: CPT

## 2018-06-27 PROCEDURE — 3008F BODY MASS INDEX DOCD: CPT | Mod: CPTII,S$GLB,, | Performed by: NURSE PRACTITIONER

## 2018-06-27 PROCEDURE — 63600175 PHARM REV CODE 636 W HCPCS: Mod: JG | Performed by: INTERNAL MEDICINE

## 2018-06-27 PROCEDURE — 3074F SYST BP LT 130 MM HG: CPT | Mod: CPTII,S$GLB,, | Performed by: NURSE PRACTITIONER

## 2018-06-27 PROCEDURE — 36415 COLL VENOUS BLD VENIPUNCTURE: CPT

## 2018-06-27 PROCEDURE — 85027 COMPLETE CBC AUTOMATED: CPT

## 2018-06-27 PROCEDURE — 96413 CHEMO IV INFUSION 1 HR: CPT

## 2018-06-27 PROCEDURE — 99999 PR PBB SHADOW E&M-EST. PATIENT-LVL IV: CPT | Mod: PBBFAC,,, | Performed by: NURSE PRACTITIONER

## 2018-06-27 PROCEDURE — 25000003 PHARM REV CODE 250: Performed by: INTERNAL MEDICINE

## 2018-06-27 PROCEDURE — 83735 ASSAY OF MAGNESIUM: CPT

## 2018-06-27 RX ORDER — SODIUM CHLORIDE 0.9 % (FLUSH) 0.9 %
10 SYRINGE (ML) INJECTION
Status: CANCELLED | OUTPATIENT
Start: 2018-06-27

## 2018-06-27 RX ORDER — HEPARIN 100 UNIT/ML
500 SYRINGE INTRAVENOUS
Status: CANCELLED | OUTPATIENT
Start: 2018-06-27

## 2018-06-27 RX ADMIN — SODIUM CHLORIDE 200 MG: 9 INJECTION, SOLUTION INTRAVENOUS at 12:06

## 2018-06-27 NOTE — PLAN OF CARE
Problem: Patient Care Overview  Goal: Plan of Care Review  Outcome: Ongoing (interventions implemented as appropriate)  Patient tolerated infusion well.  No reactions noted.  AVS given.  No questions or concerns at this time.  Ambulated off unit unassisted.

## 2018-06-27 NOTE — PLAN OF CARE
Problem: Chemotherapy Effects (Adult)  Goal: Signs and Symptoms of Listed Potential Problems Will be Absent, Minimized or Managed (Chemotherapy Effects)  Signs and symptoms of listed potential problems will be absent, minimized or managed by discharge/transition of care (reference Chemotherapy Effects (Adult) CPG).   Outcome: Ongoing (interventions implemented as appropriate)  Patient here for caden.  Assessment completed and labs reviewed.  VSS.  No needs at this time.  [place] offered.  Will continue to monitor.

## 2018-07-18 ENCOUNTER — LAB VISIT (OUTPATIENT)
Dept: LAB | Facility: HOSPITAL | Age: 63
End: 2018-07-18
Payer: COMMERCIAL

## 2018-07-18 ENCOUNTER — OFFICE VISIT (OUTPATIENT)
Dept: HEMATOLOGY/ONCOLOGY | Facility: CLINIC | Age: 63
End: 2018-07-18
Payer: COMMERCIAL

## 2018-07-18 ENCOUNTER — INFUSION (OUTPATIENT)
Dept: INFUSION THERAPY | Facility: HOSPITAL | Age: 63
End: 2018-07-18
Attending: INTERNAL MEDICINE
Payer: COMMERCIAL

## 2018-07-18 VITALS
DIASTOLIC BLOOD PRESSURE: 77 MMHG | HEIGHT: 77 IN | HEART RATE: 102 BPM | BODY MASS INDEX: 21.73 KG/M2 | DIASTOLIC BLOOD PRESSURE: 75 MMHG | BODY MASS INDEX: 21.73 KG/M2 | OXYGEN SATURATION: 95 % | TEMPERATURE: 98 F | RESPIRATION RATE: 16 BRPM | HEART RATE: 101 BPM | RESPIRATION RATE: 18 BRPM | TEMPERATURE: 98 F | WEIGHT: 184.06 LBS | HEIGHT: 77 IN | SYSTOLIC BLOOD PRESSURE: 126 MMHG | SYSTOLIC BLOOD PRESSURE: 117 MMHG | WEIGHT: 184.06 LBS

## 2018-07-18 DIAGNOSIS — Z09 CHEMOTHERAPY FOLLOW-UP EXAMINATION: ICD-10-CM

## 2018-07-18 DIAGNOSIS — R59.0 MEDIASTINAL ADENOPATHY: ICD-10-CM

## 2018-07-18 DIAGNOSIS — C79.51 SECONDARY ADENOCARCINOMA OF BONE: ICD-10-CM

## 2018-07-18 DIAGNOSIS — C34.2 PRIMARY CANCER OF RIGHT MIDDLE LOBE OF LUNG: Primary | ICD-10-CM

## 2018-07-18 DIAGNOSIS — C34.2 PRIMARY CANCER OF RIGHT MIDDLE LOBE OF LUNG: ICD-10-CM

## 2018-07-18 LAB
ALBUMIN SERPL BCP-MCNC: 3.7 G/DL
ALP SERPL-CCNC: 55 U/L
ALT SERPL W/O P-5'-P-CCNC: 19 U/L
ANION GAP SERPL CALC-SCNC: 7 MMOL/L
AST SERPL-CCNC: 16 U/L
BILIRUB SERPL-MCNC: 0.4 MG/DL
BUN SERPL-MCNC: 20 MG/DL
CALCIUM SERPL-MCNC: 10 MG/DL
CHLORIDE SERPL-SCNC: 106 MMOL/L
CO2 SERPL-SCNC: 27 MMOL/L
CREAT SERPL-MCNC: 1.3 MG/DL
ERYTHROCYTE [DISTWIDTH] IN BLOOD BY AUTOMATED COUNT: 14.7 %
EST. GFR  (AFRICAN AMERICAN): >60 ML/MIN/1.73 M^2
EST. GFR  (NON AFRICAN AMERICAN): 58.5 ML/MIN/1.73 M^2
GLUCOSE SERPL-MCNC: 94 MG/DL
HCT VFR BLD AUTO: 42.3 %
HGB BLD-MCNC: 13.5 G/DL
IMM GRANULOCYTES # BLD AUTO: 0.02 K/UL
MCH RBC QN AUTO: 27.3 PG
MCHC RBC AUTO-ENTMCNC: 31.9 G/DL
MCV RBC AUTO: 86 FL
NEUTROPHILS # BLD AUTO: 5.4 K/UL
PLATELET # BLD AUTO: 407 K/UL
PMV BLD AUTO: 9.4 FL
POTASSIUM SERPL-SCNC: 4.6 MMOL/L
PROT SERPL-MCNC: 8 G/DL
RBC # BLD AUTO: 4.95 M/UL
SODIUM SERPL-SCNC: 140 MMOL/L
TSH SERPL DL<=0.005 MIU/L-ACNC: 1.58 UIU/ML
WBC # BLD AUTO: 7.84 K/UL

## 2018-07-18 PROCEDURE — 3008F BODY MASS INDEX DOCD: CPT | Mod: CPTII,S$GLB,, | Performed by: INTERNAL MEDICINE

## 2018-07-18 PROCEDURE — 3074F SYST BP LT 130 MM HG: CPT | Mod: CPTII,S$GLB,, | Performed by: INTERNAL MEDICINE

## 2018-07-18 PROCEDURE — 85027 COMPLETE CBC AUTOMATED: CPT

## 2018-07-18 PROCEDURE — 99214 OFFICE O/P EST MOD 30 MIN: CPT | Mod: S$GLB,,, | Performed by: INTERNAL MEDICINE

## 2018-07-18 PROCEDURE — 84443 ASSAY THYROID STIM HORMONE: CPT

## 2018-07-18 PROCEDURE — 96413 CHEMO IV INFUSION 1 HR: CPT

## 2018-07-18 PROCEDURE — 99999 PR PBB SHADOW E&M-EST. PATIENT-LVL IV: CPT | Mod: PBBFAC,,, | Performed by: INTERNAL MEDICINE

## 2018-07-18 PROCEDURE — 96367 TX/PROPH/DG ADDL SEQ IV INF: CPT

## 2018-07-18 PROCEDURE — 80053 COMPREHEN METABOLIC PANEL: CPT

## 2018-07-18 PROCEDURE — 3078F DIAST BP <80 MM HG: CPT | Mod: CPTII,S$GLB,, | Performed by: INTERNAL MEDICINE

## 2018-07-18 PROCEDURE — 25000003 PHARM REV CODE 250: Performed by: INTERNAL MEDICINE

## 2018-07-18 PROCEDURE — 36415 COLL VENOUS BLD VENIPUNCTURE: CPT

## 2018-07-18 PROCEDURE — 63600175 PHARM REV CODE 636 W HCPCS: Mod: JG | Performed by: INTERNAL MEDICINE

## 2018-07-18 RX ORDER — SODIUM CHLORIDE 0.9 % (FLUSH) 0.9 %
10 SYRINGE (ML) INJECTION
Status: CANCELLED | OUTPATIENT
Start: 2018-07-18

## 2018-07-18 RX ORDER — HEPARIN 100 UNIT/ML
500 SYRINGE INTRAVENOUS
Status: CANCELLED | OUTPATIENT
Start: 2018-07-18

## 2018-07-18 RX ORDER — SODIUM CHLORIDE 0.9 % (FLUSH) 0.9 %
10 SYRINGE (ML) INJECTION
Status: DISCONTINUED | OUTPATIENT
Start: 2018-07-18 | End: 2018-07-18 | Stop reason: HOSPADM

## 2018-07-18 RX ORDER — HEPARIN 100 UNIT/ML
500 SYRINGE INTRAVENOUS
Status: DISCONTINUED | OUTPATIENT
Start: 2018-07-18 | End: 2018-07-18 | Stop reason: HOSPADM

## 2018-07-18 RX ADMIN — SODIUM CHLORIDE: 0.9 INJECTION, SOLUTION INTRAVENOUS at 12:07

## 2018-07-18 RX ADMIN — SODIUM CHLORIDE 4 MG: 9 INJECTION, SOLUTION INTRAVENOUS at 12:07

## 2018-07-18 RX ADMIN — SODIUM CHLORIDE 200 MG: 9 INJECTION, SOLUTION INTRAVENOUS at 12:07

## 2018-07-18 NOTE — PLAN OF CARE
Problem: Chemotherapy Effects (Adult)  Goal: Signs and Symptoms of Listed Potential Problems Will be Absent, Minimized or Managed (Chemotherapy Effects)  Signs and symptoms of listed potential problems will be absent, minimized or managed by discharge/transition of care (reference Chemotherapy Effects (Adult) CPG).   Outcome: Ongoing (interventions implemented as appropriate)  Pt here for Keytruda, Zometa infusions, accompanied by spouse, no new complaints or concerns at present, tolerating treatment; discussed Zometa infusion today and every 12 weeks, discussed treatment plan for today, all questions answered and pt agreed to proceed

## 2018-07-18 NOTE — PROGRESS NOTES
0900  Per ABRAHAN Vegas RN (p/ MD Fernandez) pt to get Zometa infusion every 12 weeks; pt will receive Zometa today

## 2018-07-18 NOTE — PLAN OF CARE
Problem: Patient Care Overview  Goal: Plan of Care Review  Outcome: Ongoing (interventions implemented as appropriate)  Infusion completed, pt tolerated well; pt instructed to remain well hydrated, to increase hydration x next 3 days r/t Zometa infusion, to contact MD for any needs or concerns; discussed Zometa infusion every 12 weeks; printed AVS given to and reviewed with spouse, spouse states she will check [facility] for scheduled appts, next infusion 8/8/18; pt and spouse verbalized understanding of all discussed

## 2018-07-18 NOTE — PROGRESS NOTES
PATIENT: Angel Torres  MRN: [id number]  DATE: 7/18/2018      Diagnosis:   1. Primary cancer of right middle lobe of lung    2. Secondary adenocarcinoma of bone    3. Chemotherapy follow-up examination        Chief Complaint: Primary cancer of right middle lobe of lung      Oncologic History:      Oncologic History Non-small cell lung cancer diagnosed 6/2016   Recurrent disease to lung 4/2017     Oncologic Treatment Cisplatin/Pemetrexed initiated 8/15/16 with concurrent radiation completed 3 cycles 10/3/16; 64 Gy  Carboplatin/pemetrexed/pembrolizumab 7/2017 - 9/2017  Pembrolizumab maintenance 11/2017     Pathology  6/2016 : Adenocarcinoma, T2b, N2, M0, Stage IIIA          Subjective:    Interval History: Mr. Torres is a 63 y.o. male who was seen in follow-up for lung cancer.  He states he has generally been feeling well. Breathing is unchanged.  He has no other new complaints.    His history dates to approximately 4/2016 when he noted a worsening cough which was associated with clear sputum.  He had a chest x-ray in 5/16 showing a right middle lobe opacity.  Subsequent CT of the chest was performed on 5/30/16 showing a 5.6 cm lesion in the right middle lobe with hilar and mediastinal lymphadenopathy along with hepatic lesions and a left adrenal mass..  He underwent bronchoscopy which was nonrevealing and transbronchial biopsies were nondiagnostic.  He then underwent EBUS with biopsy of mediastinal lymph nodes with pathology showing non-small cell lung cancer consistent with adenocarcinoma.  He was started on concurrent chemotherapy and radiation with cisplatin and pemetrexed on 8/15/16.  He completed 64 Gy of concurrent radiation with cisplatin and pemetrexed with chemotherapy completed on 10/3/16.  Scans in 4/2017 had indicated progressive disease in the lung.  Subsequent PET scan in 7/2017 had shown spread of disease to bone.  He was started on carboplatin and pemetrexed and pembrolizumab in 7/2017.  Repeat imaging in  8/2017 had shown stability of disease.  He was started on pembrolizumab maintenance in 11/2017 after a delay due to his insurance.  Repeat imaging in 12/2016 had shown mild progressive disease, however, it was felt that due to the excessive delay in treatment we would proceed on rather than change therapy at this time.  CT in 3/2018 had shown stability of his disease.  CT in 05/2018 had shown stable disease.    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       Past Surgical HIstory:   Past Surgical History:   Procedure Laterality Date    INGUINAL HERNIA REPAIR Bilateral     KNEE SURGERY         Family History:   Family History   Problem Relation Age of Onset    Cancer Mother         lung, breast    Cancer Sister         lung    Cancer Maternal Grandfather     No Known Problems Father     No Known Problems Brother     No Known Problems Maternal Aunt     No Known Problems Maternal Uncle     No Known Problems Paternal Aunt     No Known Problems Paternal Uncle     No Known Problems Maternal Grandmother     No Known Problems Paternal Grandmother     No Known Problems Paternal Grandfather     Amblyopia Neg Hx     Blindness Neg Hx     Cataracts Neg Hx     Diabetes Neg Hx     Glaucoma Neg Hx     Hypertension Neg Hx     Macular degeneration Neg Hx     Retinal detachment Neg Hx     Strabismus Neg Hx     Stroke Neg Hx     Thyroid disease Neg Hx        Social History:  reports that he quit smoking about 4 years ago. He has a 80.00 pack-year smoking history. He has quit using smokeless tobacco. He reports that he does not drink alcohol or use  drugs.    Allergies:  Review of patient's allergies indicates:  No Known Allergies    Medications:  Current Outpatient Prescriptions   Medication Sig Dispense Refill    albuterol-ipratropium (DUO-NEB) 2.5 mg-0.5 mg/3 mL nebulizer solution Take 3 mLs by nebulization every 6 (six) hours as needed for Wheezing or Shortness of Breath. 100 vial 2    amLODIPine (NORVASC) 10 MG tablet Take 1 tablet (10 mg total) by mouth once daily. 90 tablet 3    dronabinol (MARINOL) 5 MG capsule Take 1 capsule (5 mg total) by mouth 2 (two) times daily before meals. 60 capsule 0    fluticasone (FLONASE) 50 mcg/actuation nasal spray SHAKE LIQUID AND USE 2 SPRAYS IN EACH NOSTRIL EVERY DAY 16 g 1    folic acid (FOLVITE) 400 MCG tablet TAKE 1 TABLET(400 MCG) BY MOUTH EVERY  tablet 0    naproxen (NAPROSYN) 500 MG tablet Take 1 tablet (500 mg total) by mouth 2 (two) times daily as needed (headache). 60 tablet 0    PROAIR HFA 90 mcg/actuation inhaler INHALE 2 PUFFS INTO THE LUNGS EVERY 6 HOURS AS NEEDED FOR WHEEZING 17 g 0    cetirizine (ZYRTEC) 10 MG tablet Take 1 tablet (10 mg total) by mouth once daily.  0     Current Facility-Administered Medications   Medication Dose Route Frequency Provider Last Rate Last Dose    cyanocobalamin injection 1,000 mcg  1,000 mcg Intramuscular 1 time in Clinic/HOD Dano Fernandez DO, FACP        cyanocobalamin injection 1,000 mcg  1,000 mcg Intramuscular 1 time in Clinic/HOD Dano Fernandez DO, FACP           Review of Systems   Constitutional: Negative for activity change, appetite change, chills, fatigue, fever and unexpected weight change.        Normal weight 220   HENT: Negative for dental problem, nosebleeds, sinus pressure, sneezing and trouble swallowing.    Eyes: Negative for visual disturbance.   Respiratory: Positive for cough. Negative for choking, chest tightness, shortness of breath and wheezing.    Cardiovascular: Negative for chest pain and leg swelling.  "  Gastrointestinal: Negative for abdominal pain, blood in stool, constipation, diarrhea and nausea.   Genitourinary: Negative for difficulty urinating and dysuria.   Musculoskeletal: Positive for myalgias. Negative for arthralgias and back pain.   Skin: Negative for rash and wound.   Neurological: Negative for dizziness, light-headedness and headaches.   Hematological: Negative for adenopathy. Does not bruise/bleed easily.   Psychiatric/Behavioral: Negative for sleep disturbance. The patient is not nervous/anxious.        ECOG Performance Status: 1   Objective:      Vitals:   Vitals:    07/18/18 1105   BP: 117/77   BP Location: Right arm   Patient Position: Sitting   BP Method: Large (Automatic)   Pulse: 101   Resp: 16   Temp: 97.7 °F (36.5 °C)   TempSrc: Oral   SpO2: 95%   Weight: 83.5 kg (184 lb 1.4 oz)   Height: 6' 5" (1.956 m)     BMI: Body mass index is 21.83 kg/m².    Physical Exam   Constitutional: He is oriented to person, place, and time. He appears well-developed and well-nourished.   HENT:   Head: Normocephalic and atraumatic.   Eyes: Pupils are equal, round, and reactive to light.   Neck: Normal range of motion. Neck supple.   Cardiovascular: Normal rate and regular rhythm.    Pulmonary/Chest: Effort normal. No respiratory distress. He has no rales.   Abdominal: Soft. He exhibits no distension. There is no tenderness.   Musculoskeletal: He exhibits no edema or tenderness.   Lymphadenopathy:     He has no cervical adenopathy.   Neurological: He is alert and oriented to person, place, and time. No cranial nerve deficit.   Skin: Skin is warm and dry. Rash noted.   Psychiatric: He has a normal mood and affect. His behavior is normal.       Laboratory Data:  Lab Visit on 07/18/2018   Component Date Value Ref Range Status    WBC 07/18/2018 7.84  3.90 - 12.70 K/uL Final    RBC 07/18/2018 4.95  4.60 - 6.20 M/uL Final    Hemoglobin 07/18/2018 13.5* 14.0 - 18.0 g/dL Final    Hematocrit 07/18/2018 42.3  40.0 - " 54.0 % Final    MCV 07/18/2018 86  82 - 98 fL Final    MCH 07/18/2018 27.3  27.0 - 31.0 pg Final    MCHC 07/18/2018 31.9* 32.0 - 36.0 g/dL Final    RDW 07/18/2018 14.7* 11.5 - 14.5 % Final    Platelets 07/18/2018 407* 150 - 350 K/uL Final    MPV 07/18/2018 9.4  9.2 - 12.9 fL Final    Gran # (ANC) 07/18/2018 5.4  1.8 - 7.7 K/uL Final    Comment: The ANC is based on a white cell differential from an   automated cell counter. It has not been microscopically   reviewed for the presence of abnormal cells. Clinical   correlation is required.      Immature Grans (Abs) 07/18/2018 0.02  0.00 - 0.04 K/uL Final    Comment: Mild elevation in immature granulocytes is non specific and   can be seen in a variety of conditions including stress response,   acute inflammation, trauma and pregnancy. Correlation with other   laboratory and clinical findings is essential.      Sodium 07/18/2018 140  136 - 145 mmol/L Final    Potassium 07/18/2018 4.6  3.5 - 5.1 mmol/L Final    Chloride 07/18/2018 106  95 - 110 mmol/L Final    CO2 07/18/2018 27  23 - 29 mmol/L Final    Glucose 07/18/2018 94  70 - 110 mg/dL Final    BUN, Bld 07/18/2018 20  8 - 23 mg/dL Final    Creatinine 07/18/2018 1.3  0.5 - 1.4 mg/dL Final    Calcium 07/18/2018 10.0  8.7 - 10.5 mg/dL Final    Total Protein 07/18/2018 8.0  6.0 - 8.4 g/dL Final    Albumin 07/18/2018 3.7  3.5 - 5.2 g/dL Final    Total Bilirubin 07/18/2018 0.4  0.1 - 1.0 mg/dL Final    Comment: For infants and newborns, interpretation of results should be based  on gestational age, weight and in agreement with clinical  observations.  Premature Infant recommended reference ranges:  Up to 24 hours.............<8.0 mg/dL  Up to 48 hours............<12.0 mg/dL  3-5 days..................<15.0 mg/dL  6-29 days.................<15.0 mg/dL      Alkaline Phosphatase 07/18/2018 55  55 - 135 U/L Final    AST 07/18/2018 16  10 - 40 U/L Final    ALT 07/18/2018 19  10 - 44 U/L Final    Anion Gap  07/18/2018 7* 8 - 16 mmol/L Final    eGFR if African American 07/18/2018 >60.0  >60 mL/min/1.73 m^2 Final    eGFR if non African American 07/18/2018 58.5* >60 mL/min/1.73 m^2 Final    Comment: Calculation used to obtain the estimated glomerular filtration  rate (eGFR) is the CKD-EPI equation.       TSH 07/18/2018 1.582  0.400 - 4.000 uIU/mL Final   Lab Visit on 06/27/2018   Component Date Value Ref Range Status    Magnesium 06/27/2018 2.0  1.6 - 2.6 mg/dL Final    WBC 06/27/2018 14.74* 3.90 - 12.70 K/uL Final    RBC 06/27/2018 4.84  4.60 - 6.20 M/uL Final    Hemoglobin 06/27/2018 13.6* 14.0 - 18.0 g/dL Final    Hematocrit 06/27/2018 41.9  40.0 - 54.0 % Final    MCV 06/27/2018 87  82 - 98 fL Final    MCH 06/27/2018 28.1  27.0 - 31.0 pg Final    MCHC 06/27/2018 32.5  32.0 - 36.0 g/dL Final    RDW 06/27/2018 15.0* 11.5 - 14.5 % Final    Platelets 06/27/2018 346  150 - 350 K/uL Final    MPV 06/27/2018 9.6  9.2 - 12.9 fL Final    Gran # (ANC) 06/27/2018 12.4* 1.8 - 7.7 K/uL Final    Comment: The ANC is based on a white cell differential from an   automated cell counter. It has not been microscopically   reviewed for the presence of abnormal cells. Clinical   correlation is required.      Immature Grans (Abs) 06/27/2018 0.06* 0.00 - 0.04 K/uL Final    Comment: Mild elevation in immature granulocytes is non specific and   can be seen in a variety of conditions including stress response,   acute inflammation, trauma and pregnancy. Correlation with other   laboratory and clinical findings is essential.      Sodium 06/27/2018 139  136 - 145 mmol/L Final    Potassium 06/27/2018 4.6  3.5 - 5.1 mmol/L Final    Chloride 06/27/2018 105  95 - 110 mmol/L Final    CO2 06/27/2018 25  23 - 29 mmol/L Final    Glucose 06/27/2018 56* 70 - 110 mg/dL Final    BUN, Bld 06/27/2018 19  8 - 23 mg/dL Final    Creatinine 06/27/2018 1.3  0.5 - 1.4 mg/dL Final    Calcium 06/27/2018 10.2  8.7 - 10.5 mg/dL Final    Total  Protein 06/27/2018 7.9  6.0 - 8.4 g/dL Final    Albumin 06/27/2018 3.6  3.5 - 5.2 g/dL Final    Total Bilirubin 06/27/2018 0.6  0.1 - 1.0 mg/dL Final    Comment: For infants and newborns, interpretation of results should be based  on gestational age, weight and in agreement with clinical  observations.  Premature Infant recommended reference ranges:  Up to 24 hours.............<8.0 mg/dL  Up to 48 hours............<12.0 mg/dL  3-5 days..................<15.0 mg/dL  6-29 days.................<15.0 mg/dL      Alkaline Phosphatase 06/27/2018 51* 55 - 135 U/L Final    AST 06/27/2018 15  10 - 40 U/L Final    ALT 06/27/2018 21  10 - 44 U/L Final    Anion Gap 06/27/2018 9  8 - 16 mmol/L Final    eGFR if African American 06/27/2018 >60.0  >60 mL/min/1.73 m^2 Final    eGFR if non African American 06/27/2018 58.5* >60 mL/min/1.73 m^2 Final    Comment: Calculation used to obtain the estimated glomerular filtration  rate (eGFR) is the CKD-EPI equation.       TSH 06/27/2018 0.940  0.400 - 4.000 uIU/mL Final     Supplemental Diagnosis 6/30/16  Immunohistochemical stains are completed on the Station 4L lymph node cell block material and reveal the tumor  cells to stain positively with cytokeratin 7 and TTF-1. The tumor cells show nonspecific blush staining with  cytokeratin 5/6 and are negative for p63. This staining profile supports a diagnosis of non-small cell carcinoma  consistent with adenocarcinoma.  Cell block material will be sent for EGFR, ALK and ROS-1 testing and results will follow in a supplemental report.  (Electronically Signed: 2016-06-30 11:50:31 )  Diagnosed by: Irene Amaro M.D.  FINAL PATHOLOGIC DIAGNOSIS  1. Lymph node, Station 4L, EBUS guided fine needle aspiration:  Positive for malignant cells, non-small cell carcinoma.  Rare background lymphocytes are present.  Immunohistochemical staining be completed to further characterize this malignancy and results will follow in a  supplemental  report.  2. Lymph node, Station 7, EBUS guided fine needle aspiration:  Positive for malignant cells, non-small cell carcinoma tumor identical to that seen in specimen #1.  Background lymphocytes are present.    Imaging:   CT 05/30/2018  EXAMINATION:  CT ABDOMEN PELVIS WITH CONTRAST; CT CHEST WITH CONTRAST    CLINICAL HISTORY:  Neoplasm: abdomen, metastatic, recurrence, suspected/known;; Neoplasm: chest, lung, recurrence, suspected/known; Neoplasm of unspecified behavior of other specified sites; Neoplasm of unspecified behavior of respiratory system    TECHNIQUE:  Low dose axial images, sagittal and coronal reformations were obtained from the neck base to the pubic symphysis after the administration of 100 cc Omnipaque 350 intravenous contrast.  Oral contrast was not administered.    COMPARISON:  CT chest abdomen pelvis 11/29/2017, 03/07/2018    FINDINGS:  Base of Neck: No significant abnormality.    CHEST:    -Heart: Normal size. No pericardial effusion.  Calcific coronary artery atherosclerosis is present.    -Pulmonary vasculature: Pulmonary arteries distribute normally.  There are four pulmonary veins.    -Denisse/Mediastinum: No pathologic kervin enlargement.    -Trachea and Proximal airways: Patent.    -Lungs/Pleura: Symmetrically expanded without pneumothorax.  There is severe bilateral emphysematous change.  There is redemonstration of a soft tissue opacity at the right hilum measuring 3.7 x 3.9 cm (previously 4.1 x 3.8 cm).  This continues to result in narrowing at the origin of the right middle lobe bronchus with some distal airway obstruction resulting in significant middle lobe volume loss, unchanged.  There is persistent bandlike, ground-glass, and patchy consolidative opacity within the right lower lobe.  These findings may correlate with pneumonitis or post radiation therapy change.  Tumor is again felt less likely given the lack of increased hypermetabolic activity on PET.  No significant pleural  thickening.  There is trace right pleural fluid, unchanged.    -Esophagus: Normal course and caliber.    ABDOMEN:    - Liver: Normal in size and attenuation.  There are multiple hepatic hypodensities, the largest of which demonstrate attenuation compatible with cysts.  Smaller hypodensities are too small to characterize.  The largest lesion measures 1.6 cm within the hepatic segment 6.    - Gallbladder: No calcified gallstones.  No wall thickening or pericholecystic fluid.    - Bile Ducts: No intra or extrahepatic biliary ductal dilatation.    - Stomach/Duodenum: Unremarkable.    - Spleen: Unremarkable.    - Pancreas: Unremarkable.    - Adrenals: There is a stable 1.1 cm left adrenal nodule, unchanged since 03/07/2018.  The right adrenal gland is unremarkable.    - Kidneys/ureters/urinary bladder: Normal in size and location, with appropriate parenchymal opacification.  No hydronephrosis or stones.  There are tiny left renal hypodensities at the inferior pole, which are too small to characterize.  The ureters appear normal in course and caliber without evidence of ureteral dilatation.  The urinary bladder is unremarkable.    - Retroperitoneum: No significant adenopathy.    PELVIS:    -Prostate: Unremarkable.    - Other: No pelvic adenopathy, free fluid, or mass.    BOWEL/MESENTERY:    No evidence of bowel obstruction or inflammatory process. There is moderate volume stool within the colon.    VASCULATURE: Left-sided aortic arch with 3 branch vessels.  No aneurysm.  There is mild to moderate calcific aortoiliac atherosclerosis.    BONES: No acute fracture. There is stable sclerotic foci within the T1 vertebral body and left iliac wing, compatible with this patient's known osseous metastatic disease.  No new lesions are seen.  There is mild degenerative change of the spine.    EXTRATHORACIC/EXTRAPERITONEAL SOFT TISSUES: Unremarkable.   Impression       In this patient with a history of lung cancer, there is stable  appearance of the large right middle lobe mass, previously shown to be hypermetabolic on PET-CT.  No new pulmonary lesions are identified.    Stable osseous metastatic disease within the T1 vertebral body and left iliac wing.    Persistent patchy opacification of the right lower lobe, which may correlate with pneumonitis or postradiation therapy change.    Additional findings include coronary and aortoiliac atherosclerosis, emphysema, hepatic cysts with additional hepatic hypodensities are too small to characterize, stable left adrenal nodule, tiny left renal hypodensities which are too small to characterize, and mild degenerative change of the spine.    RECIST SUMMARY:    Date of prior examination for comparison: 03/07/2018    Lesion 1: Right middle lobe.  3.7 cm. Series 2 image 77.  Prior measurement 4.1 cm.                Assessment:       1. Primary cancer of right middle lobe of lung    2. Secondary adenocarcinoma of bone    3. Chemotherapy follow-up examination           Plan:     Mr. Torres continues to do well on treatment with pembrolizumab.  Labs are appropriate to continue on with treatment.  I will plan to give him 2 additional cycles of treatment prior to his next CT.  He will follow back up in another 3 weeks.  Report any new symptoms in the interim.  All questions were answered and he is agreeable with this plan.    Dano Fernandez DO, FACP  Hematology & Oncology  [facility] [place] [address]  ph. [phone]  Fax. [phone]    25 minutes were spent in coordination of patient's care, record review and counseling.  More than 50% of the time was face-to-face.

## 2018-08-06 RX ORDER — SODIUM CHLORIDE 0.9 % (FLUSH) 0.9 %
10 SYRINGE (ML) INJECTION
Status: CANCELLED | OUTPATIENT
Start: 2018-08-08

## 2018-08-06 RX ORDER — HEPARIN 100 UNIT/ML
500 SYRINGE INTRAVENOUS
Status: CANCELLED | OUTPATIENT
Start: 2018-08-08

## 2018-08-08 ENCOUNTER — OFFICE VISIT (OUTPATIENT)
Dept: HEMATOLOGY/ONCOLOGY | Facility: CLINIC | Age: 63
End: 2018-08-08
Payer: COMMERCIAL

## 2018-08-08 ENCOUNTER — LAB VISIT (OUTPATIENT)
Dept: LAB | Facility: HOSPITAL | Age: 63
End: 2018-08-08
Payer: COMMERCIAL

## 2018-08-08 ENCOUNTER — INFUSION (OUTPATIENT)
Dept: INFUSION THERAPY | Facility: HOSPITAL | Age: 63
End: 2018-08-08
Attending: INTERNAL MEDICINE
Payer: COMMERCIAL

## 2018-08-08 VITALS
HEART RATE: 98 BPM | HEIGHT: 77 IN | WEIGHT: 183.19 LBS | BODY MASS INDEX: 21.63 KG/M2 | RESPIRATION RATE: 18 BRPM | SYSTOLIC BLOOD PRESSURE: 110 MMHG | TEMPERATURE: 98 F | DIASTOLIC BLOOD PRESSURE: 67 MMHG

## 2018-08-08 VITALS
WEIGHT: 183.19 LBS | HEIGHT: 77 IN | BODY MASS INDEX: 21.63 KG/M2 | RESPIRATION RATE: 14 BRPM | SYSTOLIC BLOOD PRESSURE: 114 MMHG | DIASTOLIC BLOOD PRESSURE: 71 MMHG | TEMPERATURE: 98 F | OXYGEN SATURATION: 97 % | HEART RATE: 103 BPM

## 2018-08-08 DIAGNOSIS — D49.89 NEOPLASM OF ABDOMEN: ICD-10-CM

## 2018-08-08 DIAGNOSIS — C34.2 PRIMARY CANCER OF RIGHT MIDDLE LOBE OF LUNG: Primary | ICD-10-CM

## 2018-08-08 DIAGNOSIS — C79.51 SECONDARY ADENOCARCINOMA OF BONE: ICD-10-CM

## 2018-08-08 DIAGNOSIS — R59.0 MEDIASTINAL ADENOPATHY: ICD-10-CM

## 2018-08-08 DIAGNOSIS — Z09 CHEMOTHERAPY FOLLOW-UP EXAMINATION: ICD-10-CM

## 2018-08-08 DIAGNOSIS — C34.2 PRIMARY CANCER OF RIGHT MIDDLE LOBE OF LUNG: ICD-10-CM

## 2018-08-08 DIAGNOSIS — L08.9 SKIN INFECTION: ICD-10-CM

## 2018-08-08 LAB
ALBUMIN SERPL BCP-MCNC: 3.7 G/DL
ALP SERPL-CCNC: 61 U/L
ALT SERPL W/O P-5'-P-CCNC: 19 U/L
ANION GAP SERPL CALC-SCNC: 9 MMOL/L
AST SERPL-CCNC: 17 U/L
BILIRUB SERPL-MCNC: 0.3 MG/DL
BUN SERPL-MCNC: 20 MG/DL
CALCIUM SERPL-MCNC: 9.9 MG/DL
CHLORIDE SERPL-SCNC: 106 MMOL/L
CO2 SERPL-SCNC: 24 MMOL/L
CREAT SERPL-MCNC: 1.4 MG/DL
ERYTHROCYTE [DISTWIDTH] IN BLOOD BY AUTOMATED COUNT: 14.7 %
EST. GFR  (AFRICAN AMERICAN): >60 ML/MIN/1.73 M^2
EST. GFR  (NON AFRICAN AMERICAN): 53.5 ML/MIN/1.73 M^2
GLUCOSE SERPL-MCNC: 86 MG/DL
HCT VFR BLD AUTO: 43.4 %
HGB BLD-MCNC: 13.6 G/DL
IMM GRANULOCYTES # BLD AUTO: 0.02 K/UL
MCH RBC QN AUTO: 27.4 PG
MCHC RBC AUTO-ENTMCNC: 31.3 G/DL
MCV RBC AUTO: 88 FL
NEUTROPHILS # BLD AUTO: 5.3 K/UL
PLATELET # BLD AUTO: 389 K/UL
PMV BLD AUTO: 9.4 FL
POTASSIUM SERPL-SCNC: 4.4 MMOL/L
PROT SERPL-MCNC: 8.3 G/DL
RBC # BLD AUTO: 4.96 M/UL
SODIUM SERPL-SCNC: 139 MMOL/L
WBC # BLD AUTO: 7.52 K/UL

## 2018-08-08 PROCEDURE — 85027 COMPLETE CBC AUTOMATED: CPT

## 2018-08-08 PROCEDURE — 63600175 PHARM REV CODE 636 W HCPCS: Mod: JG | Performed by: INTERNAL MEDICINE

## 2018-08-08 PROCEDURE — 96413 CHEMO IV INFUSION 1 HR: CPT

## 2018-08-08 PROCEDURE — 36415 COLL VENOUS BLD VENIPUNCTURE: CPT

## 2018-08-08 PROCEDURE — 25000003 PHARM REV CODE 250: Performed by: INTERNAL MEDICINE

## 2018-08-08 PROCEDURE — 3008F BODY MASS INDEX DOCD: CPT | Mod: CPTII,S$GLB,, | Performed by: INTERNAL MEDICINE

## 2018-08-08 PROCEDURE — 3078F DIAST BP <80 MM HG: CPT | Mod: CPTII,S$GLB,, | Performed by: INTERNAL MEDICINE

## 2018-08-08 PROCEDURE — 99999 PR PBB SHADOW E&M-EST. PATIENT-LVL IV: CPT | Mod: PBBFAC,,, | Performed by: INTERNAL MEDICINE

## 2018-08-08 PROCEDURE — 99214 OFFICE O/P EST MOD 30 MIN: CPT | Mod: S$GLB,,, | Performed by: INTERNAL MEDICINE

## 2018-08-08 PROCEDURE — 3074F SYST BP LT 130 MM HG: CPT | Mod: CPTII,S$GLB,, | Performed by: INTERNAL MEDICINE

## 2018-08-08 PROCEDURE — 80053 COMPREHEN METABOLIC PANEL: CPT

## 2018-08-08 RX ORDER — SULFAMETHOXAZOLE AND TRIMETHOPRIM 400; 80 MG/1; MG/1
1 TABLET ORAL 2 TIMES DAILY
Qty: 14 TABLET | Refills: 0 | Status: SHIPPED | OUTPATIENT
Start: 2018-08-08 | End: 2018-08-18

## 2018-08-08 RX ADMIN — SODIUM CHLORIDE 200 MG: 9 INJECTION, SOLUTION INTRAVENOUS at 11:08

## 2018-08-08 NOTE — PLAN OF CARE
Problem: Patient Care Overview  Goal: Plan of Care Review  Outcome: Ongoing (interventions implemented as appropriate)  Tolerated infusion well.  No reactions noted.  AVS given.  No questions or concerns at this time.  Ambulated off unit unassisted.

## 2018-08-08 NOTE — PLAN OF CARE
Problem: Chemotherapy Effects (Adult)  Goal: Signs and Symptoms of Listed Potential Problems Will be Absent, Minimized or Managed (Chemotherapy Effects)  Signs and symptoms of listed potential problems will be absent, minimized or managed by discharge/transition of care (reference Chemotherapy Effects (Adult) CPG).   Outcome: Ongoing (interventions implemented as appropriate)  Patient here for keytruda.  Assessment completed and labs reviewed.  VSS.  No needs at this time.  Chair reclined and blanket offered.  Will continue to monitor.

## 2018-08-08 NOTE — PROGRESS NOTES
PATIENT: Angel Torres  MRN: [id number]  DATE: 8/8/2018      Diagnosis:   1. Primary cancer of right middle lobe of lung    2. Secondary adenocarcinoma of bone    3. Chemotherapy follow-up examination    4. Skin infection    5. Neoplasm of abdomen        Chief Complaint: Primary cancer of right middle lobe of lung      Oncologic History:      Oncologic History Non-small cell lung cancer diagnosed 6/2016   Recurrent disease to lung 4/2017     Oncologic Treatment Cisplatin/Pemetrexed initiated 8/15/16 with concurrent radiation completed 3 cycles 10/3/16; 64 Gy  Carboplatin/pemetrexed/pembrolizumab 7/2017 - 9/2017  Pembrolizumab maintenance 11/2017     Pathology  6/2016 : Adenocarcinoma, T2b, N2, M0, Stage IIIA          Subjective:    Interval History: Mr. Torres is a 63 y.o. male who was seen in follow-up for lung cancer.  He states his breathing is unchanged.  He has developed several areas skin induration with associated drainage over the last 2 weeks.  Denies any fevers or chills.  He has no other new complaints.    His history dates to approximately 4/2016 when he noted a worsening cough which was associated with clear sputum.  He had a chest x-ray in 5/16 showing a right middle lobe opacity.  Subsequent CT of the chest was performed on 5/30/16 showing a 5.6 cm lesion in the right middle lobe with hilar and mediastinal lymphadenopathy along with hepatic lesions and a left adrenal mass..  He underwent bronchoscopy which was nonrevealing and transbronchial biopsies were nondiagnostic.  He then underwent EBUS with biopsy of mediastinal lymph nodes with pathology showing non-small cell lung cancer consistent with adenocarcinoma.  He was started on concurrent chemotherapy and radiation with cisplatin and pemetrexed on 8/15/16.  He completed 64 Gy of concurrent radiation with cisplatin and pemetrexed with chemotherapy completed on 10/3/16.  Scans in 4/2017 had indicated progressive disease in the lung.  Subsequent PET scan  in 7/2017 had shown spread of disease to bone.  He was started on carboplatin and pemetrexed and pembrolizumab in 7/2017.  Repeat imaging in 8/2017 had shown stability of disease.  He was started on pembrolizumab maintenance in 11/2017 after a delay due to his insurance.  Repeat imaging in 12/2016 had shown mild progressive disease, however, it was felt that due to the excessive delay in treatment we would proceed on rather than change therapy at this time.  CT in 3/2018 had shown stability of his disease.  CT in 05/2018 had shown stable disease.    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    Skin infection 8/8/2018       Past Surgical HIstory:   Past Surgical History:   Procedure Laterality Date    INGUINAL HERNIA REPAIR Bilateral     KNEE SURGERY         Family History:   Family History   Problem Relation Age of Onset    Cancer Mother         lung, breast    Cancer Sister         lung    Cancer Maternal Grandfather     No Known Problems Father     No Known Problems Brother     No Known Problems Maternal Aunt     No Known Problems Maternal Uncle     No Known Problems Paternal Aunt     No Known Problems Paternal Uncle     No Known Problems Maternal Grandmother     No Known Problems Paternal Grandmother     No Known Problems Paternal Grandfather     Amblyopia Neg Hx     Blindness Neg Hx     Cataracts Neg Hx     Diabetes Neg Hx     Glaucoma Neg Hx     Hypertension Neg Hx     Macular degeneration Neg Hx     Retinal detachment Neg Hx     Strabismus Neg Hx     Stroke Neg Hx     Thyroid disease Neg Hx        Social History:  reports  that he quit smoking about 4 years ago. He has a 80.00 pack-year smoking history. He has quit using smokeless tobacco. He reports that he does not drink alcohol or use drugs.    Allergies:  Review of patient's allergies indicates:  No Known Allergies    Medications:  Current Outpatient Prescriptions   Medication Sig Dispense Refill    albuterol-ipratropium (DUO-NEB) 2.5 mg-0.5 mg/3 mL nebulizer solution Take 3 mLs by nebulization every 6 (six) hours as needed for Wheezing or Shortness of Breath. 100 vial 2    amLODIPine (NORVASC) 10 MG tablet Take 1 tablet (10 mg total) by mouth once daily. 90 tablet 3    dronabinol (MARINOL) 5 MG capsule Take 1 capsule (5 mg total) by mouth 2 (two) times daily before meals. 60 capsule 0    fluticasone (FLONASE) 50 mcg/actuation nasal spray SHAKE LIQUID AND USE 2 SPRAYS IN EACH NOSTRIL EVERY DAY 16 g 1    folic acid (FOLVITE) 400 MCG tablet TAKE 1 TABLET(400 MCG) BY MOUTH EVERY  tablet 0    naproxen (NAPROSYN) 500 MG tablet Take 1 tablet (500 mg total) by mouth 2 (two) times daily as needed (headache). 60 tablet 0    PROAIR HFA 90 mcg/actuation inhaler INHALE 2 PUFFS INTO THE LUNGS EVERY 6 HOURS AS NEEDED FOR WHEEZING 17 g 0    cetirizine (ZYRTEC) 10 MG tablet Take 1 tablet (10 mg total) by mouth once daily.  0    sulfamethoxazole-trimethoprim 400-80mg (BACTRIM) 400-80 mg per tablet Take 1 tablet by mouth 2 (two) times daily. for 10 days 14 tablet 0     Current Facility-Administered Medications   Medication Dose Route Frequency Provider Last Rate Last Dose    cyanocobalamin injection 1,000 mcg  1,000 mcg Intramuscular 1 time in Clinic/HOD Dano Fernandez DO, FACP        cyanocobalamin injection 1,000 mcg  1,000 mcg Intramuscular 1 time in Clinic/HOD Dano Fernandez DO, FACP           Review of Systems   Constitutional: Negative for activity change, appetite change, chills, fatigue, fever and unexpected weight change.        Normal weight 220   HENT: Negative  "for dental problem, nosebleeds, sinus pressure, sneezing and trouble swallowing.    Eyes: Negative for visual disturbance.   Respiratory: Positive for cough. Negative for choking, chest tightness, shortness of breath and wheezing.    Cardiovascular: Negative for chest pain and leg swelling.   Gastrointestinal: Negative for abdominal pain, blood in stool, constipation, diarrhea and nausea.   Genitourinary: Negative for difficulty urinating and dysuria.   Musculoskeletal: Positive for myalgias. Negative for arthralgias and back pain.   Skin: Positive for wound. Negative for rash.   Neurological: Negative for dizziness, light-headedness and headaches.   Hematological: Negative for adenopathy. Does not bruise/bleed easily.   Psychiatric/Behavioral: Negative for sleep disturbance. The patient is not nervous/anxious.        ECOG Performance Status: 1   Objective:      Vitals:   Vitals:    08/08/18 1000   BP: 114/71   BP Location: Left arm   Patient Position: Sitting   BP Method: Large (Automatic)   Pulse: 103   Resp: 14   Temp: 97.6 °F (36.4 °C)   TempSrc: Oral   SpO2: 97%   Weight: 83.1 kg (183 lb 3.2 oz)   Height: 6' 5" (1.956 m)     BMI: Body mass index is 21.72 kg/m².    Physical Exam   Constitutional: He is oriented to person, place, and time. He appears well-developed and well-nourished.   HENT:   Head: Normocephalic and atraumatic.   Eyes: Pupils are equal, round, and reactive to light.   Neck: Normal range of motion. Neck supple.   Cardiovascular: Normal rate and regular rhythm.    Pulmonary/Chest: Effort normal. No respiratory distress. He has no rales.   Abdominal: Soft. He exhibits no distension. There is no tenderness.   Musculoskeletal: He exhibits no edema or tenderness.   Lymphadenopathy:     He has no cervical adenopathy.   Neurological: He is alert and oriented to person, place, and time. No cranial nerve deficit.   Skin: Skin is warm and dry. Rash noted.   Psychiatric: He has a normal mood and affect. " His behavior is normal.       Laboratory Data:  Lab Visit on 08/08/2018   Component Date Value Ref Range Status    WBC 08/08/2018 7.52  3.90 - 12.70 K/uL Final    RBC 08/08/2018 4.96  4.60 - 6.20 M/uL Final    Hemoglobin 08/08/2018 13.6* 14.0 - 18.0 g/dL Final    Hematocrit 08/08/2018 43.4  40.0 - 54.0 % Final    MCV 08/08/2018 88  82 - 98 fL Final    MCH 08/08/2018 27.4  27.0 - 31.0 pg Final    MCHC 08/08/2018 31.3* 32.0 - 36.0 g/dL Final    RDW 08/08/2018 14.7* 11.5 - 14.5 % Final    Platelets 08/08/2018 389* 150 - 350 K/uL Final    MPV 08/08/2018 9.4  9.2 - 12.9 fL Final    Gran # (ANC) 08/08/2018 5.3  1.8 - 7.7 K/uL Final    Comment: The ANC is based on a white cell differential from an   automated cell counter. It has not been microscopically   reviewed for the presence of abnormal cells. Clinical   correlation is required.      Immature Grans (Abs) 08/08/2018 0.02  0.00 - 0.04 K/uL Final    Comment: Mild elevation in immature granulocytes is non specific and   can be seen in a variety of conditions including stress response,   acute inflammation, trauma and pregnancy. Correlation with other   laboratory and clinical findings is essential.     Lab Visit on 07/18/2018   Component Date Value Ref Range Status    WBC 07/18/2018 7.84  3.90 - 12.70 K/uL Final    RBC 07/18/2018 4.95  4.60 - 6.20 M/uL Final    Hemoglobin 07/18/2018 13.5* 14.0 - 18.0 g/dL Final    Hematocrit 07/18/2018 42.3  40.0 - 54.0 % Final    MCV 07/18/2018 86  82 - 98 fL Final    MCH 07/18/2018 27.3  27.0 - 31.0 pg Final    MCHC 07/18/2018 31.9* 32.0 - 36.0 g/dL Final    RDW 07/18/2018 14.7* 11.5 - 14.5 % Final    Platelets 07/18/2018 407* 150 - 350 K/uL Final    MPV 07/18/2018 9.4  9.2 - 12.9 fL Final    Gran # (ANC) 07/18/2018 5.4  1.8 - 7.7 K/uL Final    Comment: The ANC is based on a white cell differential from an   automated cell counter. It has not been microscopically   reviewed for the presence of abnormal cells.  Clinical   correlation is required.      Immature Grans (Abs) 07/18/2018 0.02  0.00 - 0.04 K/uL Final    Comment: Mild elevation in immature granulocytes is non specific and   can be seen in a variety of conditions including stress response,   acute inflammation, trauma and pregnancy. Correlation with other   laboratory and clinical findings is essential.      Sodium 07/18/2018 140  136 - 145 mmol/L Final    Potassium 07/18/2018 4.6  3.5 - 5.1 mmol/L Final    Chloride 07/18/2018 106  95 - 110 mmol/L Final    CO2 07/18/2018 27  23 - 29 mmol/L Final    Glucose 07/18/2018 94  70 - 110 mg/dL Final    BUN, Bld 07/18/2018 20  8 - 23 mg/dL Final    Creatinine 07/18/2018 1.3  0.5 - 1.4 mg/dL Final    Calcium 07/18/2018 10.0  8.7 - 10.5 mg/dL Final    Total Protein 07/18/2018 8.0  6.0 - 8.4 g/dL Final    Albumin 07/18/2018 3.7  3.5 - 5.2 g/dL Final    Total Bilirubin 07/18/2018 0.4  0.1 - 1.0 mg/dL Final    Comment: For infants and newborns, interpretation of results should be based  on gestational age, weight and in agreement with clinical  observations.  Premature Infant recommended reference ranges:  Up to 24 hours.............<8.0 mg/dL  Up to 48 hours............<12.0 mg/dL  3-5 days..................<15.0 mg/dL  6-29 days.................<15.0 mg/dL      Alkaline Phosphatase 07/18/2018 55  55 - 135 U/L Final    AST 07/18/2018 16  10 - 40 U/L Final    ALT 07/18/2018 19  10 - 44 U/L Final    Anion Gap 07/18/2018 7* 8 - 16 mmol/L Final    eGFR if African American 07/18/2018 >60.0  >60 mL/min/1.73 m^2 Final    eGFR if non African American 07/18/2018 58.5* >60 mL/min/1.73 m^2 Final    Comment: Calculation used to obtain the estimated glomerular filtration  rate (eGFR) is the CKD-EPI equation.       TSH 07/18/2018 1.582  0.400 - 4.000 uIU/mL Final     Supplemental Diagnosis 6/30/16  Immunohistochemical stains are completed on the Station 4L lymph node cell block material and reveal the tumor  cells to  stain positively with cytokeratin 7 and TTF-1. The tumor cells show nonspecific blush staining with  cytokeratin 5/6 and are negative for p63. This staining profile supports a diagnosis of non-small cell carcinoma  consistent with adenocarcinoma.  Cell block material will be sent for EGFR, ALK and ROS-1 testing and results will follow in a supplemental report.  (Electronically Signed: 2016-06-30 11:50:31 )  Diagnosed by: Irene Amaro M.D.  FINAL PATHOLOGIC DIAGNOSIS  1. Lymph node, Station 4L, EBUS guided fine needle aspiration:  Positive for malignant cells, non-small cell carcinoma.  Rare background lymphocytes are present.  Immunohistochemical staining be completed to further characterize this malignancy and results will follow in a  supplemental report.  2. Lymph node, Station 7, EBUS guided fine needle aspiration:  Positive for malignant cells, non-small cell carcinoma tumor identical to that seen in specimen #1.  Background lymphocytes are present.    Imaging:   CT 05/30/2018  EXAMINATION:  CT ABDOMEN PELVIS WITH CONTRAST; CT CHEST WITH CONTRAST    CLINICAL HISTORY:  Neoplasm: abdomen, metastatic, recurrence, suspected/known;; Neoplasm: chest, lung, recurrence, suspected/known; Neoplasm of unspecified behavior of other specified sites; Neoplasm of unspecified behavior of respiratory system    TECHNIQUE:  Low dose axial images, sagittal and coronal reformations were obtained from the neck base to the pubic symphysis after the administration of 100 cc Omnipaque 350 intravenous contrast.  Oral contrast was not administered.    COMPARISON:  CT chest abdomen pelvis 11/29/2017, 03/07/2018    FINDINGS:  Base of Neck: No significant abnormality.    CHEST:    -Heart: Normal size. No pericardial effusion.  Calcific coronary artery atherosclerosis is present.    -Pulmonary vasculature: Pulmonary arteries distribute normally.  There are four pulmonary veins.    -Denisse/Mediastinum: No pathologic kervin  enlargement.    -Trachea and Proximal airways: Patent.    -Lungs/Pleura: Symmetrically expanded without pneumothorax.  There is severe bilateral emphysematous change.  There is redemonstration of a soft tissue opacity at the right hilum measuring 3.7 x 3.9 cm (previously 4.1 x 3.8 cm).  This continues to result in narrowing at the origin of the right middle lobe bronchus with some distal airway obstruction resulting in significant middle lobe volume loss, unchanged.  There is persistent bandlike, ground-glass, and patchy consolidative opacity within the right lower lobe.  These findings may correlate with pneumonitis or post radiation therapy change.  Tumor is again felt less likely given the lack of increased hypermetabolic activity on PET.  No significant pleural thickening.  There is trace right pleural fluid, unchanged.    -Esophagus: Normal course and caliber.    ABDOMEN:    - Liver: Normal in size and attenuation.  There are multiple hepatic hypodensities, the largest of which demonstrate attenuation compatible with cysts.  Smaller hypodensities are too small to characterize.  The largest lesion measures 1.6 cm within the hepatic segment 6.    - Gallbladder: No calcified gallstones.  No wall thickening or pericholecystic fluid.    - Bile Ducts: No intra or extrahepatic biliary ductal dilatation.    - Stomach/Duodenum: Unremarkable.    - Spleen: Unremarkable.    - Pancreas: Unremarkable.    - Adrenals: There is a stable 1.1 cm left adrenal nodule, unchanged since 03/07/2018.  The right adrenal gland is unremarkable.    - Kidneys/ureters/urinary bladder: Normal in size and location, with appropriate parenchymal opacification.  No hydronephrosis or stones.  There are tiny left renal hypodensities at the inferior pole, which are too small to characterize.  The ureters appear normal in course and caliber without evidence of ureteral dilatation.  The urinary bladder is unremarkable.    - Retroperitoneum: No  significant adenopathy.    PELVIS:    -Prostate: Unremarkable.    - Other: No pelvic adenopathy, free fluid, or mass.    BOWEL/MESENTERY:    No evidence of bowel obstruction or inflammatory process. There is moderate volume stool within the colon.    VASCULATURE: Left-sided aortic arch with 3 branch vessels.  No aneurysm.  There is mild to moderate calcific aortoiliac atherosclerosis.    BONES: No acute fracture. There is stable sclerotic foci within the T1 vertebral body and left iliac wing, compatible with this patient's known osseous metastatic disease.  No new lesions are seen.  There is mild degenerative change of the spine.    EXTRATHORACIC/EXTRAPERITONEAL SOFT TISSUES: Unremarkable.   Impression       In this patient with a history of lung cancer, there is stable appearance of the large right middle lobe mass, previously shown to be hypermetabolic on PET-CT.  No new pulmonary lesions are identified.    Stable osseous metastatic disease within the T1 vertebral body and left iliac wing.    Persistent patchy opacification of the right lower lobe, which may correlate with pneumonitis or postradiation therapy change.    Additional findings include coronary and aortoiliac atherosclerosis, emphysema, hepatic cysts with additional hepatic hypodensities are too small to characterize, stable left adrenal nodule, tiny left renal hypodensities which are too small to characterize, and mild degenerative change of the spine.    RECIST SUMMARY:    Date of prior examination for comparison: 03/07/2018    Lesion 1: Right middle lobe.  3.7 cm. Series 2 image 77.  Prior measurement 4.1 cm.                Assessment:       1. Primary cancer of right middle lobe of lung    2. Secondary adenocarcinoma of bone    3. Chemotherapy follow-up examination    4. Skin infection    5. Neoplasm of abdomen           Plan:     Mr. Torres continues to do well on treatment with pembrolizumab.  Labs are appropriate to continue on with treatment.   He will proceed on with treatment today.  Will check a CT scan of the chest, abdomen and pelvis prior to next cycle.  I have written him for Bactrim.  Follow-up 3 weeks.    Dano Fernandez DO, [facility]  Hematology & Oncology  [facility] [place] [address]  ph. [phone]  Fax. [phone]    25 minutes were spent in coordination of patient's care, record review and counseling.  More than 50% of the time was face-to-face.

## 2018-08-20 PROBLEM — Z09 CHEMOTHERAPY FOLLOW-UP EXAMINATION: Status: RESOLVED | Noted: 2018-05-16 | Resolved: 2018-08-20

## 2018-08-21 ENCOUNTER — HOSPITAL ENCOUNTER (OUTPATIENT)
Dept: RADIOLOGY | Facility: HOSPITAL | Age: 63
Discharge: HOME | End: 2018-08-21
Attending: INTERNAL MEDICINE
Payer: COMMERCIAL

## 2018-08-21 DIAGNOSIS — D49.89 NEOPLASM OF ABDOMEN: ICD-10-CM

## 2018-08-21 PROCEDURE — 25500020 PHARM REV CODE 255: Performed by: INTERNAL MEDICINE

## 2018-08-21 PROCEDURE — 74177 CT ABD & PELVIS W/CONTRAST: CPT | Mod: TC

## 2018-08-21 PROCEDURE — 71260 CT THORAX DX C+: CPT | Mod: 26,,, | Performed by: RADIOLOGY

## 2018-08-21 PROCEDURE — 74177 CT ABD & PELVIS W/CONTRAST: CPT | Mod: 26,,, | Performed by: RADIOLOGY

## 2018-08-21 RX ADMIN — IOHEXOL 100 ML: 350 INJECTION, SOLUTION INTRAVENOUS at 04:08

## 2018-08-23 ENCOUNTER — TELEPHONE (OUTPATIENT)
Dept: HEMATOLOGY/ONCOLOGY | Facility: CLINIC | Age: 63
End: 2018-08-23

## 2018-08-23 NOTE — TELEPHONE ENCOUNTER
----- Message from Edel Fay sent at 8/23/2018 11:30 AM CDT -----  Contact: pt wife   Pt wife called to speak with nurse regino Ureña#[phone]  Thank you  ABRAHAN Fay

## 2018-08-29 ENCOUNTER — OFFICE VISIT (OUTPATIENT)
Dept: HEMATOLOGY/ONCOLOGY | Facility: CLINIC | Age: 63
End: 2018-08-29
Payer: COMMERCIAL

## 2018-08-29 ENCOUNTER — LAB VISIT (OUTPATIENT)
Dept: LAB | Facility: HOSPITAL | Age: 63
End: 2018-08-29
Payer: COMMERCIAL

## 2018-08-29 ENCOUNTER — INFUSION (OUTPATIENT)
Dept: INFUSION THERAPY | Facility: HOSPITAL | Age: 63
End: 2018-08-29
Attending: INTERNAL MEDICINE
Payer: COMMERCIAL

## 2018-08-29 ENCOUNTER — TELEPHONE (OUTPATIENT)
Dept: HEMATOLOGY/ONCOLOGY | Facility: CLINIC | Age: 63
End: 2018-08-29

## 2018-08-29 VITALS
RESPIRATION RATE: 20 BRPM | BODY MASS INDEX: 21.02 KG/M2 | DIASTOLIC BLOOD PRESSURE: 84 MMHG | WEIGHT: 178 LBS | TEMPERATURE: 98 F | HEIGHT: 77 IN | SYSTOLIC BLOOD PRESSURE: 140 MMHG | OXYGEN SATURATION: 96 % | HEART RATE: 97 BPM

## 2018-08-29 VITALS
TEMPERATURE: 98 F | DIASTOLIC BLOOD PRESSURE: 79 MMHG | HEIGHT: 77 IN | HEART RATE: 101 BPM | RESPIRATION RATE: 20 BRPM | WEIGHT: 178 LBS | BODY MASS INDEX: 21.02 KG/M2 | SYSTOLIC BLOOD PRESSURE: 109 MMHG

## 2018-08-29 DIAGNOSIS — C79.51 SECONDARY ADENOCARCINOMA OF BONE: ICD-10-CM

## 2018-08-29 DIAGNOSIS — C34.2 PRIMARY CANCER OF RIGHT MIDDLE LOBE OF LUNG: Primary | ICD-10-CM

## 2018-08-29 DIAGNOSIS — R53.0 NEOPLASTIC MALIGNANT RELATED FATIGUE: ICD-10-CM

## 2018-08-29 DIAGNOSIS — R21 RASH: ICD-10-CM

## 2018-08-29 DIAGNOSIS — R59.0 MEDIASTINAL ADENOPATHY: ICD-10-CM

## 2018-08-29 DIAGNOSIS — Z09 CHEMOTHERAPY FOLLOW-UP EXAMINATION: ICD-10-CM

## 2018-08-29 DIAGNOSIS — C34.2 PRIMARY CANCER OF RIGHT MIDDLE LOBE OF LUNG: ICD-10-CM

## 2018-08-29 LAB
ALBUMIN SERPL BCP-MCNC: 3.9 G/DL
ALP SERPL-CCNC: 58 U/L
ALT SERPL W/O P-5'-P-CCNC: 18 U/L
ANION GAP SERPL CALC-SCNC: 8 MMOL/L
AST SERPL-CCNC: 18 U/L
BASOPHILS # BLD AUTO: 0.03 K/UL
BASOPHILS NFR BLD: 0.4 %
BILIRUB SERPL-MCNC: 0.6 MG/DL
BUN SERPL-MCNC: 16 MG/DL
CALCIUM SERPL-MCNC: 10 MG/DL
CHLORIDE SERPL-SCNC: 105 MMOL/L
CO2 SERPL-SCNC: 27 MMOL/L
CREAT SERPL-MCNC: 1.2 MG/DL
DIFFERENTIAL METHOD: ABNORMAL
EOSINOPHIL # BLD AUTO: 0.2 K/UL
EOSINOPHIL NFR BLD: 2.5 %
ERYTHROCYTE [DISTWIDTH] IN BLOOD BY AUTOMATED COUNT: 15.1 %
EST. GFR  (AFRICAN AMERICAN): >60 ML/MIN/1.73 M^2
EST. GFR  (NON AFRICAN AMERICAN): >60 ML/MIN/1.73 M^2
GLUCOSE SERPL-MCNC: 68 MG/DL
HCT VFR BLD AUTO: 44.1 %
HGB BLD-MCNC: 14.3 G/DL
IMM GRANULOCYTES # BLD AUTO: 0.03 K/UL
IMM GRANULOCYTES NFR BLD AUTO: 0.4 %
LYMPHOCYTES # BLD AUTO: 1.2 K/UL
LYMPHOCYTES NFR BLD: 17 %
MCH RBC QN AUTO: 27.9 PG
MCHC RBC AUTO-ENTMCNC: 32.4 G/DL
MCV RBC AUTO: 86 FL
MONOCYTES # BLD AUTO: 0.8 K/UL
MONOCYTES NFR BLD: 11 %
NEUTROPHILS # BLD AUTO: 4.9 K/UL
NEUTROPHILS NFR BLD: 68.7 %
NRBC BLD-RTO: 0 /100 WBC
PLATELET # BLD AUTO: 362 K/UL
PMV BLD AUTO: 9.7 FL
POTASSIUM SERPL-SCNC: 4.3 MMOL/L
PROT SERPL-MCNC: 8.2 G/DL
RBC # BLD AUTO: 5.13 M/UL
SODIUM SERPL-SCNC: 140 MMOL/L
TSH SERPL DL<=0.005 MIU/L-ACNC: 2.2 UIU/ML
WBC # BLD AUTO: 7.16 K/UL

## 2018-08-29 PROCEDURE — 85025 COMPLETE CBC W/AUTO DIFF WBC: CPT

## 2018-08-29 PROCEDURE — A4216 STERILE WATER/SALINE, 10 ML: HCPCS | Performed by: INTERNAL MEDICINE

## 2018-08-29 PROCEDURE — 99214 OFFICE O/P EST MOD 30 MIN: CPT | Mod: S$GLB,,, | Performed by: INTERNAL MEDICINE

## 2018-08-29 PROCEDURE — 36415 COLL VENOUS BLD VENIPUNCTURE: CPT

## 2018-08-29 PROCEDURE — 3077F SYST BP >= 140 MM HG: CPT | Mod: CPTII,S$GLB,, | Performed by: INTERNAL MEDICINE

## 2018-08-29 PROCEDURE — 80053 COMPREHEN METABOLIC PANEL: CPT

## 2018-08-29 PROCEDURE — 3008F BODY MASS INDEX DOCD: CPT | Mod: CPTII,S$GLB,, | Performed by: INTERNAL MEDICINE

## 2018-08-29 PROCEDURE — 96413 CHEMO IV INFUSION 1 HR: CPT

## 2018-08-29 PROCEDURE — 25000003 PHARM REV CODE 250: Performed by: INTERNAL MEDICINE

## 2018-08-29 PROCEDURE — 3079F DIAST BP 80-89 MM HG: CPT | Mod: CPTII,S$GLB,, | Performed by: INTERNAL MEDICINE

## 2018-08-29 PROCEDURE — 99999 PR PBB SHADOW E&M-EST. PATIENT-LVL IV: CPT | Mod: PBBFAC,,, | Performed by: INTERNAL MEDICINE

## 2018-08-29 PROCEDURE — 63600175 PHARM REV CODE 636 W HCPCS: Mod: JG | Performed by: INTERNAL MEDICINE

## 2018-08-29 PROCEDURE — 84443 ASSAY THYROID STIM HORMONE: CPT

## 2018-08-29 RX ORDER — SODIUM CHLORIDE 0.9 % (FLUSH) 0.9 %
10 SYRINGE (ML) INJECTION
Status: DISCONTINUED | OUTPATIENT
Start: 2018-08-29 | End: 2018-08-29 | Stop reason: HOSPADM

## 2018-08-29 RX ORDER — HEPARIN 100 UNIT/ML
500 SYRINGE INTRAVENOUS
Status: CANCELLED | OUTPATIENT
Start: 2018-08-29

## 2018-08-29 RX ORDER — SODIUM CHLORIDE 0.9 % (FLUSH) 0.9 %
10 SYRINGE (ML) INJECTION
Status: CANCELLED | OUTPATIENT
Start: 2018-08-29

## 2018-08-29 RX ADMIN — SODIUM CHLORIDE: 0.9 INJECTION, SOLUTION INTRAVENOUS at 12:08

## 2018-08-29 RX ADMIN — Medication 10 ML: at 12:08

## 2018-08-29 RX ADMIN — SODIUM CHLORIDE 200 MG: 9 INJECTION, SOLUTION INTRAVENOUS at 12:08

## 2018-08-29 NOTE — PROGRESS NOTES
PATIENT: Angel Torres  MRN: [id number]  DATE: 8/29/2018      Diagnosis:   1. Primary cancer of right middle lobe of lung    2. Rash        Chief Complaint: Lung Cancer      Oncologic History:      Oncologic History Non-small cell lung cancer diagnosed 6/2016   Recurrent disease to lung 4/2017     Oncologic Treatment Cisplatin/Pemetrexed initiated 8/15/16 with concurrent radiation completed 3 cycles 10/3/16; 64 Gy  Carboplatin/pemetrexed/pembrolizumab 7/2017 - 9/2017  Pembrolizumab maintenance 11/2017     Pathology  6/2016 : Adenocarcinoma, T2b, N2, M0, Stage IIIA          Subjective:    Interval History: Mr. Torres is a 63 y.o. male who was seen in follow-up for lung cancer.  He states his breathing is unchanged.  Since I had seen him last he completed a course of Bactrim.  He states his rash has worsened in some locations but gotten better in other locations.  He complains ulcerations to his hands, feet and scalp.  Has no other new complaints.    His history dates to approximately 4/2016 when he noted a worsening cough which was associated with clear sputum.  He had a chest x-ray in 5/16 showing a right middle lobe opacity.  Subsequent CT of the chest was performed on 5/30/16 showing a 5.6 cm lesion in the right middle lobe with hilar and mediastinal lymphadenopathy along with hepatic lesions and a left adrenal mass..  He underwent bronchoscopy which was nonrevealing and transbronchial biopsies were nondiagnostic.  He then underwent EBUS with biopsy of mediastinal lymph nodes with pathology showing non-small cell lung cancer consistent with adenocarcinoma.  He was started on concurrent chemotherapy and radiation with cisplatin and pemetrexed on 8/15/16.  He completed 64 Gy of concurrent radiation with cisplatin and pemetrexed with chemotherapy completed on 10/3/16.  Scans in 4/2017 had indicated progressive disease in the lung.  Subsequent PET scan in 7/2017 had shown spread of disease to bone.  He was started on  carboplatin and pemetrexed and pembrolizumab in 7/2017.  Repeat imaging in 8/2017 had shown stability of disease.  He was started on pembrolizumab maintenance in 11/2017 after a delay due to his insurance.  Repeat imaging in 12/2016 had shown mild progressive disease, however, it was felt that due to the excessive delay in treatment we would proceed on rather than change therapy at this time.  CT in 3/2018 had shown stability of his disease.  CT in 05/2018 had shown stable disease.  CT in 08/2018 had shown stable disease.    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    Skin infection 8/8/2018       Past Surgical HIstory:   Past Surgical History:   Procedure Laterality Date    INGUINAL HERNIA REPAIR Bilateral     KNEE SURGERY         Family History:   Family History   Problem Relation Age of Onset    Cancer Mother         lung, breast    Cancer Sister         lung    Cancer Maternal Grandfather     No Known Problems Father     No Known Problems Brother     No Known Problems Maternal Aunt     No Known Problems Maternal Uncle     No Known Problems Paternal Aunt     No Known Problems Paternal Uncle     No Known Problems Maternal Grandmother     No Known Problems Paternal Grandmother     No Known Problems Paternal Grandfather     Amblyopia Neg Hx     Blindness Neg Hx     Cataracts Neg Hx     Diabetes Neg Hx     Glaucoma Neg Hx     Hypertension Neg Hx     Macular degeneration Neg Hx     Retinal detachment Neg Hx     Strabismus Neg Hx     Stroke Neg Hx     Thyroid disease Neg Hx        Social History:  reports that he quit smoking about  4 years ago. He has a 80.00 pack-year smoking history. He has quit using smokeless tobacco. He reports that he does not drink alcohol or use drugs.    Allergies:  Review of patient's allergies indicates:  No Known Allergies    Medications:  Current Outpatient Medications   Medication Sig Dispense Refill    albuterol-ipratropium (DUO-NEB) 2.5 mg-0.5 mg/3 mL nebulizer solution Take 3 mLs by nebulization every 6 (six) hours as needed for Wheezing or Shortness of Breath. 100 vial 2    amLODIPine (NORVASC) 10 MG tablet Take 1 tablet (10 mg total) by mouth once daily. 90 tablet 3    dronabinol (MARINOL) 5 MG capsule Take 1 capsule (5 mg total) by mouth 2 (two) times daily before meals. 60 capsule 0    fluticasone (FLONASE) 50 mcg/actuation nasal spray SHAKE LIQUID AND USE 2 SPRAYS IN EACH NOSTRIL EVERY DAY 16 g 1    naproxen (NAPROSYN) 500 MG tablet Take 1 tablet (500 mg total) by mouth 2 (two) times daily as needed (headache). 60 tablet 0    PROAIR HFA 90 mcg/actuation inhaler INHALE 2 PUFFS INTO THE LUNGS EVERY 6 HOURS AS NEEDED FOR WHEEZING 17 g 0    cetirizine (ZYRTEC) 10 MG tablet Take 1 tablet (10 mg total) by mouth once daily.  0    folic acid (FOLVITE) 400 MCG tablet TAKE 1 TABLET(400 MCG) BY MOUTH EVERY  tablet 0     Current Facility-Administered Medications   Medication Dose Route Frequency Provider Last Rate Last Dose    cyanocobalamin injection 1,000 mcg  1,000 mcg Intramuscular 1 time in Clinic/HOD Dano Fernandez DO, FACP        cyanocobalamin injection 1,000 mcg  1,000 mcg Intramuscular 1 time in Clinic/HOD Dano Fernandez DO, FACP           Review of Systems   Constitutional: Negative for activity change, appetite change, chills, fatigue, fever and unexpected weight change.        Normal weight 220   HENT: Negative for dental problem, nosebleeds, sinus pressure, sneezing and trouble swallowing.    Eyes: Negative for visual disturbance.   Respiratory: Positive for shortness of breath.  "Negative for cough, choking, chest tightness and wheezing.    Cardiovascular: Negative for chest pain and leg swelling.   Gastrointestinal: Negative for abdominal pain, blood in stool, constipation, diarrhea and nausea.   Genitourinary: Negative for difficulty urinating and dysuria.   Musculoskeletal: Positive for myalgias. Negative for arthralgias and back pain.   Skin: Positive for rash and wound.   Neurological: Negative for dizziness, light-headedness and headaches.   Hematological: Negative for adenopathy. Does not bruise/bleed easily.   Psychiatric/Behavioral: Negative for sleep disturbance. The patient is not nervous/anxious.        ECOG Performance Status: 1   Objective:      Vitals:   Vitals:    08/29/18 1002   BP: (!) 140/84   Pulse: 97   Resp: 20   Temp: 97.6 °F (36.4 °C)   SpO2: 96%   Weight: 80.7 kg (178 lb)   Height: 6' 5" (1.956 m)     BMI: Body mass index is 21.11 kg/m².    Physical Exam   Constitutional: He is oriented to person, place, and time. He appears well-developed and well-nourished.   HENT:   Head: Normocephalic and atraumatic.   Eyes: Pupils are equal, round, and reactive to light.   Neck: Normal range of motion. Neck supple.   Cardiovascular: Normal rate and regular rhythm.   Pulmonary/Chest: Effort normal. No respiratory distress. He has no rales.   Abdominal: Soft. He exhibits no distension. There is no tenderness.   Musculoskeletal: He exhibits no edema or tenderness.   Lymphadenopathy:     He has no cervical adenopathy.   Neurological: He is alert and oriented to person, place, and time. No cranial nerve deficit.   Skin: Skin is warm and dry. Rash noted.   Psychiatric: He has a normal mood and affect. His behavior is normal.       Laboratory Data:  Lab Visit on 08/29/2018   Component Date Value Ref Range Status    WBC 08/29/2018 7.16  3.90 - 12.70 K/uL Final    RBC 08/29/2018 5.13  4.60 - 6.20 M/uL Final    Hemoglobin 08/29/2018 14.3  14.0 - 18.0 g/dL Final    Hematocrit " 08/29/2018 44.1  40.0 - 54.0 % Final    MCV 08/29/2018 86  82 - 98 fL Final    MCH 08/29/2018 27.9  27.0 - 31.0 pg Final    MCHC 08/29/2018 32.4  32.0 - 36.0 g/dL Final    RDW 08/29/2018 15.1* 11.5 - 14.5 % Final    Platelets 08/29/2018 362* 150 - 350 K/uL Final    MPV 08/29/2018 9.7  9.2 - 12.9 fL Final    Immature Granulocytes 08/29/2018 0.4  0.0 - 0.5 % Final    Gran # (ANC) 08/29/2018 4.9  1.8 - 7.7 K/uL Final    Immature Grans (Abs) 08/29/2018 0.03  0.00 - 0.04 K/uL Final    Comment: Mild elevation in immature granulocytes is non specific and   can be seen in a variety of conditions including stress response,   acute inflammation, trauma and pregnancy. Correlation with other   laboratory and clinical findings is essential.      Lymph # 08/29/2018 1.2  1.0 - 4.8 K/uL Final    Mono # 08/29/2018 0.8  0.3 - 1.0 K/uL Final    Eos # 08/29/2018 0.2  0.0 - 0.5 K/uL Final    Baso # 08/29/2018 0.03  0.00 - 0.20 K/uL Final    nRBC 08/29/2018 0  0 /100 WBC Final    Gran% 08/29/2018 68.7  38.0 - 73.0 % Final    Lymph% 08/29/2018 17.0* 18.0 - 48.0 % Final    Mono% 08/29/2018 11.0  4.0 - 15.0 % Final    Eosinophil% 08/29/2018 2.5  0.0 - 8.0 % Final    Basophil% 08/29/2018 0.4  0.0 - 1.9 % Final    Differential Method 08/29/2018 Automated   Final    Sodium 08/29/2018 140  136 - 145 mmol/L Final    Potassium 08/29/2018 4.3  3.5 - 5.1 mmol/L Final    Chloride 08/29/2018 105  95 - 110 mmol/L Final    CO2 08/29/2018 27  23 - 29 mmol/L Final    Glucose 08/29/2018 68* 70 - 110 mg/dL Final    BUN, Bld 08/29/2018 16  8 - 23 mg/dL Final    Creatinine 08/29/2018 1.2  0.5 - 1.4 mg/dL Final    Calcium 08/29/2018 10.0  8.7 - 10.5 mg/dL Final    Total Protein 08/29/2018 8.2  6.0 - 8.4 g/dL Final    Albumin 08/29/2018 3.9  3.5 - 5.2 g/dL Final    Total Bilirubin 08/29/2018 0.6  0.1 - 1.0 mg/dL Final    Comment: For infants and newborns, interpretation of results should be based  on gestational age, weight and  in agreement with clinical  observations.  Premature Infant recommended reference ranges:  Up to 24 hours.............<8.0 mg/dL  Up to 48 hours............<12.0 mg/dL  3-5 days..................<15.0 mg/dL  6-29 days.................<15.0 mg/dL      Alkaline Phosphatase 08/29/2018 58  55 - 135 U/L Final    AST 08/29/2018 18  10 - 40 U/L Final    ALT 08/29/2018 18  10 - 44 U/L Final    Anion Gap 08/29/2018 8  8 - 16 mmol/L Final    eGFR if African American 08/29/2018 >60.0  >60 mL/min/1.73 m^2 Final    eGFR if non African American 08/29/2018 >60.0  >60 mL/min/1.73 m^2 Final    Comment: Calculation used to obtain the estimated glomerular filtration  rate (eGFR) is the CKD-EPI equation.       TSH 08/29/2018 2.200  0.400 - 4.000 uIU/mL Final   Lab Visit on 08/08/2018   Component Date Value Ref Range Status    WBC 08/08/2018 7.52  3.90 - 12.70 K/uL Final    RBC 08/08/2018 4.96  4.60 - 6.20 M/uL Final    Hemoglobin 08/08/2018 13.6* 14.0 - 18.0 g/dL Final    Hematocrit 08/08/2018 43.4  40.0 - 54.0 % Final    MCV 08/08/2018 88  82 - 98 fL Final    MCH 08/08/2018 27.4  27.0 - 31.0 pg Final    MCHC 08/08/2018 31.3* 32.0 - 36.0 g/dL Final    RDW 08/08/2018 14.7* 11.5 - 14.5 % Final    Platelets 08/08/2018 389* 150 - 350 K/uL Final    MPV 08/08/2018 9.4  9.2 - 12.9 fL Final    Gran # (ANC) 08/08/2018 5.3  1.8 - 7.7 K/uL Final    Comment: The ANC is based on a white cell differential from an   automated cell counter. It has not been microscopically   reviewed for the presence of abnormal cells. Clinical   correlation is required.      Immature Grans (Abs) 08/08/2018 0.02  0.00 - 0.04 K/uL Final    Comment: Mild elevation in immature granulocytes is non specific and   can be seen in a variety of conditions including stress response,   acute inflammation, trauma and pregnancy. Correlation with other   laboratory and clinical findings is essential.      Sodium 08/08/2018 139  136 - 145 mmol/L Final     Potassium 08/08/2018 4.4  3.5 - 5.1 mmol/L Final    Chloride 08/08/2018 106  95 - 110 mmol/L Final    CO2 08/08/2018 24  23 - 29 mmol/L Final    Glucose 08/08/2018 86  70 - 110 mg/dL Final    BUN, Bld 08/08/2018 20  8 - 23 mg/dL Final    Creatinine 08/08/2018 1.4  0.5 - 1.4 mg/dL Final    Calcium 08/08/2018 9.9  8.7 - 10.5 mg/dL Final    Total Protein 08/08/2018 8.3  6.0 - 8.4 g/dL Final    Albumin 08/08/2018 3.7  3.5 - 5.2 g/dL Final    Total Bilirubin 08/08/2018 0.3  0.1 - 1.0 mg/dL Final    Comment: For infants and newborns, interpretation of results should be based  on gestational age, weight and in agreement with clinical  observations.  Premature Infant recommended reference ranges:  Up to 24 hours.............<8.0 mg/dL  Up to 48 hours............<12.0 mg/dL  3-5 days..................<15.0 mg/dL  6-29 days.................<15.0 mg/dL      Alkaline Phosphatase 08/08/2018 61  55 - 135 U/L Final    AST 08/08/2018 17  10 - 40 U/L Final    ALT 08/08/2018 19  10 - 44 U/L Final    Anion Gap 08/08/2018 9  8 - 16 mmol/L Final    eGFR if African American 08/08/2018 >60.0  >60 mL/min/1.73 m^2 Final    eGFR if non African American 08/08/2018 53.5* >60 mL/min/1.73 m^2 Final    Comment: Calculation used to obtain the estimated glomerular filtration  rate (eGFR) is the CKD-EPI equation.        Supplemental Diagnosis 6/30/16  Immunohistochemical stains are completed on the Station 4L lymph node cell block material and reveal the tumor  cells to stain positively with cytokeratin 7 and TTF-1. The tumor cells show nonspecific blush staining with  cytokeratin 5/6 and are negative for p63. This staining profile supports a diagnosis of non-small cell carcinoma  consistent with adenocarcinoma.  Cell block material will be sent for EGFR, ALK and ROS-1 testing and results will follow in a supplemental report.  (Electronically Signed: 2016-06-30 11:50:31 )  Diagnosed by: Irene Amaro M.D.  FINAL PATHOLOGIC  DIAGNOSIS  1. Lymph node, Station 4L, EBUS guided fine needle aspiration:  Positive for malignant cells, non-small cell carcinoma.  Rare background lymphocytes are present.  Immunohistochemical staining be completed to further characterize this malignancy and results will follow in a  supplemental report.  2. Lymph node, Station 7, EBUS guided fine needle aspiration:  Positive for malignant cells, non-small cell carcinoma tumor identical to that seen in specimen #1.  Background lymphocytes are present.    Imaging:   CT 08/21/2018  EXAMINATION:  CT CHEST ABDOMEN PELVIS WITH CONTRAST (XPD)    CLINICAL HISTORY:  Neoplasm: abdomen, metastatic, recurrence, suspected/known; Neoplasm of unspecified behavior of other specified sites    TECHNIQUE:  Low dose axial images, sagittal and coronal reformations were obtained from the neck base to the pubic symphysis after the administration of 100 cc Omnipaque 350 intravenous contrast with abdominal imaging obtained during arterial, portal venous, and delayed phases.  Oral contrast was not administered.    COMPARISON:  CT chest abdomen pelvis 05/30/2018, PET-CT 07/19/2017    FINDINGS:  Base of Neck: No significant abnormality.    CHEST:    -Heart: Normal size. No pericardial effusion.  There is calcific coronary artery atherosclerosis..    -Pulmonary vasculature: Pulmonary arteries distribute normally.  There are four pulmonary veins.    -Denisse/Mediastinum: No pathologic kervin enlargement.    -Trachea and Proximal airways: Patent.    -Lungs/Pleura: There is extensive bilateral centrilobular emphysematous change.  No pneumothorax.  Redemonstration of a soft tissue opacity at the right hilum measuring 3.8 x 3.6 cm (previously 3.7 x 3.4 cm), which continues to narrow the origin of the right middle lobe bronchus with distal airway obstruction and consequent significant middle lobe volume loss, unchanged.  Slight differences in measurement of this right hilar lesion may correlate with  interobserver error and differences in technique rather than interval enlargement of this lesion.  Persistent bandlike, ground-glass, and patchy consolidative opacity within the right lower lobe, noting slight interval increase in consolidative component.  These findings are favored to correlate with post radiation therapy change given lack of hypermetabolic activity on PET.  Superimposed infectious or inflammatory change cannot be excluded.  No significant pleural thickening.  There is slight interval increase in small volume right pleural fluid.    -Esophagus: Normal in course and caliber.    ABDOMEN:    - Liver: Normal in size and attenuation, noting geographic alteration perfusion on arterial phase which normalizes on portal venous phase.  There are multiple hepatic hypodensities, the largest of which demonstrate attenuation compatible with cysts.  Smaller hypodensities are too small to characterize.  The largest lesion measures 1.4 cm within hepatic segment 6.    - Gallbladder: No calcified gallstones.  No wall thickening or pericholecystic fluid.    - Bile Ducts: No intra or extrahepatic biliary ductal dilatation.    - Stomach/Duodenum: Unremarkable.    - Spleen: Unremarkable.    - Pancreas: Unremarkable.    - Adrenals: The right adrenal gland is unremarkable.  There is a stable nonspecific 1.2 cm left adrenal nodule.    - Kidneys/ureters/urinary bladder: Normal in size and location, concentrating excreting contrast appropriately on delayed imaging.  No hydronephrosis or stones.  Tiny hypodensities at the bilateral inferior renal poles are too small to characterize.  The ureters appear normal in course and caliber without evidence of ureteral dilatation.  There is a diverticulum at the anterior aspect of the bladder dome, best appreciated on sagittal imaging.  There is abnormal wall thickening of the diverticulum and adjacent bladder dome, new since CT 05/30/2018.    - Retroperitoneum: No significant  adenopathy.    PELVIS:    - Reproductive: Unremarkable.    - Other: No pelvic adenopathy, free fluid, or mass.    BOWEL/MESENTERY:    No evidence of bowel obstruction or inflammatory process. There is moderate stool within the colon.  No intraperitoneal free air or free fluid.    VASCULATURE: Left-sided aortic arch with 4 branch vessels, the left vertebral artery arising directly from the arch.  No aneurysm.  There is mild moderate calcific aortoiliac atherosclerosis.    BONES: No acute fracture or bony destructive process.  Stable sclerotic foci within the T1 vertebral body and posterior left iliac wing are compatible with this patient's known osseous metastatic disease.  No new lesions identified.  There is mild degenerative change of the spine.    EXTRATHORACIC/EXTRAPERITONEAL SOFT TISSUES: There is bilateral gynecomastia.      Impression       In this patient with a history of lung malignancy, grossly stable appearance of a right middle lobe mass which was previously shown to be hypermetabolic on PET-CT.  No new pulmonary lesions are identified.    Stable osseous metastatic disease within the T1 vertebral body and left iliac wing.    Persistent patchy opacification of the right lower lobe with slight increase in consolidative component, possibly correlating with pneumonitis or postradiation therapy change.  Superimposed infectious or inflammatory change cannot be excluded.    Slight interval increase in right pleural fluid.    New abnormal wall thickening of the urinary bladder within and adjacent to a small bladder diverticulum.  Urologic consultation/cystoscopy is recommended for further evaluation.    Multiple additional findings as detailed above.    RECIST SUMMARY:    Date of prior examination for comparison: 05/30/2018    Lesion 1: Right middle lobe.  3.8 cm. Series 3 image 80.  Prior measurement 3.7 cm.    This report was flagged in Epic as abnormal.                Assessment:       1. Primary cancer of  right middle lobe of lung    2. Rash           Plan:     Mr. Torres continues to do well on treatment with pembrolizumab.  Labs are appropriate to continue on with treatment.  He will proceed on with treatment today.  His CT did not show any significant change in his lung mass or new findings of metastasis.  He did have a abnormal wall thickening at the urinary bladder and will discuss with him next visit with possible referral to urology if symptomatic.  Since he did not improve with Bactrim I will have referred to dermatology at this time.  All questions were answered and he is agreeable with this plan.    Dano Fernandez DO, FACP  Hematology & Oncology  [address] [place] [address]  ph. [phone]  Fax. [phone]    25 minutes were spent in coordination of patient's care, record review and counseling.  More than 50% of the time was face-to-face.

## 2018-08-29 NOTE — TELEPHONE ENCOUNTER
----- Message from Luanne Cohen sent at 8/29/2018 11:02 AM CDT -----  Regarding: orders  Lauren,    Can I get orders for labs for follow up. Is the patient done with chemo after today?    ----- Message -----  From: Dano Fernandez DO, FACP  Sent: 8/29/2018  10:52 AM  To: Luanne Cohen    Pembrolizumab today  Refer to Dermatology  See me in 3 weeks

## 2018-08-29 NOTE — PLAN OF CARE
Problem: Patient Care Overview  Goal: Plan of Care Review  Outcome: Ongoing (interventions implemented as appropriate)  Patient tolerated keytruda well today. NAD noted upon discharge. Verbalized understanding to call MD for any questions/concerns. PIV removed, catheter tip intact. AVS given. Discharged home, ambulated independently.

## 2018-09-19 ENCOUNTER — OFFICE VISIT (OUTPATIENT)
Dept: HEMATOLOGY/ONCOLOGY | Facility: CLINIC | Age: 63
End: 2018-09-19
Payer: COMMERCIAL

## 2018-09-19 ENCOUNTER — LAB VISIT (OUTPATIENT)
Dept: LAB | Facility: HOSPITAL | Age: 63
End: 2018-09-19
Attending: INTERNAL MEDICINE
Payer: COMMERCIAL

## 2018-09-19 VITALS
OXYGEN SATURATION: 97 % | SYSTOLIC BLOOD PRESSURE: 118 MMHG | DIASTOLIC BLOOD PRESSURE: 77 MMHG | TEMPERATURE: 98 F | BODY MASS INDEX: 21.22 KG/M2 | WEIGHT: 179.69 LBS | HEART RATE: 108 BPM | RESPIRATION RATE: 17 BRPM | HEIGHT: 77 IN

## 2018-09-19 DIAGNOSIS — C79.51 SECONDARY ADENOCARCINOMA OF BONE: ICD-10-CM

## 2018-09-19 DIAGNOSIS — C34.2 PRIMARY CANCER OF RIGHT MIDDLE LOBE OF LUNG: ICD-10-CM

## 2018-09-19 DIAGNOSIS — Z09 CHEMOTHERAPY FOLLOW-UP EXAMINATION: ICD-10-CM

## 2018-09-19 DIAGNOSIS — C34.2 PRIMARY CANCER OF RIGHT MIDDLE LOBE OF LUNG: Primary | ICD-10-CM

## 2018-09-19 DIAGNOSIS — R53.0 NEOPLASTIC MALIGNANT RELATED FATIGUE: ICD-10-CM

## 2018-09-19 DIAGNOSIS — R21 RASH: ICD-10-CM

## 2018-09-19 LAB
ALBUMIN SERPL BCP-MCNC: 3.7 G/DL
ALP SERPL-CCNC: 55 U/L
ALT SERPL W/O P-5'-P-CCNC: 15 U/L
ANION GAP SERPL CALC-SCNC: 7 MMOL/L
AST SERPL-CCNC: 14 U/L
BILIRUB SERPL-MCNC: 0.6 MG/DL
BUN SERPL-MCNC: 18 MG/DL
CALCIUM SERPL-MCNC: 9.5 MG/DL
CHLORIDE SERPL-SCNC: 106 MMOL/L
CO2 SERPL-SCNC: 25 MMOL/L
CREAT SERPL-MCNC: 1 MG/DL
ERYTHROCYTE [DISTWIDTH] IN BLOOD BY AUTOMATED COUNT: 15.4 %
EST. GFR  (AFRICAN AMERICAN): >60 ML/MIN/1.73 M^2
EST. GFR  (NON AFRICAN AMERICAN): >60 ML/MIN/1.73 M^2
GLUCOSE SERPL-MCNC: 87 MG/DL
HCT VFR BLD AUTO: 40.8 %
HGB BLD-MCNC: 13.3 G/DL
IMM GRANULOCYTES # BLD AUTO: 0.02 K/UL
MCH RBC QN AUTO: 27.8 PG
MCHC RBC AUTO-ENTMCNC: 32.6 G/DL
MCV RBC AUTO: 85 FL
NEUTROPHILS # BLD AUTO: 7.8 K/UL
PLATELET # BLD AUTO: 337 K/UL
PMV BLD AUTO: 9.3 FL
POTASSIUM SERPL-SCNC: 3.9 MMOL/L
PROT SERPL-MCNC: 7.8 G/DL
RBC # BLD AUTO: 4.79 M/UL
SODIUM SERPL-SCNC: 138 MMOL/L
T4 FREE SERPL-MCNC: 1.12 NG/DL
TSH SERPL DL<=0.005 MIU/L-ACNC: 1 UIU/ML
WBC # BLD AUTO: 10.1 K/UL

## 2018-09-19 PROCEDURE — 3008F BODY MASS INDEX DOCD: CPT | Mod: CPTII,S$GLB,, | Performed by: INTERNAL MEDICINE

## 2018-09-19 PROCEDURE — 99214 OFFICE O/P EST MOD 30 MIN: CPT | Mod: S$GLB,,, | Performed by: INTERNAL MEDICINE

## 2018-09-19 PROCEDURE — 80053 COMPREHEN METABOLIC PANEL: CPT

## 2018-09-19 PROCEDURE — 99999 PR PBB SHADOW E&M-EST. PATIENT-LVL III: CPT | Mod: PBBFAC,,, | Performed by: INTERNAL MEDICINE

## 2018-09-19 PROCEDURE — 84439 ASSAY OF FREE THYROXINE: CPT

## 2018-09-19 PROCEDURE — 3078F DIAST BP <80 MM HG: CPT | Mod: CPTII,S$GLB,, | Performed by: INTERNAL MEDICINE

## 2018-09-19 PROCEDURE — 3074F SYST BP LT 130 MM HG: CPT | Mod: CPTII,S$GLB,, | Performed by: INTERNAL MEDICINE

## 2018-09-19 PROCEDURE — 84443 ASSAY THYROID STIM HORMONE: CPT

## 2018-09-19 PROCEDURE — 36415 COLL VENOUS BLD VENIPUNCTURE: CPT

## 2018-09-19 PROCEDURE — 85027 COMPLETE CBC AUTOMATED: CPT

## 2018-09-19 RX ORDER — SODIUM CHLORIDE 0.9 % (FLUSH) 0.9 %
10 SYRINGE (ML) INJECTION
Status: CANCELLED | OUTPATIENT
Start: 2018-09-19

## 2018-09-19 RX ORDER — HEPARIN 100 UNIT/ML
500 SYRINGE INTRAVENOUS
Status: CANCELLED | OUTPATIENT
Start: 2018-09-19

## 2018-09-19 NOTE — PROGRESS NOTES
PATIENT: Angel Torres  MRN: [id number]  DATE: 9/19/2018      Diagnosis:   1. Primary cancer of right middle lobe of lung    2. Secondary adenocarcinoma of bone    3. Rash        Chief Complaint: No chief complaint on file.      Oncologic History:      Oncologic History Non-small cell lung cancer diagnosed 6/2016   Recurrent disease to lung 4/2017     Oncologic Treatment Cisplatin/Pemetrexed initiated 8/15/16 with concurrent radiation completed 3 cycles 10/3/16; 64 Gy  Carboplatin/pemetrexed/pembrolizumab 7/2017 - 9/2017  Pembrolizumab maintenance 11/2017     Pathology  6/2016 : Adenocarcinoma, T2b, N2, M0, Stage IIIA          Subjective:    Interval History: Mr. Torres is a 63 y.o. male who was seen in follow-up for lung cancer.  He states he has generally been feeling well.  Breathing is unchanged.  His rash is actually getting better.  No other new complaints.    His history dates to approximately 4/2016 when he noted a worsening cough which was associated with clear sputum.  He had a chest x-ray in 5/16 showing a right middle lobe opacity.  Subsequent CT of the chest was performed on 5/30/16 showing a 5.6 cm lesion in the right middle lobe with hilar and mediastinal lymphadenopathy along with hepatic lesions and a left adrenal mass..  He underwent bronchoscopy which was nonrevealing and transbronchial biopsies were nondiagnostic.  He then underwent EBUS with biopsy of mediastinal lymph nodes with pathology showing non-small cell lung cancer consistent with adenocarcinoma.  He was started on concurrent chemotherapy and radiation with cisplatin and pemetrexed on 8/15/16.  He completed 64 Gy of concurrent radiation with cisplatin and pemetrexed with chemotherapy completed on 10/3/16.  Scans in 4/2017 had indicated progressive disease in the lung.  Subsequent PET scan in 7/2017 had shown spread of disease to bone.  He was started on carboplatin and pemetrexed and pembrolizumab in 7/2017.  Repeat imaging in 8/2017 had  shown stability of disease.  He was started on pembrolizumab maintenance in 11/2017 after a delay due to his insurance.  Repeat imaging in 12/2016 had shown mild progressive disease, however, it was felt that due to the excessive delay in treatment we would proceed on rather than change therapy at this time.  CT in 3/2018 had shown stability of his disease.  CT in 05/2018 had shown stable disease.  CT in 08/2018 had shown stable disease.    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    Skin infection 8/8/2018       Past Surgical HIstory:   Past Surgical History:   Procedure Laterality Date    BRONCHOSCOPY N/A 6/28/2016    Performed by [facility] Wallace [facility] at [facility]    BRONCHOSCOPY N/A 6/13/2016    Performed by [facility] Provider at [facility] OR [address] [place] [address]    ENDOBRONCHIAL ULTRASOUND (EBUS) N/A 6/28/2016    Performed by [facility] Wallace [facility] at [facility]    EXCISION, MASS, LOWER EXTREMITY Left 4/21/2014    [facility]    INGUINAL HERNIA REPAIR Bilateral     KNEE SURGERY      REPAIR, HERNIA, INGUINAL, BILATERAL, LAPAROSCOPIC Bilateral 4/21/2014    Performed by Ryne Dai MD at [facility] OR [address] [place] [address]       Family History:   Family History   Problem Relation Age of Onset    Cancer Mother         lung, breast    Cancer Sister         lung    Cancer Maternal Grandfather     No Known Problems Father     No Known Problems Brother     No Known Problems Maternal Aunt     No Known Problems Maternal Uncle     No Known Problems Paternal Aunt     No Known Problems Paternal Uncle     No Known  Problems Maternal Grandmother     No Known Problems Paternal Grandmother     No Known Problems Paternal Grandfather     Amblyopia Neg Hx     Blindness Neg Hx     Cataracts Neg Hx     Diabetes Neg Hx     Glaucoma Neg Hx     Hypertension Neg Hx     Macular degeneration Neg Hx     Retinal detachment Neg Hx     Strabismus Neg Hx     Stroke Neg Hx     Thyroid disease Neg Hx        Social History:  reports that he quit smoking about 4 years ago. He has a 80.00 pack-year smoking history. He has quit using smokeless tobacco. He reports that he does not drink alcohol or use drugs.    Allergies:  Review of patient's allergies indicates:  No Known Allergies    Medications:  Current Outpatient Medications   Medication Sig Dispense Refill    albuterol-ipratropium (DUO-NEB) 2.5 mg-0.5 mg/3 mL nebulizer solution Take 3 mLs by nebulization every 6 (six) hours as needed for Wheezing or Shortness of Breath. 100 vial 2    amLODIPine (NORVASC) 10 MG tablet Take 1 tablet (10 mg total) by mouth once daily. 90 tablet 3    dronabinol (MARINOL) 5 MG capsule Take 1 capsule (5 mg total) by mouth 2 (two) times daily before meals. 60 capsule 0    fluticasone (FLONASE) 50 mcg/actuation nasal spray SHAKE LIQUID AND USE 2 SPRAYS IN EACH NOSTRIL EVERY DAY 16 g 1    folic acid (FOLVITE) 400 MCG tablet TAKE 1 TABLET(400 MCG) BY MOUTH EVERY  tablet 0    naproxen (NAPROSYN) 500 MG tablet Take 1 tablet (500 mg total) by mouth 2 (two) times daily as needed (headache). 60 tablet 0    PROAIR HFA 90 mcg/actuation inhaler INHALE 2 PUFFS INTO THE LUNGS EVERY 6 HOURS AS NEEDED FOR WHEEZING 17 g 0    cetirizine (ZYRTEC) 10 MG tablet Take 1 tablet (10 mg total) by mouth once daily.  0     Current Facility-Administered Medications   Medication Dose Route Frequency Provider Last Rate Last Dose    cyanocobalamin injection 1,000 mcg  1,000 mcg Intramuscular 1 time in Clinic/HOD Dano Fernandez DO, FACP        cyanocobalamin injection  "1,000 mcg  1,000 mcg Intramuscular 1 time in Clinic/HOD Dano Fernandez, DO, FACP           Review of Systems   Constitutional: Negative for activity change, appetite change, chills, fatigue, fever and unexpected weight change.        Normal weight 220   HENT: Negative for dental problem, nosebleeds, sinus pressure, sneezing and trouble swallowing.    Eyes: Negative for visual disturbance.   Respiratory: Positive for shortness of breath. Negative for cough, choking, chest tightness and wheezing.    Cardiovascular: Negative for chest pain and leg swelling.   Gastrointestinal: Negative for abdominal pain, blood in stool, constipation, diarrhea and nausea.   Genitourinary: Negative for difficulty urinating and dysuria.   Musculoskeletal: Positive for myalgias. Negative for arthralgias and back pain.   Skin: Positive for rash and wound.   Neurological: Negative for dizziness, light-headedness and headaches.   Hematological: Negative for adenopathy. Does not bruise/bleed easily.   Psychiatric/Behavioral: Negative for sleep disturbance. The patient is not nervous/anxious.        ECOG Performance Status: 1   Objective:      Vitals:   Vitals:    09/19/18 1127   BP: 118/77   Pulse: 108   Resp: 17   Temp: 97.5 °F (36.4 °C)   SpO2: 97%   Weight: 81.5 kg (179 lb 10.8 oz)   Height: 6' 5" (1.956 m)     BMI: Body mass index is 21.31 kg/m².    Physical Exam   Constitutional: He is oriented to person, place, and time. He appears well-developed and well-nourished.   HENT:   Head: Normocephalic and atraumatic.   Eyes: Pupils are equal, round, and reactive to light.   Neck: Normal range of motion. Neck supple.   Cardiovascular: Normal rate and regular rhythm.   Pulmonary/Chest: Effort normal. No respiratory distress. He has no rales.   Abdominal: Soft. He exhibits no distension. There is no tenderness.   Musculoskeletal: He exhibits no edema or tenderness.   Lymphadenopathy:     He has no cervical adenopathy.   Neurological: He is " alert and oriented to person, place, and time. No cranial nerve deficit.   Skin: Skin is warm and dry. Rash noted.   Psychiatric: He has a normal mood and affect. His behavior is normal.       Laboratory Data:  Lab Visit on 09/19/2018   Component Date Value Ref Range Status    Free T4 09/19/2018 1.12  0.71 - 1.51 ng/dL Final    WBC 09/19/2018 10.10  3.90 - 12.70 K/uL Final    RBC 09/19/2018 4.79  4.60 - 6.20 M/uL Final    Hemoglobin 09/19/2018 13.3* 14.0 - 18.0 g/dL Final    Hematocrit 09/19/2018 40.8  40.0 - 54.0 % Final    MCV 09/19/2018 85  82 - 98 fL Final    MCH 09/19/2018 27.8  27.0 - 31.0 pg Final    MCHC 09/19/2018 32.6  32.0 - 36.0 g/dL Final    RDW 09/19/2018 15.4* 11.5 - 14.5 % Final    Platelets 09/19/2018 337  150 - 350 K/uL Final    MPV 09/19/2018 9.3  9.2 - 12.9 fL Final    Gran # (ANC) 09/19/2018 7.8* 1.8 - 7.7 K/uL Final    Comment: The ANC is based on a white cell differential from an   automated cell counter. It has not been microscopically   reviewed for the presence of abnormal cells. Clinical   correlation is required.      Immature Grans (Abs) 09/19/2018 0.02  0.00 - 0.04 K/uL Final    Comment: Mild elevation in immature granulocytes is non specific and   can be seen in a variety of conditions including stress response,   acute inflammation, trauma and pregnancy. Correlation with other   laboratory and clinical findings is essential.      Sodium 09/19/2018 138  136 - 145 mmol/L Final    Potassium 09/19/2018 3.9  3.5 - 5.1 mmol/L Final    Chloride 09/19/2018 106  95 - 110 mmol/L Final    CO2 09/19/2018 25  23 - 29 mmol/L Final    Glucose 09/19/2018 87  70 - 110 mg/dL Final    BUN, Bld 09/19/2018 18  8 - 23 mg/dL Final    Creatinine 09/19/2018 1.0  0.5 - 1.4 mg/dL Final    Calcium 09/19/2018 9.5  8.7 - 10.5 mg/dL Final    Total Protein 09/19/2018 7.8  6.0 - 8.4 g/dL Final    Albumin 09/19/2018 3.7  3.5 - 5.2 g/dL Final    Total Bilirubin 09/19/2018 0.6  0.1 - 1.0 mg/dL  Final    Comment: For infants and newborns, interpretation of results should be based  on gestational age, weight and in agreement with clinical  observations.  Premature Infant recommended reference ranges:  Up to 24 hours.............<8.0 mg/dL  Up to 48 hours............<12.0 mg/dL  3-5 days..................<15.0 mg/dL  6-29 days.................<15.0 mg/dL      Alkaline Phosphatase 09/19/2018 55  55 - 135 U/L Final    AST 09/19/2018 14  10 - 40 U/L Final    ALT 09/19/2018 15  10 - 44 U/L Final    Anion Gap 09/19/2018 7* 8 - 16 mmol/L Final    eGFR if African American 09/19/2018 >60.0  >60 mL/min/1.73 m^2 Final    eGFR if non African American 09/19/2018 >60.0  >60 mL/min/1.73 m^2 Final    Comment: Calculation used to obtain the estimated glomerular filtration  rate (eGFR) is the CKD-EPI equation.       TSH 09/19/2018 0.998  0.400 - 4.000 uIU/mL Final   Lab Visit on 08/29/2018   Component Date Value Ref Range Status    WBC 08/29/2018 7.16  3.90 - 12.70 K/uL Final    RBC 08/29/2018 5.13  4.60 - 6.20 M/uL Final    Hemoglobin 08/29/2018 14.3  14.0 - 18.0 g/dL Final    Hematocrit 08/29/2018 44.1  40.0 - 54.0 % Final    MCV 08/29/2018 86  82 - 98 fL Final    MCH 08/29/2018 27.9  27.0 - 31.0 pg Final    MCHC 08/29/2018 32.4  32.0 - 36.0 g/dL Final    RDW 08/29/2018 15.1* 11.5 - 14.5 % Final    Platelets 08/29/2018 362* 150 - 350 K/uL Final    MPV 08/29/2018 9.7  9.2 - 12.9 fL Final    Immature Granulocytes 08/29/2018 0.4  0.0 - 0.5 % Final    Gran # (ANC) 08/29/2018 4.9  1.8 - 7.7 K/uL Final    Immature Grans (Abs) 08/29/2018 0.03  0.00 - 0.04 K/uL Final    Comment: Mild elevation in immature granulocytes is non specific and   can be seen in a variety of conditions including stress response,   acute inflammation, trauma and pregnancy. Correlation with other   laboratory and clinical findings is essential.      Lymph # 08/29/2018 1.2  1.0 - 4.8 K/uL Final    Mono # 08/29/2018 0.8  0.3 - 1.0 K/uL  Final    Eos # 08/29/2018 0.2  0.0 - 0.5 K/uL Final    Baso # 08/29/2018 0.03  0.00 - 0.20 K/uL Final    nRBC 08/29/2018 0  0 /100 WBC Final    Gran% 08/29/2018 68.7  38.0 - 73.0 % Final    Lymph% 08/29/2018 17.0* 18.0 - 48.0 % Final    Mono% 08/29/2018 11.0  4.0 - 15.0 % Final    Eosinophil% 08/29/2018 2.5  0.0 - 8.0 % Final    Basophil% 08/29/2018 0.4  0.0 - 1.9 % Final    Differential Method 08/29/2018 Automated   Final    Sodium 08/29/2018 140  136 - 145 mmol/L Final    Potassium 08/29/2018 4.3  3.5 - 5.1 mmol/L Final    Chloride 08/29/2018 105  95 - 110 mmol/L Final    CO2 08/29/2018 27  23 - 29 mmol/L Final    Glucose 08/29/2018 68* 70 - 110 mg/dL Final    BUN, Bld 08/29/2018 16  8 - 23 mg/dL Final    Creatinine 08/29/2018 1.2  0.5 - 1.4 mg/dL Final    Calcium 08/29/2018 10.0  8.7 - 10.5 mg/dL Final    Total Protein 08/29/2018 8.2  6.0 - 8.4 g/dL Final    Albumin 08/29/2018 3.9  3.5 - 5.2 g/dL Final    Total Bilirubin 08/29/2018 0.6  0.1 - 1.0 mg/dL Final    Comment: For infants and newborns, interpretation of results should be based  on gestational age, weight and in agreement with clinical  observations.  Premature Infant recommended reference ranges:  Up to 24 hours.............<8.0 mg/dL  Up to 48 hours............<12.0 mg/dL  3-5 days..................<15.0 mg/dL  6-29 days.................<15.0 mg/dL      Alkaline Phosphatase 08/29/2018 58  55 - 135 U/L Final    AST 08/29/2018 18  10 - 40 U/L Final    ALT 08/29/2018 18  10 - 44 U/L Final    Anion Gap 08/29/2018 8  8 - 16 mmol/L Final    eGFR if African American 08/29/2018 >60.0  >60 mL/min/1.73 m^2 Final    eGFR if non African American 08/29/2018 >60.0  >60 mL/min/1.73 m^2 Final    Comment: Calculation used to obtain the estimated glomerular filtration  rate (eGFR) is the CKD-EPI equation.       TSH 08/29/2018 2.200  0.400 - 4.000 uIU/mL Final     Supplemental Diagnosis 6/30/16  Immunohistochemical stains are completed on the  Station 4L lymph node cell block material and reveal the tumor  cells to stain positively with cytokeratin 7 and TTF-1. The tumor cells show nonspecific blush staining with  cytokeratin 5/6 and are negative for p63. This staining profile supports a diagnosis of non-small cell carcinoma  consistent with adenocarcinoma.  Cell block material will be sent for EGFR, ALK and ROS-1 testing and results will follow in a supplemental report.  (Electronically Signed: 2016-06-30 11:50:31 )  Diagnosed by: Irene Amaro M.D.  FINAL PATHOLOGIC DIAGNOSIS  1. Lymph node, Station 4L, EBUS guided fine needle aspiration:  Positive for malignant cells, non-small cell carcinoma.  Rare background lymphocytes are present.  Immunohistochemical staining be completed to further characterize this malignancy and results will follow in a  supplemental report.  2. Lymph node, Station 7, EBUS guided fine needle aspiration:  Positive for malignant cells, non-small cell carcinoma tumor identical to that seen in specimen #1.  Background lymphocytes are present.    Imaging:   CT 08/21/2018  EXAMINATION:  CT CHEST ABDOMEN PELVIS WITH CONTRAST (XPD)    CLINICAL HISTORY:  Neoplasm: abdomen, metastatic, recurrence, suspected/known; Neoplasm of unspecified behavior of other specified sites    TECHNIQUE:  Low dose axial images, sagittal and coronal reformations were obtained from the neck base to the pubic symphysis after the administration of 100 cc Omnipaque 350 intravenous contrast with abdominal imaging obtained during arterial, portal venous, and delayed phases.  Oral contrast was not administered.    COMPARISON:  CT chest abdomen pelvis 05/30/2018, PET-CT 07/19/2017    FINDINGS:  Base of Neck: No significant abnormality.    CHEST:    -Heart: Normal size. No pericardial effusion.  There is calcific coronary artery atherosclerosis..    -Pulmonary vasculature: Pulmonary arteries distribute normally.  There are four pulmonary  veins.    -Denisse/Mediastinum: No pathologic kervin enlargement.    -Trachea and Proximal airways: Patent.    -Lungs/Pleura: There is extensive bilateral centrilobular emphysematous change.  No pneumothorax.  Redemonstration of a soft tissue opacity at the right hilum measuring 3.8 x 3.6 cm (previously 3.7 x 3.4 cm), which continues to narrow the origin of the right middle lobe bronchus with distal airway obstruction and consequent significant middle lobe volume loss, unchanged.  Slight differences in measurement of this right hilar lesion may correlate with interobserver error and differences in technique rather than interval enlargement of this lesion.  Persistent bandlike, ground-glass, and patchy consolidative opacity within the right lower lobe, noting slight interval increase in consolidative component.  These findings are favored to correlate with post radiation therapy change given lack of hypermetabolic activity on PET.  Superimposed infectious or inflammatory change cannot be excluded.  No significant pleural thickening.  There is slight interval increase in small volume right pleural fluid.    -Esophagus: Normal in course and caliber.    ABDOMEN:    - Liver: Normal in size and attenuation, noting geographic alteration perfusion on arterial phase which normalizes on portal venous phase.  There are multiple hepatic hypodensities, the largest of which demonstrate attenuation compatible with cysts.  Smaller hypodensities are too small to characterize.  The largest lesion measures 1.4 cm within hepatic segment 6.    - Gallbladder: No calcified gallstones.  No wall thickening or pericholecystic fluid.    - Bile Ducts: No intra or extrahepatic biliary ductal dilatation.    - Stomach/Duodenum: Unremarkable.    - Spleen: Unremarkable.    - Pancreas: Unremarkable.    - Adrenals: The right adrenal gland is unremarkable.  There is a stable nonspecific 1.2 cm left adrenal nodule.    - Kidneys/ureters/urinary bladder:  Normal in size and location, concentrating excreting contrast appropriately on delayed imaging.  No hydronephrosis or stones.  Tiny hypodensities at the bilateral inferior renal poles are too small to characterize.  The ureters appear normal in course and caliber without evidence of ureteral dilatation.  There is a diverticulum at the anterior aspect of the bladder dome, best appreciated on sagittal imaging.  There is abnormal wall thickening of the diverticulum and adjacent bladder dome, new since CT 05/30/2018.    - Retroperitoneum: No significant adenopathy.    PELVIS:    - Reproductive: Unremarkable.    - Other: No pelvic adenopathy, free fluid, or mass.    BOWEL/MESENTERY:    No evidence of bowel obstruction or inflammatory process. There is moderate stool within the colon.  No intraperitoneal free air or free fluid.    VASCULATURE: Left-sided aortic arch with 4 branch vessels, the left vertebral artery arising directly from the arch.  No aneurysm.  There is mild moderate calcific aortoiliac atherosclerosis.    BONES: No acute fracture or bony destructive process.  Stable sclerotic foci within the T1 vertebral body and posterior left iliac wing are compatible with this patient's known osseous metastatic disease.  No new lesions identified.  There is mild degenerative change of the spine.    EXTRATHORACIC/EXTRAPERITONEAL SOFT TISSUES: There is bilateral gynecomastia.      Impression       In this patient with a history of lung malignancy, grossly stable appearance of a right middle lobe mass which was previously shown to be hypermetabolic on PET-CT.  No new pulmonary lesions are identified.    Stable osseous metastatic disease within the T1 vertebral body and left iliac wing.    Persistent patchy opacification of the right lower lobe with slight increase in consolidative component, possibly correlating with pneumonitis or postradiation therapy change.  Superimposed infectious or inflammatory change cannot be  excluded.    Slight interval increase in right pleural fluid.    New abnormal wall thickening of the urinary bladder within and adjacent to a small bladder diverticulum.  Urologic consultation/cystoscopy is recommended for further evaluation.    Multiple additional findings as detailed above.    RECIST SUMMARY:    Date of prior examination for comparison: 05/30/2018    Lesion 1: Right middle lobe.  3.8 cm. Series 3 image 80.  Prior measurement 3.7 cm.    This report was flagged in Epic as abnormal.                Assessment:       1. Primary cancer of right middle lobe of lung    2. Secondary adenocarcinoma of bone    3. Rash           Plan:     Mr. Torres still has ongoing rash in awaiting follow-up with Dermatology.  I am not sure if this is related to pembrolizumab.  He will continue on with pembrolizumab next week as he was unable to get a chair for today.  Labs are appropriate to continue.  Follow back up me prior to next cycle.  All questions were answered and he is agreeable with this plan.    Dano Fernandez DO, FACP  Hematology & Oncology  [facility] [place] [address]  ph. [phone]  Fax. [phone]    25 minutes were spent in coordination of patient's care, record review and counseling.  More than 50% of the time was face-to-face.

## 2018-09-26 ENCOUNTER — INFUSION (OUTPATIENT)
Dept: INFUSION THERAPY | Facility: HOSPITAL | Age: 63
End: 2018-09-26
Attending: INTERNAL MEDICINE
Payer: COMMERCIAL

## 2018-09-26 VITALS
TEMPERATURE: 98 F | RESPIRATION RATE: 20 BRPM | HEART RATE: 104 BPM | DIASTOLIC BLOOD PRESSURE: 75 MMHG | SYSTOLIC BLOOD PRESSURE: 131 MMHG

## 2018-09-26 DIAGNOSIS — C34.2 PRIMARY CANCER OF RIGHT MIDDLE LOBE OF LUNG: Primary | ICD-10-CM

## 2018-09-26 DIAGNOSIS — R59.0 MEDIASTINAL ADENOPATHY: ICD-10-CM

## 2018-09-26 PROCEDURE — 25000003 PHARM REV CODE 250: Performed by: INTERNAL MEDICINE

## 2018-09-26 PROCEDURE — 96413 CHEMO IV INFUSION 1 HR: CPT

## 2018-09-26 PROCEDURE — 63600175 PHARM REV CODE 636 W HCPCS: Mod: JG | Performed by: INTERNAL MEDICINE

## 2018-09-26 RX ADMIN — SODIUM CHLORIDE 200 MG: 9 INJECTION, SOLUTION INTRAVENOUS at 08:09

## 2018-09-26 RX ADMIN — SODIUM CHLORIDE: 0.9 INJECTION, SOLUTION INTRAVENOUS at 08:09

## 2018-09-26 NOTE — PLAN OF CARE
Problem: Patient Care Overview  Goal: Plan of Care Review  Outcome: Ongoing (interventions implemented as appropriate)  Pt tolerated Keytruda with no complications. Pt instructed to call MD with any problems. NAD. Pt discharged home independently with sister at side.

## 2018-10-02 ENCOUNTER — TELEPHONE (OUTPATIENT)
Dept: HEMATOLOGY/ONCOLOGY | Facility: CLINIC | Age: 63
End: 2018-10-02

## 2018-10-02 NOTE — TELEPHONE ENCOUNTER
Let patient know that we would try to contact the derm's office to see if there was anyone that could see him earlier.

## 2018-10-02 NOTE — TELEPHONE ENCOUNTER
----- Message from Edel Fay sent at 10/2/2018 11:02 AM CDT -----  Contact: Pt   Pt wife called to speak with nurse regino have a few questions   Callback#[phone]  Thank You  ABRAHAN Fay

## 2018-10-09 ENCOUNTER — OFFICE VISIT (OUTPATIENT)
Dept: DERMATOLOGY | Facility: CLINIC | Age: 63
End: 2018-10-09
Payer: COMMERCIAL

## 2018-10-09 DIAGNOSIS — R21 RASH AND NONSPECIFIC SKIN ERUPTION: Primary | ICD-10-CM

## 2018-10-09 PROCEDURE — 99203 OFFICE O/P NEW LOW 30 MIN: CPT | Mod: 25,S$GLB,, | Performed by: DERMATOLOGY

## 2018-10-09 PROCEDURE — 88312 SPECIAL STAINS GROUP 1: CPT | Mod: 26,,, | Performed by: PATHOLOGY

## 2018-10-09 PROCEDURE — 87070 CULTURE OTHR SPECIMN AEROBIC: CPT

## 2018-10-09 PROCEDURE — 11101 PR BIOPSY, EACH ADDED LESION: CPT | Mod: S$GLB,,, | Performed by: DERMATOLOGY

## 2018-10-09 PROCEDURE — 87075 CULTR BACTERIA EXCEPT BLOOD: CPT

## 2018-10-09 PROCEDURE — 88305 TISSUE EXAM BY PATHOLOGIST: CPT | Mod: 26,,, | Performed by: PATHOLOGY

## 2018-10-09 PROCEDURE — 99999 PR PBB SHADOW E&M-EST. PATIENT-LVL IV: CPT | Mod: PBBFAC,,, | Performed by: DERMATOLOGY

## 2018-10-09 PROCEDURE — 87107 FUNGI IDENTIFICATION MOLD: CPT

## 2018-10-09 PROCEDURE — 87206 SMEAR FLUORESCENT/ACID STAI: CPT

## 2018-10-09 PROCEDURE — 87101 SKIN FUNGI CULTURE: CPT

## 2018-10-09 PROCEDURE — 88312 SPECIAL STAINS GROUP 1: CPT | Performed by: PATHOLOGY

## 2018-10-09 PROCEDURE — 11100 PR BIOPSY OF SKIN LESION: CPT | Mod: S$GLB,,, | Performed by: DERMATOLOGY

## 2018-10-09 PROCEDURE — 88305 TISSUE EXAM BY PATHOLOGIST: CPT | Performed by: PATHOLOGY

## 2018-10-09 PROCEDURE — 87116 MYCOBACTERIA CULTURE: CPT

## 2018-10-09 PROCEDURE — 87176 TISSUE HOMOGENIZATION CULTR: CPT

## 2018-10-09 RX ORDER — MUPIROCIN 20 MG/G
OINTMENT TOPICAL 2 TIMES DAILY
Qty: 22 G | Refills: 2 | Status: SHIPPED | OUTPATIENT
Start: 2018-10-09 | End: 2019-10-30 | Stop reason: SDUPTHER

## 2018-10-09 RX ORDER — TRIAMCINOLONE ACETONIDE 1 MG/G
OINTMENT TOPICAL
Qty: 80 G | Refills: 2 | Status: SHIPPED | OUTPATIENT
Start: 2018-10-09 | End: 2019-10-16 | Stop reason: SDUPTHER

## 2018-10-09 NOTE — PROGRESS NOTES
Subjective:       Patient ID:  Angel Torres is a 63 y.o. male who presents for   Chief Complaint   Patient presents with    Rash     body, x 2 months, itchy, bleeds, painful, tx calamine & oral abx     64 yo male with history of lung cancer with bone metastases, currently on Pembro, here for evaluation of a new rash.    It has been ongoing for past 2 months and starts as red bumps that then become crusted and painful.  Has been treated with oral antibiotics (Bactrim) and calamine lotion.    No prior history of eczema or psoriasis.        Review of Systems   Constitutional: Negative for fever and chills.   Skin: Positive for itching, rash and dry skin.   Hematologic/Lymphatic: Does not bruise/bleed easily.        Objective:    Physical Exam   Constitutional: He appears well-developed and well-nourished. No distress.   Neurological: He is alert and oriented to person, place, and time. He is not disoriented.   Psychiatric: He has a normal mood and affect.   Skin:   Areas Examined (abnormalities noted in diagram):   Scalp / Hair Palpated and Inspected  Head / Face Inspection Performed  Neck Inspection Performed  Chest / Axilla Inspection Performed  Abdomen Inspection Performed  Genitals / Buttocks / Groin Inspection Performed  Back Inspection Performed  RUE Inspected  LUE Inspection Performed  RLE Inspected  LLE Inspection Performed  Nails and Digits Inspection Performed              Diagram Legend     Erythematous scaling macule/papule c/w actinic keratosis       Vascular papule c/w angioma      Pigmented verrucoid papule/plaque c/w seborrheic keratosis      Yellow umbilicated papule c/w sebaceous hyperplasia      Irregularly shaped tan macule c/w lentigo     1-2 mm smooth white papules consistent with Milia      Movable subcutaneous cyst with punctum c/w epidermal inclusion cyst      Subcutaneous movable cyst c/w pilar cyst      Firm pink to brown papule c/w dermatofibroma      Pedunculated fleshy papule(s) c/w skin  tag(s)      Evenly pigmented macule c/w junctional nevus     Mildly variegated pigmented, slightly irregular-bordered macule c/w mildly atypical nevus      Flesh colored to evenly pigmented papule c/w intradermal nevus       Pink pearly papule/plaque c/w basal cell carcinoma      Erythematous hyperkeratotic cursted plaque c/w SCC      Surgical scar with no sign of skin cancer recurrence      Open and closed comedones      Inflammatory papules and pustules      Verrucoid papule consistent consistent with wart     Erythematous eczematous patches and plaques     Dystrophic onycholytic nail with subungual debris c/w onychomycosis     Umbilicated papule    Erythematous-base heme-crusted tan verrucoid plaque consistent with inflamed seborrheic keratosis     Erythematous Silvery Scaling Plaque c/w Psoriasis     See annotation                      Assessment / Plan:      Pathology Orders:     Normal Orders This Visit    Tissue Specimen To Pathology, Dermatology     Questions:    Directional Terms:  Other(comment)    Clinical information:  hyperkeratotic annular plaques in pt with lung ca - infection vs PSO vs malignancy    Specific Site:  right shin    Tissue Specimen To Pathology, Dermatology     Questions:    Directional Terms:  Other(comment)    Clinical information:  itchy annular hyperkeratotic plaques - pt with lung ca - infection vs PSO vs perforating disorder    Specific Site:  right upper arm        Annular hyperkeratotic plaques in patient with metastatic lung adenocarcinoma -     Two skin biopsies taken for H&E today, one of a newer red papule and another of an older more hyperkeratotic papule -   One biopsy taken for pan tissue culture as well    Overall I need pathology and cultures to help render a diagnosis and treatment, but possible options include: paraneoplastic psoriasiform dermatitis vs atypical mycobacterial infection vs perforating disorder vs cutaneous metastases.    I do not feel this to be related  to his underlying Pembro, but will correlate more definitively once biopsies have returned.    -     Tissue Specimen To Pathology, Dermatology  -     Tissue Specimen To Pathology, Dermatology  -     Aerobic culture  -     Fungal culture , skin, hair, or nails  -     AFB Culture & Smear  -     CULTURE, ANAEROBE    While awaiting pathology results -   -     mupirocin (BACTROBAN) 2 % ointment; Apply topically 2 (two) times daily. To open areas  Dispense: 22 g; Refill: 2  -     triamcinolone acetonide 0.1% (KENALOG) 0.1 % ointment; To all rashes BID  Dispense: 80 g; Refill: 2           Follow-up in about 2 weeks (around 10/23/2018).

## 2018-10-09 NOTE — Clinical Note
MARKO - unusual rash - I did two biopsies and tissue culture. Doubt its related to the Pembro. Will keep you posted..Dulce (derm)

## 2018-10-12 LAB — BACTERIA SPEC AEROBE CULT: NORMAL

## 2018-10-15 LAB — BACTERIA SPEC ANAEROBE CULT: NORMAL

## 2018-10-24 ENCOUNTER — LAB VISIT (OUTPATIENT)
Dept: LAB | Facility: HOSPITAL | Age: 63
End: 2018-10-24
Attending: INTERNAL MEDICINE
Payer: COMMERCIAL

## 2018-10-24 ENCOUNTER — INFUSION (OUTPATIENT)
Dept: INFUSION THERAPY | Facility: HOSPITAL | Age: 63
End: 2018-10-24
Attending: INTERNAL MEDICINE
Payer: COMMERCIAL

## 2018-10-24 ENCOUNTER — OFFICE VISIT (OUTPATIENT)
Dept: HEMATOLOGY/ONCOLOGY | Facility: CLINIC | Age: 63
End: 2018-10-24
Payer: COMMERCIAL

## 2018-10-24 ENCOUNTER — OFFICE VISIT (OUTPATIENT)
Dept: DERMATOLOGY | Facility: CLINIC | Age: 63
End: 2018-10-24
Payer: COMMERCIAL

## 2018-10-24 VITALS
BODY MASS INDEX: 21.32 KG/M2 | WEIGHT: 180.56 LBS | HEIGHT: 77 IN | SYSTOLIC BLOOD PRESSURE: 125 MMHG | OXYGEN SATURATION: 97 % | RESPIRATION RATE: 18 BRPM | TEMPERATURE: 98 F | HEART RATE: 114 BPM | DIASTOLIC BLOOD PRESSURE: 81 MMHG

## 2018-10-24 VITALS
TEMPERATURE: 98 F | DIASTOLIC BLOOD PRESSURE: 84 MMHG | HEART RATE: 113 BPM | SYSTOLIC BLOOD PRESSURE: 127 MMHG | RESPIRATION RATE: 20 BRPM

## 2018-10-24 DIAGNOSIS — R53.0 NEOPLASTIC MALIGNANT RELATED FATIGUE: ICD-10-CM

## 2018-10-24 DIAGNOSIS — C34.2 PRIMARY CANCER OF RIGHT MIDDLE LOBE OF LUNG: Primary | ICD-10-CM

## 2018-10-24 DIAGNOSIS — C34.2 PRIMARY CANCER OF RIGHT MIDDLE LOBE OF LUNG: ICD-10-CM

## 2018-10-24 DIAGNOSIS — R21 RASH: ICD-10-CM

## 2018-10-24 DIAGNOSIS — L43.9 LICHEN PLANUS: Primary | ICD-10-CM

## 2018-10-24 DIAGNOSIS — Z09 CHEMOTHERAPY FOLLOW-UP EXAMINATION: ICD-10-CM

## 2018-10-24 DIAGNOSIS — C79.51 SECONDARY ADENOCARCINOMA OF BONE: ICD-10-CM

## 2018-10-24 DIAGNOSIS — R59.0 MEDIASTINAL ADENOPATHY: ICD-10-CM

## 2018-10-24 LAB
ALBUMIN SERPL BCP-MCNC: 3.9 G/DL
ALP SERPL-CCNC: 61 U/L
ALT SERPL W/O P-5'-P-CCNC: 21 U/L
ANION GAP SERPL CALC-SCNC: 8 MMOL/L
AST SERPL-CCNC: 20 U/L
BILIRUB SERPL-MCNC: 0.4 MG/DL
BUN SERPL-MCNC: 21 MG/DL
CALCIUM SERPL-MCNC: 10.4 MG/DL
CHLORIDE SERPL-SCNC: 105 MMOL/L
CO2 SERPL-SCNC: 26 MMOL/L
CREAT SERPL-MCNC: 1.1 MG/DL
ERYTHROCYTE [DISTWIDTH] IN BLOOD BY AUTOMATED COUNT: 15.6 %
EST. GFR  (AFRICAN AMERICAN): >60 ML/MIN/1.73 M^2
EST. GFR  (NON AFRICAN AMERICAN): >60 ML/MIN/1.73 M^2
GLUCOSE SERPL-MCNC: 106 MG/DL
HCT VFR BLD AUTO: 44.4 %
HGB BLD-MCNC: 14.1 G/DL
IMM GRANULOCYTES # BLD AUTO: 0.02 K/UL
MCH RBC QN AUTO: 27.1 PG
MCHC RBC AUTO-ENTMCNC: 31.8 G/DL
MCV RBC AUTO: 85 FL
NEUTROPHILS # BLD AUTO: 5.3 K/UL
PLATELET # BLD AUTO: 319 K/UL
PMV BLD AUTO: 9.9 FL
POTASSIUM SERPL-SCNC: 4.4 MMOL/L
PROT SERPL-MCNC: 8.3 G/DL
RBC # BLD AUTO: 5.2 M/UL
SODIUM SERPL-SCNC: 139 MMOL/L
TSH SERPL DL<=0.005 MIU/L-ACNC: 1.87 UIU/ML
WBC # BLD AUTO: 7.57 K/UL

## 2018-10-24 PROCEDURE — 99213 OFFICE O/P EST LOW 20 MIN: CPT | Mod: S$GLB,,, | Performed by: DERMATOLOGY

## 2018-10-24 PROCEDURE — 3074F SYST BP LT 130 MM HG: CPT | Mod: CPTII,S$GLB,, | Performed by: DERMATOLOGY

## 2018-10-24 PROCEDURE — 85027 COMPLETE CBC AUTOMATED: CPT

## 2018-10-24 PROCEDURE — 99999 PR PBB SHADOW E&M-EST. PATIENT-LVL IV: CPT | Mod: PBBFAC,,, | Performed by: INTERNAL MEDICINE

## 2018-10-24 PROCEDURE — 3079F DIAST BP 80-89 MM HG: CPT | Mod: CPTII,S$GLB,, | Performed by: INTERNAL MEDICINE

## 2018-10-24 PROCEDURE — 25000003 PHARM REV CODE 250: Performed by: INTERNAL MEDICINE

## 2018-10-24 PROCEDURE — 36415 COLL VENOUS BLD VENIPUNCTURE: CPT

## 2018-10-24 PROCEDURE — 3074F SYST BP LT 130 MM HG: CPT | Mod: CPTII,S$GLB,, | Performed by: INTERNAL MEDICINE

## 2018-10-24 PROCEDURE — 80053 COMPREHEN METABOLIC PANEL: CPT

## 2018-10-24 PROCEDURE — 3008F BODY MASS INDEX DOCD: CPT | Mod: CPTII,S$GLB,, | Performed by: INTERNAL MEDICINE

## 2018-10-24 PROCEDURE — 63600175 PHARM REV CODE 636 W HCPCS: Mod: JG | Performed by: INTERNAL MEDICINE

## 2018-10-24 PROCEDURE — 99999 PR PBB SHADOW E&M-EST. PATIENT-LVL II: CPT | Mod: PBBFAC,,, | Performed by: DERMATOLOGY

## 2018-10-24 PROCEDURE — 84443 ASSAY THYROID STIM HORMONE: CPT

## 2018-10-24 PROCEDURE — 99214 OFFICE O/P EST MOD 30 MIN: CPT | Mod: S$GLB,,, | Performed by: INTERNAL MEDICINE

## 2018-10-24 PROCEDURE — 3078F DIAST BP <80 MM HG: CPT | Mod: CPTII,S$GLB,, | Performed by: DERMATOLOGY

## 2018-10-24 PROCEDURE — 96413 CHEMO IV INFUSION 1 HR: CPT

## 2018-10-24 RX ORDER — TRIAMCINOLONE ACETONIDE 1 MG/G
OINTMENT TOPICAL 2 TIMES DAILY
Qty: 454 G | Refills: 1 | Status: SHIPPED | OUTPATIENT
Start: 2018-10-24 | End: 2018-11-07

## 2018-10-24 RX ORDER — HEPARIN 100 UNIT/ML
500 SYRINGE INTRAVENOUS
Status: CANCELLED | OUTPATIENT
Start: 2018-10-24

## 2018-10-24 RX ORDER — SODIUM CHLORIDE 0.9 % (FLUSH) 0.9 %
10 SYRINGE (ML) INJECTION
Status: CANCELLED | OUTPATIENT
Start: 2018-10-24

## 2018-10-24 RX ORDER — PREDNISONE 20 MG/1
TABLET ORAL
Qty: 42 TABLET | Refills: 0 | Status: SHIPPED | OUTPATIENT
Start: 2018-10-24 | End: 2018-11-14 | Stop reason: SDUPTHER

## 2018-10-24 RX ORDER — HEPARIN 100 UNIT/ML
500 SYRINGE INTRAVENOUS
Status: DISCONTINUED | OUTPATIENT
Start: 2018-10-24 | End: 2018-10-24 | Stop reason: HOSPADM

## 2018-10-24 RX ORDER — SODIUM CHLORIDE 0.9 % (FLUSH) 0.9 %
10 SYRINGE (ML) INJECTION
Status: DISCONTINUED | OUTPATIENT
Start: 2018-10-24 | End: 2018-10-24 | Stop reason: HOSPADM

## 2018-10-24 RX ADMIN — SODIUM CHLORIDE: 0.9 INJECTION, SOLUTION INTRAVENOUS at 11:10

## 2018-10-24 RX ADMIN — SODIUM CHLORIDE 200 MG: 9 INJECTION, SOLUTION INTRAVENOUS at 11:10

## 2018-10-24 NOTE — PROGRESS NOTES
HPI:  Patient here today for f/u skin biopsies.  Rash is slightly improved with the topical cortisone and antibiotic ointment.  Still very painful particularly on feet.  No new lesions.    PE: UBSE performed.  Hyperkeratotic erythematous healing plaques on trunk, extremities bilaterally  A&O x3     Path review -   FINAL PATHOLOGIC DIAGNOSIS  1. Skin, shin, punch biopsy:  -LICHENOID DERMATITIS, see comment  2. Skin, right upper arm, punch biopsy:  -LICHENOID DERMATITIS, see comment  COMMENT (parts 1 & 2): The histological findings in this case are most consistent with hypertrophic lichen planus /  lichen planus. Rare eosinophils are noted, and a lichenoid drug eruption is also a possibility. Other lichenoid  processes may display similar histology. Classical features of psoriasis or perforating disease are not appreciated  in these biopsies. Correlation is recommended.    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  Possibly drug induced - favor due to naproxen vs Pembro - Naproxen he has been taking regularly since 5/2018 and rash started soon thereafter - lichen planus has been associated with NSAID use.  Pembro - he has been taking since 7/2017, still on maintenance.    Advise he discontinue Naproxen.    Pt is improved with topical cortisone and bactroban.     Will tx with oral pred start dose 60 mg, taper by 20 mg weekly, and will notify heme/onc.  Do not feel pembro needs to be changed at this point.    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

## 2018-10-24 NOTE — PROGRESS NOTES
PATIENT: Angel Torres  MRN: [id number]  DATE: 10/24/2018      Diagnosis:   1. Primary cancer of right middle lobe of lung    2. Secondary adenocarcinoma of bone    3. Rash        Chief Complaint: Follow-up      Oncologic History:      Oncologic History Non-small cell lung cancer diagnosed 6/2016   Recurrent disease to lung 4/2017     Oncologic Treatment Cisplatin/Pemetrexed initiated 8/15/16 with concurrent radiation completed 3 cycles 10/3/16; 64 Gy  Carboplatin/pemetrexed/pembrolizumab 7/2017 - 9/2017  Pembrolizumab maintenance 11/2017     Pathology  6/2016 : Adenocarcinoma, T2b, N2, M0, Stage IIIA          Subjective:    Interval History: Mr. Torres is a 63 y.o. male who was seen in follow-up for lung cancer.  He states he has generally been feeling well.  Breathing is unchanged.  He has been seen by Dermatology in a biopsy was taken.  He is following up with them later today.  His rash persists and seems to be stable.  He is having some difficulty walking because this.  No other new complaints.    His history dates to approximately 4/2016 when he noted a worsening cough which was associated with clear sputum.  He had a chest x-ray in 5/16 showing a right middle lobe opacity.  Subsequent CT of the chest was performed on 5/30/16 showing a 5.6 cm lesion in the right middle lobe with hilar and mediastinal lymphadenopathy along with hepatic lesions and a left adrenal mass..  He underwent bronchoscopy which was nonrevealing and transbronchial biopsies were nondiagnostic.  He then underwent EBUS with biopsy of mediastinal lymph nodes with pathology showing non-small cell lung cancer consistent with adenocarcinoma.  He was started on concurrent chemotherapy and radiation with cisplatin and pemetrexed on 8/15/16.  He completed 64 Gy of concurrent radiation with cisplatin and pemetrexed with chemotherapy completed on 10/3/16.  Scans in 4/2017 had indicated progressive disease in the lung.  Subsequent PET scan in 7/2017 had  shown spread of disease to bone.  He was started on carboplatin and pemetrexed and pembrolizumab in 7/2017.  Repeat imaging in 8/2017 had shown stability of disease.  He was started on pembrolizumab maintenance in 11/2017 after a delay due to his insurance.  Repeat imaging in 12/2016 had shown mild progressive disease, however, it was felt that due to the excessive delay in treatment we would proceed on rather than change therapy at this time.  CT in 3/2018 had shown stability of his disease.  CT in 05/2018 had shown stable disease.  CT in 08/2018 had shown stable disease.    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    Skin infection 8/8/2018       Past Surgical HIstory:   Past Surgical History:   Procedure Laterality Date    BRONCHOSCOPY N/A 6/28/2016    Performed by Maribel Wallace MD at [facility] OR [address] [place] [address]    BRONCHOSCOPY N/A 6/13/2016    Performed by [facility] at [facility] OR [address] [place] [address]    ENDOBRONCHIAL ULTRASOUND (EBUS) N/A 6/28/2016    Performed by Maribel Wallace MD at [facility] OR [address] [place] [address]    EXCISION, MASS, LOWER EXTREMITY Left 4/21/2014    Performed by Ryne Dai MD at [facility] OR [address] [place] [address]    INGUINAL HERNIA REPAIR Bilateral     KNEE SURGERY      REPAIR, HERNIA, INGUINAL, BILATERAL, LAPAROSCOPIC Bilateral 4/21/2014    Performed by Ryne Dai MD at [facility] OR [address] [place] [address]       Family History:   Family History   Problem Relation Age of Onset    Cancer Mother         lung, breast    Cancer Sister         lung    Cancer Maternal Grandfather     No Known Problems Father     No Known Problems Brother     No Known Problems  Maternal Aunt     No Known Problems Maternal Uncle     No Known Problems Paternal Aunt     No Known Problems Paternal Uncle     No Known Problems Maternal Grandmother     No Known Problems Paternal Grandmother     No Known Problems Paternal Grandfather     Amblyopia Neg Hx     Blindness Neg Hx     Cataracts Neg Hx     Diabetes Neg Hx     Glaucoma Neg Hx     Hypertension Neg Hx     Macular degeneration Neg Hx     Retinal detachment Neg Hx     Strabismus Neg Hx     Stroke Neg Hx     Thyroid disease Neg Hx        Social History:  reports that he quit smoking about 4 years ago. He has a 80.00 pack-year smoking history. He has quit using smokeless tobacco. He reports that he does not drink alcohol or use drugs.    Allergies:  Review of patient's allergies indicates:  No Known Allergies    Medications:  Current Outpatient Medications   Medication Sig Dispense Refill    albuterol-ipratropium (DUO-NEB) 2.5 mg-0.5 mg/3 mL nebulizer solution Take 3 mLs by nebulization every 6 (six) hours as needed for Wheezing or Shortness of Breath. 100 vial 2    amLODIPine (NORVASC) 10 MG tablet Take 1 tablet (10 mg total) by mouth once daily. 90 tablet 3    cetirizine (ZYRTEC) 10 MG tablet Take 1 tablet (10 mg total) by mouth once daily.  0    dronabinol (MARINOL) 5 MG capsule Take 1 capsule (5 mg total) by mouth 2 (two) times daily before meals. 60 capsule 0    fluticasone (FLONASE) 50 mcg/actuation nasal spray SHAKE LIQUID AND USE 2 SPRAYS IN EACH NOSTRIL EVERY DAY 16 g 1    folic acid (FOLVITE) 400 MCG tablet TAKE 1 TABLET(400 MCG) BY MOUTH EVERY  tablet 0    mupirocin (BACTROBAN) 2 % ointment Apply topically 2 (two) times daily. To open areas 22 g 2    naproxen (NAPROSYN) 500 MG tablet Take 1 tablet (500 mg total) by mouth 2 (two) times daily as needed (headache). 60 tablet 0    PROAIR HFA 90 mcg/actuation inhaler INHALE 2 PUFFS INTO THE LUNGS EVERY 6 HOURS AS NEEDED FOR WHEEZING 17 g 0     "triamcinolone acetonide 0.1% (KENALOG) 0.1 % ointment To all rashes BID 80 g 2     Current Facility-Administered Medications   Medication Dose Route Frequency Provider Last Rate Last Dose    cyanocobalamin injection 1,000 mcg  1,000 mcg Intramuscular 1 time in Clinic/HOD Dano Fernandez DO, FACP        cyanocobalamin injection 1,000 mcg  1,000 mcg Intramuscular 1 time in Clinic/HOD Dano Fernandez DO, FACP           Review of Systems   Constitutional: Negative for activity change, appetite change, chills, fatigue, fever and unexpected weight change.        Normal weight 220   HENT: Negative for dental problem, nosebleeds, sinus pressure, sneezing and trouble swallowing.    Eyes: Negative for visual disturbance.   Respiratory: Positive for shortness of breath. Negative for cough, choking, chest tightness and wheezing.    Cardiovascular: Negative for chest pain and leg swelling.   Gastrointestinal: Negative for abdominal pain, blood in stool, constipation, diarrhea and nausea.   Genitourinary: Negative for difficulty urinating and dysuria.   Musculoskeletal: Positive for myalgias. Negative for arthralgias and back pain.   Skin: Positive for rash and wound.   Neurological: Negative for dizziness, light-headedness and headaches.   Hematological: Negative for adenopathy. Does not bruise/bleed easily.   Psychiatric/Behavioral: Negative for sleep disturbance. The patient is not nervous/anxious.        ECOG Performance Status: 1   Objective:      Vitals:   Vitals:    10/24/18 0959   BP: 125/81   BP Location: Right arm   Patient Position: Sitting   BP Method: Large (Automatic)   Pulse: (!) 114   Resp: 18   Temp: 97.8 °F (36.6 °C)   TempSrc: Oral   SpO2: 97%   Weight: 81.9 kg (180 lb 8.9 oz)   Height: 6' 5" (1.956 m)     BMI: Body mass index is 21.41 kg/m².    Physical Exam   Constitutional: He is oriented to person, place, and time. He appears well-developed and well-nourished.   HENT:   Head: Normocephalic and " atraumatic.   Eyes: Pupils are equal, round, and reactive to light.   Neck: Normal range of motion. Neck supple.   Cardiovascular: Normal rate and regular rhythm.   Pulmonary/Chest: Effort normal. No respiratory distress. He has no rales.   Abdominal: Soft. He exhibits no distension. There is no tenderness.   Musculoskeletal: He exhibits no edema or tenderness.   Lymphadenopathy:     He has no cervical adenopathy.   Neurological: He is alert and oriented to person, place, and time. No cranial nerve deficit.   Skin: Skin is warm and dry. Rash noted.   Psychiatric: He has a normal mood and affect. His behavior is normal.       Laboratory Data:  Lab Visit on 10/24/2018   Component Date Value Ref Range Status    WBC 10/24/2018 7.57  3.90 - 12.70 K/uL Final    RBC 10/24/2018 5.20  4.60 - 6.20 M/uL Final    Hemoglobin 10/24/2018 14.1  14.0 - 18.0 g/dL Final    Hematocrit 10/24/2018 44.4  40.0 - 54.0 % Final    MCV 10/24/2018 85  82 - 98 fL Final    MCH 10/24/2018 27.1  27.0 - 31.0 pg Final    MCHC 10/24/2018 31.8* 32.0 - 36.0 g/dL Final    RDW 10/24/2018 15.6* 11.5 - 14.5 % Final    Platelets 10/24/2018 319  150 - 350 K/uL Final    MPV 10/24/2018 9.9  9.2 - 12.9 fL Final    Gran # (ANC) 10/24/2018 5.3  1.8 - 7.7 K/uL Final    Comment: The ANC is based on a white cell differential from an   automated cell counter. It has not been microscopically   reviewed for the presence of abnormal cells. Clinical   correlation is required.      Immature Grans (Abs) 10/24/2018 0.02  0.00 - 0.04 K/uL Final    Comment: Mild elevation in immature granulocytes is non specific and   can be seen in a variety of conditions including stress response,   acute inflammation, trauma and pregnancy. Correlation with other   laboratory and clinical findings is essential.     Office Visit on 10/09/2018   Component Date Value Ref Range Status    Aerobic Bacterial Culture 10/09/2018 Skin ruslan,  no predominant organism   Final    Fungus  Cult, skin, hair or nails 10/09/2018 Culture in progress   Preliminary    Fungus Cult, skin, hair or nails 10/09/2018 No significant fungus isolated   Preliminary    AFB Culture & Smear 10/09/2018 Culture in progress   Preliminary    AFB CULTURE STAIN 10/09/2018 No acid fast bacilli seen.   Final    Anaerobic Culture 10/09/2018 No anaerobes isolated   Final     Supplemental Diagnosis 6/30/16  Immunohistochemical stains are completed on the Station 4L lymph node cell block material and reveal the tumor  cells to stain positively with cytokeratin 7 and TTF-1. The tumor cells show nonspecific blush staining with  cytokeratin 5/6 and are negative for p63. This staining profile supports a diagnosis of non-small cell carcinoma  consistent with adenocarcinoma.  Cell block material will be sent for EGFR, ALK and ROS-1 testing and results will follow in a supplemental report.  (Electronically Signed: 2016-06-30 11:50:31 )  Diagnosed by: Irene Amaro M.D.  FINAL PATHOLOGIC DIAGNOSIS  1. Lymph node, Station 4L, EBUS guided fine needle aspiration:  Positive for malignant cells, non-small cell carcinoma.  Rare background lymphocytes are present.  Immunohistochemical staining be completed to further characterize this malignancy and results will follow in a  supplemental report.  2. Lymph node, Station 7, EBUS guided fine needle aspiration:  Positive for malignant cells, non-small cell carcinoma tumor identical to that seen in specimen #1.  Background lymphocytes are present.    Imaging:   CT 08/21/2018  EXAMINATION:  CT CHEST ABDOMEN PELVIS WITH CONTRAST (XPD)    CLINICAL HISTORY:  Neoplasm: abdomen, metastatic, recurrence, suspected/known; Neoplasm of unspecified behavior of other specified sites    TECHNIQUE:  Low dose axial images, sagittal and coronal reformations were obtained from the neck base to the pubic symphysis after the administration of 100 cc Omnipaque 350 intravenous contrast with abdominal imaging  obtained during arterial, portal venous, and delayed phases.  Oral contrast was not administered.    COMPARISON:  CT chest abdomen pelvis 05/30/2018, PET-CT 07/19/2017    FINDINGS:  Base of Neck: No significant abnormality.    CHEST:    -Heart: Normal size. No pericardial effusion.  There is calcific coronary artery atherosclerosis..    -Pulmonary vasculature: Pulmonary arteries distribute normally.  There are four pulmonary veins.    -Denisse/Mediastinum: No pathologic kervin enlargement.    -Trachea and Proximal airways: Patent.    -Lungs/Pleura: There is extensive bilateral centrilobular emphysematous change.  No pneumothorax.  Redemonstration of a soft tissue opacity at the right hilum measuring 3.8 x 3.6 cm (previously 3.7 x 3.4 cm), which continues to narrow the origin of the right middle lobe bronchus with distal airway obstruction and consequent significant middle lobe volume loss, unchanged.  Slight differences in measurement of this right hilar lesion may correlate with interobserver error and differences in technique rather than interval enlargement of this lesion.  Persistent bandlike, ground-glass, and patchy consolidative opacity within the right lower lobe, noting slight interval increase in consolidative component.  These findings are favored to correlate with post radiation therapy change given lack of hypermetabolic activity on PET.  Superimposed infectious or inflammatory change cannot be excluded.  No significant pleural thickening.  There is slight interval increase in small volume right pleural fluid.    -Esophagus: Normal in course and caliber.    ABDOMEN:    - Liver: Normal in size and attenuation, noting geographic alteration perfusion on arterial phase which normalizes on portal venous phase.  There are multiple hepatic hypodensities, the largest of which demonstrate attenuation compatible with cysts.  Smaller hypodensities are too small to characterize.  The largest lesion measures 1.4 cm  within hepatic segment 6.    - Gallbladder: No calcified gallstones.  No wall thickening or pericholecystic fluid.    - Bile Ducts: No intra or extrahepatic biliary ductal dilatation.    - Stomach/Duodenum: Unremarkable.    - Spleen: Unremarkable.    - Pancreas: Unremarkable.    - Adrenals: The right adrenal gland is unremarkable.  There is a stable nonspecific 1.2 cm left adrenal nodule.    - Kidneys/ureters/urinary bladder: Normal in size and location, concentrating excreting contrast appropriately on delayed imaging.  No hydronephrosis or stones.  Tiny hypodensities at the bilateral inferior renal poles are too small to characterize.  The ureters appear normal in course and caliber without evidence of ureteral dilatation.  There is a diverticulum at the anterior aspect of the bladder dome, best appreciated on sagittal imaging.  There is abnormal wall thickening of the diverticulum and adjacent bladder dome, new since CT 05/30/2018.    - Retroperitoneum: No significant adenopathy.    PELVIS:    - Reproductive: Unremarkable.    - Other: No pelvic adenopathy, free fluid, or mass.    BOWEL/MESENTERY:    No evidence of bowel obstruction or inflammatory process. There is moderate stool within the colon.  No intraperitoneal free air or free fluid.    VASCULATURE: Left-sided aortic arch with 4 branch vessels, the left vertebral artery arising directly from the arch.  No aneurysm.  There is mild moderate calcific aortoiliac atherosclerosis.    BONES: No acute fracture or bony destructive process.  Stable sclerotic foci within the T1 vertebral body and posterior left iliac wing are compatible with this patient's known osseous metastatic disease.  No new lesions identified.  There is mild degenerative change of the spine.    EXTRATHORACIC/EXTRAPERITONEAL SOFT TISSUES: There is bilateral gynecomastia.      Impression       In this patient with a history of lung malignancy, grossly stable appearance of a right middle lobe  mass which was previously shown to be hypermetabolic on PET-CT.  No new pulmonary lesions are identified.    Stable osseous metastatic disease within the T1 vertebral body and left iliac wing.    Persistent patchy opacification of the right lower lobe with slight increase in consolidative component, possibly correlating with pneumonitis or postradiation therapy change.  Superimposed infectious or inflammatory change cannot be excluded.    Slight interval increase in right pleural fluid.    New abnormal wall thickening of the urinary bladder within and adjacent to a small bladder diverticulum.  Urologic consultation/cystoscopy is recommended for further evaluation.    Multiple additional findings as detailed above.    RECIST SUMMARY:    Date of prior examination for comparison: 05/30/2018    Lesion 1: Right middle lobe.  3.8 cm. Series 3 image 80.  Prior measurement 3.7 cm.    This report was flagged in Epic as abnormal.                Assessment:       1. Primary cancer of right middle lobe of lung    2. Secondary adenocarcinoma of bone    3. Rash           Plan:     Mr. Torres has been seen by Dermatology for his rash and a biopsy was taken.  This is possibly related to medications and his wife does state that the 1st noticed this with the initiation of zoledronic acid.  We will permanently discontinue this now.  I would prefer to keep him on pembrolizumab if possible.  He is following up Dermatology today.  He will receive pembrolizumab today.  I am awaiting lab work.  Follow back up me in another 3 weeks or sooner if needed.    Dano Fernandez DO, FACP  Hematology & Oncology  [facility] [place] [address]  ph. [phone]  Fax. [phone]    25 minutes were spent in coordination of patient's care, record review and counseling.  More than 50% of the time was face-to-face.

## 2018-10-24 NOTE — PLAN OF CARE
Problem: Chemotherapy Effects (Adult)  Goal: Signs and Symptoms of Listed Potential Problems Will be Absent, Minimized or Managed (Chemotherapy Effects)  Signs and symptoms of listed potential problems will be absent, minimized or managed by discharge/transition of care (reference Chemotherapy Effects (Adult) CPG).   Outcome: Ongoing (interventions implemented as appropriate)  Here for Keytruda.

## 2018-11-12 ENCOUNTER — TELEPHONE (OUTPATIENT)
Dept: NEUROLOGY | Facility: HOSPITAL | Age: 63
End: 2018-11-12

## 2018-11-13 NOTE — TELEPHONE ENCOUNTER
----- Message from Sue Butler sent at 11/13/2018 10:55 AM CST -----  Contact: pt wife cali   Wife  Cali would like to be called back regarding breaking out with sores   can be reached at [phone]

## 2018-11-13 NOTE — TELEPHONE ENCOUNTER
Patient breaking out in rash, was told by derm this was from the Keytruda, asked for advice. Patient seeing Dr. Fernandez tomorrow, will let him know ahead of time the issues he is having with the sores.

## 2018-11-13 NOTE — TELEPHONE ENCOUNTER
----- Message from Sofia Lee sent at 11/12/2018  1:57 PM CST -----  Contact: pt  Pt would like to be called back regarding sore on his body    Pt can be reached at [phone]

## 2018-11-14 ENCOUNTER — OFFICE VISIT (OUTPATIENT)
Dept: HEMATOLOGY/ONCOLOGY | Facility: CLINIC | Age: 63
End: 2018-11-14
Payer: COMMERCIAL

## 2018-11-14 ENCOUNTER — TELEPHONE (OUTPATIENT)
Dept: DERMATOLOGY | Facility: CLINIC | Age: 63
End: 2018-11-14

## 2018-11-14 ENCOUNTER — LAB VISIT (OUTPATIENT)
Dept: LAB | Facility: HOSPITAL | Age: 63
End: 2018-11-14
Attending: INTERNAL MEDICINE
Payer: COMMERCIAL

## 2018-11-14 VITALS
BODY MASS INDEX: 21.06 KG/M2 | OXYGEN SATURATION: 97 % | SYSTOLIC BLOOD PRESSURE: 123 MMHG | TEMPERATURE: 98 F | RESPIRATION RATE: 14 BRPM | WEIGHT: 178.38 LBS | HEART RATE: 115 BPM | DIASTOLIC BLOOD PRESSURE: 75 MMHG | HEIGHT: 77 IN

## 2018-11-14 DIAGNOSIS — R53.0 NEOPLASTIC MALIGNANT RELATED FATIGUE: ICD-10-CM

## 2018-11-14 DIAGNOSIS — C34.2 PRIMARY CANCER OF RIGHT MIDDLE LOBE OF LUNG: ICD-10-CM

## 2018-11-14 DIAGNOSIS — C34.2 PRIMARY CANCER OF RIGHT MIDDLE LOBE OF LUNG: Primary | ICD-10-CM

## 2018-11-14 DIAGNOSIS — R21 RASH: ICD-10-CM

## 2018-11-14 DIAGNOSIS — L43.9 LICHEN PLANUS: ICD-10-CM

## 2018-11-14 DIAGNOSIS — C79.51 SECONDARY ADENOCARCINOMA OF BONE: ICD-10-CM

## 2018-11-14 LAB
ALBUMIN SERPL BCP-MCNC: 3.6 G/DL
ALP SERPL-CCNC: 58 U/L
ALT SERPL W/O P-5'-P-CCNC: 20 U/L
ANION GAP SERPL CALC-SCNC: 9 MMOL/L
AST SERPL-CCNC: 16 U/L
BILIRUB SERPL-MCNC: 0.6 MG/DL
BUN SERPL-MCNC: 16 MG/DL
CALCIUM SERPL-MCNC: 10 MG/DL
CHLORIDE SERPL-SCNC: 106 MMOL/L
CO2 SERPL-SCNC: 27 MMOL/L
CREAT SERPL-MCNC: 1 MG/DL
ERYTHROCYTE [DISTWIDTH] IN BLOOD BY AUTOMATED COUNT: 15.3 %
EST. GFR  (AFRICAN AMERICAN): >60 ML/MIN/1.73 M^2
EST. GFR  (NON AFRICAN AMERICAN): >60 ML/MIN/1.73 M^2
GLUCOSE SERPL-MCNC: 78 MG/DL
HCT VFR BLD AUTO: 44.5 %
HGB BLD-MCNC: 14.1 G/DL
IMM GRANULOCYTES # BLD AUTO: 0.04 K/UL
MCH RBC QN AUTO: 27.2 PG
MCHC RBC AUTO-ENTMCNC: 31.7 G/DL
MCV RBC AUTO: 86 FL
NEUTROPHILS # BLD AUTO: 5.9 K/UL
PLATELET # BLD AUTO: 422 K/UL
PMV BLD AUTO: 9.4 FL
POTASSIUM SERPL-SCNC: 4.4 MMOL/L
PROT SERPL-MCNC: 8.1 G/DL
RBC # BLD AUTO: 5.19 M/UL
SODIUM SERPL-SCNC: 142 MMOL/L
TSH SERPL DL<=0.005 MIU/L-ACNC: 1.84 UIU/ML
WBC # BLD AUTO: 8.29 K/UL

## 2018-11-14 PROCEDURE — 3008F BODY MASS INDEX DOCD: CPT | Mod: CPTII,S$GLB,, | Performed by: INTERNAL MEDICINE

## 2018-11-14 PROCEDURE — 99999 PR PBB SHADOW E&M-EST. PATIENT-LVL IV: CPT | Mod: PBBFAC,,, | Performed by: INTERNAL MEDICINE

## 2018-11-14 PROCEDURE — 84443 ASSAY THYROID STIM HORMONE: CPT

## 2018-11-14 PROCEDURE — 85027 COMPLETE CBC AUTOMATED: CPT

## 2018-11-14 PROCEDURE — 36415 COLL VENOUS BLD VENIPUNCTURE: CPT

## 2018-11-14 PROCEDURE — 99214 OFFICE O/P EST MOD 30 MIN: CPT | Mod: S$GLB,,, | Performed by: INTERNAL MEDICINE

## 2018-11-14 PROCEDURE — 3074F SYST BP LT 130 MM HG: CPT | Mod: CPTII,S$GLB,, | Performed by: INTERNAL MEDICINE

## 2018-11-14 PROCEDURE — 80053 COMPREHEN METABOLIC PANEL: CPT

## 2018-11-14 PROCEDURE — 3078F DIAST BP <80 MM HG: CPT | Mod: CPTII,S$GLB,, | Performed by: INTERNAL MEDICINE

## 2018-11-14 RX ORDER — FUROSEMIDE 20 MG/1
20 TABLET ORAL DAILY
Qty: 6 TABLET | Refills: 0 | Status: SHIPPED | OUTPATIENT
Start: 2018-11-14 | End: 2019-05-08

## 2018-11-14 RX ORDER — PREDNISONE 20 MG/1
TABLET ORAL
Qty: 70 TABLET | Refills: 1 | Status: SHIPPED | OUTPATIENT
Start: 2018-11-14 | End: 2019-04-03

## 2018-11-14 NOTE — TELEPHONE ENCOUNTER
Spoke with pt, rescheduled to 11/28.     ----- Message from Dulce Wheeler MD sent at 11/14/2018  1:35 PM CST -----  The week after Thanksgiving sometime?  ----- Message -----  From: Oksana Weaver LPN  Sent: 11/14/2018   1:15 PM  To: Dulce Wheeler MD    For when, you are 100% booked on Friday?    ----- Message -----  From: Dulce Wheeler MD  Sent: 11/14/2018  12:53 PM  To: Liam Rahman    Hi can you please reschedule this pt? He missed his f/u appt with me today and has a bad rash. Thanks!  ----- Message -----  From: Dano Fernandez DO, FACP  Sent: 11/14/2018   8:48 AM  To: Dulce Wheeler MD

## 2018-11-14 NOTE — PROGRESS NOTES
PATIENT: Angel Torres  MRN: [id number]  DATE: 11/14/2018      Diagnosis:   1. Primary cancer of right middle lobe of lung    2. Secondary adenocarcinoma of bone    3. Rash        Chief Complaint: Primary cancer of right middle lobe of lung      Oncologic History:      Oncologic History Non-small cell lung cancer diagnosed 6/2016   Recurrent disease to lung 4/2017     Oncologic Treatment Cisplatin/Pemetrexed initiated 8/15/16 with concurrent radiation completed 3 cycles 10/3/16; 64 Gy  Carboplatin/pemetrexed/pembrolizumab 7/2017 - 9/2017  Pembrolizumab maintenance 11/2017     Pathology  6/2016 : Adenocarcinoma, T2b, N2, M0, Stage IIIA          Subjective:    Interval History: Mr. Torres is a 63 y.o. male who was seen in follow-up for lung cancer.  He states he has generally been feeling well.  Breathing is unchanged.  He still has ongoing rash which is unchanged.  He was instructed by Dermatology to start systemic steroids.  He also complains of some leg swelling over the last week or so.  No other new complaints.    His history dates to approximately 4/2016 when he noted a worsening cough which was associated with clear sputum.  He had a chest x-ray in 5/16 showing a right middle lobe opacity.  Subsequent CT of the chest was performed on 5/30/16 showing a 5.6 cm lesion in the right middle lobe with hilar and mediastinal lymphadenopathy along with hepatic lesions and a left adrenal mass..  He underwent bronchoscopy which was nonrevealing and transbronchial biopsies were nondiagnostic.  He then underwent EBUS with biopsy of mediastinal lymph nodes with pathology showing non-small cell lung cancer consistent with adenocarcinoma.  He was started on concurrent chemotherapy and radiation with cisplatin and pemetrexed on 8/15/16.  He completed 64 Gy of concurrent radiation with cisplatin and pemetrexed with chemotherapy completed on 10/3/16.  Scans in 4/2017 had indicated progressive disease in the lung.  Subsequent PET scan  in 7/2017 had shown spread of disease to bone.  He was started on carboplatin and pemetrexed and pembrolizumab in 7/2017.  Repeat imaging in 8/2017 had shown stability of disease.  He was started on pembrolizumab maintenance in 11/2017 after a delay due to his insurance.  Repeat imaging in 12/2016 had shown mild progressive disease, however, it was felt that due to the excessive delay in treatment we would proceed on rather than change therapy at this time.  CT in 3/2018 had shown stability of his disease.  CT in 05/2018 had shown stable disease.  CT in 08/2018 had shown stable disease.    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    Skin infection 8/8/2018       Past Surgical HIstory:   Past Surgical History:   Procedure Laterality Date    BRONCHOSCOPY N/A 6/28/2016    Performed by [facility] at [facility] OR 2ND [facility]    BRONCHOSCOPY N/A 6/13/2016    Performed by [facility] Diagnostic Provider at [facility] OR [address] [place] [address]    ENDOBRONCHIAL ULTRASOUND (EBUS) N/A 6/28/2016    [facility]    EXCISION, MASS, LOWER EXTREMITY Left 4/21/2014    Performed by Ryne Dai MD at [facility] OR [address] [place] [address]    INGUINAL HERNIA REPAIR Bilateral     KNEE SURGERY      REPAIR, HERNIA, INGUINAL, BILATERAL, LAPAROSCOPIC Bilateral 4/21/2014    Performed by Ryne Dai MD at [facility] OR [address] [place] [address]       Family History:   Family History   Problem Relation Age of Onset    Cancer Mother         lung, breast    Cancer Sister         lung    Cancer Maternal Grandfather     No Known Problems Father     No Known Problems Brother     No Known  Problems Maternal Aunt     No Known Problems Maternal Uncle     No Known Problems Paternal Aunt     No Known Problems Paternal Uncle     No Known Problems Maternal Grandmother     No Known Problems Paternal Grandmother     No Known Problems Paternal Grandfather     Amblyopia Neg Hx     Blindness Neg Hx     Cataracts Neg Hx     Diabetes Neg Hx     Glaucoma Neg Hx     Hypertension Neg Hx     Macular degeneration Neg Hx     Retinal detachment Neg Hx     Strabismus Neg Hx     Stroke Neg Hx     Thyroid disease Neg Hx        Social History:  reports that he quit smoking about 4 years ago. He has a 80.00 pack-year smoking history. He has quit using smokeless tobacco. He reports that he does not drink alcohol or use drugs.    Allergies:  Review of patient's allergies indicates:  No Known Allergies    Medications:  Current Outpatient Medications   Medication Sig Dispense Refill    albuterol-ipratropium (DUO-NEB) 2.5 mg-0.5 mg/3 mL nebulizer solution Take 3 mLs by nebulization every 6 (six) hours as needed for Wheezing or Shortness of Breath. 100 vial 2    amLODIPine (NORVASC) 10 MG tablet Take 1 tablet (10 mg total) by mouth once daily. 90 tablet 3    dronabinol (MARINOL) 5 MG capsule Take 1 capsule (5 mg total) by mouth 2 (two) times daily before meals. 60 capsule 0    fluticasone (FLONASE) 50 mcg/actuation nasal spray SHAKE LIQUID AND USE 2 SPRAYS IN EACH NOSTRIL EVERY DAY 16 g 1    folic acid (FOLVITE) 400 MCG tablet TAKE 1 TABLET(400 MCG) BY MOUTH EVERY  tablet 0    mupirocin (BACTROBAN) 2 % ointment Apply topically 2 (two) times daily. To open areas 22 g 2    naproxen (NAPROSYN) 500 MG tablet Take 1 tablet (500 mg total) by mouth 2 (two) times daily as needed (headache). 60 tablet 0    predniSONE (DELTASONE) 20 MG tablet Take 3 pills po qam with breakfast x 1 wk then take 2 po qam with breakfast x 1 wk then take 1 po qam with breakfast x 1 wk 42 tablet 0    PROAIR HFA 90 mcg/actuation  "inhaler INHALE 2 PUFFS INTO THE LUNGS EVERY 6 HOURS AS NEEDED FOR WHEEZING 17 g 0    triamcinolone acetonide 0.1% (KENALOG) 0.1 % ointment To all rashes BID 80 g 2    cetirizine (ZYRTEC) 10 MG tablet Take 1 tablet (10 mg total) by mouth once daily.  0     Current Facility-Administered Medications   Medication Dose Route Frequency Provider Last Rate Last Dose    cyanocobalamin injection 1,000 mcg  1,000 mcg Intramuscular 1 time in Clinic/HOD Dano Fernandez DO, FACP        cyanocobalamin injection 1,000 mcg  1,000 mcg Intramuscular 1 time in Clinic/HOD Dano Fernandez DO, FACP           Review of Systems   Constitutional: Negative for activity change, appetite change, chills, fatigue, fever and unexpected weight change.        Normal weight 220   HENT: Negative for dental problem, nosebleeds, sinus pressure, sneezing and trouble swallowing.    Eyes: Negative for visual disturbance.   Respiratory: Positive for shortness of breath. Negative for cough, choking, chest tightness and wheezing.    Cardiovascular: Negative for chest pain and leg swelling.   Gastrointestinal: Negative for abdominal pain, blood in stool, constipation, diarrhea and nausea.   Genitourinary: Negative for difficulty urinating and dysuria.   Musculoskeletal: Positive for myalgias. Negative for arthralgias and back pain.   Skin: Positive for rash and wound.   Neurological: Negative for dizziness, light-headedness and headaches.   Hematological: Negative for adenopathy. Does not bruise/bleed easily.   Psychiatric/Behavioral: Negative for sleep disturbance. The patient is not nervous/anxious.        ECOG Performance Status: 1   Objective:      Vitals:   Vitals:    11/14/18 0811   BP: 123/75   BP Location: Left arm   Patient Position: Sitting   BP Method: Large (Automatic)   Pulse: (!) 115   Resp: 14   Temp: 97.8 °F (36.6 °C)   TempSrc: Oral   SpO2: 97%   Weight: 80.9 kg (178 lb 5.6 oz)   Height: 6' 5" (1.956 m)     BMI: Body mass index is " 21.15 kg/m².    Physical Exam   Constitutional: He is oriented to person, place, and time. He appears well-developed and well-nourished.   HENT:   Head: Normocephalic and atraumatic.   Eyes: Pupils are equal, round, and reactive to light.   Neck: Normal range of motion. Neck supple.   Cardiovascular: Normal rate and regular rhythm.   Pulmonary/Chest: Effort normal. No respiratory distress. He has no rales.   Abdominal: Soft. He exhibits no distension. There is no tenderness.   Musculoskeletal: He exhibits no edema or tenderness.   Lymphadenopathy:     He has no cervical adenopathy.   Neurological: He is alert and oriented to person, place, and time. No cranial nerve deficit.   Skin: Skin is warm and dry. Rash noted.   Psychiatric: He has a normal mood and affect. His behavior is normal.       Laboratory Data:  Lab Visit on 11/14/2018   Component Date Value Ref Range Status    WBC 11/14/2018 8.29  3.90 - 12.70 K/uL Final    RBC 11/14/2018 5.19  4.60 - 6.20 M/uL Final    Hemoglobin 11/14/2018 14.1  14.0 - 18.0 g/dL Final    Hematocrit 11/14/2018 44.5  40.0 - 54.0 % Final    MCV 11/14/2018 86  82 - 98 fL Final    MCH 11/14/2018 27.2  27.0 - 31.0 pg Final    MCHC 11/14/2018 31.7* 32.0 - 36.0 g/dL Final    RDW 11/14/2018 15.3* 11.5 - 14.5 % Final    Platelets 11/14/2018 422* 150 - 350 K/uL Final    MPV 11/14/2018 9.4  9.2 - 12.9 fL Final    Gran # (ANC) 11/14/2018 5.9  1.8 - 7.7 K/uL Final    Comment: The ANC is based on a white cell differential from an   automated cell counter. It has not been microscopically   reviewed for the presence of abnormal cells. Clinical   correlation is required.      Immature Grans (Abs) 11/14/2018 0.04  0.00 - 0.04 K/uL Final    Comment: Mild elevation in immature granulocytes is non specific and   can be seen in a variety of conditions including stress response,   acute inflammation, trauma and pregnancy. Correlation with other   laboratory and clinical findings is  essential.      Sodium 11/14/2018 142  136 - 145 mmol/L Final    Potassium 11/14/2018 4.4  3.5 - 5.1 mmol/L Final    Chloride 11/14/2018 106  95 - 110 mmol/L Final    CO2 11/14/2018 27  23 - 29 mmol/L Final    Glucose 11/14/2018 78  70 - 110 mg/dL Final    BUN, Bld 11/14/2018 16  8 - 23 mg/dL Final    Creatinine 11/14/2018 1.0  0.5 - 1.4 mg/dL Final    Calcium 11/14/2018 10.0  8.7 - 10.5 mg/dL Final    Total Protein 11/14/2018 8.1  6.0 - 8.4 g/dL Final    Albumin 11/14/2018 3.6  3.5 - 5.2 g/dL Final    Total Bilirubin 11/14/2018 0.6  0.1 - 1.0 mg/dL Final    Comment: For infants and newborns, interpretation of results should be based  on gestational age, weight and in agreement with clinical  observations.  Premature Infant recommended reference ranges:  Up to 24 hours.............<8.0 mg/dL  Up to 48 hours............<12.0 mg/dL  3-5 days..................<15.0 mg/dL  6-29 days.................<15.0 mg/dL      Alkaline Phosphatase 11/14/2018 58  55 - 135 U/L Final    AST 11/14/2018 16  10 - 40 U/L Final    ALT 11/14/2018 20  10 - 44 U/L Final    Anion Gap 11/14/2018 9  8 - 16 mmol/L Final    eGFR if African American 11/14/2018 >60.0  >60 mL/min/1.73 m^2 Final    eGFR if non African American 11/14/2018 >60.0  >60 mL/min/1.73 m^2 Final    Comment: Calculation used to obtain the estimated glomerular filtration  rate (eGFR) is the CKD-EPI equation.      Lab Visit on 10/24/2018   Component Date Value Ref Range Status    TSH 10/24/2018 1.873  0.400 - 4.000 uIU/mL Final    WBC 10/24/2018 7.57  3.90 - 12.70 K/uL Final    RBC 10/24/2018 5.20  4.60 - 6.20 M/uL Final    Hemoglobin 10/24/2018 14.1  14.0 - 18.0 g/dL Final    Hematocrit 10/24/2018 44.4  40.0 - 54.0 % Final    MCV 10/24/2018 85  82 - 98 fL Final    MCH 10/24/2018 27.1  27.0 - 31.0 pg Final    MCHC 10/24/2018 31.8* 32.0 - 36.0 g/dL Final    RDW 10/24/2018 15.6* 11.5 - 14.5 % Final    Platelets 10/24/2018 319  150 - 350 K/uL Final    MPV  10/24/2018 9.9  9.2 - 12.9 fL Final    Gran # (ANC) 10/24/2018 5.3  1.8 - 7.7 K/uL Final    Comment: The ANC is based on a white cell differential from an   automated cell counter. It has not been microscopically   reviewed for the presence of abnormal cells. Clinical   correlation is required.      Immature Grans (Abs) 10/24/2018 0.02  0.00 - 0.04 K/uL Final    Comment: Mild elevation in immature granulocytes is non specific and   can be seen in a variety of conditions including stress response,   acute inflammation, trauma and pregnancy. Correlation with other   laboratory and clinical findings is essential.      Sodium 10/24/2018 139  136 - 145 mmol/L Final    Potassium 10/24/2018 4.4  3.5 - 5.1 mmol/L Final    Chloride 10/24/2018 105  95 - 110 mmol/L Final    CO2 10/24/2018 26  23 - 29 mmol/L Final    Glucose 10/24/2018 106  70 - 110 mg/dL Final    BUN, Bld 10/24/2018 21  8 - 23 mg/dL Final    Creatinine 10/24/2018 1.1  0.5 - 1.4 mg/dL Final    Calcium 10/24/2018 10.4  8.7 - 10.5 mg/dL Final    Total Protein 10/24/2018 8.3  6.0 - 8.4 g/dL Final    Albumin 10/24/2018 3.9  3.5 - 5.2 g/dL Final    Total Bilirubin 10/24/2018 0.4  0.1 - 1.0 mg/dL Final    Comment: For infants and newborns, interpretation of results should be based  on gestational age, weight and in agreement with clinical  observations.  Premature Infant recommended reference ranges:  Up to 24 hours.............<8.0 mg/dL  Up to 48 hours............<12.0 mg/dL  3-5 days..................<15.0 mg/dL  6-29 days.................<15.0 mg/dL      Alkaline Phosphatase 10/24/2018 61  55 - 135 U/L Final    AST 10/24/2018 20  10 - 40 U/L Final    ALT 10/24/2018 21  10 - 44 U/L Final    Anion Gap 10/24/2018 8  8 - 16 mmol/L Final    eGFR if African American 10/24/2018 >60.0  >60 mL/min/1.73 m^2 Final    eGFR if non African American 10/24/2018 >60.0  >60 mL/min/1.73 m^2 Final    Comment: Calculation used to obtain the estimated glomerular  filtration  rate (eGFR) is the CKD-EPI equation.        Supplemental Diagnosis 6/30/16  Immunohistochemical stains are completed on the Station 4L lymph node cell block material and reveal the tumor  cells to stain positively with cytokeratin 7 and TTF-1. The tumor cells show nonspecific blush staining with  cytokeratin 5/6 and are negative for p63. This staining profile supports a diagnosis of non-small cell carcinoma  consistent with adenocarcinoma.  Cell block material will be sent for EGFR, ALK and ROS-1 testing and results will follow in a supplemental report.  (Electronically Signed: 2016-06-30 11:50:31 )  Diagnosed by: Irene Amaro M.D.  FINAL PATHOLOGIC DIAGNOSIS  1. Lymph node, Station 4L, EBUS guided fine needle aspiration:  Positive for malignant cells, non-small cell carcinoma.  Rare background lymphocytes are present.  Immunohistochemical staining be completed to further characterize this malignancy and results will follow in a  supplemental report.  2. Lymph node, Station 7, EBUS guided fine needle aspiration:  Positive for malignant cells, non-small cell carcinoma tumor identical to that seen in specimen #1.  Background lymphocytes are present.    Imaging:   CT 08/21/2018  EXAMINATION:  CT CHEST ABDOMEN PELVIS WITH CONTRAST (XPD)    CLINICAL HISTORY:  Neoplasm: abdomen, metastatic, recurrence, suspected/known; Neoplasm of unspecified behavior of other specified sites    TECHNIQUE:  Low dose axial images, sagittal and coronal reformations were obtained from the neck base to the pubic symphysis after the administration of 100 cc Omnipaque 350 intravenous contrast with abdominal imaging obtained during arterial, portal venous, and delayed phases.  Oral contrast was not administered.    COMPARISON:  CT chest abdomen pelvis 05/30/2018, PET-CT 07/19/2017    FINDINGS:  Base of Neck: No significant abnormality.    CHEST:    -Heart: Normal size. No pericardial effusion.  There is calcific coronary artery  atherosclerosis..    -Pulmonary vasculature: Pulmonary arteries distribute normally.  There are four pulmonary veins.    -Denisse/Mediastinum: No pathologic kervin enlargement.    -Trachea and Proximal airways: Patent.    -Lungs/Pleura: There is extensive bilateral centrilobular emphysematous change.  No pneumothorax.  Redemonstration of a soft tissue opacity at the right hilum measuring 3.8 x 3.6 cm (previously 3.7 x 3.4 cm), which continues to narrow the origin of the right middle lobe bronchus with distal airway obstruction and consequent significant middle lobe volume loss, unchanged.  Slight differences in measurement of this right hilar lesion may correlate with interobserver error and differences in technique rather than interval enlargement of this lesion.  Persistent bandlike, ground-glass, and patchy consolidative opacity within the right lower lobe, noting slight interval increase in consolidative component.  These findings are favored to correlate with post radiation therapy change given lack of hypermetabolic activity on PET.  Superimposed infectious or inflammatory change cannot be excluded.  No significant pleural thickening.  There is slight interval increase in small volume right pleural fluid.    -Esophagus: Normal in course and caliber.    ABDOMEN:    - Liver: Normal in size and attenuation, noting geographic alteration perfusion on arterial phase which normalizes on portal venous phase.  There are multiple hepatic hypodensities, the largest of which demonstrate attenuation compatible with cysts.  Smaller hypodensities are too small to characterize.  The largest lesion measures 1.4 cm within hepatic segment 6.    - Gallbladder: No calcified gallstones.  No wall thickening or pericholecystic fluid.    - Bile Ducts: No intra or extrahepatic biliary ductal dilatation.    - Stomach/Duodenum: Unremarkable.    - Spleen: Unremarkable.    - Pancreas: Unremarkable.    - Adrenals: The right adrenal gland is  unremarkable.  There is a stable nonspecific 1.2 cm left adrenal nodule.    - Kidneys/ureters/urinary bladder: Normal in size and location, concentrating excreting contrast appropriately on delayed imaging.  No hydronephrosis or stones.  Tiny hypodensities at the bilateral inferior renal poles are too small to characterize.  The ureters appear normal in course and caliber without evidence of ureteral dilatation.  There is a diverticulum at the anterior aspect of the bladder dome, best appreciated on sagittal imaging.  There is abnormal wall thickening of the diverticulum and adjacent bladder dome, new since CT 05/30/2018.    - Retroperitoneum: No significant adenopathy.    PELVIS:    - Reproductive: Unremarkable.    - Other: No pelvic adenopathy, free fluid, or mass.    BOWEL/MESENTERY:    No evidence of bowel obstruction or inflammatory process. There is moderate stool within the colon.  No intraperitoneal free air or free fluid.    VASCULATURE: Left-sided aortic arch with 4 branch vessels, the left vertebral artery arising directly from the arch.  No aneurysm.  There is mild moderate calcific aortoiliac atherosclerosis.    BONES: No acute fracture or bony destructive process.  Stable sclerotic foci within the T1 vertebral body and posterior left iliac wing are compatible with this patient's known osseous metastatic disease.  No new lesions identified.  There is mild degenerative change of the spine.    EXTRATHORACIC/EXTRAPERITONEAL SOFT TISSUES: There is bilateral gynecomastia.      Impression       In this patient with a history of lung malignancy, grossly stable appearance of a right middle lobe mass which was previously shown to be hypermetabolic on PET-CT.  No new pulmonary lesions are identified.    Stable osseous metastatic disease within the T1 vertebral body and left iliac wing.    Persistent patchy opacification of the right lower lobe with slight increase in consolidative component, possibly correlating  with pneumonitis or postradiation therapy change.  Superimposed infectious or inflammatory change cannot be excluded.    Slight interval increase in right pleural fluid.    New abnormal wall thickening of the urinary bladder within and adjacent to a small bladder diverticulum.  Urologic consultation/cystoscopy is recommended for further evaluation.    Multiple additional findings as detailed above.    RECIST SUMMARY:    Date of prior examination for comparison: 05/30/2018    Lesion 1: Right middle lobe.  3.8 cm. Series 3 image 80.  Prior measurement 3.7 cm.    This report was flagged in Epic as abnormal.                Assessment:       1. Primary cancer of right middle lobe of lung    2. Secondary adenocarcinoma of bone    3. Rash           Plan:     Mr. Torres has an ongoing rash and Dermatology has called in prednisone 60 mg.  If we truly think this is related to pembrolizumab I will hold this for now and actually start him on a higher dose of steroids and call this to his pharmacy.  This will need be tapered over the course of the next month or more.  We may possibly be able to rechallenge him with pembrolizumab in the future.  I have also written for Lasix given his leg swelling that he can use over the next 2 days.  I will see him in 2 weeks.  All questions were answered and he is agreeable with this plan.    Dano Fernandez DO, FACP  Hematology & Oncology  [facility] [place] [address]  ph. [phone]  Fax. [phone]    25 minutes were spent in coordination of patient's care, record review and counseling.  More than 50% of the time was face-to-face.

## 2018-11-15 LAB — FUNGUS BLD CULT: NORMAL

## 2018-11-28 ENCOUNTER — LAB VISIT (OUTPATIENT)
Dept: LAB | Facility: HOSPITAL | Age: 63
End: 2018-11-28
Attending: INTERNAL MEDICINE
Payer: COMMERCIAL

## 2018-11-28 ENCOUNTER — OFFICE VISIT (OUTPATIENT)
Dept: HEMATOLOGY/ONCOLOGY | Facility: CLINIC | Age: 63
End: 2018-11-28
Payer: COMMERCIAL

## 2018-11-28 ENCOUNTER — OFFICE VISIT (OUTPATIENT)
Dept: DERMATOLOGY | Facility: CLINIC | Age: 63
End: 2018-11-28
Payer: COMMERCIAL

## 2018-11-28 VITALS
OXYGEN SATURATION: 97 % | HEIGHT: 77 IN | HEART RATE: 104 BPM | SYSTOLIC BLOOD PRESSURE: 114 MMHG | DIASTOLIC BLOOD PRESSURE: 71 MMHG | TEMPERATURE: 98 F | WEIGHT: 179.69 LBS | BODY MASS INDEX: 21.22 KG/M2 | RESPIRATION RATE: 19 BRPM

## 2018-11-28 DIAGNOSIS — D49.89 NEOPLASM OF ABDOMEN: ICD-10-CM

## 2018-11-28 DIAGNOSIS — C34.2 PRIMARY CANCER OF RIGHT MIDDLE LOBE OF LUNG: ICD-10-CM

## 2018-11-28 DIAGNOSIS — C79.51 SECONDARY ADENOCARCINOMA OF BONE: ICD-10-CM

## 2018-11-28 DIAGNOSIS — C34.2 PRIMARY CANCER OF RIGHT MIDDLE LOBE OF LUNG: Primary | ICD-10-CM

## 2018-11-28 DIAGNOSIS — L43.9 LICHEN PLANUS: Primary | ICD-10-CM

## 2018-11-28 DIAGNOSIS — R21 RASH: ICD-10-CM

## 2018-11-28 DIAGNOSIS — R53.0 NEOPLASTIC MALIGNANT RELATED FATIGUE: ICD-10-CM

## 2018-11-28 LAB
ALBUMIN SERPL BCP-MCNC: 3.6 G/DL
ALP SERPL-CCNC: 51 U/L
ALT SERPL W/O P-5'-P-CCNC: 27 U/L
ANION GAP SERPL CALC-SCNC: 7 MMOL/L
AST SERPL-CCNC: 15 U/L
BILIRUB SERPL-MCNC: 0.4 MG/DL
BUN SERPL-MCNC: 20 MG/DL
CALCIUM SERPL-MCNC: 9.9 MG/DL
CHLORIDE SERPL-SCNC: 106 MMOL/L
CO2 SERPL-SCNC: 30 MMOL/L
CREAT SERPL-MCNC: 1.2 MG/DL
ERYTHROCYTE [DISTWIDTH] IN BLOOD BY AUTOMATED COUNT: 16 %
EST. GFR  (AFRICAN AMERICAN): >60 ML/MIN/1.73 M^2
EST. GFR  (NON AFRICAN AMERICAN): >60 ML/MIN/1.73 M^2
GLUCOSE SERPL-MCNC: 56 MG/DL
HCT VFR BLD AUTO: 44 %
HGB BLD-MCNC: 14.3 G/DL
IMM GRANULOCYTES # BLD AUTO: 0.06 K/UL
MCH RBC QN AUTO: 27.4 PG
MCHC RBC AUTO-ENTMCNC: 32.5 G/DL
MCV RBC AUTO: 84 FL
NEUTROPHILS # BLD AUTO: 11.4 K/UL
PLATELET # BLD AUTO: 332 K/UL
PMV BLD AUTO: 9.9 FL
POTASSIUM SERPL-SCNC: 4.6 MMOL/L
PROT SERPL-MCNC: 7.9 G/DL
RBC # BLD AUTO: 5.22 M/UL
SODIUM SERPL-SCNC: 143 MMOL/L
TSH SERPL DL<=0.005 MIU/L-ACNC: 1.23 UIU/ML
WBC # BLD AUTO: 14.67 K/UL

## 2018-11-28 PROCEDURE — 99213 OFFICE O/P EST LOW 20 MIN: CPT | Mod: S$GLB,,, | Performed by: DERMATOLOGY

## 2018-11-28 PROCEDURE — 3074F SYST BP LT 130 MM HG: CPT | Mod: CPTII,S$GLB,, | Performed by: INTERNAL MEDICINE

## 2018-11-28 PROCEDURE — 36415 COLL VENOUS BLD VENIPUNCTURE: CPT

## 2018-11-28 PROCEDURE — 80053 COMPREHEN METABOLIC PANEL: CPT

## 2018-11-28 PROCEDURE — 99999 PR PBB SHADOW E&M-EST. PATIENT-LVL IV: CPT | Mod: PBBFAC,,, | Performed by: INTERNAL MEDICINE

## 2018-11-28 PROCEDURE — 3078F DIAST BP <80 MM HG: CPT | Mod: CPTII,S$GLB,, | Performed by: DERMATOLOGY

## 2018-11-28 PROCEDURE — 84443 ASSAY THYROID STIM HORMONE: CPT

## 2018-11-28 PROCEDURE — 3074F SYST BP LT 130 MM HG: CPT | Mod: CPTII,S$GLB,, | Performed by: DERMATOLOGY

## 2018-11-28 PROCEDURE — 99214 OFFICE O/P EST MOD 30 MIN: CPT | Mod: S$GLB,,, | Performed by: INTERNAL MEDICINE

## 2018-11-28 PROCEDURE — 3008F BODY MASS INDEX DOCD: CPT | Mod: CPTII,S$GLB,, | Performed by: INTERNAL MEDICINE

## 2018-11-28 PROCEDURE — 85027 COMPLETE CBC AUTOMATED: CPT

## 2018-11-28 PROCEDURE — 3078F DIAST BP <80 MM HG: CPT | Mod: CPTII,S$GLB,, | Performed by: INTERNAL MEDICINE

## 2018-11-28 PROCEDURE — 99999 PR PBB SHADOW E&M-EST. PATIENT-LVL III: CPT | Mod: PBBFAC,,, | Performed by: DERMATOLOGY

## 2018-11-28 NOTE — PROGRESS NOTES
PATIENT: Angel Torres  MRN: [id number]  DATE: 11/28/2018      Diagnosis:   1. Primary cancer of right middle lobe of lung    2. Secondary adenocarcinoma of bone    3. Rash        Chief Complaint: Lung Cancer      Oncologic History:      Oncologic History Non-small cell lung cancer diagnosed 6/2016   Recurrent disease to lung 4/2017     Oncologic Treatment Cisplatin/Pemetrexed initiated 8/15/16 with concurrent radiation completed 3 cycles 10/3/16; 64 Gy  Carboplatin/pemetrexed/pembrolizumab 7/2017 - 9/2017  Pembrolizumab maintenance 11/2017 (held 10/2018 secondary to rash)    Pathology  6/2016 : Adenocarcinoma, T2b, N2, M0, Stage IIIA          Subjective:    Interval History: Mr. Torres is a 63 y.o. male who was seen in follow-up for lung cancer.  When I last saw him we placed him on a tapering dose of prednisone starting at 80 mg daily.  He states since taking this his rash has completely resolved.  He also states that his breathing has improved he denies any shortness of breath or cough.  He is otherwise without new complaints.    His history dates to approximately 4/2016 when he noted a worsening cough which was associated with clear sputum.  He had a chest x-ray in 5/16 showing a right middle lobe opacity.  Subsequent CT of the chest was performed on 5/30/16 showing a 5.6 cm lesion in the right middle lobe with hilar and mediastinal lymphadenopathy along with hepatic lesions and a left adrenal mass..  He underwent bronchoscopy which was nonrevealing and transbronchial biopsies were nondiagnostic.  He then underwent EBUS with biopsy of mediastinal lymph nodes with pathology showing non-small cell lung cancer consistent with adenocarcinoma.  He was started on concurrent chemotherapy and radiation with cisplatin and pemetrexed on 8/15/16.  He completed 64 Gy of concurrent radiation with cisplatin and pemetrexed with chemotherapy completed on 10/3/16.  Scans in 4/2017 had indicated progressive disease in the lung.   Subsequent PET scan in 7/2017 had shown spread of disease to bone.  He was started on carboplatin and pemetrexed and pembrolizumab in 7/2017.  Repeat imaging in 8/2017 had shown stability of disease.  He was started on pembrolizumab maintenance in 11/2017 after a delay due to his insurance.  Repeat imaging in 12/2016 had shown mild progressive disease, however, it was felt that due to the excessive delay in treatment we would proceed on rather than change therapy at this time.  CT in 3/2018 had shown stability of his disease.  CT in 05/2018 had shown stable disease.  CT in 08/2018 had shown stable disease.  Pembrolizumab was held in 10/2018 secondary to a rash.    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    Skin infection 8/8/2018       Past Surgical HIstory:   Past Surgical History:   Procedure Laterality Date    BRONCHOSCOPY N/A 6/28/2016    Performed by Maribel Wallace MD at [facility] OR [address] [place] [address]    BRONCHOSCOPY N/A 6/13/2016    Performed by [facility] Provider at [facility] OR [address] [place] [address]    ENDOBRONCHIAL ULTRASOUND (EBUS) N/A 6/28/2016    Performed by [facility] Wallace [facility] at [facility] OR [facility]    EXCISION, MASS, LOWER EXTREMITY Left 4/21/2014    [facility]    INGUINAL HERNIA REPAIR Bilateral     KNEE SURGERY      REPAIR, HERNIA, INGUINAL, BILATERAL, LAPAROSCOPIC Bilateral 4/21/2014    Performed by Ryne Dai MD at [facility] OR [address] [place] [address]       Family History:   Family History   Problem Relation Age of Onset    Cancer Mother         lung, breast    Cancer Sister         lung    Cancer Maternal Grandfather      No Known Problems Father     No Known Problems Brother     No Known Problems Maternal Aunt     No Known Problems Maternal Uncle     No Known Problems Paternal Aunt     No Known Problems Paternal Uncle     No Known Problems Maternal Grandmother     No Known Problems Paternal Grandmother     No Known Problems Paternal Grandfather     Amblyopia Neg Hx     Blindness Neg Hx     Cataracts Neg Hx     Diabetes Neg Hx     Glaucoma Neg Hx     Hypertension Neg Hx     Macular degeneration Neg Hx     Retinal detachment Neg Hx     Strabismus Neg Hx     Stroke Neg Hx     Thyroid disease Neg Hx        Social History:  reports that he quit smoking about 5 years ago. He has a 80.00 pack-year smoking history. He has quit using smokeless tobacco. He reports that he does not drink alcohol or use drugs.    Allergies:  Review of patient's allergies indicates:  No Known Allergies    Medications:  Current Outpatient Medications   Medication Sig Dispense Refill    albuterol-ipratropium (DUO-NEB) 2.5 mg-0.5 mg/3 mL nebulizer solution Take 3 mLs by nebulization every 6 (six) hours as needed for Wheezing or Shortness of Breath. 100 vial 2    amLODIPine (NORVASC) 10 MG tablet Take 1 tablet (10 mg total) by mouth once daily. 90 tablet 3    dronabinol (MARINOL) 5 MG capsule Take 1 capsule (5 mg total) by mouth 2 (two) times daily before meals. 60 capsule 0    fluticasone (FLONASE) 50 mcg/actuation nasal spray SHAKE LIQUID AND USE 2 SPRAYS IN EACH NOSTRIL EVERY DAY 16 g 1    folic acid (FOLVITE) 400 MCG tablet TAKE 1 TABLET(400 MCG) BY MOUTH EVERY  tablet 0    furosemide (LASIX) 20 MG tablet Take 1 tablet (20 mg total) by mouth once daily. 6 tablet 0    mupirocin (BACTROBAN) 2 % ointment Apply topically 2 (two) times daily. To open areas 22 g 2    naproxen (NAPROSYN) 500 MG tablet Take 1 tablet (500 mg total) by mouth 2 (two) times daily as needed (headache). 60 tablet 0    predniSONE (DELTASONE) 20 MG tablet  "Take 3 pills po qam with breakfast x 1 wk then take 2 po qam with breakfast x 1 wk then take 1 po qam with breakfast x 1 wk 70 tablet 1    PROAIR HFA 90 mcg/actuation inhaler INHALE 2 PUFFS INTO THE LUNGS EVERY 6 HOURS AS NEEDED FOR WHEEZING 17 g 0    triamcinolone acetonide 0.1% (KENALOG) 0.1 % ointment To all rashes BID 80 g 2    cetirizine (ZYRTEC) 10 MG tablet Take 1 tablet (10 mg total) by mouth once daily.  0     No current facility-administered medications for this visit.        Review of Systems   Constitutional: Negative for activity change, appetite change, chills, fatigue, fever and unexpected weight change.        Normal weight 220   HENT: Negative for dental problem, nosebleeds, sinus pressure, sneezing and trouble swallowing.    Eyes: Negative for visual disturbance.   Respiratory: Negative for cough, choking, chest tightness, shortness of breath and wheezing.    Cardiovascular: Negative for chest pain and leg swelling.   Gastrointestinal: Negative for abdominal pain, blood in stool, constipation, diarrhea and nausea.   Genitourinary: Negative for difficulty urinating and dysuria.   Musculoskeletal: Positive for myalgias. Negative for arthralgias and back pain.   Skin: Negative for wound.   Neurological: Negative for dizziness, light-headedness and headaches.   Hematological: Negative for adenopathy. Does not bruise/bleed easily.   Psychiatric/Behavioral: Negative for sleep disturbance. The patient is not nervous/anxious.        ECOG Performance Status: 1   Objective:      Vitals:   Vitals:    11/28/18 0810   BP: 114/71   Pulse: 104   Resp: 19   Temp: 97.6 °F (36.4 °C)   TempSrc: Oral   SpO2: 97%   Weight: 81.5 kg (179 lb 10.8 oz)   Height: 6' 5" (1.956 m)     BMI: Body mass index is 21.31 kg/m².    Physical Exam   Constitutional: He is oriented to person, place, and time. He appears well-developed and well-nourished.   HENT:   Head: Normocephalic and atraumatic.   Eyes: Pupils are equal, round, and " reactive to light.   Neck: Normal range of motion. Neck supple.   Cardiovascular: Normal rate and regular rhythm.   Pulmonary/Chest: Effort normal. No respiratory distress. He has no rales.   Abdominal: Soft. He exhibits no distension. There is no tenderness.   Musculoskeletal: He exhibits no edema or tenderness.   Lymphadenopathy:     He has no cervical adenopathy.   Neurological: He is alert and oriented to person, place, and time. No cranial nerve deficit.   Skin: Skin is warm and dry. No rash noted.   Psychiatric: He has a normal mood and affect. His behavior is normal.       Laboratory Data:  Lab Visit on 11/28/2018   Component Date Value Ref Range Status    WBC 11/28/2018 14.67* 3.90 - 12.70 K/uL Final    RBC 11/28/2018 5.22  4.60 - 6.20 M/uL Final    Hemoglobin 11/28/2018 14.3  14.0 - 18.0 g/dL Final    Hematocrit 11/28/2018 44.0  40.0 - 54.0 % Final    MCV 11/28/2018 84  82 - 98 fL Final    MCH 11/28/2018 27.4  27.0 - 31.0 pg Final    MCHC 11/28/2018 32.5  32.0 - 36.0 g/dL Final    RDW 11/28/2018 16.0* 11.5 - 14.5 % Final    Platelets 11/28/2018 332  150 - 350 K/uL Final    MPV 11/28/2018 9.9  9.2 - 12.9 fL Final    Gran # (ANC) 11/28/2018 11.4* 1.8 - 7.7 K/uL Final    Comment: The ANC is based on a white cell differential from an   automated cell counter. It has not been microscopically   reviewed for the presence of abnormal cells. Clinical   correlation is required.      Immature Grans (Abs) 11/28/2018 0.06* 0.00 - 0.04 K/uL Final    Comment: Mild elevation in immature granulocytes is non specific and   can be seen in a variety of conditions including stress response,   acute inflammation, trauma and pregnancy. Correlation with other   laboratory and clinical findings is essential.      Sodium 11/28/2018 143  136 - 145 mmol/L Final    Potassium 11/28/2018 4.6  3.5 - 5.1 mmol/L Final    Chloride 11/28/2018 106  95 - 110 mmol/L Final    CO2 11/28/2018 30* 23 - 29 mmol/L Final    Glucose  11/28/2018 56* 70 - 110 mg/dL Final    BUN, Bld 11/28/2018 20  8 - 23 mg/dL Final    Creatinine 11/28/2018 1.2  0.5 - 1.4 mg/dL Final    Calcium 11/28/2018 9.9  8.7 - 10.5 mg/dL Final    Total Protein 11/28/2018 7.9  6.0 - 8.4 g/dL Final    Albumin 11/28/2018 3.6  3.5 - 5.2 g/dL Final    Total Bilirubin 11/28/2018 0.4  0.1 - 1.0 mg/dL Final    Comment: For infants and newborns, interpretation of results should be based  on gestational age, weight and in agreement with clinical  observations.  Premature Infant recommended reference ranges:  Up to 24 hours.............<8.0 mg/dL  Up to 48 hours............<12.0 mg/dL  3-5 days..................<15.0 mg/dL  6-29 days.................<15.0 mg/dL      Alkaline Phosphatase 11/28/2018 51* 55 - 135 U/L Final    AST 11/28/2018 15  10 - 40 U/L Final    ALT 11/28/2018 27  10 - 44 U/L Final    Anion Gap 11/28/2018 7* 8 - 16 mmol/L Final    eGFR if African American 11/28/2018 >60.0  >60 mL/min/1.73 m^2 Final    eGFR if non African American 11/28/2018 >60.0  >60 mL/min/1.73 m^2 Final    Comment: Calculation used to obtain the estimated glomerular filtration  rate (eGFR) is the CKD-EPI equation.      Lab Visit on 11/14/2018   Component Date Value Ref Range Status    WBC 11/14/2018 8.29  3.90 - 12.70 K/uL Final    RBC 11/14/2018 5.19  4.60 - 6.20 M/uL Final    Hemoglobin 11/14/2018 14.1  14.0 - 18.0 g/dL Final    Hematocrit 11/14/2018 44.5  40.0 - 54.0 % Final    MCV 11/14/2018 86  82 - 98 fL Final    MCH 11/14/2018 27.2  27.0 - 31.0 pg Final    MCHC 11/14/2018 31.7* 32.0 - 36.0 g/dL Final    RDW 11/14/2018 15.3* 11.5 - 14.5 % Final    Platelets 11/14/2018 422* 150 - 350 K/uL Final    MPV 11/14/2018 9.4  9.2 - 12.9 fL Final    Gran # (ANC) 11/14/2018 5.9  1.8 - 7.7 K/uL Final    Comment: The ANC is based on a white cell differential from an   automated cell counter. It has not been microscopically   reviewed for the presence of abnormal cells. Clinical    correlation is required.      Immature Grans (Abs) 11/14/2018 0.04  0.00 - 0.04 K/uL Final    Comment: Mild elevation in immature granulocytes is non specific and   can be seen in a variety of conditions including stress response,   acute inflammation, trauma and pregnancy. Correlation with other   laboratory and clinical findings is essential.      Sodium 11/14/2018 142  136 - 145 mmol/L Final    Potassium 11/14/2018 4.4  3.5 - 5.1 mmol/L Final    Chloride 11/14/2018 106  95 - 110 mmol/L Final    CO2 11/14/2018 27  23 - 29 mmol/L Final    Glucose 11/14/2018 78  70 - 110 mg/dL Final    BUN, Bld 11/14/2018 16  8 - 23 mg/dL Final    Creatinine 11/14/2018 1.0  0.5 - 1.4 mg/dL Final    Calcium 11/14/2018 10.0  8.7 - 10.5 mg/dL Final    Total Protein 11/14/2018 8.1  6.0 - 8.4 g/dL Final    Albumin 11/14/2018 3.6  3.5 - 5.2 g/dL Final    Total Bilirubin 11/14/2018 0.6  0.1 - 1.0 mg/dL Final    Comment: For infants and newborns, interpretation of results should be based  on gestational age, weight and in agreement with clinical  observations.  Premature Infant recommended reference ranges:  Up to 24 hours.............<8.0 mg/dL  Up to 48 hours............<12.0 mg/dL  3-5 days..................<15.0 mg/dL  6-29 days.................<15.0 mg/dL      Alkaline Phosphatase 11/14/2018 58  55 - 135 U/L Final    AST 11/14/2018 16  10 - 40 U/L Final    ALT 11/14/2018 20  10 - 44 U/L Final    Anion Gap 11/14/2018 9  8 - 16 mmol/L Final    eGFR if African American 11/14/2018 >60.0  >60 mL/min/1.73 m^2 Final    eGFR if non African American 11/14/2018 >60.0  >60 mL/min/1.73 m^2 Final    Comment: Calculation used to obtain the estimated glomerular filtration  rate (eGFR) is the CKD-EPI equation.       TSH 11/14/2018 1.838  0.400 - 4.000 uIU/mL Final     Supplemental Diagnosis 6/30/16  Immunohistochemical stains are completed on the Station 4L lymph node cell block material and reveal the tumor  cells to stain  positively with cytokeratin 7 and TTF-1. The tumor cells show nonspecific blush staining with  cytokeratin 5/6 and are negative for p63. This staining profile supports a diagnosis of non-small cell carcinoma  consistent with adenocarcinoma.  Cell block material will be sent for EGFR, ALK and ROS-1 testing and results will follow in a supplemental report.  (Electronically Signed: 2016-06-30 11:50:31 )  Diagnosed by: Irene Amaro M.D.  FINAL PATHOLOGIC DIAGNOSIS  1. Lymph node, Station 4L, EBUS guided fine needle aspiration:  Positive for malignant cells, non-small cell carcinoma.  Rare background lymphocytes are present.  Immunohistochemical staining be completed to further characterize this malignancy and results will follow in a  supplemental report.  2. Lymph node, Station 7, EBUS guided fine needle aspiration:  Positive for malignant cells, non-small cell carcinoma tumor identical to that seen in specimen #1.  Background lymphocytes are present.    Imaging:   CT 08/21/2018  EXAMINATION:  CT CHEST ABDOMEN PELVIS WITH CONTRAST (XPD)    CLINICAL HISTORY:  Neoplasm: abdomen, metastatic, recurrence, suspected/known; Neoplasm of unspecified behavior of other specified sites    TECHNIQUE:  Low dose axial images, sagittal and coronal reformations were obtained from the neck base to the pubic symphysis after the administration of 100 cc Omnipaque 350 intravenous contrast with abdominal imaging obtained during arterial, portal venous, and delayed phases.  Oral contrast was not administered.    COMPARISON:  CT chest abdomen pelvis 05/30/2018, PET-CT 07/19/2017    FINDINGS:  Base of Neck: No significant abnormality.    CHEST:    -Heart: Normal size. No pericardial effusion.  There is calcific coronary artery atherosclerosis..    -Pulmonary vasculature: Pulmonary arteries distribute normally.  There are four pulmonary veins.    -Denisse/Mediastinum: No pathologic kervin enlargement.    -Trachea and Proximal airways:  Patent.    -Lungs/Pleura: There is extensive bilateral centrilobular emphysematous change.  No pneumothorax.  Redemonstration of a soft tissue opacity at the right hilum measuring 3.8 x 3.6 cm (previously 3.7 x 3.4 cm), which continues to narrow the origin of the right middle lobe bronchus with distal airway obstruction and consequent significant middle lobe volume loss, unchanged.  Slight differences in measurement of this right hilar lesion may correlate with interobserver error and differences in technique rather than interval enlargement of this lesion.  Persistent bandlike, ground-glass, and patchy consolidative opacity within the right lower lobe, noting slight interval increase in consolidative component.  These findings are favored to correlate with post radiation therapy change given lack of hypermetabolic activity on PET.  Superimposed infectious or inflammatory change cannot be excluded.  No significant pleural thickening.  There is slight interval increase in small volume right pleural fluid.    -Esophagus: Normal in course and caliber.    ABDOMEN:    - Liver: Normal in size and attenuation, noting geographic alteration perfusion on arterial phase which normalizes on portal venous phase.  There are multiple hepatic hypodensities, the largest of which demonstrate attenuation compatible with cysts.  Smaller hypodensities are too small to characterize.  The largest lesion measures 1.4 cm within hepatic segment 6.    - Gallbladder: No calcified gallstones.  No wall thickening or pericholecystic fluid.    - Bile Ducts: No intra or extrahepatic biliary ductal dilatation.    - Stomach/Duodenum: Unremarkable.    - Spleen: Unremarkable.    - Pancreas: Unremarkable.    - Adrenals: The right adrenal gland is unremarkable.  There is a stable nonspecific 1.2 cm left adrenal nodule.    - Kidneys/ureters/urinary bladder: Normal in size and location, concentrating excreting contrast appropriately on delayed imaging.  No  hydronephrosis or stones.  Tiny hypodensities at the bilateral inferior renal poles are too small to characterize.  The ureters appear normal in course and caliber without evidence of ureteral dilatation.  There is a diverticulum at the anterior aspect of the bladder dome, best appreciated on sagittal imaging.  There is abnormal wall thickening of the diverticulum and adjacent bladder dome, new since CT 05/30/2018.    - Retroperitoneum: No significant adenopathy.    PELVIS:    - Reproductive: Unremarkable.    - Other: No pelvic adenopathy, free fluid, or mass.    BOWEL/MESENTERY:    No evidence of bowel obstruction or inflammatory process. There is moderate stool within the colon.  No intraperitoneal free air or free fluid.    VASCULATURE: Left-sided aortic arch with 4 branch vessels, the left vertebral artery arising directly from the arch.  No aneurysm.  There is mild moderate calcific aortoiliac atherosclerosis.    BONES: No acute fracture or bony destructive process.  Stable sclerotic foci within the T1 vertebral body and posterior left iliac wing are compatible with this patient's known osseous metastatic disease.  No new lesions identified.  There is mild degenerative change of the spine.    EXTRATHORACIC/EXTRAPERITONEAL SOFT TISSUES: There is bilateral gynecomastia.      Impression       In this patient with a history of lung malignancy, grossly stable appearance of a right middle lobe mass which was previously shown to be hypermetabolic on PET-CT.  No new pulmonary lesions are identified.    Stable osseous metastatic disease within the T1 vertebral body and left iliac wing.    Persistent patchy opacification of the right lower lobe with slight increase in consolidative component, possibly correlating with pneumonitis or postradiation therapy change.  Superimposed infectious or inflammatory change cannot be excluded.    Slight interval increase in right pleural fluid.    New abnormal wall thickening of the  urinary bladder within and adjacent to a small bladder diverticulum.  Urologic consultation/cystoscopy is recommended for further evaluation.    Multiple additional findings as detailed above.    RECIST SUMMARY:    Date of prior examination for comparison: 05/30/2018    Lesion 1: Right middle lobe.  3.8 cm. Series 3 image 80.  Prior measurement 3.7 cm.    This report was flagged in Epic as abnormal.                Assessment:       1. Primary cancer of right middle lobe of lung    2. Secondary adenocarcinoma of bone    3. Rash           Plan:     Mr. Torres is doing well from my oncologic standpoint.  He was started on prednisone secondary to will was felt to be an immune based reaction to pembrolizumab.  Since starting this his rash has completely resolved.  We will continue on with a tapering dose every 5 days.  I will plan to see him back in another month with repeat imaging.  He may still be able to go back on pembrolizumab but will also be dependent on his next scan.  All questions were answered and he is agreeable with this plan.    Dano Fernandez DO, FACP  Hematology & Oncology  [facility] [place] [address]  ph. [phone]  Fax. [phone]    25 minutes were spent in coordination of patient's care, record review and counseling.  More than 50% of the time was face-to-face.

## 2018-11-28 NOTE — PROGRESS NOTES
Subjective:       Patient ID:  Angel Torres is a 63 y.o. male who presents for   Chief Complaint   Patient presents with    Rash     f/u     Rash  - Follow-up  Symptom course: improving  Signs / symptoms: asymptomatic    62 yo male with lung cancer here for f/u rash.  Briefly, rash has been ongoing several months.  Skin biopsy shows lichenoid dermatitis; no oral involvement.  Improved with topical steroids and bactroban.  Has been taking prednisone taper per myself and heme/onc, start dose 80 mg, now down to 60 mg daily, and skin has rapidly improved. He is no longer using topical steroids. He has not had hepatitis C checked yet.     Heme/onc is holding Pembro but may restart.  He is no longer taking Naproxen or any NSAIDS otc either.    Review of Systems   Constitutional: Positive for fatigue.   Skin: Positive for rash.   Hematologic/Lymphatic: Does not bruise/bleed easily.        Objective:    Physical Exam   Constitutional: He appears well-developed and well-nourished. No distress.   Neurological: He is alert and oriented to person, place, and time. He is not disoriented.   Psychiatric: He has a normal mood and affect.   Skin:   Areas Examined (abnormalities noted in diagram):   Scalp / Hair Palpated and Inspected  Head / Face Inspection Performed  Neck Inspection Performed  Chest / Axilla Inspection Performed  Abdomen Inspection Performed  Back Inspection Performed  RUE Inspected  LUE Inspection Performed  RLE Inspected  LLE Inspection Performed              Diagram Legend     Erythematous scaling macule/papule c/w actinic keratosis       Vascular papule c/w angioma      Pigmented verrucoid papule/plaque c/w seborrheic keratosis      Yellow umbilicated papule c/w sebaceous hyperplasia      Irregularly shaped tan macule c/w lentigo     1-2 mm smooth white papules consistent with Milia      Movable subcutaneous cyst with punctum c/w epidermal inclusion cyst      Subcutaneous movable cyst c/w pilar cyst      Firm  pink to brown papule c/w dermatofibroma      Pedunculated fleshy papule(s) c/w skin tag(s)      Evenly pigmented macule c/w junctional nevus     Mildly variegated pigmented, slightly irregular-bordered macule c/w mildly atypical nevus      Flesh colored to evenly pigmented papule c/w intradermal nevus       Pink pearly papule/plaque c/w basal cell carcinoma      Erythematous hyperkeratotic cursted plaque c/w SCC      Surgical scar with no sign of skin cancer recurrence      Open and closed comedones      Inflammatory papules and pustules      Verrucoid papule consistent consistent with wart     Erythematous eczematous patches and plaques     Dystrophic onycholytic nail with subungual debris c/w onychomycosis     Umbilicated papule    Erythematous-base heme-crusted tan verrucoid plaque consistent with inflamed seborrheic keratosis     Erythematous Silvery Scaling Plaque c/w Psoriasis     See annotation      Assessment / Plan:        Lichenoid eruption - favor hypertrophic LP - this may have been idiopathic or induced by 1) NSAIDS (favored) or 2) Pembro (less likely but has been described). Pt now rapidly improving with high dose prednisone po and holding of Pembro.     Would advise completion of prednisone taper per heme/onc.  Will have patient RV in 6 wks to assess if rash persists upon lower doses of prednisone and/or reintroduction of Pembro therapy.    Will draw hep C AB to complete workup (unlikely in setting of cutaneous only lesions) and G6PD in the event that Plaquenil is necessary in next few months.    Advised pt to restart TAC cream to AA x 2 weeks.  Reassured pigmentation likely to return but can take several months.    -     Hepatitis C antibody; Future  -     G6PD,Quantitative; Future             No Follow-up on file.

## 2018-12-12 LAB
ACID FAST MOD KINY STN SPEC: NORMAL
MYCOBACTERIUM SPEC QL CULT: NORMAL

## 2018-12-26 ENCOUNTER — HOSPITAL ENCOUNTER (OUTPATIENT)
Dept: RADIOLOGY | Facility: HOSPITAL | Age: 63
Discharge: HOME | End: 2018-12-26
Attending: INTERNAL MEDICINE
Payer: COMMERCIAL

## 2018-12-26 DIAGNOSIS — D49.89 NEOPLASM OF ABDOMEN: ICD-10-CM

## 2018-12-26 PROCEDURE — 74177 CT ABD & PELVIS W/CONTRAST: CPT | Mod: 26,,, | Performed by: RADIOLOGY

## 2018-12-26 PROCEDURE — 25500020 PHARM REV CODE 255: Performed by: INTERNAL MEDICINE

## 2018-12-26 PROCEDURE — 71260 CT THORAX DX C+: CPT | Mod: 26,,, | Performed by: RADIOLOGY

## 2018-12-26 PROCEDURE — 74177 CT ABD & PELVIS W/CONTRAST: CPT | Mod: TC

## 2018-12-26 RX ADMIN — IOHEXOL 100 ML: 350 INJECTION, SOLUTION INTRAVENOUS at 11:12

## 2019-01-02 ENCOUNTER — LAB VISIT (OUTPATIENT)
Dept: LAB | Facility: HOSPITAL | Age: 64
End: 2019-01-02
Attending: INTERNAL MEDICINE
Payer: COMMERCIAL

## 2019-01-02 ENCOUNTER — OFFICE VISIT (OUTPATIENT)
Dept: HEMATOLOGY/ONCOLOGY | Facility: CLINIC | Age: 64
End: 2019-01-02
Payer: COMMERCIAL

## 2019-01-02 VITALS
RESPIRATION RATE: 20 BRPM | HEART RATE: 112 BPM | WEIGHT: 195.13 LBS | HEIGHT: 77 IN | DIASTOLIC BLOOD PRESSURE: 91 MMHG | OXYGEN SATURATION: 96 % | TEMPERATURE: 98 F | BODY MASS INDEX: 23.04 KG/M2 | SYSTOLIC BLOOD PRESSURE: 137 MMHG

## 2019-01-02 DIAGNOSIS — L43.9 LICHEN PLANUS: ICD-10-CM

## 2019-01-02 DIAGNOSIS — R53.0 NEOPLASTIC MALIGNANT RELATED FATIGUE: ICD-10-CM

## 2019-01-02 DIAGNOSIS — R21 RASH: ICD-10-CM

## 2019-01-02 DIAGNOSIS — C79.51 SECONDARY ADENOCARCINOMA OF BONE: ICD-10-CM

## 2019-01-02 DIAGNOSIS — D49.89 NEOPLASM OF ABDOMEN: ICD-10-CM

## 2019-01-02 DIAGNOSIS — C34.2 PRIMARY CANCER OF RIGHT MIDDLE LOBE OF LUNG: Primary | ICD-10-CM

## 2019-01-02 DIAGNOSIS — Z09 CHEMOTHERAPY FOLLOW-UP EXAMINATION: ICD-10-CM

## 2019-01-02 DIAGNOSIS — C34.2 PRIMARY CANCER OF RIGHT MIDDLE LOBE OF LUNG: ICD-10-CM

## 2019-01-02 LAB
ALBUMIN SERPL BCP-MCNC: 3.5 G/DL
ALP SERPL-CCNC: 48 U/L
ALT SERPL W/O P-5'-P-CCNC: 19 U/L
ANION GAP SERPL CALC-SCNC: 9 MMOL/L
AST SERPL-CCNC: 16 U/L
BILIRUB SERPL-MCNC: 0.4 MG/DL
BUN SERPL-MCNC: 20 MG/DL
CALCIUM SERPL-MCNC: 9.4 MG/DL
CHLORIDE SERPL-SCNC: 104 MMOL/L
CO2 SERPL-SCNC: 26 MMOL/L
CREAT SERPL-MCNC: 1.3 MG/DL
ERYTHROCYTE [DISTWIDTH] IN BLOOD BY AUTOMATED COUNT: 17.2 %
EST. GFR  (AFRICAN AMERICAN): >60 ML/MIN/1.73 M^2
EST. GFR  (NON AFRICAN AMERICAN): 58.5 ML/MIN/1.73 M^2
GLUCOSE SERPL-MCNC: 95 MG/DL
HCT VFR BLD AUTO: 44.4 %
HCV AB SERPL QL IA: NEGATIVE
HGB BLD-MCNC: 14 G/DL
IMM GRANULOCYTES # BLD AUTO: 0.13 K/UL
MCH RBC QN AUTO: 27.5 PG
MCHC RBC AUTO-ENTMCNC: 31.5 G/DL
MCV RBC AUTO: 87 FL
NEUTROPHILS # BLD AUTO: 9 K/UL
PLATELET # BLD AUTO: 281 K/UL
PMV BLD AUTO: 9.7 FL
POTASSIUM SERPL-SCNC: 4.6 MMOL/L
PROT SERPL-MCNC: 7.3 G/DL
RBC # BLD AUTO: 5.1 M/UL
SODIUM SERPL-SCNC: 139 MMOL/L
T4 FREE SERPL-MCNC: 1.11 NG/DL
TSH SERPL DL<=0.005 MIU/L-ACNC: 1.04 UIU/ML
WBC # BLD AUTO: 10.92 K/UL

## 2019-01-02 PROCEDURE — 84443 ASSAY THYROID STIM HORMONE: CPT

## 2019-01-02 PROCEDURE — 86803 HEPATITIS C AB TEST: CPT

## 2019-01-02 PROCEDURE — 3080F DIAST BP >= 90 MM HG: CPT | Mod: CPTII,S$GLB,, | Performed by: INTERNAL MEDICINE

## 2019-01-02 PROCEDURE — 3075F SYST BP GE 130 - 139MM HG: CPT | Mod: CPTII,S$GLB,, | Performed by: INTERNAL MEDICINE

## 2019-01-02 PROCEDURE — 80053 COMPREHEN METABOLIC PANEL: CPT

## 2019-01-02 PROCEDURE — 3075F PR MOST RECENT SYSTOLIC BLOOD PRESS GE 130-139MM HG: ICD-10-PCS | Mod: CPTII,S$GLB,, | Performed by: INTERNAL MEDICINE

## 2019-01-02 PROCEDURE — 82955 ASSAY OF G6PD ENZYME: CPT

## 2019-01-02 PROCEDURE — 84439 ASSAY OF FREE THYROXINE: CPT

## 2019-01-02 PROCEDURE — 99999 PR PBB SHADOW E&M-EST. PATIENT-LVL IV: CPT | Mod: PBBFAC,,, | Performed by: INTERNAL MEDICINE

## 2019-01-02 PROCEDURE — 3008F PR BODY MASS INDEX (BMI) DOCUMENTED: ICD-10-PCS | Mod: CPTII,S$GLB,, | Performed by: INTERNAL MEDICINE

## 2019-01-02 PROCEDURE — 36415 COLL VENOUS BLD VENIPUNCTURE: CPT

## 2019-01-02 PROCEDURE — 85027 COMPLETE CBC AUTOMATED: CPT

## 2019-01-02 PROCEDURE — 99999 PR PBB SHADOW E&M-EST. PATIENT-LVL IV: ICD-10-PCS | Mod: PBBFAC,,, | Performed by: INTERNAL MEDICINE

## 2019-01-02 PROCEDURE — 3080F PR MOST RECENT DIASTOLIC BLOOD PRESSURE >= 90 MM HG: ICD-10-PCS | Mod: CPTII,S$GLB,, | Performed by: INTERNAL MEDICINE

## 2019-01-02 PROCEDURE — 99214 OFFICE O/P EST MOD 30 MIN: CPT | Mod: S$GLB,,, | Performed by: INTERNAL MEDICINE

## 2019-01-02 PROCEDURE — 99214 PR OFFICE/OUTPT VISIT, EST, LEVL IV, 30-39 MIN: ICD-10-PCS | Mod: S$GLB,,, | Performed by: INTERNAL MEDICINE

## 2019-01-02 PROCEDURE — 3008F BODY MASS INDEX DOCD: CPT | Mod: CPTII,S$GLB,, | Performed by: INTERNAL MEDICINE

## 2019-01-02 NOTE — PROGRESS NOTES
PATIENT: Angel Torres  MRN: [id number]  DATE: 1/2/2019      Diagnosis:   1. Primary cancer of right middle lobe of lung    2. Secondary adenocarcinoma of bone    3. Rash        Chief Complaint: Primary cancer of right middle lobe of lung      Oncologic History:      Oncologic History Non-small cell lung cancer diagnosed 6/2016   Recurrent disease to lung 4/2017     Oncologic Treatment Cisplatin/Pemetrexed initiated 8/15/16 with concurrent radiation completed 3 cycles 10/3/16; 64 Gy  Carboplatin/pemetrexed/pembrolizumab 7/2017 - 9/2017  Pembrolizumab maintenance 11/2017 (held 10/2018 secondary to rash)    Pathology  6/2016 : Adenocarcinoma, T2b, N2, M0, Stage IIIA          Subjective:    Interval History: Mr. Torres is a 63 y.o. male who was seen in follow-up for lung cancer.  He states he has been feeling well.  He has completed his steroid taper earlier this week.  His rash has completely resolved and is now left with areas of hypopigmentation.  He states he is active.  Denies any shortness of breath.  His weight has increased.  He has no new complaints.    His history dates to approximately 4/2016 when he noted a worsening cough which was associated with clear sputum.  He had a chest x-ray in 5/16 showing a right middle lobe opacity.  Subsequent CT of the chest was performed on 5/30/16 showing a 5.6 cm lesion in the right middle lobe with hilar and mediastinal lymphadenopathy along with hepatic lesions and a left adrenal mass..  He underwent bronchoscopy which was nonrevealing and transbronchial biopsies were nondiagnostic.  He then underwent EBUS with biopsy of mediastinal lymph nodes with pathology showing non-small cell lung cancer consistent with adenocarcinoma.  He was started on concurrent chemotherapy and radiation with cisplatin and pemetrexed on 8/15/16.  He completed 64 Gy of concurrent radiation with cisplatin and pemetrexed with chemotherapy completed on 10/3/16.  Scans in 4/2017 had indicated progressive  disease in the lung.  Subsequent PET scan in 7/2017 had shown spread of disease to bone.  He was started on carboplatin and pemetrexed and pembrolizumab in 7/2017.  Repeat imaging in 8/2017 had shown stability of disease.  He was started on pembrolizumab maintenance in 11/2017 after a delay due to his insurance.  Repeat imaging in 12/2016 had shown mild progressive disease, however, it was felt that due to the excessive delay in treatment we would proceed on rather than change therapy at this time.  CT in 3/2018 had shown stability of his disease.  CT in 05/2018 had shown stable disease.  CT in 08/2018 had shown stable disease.  Pembrolizumab was held in 10/2018 secondary to a rash.    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    Skin infection 8/8/2018       Past Surgical HIstory:   Past Surgical History:   Procedure Laterality Date    BRONCHOSCOPY N/A 6/28/2016    Performed by [facility] at [facility] OR 2ND FLR    BRONCHOSCOPY N/A 6/13/2016    Performed by [facility] at [facility] OR [address] [place] [address]    ENDOBRONCHIAL ULTRASOUND (EBUS) N/A 6/28/2016    Performed by [facility] at [facility] OR 2ND FLR    EXCISION, MASS, LOWER EXTREMITY Left 4/21/2014    [facility]    INGUINAL HERNIA REPAIR Bilateral     KNEE SURGERY      REPAIR, HERNIA, INGUINAL, BILATERAL, LAPAROSCOPIC Bilateral 4/21/2014    Performed by [facility]ne Dai MD at [facility] OR [address] [place] [address]       Family History:   Family History   Problem Relation Age of Onset    Cancer Mother         lung, breast    Cancer Sister         lung    Cancer  Maternal Grandfather     No Known Problems Father     No Known Problems Brother     No Known Problems Maternal Aunt     No Known Problems Maternal Uncle     No Known Problems Paternal Aunt     No Known Problems Paternal Uncle     No Known Problems Maternal Grandmother     No Known Problems Paternal Grandmother     No Known Problems Paternal Grandfather     Amblyopia Neg Hx     Blindness Neg Hx     Cataracts Neg Hx     Diabetes Neg Hx     Glaucoma Neg Hx     Hypertension Neg Hx     Macular degeneration Neg Hx     Retinal detachment Neg Hx     Strabismus Neg Hx     Stroke Neg Hx     Thyroid disease Neg Hx        Social History:  reports that he quit smoking about 5 years ago. He has a 80.00 pack-year smoking history. He has quit using smokeless tobacco. He reports that he does not drink alcohol or use drugs.    Allergies:  Review of patient's allergies indicates:  No Known Allergies    Medications:  Current Outpatient Medications   Medication Sig Dispense Refill    albuterol-ipratropium (DUO-NEB) 2.5 mg-0.5 mg/3 mL nebulizer solution Take 3 mLs by nebulization every 6 (six) hours as needed for Wheezing or Shortness of Breath. 100 vial 2    amLODIPine (NORVASC) 10 MG tablet Take 1 tablet (10 mg total) by mouth once daily. 90 tablet 3    dronabinol (MARINOL) 5 MG capsule Take 1 capsule (5 mg total) by mouth 2 (two) times daily before meals. 60 capsule 0    fluticasone (FLONASE) 50 mcg/actuation nasal spray SHAKE LIQUID AND USE 2 SPRAYS IN EACH NOSTRIL EVERY DAY 16 g 1    folic acid (FOLVITE) 400 MCG tablet TAKE 1 TABLET(400 MCG) BY MOUTH EVERY  tablet 0    furosemide (LASIX) 20 MG tablet Take 1 tablet (20 mg total) by mouth once daily. 6 tablet 0    mupirocin (BACTROBAN) 2 % ointment Apply topically 2 (two) times daily. To open areas 22 g 2    naproxen (NAPROSYN) 500 MG tablet Take 1 tablet (500 mg total) by mouth 2 (two) times daily as needed (headache). 60 tablet 0    predniSONE  "(DELTASONE) 20 MG tablet Take 3 pills po qam with breakfast x 1 wk then take 2 po qam with breakfast x 1 wk then take 1 po qam with breakfast x 1 wk 70 tablet 1    PROAIR HFA 90 mcg/actuation inhaler INHALE 2 PUFFS INTO THE LUNGS EVERY 6 HOURS AS NEEDED FOR WHEEZING 17 g 0    triamcinolone acetonide 0.1% (KENALOG) 0.1 % ointment To all rashes BID 80 g 2    cetirizine (ZYRTEC) 10 MG tablet Take 1 tablet (10 mg total) by mouth once daily.  0     No current facility-administered medications for this visit.        Review of Systems   Constitutional: Negative for activity change, appetite change, chills, fatigue, fever and unexpected weight change.        Normal weight 220   HENT: Negative for dental problem, nosebleeds, sinus pressure, sneezing and trouble swallowing.    Eyes: Negative for visual disturbance.   Respiratory: Negative for cough, choking, chest tightness, shortness of breath and wheezing.    Cardiovascular: Negative for chest pain and leg swelling.   Gastrointestinal: Negative for abdominal pain, blood in stool, constipation, diarrhea and nausea.   Genitourinary: Negative for difficulty urinating and dysuria.   Musculoskeletal: Positive for myalgias. Negative for arthralgias and back pain.   Skin: Negative for rash and wound.   Neurological: Negative for dizziness, light-headedness and headaches.   Hematological: Negative for adenopathy. Does not bruise/bleed easily.   Psychiatric/Behavioral: Negative for sleep disturbance. The patient is not nervous/anxious.        ECOG Performance Status: 1   Objective:      Vitals:   Vitals:    01/02/19 1132   BP: (!) 137/91   Pulse: (!) 112   Resp: 20   Temp: 97.9 °F (36.6 °C)   SpO2: 96%   Weight: 88.5 kg (195 lb 1.7 oz)   Height: 6' 5" (1.956 m)     BMI: Body mass index is 23.14 kg/m².    Physical Exam   Constitutional: He is oriented to person, place, and time. He appears well-developed and well-nourished.   HENT:   Head: Normocephalic and atraumatic.   Eyes: " Pupils are equal, round, and reactive to light.   Neck: Normal range of motion. Neck supple.   Cardiovascular: Normal rate and regular rhythm.   Pulmonary/Chest: Effort normal. No respiratory distress. He has no rales.   Abdominal: Soft. He exhibits no distension. There is no tenderness.   Musculoskeletal: He exhibits no edema or tenderness.   Lymphadenopathy:     He has no cervical adenopathy.   Neurological: He is alert and oriented to person, place, and time. No cranial nerve deficit.   Skin: Skin is warm and dry. No rash noted.   Psychiatric: He has a normal mood and affect. His behavior is normal.       Laboratory Data:  Lab Visit on 01/02/2019   Component Date Value Ref Range Status    WBC 01/02/2019 10.92  3.90 - 12.70 K/uL Final    RBC 01/02/2019 5.10  4.60 - 6.20 M/uL Final    Hemoglobin 01/02/2019 14.0  14.0 - 18.0 g/dL Final    Hematocrit 01/02/2019 44.4  40.0 - 54.0 % Final    MCV 01/02/2019 87  82 - 98 fL Final    MCH 01/02/2019 27.5  27.0 - 31.0 pg Final    MCHC 01/02/2019 31.5* 32.0 - 36.0 g/dL Final    RDW 01/02/2019 17.2* 11.5 - 14.5 % Final    Platelets 01/02/2019 281  150 - 350 K/uL Final    MPV 01/02/2019 9.7  9.2 - 12.9 fL Final    Gran # (ANC) 01/02/2019 9.0* 1.8 - 7.7 K/uL Final    Comment: The ANC is based on a white cell differential from an   automated cell counter. It has not been microscopically   reviewed for the presence of abnormal cells. Clinical   correlation is required.      Immature Grans (Abs) 01/02/2019 0.13* 0.00 - 0.04 K/uL Final    Comment: Mild elevation in immature granulocytes is non specific and   can be seen in a variety of conditions including stress response,   acute inflammation, trauma and pregnancy. Correlation with other   laboratory and clinical findings is essential.      Sodium 01/02/2019 139  136 - 145 mmol/L Final    Potassium 01/02/2019 4.6  3.5 - 5.1 mmol/L Final    Chloride 01/02/2019 104  95 - 110 mmol/L Final    CO2 01/02/2019 26  23 - 29  mmol/L Final    Glucose 01/02/2019 95  70 - 110 mg/dL Final    BUN, Bld 01/02/2019 20  8 - 23 mg/dL Final    Creatinine 01/02/2019 1.3  0.5 - 1.4 mg/dL Final    Calcium 01/02/2019 9.4  8.7 - 10.5 mg/dL Final    Total Protein 01/02/2019 7.3  6.0 - 8.4 g/dL Final    Albumin 01/02/2019 3.5  3.5 - 5.2 g/dL Final    Total Bilirubin 01/02/2019 0.4  0.1 - 1.0 mg/dL Final    Comment: For infants and newborns, interpretation of results should be based  on gestational age, weight and in agreement with clinical  observations.  Premature Infant recommended reference ranges:  Up to 24 hours.............<8.0 mg/dL  Up to 48 hours............<12.0 mg/dL  3-5 days..................<15.0 mg/dL  6-29 days.................<15.0 mg/dL      Alkaline Phosphatase 01/02/2019 48* 55 - 135 U/L Final    AST 01/02/2019 16  10 - 40 U/L Final    ALT 01/02/2019 19  10 - 44 U/L Final    Anion Gap 01/02/2019 9  8 - 16 mmol/L Final    eGFR if African American 01/02/2019 >60.0  >60 mL/min/1.73 m^2 Final    eGFR if non African American 01/02/2019 58.5* >60 mL/min/1.73 m^2 Final    Comment: Calculation used to obtain the estimated glomerular filtration  rate (eGFR) is the CKD-EPI equation.        Supplemental Diagnosis 6/30/16  Immunohistochemical stains are completed on the Station 4L lymph node cell block material and reveal the tumor  cells to stain positively with cytokeratin 7 and TTF-1. The tumor cells show nonspecific blush staining with  cytokeratin 5/6 and are negative for p63. This staining profile supports a diagnosis of non-small cell carcinoma  consistent with adenocarcinoma.  Cell block material will be sent for EGFR, ALK and ROS-1 testing and results will follow in a supplemental report.  (Electronically Signed: 2016-06-30 11:50:31 )  Diagnosed by: Irene Amaro M.D.  FINAL PATHOLOGIC DIAGNOSIS  1. Lymph node, Station 4L, EBUS guided fine needle aspiration:  Positive for malignant cells, non-small cell carcinoma.  Rare  background lymphocytes are present.  Immunohistochemical staining be completed to further characterize this malignancy and results will follow in a  supplemental report.  2. Lymph node, Station 7, EBUS guided fine needle aspiration:  Positive for malignant cells, non-small cell carcinoma tumor identical to that seen in specimen #1.  Background lymphocytes are present.    Imaging:   CT 12/26/2018  COMPARISON:  CT chest abdomen pelvis 08/21/2018    FINDINGS:  Base of Neck: No significant abnormality.    CHEST:    -Heart: Normal size. No pericardial effusion.    -Pulmonary vasculature: Pulmonary arteries distribute normally.  There are four pulmonary veins.    -Denisse/Mediastinum: No pathologic kervin enlargement.    -Trachea and Proximal airways: Patent.    -Lungs/Pleura: There is right lower lobe bronchiectasis and geographic right paramediastinal airspace consolidation with patchy ground-glass opacities at the right lung base compatible with history of prior radiation, grossly similar size compared to CT 08/21/2018, measuring 3.9 x 3.5 cm (axial series 2, image 72).  There is a small right pleural effusion similar to prior.  There is severe centrilobular emphysematous change throughout both lungs.    -Esophagus: Normal course and caliber.    ABDOMEN:    - Liver: Normal in size and attenuation.  Multiple small hepatic hypodensities appear similar to prior CT, most of which are too small to characterize and findings probably represent cysts..    - Gallbladder: No calcified gallstones.  No wall thickening or pericholecystic fluid.    - Bile Ducts: No intra or extrahepatic biliary ductal dilatation.    - Stomach/Duodenum: Unremarkable.    - Spleen: Normal size.  No focal abnormality.  Small adjacent splenule.    - Pancreas: Unremarkable.    - Adrenals: Stable 1.2 cm left adrenal nodule.  Right adrenal gland is normal.    - Kidneys/ureters/urinary bladder: Normal in size and location, concentrating excreting contrast  appropriately on delayed imaging.  No hydronephrosis or stones.  The ureters appear normal in course and caliber without evidence of ureteral dilatation.  There is a bladder wall diverticulum at the bladder dome.  No convincing evidence of bladder wall thickening.    - Retroperitoneum: No significant adenopathy.    PELVIS:    - Reproductive: Prostatomegaly.    - Other: No pelvic adenopathy, free fluid, or mass.    BOWEL/MESENTERY:    No evidence of bowel obstruction or inflammatory process. Hiatal hernia.    VASCULATURE: Left-sided aortic arch with 3 branch vessels.  Moderate aortic atherosclerotic calcification without aneurysm.    BONES: No acute fracture or bony destructive process. Unchanged sclerotic focus at the T1 vertebral body and the superior aspect of the left iliac wing.  Mild age-appropriate degenerative changes also present.    EXTRATHORACIC/EXTRAPERITONEAL SOFT TISSUES: Unremarkable.      Impression       Unchanged right middle lobe soft tissue mass in this patient with history of lung malignancy.  No evidence of new metastatic disease.    Stable geographic right paramediastinal airspace consolidation with patchy ground-glass opacities at the right lung base and right lower lobe bronchiectasis, compatible with post radiation changes, superimposed infectious component not excluded.    Stable small right pleural effusion.    Unchanged sclerotic focus at the T1 vertebral body and superior aspect of the left iliac wing compatible with metastatic disease.    Unchanged nonspecific left adrenal nodule.    Small bladder wall diverticulum.    RECIST SUMMARY:    Date of prior examination for comparison: CT 08/21/2018    Lesion 1: Right middle lobe.  3.9 cm.  (Axial series 2, image 72).  Previously 3.8 cm.                Assessment:       1. Primary cancer of right middle lobe of lung    2. Secondary adenocarcinoma of bone    3. Rash           Plan:     Mr. Torres is doing well from an oncologic standpoint.   Review of his CT scan shows no detrimental changes while being off pembrolizumab.  At this point I believe it is reasonable to continue surveillance off treatment in would recommend we repeat imaging in another 3 months.  His rash has completely resolved but we will need to be mindful of this should he need to go back on treatment.  I will plan to see him in 3 months and he is to report any new symptoms in the interim.  All questions were answered and he is agreeable with this plan.    Dano Fernandez DO, FACP  Hematology & Oncology  [address] [place] [id number]  ph. [phone]  Fax. [phone]    25 minutes were spent in coordination of patient's care, record review and counseling.  More than 50% of the time was face-to-face.

## 2019-01-04 LAB — G6PD RBC-CCNT: 14.3 U/G HGB (ref 7–20.5)

## 2019-01-08 ENCOUNTER — TELEPHONE (OUTPATIENT)
Dept: HEMATOLOGY/ONCOLOGY | Facility: CLINIC | Age: 64
End: 2019-01-08

## 2019-01-09 NOTE — TELEPHONE ENCOUNTER
----- Message from Edel Fay sent at 1/8/2019  2:09 PM CST -----  Contact: Flavia Gil  Pt wife called to speak with nurse Lauren about her FMLA Papers   Callback#[phone]  Thank You  ABRAHAN Fay

## 2019-01-29 DIAGNOSIS — J44.9 CHRONIC OBSTRUCTIVE PULMONARY DISEASE, UNSPECIFIED COPD TYPE: ICD-10-CM

## 2019-01-30 RX ORDER — IPRATROPIUM BROMIDE AND ALBUTEROL SULFATE 2.5; .5 MG/3ML; MG/3ML
SOLUTION RESPIRATORY (INHALATION)
Qty: 270 ML | Refills: 0 | Status: SHIPPED | OUTPATIENT
Start: 2019-01-30 | End: 2019-04-03 | Stop reason: SDUPTHER

## 2019-03-11 ENCOUNTER — TELEPHONE (OUTPATIENT)
Dept: PULMONOLOGY | Facility: CLINIC | Age: 64
End: 2019-03-11

## 2019-03-11 ENCOUNTER — TELEPHONE (OUTPATIENT)
Dept: HEMATOLOGY/ONCOLOGY | Facility: CLINIC | Age: 64
End: 2019-03-11

## 2019-03-11 NOTE — TELEPHONE ENCOUNTER
----- Message from Linnea Vegas RN sent at 3/11/2019 10:00 AM CDT -----  Patient's wife is requesting a call for an appt due to patient having an increased WOB. He is currently on surveillance for his lung cancer at this time.

## 2019-03-11 NOTE — TELEPHONE ENCOUNTER
Called patient to schedule f/u with Dr Vance. Patient stated he will have family member to call back to schedule appointment tomorrow

## 2019-03-11 NOTE — TELEPHONE ENCOUNTER
----- Message from Edel Fay sent at 3/11/2019  9:05 AM CDT -----  Contact: pt   Pt wife called to speak with nurse regino have some question about labs   Callback#[phone]  Thank You   ABRAHAN Fay

## 2019-03-11 NOTE — TELEPHONE ENCOUNTER
Changed appt time for labs.  Will message pulmonologist related to patients increased WOB even off of therapy.

## 2019-03-27 ENCOUNTER — HOSPITAL ENCOUNTER (OUTPATIENT)
Dept: RADIOLOGY | Facility: HOSPITAL | Age: 64
Discharge: HOME | End: 2019-03-27
Attending: INTERNAL MEDICINE
Payer: COMMERCIAL

## 2019-03-27 DIAGNOSIS — D49.89 NEOPLASM OF ABDOMEN: ICD-10-CM

## 2019-03-27 PROCEDURE — 71260 CT CHEST ABDOMEN PELVIS WITH CONTRAST (XPD): ICD-10-PCS | Mod: 26,,, | Performed by: RADIOLOGY

## 2019-03-27 PROCEDURE — 71260 CT THORAX DX C+: CPT | Mod: 26,,, | Performed by: RADIOLOGY

## 2019-03-27 PROCEDURE — 74177 CT ABD & PELVIS W/CONTRAST: CPT | Mod: TC

## 2019-03-27 PROCEDURE — 74177 CT CHEST ABDOMEN PELVIS WITH CONTRAST (XPD): ICD-10-PCS | Mod: 26,,, | Performed by: RADIOLOGY

## 2019-03-27 PROCEDURE — 74177 CT ABD & PELVIS W/CONTRAST: CPT | Mod: 26,,, | Performed by: RADIOLOGY

## 2019-03-27 PROCEDURE — 25500020 PHARM REV CODE 255: Performed by: INTERNAL MEDICINE

## 2019-03-27 RX ADMIN — IOHEXOL 100 ML: 350 INJECTION, SOLUTION INTRAVENOUS at 02:03

## 2019-04-03 ENCOUNTER — OFFICE VISIT (OUTPATIENT)
Dept: HEMATOLOGY/ONCOLOGY | Facility: CLINIC | Age: 64
End: 2019-04-03
Payer: COMMERCIAL

## 2019-04-03 VITALS
WEIGHT: 185.88 LBS | BODY MASS INDEX: 22.63 KG/M2 | OXYGEN SATURATION: 97 % | SYSTOLIC BLOOD PRESSURE: 123 MMHG | DIASTOLIC BLOOD PRESSURE: 77 MMHG | RESPIRATION RATE: 16 BRPM | HEART RATE: 96 BPM | HEIGHT: 76 IN | TEMPERATURE: 98 F

## 2019-04-03 DIAGNOSIS — C79.51 SECONDARY ADENOCARCINOMA OF BONE: ICD-10-CM

## 2019-04-03 DIAGNOSIS — C34.2 PRIMARY CANCER OF RIGHT MIDDLE LOBE OF LUNG: Primary | ICD-10-CM

## 2019-04-03 DIAGNOSIS — J44.9 CHRONIC OBSTRUCTIVE PULMONARY DISEASE, UNSPECIFIED COPD TYPE: ICD-10-CM

## 2019-04-03 PROCEDURE — 3074F SYST BP LT 130 MM HG: CPT | Mod: CPTII,S$GLB,, | Performed by: INTERNAL MEDICINE

## 2019-04-03 PROCEDURE — 3078F PR MOST RECENT DIASTOLIC BLOOD PRESSURE < 80 MM HG: ICD-10-PCS | Mod: CPTII,S$GLB,, | Performed by: INTERNAL MEDICINE

## 2019-04-03 PROCEDURE — 3008F BODY MASS INDEX DOCD: CPT | Mod: CPTII,S$GLB,, | Performed by: INTERNAL MEDICINE

## 2019-04-03 PROCEDURE — 99214 OFFICE O/P EST MOD 30 MIN: CPT | Mod: S$GLB,,, | Performed by: INTERNAL MEDICINE

## 2019-04-03 PROCEDURE — 99999 PR PBB SHADOW E&M-EST. PATIENT-LVL III: CPT | Mod: PBBFAC,,, | Performed by: INTERNAL MEDICINE

## 2019-04-03 PROCEDURE — 99214 PR OFFICE/OUTPT VISIT, EST, LEVL IV, 30-39 MIN: ICD-10-PCS | Mod: S$GLB,,, | Performed by: INTERNAL MEDICINE

## 2019-04-03 PROCEDURE — 3078F DIAST BP <80 MM HG: CPT | Mod: CPTII,S$GLB,, | Performed by: INTERNAL MEDICINE

## 2019-04-03 PROCEDURE — 3008F PR BODY MASS INDEX (BMI) DOCUMENTED: ICD-10-PCS | Mod: CPTII,S$GLB,, | Performed by: INTERNAL MEDICINE

## 2019-04-03 PROCEDURE — 3074F PR MOST RECENT SYSTOLIC BLOOD PRESSURE < 130 MM HG: ICD-10-PCS | Mod: CPTII,S$GLB,, | Performed by: INTERNAL MEDICINE

## 2019-04-03 PROCEDURE — 99999 PR PBB SHADOW E&M-EST. PATIENT-LVL III: ICD-10-PCS | Mod: PBBFAC,,, | Performed by: INTERNAL MEDICINE

## 2019-04-03 RX ORDER — IPRATROPIUM BROMIDE AND ALBUTEROL SULFATE 2.5; .5 MG/3ML; MG/3ML
SOLUTION RESPIRATORY (INHALATION)
Qty: 270 ML | Refills: 0 | Status: SHIPPED | OUTPATIENT
Start: 2019-04-03 | End: 2019-04-25 | Stop reason: SDUPTHER

## 2019-04-03 RX ORDER — ALBUTEROL SULFATE 90 UG/1
AEROSOL, METERED RESPIRATORY (INHALATION)
Qty: 17 G | Refills: 0 | Status: SHIPPED | OUTPATIENT
Start: 2019-04-03 | End: 2019-08-07 | Stop reason: SDUPTHER

## 2019-04-03 RX ORDER — SODIUM CHLORIDE 0.9 % (FLUSH) 0.9 %
10 SYRINGE (ML) INJECTION
Status: CANCELLED | OUTPATIENT
Start: 2019-04-10

## 2019-04-03 RX ORDER — HEPARIN 100 UNIT/ML
500 SYRINGE INTRAVENOUS
Status: CANCELLED | OUTPATIENT
Start: 2019-04-10

## 2019-04-03 NOTE — PROGRESS NOTES
PATIENT: Angel Torres  MRN: [id number]  DATE: 4/3/2019      Diagnosis:   1. Primary cancer of right middle lobe of lung    2. Chronic obstructive pulmonary disease, unspecified COPD type    3. Secondary adenocarcinoma of bone        Chief Complaint: Lung Cancer      Oncologic History:      Oncologic History Non-small cell lung cancer diagnosed 6/2016   Recurrent disease to lung 4/2017     Oncologic Treatment Cisplatin/Pemetrexed initiated 8/15/16 with concurrent radiation completed 3 cycles 10/3/16; 64 Gy  Carboplatin/pemetrexed/pembrolizumab 7/2017 - 9/2017  Pembrolizumab maintenance 11/2017 (held 10/2018 secondary to rash)    Pathology  6/2016 : Adenocarcinoma, T2b, N2, M0, Stage IIIA          Subjective:    Interval History: Mr. Torres is a 64 y.o. male who was seen in follow-up for lung cancer.  He states he has noted some increasing shortness of breath, cough and wheeze.  He has dropped some weight since I had seen him last.  He has no other new complaints.    His history dates to approximately 4/2016 when he noted a worsening cough which was associated with clear sputum.  He had a chest x-ray in 5/16 showing a right middle lobe opacity.  Subsequent CT of the chest was performed on 5/30/16 showing a 5.6 cm lesion in the right middle lobe with hilar and mediastinal lymphadenopathy along with hepatic lesions and a left adrenal mass..  He underwent bronchoscopy which was nonrevealing and transbronchial biopsies were nondiagnostic.  He then underwent EBUS with biopsy of mediastinal lymph nodes with pathology showing non-small cell lung cancer consistent with adenocarcinoma.  He was started on concurrent chemotherapy and radiation with cisplatin and pemetrexed on 8/15/16.  He completed 64 Gy of concurrent radiation with cisplatin and pemetrexed with chemotherapy completed on 10/3/16.  Scans in 4/2017 had indicated progressive disease in the lung.  Subsequent PET scan in 7/2017 had shown spread of disease to bone.  He  was started on carboplatin and pemetrexed and pembrolizumab in 7/2017.  Repeat imaging in 8/2017 had shown stability of disease.  He was started on pembrolizumab maintenance in 11/2017 after a delay due to his insurance.  Repeat imaging in 12/2016 had shown mild progressive disease, however, it was felt that due to the excessive delay in treatment we would proceed on rather than change therapy at this time.  CT in 3/2018 had shown stability of his disease.  CT in 05/2018 had shown stable disease.  CT in 08/2018 had shown stable disease.  Pembrolizumab was held in 10/2018 secondary to a rash.  Scans in 04/2019 had shown progressive disease in bone.  Pembrolizumab was restarted.    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    Skin infection 8/8/2018       Past Surgical HIstory:   Past Surgical History:   Procedure Laterality Date    BRONCHOSCOPY N/A 6/28/2016    Performed by [facility]ibel Wallace [facility] at [facility] OR 2ND [facility]LR    BRONCHOSCOPY N/A 6/13/2016    Performed by [facility] Provider at [facility] OR [address] [place] [address]    ENDOBRONCHIAL ULTRASOUND (EBUS) N/A 6/28/2016    Performed by Maribel Wallace MD at [facility] OR [facility]    EXCISION, MASS, LOWER EXTREMITY Left 4/21/2014    [facility]    INGUINAL HERNIA REPAIR Bilateral     KNEE SURGERY      REPAIR, HERNIA, INGUINAL, BILATERAL, LAPAROSCOPIC Bilateral 4/21/2014    Performed by Ryne Dai MD at [facility] OR [address] [place] [address]       Family History:   Family History   Problem Relation Age of Onset    Cancer Mother         lung, breast    Cancer Sister         lung    Cancer  Maternal Grandfather     No Known Problems Father     No Known Problems Brother     No Known Problems Maternal Aunt     No Known Problems Maternal Uncle     No Known Problems Paternal Aunt     No Known Problems Paternal Uncle     No Known Problems Maternal Grandmother     No Known Problems Paternal Grandmother     No Known Problems Paternal Grandfather     Amblyopia Neg Hx     Blindness Neg Hx     Cataracts Neg Hx     Diabetes Neg Hx     Glaucoma Neg Hx     Hypertension Neg Hx     Macular degeneration Neg Hx     Retinal detachment Neg Hx     Strabismus Neg Hx     Stroke Neg Hx     Thyroid disease Neg Hx        Social History:  reports that he quit smoking about 5 years ago. He has a 80.00 pack-year smoking history. He has quit using smokeless tobacco. He reports that he does not drink alcohol or use drugs.    Allergies:  Review of patient's allergies indicates:  No Known Allergies    Medications:  Current Outpatient Medications   Medication Sig Dispense Refill    albuterol-ipratropium (DUO-NEB) 2.5 mg-0.5 mg/3 mL nebulizer solution USE 1 VIAL VIA NEBULIZER EVERY 6 HOURS AS NEEDED FOR WHEEZING OR SHORTNESS OF BREATH 270 mL 0    amLODIPine (NORVASC) 10 MG tablet Take 1 tablet (10 mg total) by mouth once daily. 90 tablet 3    dronabinol (MARINOL) 5 MG capsule Take 1 capsule (5 mg total) by mouth 2 (two) times daily before meals. 60 capsule 0    fluticasone (FLONASE) 50 mcg/actuation nasal spray SHAKE LIQUID AND USE 2 SPRAYS IN EACH NOSTRIL EVERY DAY 16 g 1    folic acid (FOLVITE) 400 MCG tablet TAKE 1 TABLET(400 MCG) BY MOUTH EVERY  tablet 0    furosemide (LASIX) 20 MG tablet Take 1 tablet (20 mg total) by mouth once daily. 6 tablet 0    mupirocin (BACTROBAN) 2 % ointment Apply topically 2 (two) times daily. To open areas 22 g 2    naproxen (NAPROSYN) 500 MG tablet Take 1 tablet (500 mg total) by mouth 2 (two) times daily as needed (headache). 60 tablet 0    PROAIR HFA 90  "mcg/actuation inhaler INHALE 2 PUFFS INTO THE LUNGS EVERY 6 HOURS AS NEEDED FOR WHEEZING 17 g 0    triamcinolone acetonide 0.1% (KENALOG) 0.1 % ointment To all rashes BID 80 g 2    cetirizine (ZYRTEC) 10 MG tablet Take 1 tablet (10 mg total) by mouth once daily.  0     No current facility-administered medications for this visit.        Review of Systems   Constitutional: Negative for activity change, appetite change, chills, fatigue, fever and unexpected weight change.        Normal weight 220   HENT: Negative for dental problem, nosebleeds, sinus pressure, sneezing and trouble swallowing.    Eyes: Negative for visual disturbance.   Respiratory: Positive for shortness of breath and wheezing. Negative for cough, choking and chest tightness.    Cardiovascular: Negative for chest pain and leg swelling.   Gastrointestinal: Negative for abdominal pain, blood in stool, constipation, diarrhea and nausea.   Genitourinary: Negative for difficulty urinating and dysuria.   Musculoskeletal: Positive for myalgias. Negative for arthralgias and back pain.   Skin: Negative for rash and wound.   Neurological: Negative for dizziness, light-headedness and headaches.   Hematological: Negative for adenopathy. Does not bruise/bleed easily.   Psychiatric/Behavioral: Negative for sleep disturbance. The patient is not nervous/anxious.        ECOG Performance Status: 1   Objective:      Vitals:   Vitals:    04/03/19 1145   BP: 123/77   Pulse: 96   Resp: 16   Temp: 98.3 °F (36.8 °C)   SpO2: 97%   Weight: 84.3 kg (185 lb 13.6 oz)   Height: 6' 4" (1.93 m)     BMI: Body mass index is 22.62 kg/m².    Physical Exam   Constitutional: He is oriented to person, place, and time. He appears well-developed and well-nourished.   HENT:   Head: Normocephalic and atraumatic.   Eyes: Pupils are equal, round, and reactive to light.   Neck: Normal range of motion. Neck supple.   Cardiovascular: Normal rate and regular rhythm.   Pulmonary/Chest: Effort " normal. No respiratory distress. He has no rales.   Abdominal: Soft. He exhibits no distension. There is no tenderness.   Musculoskeletal: He exhibits no edema or tenderness.   Lymphadenopathy:     He has no cervical adenopathy.   Neurological: He is alert and oriented to person, place, and time. No cranial nerve deficit.   Skin: Skin is warm and dry. No rash noted.   Psychiatric: He has a normal mood and affect. His behavior is normal.       Laboratory Data:  Lab Visit on 03/27/2019   Component Date Value Ref Range Status    WBC 03/27/2019 7.35  3.90 - 12.70 K/uL Final    RBC 03/27/2019 4.82  4.60 - 6.20 M/uL Final    Hemoglobin 03/27/2019 13.4* 14.0 - 18.0 g/dL Final    Hematocrit 03/27/2019 42.7  40.0 - 54.0 % Final    MCV 03/27/2019 89  82 - 98 fL Final    MCH 03/27/2019 27.8  27.0 - 31.0 pg Final    MCHC 03/27/2019 31.4* 32.0 - 36.0 g/dL Final    RDW 03/27/2019 13.9  11.5 - 14.5 % Final    Platelets 03/27/2019 418* 150 - 350 K/uL Final    MPV 03/27/2019 9.4  9.2 - 12.9 fL Final    Gran # (ANC) 03/27/2019 5.5  1.8 - 7.7 K/uL Final    Comment: The ANC is based on a white cell differential from an   automated cell counter. It has not been microscopically   reviewed for the presence of abnormal cells. Clinical   correlation is required.      Immature Grans (Abs) 03/27/2019 0.02  0.00 - 0.04 K/uL Final    Comment: Mild elevation in immature granulocytes is non specific and   can be seen in a variety of conditions including stress response,   acute inflammation, trauma and pregnancy. Correlation with other   laboratory and clinical findings is essential.      Sodium 03/27/2019 137  136 - 145 mmol/L Final    Potassium 03/27/2019 4.1  3.5 - 5.1 mmol/L Final    Chloride 03/27/2019 104  95 - 110 mmol/L Final    CO2 03/27/2019 23  23 - 29 mmol/L Final    Glucose 03/27/2019 94  70 - 110 mg/dL Final    BUN, Bld 03/27/2019 13  8 - 23 mg/dL Final    Creatinine 03/27/2019 1.0  0.5 - 1.4 mg/dL Final     Calcium 03/27/2019 9.4  8.7 - 10.5 mg/dL Final    Total Protein 03/27/2019 7.7  6.0 - 8.4 g/dL Final    Albumin 03/27/2019 3.8  3.5 - 5.2 g/dL Final    Total Bilirubin 03/27/2019 0.4  0.1 - 1.0 mg/dL Final    Comment: For infants and newborns, interpretation of results should be based  on gestational age, weight and in agreement with clinical  observations.  Premature Infant recommended reference ranges:  Up to 24 hours.............<8.0 mg/dL  Up to 48 hours............<12.0 mg/dL  3-5 days..................<15.0 mg/dL  6-29 days.................<15.0 mg/dL      Alkaline Phosphatase 03/27/2019 56  55 - 135 U/L Final    AST 03/27/2019 12  10 - 40 U/L Final    ALT 03/27/2019 12  10 - 44 U/L Final    Anion Gap 03/27/2019 10  8 - 16 mmol/L Final    eGFR if African American 03/27/2019 >60.0  >60 mL/min/1.73 m^2 Final    eGFR if non African American 03/27/2019 >60.0  >60 mL/min/1.73 m^2 Final    Comment: Calculation used to obtain the estimated glomerular filtration  rate (eGFR) is the CKD-EPI equation.       TSH 03/27/2019 1.518  0.400 - 4.000 uIU/mL Final     Supplemental Diagnosis 6/30/16  Immunohistochemical stains are completed on the Station 4L lymph node cell block material and reveal the tumor  cells to stain positively with cytokeratin 7 and TTF-1. The tumor cells show nonspecific blush staining with  cytokeratin 5/6 and are negative for p63. This staining profile supports a diagnosis of non-small cell carcinoma  consistent with adenocarcinoma.  Cell block material will be sent for EGFR, ALK and ROS-1 testing and results will follow in a supplemental report.  (Electronically Signed: 2016-06-30 11:50:31 )  Diagnosed by: Irene Amaro M.D.  FINAL PATHOLOGIC DIAGNOSIS  1. Lymph node, Station 4L, EBUS guided fine needle aspiration:  Positive for malignant cells, non-small cell carcinoma.  Rare background lymphocytes are present.  Immunohistochemical staining be completed to further characterize this  malignancy and results will follow in a  supplemental report.  2. Lymph node, Station 7, EBUS guided fine needle aspiration:  Positive for malignant cells, non-small cell carcinoma tumor identical to that seen in specimen #1.  Background lymphocytes are present.    Imaging:   CT 12/26/2018  COMPARISON:  CT chest abdomen pelvis 08/21/2018    FINDINGS:  Base of Neck: No significant abnormality.    CHEST:    -Heart: Normal size. No pericardial effusion.    -Pulmonary vasculature: Pulmonary arteries distribute normally.  There are four pulmonary veins.    -Denisse/Mediastinum: No pathologic kervin enlargement.    -Trachea and Proximal airways: Patent.    -Lungs/Pleura: There is right lower lobe bronchiectasis and geographic right paramediastinal airspace consolidation with patchy ground-glass opacities at the right lung base compatible with history of prior radiation, grossly similar size compared to CT 08/21/2018, measuring 3.9 x 3.5 cm (axial series 2, image 72).  There is a small right pleural effusion similar to prior.  There is severe centrilobular emphysematous change throughout both lungs.    -Esophagus: Normal course and caliber.    ABDOMEN:    - Liver: Normal in size and attenuation.  Multiple small hepatic hypodensities appear similar to prior CT, most of which are too small to characterize and findings probably represent cysts..    - Gallbladder: No calcified gallstones.  No wall thickening or pericholecystic fluid.    - Bile Ducts: No intra or extrahepatic biliary ductal dilatation.    - Stomach/Duodenum: Unremarkable.    - Spleen: Normal size.  No focal abnormality.  Small adjacent splenule.    - Pancreas: Unremarkable.    - Adrenals: Stable 1.2 cm left adrenal nodule.  Right adrenal gland is normal.    - Kidneys/ureters/urinary bladder: Normal in size and location, concentrating excreting contrast appropriately on delayed imaging.  No hydronephrosis or stones.  The ureters appear normal in course and caliber  without evidence of ureteral dilatation.  There is a bladder wall diverticulum at the bladder dome.  No convincing evidence of bladder wall thickening.    - Retroperitoneum: No significant adenopathy.    PELVIS:    - Reproductive: Prostatomegaly.    - Other: No pelvic adenopathy, free fluid, or mass.    BOWEL/MESENTERY:    No evidence of bowel obstruction or inflammatory process. Hiatal hernia.    VASCULATURE: Left-sided aortic arch with 3 branch vessels.  Moderate aortic atherosclerotic calcification without aneurysm.    BONES: No acute fracture or bony destructive process. Unchanged sclerotic focus at the T1 vertebral body and the superior aspect of the left iliac wing.  Mild age-appropriate degenerative changes also present.    EXTRATHORACIC/EXTRAPERITONEAL SOFT TISSUES: Unremarkable.      Impression       Unchanged right middle lobe soft tissue mass in this patient with history of lung malignancy.  No evidence of new metastatic disease.    Stable geographic right paramediastinal airspace consolidation with patchy ground-glass opacities at the right lung base and right lower lobe bronchiectasis, compatible with post radiation changes, superimposed infectious component not excluded.    Stable small right pleural effusion.    Unchanged sclerotic focus at the T1 vertebral body and superior aspect of the left iliac wing compatible with metastatic disease.    Unchanged nonspecific left adrenal nodule.    Small bladder wall diverticulum.    RECIST SUMMARY:    Date of prior examination for comparison: CT 08/21/2018    Lesion 1: Right middle lobe.  3.9 cm.  (Axial series 2, image 72).  Previously 3.8 cm.                Assessment:       1. Primary cancer of right middle lobe of lung    2. Chronic obstructive pulmonary disease, unspecified COPD type    3. Secondary adenocarcinoma of bone           Plan:     Mr. Torres has progression of his disease within the bone.  I have discussed further options for treatment with him  including restarting pembrolizumab versus changing to another immunotherapy.  Since his disease was controlled pembrolizumab before I do think it is reasonable to restart and monitor closely and if he does develop a rash changed to another immunotherapy such as a PD-L1 inhibitor.  He is agreeable to restart.  Plan to start next week.  All questions were answered and he is agreeable with this plan.    Dano Fernandez DO, FACP  Hematology & Oncology  [facility] [place] [address]  ph. [phone]  Fax. [phone]    25 minutes were spent in coordination of patient's care, record review and counseling.  More than 50% of the time was face-to-face.

## 2019-04-04 DIAGNOSIS — C34.90 MALIGNANT NEOPLASM OF LUNG, UNSPECIFIED LATERALITY, UNSPECIFIED PART OF LUNG: Primary | ICD-10-CM

## 2019-04-08 ENCOUNTER — TELEPHONE (OUTPATIENT)
Dept: HEMATOLOGY/ONCOLOGY | Facility: CLINIC | Age: 64
End: 2019-04-08

## 2019-04-08 DIAGNOSIS — J43.9 PULMONARY EMPHYSEMA, UNSPECIFIED EMPHYSEMA TYPE: Primary | ICD-10-CM

## 2019-04-08 DIAGNOSIS — R06.02 SHORTNESS OF BREATH: ICD-10-CM

## 2019-04-08 NOTE — TELEPHONE ENCOUNTER
----- Message from Sp Rothman sent at 4/8/2019  9:29 AM CDT -----  Contact: Diamond (wife)  Would like a nurse to call regarding pt's chemo.      Contact:: [phone]

## 2019-04-08 NOTE — TELEPHONE ENCOUNTER
----- Message from Sp Rothman sent at 4/8/2019  9:29 AM CDT -----  Contact: Diamond (wife)  Would like a nurse to call regarding pt's chemo.      Contact:: [phone]

## 2019-04-17 ENCOUNTER — INFUSION (OUTPATIENT)
Dept: INFUSION THERAPY | Facility: HOSPITAL | Age: 64
End: 2019-04-17
Attending: INTERNAL MEDICINE
Payer: COMMERCIAL

## 2019-04-17 ENCOUNTER — LAB VISIT (OUTPATIENT)
Dept: LAB | Facility: HOSPITAL | Age: 64
End: 2019-04-17
Attending: INTERNAL MEDICINE
Payer: COMMERCIAL

## 2019-04-17 VITALS
SYSTOLIC BLOOD PRESSURE: 110 MMHG | RESPIRATION RATE: 18 BRPM | TEMPERATURE: 98 F | HEIGHT: 76 IN | BODY MASS INDEX: 22.53 KG/M2 | WEIGHT: 185 LBS | HEART RATE: 81 BPM | DIASTOLIC BLOOD PRESSURE: 50 MMHG

## 2019-04-17 DIAGNOSIS — C34.2 PRIMARY CANCER OF RIGHT MIDDLE LOBE OF LUNG: Primary | ICD-10-CM

## 2019-04-17 DIAGNOSIS — C34.2 PRIMARY CANCER OF RIGHT MIDDLE LOBE OF LUNG: ICD-10-CM

## 2019-04-17 DIAGNOSIS — R59.0 MEDIASTINAL ADENOPATHY: ICD-10-CM

## 2019-04-17 DIAGNOSIS — R53.0 NEOPLASTIC MALIGNANT RELATED FATIGUE: ICD-10-CM

## 2019-04-17 LAB
ALBUMIN SERPL BCP-MCNC: 3.9 G/DL (ref 3.5–5.2)
ALP SERPL-CCNC: 59 U/L (ref 55–135)
ALT SERPL W/O P-5'-P-CCNC: 10 U/L (ref 10–44)
ANION GAP SERPL CALC-SCNC: 9 MMOL/L (ref 8–16)
AST SERPL-CCNC: 14 U/L (ref 10–40)
BILIRUB SERPL-MCNC: 0.7 MG/DL (ref 0.1–1)
BUN SERPL-MCNC: 12 MG/DL (ref 8–23)
CALCIUM SERPL-MCNC: 9.6 MG/DL (ref 8.7–10.5)
CHLORIDE SERPL-SCNC: 107 MMOL/L (ref 95–110)
CO2 SERPL-SCNC: 23 MMOL/L (ref 23–29)
CREAT SERPL-MCNC: 1 MG/DL (ref 0.5–1.4)
ERYTHROCYTE [DISTWIDTH] IN BLOOD BY AUTOMATED COUNT: 14.4 % (ref 11.5–14.5)
EST. GFR  (AFRICAN AMERICAN): >60 ML/MIN/1.73 M^2
EST. GFR  (NON AFRICAN AMERICAN): >60 ML/MIN/1.73 M^2
GLUCOSE SERPL-MCNC: 94 MG/DL (ref 70–110)
HCT VFR BLD AUTO: 43.5 % (ref 40–54)
HGB BLD-MCNC: 13.9 G/DL (ref 14–18)
IMM GRANULOCYTES # BLD AUTO: 0.02 K/UL (ref 0–0.04)
MCH RBC QN AUTO: 27.5 PG (ref 27–31)
MCHC RBC AUTO-ENTMCNC: 32 G/DL (ref 32–36)
MCV RBC AUTO: 86 FL (ref 82–98)
NEUTROPHILS # BLD AUTO: 5.5 K/UL (ref 1.8–7.7)
PLATELET # BLD AUTO: 311 K/UL (ref 150–350)
PMV BLD AUTO: 9.6 FL (ref 9.2–12.9)
POTASSIUM SERPL-SCNC: 4.1 MMOL/L (ref 3.5–5.1)
PROT SERPL-MCNC: 7.7 G/DL (ref 6–8.4)
RBC # BLD AUTO: 5.05 M/UL (ref 4.6–6.2)
SODIUM SERPL-SCNC: 139 MMOL/L (ref 136–145)
WBC # BLD AUTO: 7.51 K/UL (ref 3.9–12.7)

## 2019-04-17 PROCEDURE — 85027 COMPLETE CBC AUTOMATED: CPT

## 2019-04-17 PROCEDURE — 63600175 PHARM REV CODE 636 W HCPCS: Mod: JG | Performed by: INTERNAL MEDICINE

## 2019-04-17 PROCEDURE — 96413 CHEMO IV INFUSION 1 HR: CPT

## 2019-04-17 PROCEDURE — 25000003 PHARM REV CODE 250: Performed by: INTERNAL MEDICINE

## 2019-04-17 PROCEDURE — 80053 COMPREHEN METABOLIC PANEL: CPT

## 2019-04-17 PROCEDURE — 36415 COLL VENOUS BLD VENIPUNCTURE: CPT

## 2019-04-17 RX ORDER — SODIUM CHLORIDE 0.9 % (FLUSH) 0.9 %
10 SYRINGE (ML) INJECTION
Status: DISCONTINUED | OUTPATIENT
Start: 2019-04-17 | End: 2019-04-17 | Stop reason: HOSPADM

## 2019-04-17 RX ORDER — HEPARIN 100 UNIT/ML
500 SYRINGE INTRAVENOUS
Status: DISCONTINUED | OUTPATIENT
Start: 2019-04-17 | End: 2019-04-17 | Stop reason: HOSPADM

## 2019-04-17 RX ADMIN — SODIUM CHLORIDE 200 MG: 9 INJECTION, SOLUTION INTRAVENOUS at 02:04

## 2019-04-17 NOTE — PLAN OF CARE
Problem: Adult Inpatient Plan of Care  Goal: Plan of Care Review  Outcome: Ongoing (interventions implemented as appropriate)  Pt here for Keytruda infusion, labs, hx, meds, allergies reviewed, pt with no complaints ta this time, reclined in chair, continue to monitor

## 2019-04-17 NOTE — PLAN OF CARE
Problem: Adult Inpatient Plan of Care  Goal: Plan of Care Review  Outcome: Ongoing (interventions implemented as appropriate)  Pt tolerated keytruda infusion without issue, no upcoming apptss cheduled at this time, no distress noted upon d/c to home

## 2019-04-25 DIAGNOSIS — J44.9 CHRONIC OBSTRUCTIVE PULMONARY DISEASE, UNSPECIFIED COPD TYPE: ICD-10-CM

## 2019-04-25 RX ORDER — IPRATROPIUM BROMIDE AND ALBUTEROL SULFATE 2.5; .5 MG/3ML; MG/3ML
SOLUTION RESPIRATORY (INHALATION)
Qty: 270 ML | Refills: 0 | Status: SHIPPED | OUTPATIENT
Start: 2019-04-25 | End: 2019-08-07 | Stop reason: SDUPTHER

## 2019-05-08 ENCOUNTER — INFUSION (OUTPATIENT)
Dept: INFUSION THERAPY | Facility: HOSPITAL | Age: 64
End: 2019-05-08
Attending: INTERNAL MEDICINE
Payer: COMMERCIAL

## 2019-05-08 ENCOUNTER — LAB VISIT (OUTPATIENT)
Dept: LAB | Facility: HOSPITAL | Age: 64
End: 2019-05-08
Attending: INTERNAL MEDICINE
Payer: COMMERCIAL

## 2019-05-08 ENCOUNTER — TELEPHONE (OUTPATIENT)
Dept: HEMATOLOGY/ONCOLOGY | Facility: CLINIC | Age: 64
End: 2019-05-08

## 2019-05-08 ENCOUNTER — OFFICE VISIT (OUTPATIENT)
Dept: HEMATOLOGY/ONCOLOGY | Facility: CLINIC | Age: 64
End: 2019-05-08
Payer: COMMERCIAL

## 2019-05-08 VITALS
HEART RATE: 114 BPM | HEIGHT: 77 IN | RESPIRATION RATE: 20 BRPM | BODY MASS INDEX: 21.24 KG/M2 | DIASTOLIC BLOOD PRESSURE: 74 MMHG | WEIGHT: 179.88 LBS | SYSTOLIC BLOOD PRESSURE: 114 MMHG | OXYGEN SATURATION: 96 % | TEMPERATURE: 97 F

## 2019-05-08 VITALS
DIASTOLIC BLOOD PRESSURE: 73 MMHG | HEART RATE: 110 BPM | WEIGHT: 179 LBS | BODY MASS INDEX: 21.13 KG/M2 | HEIGHT: 77 IN | SYSTOLIC BLOOD PRESSURE: 118 MMHG | TEMPERATURE: 98 F | RESPIRATION RATE: 18 BRPM

## 2019-05-08 DIAGNOSIS — C34.90 MALIGNANT NEOPLASM OF LUNG, UNSPECIFIED LATERALITY, UNSPECIFIED PART OF LUNG: ICD-10-CM

## 2019-05-08 DIAGNOSIS — J44.9 CHRONIC OBSTRUCTIVE PULMONARY DISEASE, UNSPECIFIED COPD TYPE: ICD-10-CM

## 2019-05-08 DIAGNOSIS — C34.2 PRIMARY CANCER OF RIGHT MIDDLE LOBE OF LUNG: ICD-10-CM

## 2019-05-08 DIAGNOSIS — R53.0 NEOPLASTIC MALIGNANT RELATED FATIGUE: ICD-10-CM

## 2019-05-08 DIAGNOSIS — C34.2 PRIMARY CANCER OF RIGHT MIDDLE LOBE OF LUNG: Primary | ICD-10-CM

## 2019-05-08 DIAGNOSIS — R59.0 MEDIASTINAL ADENOPATHY: ICD-10-CM

## 2019-05-08 DIAGNOSIS — C79.51 SECONDARY ADENOCARCINOMA OF BONE: ICD-10-CM

## 2019-05-08 DIAGNOSIS — R06.02 SHORTNESS OF BREATH: ICD-10-CM

## 2019-05-08 LAB
ALBUMIN SERPL BCP-MCNC: 3.9 G/DL (ref 3.5–5.2)
ALP SERPL-CCNC: 66 U/L (ref 55–135)
ALT SERPL W/O P-5'-P-CCNC: 9 U/L (ref 10–44)
ANION GAP SERPL CALC-SCNC: 8 MMOL/L (ref 8–16)
AST SERPL-CCNC: 13 U/L (ref 10–40)
BILIRUB SERPL-MCNC: 0.5 MG/DL (ref 0.1–1)
BUN SERPL-MCNC: 11 MG/DL (ref 8–23)
CALCIUM SERPL-MCNC: 9.8 MG/DL (ref 8.7–10.5)
CHLORIDE SERPL-SCNC: 104 MMOL/L (ref 95–110)
CO2 SERPL-SCNC: 26 MMOL/L (ref 23–29)
CREAT SERPL-MCNC: 1 MG/DL (ref 0.5–1.4)
ERYTHROCYTE [DISTWIDTH] IN BLOOD BY AUTOMATED COUNT: 14.4 % (ref 11.5–14.5)
EST. GFR  (AFRICAN AMERICAN): >60 ML/MIN/1.73 M^2
EST. GFR  (NON AFRICAN AMERICAN): >60 ML/MIN/1.73 M^2
GLUCOSE SERPL-MCNC: 99 MG/DL (ref 70–110)
HCT VFR BLD AUTO: 44 % (ref 40–54)
HGB BLD-MCNC: 14.2 G/DL (ref 14–18)
IMM GRANULOCYTES # BLD AUTO: 0.02 K/UL (ref 0–0.04)
MCH RBC QN AUTO: 28 PG (ref 27–31)
MCHC RBC AUTO-ENTMCNC: 32.3 G/DL (ref 32–36)
MCV RBC AUTO: 87 FL (ref 82–98)
NEUTROPHILS # BLD AUTO: 6.9 K/UL (ref 1.8–7.7)
PLATELET # BLD AUTO: 399 K/UL (ref 150–350)
PMV BLD AUTO: 9.5 FL (ref 9.2–12.9)
POTASSIUM SERPL-SCNC: 4 MMOL/L (ref 3.5–5.1)
PROT SERPL-MCNC: 8 G/DL (ref 6–8.4)
RBC # BLD AUTO: 5.08 M/UL (ref 4.6–6.2)
SODIUM SERPL-SCNC: 138 MMOL/L (ref 136–145)
T4 FREE SERPL-MCNC: 1.1 NG/DL (ref 0.71–1.51)
TSH SERPL DL<=0.005 MIU/L-ACNC: 0.94 UIU/ML (ref 0.4–4)
WBC # BLD AUTO: 8.87 K/UL (ref 3.9–12.7)

## 2019-05-08 PROCEDURE — 99999 PR PBB SHADOW E&M-EST. PATIENT-LVL III: ICD-10-PCS | Mod: PBBFAC,,, | Performed by: INTERNAL MEDICINE

## 2019-05-08 PROCEDURE — 84439 ASSAY OF FREE THYROXINE: CPT

## 2019-05-08 PROCEDURE — 96413 CHEMO IV INFUSION 1 HR: CPT

## 2019-05-08 PROCEDURE — 25000003 PHARM REV CODE 250: Performed by: INTERNAL MEDICINE

## 2019-05-08 PROCEDURE — 3078F PR MOST RECENT DIASTOLIC BLOOD PRESSURE < 80 MM HG: ICD-10-PCS | Mod: CPTII,S$GLB,, | Performed by: INTERNAL MEDICINE

## 2019-05-08 PROCEDURE — 3008F BODY MASS INDEX DOCD: CPT | Mod: CPTII,S$GLB,, | Performed by: INTERNAL MEDICINE

## 2019-05-08 PROCEDURE — 3074F SYST BP LT 130 MM HG: CPT | Mod: CPTII,S$GLB,, | Performed by: INTERNAL MEDICINE

## 2019-05-08 PROCEDURE — 36415 COLL VENOUS BLD VENIPUNCTURE: CPT

## 2019-05-08 PROCEDURE — 3078F DIAST BP <80 MM HG: CPT | Mod: CPTII,S$GLB,, | Performed by: INTERNAL MEDICINE

## 2019-05-08 PROCEDURE — 3074F PR MOST RECENT SYSTOLIC BLOOD PRESSURE < 130 MM HG: ICD-10-PCS | Mod: CPTII,S$GLB,, | Performed by: INTERNAL MEDICINE

## 2019-05-08 PROCEDURE — 84443 ASSAY THYROID STIM HORMONE: CPT

## 2019-05-08 PROCEDURE — 99214 OFFICE O/P EST MOD 30 MIN: CPT | Mod: S$GLB,,, | Performed by: INTERNAL MEDICINE

## 2019-05-08 PROCEDURE — 99214 PR OFFICE/OUTPT VISIT, EST, LEVL IV, 30-39 MIN: ICD-10-PCS | Mod: S$GLB,,, | Performed by: INTERNAL MEDICINE

## 2019-05-08 PROCEDURE — 85027 COMPLETE CBC AUTOMATED: CPT

## 2019-05-08 PROCEDURE — 3008F PR BODY MASS INDEX (BMI) DOCUMENTED: ICD-10-PCS | Mod: CPTII,S$GLB,, | Performed by: INTERNAL MEDICINE

## 2019-05-08 PROCEDURE — 99999 PR PBB SHADOW E&M-EST. PATIENT-LVL III: CPT | Mod: PBBFAC,,, | Performed by: INTERNAL MEDICINE

## 2019-05-08 PROCEDURE — 80053 COMPREHEN METABOLIC PANEL: CPT

## 2019-05-08 PROCEDURE — 63600175 PHARM REV CODE 636 W HCPCS: Mod: JG | Performed by: INTERNAL MEDICINE

## 2019-05-08 RX ORDER — SODIUM CHLORIDE 0.9 % (FLUSH) 0.9 %
10 SYRINGE (ML) INJECTION
Status: DISCONTINUED | OUTPATIENT
Start: 2019-05-08 | End: 2019-05-08 | Stop reason: HOSPADM

## 2019-05-08 RX ORDER — HEPARIN 100 UNIT/ML
500 SYRINGE INTRAVENOUS
Status: CANCELLED | OUTPATIENT
Start: 2019-05-08

## 2019-05-08 RX ORDER — HEPARIN 100 UNIT/ML
500 SYRINGE INTRAVENOUS
Status: DISCONTINUED | OUTPATIENT
Start: 2019-05-08 | End: 2019-05-08 | Stop reason: HOSPADM

## 2019-05-08 RX ORDER — SODIUM CHLORIDE 0.9 % (FLUSH) 0.9 %
10 SYRINGE (ML) INJECTION
Status: CANCELLED | OUTPATIENT
Start: 2019-05-08

## 2019-05-08 RX ADMIN — SODIUM CHLORIDE 200 MG: 9 INJECTION, SOLUTION INTRAVENOUS at 10:05

## 2019-05-08 RX ADMIN — SODIUM CHLORIDE: 9 INJECTION, SOLUTION INTRAVENOUS at 10:05

## 2019-05-08 NOTE — PLAN OF CARE
Problem: Adult Inpatient Plan of Care  Goal: Plan of Care Review  Outcome: Ongoing (interventions implemented as appropriate)  Pt tolerated Keytruda infusion without issue, pt to rtc 5/29/19, no distress noted upon d/c to home

## 2019-05-08 NOTE — PLAN OF CARE
Problem: Adult Inpatient Plan of Care  Goal: Plan of Care Review  Outcome: Ongoing (interventions implemented as appropriate)  Pt here for Keytruda infusion D1C17, labs, hx, meds, allergies reviewed, pt with no complaints at this time, reclined in chair, continue to monitor

## 2019-05-08 NOTE — PROGRESS NOTES
PATIENT: Angel Torres  MRN: [id number]  DATE: 5/8/2019      Diagnosis:   1. Primary cancer of right middle lobe of lung    2. Chronic obstructive pulmonary disease, unspecified COPD type    3. Secondary adenocarcinoma of bone    4. Shortness of breath        Chief Complaint: Lung Cancer      Oncologic History:      Oncologic History Non-small cell lung cancer diagnosed 6/2016   Recurrent disease to lung 4/2017     Oncologic Treatment Cisplatin/Pemetrexed initiated 8/15/16 with concurrent radiation completed 3 cycles 10/3/16; 64 Gy  Carboplatin/pemetrexed/pembrolizumab 7/2017 - 9/2017  Pembrolizumab maintenance 11/2017 (held 10/2018 secondary to rash)  Pembrolizumab restarted 04/2019    Pathology  6/2016 : Adenocarcinoma, T2b, N2, M0, Stage IIIA          Subjective:    Interval History: Mr. Torres is a 64 y.o. male who was seen in follow-up for lung cancer.  Since I had seen him last he restarted on pembrolizumab.  He tolerated this well. He does state he is having some ongoing shortness of breath, cough and wheeze.  When he uses his nebulizer machine this does seem to improve his symptoms.  He has also noted about a 5 lb weight loss in requesting ensure.  No other new complaints.    His history dates to approximately 4/2016 when he noted a worsening cough which was associated with clear sputum.  He had a chest x-ray in 5/16 showing a right middle lobe opacity.  Subsequent CT of the chest was performed on 5/30/16 showing a 5.6 cm lesion in the right middle lobe with hilar and mediastinal lymphadenopathy along with hepatic lesions and a left adrenal mass..  He underwent bronchoscopy which was nonrevealing and transbronchial biopsies were nondiagnostic.  He then underwent EBUS with biopsy of mediastinal lymph nodes with pathology showing non-small cell lung cancer consistent with adenocarcinoma.  He was started on concurrent chemotherapy and radiation with cisplatin and pemetrexed on 8/15/16.  He completed 64 Gy of  concurrent radiation with cisplatin and pemetrexed with chemotherapy completed on 10/3/16.  Scans in 4/2017 had indicated progressive disease in the lung.  Subsequent PET scan in 7/2017 had shown spread of disease to bone.  He was started on carboplatin and pemetrexed and pembrolizumab in 7/2017.  Repeat imaging in 8/2017 had shown stability of disease.  He was started on pembrolizumab maintenance in 11/2017 after a delay due to his insurance.  Repeat imaging in 12/2016 had shown mild progressive disease, however, it was felt that due to the excessive delay in treatment we would proceed on rather than change therapy at this time.  CT in 3/2018 had shown stability of his disease.  CT in 05/2018 had shown stable disease.  CT in 08/2018 had shown stable disease.  Pembrolizumab was held in 10/2018 secondary to a rash.  Scans in 04/2019 had shown progressive disease in bone.  Pembrolizumab was restarted in 04/2019.    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    Skin infection 8/8/2018       Past Surgical HIstory:   Past Surgical History:   Procedure Laterality Date    BRONCHOSCOPY N/A 6/28/2016    Performed by [facility] at [facility] OR 2ND FLR    BRONCHOSCOPY N/A 6/13/2016    [facility]    ENDOBRONCHIAL ULTRASOUND (EBUS) N/A 6/28/2016    Performed by Maribel Wallace MD at [facility] OR 2ND [facility]LR    EXCISION, MASS, LOWER EXTREMITY Left 4/21/2014    [facility]    INGUINAL HERNIA REPAIR Bilateral     KNEE SURGERY      REPAIR, HERNIA, INGUINAL, BILATERAL, LAPAROSCOPIC  Bilateral 4/21/2014    Performed by [facility] at [facility] OR [address] [place] [address]       Family History:   Family History   Problem Relation Age of Onset    Cancer Mother         lung, breast    Cancer Sister         lung    Cancer Maternal Grandfather     No Known Problems Father     No Known Problems Brother     No Known Problems Maternal Aunt     No Known Problems Maternal Uncle     No Known Problems Paternal Aunt     No Known Problems Paternal Uncle     No Known Problems Maternal Grandmother     No Known Problems Paternal Grandmother     No Known Problems Paternal Grandfather     Amblyopia Neg Hx     Blindness Neg Hx     Cataracts Neg Hx     Diabetes Neg Hx     Glaucoma Neg Hx     Hypertension Neg Hx     Macular degeneration Neg Hx     Retinal detachment Neg Hx     Strabismus Neg Hx     Stroke Neg Hx     Thyroid disease Neg Hx        Social History:  reports that he quit smoking about 5 years ago. He has a 80.00 pack-year smoking history. He has quit using smokeless tobacco. He reports that he does not drink alcohol or use drugs.    Allergies:  Review of patient's allergies indicates:  No Known Allergies    Medications:  Current Outpatient Medications   Medication Sig Dispense Refill    albuterol (PROAIR HFA) 90 mcg/actuation inhaler INHALE 2 PUFFS INTO THE LUNGS EVERY 6 HOURS AS NEEDED FOR WHEEZING 17 g 0    albuterol-ipratropium (DUO-NEB) 2.5 mg-0.5 mg/3 mL nebulizer solution USE 1 VIAL VIA NEBULIZER EVERY 6 HOURS AS NEEDED FOR WHEEZING OR SHORTNESS OF BREATH 270 mL 0    dronabinol (MARINOL) 5 MG capsule Take 1 capsule (5 mg total) by mouth 2 (two) times daily before meals. 60 capsule 0    fluticasone (FLONASE) 50 mcg/actuation nasal spray SHAKE LIQUID AND USE 2 SPRAYS IN EACH NOSTRIL EVERY DAY 16 g 1    mupirocin (BACTROBAN) 2 % ointment Apply topically 2 (two) times daily. To open areas 22 g 2    naproxen (NAPROSYN) 500 MG tablet Take 1 tablet (500 mg total) by mouth 2 (two) times  "daily as needed (headache). 60 tablet 0    triamcinolone acetonide 0.1% (KENALOG) 0.1 % ointment To all rashes BID 80 g 2    amLODIPine (NORVASC) 10 MG tablet Take 1 tablet (10 mg total) by mouth once daily. 90 tablet 3    cetirizine (ZYRTEC) 10 MG tablet Take 1 tablet (10 mg total) by mouth once daily.  0     No current facility-administered medications for this visit.        Review of Systems   Constitutional: Positive for unexpected weight change. Negative for activity change, appetite change, chills, fatigue and fever.        Normal weight 220   HENT: Negative for dental problem, nosebleeds, sinus pressure, sneezing and trouble swallowing.    Eyes: Negative for visual disturbance.   Respiratory: Positive for cough, shortness of breath and wheezing. Negative for choking and chest tightness.    Cardiovascular: Negative for chest pain and leg swelling.   Gastrointestinal: Negative for abdominal pain, blood in stool, constipation, diarrhea and nausea.   Genitourinary: Negative for difficulty urinating and dysuria.   Musculoskeletal: Positive for myalgias. Negative for arthralgias and back pain.   Skin: Negative for rash and wound.   Neurological: Negative for dizziness, light-headedness and headaches.   Hematological: Negative for adenopathy. Does not bruise/bleed easily.   Psychiatric/Behavioral: Negative for sleep disturbance. The patient is not nervous/anxious.        ECOG Performance Status: 1   Objective:      Vitals:   Vitals:    05/08/19 0902   BP: 114/74   Pulse: (!) 114   Resp: 20   Temp: 97.4 °F (36.3 °C)   SpO2: 96%   Weight: 81.6 kg (179 lb 14.3 oz)   Height: 6' 5" (1.956 m)     BMI: Body mass index is 21.33 kg/m².    Physical Exam   Constitutional: He is oriented to person, place, and time. He appears well-developed and well-nourished.   HENT:   Head: Normocephalic and atraumatic.   Eyes: Pupils are equal, round, and reactive to light.   Neck: Normal range of motion. Neck supple.   Cardiovascular: " Normal rate and regular rhythm.   Pulmonary/Chest: Effort normal. No respiratory distress. He has no rales.   Abdominal: Soft. He exhibits no distension. There is no tenderness.   Musculoskeletal: He exhibits no edema or tenderness.   Lymphadenopathy:     He has no cervical adenopathy.   Neurological: He is alert and oriented to person, place, and time. No cranial nerve deficit.   Skin: Skin is warm and dry. No rash noted.   Psychiatric: He has a normal mood and affect. His behavior is normal.       Laboratory Data:  Lab Visit on 05/08/2019   Component Date Value Ref Range Status    WBC 05/08/2019 8.87  3.90 - 12.70 K/uL Final    RBC 05/08/2019 5.08  4.60 - 6.20 M/uL Final    Hemoglobin 05/08/2019 14.2  14.0 - 18.0 g/dL Final    Hematocrit 05/08/2019 44.0  40.0 - 54.0 % Final    Mean Corpuscular Volume 05/08/2019 87  82 - 98 fL Final    Mean Corpuscular Hemoglobin 05/08/2019 28.0  27.0 - 31.0 pg Final    Mean Corpuscular Hemoglobin Conc 05/08/2019 32.3  32.0 - 36.0 g/dL Final    RDW 05/08/2019 14.4  11.5 - 14.5 % Final    Platelets 05/08/2019 399* 150 - 350 K/uL Final    MPV 05/08/2019 9.5  9.2 - 12.9 fL Final    Gran # (ANC) 05/08/2019 6.9  1.8 - 7.7 K/uL Final    Comment: The ANC is based on a white cell differential from an   automated cell counter. It has not been microscopically   reviewed for the presence of abnormal cells. Clinical   correlation is required.      Immature Grans (Abs) 05/08/2019 0.02  0.00 - 0.04 K/uL Final    Comment: Mild elevation in immature granulocytes is non specific and   can be seen in a variety of conditions including stress response,   acute inflammation, trauma and pregnancy. Correlation with other   laboratory and clinical findings is essential.      Sodium 05/08/2019 138  136 - 145 mmol/L Final    Potassium 05/08/2019 4.0  3.5 - 5.1 mmol/L Final    Chloride 05/08/2019 104  95 - 110 mmol/L Final    CO2 05/08/2019 26  23 - 29 mmol/L Final    Glucose 05/08/2019 99   70 - 110 mg/dL Final    BUN, Bld 05/08/2019 11  8 - 23 mg/dL Final    Creatinine 05/08/2019 1.0  0.5 - 1.4 mg/dL Final    Calcium 05/08/2019 9.8  8.7 - 10.5 mg/dL Final    Total Protein 05/08/2019 8.0  6.0 - 8.4 g/dL Final    Albumin 05/08/2019 3.9  3.5 - 5.2 g/dL Final    Total Bilirubin 05/08/2019 0.5  0.1 - 1.0 mg/dL Final    Comment: For infants and newborns, interpretation of results should be based  on gestational age, weight and in agreement with clinical  observations.  Premature Infant recommended reference ranges:  Up to 24 hours.............<8.0 mg/dL  Up to 48 hours............<12.0 mg/dL  3-5 days..................<15.0 mg/dL  6-29 days.................<15.0 mg/dL      Alkaline Phosphatase 05/08/2019 66  55 - 135 U/L Final    AST 05/08/2019 13  10 - 40 U/L Final    ALT 05/08/2019 9* 10 - 44 U/L Final    Anion Gap 05/08/2019 8  8 - 16 mmol/L Final    eGFR if African American 05/08/2019 >60.0  >60 mL/min/1.73 m^2 Final    eGFR if non African American 05/08/2019 >60.0  >60 mL/min/1.73 m^2 Final    Comment: Calculation used to obtain the estimated glomerular filtration  rate (eGFR) is the CKD-EPI equation.       TSH 05/08/2019 0.944  0.400 - 4.000 uIU/mL Final    Free T4 05/08/2019 1.10  0.71 - 1.51 ng/dL Final   Lab Visit on 04/17/2019   Component Date Value Ref Range Status    WBC 04/17/2019 7.51  3.90 - 12.70 K/uL Final    RBC 04/17/2019 5.05  4.60 - 6.20 M/uL Final    Hemoglobin 04/17/2019 13.9* 14.0 - 18.0 g/dL Final    Hematocrit 04/17/2019 43.5  40.0 - 54.0 % Final    Mean Corpuscular Volume 04/17/2019 86  82 - 98 fL Final    Mean Corpuscular Hemoglobin 04/17/2019 27.5  27.0 - 31.0 pg Final    Mean Corpuscular Hemoglobin Conc 04/17/2019 32.0  32.0 - 36.0 g/dL Final    RDW 04/17/2019 14.4  11.5 - 14.5 % Final    Platelets 04/17/2019 311  150 - 350 K/uL Final    MPV 04/17/2019 9.6  9.2 - 12.9 fL Final    Gran # (ANC) 04/17/2019 5.5  1.8 - 7.7 K/uL Final    Comment: The ANC is  based on a white cell differential from an   automated cell counter. It has not been microscopically   reviewed for the presence of abnormal cells. Clinical   correlation is required.      Immature Grans (Abs) 04/17/2019 0.02  0.00 - 0.04 K/uL Final    Comment: Mild elevation in immature granulocytes is non specific and   can be seen in a variety of conditions including stress response,   acute inflammation, trauma and pregnancy. Correlation with other   laboratory and clinical findings is essential.      Sodium 04/17/2019 139  136 - 145 mmol/L Final    Potassium 04/17/2019 4.1  3.5 - 5.1 mmol/L Final    Chloride 04/17/2019 107  95 - 110 mmol/L Final    CO2 04/17/2019 23  23 - 29 mmol/L Final    Glucose 04/17/2019 94  70 - 110 mg/dL Final    BUN, Bld 04/17/2019 12  8 - 23 mg/dL Final    Creatinine 04/17/2019 1.0  0.5 - 1.4 mg/dL Final    Calcium 04/17/2019 9.6  8.7 - 10.5 mg/dL Final    Total Protein 04/17/2019 7.7  6.0 - 8.4 g/dL Final    Albumin 04/17/2019 3.9  3.5 - 5.2 g/dL Final    Total Bilirubin 04/17/2019 0.7  0.1 - 1.0 mg/dL Final    Comment: For infants and newborns, interpretation of results should be based  on gestational age, weight and in agreement with clinical  observations.  Premature Infant recommended reference ranges:  Up to 24 hours.............<8.0 mg/dL  Up to 48 hours............<12.0 mg/dL  3-5 days..................<15.0 mg/dL  6-29 days.................<15.0 mg/dL      Alkaline Phosphatase 04/17/2019 59  55 - 135 U/L Final    AST 04/17/2019 14  10 - 40 U/L Final    ALT 04/17/2019 10  10 - 44 U/L Final    Anion Gap 04/17/2019 9  8 - 16 mmol/L Final    eGFR if African American 04/17/2019 >60.0  >60 mL/min/1.73 m^2 Final    eGFR if non African American 04/17/2019 >60.0  >60 mL/min/1.73 m^2 Final    Comment: Calculation used to obtain the estimated glomerular filtration  rate (eGFR) is the CKD-EPI equation.        Supplemental Diagnosis 6/30/16  Immunohistochemical stains are  completed on the Station 4L lymph node cell block material and reveal the tumor  cells to stain positively with cytokeratin 7 and TTF-1. The tumor cells show nonspecific blush staining with  cytokeratin 5/6 and are negative for p63. This staining profile supports a diagnosis of non-small cell carcinoma  consistent with adenocarcinoma.  Cell block material will be sent for EGFR, ALK and ROS-1 testing and results will follow in a supplemental report.  (Electronically Signed: 2016-06-30 11:50:31 )  Diagnosed by: Irene Amaro M.D.  FINAL PATHOLOGIC DIAGNOSIS  1. Lymph node, Station 4L, EBUS guided fine needle aspiration:  Positive for malignant cells, non-small cell carcinoma.  Rare background lymphocytes are present.  Immunohistochemical staining be completed to further characterize this malignancy and results will follow in a  supplemental report.  2. Lymph node, Station 7, EBUS guided fine needle aspiration:  Positive for malignant cells, non-small cell carcinoma tumor identical to that seen in specimen #1.  Background lymphocytes are present.    Imaging:   CT 12/26/2018  COMPARISON:  CT chest abdomen pelvis 08/21/2018    FINDINGS:  Base of Neck: No significant abnormality.    CHEST:    -Heart: Normal size. No pericardial effusion.    -Pulmonary vasculature: Pulmonary arteries distribute normally.  There are four pulmonary veins.    -Denisse/Mediastinum: No pathologic kervin enlargement.    -Trachea and Proximal airways: Patent.    -Lungs/Pleura: There is right lower lobe bronchiectasis and geographic right paramediastinal airspace consolidation with patchy ground-glass opacities at the right lung base compatible with history of prior radiation, grossly similar size compared to CT 08/21/2018, measuring 3.9 x 3.5 cm (axial series 2, image 72).  There is a small right pleural effusion similar to prior.  There is severe centrilobular emphysematous change throughout both lungs.    -Esophagus: Normal course and  caliber.    ABDOMEN:    - Liver: Normal in size and attenuation.  Multiple small hepatic hypodensities appear similar to prior CT, most of which are too small to characterize and findings probably represent cysts..    - Gallbladder: No calcified gallstones.  No wall thickening or pericholecystic fluid.    - Bile Ducts: No intra or extrahepatic biliary ductal dilatation.    - Stomach/Duodenum: Unremarkable.    - Spleen: Normal size.  No focal abnormality.  Small adjacent splenule.    - Pancreas: Unremarkable.    - Adrenals: Stable 1.2 cm left adrenal nodule.  Right adrenal gland is normal.    - Kidneys/ureters/urinary bladder: Normal in size and location, concentrating excreting contrast appropriately on delayed imaging.  No hydronephrosis or stones.  The ureters appear normal in course and caliber without evidence of ureteral dilatation.  There is a bladder wall diverticulum at the bladder dome.  No convincing evidence of bladder wall thickening.    - Retroperitoneum: No significant adenopathy.    PELVIS:    - Reproductive: Prostatomegaly.    - Other: No pelvic adenopathy, free fluid, or mass.    BOWEL/MESENTERY:    No evidence of bowel obstruction or inflammatory process. Hiatal hernia.    VASCULATURE: Left-sided aortic arch with 3 branch vessels.  Moderate aortic atherosclerotic calcification without aneurysm.    BONES: No acute fracture or bony destructive process. Unchanged sclerotic focus at the T1 vertebral body and the superior aspect of the left iliac wing.  Mild age-appropriate degenerative changes also present.    EXTRATHORACIC/EXTRAPERITONEAL SOFT TISSUES: Unremarkable.      Impression       Unchanged right middle lobe soft tissue mass in this patient with history of lung malignancy.  No evidence of new metastatic disease.    Stable geographic right paramediastinal airspace consolidation with patchy ground-glass opacities at the right lung base and right lower lobe bronchiectasis, compatible with post  radiation changes, superimposed infectious component not excluded.    Stable small right pleural effusion.    Unchanged sclerotic focus at the T1 vertebral body and superior aspect of the left iliac wing compatible with metastatic disease.    Unchanged nonspecific left adrenal nodule.    Small bladder wall diverticulum.    RECIST SUMMARY:    Date of prior examination for comparison: CT 08/21/2018    Lesion 1: Right middle lobe.  3.9 cm.  (Axial series 2, image 72).  Previously 3.8 cm.                Assessment:       1. Primary cancer of right middle lobe of lung    2. Chronic obstructive pulmonary disease, unspecified COPD type    3. Secondary adenocarcinoma of bone    4. Shortness of breath           Plan:     Mr. Torres is tolerating pembrolizumab and will proceed on to cycle 2.  Today.  Labs are appropriate to continue.  I have encouraged him to uses nebulizer a daily as needed.  If his breathing worsens he is to contact us and he may need to be scanned sooner.  Plan to rate for ensure.  All questions were answered and he is agreeable with this plan.    Dano Fernandez DO, FACP  Hematology & Oncology  [facility] [place] [address]  ph. [phone]  Fax. [phone]    25 minutes were spent in coordination of patient's care, record review and counseling.  More than 50% of the time was face-to-face.

## 2019-05-08 NOTE — TELEPHONE ENCOUNTER
----- Message from Dano Fernandez DO, FACDENNIS sent at 5/8/2019  9:35 AM CDT -----  Pembrolizumab today  See me in 3 weeks with CBC, CMP, TSH, free T4 and next cycle

## 2019-05-29 ENCOUNTER — INFUSION (OUTPATIENT)
Dept: INFUSION THERAPY | Facility: HOSPITAL | Age: 64
End: 2019-05-29
Attending: INTERNAL MEDICINE
Payer: COMMERCIAL

## 2019-05-29 ENCOUNTER — RESEARCH ENCOUNTER (OUTPATIENT)
Dept: RESEARCH | Facility: HOSPITAL | Age: 64
End: 2019-05-29

## 2019-05-29 ENCOUNTER — LAB VISIT (OUTPATIENT)
Dept: LAB | Facility: HOSPITAL | Age: 64
End: 2019-05-29
Attending: INTERNAL MEDICINE
Payer: COMMERCIAL

## 2019-05-29 ENCOUNTER — OFFICE VISIT (OUTPATIENT)
Dept: HEMATOLOGY/ONCOLOGY | Facility: CLINIC | Age: 64
End: 2019-05-29
Payer: COMMERCIAL

## 2019-05-29 VITALS
HEART RATE: 111 BPM | WEIGHT: 172.38 LBS | BODY MASS INDEX: 20.35 KG/M2 | RESPIRATION RATE: 16 BRPM | DIASTOLIC BLOOD PRESSURE: 81 MMHG | SYSTOLIC BLOOD PRESSURE: 121 MMHG | HEIGHT: 77 IN | OXYGEN SATURATION: 98 % | TEMPERATURE: 98 F

## 2019-05-29 VITALS
RESPIRATION RATE: 16 BRPM | HEART RATE: 106 BPM | TEMPERATURE: 98 F | DIASTOLIC BLOOD PRESSURE: 68 MMHG | SYSTOLIC BLOOD PRESSURE: 130 MMHG

## 2019-05-29 DIAGNOSIS — R59.0 MEDIASTINAL ADENOPATHY: ICD-10-CM

## 2019-05-29 DIAGNOSIS — C79.51 SECONDARY ADENOCARCINOMA OF BONE: ICD-10-CM

## 2019-05-29 DIAGNOSIS — C34.2 PRIMARY CANCER OF RIGHT MIDDLE LOBE OF LUNG: Primary | ICD-10-CM

## 2019-05-29 DIAGNOSIS — R06.02 SHORTNESS OF BREATH: ICD-10-CM

## 2019-05-29 DIAGNOSIS — R53.0 NEOPLASTIC MALIGNANT RELATED FATIGUE: ICD-10-CM

## 2019-05-29 DIAGNOSIS — R63.0 DECREASED APPETITE: ICD-10-CM

## 2019-05-29 DIAGNOSIS — C34.2 PRIMARY CANCER OF RIGHT MIDDLE LOBE OF LUNG: ICD-10-CM

## 2019-05-29 DIAGNOSIS — D49.89 NEOPLASM OF ABDOMEN: ICD-10-CM

## 2019-05-29 LAB
ALBUMIN SERPL BCP-MCNC: 3.6 G/DL (ref 3.5–5.2)
ALP SERPL-CCNC: 58 U/L (ref 55–135)
ALT SERPL W/O P-5'-P-CCNC: 9 U/L (ref 10–44)
ANION GAP SERPL CALC-SCNC: 11 MMOL/L (ref 8–16)
AST SERPL-CCNC: 11 U/L (ref 10–40)
BILIRUB SERPL-MCNC: 0.5 MG/DL (ref 0.1–1)
BUN SERPL-MCNC: 13 MG/DL (ref 8–23)
CALCIUM SERPL-MCNC: 9.5 MG/DL (ref 8.7–10.5)
CHLORIDE SERPL-SCNC: 106 MMOL/L (ref 95–110)
CO2 SERPL-SCNC: 23 MMOL/L (ref 23–29)
CREAT SERPL-MCNC: 0.9 MG/DL (ref 0.5–1.4)
ERYTHROCYTE [DISTWIDTH] IN BLOOD BY AUTOMATED COUNT: 14.6 % (ref 11.5–14.5)
EST. GFR  (AFRICAN AMERICAN): >60 ML/MIN/1.73 M^2
EST. GFR  (NON AFRICAN AMERICAN): >60 ML/MIN/1.73 M^2
GLUCOSE SERPL-MCNC: 88 MG/DL (ref 70–110)
HCT VFR BLD AUTO: 42.6 % (ref 40–54)
HGB BLD-MCNC: 13.6 G/DL (ref 14–18)
IMM GRANULOCYTES # BLD AUTO: 0.03 K/UL (ref 0–0.04)
MCH RBC QN AUTO: 27.4 PG (ref 27–31)
MCHC RBC AUTO-ENTMCNC: 31.9 G/DL (ref 32–36)
MCV RBC AUTO: 86 FL (ref 82–98)
NEUTROPHILS # BLD AUTO: 7.7 K/UL (ref 1.8–7.7)
PLATELET # BLD AUTO: 352 K/UL (ref 150–350)
PMV BLD AUTO: 9.9 FL (ref 9.2–12.9)
POTASSIUM SERPL-SCNC: 3.7 MMOL/L (ref 3.5–5.1)
PROT SERPL-MCNC: 7.6 G/DL (ref 6–8.4)
RBC # BLD AUTO: 4.96 M/UL (ref 4.6–6.2)
SODIUM SERPL-SCNC: 140 MMOL/L (ref 136–145)
T4 FREE SERPL-MCNC: 1.18 NG/DL (ref 0.71–1.51)
TSH SERPL DL<=0.005 MIU/L-ACNC: 1.38 UIU/ML (ref 0.4–4)
WBC # BLD AUTO: 9.42 K/UL (ref 3.9–12.7)

## 2019-05-29 PROCEDURE — 99214 PR OFFICE/OUTPT VISIT, EST, LEVL IV, 30-39 MIN: ICD-10-PCS | Mod: S$GLB,,, | Performed by: INTERNAL MEDICINE

## 2019-05-29 PROCEDURE — 99999 PR PBB SHADOW E&M-EST. PATIENT-LVL IV: ICD-10-PCS | Mod: PBBFAC,,, | Performed by: INTERNAL MEDICINE

## 2019-05-29 PROCEDURE — 99999 PR PBB SHADOW E&M-EST. PATIENT-LVL IV: CPT | Mod: PBBFAC,,, | Performed by: INTERNAL MEDICINE

## 2019-05-29 PROCEDURE — 3074F PR MOST RECENT SYSTOLIC BLOOD PRESSURE < 130 MM HG: ICD-10-PCS | Mod: CPTII,S$GLB,, | Performed by: INTERNAL MEDICINE

## 2019-05-29 PROCEDURE — 3008F PR BODY MASS INDEX (BMI) DOCUMENTED: ICD-10-PCS | Mod: CPTII,S$GLB,, | Performed by: INTERNAL MEDICINE

## 2019-05-29 PROCEDURE — 3008F BODY MASS INDEX DOCD: CPT | Mod: CPTII,S$GLB,, | Performed by: INTERNAL MEDICINE

## 2019-05-29 PROCEDURE — 63600175 PHARM REV CODE 636 W HCPCS: Mod: JG | Performed by: INTERNAL MEDICINE

## 2019-05-29 PROCEDURE — 25000003 PHARM REV CODE 250: Performed by: INTERNAL MEDICINE

## 2019-05-29 PROCEDURE — 84443 ASSAY THYROID STIM HORMONE: CPT

## 2019-05-29 PROCEDURE — 85027 COMPLETE CBC AUTOMATED: CPT

## 2019-05-29 PROCEDURE — 84439 ASSAY OF FREE THYROXINE: CPT

## 2019-05-29 PROCEDURE — 3079F DIAST BP 80-89 MM HG: CPT | Mod: CPTII,S$GLB,, | Performed by: INTERNAL MEDICINE

## 2019-05-29 PROCEDURE — 36415 COLL VENOUS BLD VENIPUNCTURE: CPT

## 2019-05-29 PROCEDURE — 99214 OFFICE O/P EST MOD 30 MIN: CPT | Mod: S$GLB,,, | Performed by: INTERNAL MEDICINE

## 2019-05-29 PROCEDURE — 3079F PR MOST RECENT DIASTOLIC BLOOD PRESSURE 80-89 MM HG: ICD-10-PCS | Mod: CPTII,S$GLB,, | Performed by: INTERNAL MEDICINE

## 2019-05-29 PROCEDURE — 96413 CHEMO IV INFUSION 1 HR: CPT

## 2019-05-29 PROCEDURE — 80053 COMPREHEN METABOLIC PANEL: CPT

## 2019-05-29 PROCEDURE — 3074F SYST BP LT 130 MM HG: CPT | Mod: CPTII,S$GLB,, | Performed by: INTERNAL MEDICINE

## 2019-05-29 RX ORDER — SODIUM CHLORIDE 0.9 % (FLUSH) 0.9 %
10 SYRINGE (ML) INJECTION
Status: CANCELLED | OUTPATIENT
Start: 2019-05-29

## 2019-05-29 RX ORDER — CYPROHEPTADINE HYDROCHLORIDE 4 MG/1
4 TABLET ORAL 3 TIMES DAILY PRN
Qty: 90 TABLET | Refills: 1 | Status: SHIPPED | OUTPATIENT
Start: 2019-05-29

## 2019-05-29 RX ORDER — HEPARIN 100 UNIT/ML
500 SYRINGE INTRAVENOUS
Status: CANCELLED | OUTPATIENT
Start: 2019-05-29

## 2019-05-29 RX ORDER — SODIUM CHLORIDE 0.9 % (FLUSH) 0.9 %
10 SYRINGE (ML) INJECTION
Status: DISCONTINUED | OUTPATIENT
Start: 2019-05-29 | End: 2019-05-29 | Stop reason: HOSPADM

## 2019-05-29 RX ADMIN — SODIUM CHLORIDE: 9 INJECTION, SOLUTION INTRAVENOUS at 11:05

## 2019-05-29 RX ADMIN — SODIUM CHLORIDE 200 MG: 9 INJECTION, SOLUTION INTRAVENOUS at 11:05

## 2019-05-29 NOTE — PROGRESS NOTES
Pt and family member were approached in Hem Onc clinic regarding participation in IRB protocol #2015.101.C, [id number] study. Pt was agreeable.     The Informed Consent Form (ICF) was reviewed with pt. The discussion included:   - participation is voluntary  - pt can change his mind about participating  - pt was informed that participation in this study would not preclude him from participating in any other research if offered  - specimens may be used by [facility] researchers, community researchers or research companies  - specimens collected include only those discussed with the patient at the time of consent and are indicated on the ICF   - specimens may be used for DNA, RNA or protein studies investigating biomarkers for diagnostic, prognostic or treatment purposes  - one time blood specimen will be collected from hem onc lab after routine collections have been conducted  - all specimens released to researchers will be stripped of identifiers  - no personal medical information will be released to any parties outside of this research study  - there will be no other physical risks outside of those involved in standard of care procedure     Dr. Fernandez approved of patient's participation in the [employer] study.  Pt did not have any questions. Pt willingly and independently signed ICF.    A copy of signed ICF was given to pt with instructions to call with any questions that may arise or if he should change his mind regarding participation in [employer] study.

## 2019-05-29 NOTE — Clinical Note
Pembrolizumab todayFollow-up with me in 3 weeks with CBC, CMP, TSH, free T4 CT and next cycle of pembrolizumab

## 2019-05-29 NOTE — PLAN OF CARE
Problem: Adult Inpatient Plan of Care  Goal: Plan of Care Review  Outcome: Ongoing (interventions implemented as appropriate)  Patient tolerated keytruda well today. NAD noted upon discharge. Verbalized understanding to call MD for any questions/concerns. PIV removed, catheter tip intact. AVS given. Discharged home, ambulated independently with family by side.

## 2019-05-29 NOTE — PROGRESS NOTES
PATIENT: Angel Torres  MRN: [id number]  DATE: 5/29/2019      Diagnosis:   1. Primary cancer of right middle lobe of lung    2. Secondary adenocarcinoma of bone    3. Shortness of breath    4. Decreased appetite    5. Neoplasm of abdomen        Chief Complaint: Follow-up      Oncologic History:      Oncologic History Non-small cell lung cancer diagnosed 6/2016   Recurrent disease to lung 4/2017     Oncologic Treatment Cisplatin/Pemetrexed initiated 8/15/16 with concurrent radiation completed 3 cycles 10/3/16; 64 Gy  Carboplatin/pemetrexed/pembrolizumab 7/2017 - 9/2017  Pembrolizumab maintenance 11/2017 (held 10/2018 secondary to rash)  Pembrolizumab restarted 04/2019    Pathology  6/2016 : Adenocarcinoma, T2b, N2, M0, Stage IIIA          Subjective:    Interval History: Mr. Torres is a 64 y.o. male who was seen in follow-up for lung cancer.  He continues on with pembrolizumab.  He states that his breathing is unchanged.  He continues to lose weight and has decreased appetite.  Has no other new complaints.    His history dates to approximately 4/2016 when he noted a worsening cough which was associated with clear sputum.  He had a chest x-ray in 5/16 showing a right middle lobe opacity.  Subsequent CT of the chest was performed on 5/30/16 showing a 5.6 cm lesion in the right middle lobe with hilar and mediastinal lymphadenopathy along with hepatic lesions and a left adrenal mass..  He underwent bronchoscopy which was nonrevealing and transbronchial biopsies were nondiagnostic.  He then underwent EBUS with biopsy of mediastinal lymph nodes with pathology showing non-small cell lung cancer consistent with adenocarcinoma.  He was started on concurrent chemotherapy and radiation with cisplatin and pemetrexed on 8/15/16.  He completed 64 Gy of concurrent radiation with cisplatin and pemetrexed with chemotherapy completed on 10/3/16.  Scans in 4/2017 had indicated progressive disease in the lung.  Subsequent PET scan in 7/2017  had shown spread of disease to bone.  He was started on carboplatin and pemetrexed and pembrolizumab in 7/2017.  Repeat imaging in 8/2017 had shown stability of disease.  He was started on pembrolizumab maintenance in 11/2017 after a delay due to his insurance.  Repeat imaging in 12/2016 had shown mild progressive disease, however, it was felt that due to the excessive delay in treatment we would proceed on rather than change therapy at this time.  CT in 3/2018 had shown stability of his disease.  CT in 05/2018 had shown stable disease.  CT in 08/2018 had shown stable disease.  Pembrolizumab was held in 10/2018 secondary to a rash.  Scans in 04/2019 had shown progressive disease in bone.  Pembrolizumab was restarted in 04/2019.    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    Skin infection 8/8/2018       Past Surgical HIstory:   Past Surgical History:   Procedure Laterality Date    BRONCHOSCOPY N/A 6/28/2016    Performed by Maribel Wallace MD at [facility] OR [address] [place] [address]    BRONCHOSCOPY N/A 6/13/2016    Performed by [facility] at [facility] OR [address] [place] [address]    ENDOBRONCHIAL ULTRASOUND (EBUS) N/A 6/28/2016    [facility]    EXCISION, MASS, LOWER EXTREMITY Left 4/21/2014    Performed by Ryne Dai MD at [facility] OR [address] [place] [address]    INGUINAL HERNIA REPAIR Bilateral     KNEE SURGERY      REPAIR, HERNIA, INGUINAL, BILATERAL, LAPAROSCOPIC Bilateral 4/21/2014    Performed by Ryne Dai MD at [facility] OR [address] [place] [address]       Family History:   Family History   Problem Relation Age of Onset    Cancer Mother         lung,  breast    Cancer Sister         lung    Cancer Maternal Grandfather     No Known Problems Father     No Known Problems Brother     No Known Problems Maternal Aunt     No Known Problems Maternal Uncle     No Known Problems Paternal Aunt     No Known Problems Paternal Uncle     No Known Problems Maternal Grandmother     No Known Problems Paternal Grandmother     No Known Problems Paternal Grandfather     Amblyopia Neg Hx     Blindness Neg Hx     Cataracts Neg Hx     Diabetes Neg Hx     Glaucoma Neg Hx     Hypertension Neg Hx     Macular degeneration Neg Hx     Retinal detachment Neg Hx     Strabismus Neg Hx     Stroke Neg Hx     Thyroid disease Neg Hx        Social History:  reports that he quit smoking about 5 years ago. He has a 80.00 pack-year smoking history. He has quit using smokeless tobacco. He reports that he does not drink alcohol or use drugs.    Allergies:  Review of patient's allergies indicates:  No Known Allergies    Medications:  Current Outpatient Medications   Medication Sig Dispense Refill    albuterol (PROAIR HFA) 90 mcg/actuation inhaler INHALE 2 PUFFS INTO THE LUNGS EVERY 6 HOURS AS NEEDED FOR WHEEZING 17 g 0    albuterol-ipratropium (DUO-NEB) 2.5 mg-0.5 mg/3 mL nebulizer solution USE 1 VIAL VIA NEBULIZER EVERY 6 HOURS AS NEEDED FOR WHEEZING OR SHORTNESS OF BREATH 270 mL 0    amLODIPine (NORVASC) 10 MG tablet Take 1 tablet (10 mg total) by mouth once daily. 90 tablet 3    dronabinol (MARINOL) 5 MG capsule Take 1 capsule (5 mg total) by mouth 2 (two) times daily before meals. 60 capsule 0    fluticasone (FLONASE) 50 mcg/actuation nasal spray SHAKE LIQUID AND USE 2 SPRAYS IN EACH NOSTRIL EVERY DAY 16 g 1    mupirocin (BACTROBAN) 2 % ointment Apply topically 2 (two) times daily. To open areas 22 g 2    naproxen (NAPROSYN) 500 MG tablet Take 1 tablet (500 mg total) by mouth 2 (two) times daily as needed (headache). 60 tablet 0    triamcinolone acetonide 0.1% (KENALOG)  "0.1 % ointment To all rashes BID 80 g 2    cetirizine (ZYRTEC) 10 MG tablet Take 1 tablet (10 mg total) by mouth once daily.  0    cyproheptadine (PERIACTIN) 4 mg tablet Take 1 tablet (4 mg total) by mouth 3 (three) times daily as needed. 90 tablet 1     No current facility-administered medications for this visit.        Review of Systems   Constitutional: Positive for unexpected weight change. Negative for activity change, appetite change, chills, fatigue and fever.        Normal weight 220   HENT: Negative for dental problem, nosebleeds, sinus pressure, sneezing and trouble swallowing.    Eyes: Negative for visual disturbance.   Respiratory: Positive for cough, shortness of breath and wheezing. Negative for choking and chest tightness.    Cardiovascular: Negative for chest pain and leg swelling.   Gastrointestinal: Negative for abdominal pain, blood in stool, constipation, diarrhea and nausea.   Genitourinary: Negative for difficulty urinating and dysuria.   Musculoskeletal: Positive for myalgias. Negative for arthralgias and back pain.   Skin: Negative for rash and wound.   Neurological: Negative for dizziness, light-headedness and headaches.   Hematological: Negative for adenopathy. Does not bruise/bleed easily.   Psychiatric/Behavioral: Negative for sleep disturbance. The patient is not nervous/anxious.        ECOG Performance Status: 1   Objective:      Vitals:   Vitals:    05/29/19 1006   BP: 121/81   BP Location: Right arm   Patient Position: Sitting   BP Method: Large (Automatic)   Pulse: (!) 111   Resp: 16   Temp: 97.8 °F (36.6 °C)   TempSrc: Oral   SpO2: 98%   Weight: 78.2 kg (172 lb 6.4 oz)   Height: 6' 5" (1.956 m)     BMI: Body mass index is 20.44 kg/m².    Physical Exam   Constitutional: He is oriented to person, place, and time. He appears well-developed and well-nourished.   HENT:   Head: Normocephalic and atraumatic.   Eyes: Pupils are equal, round, and reactive to light.   Neck: Normal range of " motion. Neck supple.   Cardiovascular: Normal rate and regular rhythm.   Pulmonary/Chest: Effort normal. No respiratory distress. He has no rales.   Abdominal: Soft. He exhibits no distension. There is no tenderness.   Musculoskeletal: He exhibits no edema or tenderness.   Lymphadenopathy:     He has no cervical adenopathy.   Neurological: He is alert and oriented to person, place, and time. No cranial nerve deficit.   Skin: Skin is warm and dry. No rash noted.   Psychiatric: He has a normal mood and affect. His behavior is normal.       Laboratory Data:  Lab Visit on 05/29/2019   Component Date Value Ref Range Status    WBC 05/29/2019 9.42  3.90 - 12.70 K/uL Final    RBC 05/29/2019 4.96  4.60 - 6.20 M/uL Final    Hemoglobin 05/29/2019 13.6* 14.0 - 18.0 g/dL Final    Hematocrit 05/29/2019 42.6  40.0 - 54.0 % Final    Mean Corpuscular Volume 05/29/2019 86  82 - 98 fL Final    Mean Corpuscular Hemoglobin 05/29/2019 27.4  27.0 - 31.0 pg Final    Mean Corpuscular Hemoglobin Conc 05/29/2019 31.9* 32.0 - 36.0 g/dL Final    RDW 05/29/2019 14.6* 11.5 - 14.5 % Final    Platelets 05/29/2019 352* 150 - 350 K/uL Final    MPV 05/29/2019 9.9  9.2 - 12.9 fL Final    Gran # (ANC) 05/29/2019 7.7  1.8 - 7.7 K/uL Final    Comment: The ANC is based on a white cell differential from an   automated cell counter. It has not been microscopically   reviewed for the presence of abnormal cells. Clinical   correlation is required.      Immature Grans (Abs) 05/29/2019 0.03  0.00 - 0.04 K/uL Final    Comment: Mild elevation in immature granulocytes is non specific and   can be seen in a variety of conditions including stress response,   acute inflammation, trauma and pregnancy. Correlation with other   laboratory and clinical findings is essential.      Sodium 05/29/2019 140  136 - 145 mmol/L Final    Potassium 05/29/2019 3.7  3.5 - 5.1 mmol/L Final    Chloride 05/29/2019 106  95 - 110 mmol/L Final    CO2 05/29/2019 23  23 - 29  mmol/L Final    Glucose 05/29/2019 88  70 - 110 mg/dL Final    BUN, Bld 05/29/2019 13  8 - 23 mg/dL Final    Creatinine 05/29/2019 0.9  0.5 - 1.4 mg/dL Final    Calcium 05/29/2019 9.5  8.7 - 10.5 mg/dL Final    Total Protein 05/29/2019 7.6  6.0 - 8.4 g/dL Final    Albumin 05/29/2019 3.6  3.5 - 5.2 g/dL Final    Total Bilirubin 05/29/2019 0.5  0.1 - 1.0 mg/dL Final    Comment: For infants and newborns, interpretation of results should be based  on gestational age, weight and in agreement with clinical  observations.  Premature Infant recommended reference ranges:  Up to 24 hours.............<8.0 mg/dL  Up to 48 hours............<12.0 mg/dL  3-5 days..................<15.0 mg/dL  6-29 days.................<15.0 mg/dL      Alkaline Phosphatase 05/29/2019 58  55 - 135 U/L Final    AST 05/29/2019 11  10 - 40 U/L Final    ALT 05/29/2019 9* 10 - 44 U/L Final    Anion Gap 05/29/2019 11  8 - 16 mmol/L Final    eGFR if African American 05/29/2019 >60.0  >60 mL/min/1.73 m^2 Final    eGFR if non African American 05/29/2019 >60.0  >60 mL/min/1.73 m^2 Final    Comment: Calculation used to obtain the estimated glomerular filtration  rate (eGFR) is the CKD-EPI equation.       TSH 05/29/2019 1.376  0.400 - 4.000 uIU/mL Final    Free T4 05/29/2019 1.18  0.71 - 1.51 ng/dL Final   Lab Visit on 05/08/2019   Component Date Value Ref Range Status    WBC 05/08/2019 8.87  3.90 - 12.70 K/uL Final    RBC 05/08/2019 5.08  4.60 - 6.20 M/uL Final    Hemoglobin 05/08/2019 14.2  14.0 - 18.0 g/dL Final    Hematocrit 05/08/2019 44.0  40.0 - 54.0 % Final    Mean Corpuscular Volume 05/08/2019 87  82 - 98 fL Final    Mean Corpuscular Hemoglobin 05/08/2019 28.0  27.0 - 31.0 pg Final    Mean Corpuscular Hemoglobin Conc 05/08/2019 32.3  32.0 - 36.0 g/dL Final    RDW 05/08/2019 14.4  11.5 - 14.5 % Final    Platelets 05/08/2019 399* 150 - 350 K/uL Final    MPV 05/08/2019 9.5  9.2 - 12.9 fL Final    Gran # (ANC) 05/08/2019 6.9  1.8  - 7.7 K/uL Final    Comment: The ANC is based on a white cell differential from an   automated cell counter. It has not been microscopically   reviewed for the presence of abnormal cells. Clinical   correlation is required.      Immature Grans (Abs) 05/08/2019 0.02  0.00 - 0.04 K/uL Final    Comment: Mild elevation in immature granulocytes is non specific and   can be seen in a variety of conditions including stress response,   acute inflammation, trauma and pregnancy. Correlation with other   laboratory and clinical findings is essential.      Sodium 05/08/2019 138  136 - 145 mmol/L Final    Potassium 05/08/2019 4.0  3.5 - 5.1 mmol/L Final    Chloride 05/08/2019 104  95 - 110 mmol/L Final    CO2 05/08/2019 26  23 - 29 mmol/L Final    Glucose 05/08/2019 99  70 - 110 mg/dL Final    BUN, Bld 05/08/2019 11  8 - 23 mg/dL Final    Creatinine 05/08/2019 1.0  0.5 - 1.4 mg/dL Final    Calcium 05/08/2019 9.8  8.7 - 10.5 mg/dL Final    Total Protein 05/08/2019 8.0  6.0 - 8.4 g/dL Final    Albumin 05/08/2019 3.9  3.5 - 5.2 g/dL Final    Total Bilirubin 05/08/2019 0.5  0.1 - 1.0 mg/dL Final    Comment: For infants and newborns, interpretation of results should be based  on gestational age, weight and in agreement with clinical  observations.  Premature Infant recommended reference ranges:  Up to 24 hours.............<8.0 mg/dL  Up to 48 hours............<12.0 mg/dL  3-5 days..................<15.0 mg/dL  6-29 days.................<15.0 mg/dL      Alkaline Phosphatase 05/08/2019 66  55 - 135 U/L Final    AST 05/08/2019 13  10 - 40 U/L Final    ALT 05/08/2019 9* 10 - 44 U/L Final    Anion Gap 05/08/2019 8  8 - 16 mmol/L Final    eGFR if African American 05/08/2019 >60.0  >60 mL/min/1.73 m^2 Final    eGFR if non African American 05/08/2019 >60.0  >60 mL/min/1.73 m^2 Final    Comment: Calculation used to obtain the estimated glomerular filtration  rate (eGFR) is the CKD-EPI equation.       TSH 05/08/2019 0.944   0.400 - 4.000 uIU/mL Final    Free T4 05/08/2019 1.10  0.71 - 1.51 ng/dL Final     Supplemental Diagnosis 6/30/16  Immunohistochemical stains are completed on the Station 4L lymph node cell block material and reveal the tumor  cells to stain positively with cytokeratin 7 and TTF-1. The tumor cells show nonspecific blush staining with  cytokeratin 5/6 and are negative for p63. This staining profile supports a diagnosis of non-small cell carcinoma  consistent with adenocarcinoma.  Cell block material will be sent for EGFR, ALK and ROS-1 testing and results will follow in a supplemental report.  (Electronically Signed: 2016-06-30 11:50:31 )  Diagnosed by: Irene Amaro M.D.  FINAL PATHOLOGIC DIAGNOSIS  1. Lymph node, Station 4L, EBUS guided fine needle aspiration:  Positive for malignant cells, non-small cell carcinoma.  Rare background lymphocytes are present.  Immunohistochemical staining be completed to further characterize this malignancy and results will follow in a  supplemental report.  2. Lymph node, Station 7, EBUS guided fine needle aspiration:  Positive for malignant cells, non-small cell carcinoma tumor identical to that seen in specimen #1.  Background lymphocytes are present.    Imaging:   CT 12/26/2018  COMPARISON:  CT chest abdomen pelvis 08/21/2018    FINDINGS:  Base of Neck: No significant abnormality.    CHEST:    -Heart: Normal size. No pericardial effusion.    -Pulmonary vasculature: Pulmonary arteries distribute normally.  There are four pulmonary veins.    -Denisse/Mediastinum: No pathologic kervin enlargement.    -Trachea and Proximal airways: Patent.    -Lungs/Pleura: There is right lower lobe bronchiectasis and geographic right paramediastinal airspace consolidation with patchy ground-glass opacities at the right lung base compatible with history of prior radiation, grossly similar size compared to CT 08/21/2018, measuring 3.9 x 3.5 cm (axial series 2, image 72).  There is a small right  pleural effusion similar to prior.  There is severe centrilobular emphysematous change throughout both lungs.    -Esophagus: Normal course and caliber.    ABDOMEN:    - Liver: Normal in size and attenuation.  Multiple small hepatic hypodensities appear similar to prior CT, most of which are too small to characterize and findings probably represent cysts..    - Gallbladder: No calcified gallstones.  No wall thickening or pericholecystic fluid.    - Bile Ducts: No intra or extrahepatic biliary ductal dilatation.    - Stomach/Duodenum: Unremarkable.    - Spleen: Normal size.  No focal abnormality.  Small adjacent splenule.    - Pancreas: Unremarkable.    - Adrenals: Stable 1.2 cm left adrenal nodule.  Right adrenal gland is normal.    - Kidneys/ureters/urinary bladder: Normal in size and location, concentrating excreting contrast appropriately on delayed imaging.  No hydronephrosis or stones.  The ureters appear normal in course and caliber without evidence of ureteral dilatation.  There is a bladder wall diverticulum at the bladder dome.  No convincing evidence of bladder wall thickening.    - Retroperitoneum: No significant adenopathy.    PELVIS:    - Reproductive: Prostatomegaly.    - Other: No pelvic adenopathy, free fluid, or mass.    BOWEL/MESENTERY:    No evidence of bowel obstruction or inflammatory process. Hiatal hernia.    VASCULATURE: Left-sided aortic arch with 3 branch vessels.  Moderate aortic atherosclerotic calcification without aneurysm.    BONES: No acute fracture or bony destructive process. Unchanged sclerotic focus at the T1 vertebral body and the superior aspect of the left iliac wing.  Mild age-appropriate degenerative changes also present.    EXTRATHORACIC/EXTRAPERITONEAL SOFT TISSUES: Unremarkable.      Impression       Unchanged right middle lobe soft tissue mass in this patient with history of lung malignancy.  No evidence of new metastatic disease.    Stable geographic right paramediastinal  airspace consolidation with patchy ground-glass opacities at the right lung base and right lower lobe bronchiectasis, compatible with post radiation changes, superimposed infectious component not excluded.    Stable small right pleural effusion.    Unchanged sclerotic focus at the T1 vertebral body and superior aspect of the left iliac wing compatible with metastatic disease.    Unchanged nonspecific left adrenal nodule.    Small bladder wall diverticulum.    RECIST SUMMARY:    Date of prior examination for comparison: CT 08/21/2018    Lesion 1: Right middle lobe.  3.9 cm.  (Axial series 2, image 72).  Previously 3.8 cm.                Assessment:       1. Primary cancer of right middle lobe of lung    2. Secondary adenocarcinoma of bone    3. Shortness of breath    4. Decreased appetite    5. Neoplasm of abdomen           Plan:     Mr. Torres will receive his 3rd cycle pembrolizumab since restarting today.  I will plan to repeat a scan on him in another 3 weeks.  Labs have been reviewed and appropriate to continue treatment.  I have added Periactin for appetite.  Follow back with me 3 weeks or sooner if needed.  All questions were answered and he is agreeable with this plan.    Dano Fernandez DO, FACP  Hematology & Oncology  [facility] [place] [address]  ph. [phone]  Fax. [phone]    25 minutes were spent in coordination of patient's care, record review and counseling.  More than 50% of the time was face-to-face.

## 2019-06-03 ENCOUNTER — TELEPHONE (OUTPATIENT)
Dept: HEMATOLOGY/ONCOLOGY | Facility: CLINIC | Age: 64
End: 2019-06-03

## 2019-06-03 NOTE — TELEPHONE ENCOUNTER
----- Message from Tameka Urban sent at 6/3/2019  8:16 AM CDT -----  Contact: Wife  Wife is calling to speak with Staff regarding his appt for a PET Scan before he takes his medication; Keytruda again.    She can be reached at [phone].    Thank you.

## 2019-06-03 NOTE — TELEPHONE ENCOUNTER
Spoke to patient wife about scheduling ct scan for Monday 6/17.  Mailed appt slip.      ----- Message from Linnea Vegas RN sent at 6/3/2019  8:41 AM CDT -----  Contact: Wife  I have tried calling. He was to be scheduled for a CT scan prior to his next appt and it is not scheduled.  Can you follow up on this?    ----- Message -----  From: Tameka Urban  Sent: 6/3/2019   8:16 AM  To: [facility]    Wife is calling to speak with Staff regarding his appt for a PET Scan before he takes his medication; Keytruda again.    She can be reached at [phone].    Thank you.

## 2019-06-17 ENCOUNTER — HOSPITAL ENCOUNTER (OUTPATIENT)
Dept: RADIOLOGY | Facility: HOSPITAL | Age: 64
Discharge: HOME | End: 2019-06-17
Attending: INTERNAL MEDICINE
Payer: COMMERCIAL

## 2019-06-17 DIAGNOSIS — D49.89 NEOPLASM OF ABDOMEN: ICD-10-CM

## 2019-06-17 PROCEDURE — 71260 CT THORAX DX C+: CPT | Mod: 26,,, | Performed by: RADIOLOGY

## 2019-06-17 PROCEDURE — 74177 CT ABD & PELVIS W/CONTRAST: CPT | Mod: 26,,, | Performed by: RADIOLOGY

## 2019-06-17 PROCEDURE — 74177 CT ABD & PELVIS W/CONTRAST: CPT | Mod: TC

## 2019-06-17 PROCEDURE — 71260 CT CHEST ABDOMEN PELVIS WITH CONTRAST (XPD): ICD-10-PCS | Mod: 26,,, | Performed by: RADIOLOGY

## 2019-06-17 PROCEDURE — 25500020 PHARM REV CODE 255: Performed by: INTERNAL MEDICINE

## 2019-06-17 PROCEDURE — 74177 CT CHEST ABDOMEN PELVIS WITH CONTRAST (XPD): ICD-10-PCS | Mod: 26,,, | Performed by: RADIOLOGY

## 2019-06-17 RX ADMIN — IOHEXOL 75 ML: 350 INJECTION, SOLUTION INTRAVENOUS at 04:06

## 2019-06-19 ENCOUNTER — OFFICE VISIT (OUTPATIENT)
Dept: HEMATOLOGY/ONCOLOGY | Facility: CLINIC | Age: 64
End: 2019-06-19
Payer: COMMERCIAL

## 2019-06-19 ENCOUNTER — RESEARCH ENCOUNTER (OUTPATIENT)
Dept: HEMATOLOGY/ONCOLOGY | Facility: CLINIC | Age: 64
End: 2019-06-19

## 2019-06-19 ENCOUNTER — LAB VISIT (OUTPATIENT)
Dept: LAB | Facility: HOSPITAL | Age: 64
End: 2019-06-19
Attending: INTERNAL MEDICINE
Payer: COMMERCIAL

## 2019-06-19 VITALS
OXYGEN SATURATION: 94 % | DIASTOLIC BLOOD PRESSURE: 83 MMHG | BODY MASS INDEX: 20.74 KG/M2 | SYSTOLIC BLOOD PRESSURE: 131 MMHG | HEART RATE: 114 BPM | TEMPERATURE: 98 F | RESPIRATION RATE: 14 BRPM | HEIGHT: 77 IN | WEIGHT: 175.69 LBS

## 2019-06-19 DIAGNOSIS — R53.0 NEOPLASTIC MALIGNANT RELATED FATIGUE: ICD-10-CM

## 2019-06-19 DIAGNOSIS — C34.2 PRIMARY CANCER OF RIGHT MIDDLE LOBE OF LUNG: Primary | ICD-10-CM

## 2019-06-19 DIAGNOSIS — C79.51 SECONDARY ADENOCARCINOMA OF BONE: ICD-10-CM

## 2019-06-19 DIAGNOSIS — C34.2 PRIMARY CANCER OF RIGHT MIDDLE LOBE OF LUNG: ICD-10-CM

## 2019-06-19 LAB
ALBUMIN SERPL BCP-MCNC: 3.4 G/DL (ref 3.5–5.2)
ALP SERPL-CCNC: 69 U/L (ref 55–135)
ALT SERPL W/O P-5'-P-CCNC: 13 U/L (ref 10–44)
ANION GAP SERPL CALC-SCNC: 11 MMOL/L (ref 8–16)
AST SERPL-CCNC: 14 U/L (ref 10–40)
BILIRUB SERPL-MCNC: 0.2 MG/DL (ref 0.1–1)
BUN SERPL-MCNC: 13 MG/DL (ref 8–23)
CALCIUM SERPL-MCNC: 9.4 MG/DL (ref 8.7–10.5)
CHLORIDE SERPL-SCNC: 107 MMOL/L (ref 95–110)
CO2 SERPL-SCNC: 25 MMOL/L (ref 23–29)
CREAT SERPL-MCNC: 1 MG/DL (ref 0.5–1.4)
ERYTHROCYTE [DISTWIDTH] IN BLOOD BY AUTOMATED COUNT: 14.6 % (ref 11.5–14.5)
EST. GFR  (AFRICAN AMERICAN): >60 ML/MIN/1.73 M^2
EST. GFR  (NON AFRICAN AMERICAN): >60 ML/MIN/1.73 M^2
GLUCOSE SERPL-MCNC: 98 MG/DL (ref 70–110)
HCT VFR BLD AUTO: 43.6 % (ref 40–54)
HGB BLD-MCNC: 13.6 G/DL (ref 14–18)
IMM GRANULOCYTES # BLD AUTO: 0.03 K/UL (ref 0–0.04)
MCH RBC QN AUTO: 27.5 PG (ref 27–31)
MCHC RBC AUTO-ENTMCNC: 31.2 G/DL (ref 32–36)
MCV RBC AUTO: 88 FL (ref 82–98)
NEUTROPHILS # BLD AUTO: 7.3 K/UL (ref 1.8–7.7)
PLATELET # BLD AUTO: 398 K/UL (ref 150–350)
PMV BLD AUTO: 9.9 FL (ref 9.2–12.9)
POTASSIUM SERPL-SCNC: 4.2 MMOL/L (ref 3.5–5.1)
PROT SERPL-MCNC: 7.3 G/DL (ref 6–8.4)
RBC # BLD AUTO: 4.94 M/UL (ref 4.6–6.2)
SODIUM SERPL-SCNC: 143 MMOL/L (ref 136–145)
T4 FREE SERPL-MCNC: 1.03 NG/DL (ref 0.71–1.51)
TSH SERPL DL<=0.005 MIU/L-ACNC: 1.17 UIU/ML (ref 0.4–4)
WBC # BLD AUTO: 9.39 K/UL (ref 3.9–12.7)

## 2019-06-19 PROCEDURE — 99215 PR OFFICE/OUTPT VISIT, EST, LEVL V, 40-54 MIN: ICD-10-PCS | Mod: S$GLB,,, | Performed by: INTERNAL MEDICINE

## 2019-06-19 PROCEDURE — 3075F PR MOST RECENT SYSTOLIC BLOOD PRESS GE 130-139MM HG: ICD-10-PCS | Mod: CPTII,S$GLB,, | Performed by: INTERNAL MEDICINE

## 2019-06-19 PROCEDURE — 3079F PR MOST RECENT DIASTOLIC BLOOD PRESSURE 80-89 MM HG: ICD-10-PCS | Mod: CPTII,S$GLB,, | Performed by: INTERNAL MEDICINE

## 2019-06-19 PROCEDURE — 3075F SYST BP GE 130 - 139MM HG: CPT | Mod: CPTII,S$GLB,, | Performed by: INTERNAL MEDICINE

## 2019-06-19 PROCEDURE — 85027 COMPLETE CBC AUTOMATED: CPT

## 2019-06-19 PROCEDURE — 3008F BODY MASS INDEX DOCD: CPT | Mod: CPTII,S$GLB,, | Performed by: INTERNAL MEDICINE

## 2019-06-19 PROCEDURE — 3008F PR BODY MASS INDEX (BMI) DOCUMENTED: ICD-10-PCS | Mod: CPTII,S$GLB,, | Performed by: INTERNAL MEDICINE

## 2019-06-19 PROCEDURE — 84439 ASSAY OF FREE THYROXINE: CPT

## 2019-06-19 PROCEDURE — 80053 COMPREHEN METABOLIC PANEL: CPT

## 2019-06-19 PROCEDURE — 36415 COLL VENOUS BLD VENIPUNCTURE: CPT

## 2019-06-19 PROCEDURE — 99999 PR PBB SHADOW E&M-EST. PATIENT-LVL IV: CPT | Mod: PBBFAC,,, | Performed by: INTERNAL MEDICINE

## 2019-06-19 PROCEDURE — 3079F DIAST BP 80-89 MM HG: CPT | Mod: CPTII,S$GLB,, | Performed by: INTERNAL MEDICINE

## 2019-06-19 PROCEDURE — 99215 OFFICE O/P EST HI 40 MIN: CPT | Mod: S$GLB,,, | Performed by: INTERNAL MEDICINE

## 2019-06-19 PROCEDURE — 99999 PR PBB SHADOW E&M-EST. PATIENT-LVL IV: ICD-10-PCS | Mod: PBBFAC,,, | Performed by: INTERNAL MEDICINE

## 2019-06-19 PROCEDURE — 84443 ASSAY THYROID STIM HORMONE: CPT

## 2019-06-19 NOTE — Clinical Note
Follow-up per protocolHe is going to need a biopsy some point and we will place orders when research tells me

## 2019-06-19 NOTE — PROGRESS NOTES
"Angel Torres  MRN# [id number]  [facility]  [facility]    Informed Consent Process:     Patient returned to clinic today to meet with Dr. Fernandez to review recent CT Scans. Per Dr. Fernandez, pts CT scan shows progression. Dr Fernandez introduced the LungMap trial to pt and his family members and they agreed to review screening process for LungMAP trial.      Prescreening/Screening Informed Consent was reviewed in full including: Overview and Key Information; Purpose; Number of Participants; What are study groups? (Including study chart displaying screening and sub-study assignment design); Procedure; Risks; Potential Benefits; Costs; Payment for Participation and/or Reimbursement of Expenses; Alternative Methods/Treatments; Study Related Questions and Compensation for Injury; Questions About Your Rights; Voluntary Participation and Withdrawal from Research; New Findings; Employees in Research (n/a); Confidentiality; Where can I get more information; Sections on optional studies; HIPAA Authorization to Release Information for Research; and section titled "specimen consent supplemental sheets".     Opportunity for questions was given and all were answered to patient and family members satisfaction by Dr. Fernandez and myself. Pt would like to proceed w/ signing consent. Informed consent was signed by patient and witnessed by myself. He was given a copy of signed informed consent, along with my contact information. Of note, he did consent yes to future contact and no to samples for unknown future studies. He also agreed to participate in the optional research survey study.     Pt understands he will require another biopsy for study since he didnt have enough tumor tissue from previous FNA cell blocks to run any mutational testing. I instructed him someone would be in contact with him once this has been scheduled. He knows he should call the clinic if he starts having any symptoms between now and then.        Dr. Fernandez " notified that patient signed consent.

## 2019-06-19 NOTE — PROGRESS NOTES
PATIENT: Angel Torres  MRN: [id number]  DATE: 6/19/2019      Diagnosis:   1. Primary cancer of right middle lobe of lung    2. Secondary adenocarcinoma of bone        Chief Complaint: Follow-up      Oncologic History:      Oncologic History Non-small cell lung cancer diagnosed 6/2016   Recurrent disease to lung 4/2017     Oncologic Treatment Cisplatin/Pemetrexed initiated 8/15/16 with concurrent radiation completed 3 cycles 10/3/16; 64 Gy  Carboplatin/pemetrexed/pembrolizumab 7/2017 - 9/2017  Pembrolizumab maintenance 11/2017 (held 10/2018 secondary to rash)  Pembrolizumab restarted 04/2019    Pathology  6/2016 : Adenocarcinoma, T2b, N2, M0, Stage IIIA          Subjective:    Interval History: Mr. Torres is a 64 y.o. male who was seen in follow-up for lung cancer.  He continues on with pembrolizumab.  He states that he generally feels well and breathing is relatively unchanged but is short.  He has lost a few lb since I had seen him last.  He also complains of some left-sided chest wall pain and uses ibuprofen with relief.  No other new complaints.    His history dates to approximately 4/2016 when he noted a worsening cough which was associated with clear sputum.  He had a chest x-ray in 5/16 showing a right middle lobe opacity.  Subsequent CT of the chest was performed on 5/30/16 showing a 5.6 cm lesion in the right middle lobe with hilar and mediastinal lymphadenopathy along with hepatic lesions and a left adrenal mass..  He underwent bronchoscopy which was nonrevealing and transbronchial biopsies were nondiagnostic.  He then underwent EBUS with biopsy of mediastinal lymph nodes with pathology showing non-small cell lung cancer consistent with adenocarcinoma.  He was started on concurrent chemotherapy and radiation with cisplatin and pemetrexed on 8/15/16.  He completed 64 Gy of concurrent radiation with cisplatin and pemetrexed with chemotherapy completed on 10/3/16.  Scans in 4/2017 had indicated progressive  disease in the lung.  Subsequent PET scan in 7/2017 had shown spread of disease to bone.  He was started on carboplatin and pemetrexed and pembrolizumab in 7/2017.  Repeat imaging in 8/2017 had shown stability of disease.  He was started on pembrolizumab maintenance in 11/2017 after a delay due to his insurance.  Repeat imaging in 12/2016 had shown mild progressive disease, however, it was felt that due to the excessive delay in treatment we would proceed on rather than change therapy at this time.  CT in 3/2018 had shown stability of his disease.  CT in 05/2018 had shown stable disease.  CT in 08/2018 had shown stable disease.  Pembrolizumab was held in 10/2018 secondary to a rash.  Scans in 04/2019 had shown progressive disease in bone.  Pembrolizumab was restarted in 04/2019.    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    Skin infection 8/8/2018       Past Surgical HIstory:   Past Surgical History:   Procedure Laterality Date    BRONCHOSCOPY N/A 6/28/2016    Performed by Maribel Wallace MD at [facility] OR [address] [place] [address]    BRONCHOSCOPY N/A 6/13/2016    Performed by [facility] at [facility] OR [address] [place] [address]    ENDOBRONCHIAL ULTRASOUND (EBUS) N/A 6/28/2016    Performed by Maribel Wallace MD at [facility] OR [address] [place] [address]    EXCISION, MASS, LOWER EXTREMITY Left 4/21/2014    [facility]    INGUINAL HERNIA REPAIR Bilateral     KNEE SURGERY      REPAIR, HERNIA, INGUINAL, BILATERAL, LAPAROSCOPIC Bilateral 4/21/2014    Performed by Ryne Dai MD at [facility] OR [address] [place] [address]       Family History:   Family History   Problem  Relation Age of Onset    Cancer Mother         lung, breast    Cancer Sister         lung    Cancer Maternal Grandfather     No Known Problems Father     No Known Problems Brother     No Known Problems Maternal Aunt     No Known Problems Maternal Uncle     No Known Problems Paternal Aunt     No Known Problems Paternal Uncle     No Known Problems Maternal Grandmother     No Known Problems Paternal Grandmother     No Known Problems Paternal Grandfather     Amblyopia Neg Hx     Blindness Neg Hx     Cataracts Neg Hx     Diabetes Neg Hx     Glaucoma Neg Hx     Hypertension Neg Hx     Macular degeneration Neg Hx     Retinal detachment Neg Hx     Strabismus Neg Hx     Stroke Neg Hx     Thyroid disease Neg Hx        Social History:  reports that he quit smoking about 5 years ago. He has a 80.00 pack-year smoking history. He has quit using smokeless tobacco. He reports that he does not drink alcohol or use drugs.    Allergies:  Review of patient's allergies indicates:  No Known Allergies    Medications:  Current Outpatient Medications   Medication Sig Dispense Refill    albuterol (PROAIR HFA) 90 mcg/actuation inhaler INHALE 2 PUFFS INTO THE LUNGS EVERY 6 HOURS AS NEEDED FOR WHEEZING 17 g 0    albuterol-ipratropium (DUO-NEB) 2.5 mg-0.5 mg/3 mL nebulizer solution USE 1 VIAL VIA NEBULIZER EVERY 6 HOURS AS NEEDED FOR WHEEZING OR SHORTNESS OF BREATH 270 mL 0    amLODIPine (NORVASC) 10 MG tablet Take 1 tablet (10 mg total) by mouth once daily. 90 tablet 3    cyproheptadine (PERIACTIN) 4 mg tablet Take 1 tablet (4 mg total) by mouth 3 (three) times daily as needed. 90 tablet 1    dronabinol (MARINOL) 5 MG capsule Take 1 capsule (5 mg total) by mouth 2 (two) times daily before meals. 60 capsule 0    fluticasone (FLONASE) 50 mcg/actuation nasal spray SHAKE LIQUID AND USE 2 SPRAYS IN EACH NOSTRIL EVERY DAY 16 g 1    mupirocin (BACTROBAN) 2 % ointment Apply topically 2 (two) times daily. To open areas 22 g  "2    naproxen (NAPROSYN) 500 MG tablet Take 1 tablet (500 mg total) by mouth 2 (two) times daily as needed (headache). 60 tablet 0    triamcinolone acetonide 0.1% (KENALOG) 0.1 % ointment To all rashes BID 80 g 2    cetirizine (ZYRTEC) 10 MG tablet Take 1 tablet (10 mg total) by mouth once daily.  0     No current facility-administered medications for this visit.        Review of Systems   Constitutional: Positive for unexpected weight change. Negative for activity change, appetite change, chills, fatigue and fever.        Normal weight 220   HENT: Negative for dental problem, nosebleeds, sinus pressure, sneezing and trouble swallowing.    Eyes: Negative for visual disturbance.   Respiratory: Positive for cough, shortness of breath and wheezing. Negative for choking and chest tightness.    Cardiovascular: Negative for chest pain and leg swelling.   Gastrointestinal: Negative for abdominal pain, blood in stool, constipation, diarrhea and nausea.   Genitourinary: Negative for difficulty urinating and dysuria.   Musculoskeletal: Positive for myalgias. Negative for arthralgias and back pain.   Skin: Negative for rash and wound.   Neurological: Negative for dizziness, light-headedness and headaches.   Hematological: Negative for adenopathy. Does not bruise/bleed easily.   Psychiatric/Behavioral: Negative for sleep disturbance. The patient is not nervous/anxious.        ECOG Performance Status: 1   Objective:      Vitals:   Vitals:    06/19/19 1133   BP: 131/83   BP Location: Left arm   Patient Position: Sitting   BP Method: Large (Automatic)   Pulse: (!) 114   Resp: 14   Temp: 98 °F (36.7 °C)   TempSrc: Oral   SpO2: (!) 94%   Weight: 79.7 kg (175 lb 11.3 oz)   Height: 6' 5" (1.956 m)     BMI: Body mass index is 20.84 kg/m².    Physical Exam   Constitutional: He is oriented to person, place, and time. He appears well-developed and well-nourished.   HENT:   Head: Normocephalic and atraumatic.   Eyes: Pupils are equal, " round, and reactive to light.   Neck: Normal range of motion. Neck supple.   Cardiovascular: Normal rate and regular rhythm.   Pulmonary/Chest: Effort normal. No respiratory distress. He has no rales.   Abdominal: Soft. He exhibits no distension. There is no tenderness.   Musculoskeletal: He exhibits no edema or tenderness.   Lymphadenopathy:     He has no cervical adenopathy.   Neurological: He is alert and oriented to person, place, and time. No cranial nerve deficit.   Skin: Skin is warm and dry. No rash noted.   Psychiatric: He has a normal mood and affect. His behavior is normal.       Laboratory Data:  Lab Visit on 06/19/2019   Component Date Value Ref Range Status    WBC 06/19/2019 9.39  3.90 - 12.70 K/uL Final    RBC 06/19/2019 4.94  4.60 - 6.20 M/uL Final    Hemoglobin 06/19/2019 13.6* 14.0 - 18.0 g/dL Final    Hematocrit 06/19/2019 43.6  40.0 - 54.0 % Final    Mean Corpuscular Volume 06/19/2019 88  82 - 98 fL Final    Mean Corpuscular Hemoglobin 06/19/2019 27.5  27.0 - 31.0 pg Final    Mean Corpuscular Hemoglobin Conc 06/19/2019 31.2* 32.0 - 36.0 g/dL Final    RDW 06/19/2019 14.6* 11.5 - 14.5 % Final    Platelets 06/19/2019 398* 150 - 350 K/uL Final    MPV 06/19/2019 9.9  9.2 - 12.9 fL Final    Gran # (ANC) 06/19/2019 7.3  1.8 - 7.7 K/uL Final    Comment: The ANC is based on a white cell differential from an   automated cell counter. It has not been microscopically   reviewed for the presence of abnormal cells. Clinical   correlation is required.      Immature Grans (Abs) 06/19/2019 0.03  0.00 - 0.04 K/uL Final    Comment: Mild elevation in immature granulocytes is non specific and   can be seen in a variety of conditions including stress response,   acute inflammation, trauma and pregnancy. Correlation with other   laboratory and clinical findings is essential.      Sodium 06/19/2019 143  136 - 145 mmol/L Final    Potassium 06/19/2019 4.2  3.5 - 5.1 mmol/L Final    Chloride 06/19/2019 107   95 - 110 mmol/L Final    CO2 06/19/2019 25  23 - 29 mmol/L Final    Glucose 06/19/2019 98  70 - 110 mg/dL Final    BUN, Bld 06/19/2019 13  8 - 23 mg/dL Final    Creatinine 06/19/2019 1.0  0.5 - 1.4 mg/dL Final    Calcium 06/19/2019 9.4  8.7 - 10.5 mg/dL Final    Total Protein 06/19/2019 7.3  6.0 - 8.4 g/dL Final    Albumin 06/19/2019 3.4* 3.5 - 5.2 g/dL Final    Total Bilirubin 06/19/2019 0.2  0.1 - 1.0 mg/dL Final    Comment: For infants and newborns, interpretation of results should be based  on gestational age, weight and in agreement with clinical  observations.  Premature Infant recommended reference ranges:  Up to 24 hours.............<8.0 mg/dL  Up to 48 hours............<12.0 mg/dL  3-5 days..................<15.0 mg/dL  6-29 days.................<15.0 mg/dL      Alkaline Phosphatase 06/19/2019 69  55 - 135 U/L Final    AST 06/19/2019 14  10 - 40 U/L Final    ALT 06/19/2019 13  10 - 44 U/L Final    Anion Gap 06/19/2019 11  8 - 16 mmol/L Final    eGFR if African American 06/19/2019 >60.0  >60 mL/min/1.73 m^2 Final    eGFR if non African American 06/19/2019 >60.0  >60 mL/min/1.73 m^2 Final    Comment: Calculation used to obtain the estimated glomerular filtration  rate (eGFR) is the CKD-EPI equation.       TSH 06/19/2019 1.169  0.400 - 4.000 uIU/mL Final    Free T4 06/19/2019 1.03  0.71 - 1.51 ng/dL Final   Lab Visit on 05/29/2019   Component Date Value Ref Range Status    WBC 05/29/2019 9.42  3.90 - 12.70 K/uL Final    RBC 05/29/2019 4.96  4.60 - 6.20 M/uL Final    Hemoglobin 05/29/2019 13.6* 14.0 - 18.0 g/dL Final    Hematocrit 05/29/2019 42.6  40.0 - 54.0 % Final    Mean Corpuscular Volume 05/29/2019 86  82 - 98 fL Final    Mean Corpuscular Hemoglobin 05/29/2019 27.4  27.0 - 31.0 pg Final    Mean Corpuscular Hemoglobin Conc 05/29/2019 31.9* 32.0 - 36.0 g/dL Final    RDW 05/29/2019 14.6* 11.5 - 14.5 % Final    Platelets 05/29/2019 352* 150 - 350 K/uL Final    MPV 05/29/2019 9.9  9.2  - 12.9 fL Final    Gran # (ANC) 05/29/2019 7.7  1.8 - 7.7 K/uL Final    Comment: The ANC is based on a white cell differential from an   automated cell counter. It has not been microscopically   reviewed for the presence of abnormal cells. Clinical   correlation is required.      Immature Grans (Abs) 05/29/2019 0.03  0.00 - 0.04 K/uL Final    Comment: Mild elevation in immature granulocytes is non specific and   can be seen in a variety of conditions including stress response,   acute inflammation, trauma and pregnancy. Correlation with other   laboratory and clinical findings is essential.      Sodium 05/29/2019 140  136 - 145 mmol/L Final    Potassium 05/29/2019 3.7  3.5 - 5.1 mmol/L Final    Chloride 05/29/2019 106  95 - 110 mmol/L Final    CO2 05/29/2019 23  23 - 29 mmol/L Final    Glucose 05/29/2019 88  70 - 110 mg/dL Final    BUN, Bld 05/29/2019 13  8 - 23 mg/dL Final    Creatinine 05/29/2019 0.9  0.5 - 1.4 mg/dL Final    Calcium 05/29/2019 9.5  8.7 - 10.5 mg/dL Final    Total Protein 05/29/2019 7.6  6.0 - 8.4 g/dL Final    Albumin 05/29/2019 3.6  3.5 - 5.2 g/dL Final    Total Bilirubin 05/29/2019 0.5  0.1 - 1.0 mg/dL Final    Comment: For infants and newborns, interpretation of results should be based  on gestational age, weight and in agreement with clinical  observations.  Premature Infant recommended reference ranges:  Up to 24 hours.............<8.0 mg/dL  Up to 48 hours............<12.0 mg/dL  3-5 days..................<15.0 mg/dL  6-29 days.................<15.0 mg/dL      Alkaline Phosphatase 05/29/2019 58  55 - 135 U/L Final    AST 05/29/2019 11  10 - 40 U/L Final    ALT 05/29/2019 9* 10 - 44 U/L Final    Anion Gap 05/29/2019 11  8 - 16 mmol/L Final    eGFR if African American 05/29/2019 >60.0  >60 mL/min/1.73 m^2 Final    eGFR if non African American 05/29/2019 >60.0  >60 mL/min/1.73 m^2 Final    Comment: Calculation used to obtain the estimated glomerular filtration  rate (eGFR)  is the CKD-EPI equation.       TSH 05/29/2019 1.376  0.400 - 4.000 uIU/mL Final    Free T4 05/29/2019 1.18  0.71 - 1.51 ng/dL Final     Supplemental Diagnosis 6/30/16  Immunohistochemical stains are completed on the Station 4L lymph node cell block material and reveal the tumor  cells to stain positively with cytokeratin 7 and TTF-1. The tumor cells show nonspecific blush staining with  cytokeratin 5/6 and are negative for p63. This staining profile supports a diagnosis of non-small cell carcinoma  consistent with adenocarcinoma.  Cell block material will be sent for EGFR, ALK and ROS-1 testing and results will follow in a supplemental report.  (Electronically Signed: 2016-06-30 11:50:31 )  Diagnosed by: Irene Amaro M.D.  FINAL PATHOLOGIC DIAGNOSIS  1. Lymph node, Station 4L, EBUS guided fine needle aspiration:  Positive for malignant cells, non-small cell carcinoma.  Rare background lymphocytes are present.  Immunohistochemical staining be completed to further characterize this malignancy and results will follow in a  supplemental report.  2. Lymph node, Station 7, EBUS guided fine needle aspiration:  Positive for malignant cells, non-small cell carcinoma tumor identical to that seen in specimen #1.  Background lymphocytes are present.    Imaging:   CT 06/17/2019  COMPARISON:  CT chest abdomen pelvis 03/27/2019, 12/26/2018.    FINDINGS:  Neck base:Unremarkable.    Chest wall/axilla:Bilateral gynecomastia.  Nonspecific subcutaneous fat haziness in the left inguinal region.    Lungs/pleura/glenn: Bilateral centrilobular emphysema.  Soft tissue infiltration/consolidation in the medial segment of the right middle lobe, measuring 3.4 x 3.7 cm (axial series 2, image 76), previously measured 3.5 x 3.7 cm.  Patchy and ground-glass opacification at the right lung base, consistent with post radiation changes.  Overall appearance is unchanged from prior.    Small right-sided pleural effusion, similar to prior.   Associated several nodular pleural thickening as well as thickening along the fissure, possibly representing malignant effusion.    Heart/pericardium/mediastinum: Heart is normal in size.  No pericardial effusion.  Coronary artery atherosclerotic calcification.    Abdominal wall: Unremarkable.    Liver: Normal in size and contour.  Multiple small hypodensities, most of which are too small to characterize, however largest are consistent with cysts.  No new lesion identified.    Gallbladder/bile ducts: Layering high density in the gallbladder, possibly representing sludge or the vicarious contrast excretion.  No intra or extrahepatic biliary ductal dilatation.    Spleen and pancreas are unremarkable.    Adrenal glands: Stable 1.2 cm left adrenal nodule.  Right adrenal is unremarkable.    Kidneys/ureters: Normal in size and location.  No hydronephrosis or ureteral dilatation.    Urinary bladder: Partially distended with smooth contour.    Reproductive: Prostate is mildly enlarged measuring 5.4 cm.    Bowel/mesentery: Moderate amount of stool burden in the colon.  No evidence of bowel obstruction or inflammation.    Peritoneal/retroperitoneum: No free intraperitoneal air or fluid.    Vasculature: 3 vessels left aortic arch.  Aortoiliac atherosclerotic calcification.  Aorta is normal course and caliber without aneurysmal dilatation.  Moderate to severe stenosis at the right common iliac artery.  Portal, splenic and superior mesenteric veins are patent.    Bones: Degenerative changes of the spine.  Sclerotic focus at T1 vertebral body and left iliac bone, similar to prior.  Lytic lesion at T2, larger than prior measuring 1.6 cm (series 2, image 15), previously measured 1.2 cm.  Noting additional smaller lucency adjacent to this lesion.  Lytic lesion at C7, similar to prior.    Fracture of the lateral left 9th rib, new from prior.    Larger lytic lesion in the right iliac crest (series 2, image 166).  New lesions in the  left acetabulum (series 2, image 189), T9 vertebral body (series 2, image 69), and T10 vertebral body (series 2, image 73).      Impression       In this patient with history of lung cancer with bone metastasis, there has been interval increase in size of a previously seen osseous metastasis at T2 as well as interval development of several new lesions in the spine and hips, as above.    Stable geographic right lower lobe volume loss and consolidation, likely representing postradiation changes.    Small right pleural effusion with associated nodular pleural thickening and thickening along the fissure, similar to prior and possibly representing malignant effusion.    New mildly displaced fracture of the lateral left 9th rib, clinical correlation with history of recent trauma.    Additional stable findings as above.    RECIST SUMMARY:    Date of prior examination for comparison: CT chest abdomen pelvis 03/27/2019    Lesion 1: Right middle lobe.  3.7 cm. Series 2 image 76.  Prior measurement 3.7 cm.    Lesion 2: T2 lytic lesion.  1.6 cm. Series 2 image 15.  Prior measurement 1.2 cm.    Lesion 3: Right iliac wing.  2.1 cm. Series 2 image 167.  Prior measurement 1.2 cm..    This report was flagged in Epic as abnormal.                Assessment:       1. Primary cancer of right middle lobe of lung    2. Secondary adenocarcinoma of bone           Plan:     Mr. Torres clinical status is relatively unchanged, however, it is noted on his scans he has progression of his disease in his bone.  It is also noted had he has a left rib fracture.  At this point we will discontinue pembrolizumab.  We have discussed options for second-line treatment including docetaxel versus a clinical trial.  I have had him screened today for lung map and he is agreeable.  He will need another biopsy and will get this set up.  He does understand that this is a clinical trial and that participation is strictly voluntary and he may withdraw consent at  any time.  He is using ibuprofen for his pain related to his rib fracture.  Will plan to restart zoledronic acid.  Follow back up with me per protocol.    Dano Fernandez DO, FACP  Hematology & Oncology  [facility] [place] [address]  ph. [phone]  Fax. [phone]    45 minutes were spent in coordination of patient's care, record review and counseling.  More than 50% of the time was face-to-face.

## 2019-06-20 DIAGNOSIS — C34.2 PRIMARY MALIGNANT NEOPLASM OF RIGHT MIDDLE LOBE OF LUNG: Primary | ICD-10-CM

## 2019-06-20 DIAGNOSIS — C34.2 PRIMARY CANCER OF RIGHT MIDDLE LOBE OF LUNG: Primary | ICD-10-CM

## 2019-06-20 DIAGNOSIS — Z00.6 EXAMINATION OF PARTICIPANT IN CLINICAL TRIAL: ICD-10-CM

## 2019-06-21 ENCOUNTER — TELEPHONE (OUTPATIENT)
Dept: INTERVENTIONAL RADIOLOGY/VASCULAR | Facility: CLINIC | Age: 64
End: 2019-06-21

## 2019-06-21 NOTE — TELEPHONE ENCOUNTER
Left message for pt to return my call.  Need to schedule Ir consult. Please forward call to [phone], TY

## 2019-06-27 ENCOUNTER — RESEARCH ENCOUNTER (OUTPATIENT)
Dept: RESEARCH | Facility: HOSPITAL | Age: 64
End: 2019-06-27

## 2019-06-27 ENCOUNTER — TELEPHONE (OUTPATIENT)
Dept: RESEARCH | Facility: HOSPITAL | Age: 64
End: 2019-06-27

## 2019-06-27 ENCOUNTER — PATIENT MESSAGE (OUTPATIENT)
Dept: HEMATOLOGY/ONCOLOGY | Facility: CLINIC | Age: 64
End: 2019-06-27

## 2019-06-27 ENCOUNTER — TELEPHONE (OUTPATIENT)
Dept: HEMATOLOGY/ONCOLOGY | Facility: CLINIC | Age: 64
End: 2019-06-27

## 2019-06-27 NOTE — TELEPHONE ENCOUNTER
"Patient missed his IR consult appointment yesterday afternoon. There was no phone call received by myself or IR clinic as to why. I called the patient regarding missed appointment. His wife stated, that "his hip was hurting and I couldn't find the number to call to cancel his appointment." I emphasized the importance of his biopsy getting done in a timely manner since the trial screening process will delay starting treatment. I informed her that, per IR clinic, the next available IR consult appointment is not until the week of July 10th.   I attempted to transfer her to Jenifer Goldman in IR to reschedule, but she requested to call back. Dr. Fernandez will be notified of the above.  "

## 2019-06-27 NOTE — PROGRESS NOTES
"Protocol: LungMAP " A Master Protocol to Evaluate Biomarker-Driven Therapies and Immunotherapies in Previously Treated Non-Small Cell Lung Cancer (Lung-MAP Screening Study)".  Protocol # LungMAP       IRB # [id number]  PI: Pratik Ferrari MD  Treating Physician: Dano Fernandez MD  Patient Initials: L,F     June 27, 2019    Withdrawal of consent    Patient and wife had questions/concerns regarding lung biopsy required for trial. Dr. Fernandez discussed with them further. Patient has decided that he does not want biopsy and he will go on standard of care treatment.   "

## 2019-06-27 NOTE — TELEPHONE ENCOUNTER
----- Message from Sp Rothman sent at 6/27/2019 11:28 AM CDT -----  Contact: Alejandra (wife)  Returning a call from Dr Fernandez, say nurse Majo told her that the doctor needs to speak with her asap.      Contact:: [phone]

## 2019-07-03 ENCOUNTER — OFFICE VISIT (OUTPATIENT)
Dept: HEMATOLOGY/ONCOLOGY | Facility: CLINIC | Age: 64
End: 2019-07-03
Payer: COMMERCIAL

## 2019-07-03 VITALS
WEIGHT: 171.5 LBS | DIASTOLIC BLOOD PRESSURE: 81 MMHG | HEIGHT: 77 IN | OXYGEN SATURATION: 96 % | RESPIRATION RATE: 14 BRPM | BODY MASS INDEX: 20.25 KG/M2 | HEART RATE: 117 BPM | SYSTOLIC BLOOD PRESSURE: 120 MMHG | TEMPERATURE: 98 F

## 2019-07-03 DIAGNOSIS — C34.2 PRIMARY CANCER OF RIGHT MIDDLE LOBE OF LUNG: Primary | ICD-10-CM

## 2019-07-03 DIAGNOSIS — C79.51 SECONDARY ADENOCARCINOMA OF BONE: ICD-10-CM

## 2019-07-03 PROCEDURE — 3008F PR BODY MASS INDEX (BMI) DOCUMENTED: ICD-10-PCS | Mod: CPTII,S$GLB,, | Performed by: INTERNAL MEDICINE

## 2019-07-03 PROCEDURE — 99214 PR OFFICE/OUTPT VISIT, EST, LEVL IV, 30-39 MIN: ICD-10-PCS | Mod: S$GLB,,, | Performed by: INTERNAL MEDICINE

## 2019-07-03 PROCEDURE — 3079F DIAST BP 80-89 MM HG: CPT | Mod: CPTII,S$GLB,, | Performed by: INTERNAL MEDICINE

## 2019-07-03 PROCEDURE — 99999 PR PBB SHADOW E&M-EST. PATIENT-LVL IV: CPT | Mod: PBBFAC,,, | Performed by: INTERNAL MEDICINE

## 2019-07-03 PROCEDURE — 3074F SYST BP LT 130 MM HG: CPT | Mod: CPTII,S$GLB,, | Performed by: INTERNAL MEDICINE

## 2019-07-03 PROCEDURE — 99214 OFFICE O/P EST MOD 30 MIN: CPT | Mod: S$GLB,,, | Performed by: INTERNAL MEDICINE

## 2019-07-03 PROCEDURE — 3079F PR MOST RECENT DIASTOLIC BLOOD PRESSURE 80-89 MM HG: ICD-10-PCS | Mod: CPTII,S$GLB,, | Performed by: INTERNAL MEDICINE

## 2019-07-03 PROCEDURE — 99999 PR PBB SHADOW E&M-EST. PATIENT-LVL IV: ICD-10-PCS | Mod: PBBFAC,,, | Performed by: INTERNAL MEDICINE

## 2019-07-03 PROCEDURE — 3074F PR MOST RECENT SYSTOLIC BLOOD PRESSURE < 130 MM HG: ICD-10-PCS | Mod: CPTII,S$GLB,, | Performed by: INTERNAL MEDICINE

## 2019-07-03 PROCEDURE — 3008F BODY MASS INDEX DOCD: CPT | Mod: CPTII,S$GLB,, | Performed by: INTERNAL MEDICINE

## 2019-07-03 RX ORDER — SODIUM CHLORIDE 0.9 % (FLUSH) 0.9 %
10 SYRINGE (ML) INJECTION
Status: CANCELLED | OUTPATIENT
Start: 2019-07-10

## 2019-07-03 RX ORDER — HEPARIN 100 UNIT/ML
500 SYRINGE INTRAVENOUS
Status: CANCELLED | OUTPATIENT
Start: 2019-07-10

## 2019-07-03 RX ORDER — DEXAMETHASONE 4 MG/1
8 TABLET ORAL EVERY 12 HOURS
Qty: 60 TABLET | Refills: 1 | Status: ON HOLD | OUTPATIENT
Start: 2019-07-03 | End: 2019-11-18 | Stop reason: HOSPADM

## 2019-07-03 NOTE — PATIENT INSTRUCTIONS
Docetaxel injection  What is this medicine?  DOCETAXEL (woodard se TAX el) is a chemotherapy drug. It targets fast dividing cells, like cancer cells, and causes these cells to die. This medicine is used to treat many types of cancers like breast cancer, certain stomach cancers, head and neck cancer, lung cancer, and prostate cancer.  How should I use this medicine?  This drug is given as an infusion into a vein. It is administered in a hospital or clinic by a specially trained health care professional.  Talk to your pediatrician regarding the use of this medicine in children. Special care may be needed.  What side effects may I notice from receiving this medicine?  Side effects that you should report to your doctor or health care professional as soon as possible:  · allergic reactions like skin rash, itching or hives, swelling of the face, lips, or tongue  · low blood counts - This drug may decrease the number of white blood cells, red blood cells and platelets. You may be at increased risk for infections and bleeding.  · signs of infection - fever or chills, cough, sore throat, pain or difficulty passing urine  · signs of decreased platelets or bleeding - bruising, pinpoint red spots on the skin, black, tarry stools, nosebleeds  · signs of decreased red blood cells - unusually weak or tired, fainting spells, lightheadedness  · breathing problems  · fast or irregular heartbeat  · low blood pressure  · mouth sores  · nausea and vomiting  · pain, swelling, redness or irritation at the injection site  · pain, tingling, numbness in the hands or feet  · swelling of the ankle, feet, hands  · weight gain  Side effects that usually do not require medical attention (report to your prescriber or health care professional if they continue or are bothersome):  · bone pain  · complete hair loss including hair on your head, underarms, pubic hair, eyebrows, and eyelashes  · diarrhea  · excessive tearing  · changes in the color of  fingernails  · loosening of the fingernails  · nausea  · muscle pain  · red flush to skin  · sweating  · weak or tired  What may interact with this medicine?  · cyclosporine  · erythromycin  · ketoconazole  · medicines to increase blood counts like filgrastim, pegfilgrastim, sargramostim  · vaccines  Talk to your doctor or health care professional before taking any of these medicines:  · acetaminophen  · aspirin  · ibuprofen  · ketoprofen  · naproxen  What if I miss a dose?  It is important not to miss your dose. Call your doctor or health care professional if you are unable to keep an appointment.  Where should I keep my medicine?  This drug is given in a hospital or clinic and will not be stored at home.  What should I tell my health care provider before I take this medicine?  They need to know if you have any of these conditions:  · infection (especially a virus infection such as chickenpox, cold sores, or herpes)  · liver disease  · low blood counts, like low white cell, platelet, or red cell counts  · an unusual or allergic reaction to docetaxel, polysorbate 80, other chemotherapy agents, other medicines, foods, dyes, or preservatives  · pregnant or trying to get pregnant  · breast-feeding  What should I watch for while using this medicine?  Your condition will be monitored carefully while you are receiving this medicine. You will need important blood work done while you are taking this medicine.  This drug may make you feel generally unwell. This is not uncommon, as chemotherapy can affect healthy cells as well as cancer cells. Report any side effects. Continue your course of treatment even though you feel ill unless your doctor tells you to stop.  In some cases, you may be given additional medicines to help with side effects. Follow all directions for their use.  Call your doctor or health care professional for advice if you get a fever, chills or sore throat, or other symptoms of a cold or flu. Do not treat  yourself. This drug decreases your body's ability to fight infections. Try to avoid being around people who are sick.  This medicine may increase your risk to bruise or bleed. Call your doctor or health care professional if you notice any unusual bleeding.  This medicine may contain alcohol in the product. You may get drowsy or dizzy. Do not drive, use machinery, or do anything that needs mental alertness until you know how this medicine affects you. Do not stand or sit up quickly, especially if you are an older patient. This reduces the risk of dizzy or fainting spells. Avoid alcoholic drinks.  Do not become pregnant while taking this medicine. Women should inform their doctor if they wish to become pregnant or think they might be pregnant. There is a potential for serious side effects to an unborn child. Talk to your health care professional or pharmacist for more information. Do not breast-feed an infant while taking this medicine.  NOTE:This sheet is a summary. It may not cover all possible information. If you have questions about this medicine, talk to your doctor, pharmacist, or health care provider. Copyright© 2017 [employer]

## 2019-07-03 NOTE — PLAN OF CARE
START ON PATHWAY REGIMEN - Non-Small Cell Lung    [id number]        Ramucirumab (Cyramza (R))       Docetaxel (Taxotere(R))           Additional Orders: Premedicate with dexamethasone 8 mg PO BID for three   days beginning 1 day prior to therapy.    **Always confirm dose/schedule in your pharmacy ordering system**    Patient Characteristics:  Stage IV Metastatic, Nonsquamous, Third Line - Chemotherapy/Immunotherapy, PS =   0, 1, Prior PD-1/PD-L1 Inhibitor or No Prior PD-1/PD-L1 Inhibitor and Not a   Candidate for Immunotherapy  AJCC T Category: T2b  Current Disease Status: Distant Metastases  AJCC N Category: N2  AJCC M Category: M1c  AJCC 8 Stage Grouping: IVB  Histology: Nonsquamous Cell  ROS1 Rearrangement Status: Negative  T790M Mutation Status: Not Applicable - EGFR Mutation Negative/Unknown  Other Mutations/Biomarkers: No Other Actionable Mutations  NTRK Gene Fusion Status: Negative  PD-L1 Expression Status: PD-L1 Negative  Chemotherapy/Immunotherapy LOT: Third Line Chemotherapy/Immunotherapy  Molecular Targeted Therapy: Not Appropriate  ALK Translocation Status: Negative  EGFR Mutation Status: Negative/Wild Type  BRAF V600E Mutation Status: Negative  Performance Status: PS = 0, 1  Immunotherapy Candidate Status: Not a Candidate for Immunotherapy  Prior Immunotherapy Status: Prior PD-1/PD-L1 Inhibitor  Intent of Therapy:  Non-Curative / Palliative Intent, Discussed with Patient

## 2019-07-03 NOTE — PROGRESS NOTES
PATIENT: Angel Torres  MRN: [id number]  DATE: 7/3/2019      Diagnosis:   1. Primary cancer of right middle lobe of lung    2. Secondary adenocarcinoma of bone        Chief Complaint: Follow-up      Oncologic History:      Oncologic History Non-small cell lung cancer diagnosed 6/2016   Recurrent disease to lung 4/2017     Oncologic Treatment Cisplatin/Pemetrexed initiated 8/15/16 with concurrent radiation completed 3 cycles 10/3/16; 64 Gy  Carboplatin/pemetrexed/pembrolizumab 7/2017 - 9/2017  Pembrolizumab maintenance 11/2017 (held 10/2018 secondary to rash)  Pembrolizumab restarted 04/2019 - 6/2019 (discontinued due to progression)    Pathology  6/2016 : Adenocarcinoma, T2b, N2, M0, Stage IIIA          Subjective:    Interval History: Mr. Torres is a 64 y.o. male who was seen in follow-up for lung cancer.  He states that his breathing is relatively unchanged.  Denies any back pain.  He does note he has continued to lose weight.  Has no other new complaints.    His history dates to approximately 4/2016 when he noted a worsening cough which was associated with clear sputum.  He had a chest x-ray in 5/16 showing a right middle lobe opacity.  Subsequent CT of the chest was performed on 5/30/16 showing a 5.6 cm lesion in the right middle lobe with hilar and mediastinal lymphadenopathy along with hepatic lesions and a left adrenal mass..  He underwent bronchoscopy which was nonrevealing and transbronchial biopsies were nondiagnostic.  He then underwent EBUS with biopsy of mediastinal lymph nodes with pathology showing non-small cell lung cancer consistent with adenocarcinoma.  He was started on concurrent chemotherapy and radiation with cisplatin and pemetrexed on 8/15/16.  He completed 64 Gy of concurrent radiation with cisplatin and pemetrexed with chemotherapy completed on 10/3/16.  Scans in 4/2017 had indicated progressive disease in the lung.  Subsequent PET scan in 7/2017 had shown spread of disease to bone.  He was  started on carboplatin and pemetrexed and pembrolizumab in 7/2017.  Repeat imaging in 8/2017 had shown stability of disease.  He was started on pembrolizumab maintenance in 11/2017 after a delay due to his insurance.  Repeat imaging in 12/2016 had shown mild progressive disease, however, it was felt that due to the excessive delay in treatment we would proceed on rather than change therapy at this time.  CT in 3/2018 had shown stability of his disease.  CT in 05/2018 had shown stable disease.  CT in 08/2018 had shown stable disease.  Pembrolizumab was held in 10/2018 secondary to a rash.  Scans in 04/2019 had shown progressive disease in bone.  Pembrolizumab was restarted in 04/2019 but this was discontinued in 06/2019 secondary to progression.    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    Skin infection 8/8/2018       Past Surgical HIstory:   Past Surgical History:   Procedure Laterality Date    BRONCHOSCOPY N/A 6/28/2016    Performed by [facility] Wallace [facility] at [facility] OR [address] [place] [address]    BRONCHOSCOPY N/A 6/13/2016    Performed by [facility] Provider at [facility] OR [address] [place] [address]    ENDOBRONCHIAL ULTRASOUND (EBUS) N/A 6/28/2016    Performed by Maribel Wallace MD at [facility] OR [address] [place] [address]    EXCISION, MASS, LOWER EXTREMITY Left 4/21/2014    Performed by Ryne Dai MD at [facility] OR [address] [place] [address]    INGUINAL HERNIA REPAIR Bilateral     KNEE SURGERY      REPAIR, HERNIA, INGUINAL, BILATERAL, LAPAROSCOPIC Bilateral 4/21/2014    Performed by Ryne Dai MD at [facility] OR [address] [place] [address]       Family History:   Family History   Problem Relation Age of Onset    Cancer Mother          lung, breast    Cancer Sister         lung    Cancer Maternal Grandfather     No Known Problems Father     No Known Problems Brother     No Known Problems Maternal Aunt     No Known Problems Maternal Uncle     No Known Problems Paternal Aunt     No Known Problems Paternal Uncle     No Known Problems Maternal Grandmother     No Known Problems Paternal Grandmother     No Known Problems Paternal Grandfather     Amblyopia Neg Hx     Blindness Neg Hx     Cataracts Neg Hx     Diabetes Neg Hx     Glaucoma Neg Hx     Hypertension Neg Hx     Macular degeneration Neg Hx     Retinal detachment Neg Hx     Strabismus Neg Hx     Stroke Neg Hx     Thyroid disease Neg Hx        Social History:  reports that he quit smoking about 5 years ago. He has a 80.00 pack-year smoking history. He has quit using smokeless tobacco. He reports that he does not drink alcohol or use drugs.    Allergies:  Review of patient's allergies indicates:  No Known Allergies    Medications:  Current Outpatient Medications   Medication Sig Dispense Refill    albuterol (PROAIR HFA) 90 mcg/actuation inhaler INHALE 2 PUFFS INTO THE LUNGS EVERY 6 HOURS AS NEEDED FOR WHEEZING 17 g 0    albuterol-ipratropium (DUO-NEB) 2.5 mg-0.5 mg/3 mL nebulizer solution USE 1 VIAL VIA NEBULIZER EVERY 6 HOURS AS NEEDED FOR WHEEZING OR SHORTNESS OF BREATH 270 mL 0    amLODIPine (NORVASC) 10 MG tablet Take 1 tablet (10 mg total) by mouth once daily. 90 tablet 3    cyproheptadine (PERIACTIN) 4 mg tablet Take 1 tablet (4 mg total) by mouth 3 (three) times daily as needed. 90 tablet 1    dronabinol (MARINOL) 5 MG capsule Take 1 capsule (5 mg total) by mouth 2 (two) times daily before meals. 60 capsule 0    fluticasone (FLONASE) 50 mcg/actuation nasal spray SHAKE LIQUID AND USE 2 SPRAYS IN EACH NOSTRIL EVERY DAY 16 g 1    mupirocin (BACTROBAN) 2 % ointment Apply topically 2 (two) times daily. To open areas 22 g 2    naproxen (NAPROSYN) 500 MG tablet  "Take 1 tablet (500 mg total) by mouth 2 (two) times daily as needed (headache). 60 tablet 0    triamcinolone acetonide 0.1% (KENALOG) 0.1 % ointment To all rashes BID 80 g 2    cetirizine (ZYRTEC) 10 MG tablet Take 1 tablet (10 mg total) by mouth once daily.  0     No current facility-administered medications for this visit.        Review of Systems   Constitutional: Positive for unexpected weight change. Negative for activity change, appetite change, chills, fatigue and fever.        Normal weight 220   HENT: Negative for dental problem, nosebleeds, sinus pressure, sneezing and trouble swallowing.    Eyes: Negative for visual disturbance.   Respiratory: Positive for cough, shortness of breath and wheezing. Negative for choking and chest tightness.    Cardiovascular: Negative for chest pain and leg swelling.   Gastrointestinal: Negative for abdominal pain, blood in stool, constipation, diarrhea and nausea.   Genitourinary: Negative for difficulty urinating and dysuria.   Musculoskeletal: Positive for myalgias. Negative for arthralgias and back pain.   Skin: Negative for rash and wound.   Neurological: Negative for dizziness, light-headedness and headaches.   Hematological: Negative for adenopathy. Does not bruise/bleed easily.   Psychiatric/Behavioral: Negative for sleep disturbance. The patient is not nervous/anxious.        ECOG Performance Status: 1   Objective:      Vitals:   Vitals:    07/03/19 0902   BP: 120/81   BP Location: Left arm   Patient Position: Sitting   BP Method: Large (Automatic)   Pulse: (!) 117   Resp: 14   Temp: 97.7 °F (36.5 °C)   TempSrc: Oral   SpO2: 96%   Weight: 77.8 kg (171 lb 8.3 oz)   Height: 6' 5" (1.956 m)     BMI: Body mass index is 20.34 kg/m².    Physical Exam   Constitutional: He is oriented to person, place, and time. He appears well-developed and well-nourished.   HENT:   Head: Normocephalic and atraumatic.   Eyes: Pupils are equal, round, and reactive to light.   Neck: Normal " range of motion. Neck supple.   Cardiovascular: Normal rate and regular rhythm.   Pulmonary/Chest: Effort normal. No respiratory distress. He has no rales.   Abdominal: Soft. He exhibits no distension. There is no tenderness.   Musculoskeletal: He exhibits no edema or tenderness.   Lymphadenopathy:     He has no cervical adenopathy.   Neurological: He is alert and oriented to person, place, and time. No cranial nerve deficit.   Skin: Skin is warm and dry. No rash noted.   Psychiatric: He has a normal mood and affect. His behavior is normal.       Laboratory Data:  Lab Visit on 06/19/2019   Component Date Value Ref Range Status    WBC 06/19/2019 9.39  3.90 - 12.70 K/uL Final    RBC 06/19/2019 4.94  4.60 - 6.20 M/uL Final    Hemoglobin 06/19/2019 13.6* 14.0 - 18.0 g/dL Final    Hematocrit 06/19/2019 43.6  40.0 - 54.0 % Final    Mean Corpuscular Volume 06/19/2019 88  82 - 98 fL Final    Mean Corpuscular Hemoglobin 06/19/2019 27.5  27.0 - 31.0 pg Final    Mean Corpuscular Hemoglobin Conc 06/19/2019 31.2* 32.0 - 36.0 g/dL Final    RDW 06/19/2019 14.6* 11.5 - 14.5 % Final    Platelets 06/19/2019 398* 150 - 350 K/uL Final    MPV 06/19/2019 9.9  9.2 - 12.9 fL Final    Gran # (ANC) 06/19/2019 7.3  1.8 - 7.7 K/uL Final    Comment: The ANC is based on a white cell differential from an   automated cell counter. It has not been microscopically   reviewed for the presence of abnormal cells. Clinical   correlation is required.      Immature Grans (Abs) 06/19/2019 0.03  0.00 - 0.04 K/uL Final    Comment: Mild elevation in immature granulocytes is non specific and   can be seen in a variety of conditions including stress response,   acute inflammation, trauma and pregnancy. Correlation with other   laboratory and clinical findings is essential.      Sodium 06/19/2019 143  136 - 145 mmol/L Final    Potassium 06/19/2019 4.2  3.5 - 5.1 mmol/L Final    Chloride 06/19/2019 107  95 - 110 mmol/L Final    CO2 06/19/2019 25   23 - 29 mmol/L Final    Glucose 06/19/2019 98  70 - 110 mg/dL Final    BUN, Bld 06/19/2019 13  8 - 23 mg/dL Final    Creatinine 06/19/2019 1.0  0.5 - 1.4 mg/dL Final    Calcium 06/19/2019 9.4  8.7 - 10.5 mg/dL Final    Total Protein 06/19/2019 7.3  6.0 - 8.4 g/dL Final    Albumin 06/19/2019 3.4* 3.5 - 5.2 g/dL Final    Total Bilirubin 06/19/2019 0.2  0.1 - 1.0 mg/dL Final    Comment: For infants and newborns, interpretation of results should be based  on gestational age, weight and in agreement with clinical  observations.  Premature Infant recommended reference ranges:  Up to 24 hours.............<8.0 mg/dL  Up to 48 hours............<12.0 mg/dL  3-5 days..................<15.0 mg/dL  6-29 days.................<15.0 mg/dL      Alkaline Phosphatase 06/19/2019 69  55 - 135 U/L Final    AST 06/19/2019 14  10 - 40 U/L Final    ALT 06/19/2019 13  10 - 44 U/L Final    Anion Gap 06/19/2019 11  8 - 16 mmol/L Final    eGFR if African American 06/19/2019 >60.0  >60 mL/min/1.73 m^2 Final    eGFR if non African American 06/19/2019 >60.0  >60 mL/min/1.73 m^2 Final    Comment: Calculation used to obtain the estimated glomerular filtration  rate (eGFR) is the CKD-EPI equation.       TSH 06/19/2019 1.169  0.400 - 4.000 uIU/mL Final    Free T4 06/19/2019 1.03  0.71 - 1.51 ng/dL Final     Supplemental Diagnosis 6/30/16  Immunohistochemical stains are completed on the Station 4L lymph node cell block material and reveal the tumor  cells to stain positively with cytokeratin 7 and TTF-1. The tumor cells show nonspecific blush staining with  cytokeratin 5/6 and are negative for p63. This staining profile supports a diagnosis of non-small cell carcinoma  consistent with adenocarcinoma.  Cell block material will be sent for EGFR, ALK and ROS-1 testing and results will follow in a supplemental report.  (Electronically Signed: 2016-06-30 11:50:31 )  Diagnosed by: Irene Amaro M.D.  FINAL PATHOLOGIC DIAGNOSIS  1. Lymph  node, Station 4L, EBUS guided fine needle aspiration:  Positive for malignant cells, non-small cell carcinoma.  Rare background lymphocytes are present.  Immunohistochemical staining be completed to further characterize this malignancy and results will follow in a  supplemental report.  2. Lymph node, Station 7, EBUS guided fine needle aspiration:  Positive for malignant cells, non-small cell carcinoma tumor identical to that seen in specimen #1.  Background lymphocytes are present.    Imaging:   CT 06/17/2019  COMPARISON:  CT chest abdomen pelvis 03/27/2019, 12/26/2018.    FINDINGS:  Neck base:Unremarkable.    Chest wall/axilla:Bilateral gynecomastia.  Nonspecific subcutaneous fat haziness in the left inguinal region.    Lungs/pleura/glenn: Bilateral centrilobular emphysema.  Soft tissue infiltration/consolidation in the medial segment of the right middle lobe, measuring 3.4 x 3.7 cm (axial series 2, image 76), previously measured 3.5 x 3.7 cm.  Patchy and ground-glass opacification at the right lung base, consistent with post radiation changes.  Overall appearance is unchanged from prior.    Small right-sided pleural effusion, similar to prior.  Associated several nodular pleural thickening as well as thickening along the fissure, possibly representing malignant effusion.    Heart/pericardium/mediastinum: Heart is normal in size.  No pericardial effusion.  Coronary artery atherosclerotic calcification.    Abdominal wall: Unremarkable.    Liver: Normal in size and contour.  Multiple small hypodensities, most of which are too small to characterize, however largest are consistent with cysts.  No new lesion identified.    Gallbladder/bile ducts: Layering high density in the gallbladder, possibly representing sludge or the vicarious contrast excretion.  No intra or extrahepatic biliary ductal dilatation.    Spleen and pancreas are unremarkable.    Adrenal glands: Stable 1.2 cm left adrenal nodule.  Right adrenal is  unremarkable.    Kidneys/ureters: Normal in size and location.  No hydronephrosis or ureteral dilatation.    Urinary bladder: Partially distended with smooth contour.    Reproductive: Prostate is mildly enlarged measuring 5.4 cm.    Bowel/mesentery: Moderate amount of stool burden in the colon.  No evidence of bowel obstruction or inflammation.    Peritoneal/retroperitoneum: No free intraperitoneal air or fluid.    Vasculature: 3 vessels left aortic arch.  Aortoiliac atherosclerotic calcification.  Aorta is normal course and caliber without aneurysmal dilatation.  Moderate to severe stenosis at the right common iliac artery.  Portal, splenic and superior mesenteric veins are patent.    Bones: Degenerative changes of the spine.  Sclerotic focus at T1 vertebral body and left iliac bone, similar to prior.  Lytic lesion at T2, larger than prior measuring 1.6 cm (series 2, image 15), previously measured 1.2 cm.  Noting additional smaller lucency adjacent to this lesion.  Lytic lesion at C7, similar to prior.    Fracture of the lateral left 9th rib, new from prior.    Larger lytic lesion in the right iliac crest (series 2, image 166).  New lesions in the left acetabulum (series 2, image 189), T9 vertebral body (series 2, image 69), and T10 vertebral body (series 2, image 73).      Impression       In this patient with history of lung cancer with bone metastasis, there has been interval increase in size of a previously seen osseous metastasis at T2 as well as interval development of several new lesions in the spine and hips, as above.    Stable geographic right lower lobe volume loss and consolidation, likely representing postradiation changes.    Small right pleural effusion with associated nodular pleural thickening and thickening along the fissure, similar to prior and possibly representing malignant effusion.    New mildly displaced fracture of the lateral left 9th rib, clinical correlation with history of recent  trauma.    Additional stable findings as above.    RECIST SUMMARY:    Date of prior examination for comparison: CT chest abdomen pelvis 03/27/2019    Lesion 1: Right middle lobe.  3.7 cm. Series 2 image 76.  Prior measurement 3.7 cm.    Lesion 2: T2 lytic lesion.  1.6 cm. Series 2 image 15.  Prior measurement 1.2 cm.    Lesion 3: Right iliac wing.  2.1 cm. Series 2 image 167.  Prior measurement 1.2 cm..    This report was flagged in Epic as abnormal.                Assessment:       1. Primary cancer of right middle lobe of lung    2. Secondary adenocarcinoma of bone           Plan:     I have discussed options for treatment with Mr. Torres and his sister today.  We had previously considered him for clinical trial and he was initially agreeable, however, he did not want to undergo biopsy.  The next standard of care option for him would be docetaxel and ramicirumab.  I have discussed the rationale, alternatives and potential side effects of this treatment with him.  I have given him written information on this medication.  He is agreeable to proceed and has signed informed consent.  Plan to start treatment next week if possible.  I will see him back prior to cycle 2.  Multiple questions were answered and he is agreeable with this plan.    Dano Fernandez DO, FACP  Hematology & Oncology  [facility] [place] [address]  ph. [phone]  Fax. [phone]    45 minutes were spent in coordination of patient's care, record review and counseling.  More than 50% of the time was face-to-face.

## 2019-07-16 ENCOUNTER — TELEPHONE (OUTPATIENT)
Dept: HEMATOLOGY/ONCOLOGY | Facility: CLINIC | Age: 64
End: 2019-07-16

## 2019-07-16 DIAGNOSIS — R11.0 CHEMOTHERAPY-INDUCED NAUSEA: Primary | ICD-10-CM

## 2019-07-16 DIAGNOSIS — T45.1X5A CHEMOTHERAPY-INDUCED NAUSEA: Primary | ICD-10-CM

## 2019-07-16 NOTE — TELEPHONE ENCOUNTER
----- Message from Edel Fay sent at 7/16/2019 10:36 AM CDT -----  Contact: Pt wife tino   Pt wife tino called to speak with nurse Lauren have some questions   Callback#[phone]  Thank You  ABRAHAN Fay

## 2019-07-16 NOTE — TELEPHONE ENCOUNTER
Called patient have approval on the infusion. Left message to call the clinic about appt scheduled for Friday. Number was provided.        ----- Message from Luanne Cohen sent at 7/3/2019  2:16 PM CDT -----  Regarding: pending chemo 7/3  Dano Fernandez DO, FACP  Luanne Cohen     Set up docetaxel/ramicirumab     CBC, CMP, Mag and visit  prior to cycle 2.      Cycles are 3 weeks.

## 2019-07-17 ENCOUNTER — INFUSION (OUTPATIENT)
Dept: INFUSION THERAPY | Facility: HOSPITAL | Age: 64
End: 2019-07-17
Attending: INTERNAL MEDICINE
Payer: COMMERCIAL

## 2019-07-17 VITALS
DIASTOLIC BLOOD PRESSURE: 77 MMHG | SYSTOLIC BLOOD PRESSURE: 132 MMHG | HEIGHT: 77 IN | OXYGEN SATURATION: 96 % | WEIGHT: 171.5 LBS | TEMPERATURE: 98 F | BODY MASS INDEX: 20.25 KG/M2 | HEART RATE: 61 BPM | RESPIRATION RATE: 18 BRPM

## 2019-07-17 DIAGNOSIS — C79.51 SECONDARY ADENOCARCINOMA OF BONE: Primary | ICD-10-CM

## 2019-07-17 DIAGNOSIS — R59.0 MEDIASTINAL ADENOPATHY: ICD-10-CM

## 2019-07-17 DIAGNOSIS — C34.2 PRIMARY CANCER OF RIGHT MIDDLE LOBE OF LUNG: ICD-10-CM

## 2019-07-17 PROCEDURE — 96367 TX/PROPH/DG ADDL SEQ IV INF: CPT

## 2019-07-17 PROCEDURE — 96417 CHEMO IV INFUS EACH ADDL SEQ: CPT

## 2019-07-17 PROCEDURE — 63600175 PHARM REV CODE 636 W HCPCS: Mod: JG | Performed by: INTERNAL MEDICINE

## 2019-07-17 PROCEDURE — 25000003 PHARM REV CODE 250: Performed by: INTERNAL MEDICINE

## 2019-07-17 PROCEDURE — 96413 CHEMO IV INFUSION 1 HR: CPT

## 2019-07-17 RX ORDER — HEPARIN 100 UNIT/ML
500 SYRINGE INTRAVENOUS
Status: DISCONTINUED | OUTPATIENT
Start: 2019-07-17 | End: 2019-07-17 | Stop reason: HOSPADM

## 2019-07-17 RX ORDER — SODIUM CHLORIDE 0.9 % (FLUSH) 0.9 %
10 SYRINGE (ML) INJECTION
Status: DISCONTINUED | OUTPATIENT
Start: 2019-07-17 | End: 2019-07-17 | Stop reason: HOSPADM

## 2019-07-17 RX ORDER — ONDANSETRON 8 MG/1
8 TABLET, ORALLY DISINTEGRATING ORAL EVERY 12 HOURS PRN
Qty: 30 TABLET | Refills: 3 | Status: SHIPPED | OUTPATIENT
Start: 2019-07-17

## 2019-07-17 RX ADMIN — DEXAMETHASONE SODIUM PHOSPHATE 20 MG: 4 INJECTION INTRA-ARTICULAR; INTRALESIONAL; INTRAMUSCULAR; INTRAVENOUS; SOFT TISSUE at 01:07

## 2019-07-17 RX ADMIN — DIPHENHYDRAMINE HYDROCHLORIDE 50 MG: 50 INJECTION, SOLUTION INTRAMUSCULAR; INTRAVENOUS at 02:07

## 2019-07-17 RX ADMIN — DOCETAXEL 155 MG: 10 INJECTION, SOLUTION INTRAVENOUS at 03:07

## 2019-07-17 RX ADMIN — SODIUM CHLORIDE 778 MG: 0.9 INJECTION, SOLUTION INTRAVENOUS at 02:07

## 2019-07-17 NOTE — PLAN OF CARE
Problem: Adult Inpatient Plan of Care  Goal: Plan of Care Review  Outcome: Ongoing (interventions implemented as appropriate)  1659-Patient tolerated treatment well. Discharged without complaints or S/S of adverse event. AVS given.  Instructed to call provider for any questions or concerns.

## 2019-08-01 ENCOUNTER — TELEPHONE (OUTPATIENT)
Dept: NEUROLOGY | Facility: HOSPITAL | Age: 64
End: 2019-08-01

## 2019-08-01 NOTE — TELEPHONE ENCOUNTER
----- Message from Edel Fay sent at 8/1/2019 11:32 AM CDT -----  Contact: Pt wife tino   Pt wife tino called to speak with nurse regino have some questions   Callback#[phone]  Thank You  ABRAHAN Fay

## 2019-08-07 ENCOUNTER — LAB VISIT (OUTPATIENT)
Dept: LAB | Facility: HOSPITAL | Age: 64
End: 2019-08-07
Attending: INTERNAL MEDICINE
Payer: COMMERCIAL

## 2019-08-07 ENCOUNTER — OFFICE VISIT (OUTPATIENT)
Dept: HEMATOLOGY/ONCOLOGY | Facility: CLINIC | Age: 64
End: 2019-08-07
Payer: MEDICAID

## 2019-08-07 ENCOUNTER — INFUSION (OUTPATIENT)
Dept: INFUSION THERAPY | Facility: HOSPITAL | Age: 64
End: 2019-08-07
Attending: INTERNAL MEDICINE
Payer: COMMERCIAL

## 2019-08-07 ENCOUNTER — TELEPHONE (OUTPATIENT)
Dept: HEMATOLOGY/ONCOLOGY | Facility: CLINIC | Age: 64
End: 2019-08-07

## 2019-08-07 VITALS
DIASTOLIC BLOOD PRESSURE: 82 MMHG | OXYGEN SATURATION: 97 % | HEIGHT: 76 IN | SYSTOLIC BLOOD PRESSURE: 125 MMHG | HEART RATE: 115 BPM | RESPIRATION RATE: 16 BRPM | TEMPERATURE: 97 F | WEIGHT: 174.19 LBS | BODY MASS INDEX: 21.21 KG/M2

## 2019-08-07 VITALS — DIASTOLIC BLOOD PRESSURE: 86 MMHG | RESPIRATION RATE: 18 BRPM | HEART RATE: 111 BPM | SYSTOLIC BLOOD PRESSURE: 145 MMHG

## 2019-08-07 DIAGNOSIS — C34.2 PRIMARY CANCER OF RIGHT MIDDLE LOBE OF LUNG: ICD-10-CM

## 2019-08-07 DIAGNOSIS — C79.51 SECONDARY ADENOCARCINOMA OF BONE: ICD-10-CM

## 2019-08-07 DIAGNOSIS — J44.9 CHRONIC OBSTRUCTIVE PULMONARY DISEASE, UNSPECIFIED COPD TYPE: ICD-10-CM

## 2019-08-07 DIAGNOSIS — R53.0 NEOPLASTIC MALIGNANT RELATED FATIGUE: ICD-10-CM

## 2019-08-07 DIAGNOSIS — D49.89 NEOPLASM OF ABDOMEN: ICD-10-CM

## 2019-08-07 DIAGNOSIS — R59.0 MEDIASTINAL ADENOPATHY: ICD-10-CM

## 2019-08-07 DIAGNOSIS — C34.2 PRIMARY CANCER OF RIGHT MIDDLE LOBE OF LUNG: Primary | ICD-10-CM

## 2019-08-07 DIAGNOSIS — C79.51 SECONDARY ADENOCARCINOMA OF BONE: Primary | ICD-10-CM

## 2019-08-07 LAB
ALBUMIN SERPL BCP-MCNC: 3.2 G/DL (ref 3.5–5.2)
ALP SERPL-CCNC: 74 U/L (ref 55–135)
ALT SERPL W/O P-5'-P-CCNC: 24 U/L (ref 10–44)
ANION GAP SERPL CALC-SCNC: 8 MMOL/L (ref 8–16)
AST SERPL-CCNC: 21 U/L (ref 10–40)
BILIRUB SERPL-MCNC: 0.3 MG/DL (ref 0.1–1)
BUN SERPL-MCNC: 10 MG/DL (ref 8–23)
CALCIUM SERPL-MCNC: 9.1 MG/DL (ref 8.7–10.5)
CHLORIDE SERPL-SCNC: 107 MMOL/L (ref 95–110)
CO2 SERPL-SCNC: 26 MMOL/L (ref 23–29)
CREAT SERPL-MCNC: 1 MG/DL (ref 0.5–1.4)
ERYTHROCYTE [DISTWIDTH] IN BLOOD BY AUTOMATED COUNT: 15.5 % (ref 11.5–14.5)
EST. GFR  (AFRICAN AMERICAN): >60 ML/MIN/1.73 M^2
EST. GFR  (NON AFRICAN AMERICAN): >60 ML/MIN/1.73 M^2
GLUCOSE SERPL-MCNC: 92 MG/DL (ref 70–110)
HCT VFR BLD AUTO: 42.6 % (ref 40–54)
HGB BLD-MCNC: 12.9 G/DL (ref 14–18)
IMM GRANULOCYTES # BLD AUTO: 0.07 K/UL (ref 0–0.04)
MCH RBC QN AUTO: 26.9 PG (ref 27–31)
MCHC RBC AUTO-ENTMCNC: 30.3 G/DL (ref 32–36)
MCV RBC AUTO: 89 FL (ref 82–98)
NEUTROPHILS # BLD AUTO: 6.8 K/UL (ref 1.8–7.7)
PLATELET # BLD AUTO: 519 K/UL (ref 150–350)
PMV BLD AUTO: 9.6 FL (ref 9.2–12.9)
POTASSIUM SERPL-SCNC: 4.1 MMOL/L (ref 3.5–5.1)
PROT SERPL-MCNC: 7 G/DL (ref 6–8.4)
RBC # BLD AUTO: 4.79 M/UL (ref 4.6–6.2)
SODIUM SERPL-SCNC: 141 MMOL/L (ref 136–145)
T4 FREE SERPL-MCNC: 1.03 NG/DL (ref 0.71–1.51)
TSH SERPL DL<=0.005 MIU/L-ACNC: 1.64 UIU/ML (ref 0.4–4)
WBC # BLD AUTO: 8.95 K/UL (ref 3.9–12.7)

## 2019-08-07 PROCEDURE — 99999 PR PBB SHADOW E&M-EST. PATIENT-LVL IV: CPT | Mod: PBBFAC,,, | Performed by: INTERNAL MEDICINE

## 2019-08-07 PROCEDURE — 99214 OFFICE O/P EST MOD 30 MIN: CPT | Mod: S$PBB,,, | Performed by: INTERNAL MEDICINE

## 2019-08-07 PROCEDURE — 96417 CHEMO IV INFUS EACH ADDL SEQ: CPT

## 2019-08-07 PROCEDURE — 96367 TX/PROPH/DG ADDL SEQ IV INF: CPT

## 2019-08-07 PROCEDURE — 80053 COMPREHEN METABOLIC PANEL: CPT

## 2019-08-07 PROCEDURE — 84443 ASSAY THYROID STIM HORMONE: CPT

## 2019-08-07 PROCEDURE — 99214 OFFICE O/P EST MOD 30 MIN: CPT | Mod: PBBFAC,25 | Performed by: INTERNAL MEDICINE

## 2019-08-07 PROCEDURE — 96413 CHEMO IV INFUSION 1 HR: CPT

## 2019-08-07 PROCEDURE — 84439 ASSAY OF FREE THYROXINE: CPT

## 2019-08-07 PROCEDURE — 36415 COLL VENOUS BLD VENIPUNCTURE: CPT

## 2019-08-07 PROCEDURE — 99999 PR PBB SHADOW E&M-EST. PATIENT-LVL IV: ICD-10-PCS | Mod: PBBFAC,,, | Performed by: INTERNAL MEDICINE

## 2019-08-07 PROCEDURE — 99214 PR OFFICE/OUTPT VISIT, EST, LEVL IV, 30-39 MIN: ICD-10-PCS | Mod: S$PBB,,, | Performed by: INTERNAL MEDICINE

## 2019-08-07 PROCEDURE — 63600175 PHARM REV CODE 636 W HCPCS: Performed by: INTERNAL MEDICINE

## 2019-08-07 PROCEDURE — 85027 COMPLETE CBC AUTOMATED: CPT

## 2019-08-07 RX ORDER — IPRATROPIUM BROMIDE AND ALBUTEROL SULFATE 2.5; .5 MG/3ML; MG/3ML
SOLUTION RESPIRATORY (INHALATION)
Qty: 270 ML | Refills: 0 | Status: SHIPPED | OUTPATIENT
Start: 2019-08-07 | End: 2019-10-16 | Stop reason: SDUPTHER

## 2019-08-07 RX ORDER — SODIUM CHLORIDE 0.9 % (FLUSH) 0.9 %
10 SYRINGE (ML) INJECTION
Status: CANCELLED | OUTPATIENT
Start: 2019-08-07

## 2019-08-07 RX ORDER — SODIUM CHLORIDE 0.9 % (FLUSH) 0.9 %
10 SYRINGE (ML) INJECTION
Status: DISCONTINUED | OUTPATIENT
Start: 2019-08-07 | End: 2019-08-07 | Stop reason: HOSPADM

## 2019-08-07 RX ORDER — ALBUTEROL SULFATE 90 UG/1
AEROSOL, METERED RESPIRATORY (INHALATION)
Qty: 17 G | Refills: 0 | Status: ON HOLD | OUTPATIENT
Start: 2019-08-07 | End: 2019-11-18 | Stop reason: HOSPADM

## 2019-08-07 RX ORDER — HEPARIN 100 UNIT/ML
500 SYRINGE INTRAVENOUS
Status: CANCELLED | OUTPATIENT
Start: 2019-08-07

## 2019-08-07 RX ORDER — HEPARIN 100 UNIT/ML
500 SYRINGE INTRAVENOUS
Status: DISCONTINUED | OUTPATIENT
Start: 2019-08-07 | End: 2019-08-07 | Stop reason: HOSPADM

## 2019-08-07 RX ADMIN — DIPHENHYDRAMINE HYDROCHLORIDE 50 MG: 50 INJECTION, SOLUTION INTRAMUSCULAR; INTRAVENOUS at 01:08

## 2019-08-07 RX ADMIN — DOCETAXEL 125 MG: 10 INJECTION, SOLUTION INTRAVENOUS at 03:08

## 2019-08-07 RX ADMIN — DEXAMETHASONE SODIUM PHOSPHATE 20 MG: 4 INJECTION, SOLUTION INTRA-ARTICULAR; INTRALESIONAL; INTRAMUSCULAR; INTRAVENOUS; SOFT TISSUE at 01:08

## 2019-08-07 RX ADMIN — SODIUM CHLORIDE 778 MG: 9 INJECTION, SOLUTION INTRAVENOUS at 02:08

## 2019-08-07 NOTE — TELEPHONE ENCOUNTER
"----- Message from Alicia Kleinn sent at 8/7/2019 12:27 PM CDT -----  Reason for Call:  Pharmacy calling to clarify a prescription directions.  Provider: MICHAEL Fernandez MD   Name of caller: Rocío (Pharamist)   Pharmacy name and phone number   [id number] [address] AT [facility] [address] do they need to clarify:  - albuterol-ipratropium (DUO-NEB) 2.5 mg-0.5 mg/3 mL nebulizer solution  The directions are missing from the prescription.  Contact Preference:   [phone]    Additional Information:  "Thank you for all that you do for our patients'"    "

## 2019-08-07 NOTE — PROGRESS NOTES
PATIENT: Angel Torres  MRN: [id number]  DATE: 8/7/2019      Diagnosis:   1. Primary cancer of right middle lobe of lung    2. Secondary adenocarcinoma of bone    3. Chronic obstructive pulmonary disease, unspecified COPD type        Chief Complaint: Follow-up      Oncologic History:      Oncologic History Non-small cell lung cancer diagnosed 6/2016   Recurrent disease to lung 4/2017     Oncologic Treatment Cisplatin/Pemetrexed initiated 8/15/16 with concurrent radiation completed 3 cycles 10/3/16; 64 Gy  Carboplatin/pemetrexed/pembrolizumab 7/2017 - 9/2017  Pembrolizumab maintenance 11/2017 (held 10/2018 secondary to rash)  Pembrolizumab restarted 04/2019 - 6/2019 (discontinued due to progression)  Docetaxel/Ramicirumab 7/2019    Pathology  6/2016 : Adenocarcinoma, T2b, N2, M0, Stage IIIA          Subjective:    Interval History: Mr. Torres is a 64 y.o. male who was seen in follow-up for lung cancer.  Since I had seen him last he started on treatment with docetaxel/ramicirumab.  He states he did not tolerate this treatment well and was severely fatigued as decreased activity level.  He was able to work for few days but then symptoms became too severe in took several days to recover from.  His breathing is relatively unchanged but still has some ongoing shortness of breath.  He has no other new complaints.    His history dates to approximately 4/2016 when he noted a worsening cough which was associated with clear sputum.  He had a chest x-ray in 5/16 showing a right middle lobe opacity.  Subsequent CT of the chest was performed on 5/30/16 showing a 5.6 cm lesion in the right middle lobe with hilar and mediastinal lymphadenopathy along with hepatic lesions and a left adrenal mass..  He underwent bronchoscopy which was nonrevealing and transbronchial biopsies were nondiagnostic.  He then underwent EBUS with biopsy of mediastinal lymph nodes with pathology showing non-small cell lung cancer consistent with adenocarcinoma.   He was started on concurrent chemotherapy and radiation with cisplatin and pemetrexed on 8/15/16.  He completed 64 Gy of concurrent radiation with cisplatin and pemetrexed with chemotherapy completed on 10/3/16.  Scans in 4/2017 had indicated progressive disease in the lung.  Subsequent PET scan in 7/2017 had shown spread of disease to bone.  He was started on carboplatin and pemetrexed and pembrolizumab in 7/2017.  Repeat imaging in 8/2017 had shown stability of disease.  He was started on pembrolizumab maintenance in 11/2017 after a delay due to his insurance.  Repeat imaging in 12/2016 had shown mild progressive disease, however, it was felt that due to the excessive delay in treatment we would proceed on rather than change therapy at this time.  CT in 3/2018 had shown stability of his disease.  CT in 05/2018 had shown stable disease.  CT in 08/2018 had shown stable disease.  Pembrolizumab was held in 10/2018 secondary to a rash.  Scans in 04/2019 had shown progressive disease in bone.  Pembrolizumab was restarted in 04/2019 but this was discontinued in 06/2019 secondary to progression.  He was started on treatment with docetaxel and ramicirumab in 07/2019.    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    Skin infection 8/8/2018       Past Surgical HIstory:   Past Surgical History:   Procedure Laterality Date    BRONCHOSCOPY N/A 6/28/2016    Performed by Maribel Wallace MD at [facility] OR 2ND FLR    BRONCHOSCOPY N/A 6/13/2016    Performed by [facility] Diagnostic Provider at [facility] OR 2ND FLR    ENDOBRONCHIAL ULTRASOUND (EBUS) N/A 6/28/2016    Performed by Maribel  TORREY Wallace MD at [facility] OR 2ND FLR    EXCISION, MASS, LOWER EXTREMITY Left 4/21/2014    Performed by Ryne Dai MD at [facility] OR 2ND FLR    INGUINAL HERNIA REPAIR Bilateral     KNEE SURGERY      REPAIR, HERNIA, INGUINAL, BILATERAL, LAPAROSCOPIC Bilateral 4/21/2014    Performed by Ryne Dai MD at [facility] OR 2ND FLR       Family History:   Family History   Problem Relation Age of Onset    Cancer Mother         lung, breast    Cancer Sister         lung    Cancer Maternal Grandfather     No Known Problems Father     No Known Problems Brother     No Known Problems Maternal Aunt     No Known Problems Maternal Uncle     No Known Problems Paternal Aunt     No Known Problems Paternal Uncle     No Known Problems Maternal Grandmother     No Known Problems Paternal Grandmother     No Known Problems Paternal Grandfather     Amblyopia Neg Hx     Blindness Neg Hx     Cataracts Neg Hx     Diabetes Neg Hx     Glaucoma Neg Hx     Hypertension Neg Hx     Macular degeneration Neg Hx     Retinal detachment Neg Hx     Strabismus Neg Hx     Stroke Neg Hx     Thyroid disease Neg Hx        Social History:  reports that he quit smoking about 5 years ago. He has a 80.00 pack-year smoking history. He has quit using smokeless tobacco. He reports that he does not drink alcohol or use drugs.    Allergies:  Review of patient's allergies indicates:  No Known Allergies    Medications:  Current Outpatient Medications   Medication Sig Dispense Refill    albuterol (PROAIR HFA) 90 mcg/actuation inhaler INHALE 2 PUFFS INTO THE LUNGS EVERY 6 HOURS AS NEEDED FOR WHEEZING 17 g 0    albuterol-ipratropium (DUO-NEB) 2.5 mg-0.5 mg/3 mL nebulizer solution Rescue 270 mL 0    amLODIPine (NORVASC) 10 MG tablet Take 1 tablet (10 mg total) by mouth once daily. 90 tablet 3    cyproheptadine (PERIACTIN) 4 mg tablet Take 1 tablet (4 mg total) by mouth 3 (three) times daily as needed. 90 tablet 1    dexAMETHasone (DECADRON)  4 MG Tab Take 2 tablets (8 mg total) by mouth every 12 (twelve) hours. Take for 3 days starting day prior to chemotherapy 60 tablet 1    dronabinol (MARINOL) 5 MG capsule Take 1 capsule (5 mg total) by mouth 2 (two) times daily before meals. 60 capsule 0    fluticasone (FLONASE) 50 mcg/actuation nasal spray SHAKE LIQUID AND USE 2 SPRAYS IN EACH NOSTRIL EVERY DAY 16 g 1    mupirocin (BACTROBAN) 2 % ointment Apply topically 2 (two) times daily. To open areas 22 g 2    naproxen (NAPROSYN) 500 MG tablet Take 1 tablet (500 mg total) by mouth 2 (two) times daily as needed (headache). 60 tablet 0    ondansetron (ZOFRAN-ODT) 8 MG TbDL Take 1 tablet (8 mg total) by mouth every 12 (twelve) hours as needed. 30 tablet 3    triamcinolone acetonide 0.1% (KENALOG) 0.1 % ointment To all rashes BID 80 g 2    cetirizine (ZYRTEC) 10 MG tablet Take 1 tablet (10 mg total) by mouth once daily.  0     No current facility-administered medications for this visit.      Facility-Administered Medications Ordered in Other Visits   Medication Dose Route Frequency Provider Last Rate Last Dose    alteplase injection 2 mg  2 mg Intra-Catheter PRN Dano Fernandez DO, FACP        DOCEtaxel (TAXOTERE) 60 mg/m2 = 125 mg in sodium chloride 0.9% 250 mL chemo infusion  60 mg/m2 (Treatment Plan Recorded) Intravenous 1 time in Clinic/HOD Dano Fernandez DO, FACP 250 mL/hr at 08/07/19 1513 125 mg at 08/07/19 1513    heparin, porcine (PF) 100 unit/mL injection flush 500 Units  500 Units Intravenous PRN Dano Fernandez DO, FACP        ramucirumab (CYRAMZA) 778 mg in sodium chloride 0.9% 250 mL chemo infusion  10 mg/kg (Treatment Plan Recorded) Intravenous 1 time in Clinic/HOD Dano Fernandez DO, FACP 250 mL/hr at 08/07/19 1417 778 mg at 08/07/19 1417    sodium chloride 0.9% 250 mL flush bag   Intravenous 1 time in Clinic/HOD Dano Fernandez, DO, FACP        sodium chloride 0.9% flush 10 mL  10 mL Intravenous PRN Dano Fernandez,  "DO, FACP           Review of Systems   Constitutional: Positive for activity change, fatigue and unexpected weight change. Negative for appetite change, chills and fever.        Normal weight 220   HENT: Negative for dental problem, nosebleeds, sinus pressure, sneezing and trouble swallowing.    Eyes: Negative for visual disturbance.   Respiratory: Positive for cough, shortness of breath and wheezing. Negative for choking and chest tightness.    Cardiovascular: Negative for chest pain and leg swelling.   Gastrointestinal: Negative for abdominal pain, blood in stool, constipation, diarrhea and nausea.   Genitourinary: Negative for difficulty urinating and dysuria.   Musculoskeletal: Positive for myalgias. Negative for arthralgias and back pain.   Skin: Negative for rash and wound.   Neurological: Negative for dizziness, light-headedness and headaches.   Hematological: Negative for adenopathy. Does not bruise/bleed easily.   Psychiatric/Behavioral: Negative for sleep disturbance. The patient is not nervous/anxious.        ECOG Performance Status: 1   Objective:      Vitals:   Vitals:    08/07/19 1140   BP: 125/82   BP Location: Left arm   Patient Position: Sitting   BP Method: Large (Automatic)   Pulse: (!) 115   Resp: 16   Temp: 97.4 °F (36.3 °C)   TempSrc: Oral   SpO2: 97%   Weight: 79 kg (174 lb 2.6 oz)   Height: 6' 4" (1.93 m)     BMI: Body mass index is 21.2 kg/m².    Physical Exam   Constitutional: He is oriented to person, place, and time. He appears cachectic.   HENT:   Head: Normocephalic and atraumatic.   Eyes: Pupils are equal, round, and reactive to light.   Neck: Normal range of motion. Neck supple.   Cardiovascular: Normal rate and regular rhythm.   Pulmonary/Chest: Effort normal. No respiratory distress. He has no rales.   Abdominal: Soft. He exhibits no distension. There is no tenderness.   Musculoskeletal: He exhibits no edema or tenderness.   Lymphadenopathy:     He has no cervical adenopathy. "   Neurological: He is alert and oriented to person, place, and time. No cranial nerve deficit.   Skin: Skin is warm and dry. No rash noted.   Psychiatric: He has a normal mood and affect. His behavior is normal.       Laboratory Data:  Lab Visit on 08/07/2019   Component Date Value Ref Range Status    WBC 08/07/2019 8.95  3.90 - 12.70 K/uL Final    RBC 08/07/2019 4.79  4.60 - 6.20 M/uL Final    Hemoglobin 08/07/2019 12.9* 14.0 - 18.0 g/dL Final    Hematocrit 08/07/2019 42.6  40.0 - 54.0 % Final    Mean Corpuscular Volume 08/07/2019 89  82 - 98 fL Final    Mean Corpuscular Hemoglobin 08/07/2019 26.9* 27.0 - 31.0 pg Final    Mean Corpuscular Hemoglobin Conc 08/07/2019 30.3* 32.0 - 36.0 g/dL Final    RDW 08/07/2019 15.5* 11.5 - 14.5 % Final    Platelets 08/07/2019 519* 150 - 350 K/uL Final    MPV 08/07/2019 9.6  9.2 - 12.9 fL Final    Gran # (ANC) 08/07/2019 6.8  1.8 - 7.7 K/uL Final    Comment: The ANC is based on a white cell differential from an   automated cell counter. It has not been microscopically   reviewed for the presence of abnormal cells. Clinical   correlation is required.      Immature Grans (Abs) 08/07/2019 0.07* 0.00 - 0.04 K/uL Final    Comment: Mild elevation in immature granulocytes is non specific and   can be seen in a variety of conditions including stress response,   acute inflammation, trauma and pregnancy. Correlation with other   laboratory and clinical findings is essential.      Sodium 08/07/2019 141  136 - 145 mmol/L Final    Potassium 08/07/2019 4.1  3.5 - 5.1 mmol/L Final    Chloride 08/07/2019 107  95 - 110 mmol/L Final    CO2 08/07/2019 26  23 - 29 mmol/L Final    Glucose 08/07/2019 92  70 - 110 mg/dL Final    BUN, Bld 08/07/2019 10  8 - 23 mg/dL Final    Creatinine 08/07/2019 1.0  0.5 - 1.4 mg/dL Final    Calcium 08/07/2019 9.1  8.7 - 10.5 mg/dL Final    Total Protein 08/07/2019 7.0  6.0 - 8.4 g/dL Final    Albumin 08/07/2019 3.2* 3.5 - 5.2 g/dL Final    Total  Bilirubin 08/07/2019 0.3  0.1 - 1.0 mg/dL Final    Comment: For infants and newborns, interpretation of results should be based  on gestational age, weight and in agreement with clinical  observations.  Premature Infant recommended reference ranges:  Up to 24 hours.............<8.0 mg/dL  Up to 48 hours............<12.0 mg/dL  3-5 days..................<15.0 mg/dL  6-29 days.................<15.0 mg/dL      Alkaline Phosphatase 08/07/2019 74  55 - 135 U/L Final    AST 08/07/2019 21  10 - 40 U/L Final    ALT 08/07/2019 24  10 - 44 U/L Final    Anion Gap 08/07/2019 8  8 - 16 mmol/L Final    eGFR if  08/07/2019 >60.0  >60 mL/min/1.73 m^2 Final    eGFR if non African American 08/07/2019 >60.0  >60 mL/min/1.73 m^2 Final    Comment: Calculation used to obtain the estimated glomerular filtration  rate (eGFR) is the CKD-EPI equation.       TSH 08/07/2019 1.644  0.400 - 4.000 uIU/mL Final    Free T4 08/07/2019 1.03  0.71 - 1.51 ng/dL Final   Lab Visit on 07/17/2019   Component Date Value Ref Range Status    WBC 07/17/2019 8.85  3.90 - 12.70 K/uL Final    RBC 07/17/2019 4.88  4.60 - 6.20 M/uL Final    Hemoglobin 07/17/2019 13.3* 14.0 - 18.0 g/dL Final    Hematocrit 07/17/2019 43.1  40.0 - 54.0 % Final    Mean Corpuscular Volume 07/17/2019 88  82 - 98 fL Final    Mean Corpuscular Hemoglobin 07/17/2019 27.3  27.0 - 31.0 pg Final    Mean Corpuscular Hemoglobin Conc 07/17/2019 30.9* 32.0 - 36.0 g/dL Final    RDW 07/17/2019 15.0* 11.5 - 14.5 % Final    Platelets 07/17/2019 358* 150 - 350 K/uL Final    MPV 07/17/2019 9.9  9.2 - 12.9 fL Final    Gran # (ANC) 07/17/2019 6.7  1.8 - 7.7 K/uL Final    Comment: The ANC is based on a white cell differential from an   automated cell counter. It has not been microscopically   reviewed for the presence of abnormal cells. Clinical   correlation is required.      Immature Grans (Abs) 07/17/2019 0.04  0.00 - 0.04 K/uL Final    Comment: Mild elevation in  immature granulocytes is non specific and   can be seen in a variety of conditions including stress response,   acute inflammation, trauma and pregnancy. Correlation with other   laboratory and clinical findings is essential.      Sodium 07/17/2019 140  136 - 145 mmol/L Final    Potassium 07/17/2019 4.2  3.5 - 5.1 mmol/L Final    Chloride 07/17/2019 107  95 - 110 mmol/L Final    CO2 07/17/2019 26  23 - 29 mmol/L Final    Glucose 07/17/2019 80  70 - 110 mg/dL Final    BUN, Bld 07/17/2019 14  8 - 23 mg/dL Final    Creatinine 07/17/2019 0.9  0.5 - 1.4 mg/dL Final    Calcium 07/17/2019 9.7  8.7 - 10.5 mg/dL Final    Total Protein 07/17/2019 7.3  6.0 - 8.4 g/dL Final    Albumin 07/17/2019 3.5  3.5 - 5.2 g/dL Final    Total Bilirubin 07/17/2019 0.4  0.1 - 1.0 mg/dL Final    Comment: For infants and newborns, interpretation of results should be based  on gestational age, weight and in agreement with clinical  observations.  Premature Infant recommended reference ranges:  Up to 24 hours.............<8.0 mg/dL  Up to 48 hours............<12.0 mg/dL  3-5 days..................<15.0 mg/dL  6-29 days.................<15.0 mg/dL      Alkaline Phosphatase 07/17/2019 75  55 - 135 U/L Final    AST 07/17/2019 16  10 - 40 U/L Final    ALT 07/17/2019 16  10 - 44 U/L Final    Anion Gap 07/17/2019 7* 8 - 16 mmol/L Final    eGFR if African American 07/17/2019 >60.0  >60 mL/min/1.73 m^2 Final    eGFR if non African American 07/17/2019 >60.0  >60 mL/min/1.73 m^2 Final    Comment: Calculation used to obtain the estimated glomerular filtration  rate (eGFR) is the CKD-EPI equation.       TSH 07/17/2019 0.598  0.400 - 4.000 uIU/mL Final    Free T4 07/17/2019 0.99  0.71 - 1.51 ng/dL Final     Supplemental Diagnosis 6/30/16  Immunohistochemical stains are completed on the Station 4L lymph node cell block material and reveal the tumor  cells to stain positively with cytokeratin 7 and TTF-1. The tumor cells show nonspecific  blush staining with  cytokeratin 5/6 and are negative for p63. This staining profile supports a diagnosis of non-small cell carcinoma  consistent with adenocarcinoma.  Cell block material will be sent for EGFR, ALK and ROS-1 testing and results will follow in a supplemental report.  (Electronically Signed: 2016-06-30 11:50:31 )  Diagnosed by: Irene Amaro M.D.  FINAL PATHOLOGIC DIAGNOSIS  1. Lymph node, Station 4L, EBUS guided fine needle aspiration:  Positive for malignant cells, non-small cell carcinoma.  Rare background lymphocytes are present.  Immunohistochemical staining be completed to further characterize this malignancy and results will follow in a  supplemental report.  2. Lymph node, Station 7, EBUS guided fine needle aspiration:  Positive for malignant cells, non-small cell carcinoma tumor identical to that seen in specimen #1.  Background lymphocytes are present.    Imaging:   CT 06/17/2019  COMPARISON:  CT chest abdomen pelvis 03/27/2019, 12/26/2018.    FINDINGS:  Neck base:Unremarkable.    Chest wall/axilla:Bilateral gynecomastia.  Nonspecific subcutaneous fat haziness in the left inguinal region.    Lungs/pleura/glenn: Bilateral centrilobular emphysema.  Soft tissue infiltration/consolidation in the medial segment of the right middle lobe, measuring 3.4 x 3.7 cm (axial series 2, image 76), previously measured 3.5 x 3.7 cm.  Patchy and ground-glass opacification at the right lung base, consistent with post radiation changes.  Overall appearance is unchanged from prior.    Small right-sided pleural effusion, similar to prior.  Associated several nodular pleural thickening as well as thickening along the fissure, possibly representing malignant effusion.    Heart/pericardium/mediastinum: Heart is normal in size.  No pericardial effusion.  Coronary artery atherosclerotic calcification.    Abdominal wall: Unremarkable.    Liver: Normal in size and contour.  Multiple small hypodensities, most of which  are too small to characterize, however largest are consistent with cysts.  No new lesion identified.    Gallbladder/bile ducts: Layering high density in the gallbladder, possibly representing sludge or the vicarious contrast excretion.  No intra or extrahepatic biliary ductal dilatation.    Spleen and pancreas are unremarkable.    Adrenal glands: Stable 1.2 cm left adrenal nodule.  Right adrenal is unremarkable.    Kidneys/ureters: Normal in size and location.  No hydronephrosis or ureteral dilatation.    Urinary bladder: Partially distended with smooth contour.    Reproductive: Prostate is mildly enlarged measuring 5.4 cm.    Bowel/mesentery: Moderate amount of stool burden in the colon.  No evidence of bowel obstruction or inflammation.    Peritoneal/retroperitoneum: No free intraperitoneal air or fluid.    Vasculature: 3 vessels left aortic arch.  Aortoiliac atherosclerotic calcification.  Aorta is normal course and caliber without aneurysmal dilatation.  Moderate to severe stenosis at the right common iliac artery.  Portal, splenic and superior mesenteric veins are patent.    Bones: Degenerative changes of the spine.  Sclerotic focus at T1 vertebral body and left iliac bone, similar to prior.  Lytic lesion at T2, larger than prior measuring 1.6 cm (series 2, image 15), previously measured 1.2 cm.  Noting additional smaller lucency adjacent to this lesion.  Lytic lesion at C7, similar to prior.    Fracture of the lateral left 9th rib, new from prior.    Larger lytic lesion in the right iliac crest (series 2, image 166).  New lesions in the left acetabulum (series 2, image 189), T9 vertebral body (series 2, image 69), and T10 vertebral body (series 2, image 73).      Impression       In this patient with history of lung cancer with bone metastasis, there has been interval increase in size of a previously seen osseous metastasis at T2 as well as interval development of several new lesions in the spine and hips, as  above.    Stable geographic right lower lobe volume loss and consolidation, likely representing postradiation changes.    Small right pleural effusion with associated nodular pleural thickening and thickening along the fissure, similar to prior and possibly representing malignant effusion.    New mildly displaced fracture of the lateral left 9th rib, clinical correlation with history of recent trauma.    Additional stable findings as above.    RECIST SUMMARY:    Date of prior examination for comparison: CT chest abdomen pelvis 03/27/2019    Lesion 1: Right middle lobe.  3.7 cm. Series 2 image 76.  Prior measurement 3.7 cm.    Lesion 2: T2 lytic lesion.  1.6 cm. Series 2 image 15.  Prior measurement 1.2 cm.    Lesion 3: Right iliac wing.  2.1 cm. Series 2 image 167.  Prior measurement 1.2 cm..    This report was flagged in Epic as abnormal.                Assessment:       1. Primary cancer of right middle lobe of lung    2. Secondary adenocarcinoma of bone    3. Chronic obstructive pulmonary disease, unspecified COPD type           Plan:     Mr. Torres has declined clinically since I had seen him last.  He did not tolerate his docetaxel/ramicirumab regimen well and because of this will plan to decrease the dose of docetaxel today.  I will plan to repeat imaging in another 3 weeks to assess response.  Report any new symptoms in the interim.  All questions were answered and he is agreeable with this plan.    Dano Fernandez DO, FACP  Hematology & Oncology  [facility] [place] [address]  ph. [phone]  Fax. [phone]    25 minutes were spent in coordination of patient's care, record review and counseling.  More than 50% of the time was face-to-face.

## 2019-08-07 NOTE — PLAN OF CARE
Problem: Adult Inpatient Plan of Care  Goal: Plan of Care Review  Outcome: Ongoing (interventions implemented as appropriate)  Pt tolerated taxotere/cyramza well.  No s/s of reaction. Vitals stable, NAD.

## 2019-08-08 NOTE — TELEPHONE ENCOUNTER
Called and spoke with patients wife in regards to getting her 's f/u apts scheduled. I explained to her that Dr. Fernandez is not in on 8/28 as he will be out of town but we could do his CT scan on 8/21, see Dr. Fernandez and have labs on 8/26 and chemo on 8/28. She voiced understanding and agreed to these apts.

## 2019-08-08 NOTE — TELEPHONE ENCOUNTER
----- Message from Linnea Vegas RN sent at 8/7/2019  5:50 PM CDT -----  Yes    ----- Message -----  From: Cecy Early  Sent: 8/7/2019   4:13 PM  To: Linnea Vegas RN    Okay will call her, also does he need chemo in 3 weeks ?  ----- Message -----  From: Linnea Vegas RN  Sent: 8/7/2019   3:46 PM  To: Cecy Scotton    He only comes on Wednesdays so you would have to talk to his wife Diamond to see if he would come on a Monday.  He usually does the CT the Wed prior    ----- Message -----  From: Cecy Early  Sent: 8/7/2019   3:26 PM  To: Jim Matson [facility]    Since he is having a ct in 3 weeks, should do 8/26?  Also does he need chemo again in 3 weeks ?    Message Contents   Dano Fernandez, , FACP  Luanne Cohen    Docetaxel/ramicirumab today   Follow-up in 3 weeks with CBC, CMP and CT

## 2019-08-21 ENCOUNTER — HOSPITAL ENCOUNTER (OUTPATIENT)
Dept: RADIOLOGY | Facility: HOSPITAL | Age: 64
Discharge: HOME | End: 2019-08-21
Attending: INTERNAL MEDICINE
Payer: MEDICAID

## 2019-08-21 DIAGNOSIS — D49.89 NEOPLASM OF ABDOMEN: ICD-10-CM

## 2019-08-21 PROCEDURE — 25500020 PHARM REV CODE 255: Performed by: INTERNAL MEDICINE

## 2019-08-21 PROCEDURE — 74177 CT CHEST ABDOMEN PELVIS WITH CONTRAST (XPD): ICD-10-PCS | Mod: 26,,, | Performed by: RADIOLOGY

## 2019-08-21 PROCEDURE — 71260 CT THORAX DX C+: CPT | Mod: 26,,, | Performed by: RADIOLOGY

## 2019-08-21 PROCEDURE — 71260 CT CHEST ABDOMEN PELVIS WITH CONTRAST (XPD): ICD-10-PCS | Mod: 26,,, | Performed by: RADIOLOGY

## 2019-08-21 PROCEDURE — 74177 CT ABD & PELVIS W/CONTRAST: CPT | Mod: TC

## 2019-08-21 PROCEDURE — 74177 CT ABD & PELVIS W/CONTRAST: CPT | Mod: 26,,, | Performed by: RADIOLOGY

## 2019-08-21 RX ADMIN — IOHEXOL 75 ML: 350 INJECTION, SOLUTION INTRAVENOUS at 05:08

## 2019-08-26 ENCOUNTER — LAB VISIT (OUTPATIENT)
Dept: LAB | Facility: HOSPITAL | Age: 64
End: 2019-08-26
Attending: INTERNAL MEDICINE
Payer: MEDICAID

## 2019-08-26 ENCOUNTER — OFFICE VISIT (OUTPATIENT)
Dept: HEMATOLOGY/ONCOLOGY | Facility: CLINIC | Age: 64
End: 2019-08-26
Payer: MEDICAID

## 2019-08-26 VITALS
SYSTOLIC BLOOD PRESSURE: 119 MMHG | TEMPERATURE: 98 F | HEIGHT: 76 IN | HEART RATE: 107 BPM | OXYGEN SATURATION: 97 % | DIASTOLIC BLOOD PRESSURE: 86 MMHG | WEIGHT: 168.19 LBS | BODY MASS INDEX: 20.48 KG/M2 | RESPIRATION RATE: 16 BRPM

## 2019-08-26 DIAGNOSIS — C34.2 PRIMARY CANCER OF RIGHT MIDDLE LOBE OF LUNG: ICD-10-CM

## 2019-08-26 DIAGNOSIS — R53.0 NEOPLASTIC MALIGNANT RELATED FATIGUE: ICD-10-CM

## 2019-08-26 DIAGNOSIS — C79.51 SECONDARY ADENOCARCINOMA OF BONE: ICD-10-CM

## 2019-08-26 DIAGNOSIS — C34.2 PRIMARY CANCER OF RIGHT MIDDLE LOBE OF LUNG: Primary | ICD-10-CM

## 2019-08-26 LAB
ALBUMIN SERPL BCP-MCNC: 3.7 G/DL (ref 3.5–5.2)
ALP SERPL-CCNC: 77 U/L (ref 55–135)
ALT SERPL W/O P-5'-P-CCNC: 16 U/L (ref 10–44)
ANION GAP SERPL CALC-SCNC: 8 MMOL/L (ref 8–16)
AST SERPL-CCNC: 19 U/L (ref 10–40)
BILIRUB SERPL-MCNC: 0.3 MG/DL (ref 0.1–1)
BUN SERPL-MCNC: 12 MG/DL (ref 8–23)
CALCIUM SERPL-MCNC: 9.6 MG/DL (ref 8.7–10.5)
CHLORIDE SERPL-SCNC: 105 MMOL/L (ref 95–110)
CO2 SERPL-SCNC: 26 MMOL/L (ref 23–29)
CREAT SERPL-MCNC: 0.8 MG/DL (ref 0.5–1.4)
ERYTHROCYTE [DISTWIDTH] IN BLOOD BY AUTOMATED COUNT: 15.9 % (ref 11.5–14.5)
EST. GFR  (AFRICAN AMERICAN): >60 ML/MIN/1.73 M^2
EST. GFR  (NON AFRICAN AMERICAN): >60 ML/MIN/1.73 M^2
GLUCOSE SERPL-MCNC: 85 MG/DL (ref 70–110)
HCT VFR BLD AUTO: 43.4 % (ref 40–54)
HGB BLD-MCNC: 13.5 G/DL (ref 14–18)
IMM GRANULOCYTES # BLD AUTO: 0.02 K/UL (ref 0–0.04)
MCH RBC QN AUTO: 27.1 PG (ref 27–31)
MCHC RBC AUTO-ENTMCNC: 31.1 G/DL (ref 32–36)
MCV RBC AUTO: 87 FL (ref 82–98)
NEUTROPHILS # BLD AUTO: 2.9 K/UL (ref 1.8–7.7)
PLATELET # BLD AUTO: 333 K/UL (ref 150–350)
PMV BLD AUTO: 10.8 FL (ref 9.2–12.9)
POTASSIUM SERPL-SCNC: 4.1 MMOL/L (ref 3.5–5.1)
PROT SERPL-MCNC: 7.3 G/DL (ref 6–8.4)
RBC # BLD AUTO: 4.99 M/UL (ref 4.6–6.2)
SODIUM SERPL-SCNC: 139 MMOL/L (ref 136–145)
T4 FREE SERPL-MCNC: 1.15 NG/DL (ref 0.71–1.51)
TSH SERPL DL<=0.005 MIU/L-ACNC: 4.14 UIU/ML (ref 0.4–4)
WBC # BLD AUTO: 5.31 K/UL (ref 3.9–12.7)

## 2019-08-26 PROCEDURE — 84439 ASSAY OF FREE THYROXINE: CPT

## 2019-08-26 PROCEDURE — 84443 ASSAY THYROID STIM HORMONE: CPT

## 2019-08-26 PROCEDURE — 99214 OFFICE O/P EST MOD 30 MIN: CPT | Mod: PBBFAC | Performed by: INTERNAL MEDICINE

## 2019-08-26 PROCEDURE — 36415 COLL VENOUS BLD VENIPUNCTURE: CPT

## 2019-08-26 PROCEDURE — 99214 OFFICE O/P EST MOD 30 MIN: CPT | Mod: S$PBB,,, | Performed by: INTERNAL MEDICINE

## 2019-08-26 PROCEDURE — 99999 PR PBB SHADOW E&M-EST. PATIENT-LVL IV: ICD-10-PCS | Mod: PBBFAC,,, | Performed by: INTERNAL MEDICINE

## 2019-08-26 PROCEDURE — 85027 COMPLETE CBC AUTOMATED: CPT

## 2019-08-26 PROCEDURE — 99214 PR OFFICE/OUTPT VISIT, EST, LEVL IV, 30-39 MIN: ICD-10-PCS | Mod: S$PBB,,, | Performed by: INTERNAL MEDICINE

## 2019-08-26 PROCEDURE — 80053 COMPREHEN METABOLIC PANEL: CPT

## 2019-08-26 PROCEDURE — 99999 PR PBB SHADOW E&M-EST. PATIENT-LVL IV: CPT | Mod: PBBFAC,,, | Performed by: INTERNAL MEDICINE

## 2019-08-26 RX ORDER — SODIUM CHLORIDE 0.9 % (FLUSH) 0.9 %
10 SYRINGE (ML) INJECTION
Status: CANCELLED | OUTPATIENT
Start: 2019-08-26

## 2019-08-26 RX ORDER — HEPARIN 100 UNIT/ML
500 SYRINGE INTRAVENOUS
Status: CANCELLED | OUTPATIENT
Start: 2019-08-26

## 2019-08-26 NOTE — PROGRESS NOTES
PATIENT: Angel Torres  MRN: [id number]  DATE: 8/26/2019      Diagnosis:   1. Primary cancer of right middle lobe of lung    2. Secondary adenocarcinoma of bone        Chief Complaint: Follow-up      Oncologic History:      Oncologic History Non-small cell lung cancer diagnosed 6/2016   Recurrent disease to lung 4/2017     Oncologic Treatment Cisplatin/Pemetrexed initiated 8/15/16 with concurrent radiation completed 3 cycles 10/3/16; 64 Gy  Carboplatin/pemetrexed/pembrolizumab 7/2017 - 9/2017  Pembrolizumab maintenance 11/2017 (held 10/2018 secondary to rash)  Pembrolizumab restarted 04/2019 - 6/2019 (discontinued due to progression)  Docetaxel/Ramicirumab 7/2019    Pathology  6/2016 : Adenocarcinoma, T2b, N2, M0, Stage IIIA          Subjective:    Interval History: Mr. Torres is a 64 y.o. male who was seen in follow-up for lung cancer.  Since I had seen him last he underwent his 2nd cycle of docetaxel/ramicirumab.  We had dose reduced the docetaxel and he states that he tolerated this much better.  He has noted that he has been losing weight in started on an appetite stimulant in trying to eat more.  He is without other new complaints.    His history dates to approximately 4/2016 when he noted a worsening cough which was associated with clear sputum.  He had a chest x-ray in 5/16 showing a right middle lobe opacity.  Subsequent CT of the chest was performed on 5/30/16 showing a 5.6 cm lesion in the right middle lobe with hilar and mediastinal lymphadenopathy along with hepatic lesions and a left adrenal mass..  He underwent bronchoscopy which was nonrevealing and transbronchial biopsies were nondiagnostic.  He then underwent EBUS with biopsy of mediastinal lymph nodes with pathology showing non-small cell lung cancer consistent with adenocarcinoma.  He was started on concurrent chemotherapy and radiation with cisplatin and pemetrexed on 8/15/16.  He completed 64 Gy of concurrent radiation with cisplatin and pemetrexed  with chemotherapy completed on 10/3/16.  Scans in 4/2017 had indicated progressive disease in the lung.  Subsequent PET scan in 7/2017 had shown spread of disease to bone.  He was started on carboplatin and pemetrexed and pembrolizumab in 7/2017.  Repeat imaging in 8/2017 had shown stability of disease.  He was started on pembrolizumab maintenance in 11/2017 after a delay due to his insurance.  Repeat imaging in 12/2016 had shown mild progressive disease, however, it was felt that due to the excessive delay in treatment we would proceed on rather than change therapy at this time.  CT in 3/2018 had shown stability of his disease.  CT in 05/2018 had shown stable disease.  CT in 08/2018 had shown stable disease.  Pembrolizumab was held in 10/2018 secondary to a rash.  Scans in 04/2019 had shown progressive disease in bone.  Pembrolizumab was restarted in 04/2019 but this was discontinued in 06/2019 secondary to progression.  He was started on treatment with docetaxel and ramicirumab in 07/2019.    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    Skin infection 8/8/2018       Past Surgical HIstory:   Past Surgical History:   Procedure Laterality Date    BRONCHOSCOPY N/A 6/28/2016    [facility]    BRONCHOSCOPY N/A 6/13/2016    [facility]    ENDOBRONCHIAL ULTRASOUND (EBUS) N/A 6/28/2016    [facility]    EXCISION, MASS, LOWER EXTREMITY Left 4/21/2014    [facility]    INGUINAL HERNIA REPAIR  Bilateral     KNEE SURGERY      REPAIR, HERNIA, INGUINAL, BILATERAL, LAPAROSCOPIC Bilateral 4/21/2014    Performed by [facility] at [facility] OR [address] [place] [address]       Family History:   Family History   Problem Relation Age of Onset    Cancer Mother         lung, breast    Cancer Sister         lung    Cancer Maternal Grandfather     No Known Problems Father     No Known Problems Brother     No Known Problems Maternal Aunt     No Known Problems Maternal Uncle     No Known Problems Paternal Aunt     No Known Problems Paternal Uncle     No Known Problems Maternal Grandmother     No Known Problems Paternal Grandmother     No Known Problems Paternal Grandfather     Amblyopia Neg Hx     Blindness Neg Hx     Cataracts Neg Hx     Diabetes Neg Hx     Glaucoma Neg Hx     Hypertension Neg Hx     Macular degeneration Neg Hx     Retinal detachment Neg Hx     Strabismus Neg Hx     Stroke Neg Hx     Thyroid disease Neg Hx        Social History:  reports that he quit smoking about 5 years ago. He has a 80.00 pack-year smoking history. He has quit using smokeless tobacco. He reports that he does not drink alcohol or use drugs.    Allergies:  Review of patient's allergies indicates:  No Known Allergies    Medications:  Current Outpatient Medications   Medication Sig Dispense Refill    albuterol (PROAIR HFA) 90 mcg/actuation inhaler INHALE 2 PUFFS INTO THE LUNGS EVERY 6 HOURS AS NEEDED FOR WHEEZING 17 g 0    albuterol-ipratropium (DUO-NEB) 2.5 mg-0.5 mg/3 mL nebulizer solution Rescue 270 mL 0    amLODIPine (NORVASC) 10 MG tablet Take 1 tablet (10 mg total) by mouth once daily. 90 tablet 3    cyproheptadine (PERIACTIN) 4 mg tablet Take 1 tablet (4 mg total) by mouth 3 (three) times daily as needed. 90 tablet 1    dexAMETHasone (DECADRON) 4 MG Tab Take 2 tablets (8 mg total) by mouth every 12 (twelve) hours. Take for 3 days starting day prior to chemotherapy 60 tablet 1    dronabinol (MARINOL) 5 MG  capsule Take 1 capsule (5 mg total) by mouth 2 (two) times daily before meals. 60 capsule 0    fluticasone (FLONASE) 50 mcg/actuation nasal spray SHAKE LIQUID AND USE 2 SPRAYS IN EACH NOSTRIL EVERY DAY 16 g 1    mupirocin (BACTROBAN) 2 % ointment Apply topically 2 (two) times daily. To open areas 22 g 2    naproxen (NAPROSYN) 500 MG tablet Take 1 tablet (500 mg total) by mouth 2 (two) times daily as needed (headache). 60 tablet 0    ondansetron (ZOFRAN-ODT) 8 MG TbDL Take 1 tablet (8 mg total) by mouth every 12 (twelve) hours as needed. 30 tablet 3    triamcinolone acetonide 0.1% (KENALOG) 0.1 % ointment To all rashes BID 80 g 2    cetirizine (ZYRTEC) 10 MG tablet Take 1 tablet (10 mg total) by mouth once daily.  0     No current facility-administered medications for this visit.        Review of Systems   Constitutional: Positive for fatigue and unexpected weight change. Negative for activity change, appetite change, chills and fever.        Normal weight 220   HENT: Negative for dental problem, nosebleeds, sinus pressure, sneezing and trouble swallowing.    Eyes: Negative for visual disturbance.   Respiratory: Positive for cough and shortness of breath. Negative for choking, chest tightness and wheezing.    Cardiovascular: Negative for chest pain and leg swelling.   Gastrointestinal: Negative for abdominal pain, blood in stool, constipation, diarrhea and nausea.   Genitourinary: Negative for difficulty urinating and dysuria.   Musculoskeletal: Positive for myalgias. Negative for arthralgias and back pain.   Skin: Negative for rash and wound.   Neurological: Negative for dizziness, light-headedness and headaches.   Hematological: Negative for adenopathy. Does not bruise/bleed easily.   Psychiatric/Behavioral: Negative for sleep disturbance. The patient is not nervous/anxious.        ECOG Performance Status: 1   Objective:      Vitals:   Vitals:    08/26/19 1314   BP: 119/86   BP Location: Left arm   Patient  "Position: Sitting   BP Method: Large (Automatic)   Pulse: 107   Resp: 16   Temp: 97.7 °F (36.5 °C)   TempSrc: Oral   SpO2: 97%   Weight: 76.3 kg (168 lb 3.4 oz)   Height: 6' 4" (1.93 m)     BMI: Body mass index is 20.48 kg/m².    Physical Exam   Constitutional: He is oriented to person, place, and time. He appears cachectic.   HENT:   Head: Normocephalic and atraumatic.   Eyes: Pupils are equal, round, and reactive to light.   Neck: Normal range of motion. Neck supple.   Cardiovascular: Normal rate and regular rhythm.   Pulmonary/Chest: Effort normal. No respiratory distress. He has no rales.   Abdominal: Soft. He exhibits no distension. There is no tenderness.   Musculoskeletal: He exhibits no edema or tenderness.   Lymphadenopathy:     He has no cervical adenopathy.   Neurological: He is alert and oriented to person, place, and time. No cranial nerve deficit.   Skin: Skin is warm and dry. No rash noted.   Psychiatric: He has a normal mood and affect. His behavior is normal.       Laboratory Data:  Lab Visit on 08/26/2019   Component Date Value Ref Range Status    WBC 08/26/2019 5.31  3.90 - 12.70 K/uL Final    RBC 08/26/2019 4.99  4.60 - 6.20 M/uL Final    Hemoglobin 08/26/2019 13.5* 14.0 - 18.0 g/dL Final    Hematocrit 08/26/2019 43.4  40.0 - 54.0 % Final    Mean Corpuscular Volume 08/26/2019 87  82 - 98 fL Final    Mean Corpuscular Hemoglobin 08/26/2019 27.1  27.0 - 31.0 pg Final    Mean Corpuscular Hemoglobin Conc 08/26/2019 31.1* 32.0 - 36.0 g/dL Final    RDW 08/26/2019 15.9* 11.5 - 14.5 % Final    Platelets 08/26/2019 333  150 - 350 K/uL Final    MPV 08/26/2019 10.8  9.2 - 12.9 fL Final    Gran # (ANC) 08/26/2019 2.9  1.8 - 7.7 K/uL Final    Comment: The ANC is based on a white cell differential from an   automated cell counter. It has not been microscopically   reviewed for the presence of abnormal cells. Clinical   correlation is required.      Immature Grans (Abs) 08/26/2019 0.02  0.00 - 0.04 " K/uL Final    Comment: Mild elevation in immature granulocytes is non specific and   can be seen in a variety of conditions including stress response,   acute inflammation, trauma and pregnancy. Correlation with other   laboratory and clinical findings is essential.      Sodium 08/26/2019 139  136 - 145 mmol/L Final    Potassium 08/26/2019 4.1  3.5 - 5.1 mmol/L Final    Chloride 08/26/2019 105  95 - 110 mmol/L Final    CO2 08/26/2019 26  23 - 29 mmol/L Final    Glucose 08/26/2019 85  70 - 110 mg/dL Final    BUN, Bld 08/26/2019 12  8 - 23 mg/dL Final    Creatinine 08/26/2019 0.8  0.5 - 1.4 mg/dL Final    Calcium 08/26/2019 9.6  8.7 - 10.5 mg/dL Final    Total Protein 08/26/2019 7.3  6.0 - 8.4 g/dL Final    Albumin 08/26/2019 3.7  3.5 - 5.2 g/dL Final    Total Bilirubin 08/26/2019 0.3  0.1 - 1.0 mg/dL Final    Comment: For infants and newborns, interpretation of results should be based  on gestational age, weight and in agreement with clinical  observations.  Premature Infant recommended reference ranges:  Up to 24 hours.............<8.0 mg/dL  Up to 48 hours............<12.0 mg/dL  3-5 days..................<15.0 mg/dL  6-29 days.................<15.0 mg/dL      Alkaline Phosphatase 08/26/2019 77  55 - 135 U/L Final    AST 08/26/2019 19  10 - 40 U/L Final    ALT 08/26/2019 16  10 - 44 U/L Final    Anion Gap 08/26/2019 8  8 - 16 mmol/L Final    eGFR if African American 08/26/2019 >60.0  >60 mL/min/1.73 m^2 Final    eGFR if non African American 08/26/2019 >60.0  >60 mL/min/1.73 m^2 Final    Comment: Calculation used to obtain the estimated glomerular filtration  rate (eGFR) is the CKD-EPI equation.       TSH 08/26/2019 4.142* 0.400 - 4.000 uIU/mL Final    Free T4 08/26/2019 1.15  0.71 - 1.51 ng/dL Final   Lab Visit on 08/07/2019   Component Date Value Ref Range Status    WBC 08/07/2019 8.95  3.90 - 12.70 K/uL Final    RBC 08/07/2019 4.79  4.60 - 6.20 M/uL Final    Hemoglobin 08/07/2019 12.9* 14.0  - 18.0 g/dL Final    Hematocrit 08/07/2019 42.6  40.0 - 54.0 % Final    Mean Corpuscular Volume 08/07/2019 89  82 - 98 fL Final    Mean Corpuscular Hemoglobin 08/07/2019 26.9* 27.0 - 31.0 pg Final    Mean Corpuscular Hemoglobin Conc 08/07/2019 30.3* 32.0 - 36.0 g/dL Final    RDW 08/07/2019 15.5* 11.5 - 14.5 % Final    Platelets 08/07/2019 519* 150 - 350 K/uL Final    MPV 08/07/2019 9.6  9.2 - 12.9 fL Final    Gran # (ANC) 08/07/2019 6.8  1.8 - 7.7 K/uL Final    Comment: The ANC is based on a white cell differential from an   automated cell counter. It has not been microscopically   reviewed for the presence of abnormal cells. Clinical   correlation is required.      Immature Grans (Abs) 08/07/2019 0.07* 0.00 - 0.04 K/uL Final    Comment: Mild elevation in immature granulocytes is non specific and   can be seen in a variety of conditions including stress response,   acute inflammation, trauma and pregnancy. Correlation with other   laboratory and clinical findings is essential.      Sodium 08/07/2019 141  136 - 145 mmol/L Final    Potassium 08/07/2019 4.1  3.5 - 5.1 mmol/L Final    Chloride 08/07/2019 107  95 - 110 mmol/L Final    CO2 08/07/2019 26  23 - 29 mmol/L Final    Glucose 08/07/2019 92  70 - 110 mg/dL Final    BUN, Bld 08/07/2019 10  8 - 23 mg/dL Final    Creatinine 08/07/2019 1.0  0.5 - 1.4 mg/dL Final    Calcium 08/07/2019 9.1  8.7 - 10.5 mg/dL Final    Total Protein 08/07/2019 7.0  6.0 - 8.4 g/dL Final    Albumin 08/07/2019 3.2* 3.5 - 5.2 g/dL Final    Total Bilirubin 08/07/2019 0.3  0.1 - 1.0 mg/dL Final    Comment: For infants and newborns, interpretation of results should be based  on gestational age, weight and in agreement with clinical  observations.  Premature Infant recommended reference ranges:  Up to 24 hours.............<8.0 mg/dL  Up to 48 hours............<12.0 mg/dL  3-5 days..................<15.0 mg/dL  6-29 days.................<15.0 mg/dL      Alkaline Phosphatase  08/07/2019 74  55 - 135 U/L Final    AST 08/07/2019 21  10 - 40 U/L Final    ALT 08/07/2019 24  10 - 44 U/L Final    Anion Gap 08/07/2019 8  8 - 16 mmol/L Final    eGFR if  08/07/2019 >60.0  >60 mL/min/1.73 m^2 Final    eGFR if non African American 08/07/2019 >60.0  >60 mL/min/1.73 m^2 Final    Comment: Calculation used to obtain the estimated glomerular filtration  rate (eGFR) is the CKD-EPI equation.       TSH 08/07/2019 1.644  0.400 - 4.000 uIU/mL Final    Free T4 08/07/2019 1.03  0.71 - 1.51 ng/dL Final     Supplemental Diagnosis 6/30/16  Immunohistochemical stains are completed on the Station 4L lymph node cell block material and reveal the tumor  cells to stain positively with cytokeratin 7 and TTF-1. The tumor cells show nonspecific blush staining with  cytokeratin 5/6 and are negative for p63. This staining profile supports a diagnosis of non-small cell carcinoma  consistent with adenocarcinoma.  Cell block material will be sent for EGFR, ALK and ROS-1 testing and results will follow in a supplemental report.  (Electronically Signed: 2016-06-30 11:50:31 )  Diagnosed by: Irene Amaro M.D.  FINAL PATHOLOGIC DIAGNOSIS  1. Lymph node, Station 4L, EBUS guided fine needle aspiration:  Positive for malignant cells, non-small cell carcinoma.  Rare background lymphocytes are present.  Immunohistochemical staining be completed to further characterize this malignancy and results will follow in a  supplemental report.  2. Lymph node, Station 7, EBUS guided fine needle aspiration:  Positive for malignant cells, non-small cell carcinoma tumor identical to that seen in specimen #1.  Background lymphocytes are present.    Imaging:   CT 08/21/2019    COMPARISON:  CT 06/17/2019    FINDINGS:  Base of neck and thoracic soft tissues are unremarkable.    Left-sided aortic arch.  Thoracic aorta is normal in caliber with mild calcific atherosclerosis.  Heart is normal in size without pericardial effusion.   Coronary artery atherosclerosis.    No mediastinal adenopathy.    Emphysematous changes of the lungs.  Left lung is clear.  Chronic right pleural effusion, similar.  Geographic region of volume loss and consolidation within the right perihilar region which may represent a treated lesion versus post radiation changes.  This region measures slightly smaller comparison to prior examination at 2.9 x 3.1 cm (series 2, image 71).  Stable adjacent solid pulmonary nodule within the right lower lobe measuring 0.8 cm.  Inferior and peripheral to the perihilar consolidation there is a region of airspace opacification/nodular densities within the right lung base which appears similar in comparison to prior exam, though of uncertain etiology.    Liver is normal in size and attenuation.  Several hepatic cysts.  Several additional subcentimeter hepatic hypodensities too small to characterize however likely represent cysts.  No gallstones or biliary ductal dilatation.  Portal vein is patent.    Spleen, right adrenal gland, and pancreas are unremarkable.  Stable 1.2 cm left adrenal nodule.    Small and large bowel are normal caliber.  No evidence of bowel obstruction or inflammation.  No ascites or free air.    Kidneys are normal in size and demonstrate appropriate concentration of contrast.  No hydronephrosis or ureteral dilatation.  Focal contour change at the bladder dome measuring 2.5 cm with wall thickening and enhancement, progressed in comparison to prior exam (series 2, image 175).  Mild prostatomegaly.    No retroperitoneal or mesenteric adenopathy.    Normal caliber abdominal aorta with moderate atherosclerosis along its course and branch vessels.  No aneurysm.    Osseous structures demonstrate numerous scattered lytic and blastic lesions, overall similar to prior examination.  T2 vertebral body lesion measures 1.7 cm (series 2, image 9).  Right iliac wing lesion measures 2.2 cm (series 2, image 160).  Healing left 9th  rib fracture.  New acute appearing right 11th rib fracture.  Vertebral body heights are maintained without evidence of compression fracture.      Impression       1. In this patient with history of lung cancer there is a geographic region of volume loss and consolidation within the right perihilar region which may represent a treated lesion versus post radiation changes.  Additional nonspecific airspace opacification/nodular densities within the right lung base.  Chronic right pleural effusion.  Pulmonary findings are grossly similar in comparison prior exam.  2. Numerous scattered lytic and blastic osseous metastatic lesions, grossly similar to prior exam.  New acute appearing right 11th rib fracture.  Healing left 9th rib fracture.  3. Contour change of the bladder dome with wall thickening and enhancement, progressed from prior exam.  Finding may represent tumor versus cystitis, consider urology consult and cystoscopy.  4. Additional findings as above.  5.  This report was flagged in Epic as abnormal.  RECIST SUMMARY:    Date of prior examination for comparison: 06/17/2019    Lesion 1: Right middle lobe.  3.1 cm. Series 2 image 71.  Prior measurement 3.7 cm.    Lesion 2: T2 lytic lesion.  1.7 cm. Series 2 image 9.  Prior measurement 1.6 cm.    Lesion 3: Right iliac wing.  2.2 cm. Series 2 image 160.  Prior measurement 2.1 cm.                Assessment:       1. Primary cancer of right middle lobe of lung    2. Secondary adenocarcinoma of bone           Plan:     Mr. Torres is doing better clinically with the reduced dose docetaxel peer review of his scans shows overall stability of his disease with a slight decrease in his lung lesion.  We will plan to continue on with treatment.  Labs have been reviewed.  Follow back up with me in 3 weeks and report any new symptoms in the interim.  All questions were answered and he is agreeable with this plan.    Dano Fernandez, , FACP  Hematology & Oncology  [address] [place] [address]  ph. [phone]  Fax. [phone]    25 minutes were spent in coordination of patient's care, record review and counseling.  More than 50% of the time was face-to-face.

## 2019-08-26 NOTE — Clinical Note
Docetaxel/ramicirumab on WednesdaySee me in 3 weeks with CBC, CMPPlan to transition to [place] in October

## 2019-08-27 ENCOUNTER — TELEPHONE (OUTPATIENT)
Dept: HEMATOLOGY/ONCOLOGY | Facility: CLINIC | Age: 64
End: 2019-08-27

## 2019-08-27 NOTE — TELEPHONE ENCOUNTER
Patient's wife called to ask about her disability paperwork. I explained we don't have any paperwork.  She will have it refaxed.

## 2019-08-27 NOTE — TELEPHONE ENCOUNTER
----- Message from Edel Fay sent at 8/27/2019 10:01 AM CDT -----  Contact: Pt wife Diamond  Pt wife Diamond called to speak with nurse Lauren have some questions   Callback#[phone]  Thank You  ABRAHAN Fay

## 2019-08-28 ENCOUNTER — INFUSION (OUTPATIENT)
Dept: INFUSION THERAPY | Facility: HOSPITAL | Age: 64
End: 2019-08-28
Attending: INTERNAL MEDICINE
Payer: MEDICAID

## 2019-08-28 VITALS
HEIGHT: 76 IN | HEART RATE: 99 BPM | DIASTOLIC BLOOD PRESSURE: 81 MMHG | WEIGHT: 168.19 LBS | BODY MASS INDEX: 20.48 KG/M2 | SYSTOLIC BLOOD PRESSURE: 123 MMHG | TEMPERATURE: 98 F | RESPIRATION RATE: 16 BRPM

## 2019-08-28 DIAGNOSIS — C34.2 PRIMARY CANCER OF RIGHT MIDDLE LOBE OF LUNG: ICD-10-CM

## 2019-08-28 DIAGNOSIS — C79.51 SECONDARY ADENOCARCINOMA OF BONE: Primary | ICD-10-CM

## 2019-08-28 DIAGNOSIS — R59.0 MEDIASTINAL ADENOPATHY: ICD-10-CM

## 2019-08-28 PROCEDURE — 63600175 PHARM REV CODE 636 W HCPCS: Performed by: INTERNAL MEDICINE

## 2019-08-28 PROCEDURE — 96417 CHEMO IV INFUS EACH ADDL SEQ: CPT

## 2019-08-28 PROCEDURE — 96367 TX/PROPH/DG ADDL SEQ IV INF: CPT

## 2019-08-28 PROCEDURE — 96413 CHEMO IV INFUSION 1 HR: CPT

## 2019-08-28 RX ORDER — SODIUM CHLORIDE 0.9 % (FLUSH) 0.9 %
10 SYRINGE (ML) INJECTION
Status: DISCONTINUED | OUTPATIENT
Start: 2019-08-28 | End: 2019-08-28 | Stop reason: HOSPADM

## 2019-08-28 RX ORDER — HEPARIN 100 UNIT/ML
500 SYRINGE INTRAVENOUS
Status: DISCONTINUED | OUTPATIENT
Start: 2019-08-28 | End: 2019-08-28 | Stop reason: HOSPADM

## 2019-08-28 RX ADMIN — SODIUM CHLORIDE 778 MG: 0.9 INJECTION, SOLUTION INTRAVENOUS at 02:08

## 2019-08-28 RX ADMIN — DIPHENHYDRAMINE HYDROCHLORIDE 50 MG: 50 INJECTION, SOLUTION INTRAMUSCULAR; INTRAVENOUS at 02:08

## 2019-08-28 RX ADMIN — DOCETAXEL 125 MG: 10 INJECTION, SOLUTION INTRAVENOUS at 03:08

## 2019-08-28 RX ADMIN — DEXAMETHASONE SODIUM PHOSPHATE 20 MG: 4 INJECTION, SOLUTION INTRA-ARTICULAR; INTRALESIONAL; INTRAMUSCULAR; INTRAVENOUS; SOFT TISSUE at 01:08

## 2019-08-28 RX ADMIN — SODIUM CHLORIDE: 9 INJECTION, SOLUTION INTRAVENOUS at 01:08

## 2019-08-28 NOTE — PLAN OF CARE
Problem: Neurotoxicity (Chemotherapy Effects)  Goal: Neurotoxicity Symptom Control  Outcome: Ongoing (interventions implemented as appropriate)  Pt here for chemo. VSS. Pt has darkened and enlarged vein he states is from first treatment. MD is aware, per the pt and is monitoring. PIV flushed and infusing without difficulty.

## 2019-08-28 NOTE — PLAN OF CARE
Problem: Adult Inpatient Plan of Care  Goal: Plan of Care Review  Outcome: Ongoing (interventions implemented as appropriate)  Pt tolerated treatment today without incident. VSS. PIV flushed, saline locked and removed.

## 2019-09-16 ENCOUNTER — TELEPHONE (OUTPATIENT)
Dept: HEMATOLOGY/ONCOLOGY | Facility: CLINIC | Age: 64
End: 2019-09-16

## 2019-09-16 NOTE — TELEPHONE ENCOUNTER
Patient gave me fax number FMLA paperwork was sent to, it was the wrong number, gave her the correct number.

## 2019-09-16 NOTE — TELEPHONE ENCOUNTER
----- Message from Edel Fay sent at 9/16/2019  3:23 PM CDT -----  Contact: Pt wife tino   Pt wife Diamond  called to speak with nurse regino about paper work for FMLA and needs it asap and states that they are talking about canceling her time if  The paper are not in soon   Callback#[phone]  Thank You  ABRAHAN Fay

## 2019-09-18 ENCOUNTER — OFFICE VISIT (OUTPATIENT)
Dept: HEMATOLOGY/ONCOLOGY | Facility: CLINIC | Age: 64
End: 2019-09-18
Payer: MEDICAID

## 2019-09-18 ENCOUNTER — INFUSION (OUTPATIENT)
Dept: INFUSION THERAPY | Facility: HOSPITAL | Age: 64
End: 2019-09-18
Attending: INTERNAL MEDICINE
Payer: MEDICAID

## 2019-09-18 VITALS
DIASTOLIC BLOOD PRESSURE: 94 MMHG | SYSTOLIC BLOOD PRESSURE: 135 MMHG | RESPIRATION RATE: 20 BRPM | TEMPERATURE: 97 F | HEART RATE: 107 BPM

## 2019-09-18 VITALS
DIASTOLIC BLOOD PRESSURE: 94 MMHG | OXYGEN SATURATION: 96 % | HEART RATE: 99 BPM | WEIGHT: 168.88 LBS | TEMPERATURE: 98 F | BODY MASS INDEX: 20.56 KG/M2 | SYSTOLIC BLOOD PRESSURE: 125 MMHG | RESPIRATION RATE: 16 BRPM | HEIGHT: 76 IN

## 2019-09-18 DIAGNOSIS — C34.2 PRIMARY CANCER OF RIGHT MIDDLE LOBE OF LUNG: ICD-10-CM

## 2019-09-18 DIAGNOSIS — C79.51 SECONDARY ADENOCARCINOMA OF BONE: ICD-10-CM

## 2019-09-18 DIAGNOSIS — R59.0 MEDIASTINAL ADENOPATHY: ICD-10-CM

## 2019-09-18 DIAGNOSIS — C79.51 SECONDARY ADENOCARCINOMA OF BONE: Primary | ICD-10-CM

## 2019-09-18 DIAGNOSIS — C34.2 PRIMARY CANCER OF RIGHT MIDDLE LOBE OF LUNG: Primary | ICD-10-CM

## 2019-09-18 PROCEDURE — 63600175 PHARM REV CODE 636 W HCPCS: Performed by: INTERNAL MEDICINE

## 2019-09-18 PROCEDURE — 96417 CHEMO IV INFUS EACH ADDL SEQ: CPT

## 2019-09-18 PROCEDURE — 99214 OFFICE O/P EST MOD 30 MIN: CPT | Mod: PBBFAC | Performed by: INTERNAL MEDICINE

## 2019-09-18 PROCEDURE — 99214 PR OFFICE/OUTPT VISIT, EST, LEVL IV, 30-39 MIN: ICD-10-PCS | Mod: S$PBB,,, | Performed by: INTERNAL MEDICINE

## 2019-09-18 PROCEDURE — 96413 CHEMO IV INFUSION 1 HR: CPT

## 2019-09-18 PROCEDURE — 99214 OFFICE O/P EST MOD 30 MIN: CPT | Mod: S$PBB,,, | Performed by: INTERNAL MEDICINE

## 2019-09-18 PROCEDURE — 96367 TX/PROPH/DG ADDL SEQ IV INF: CPT

## 2019-09-18 PROCEDURE — 99999 PR PBB SHADOW E&M-EST. PATIENT-LVL IV: CPT | Mod: PBBFAC,,, | Performed by: INTERNAL MEDICINE

## 2019-09-18 PROCEDURE — 99999 PR PBB SHADOW E&M-EST. PATIENT-LVL IV: ICD-10-PCS | Mod: PBBFAC,,, | Performed by: INTERNAL MEDICINE

## 2019-09-18 RX ORDER — HEPARIN 100 UNIT/ML
500 SYRINGE INTRAVENOUS
Status: CANCELLED | OUTPATIENT
Start: 2019-09-18

## 2019-09-18 RX ORDER — SODIUM CHLORIDE 0.9 % (FLUSH) 0.9 %
10 SYRINGE (ML) INJECTION
Status: CANCELLED | OUTPATIENT
Start: 2019-09-18

## 2019-09-18 RX ORDER — HEPARIN 100 UNIT/ML
500 SYRINGE INTRAVENOUS
Status: DISCONTINUED | OUTPATIENT
Start: 2019-09-18 | End: 2019-09-18 | Stop reason: HOSPADM

## 2019-09-18 RX ORDER — SODIUM CHLORIDE 0.9 % (FLUSH) 0.9 %
10 SYRINGE (ML) INJECTION
Status: DISCONTINUED | OUTPATIENT
Start: 2019-09-18 | End: 2019-09-18 | Stop reason: HOSPADM

## 2019-09-18 RX ADMIN — DIPHENHYDRAMINE HYDROCHLORIDE 50 MG: 50 INJECTION, SOLUTION INTRAMUSCULAR; INTRAVENOUS at 10:09

## 2019-09-18 RX ADMIN — SODIUM CHLORIDE 778 MG: 9 INJECTION, SOLUTION INTRAVENOUS at 11:09

## 2019-09-18 RX ADMIN — DOCETAXEL 80 MG: 10 INJECTION, SOLUTION INTRAVENOUS at 12:09

## 2019-09-18 RX ADMIN — SODIUM CHLORIDE: 9 INJECTION, SOLUTION INTRAVENOUS at 10:09

## 2019-09-18 RX ADMIN — DEXAMETHASONE SODIUM PHOSPHATE 20 MG: 4 INJECTION, SOLUTION INTRAMUSCULAR; INTRAVENOUS at 10:09

## 2019-09-18 NOTE — PROGRESS NOTES
PATIENT: Angel Torres  MRN: [id number]  DATE: 9/18/2019      Diagnosis:   1. Primary cancer of right middle lobe of lung    2. Secondary adenocarcinoma of bone        Chief Complaint: Follow-up      Oncologic History:      Oncologic History Non-small cell lung cancer diagnosed 6/2016   Recurrent disease to lung 4/2017     Oncologic Treatment Cisplatin/Pemetrexed initiated 8/15/16 with concurrent radiation completed 3 cycles 10/3/16; 64 Gy  Carboplatin/pemetrexed/pembrolizumab 7/2017 - 9/2017  Pembrolizumab maintenance 11/2017 (held 10/2018 secondary to rash)  Pembrolizumab restarted 04/2019 - 6/2019 (discontinued due to progression)  Docetaxel/Ramicirumab 7/2019    Pathology  6/2016 : Adenocarcinoma, T2b, N2, M0, Stage IIIA          Subjective:    Interval History: Mr. Torres is a 64 y.o. male who was seen in follow-up for lung cancer.  He states that overall he has been feeling better since starting on chemotherapy.  He tries to stay active and works twice a week, however, this does cause some difficulty because of fatigue.  He is asking to go down on the dose if this is possibly again.  He has developed some extravasation symptoms both arms which happened several days after his chemo infusion.  He denies any pain but states that his arms at these areas will itch and is using a ointment with improvement.  He states his breathing has improved.  Has no other new complaints.    His history dates to approximately 4/2016 when he noted a worsening cough which was associated with clear sputum.  He had a chest x-ray in 5/16 showing a right middle lobe opacity.  Subsequent CT of the chest was performed on 5/30/16 showing a 5.6 cm lesion in the right middle lobe with hilar and mediastinal lymphadenopathy along with hepatic lesions and a left adrenal mass..  He underwent bronchoscopy which was nonrevealing and transbronchial biopsies were nondiagnostic.  He then underwent EBUS with biopsy of mediastinal lymph nodes with  pathology showing non-small cell lung cancer consistent with adenocarcinoma.  He was started on concurrent chemotherapy and radiation with cisplatin and pemetrexed on 8/15/16.  He completed 64 Gy of concurrent radiation with cisplatin and pemetrexed with chemotherapy completed on 10/3/16.  Scans in 4/2017 had indicated progressive disease in the lung.  Subsequent PET scan in 7/2017 had shown spread of disease to bone.  He was started on carboplatin and pemetrexed and pembrolizumab in 7/2017.  Repeat imaging in 8/2017 had shown stability of disease.  He was started on pembrolizumab maintenance in 11/2017 after a delay due to his insurance.  Repeat imaging in 12/2016 had shown mild progressive disease, however, it was felt that due to the excessive delay in treatment we would proceed on rather than change therapy at this time.  CT in 3/2018 had shown stability of his disease.  CT in 05/2018 had shown stable disease.  CT in 08/2018 had shown stable disease.  Pembrolizumab was held in 10/2018 secondary to a rash.  Scans in 04/2019 had shown progressive disease in bone.  Pembrolizumab was restarted in 04/2019 but this was discontinued in 06/2019 secondary to progression.  He was started on treatment with docetaxel and ramicirumab in 07/2019.    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    Skin infection 8/8/2018       Past Surgical HIstory:   Past Surgical History:   Procedure Laterality Date    BRONCHOSCOPY N/A 6/28/2016    Performed by [facility] at [facility] OR 2ND FLR    BRONCHOSCOPY N/A 6/13/2016    Performed by [facility] Provider at [facility] OR  2ND FLR    ENDOBRONCHIAL ULTRASOUND (EBUS) N/A 6/28/2016    Performed by [facility] at [facility] OR 2ND FLR    EXCISION, MASS, LOWER EXTREMITY Left 4/21/2014    Performed by Ryne Dai MD at [facility] OR 2ND FLR    INGUINAL HERNIA REPAIR Bilateral     KNEE SURGERY      REPAIR, HERNIA, INGUINAL, BILATERAL, LAPAROSCOPIC Bilateral 4/21/2014    Performed by Ryne Dai [facility] at [facility] OR 2ND FLR       Family History:   Family History   Problem Relation Age of Onset    Cancer Mother         lung, breast    Cancer Sister         lung    Cancer Maternal Grandfather     No Known Problems Father     No Known Problems Brother     No Known Problems Maternal Aunt     No Known Problems Maternal Uncle     No Known Problems Paternal Aunt     No Known Problems Paternal Uncle     No Known Problems Maternal Grandmother     No Known Problems Paternal Grandmother     No Known Problems Paternal Grandfather     Amblyopia Neg Hx     Blindness Neg Hx     Cataracts Neg Hx     Diabetes Neg Hx     Glaucoma Neg Hx     Hypertension Neg Hx     Macular degeneration Neg Hx     Retinal detachment Neg Hx     Strabismus Neg Hx     Stroke Neg Hx     Thyroid disease Neg Hx        Social History:  reports that he quit smoking about 5 years ago. He has a 80.00 pack-year smoking history. He has quit using smokeless tobacco. He reports that he does not drink alcohol or use drugs.    Allergies:  Review of patient's allergies indicates:  No Known Allergies    Medications:  Current Outpatient Medications   Medication Sig Dispense Refill    albuterol (PROAIR HFA) 90 mcg/actuation inhaler INHALE 2 PUFFS INTO THE LUNGS EVERY 6 HOURS AS NEEDED FOR WHEEZING 17 g 0    albuterol-ipratropium (DUO-NEB) 2.5 mg-0.5 mg/3 mL nebulizer solution Rescue 270 mL 0    amLODIPine (NORVASC) 10 MG tablet Take 1 tablet (10 mg total) by mouth once daily. 90 tablet 3    cyproheptadine (PERIACTIN) 4 mg tablet Take 1 tablet (4 mg total) by  mouth 3 (three) times daily as needed. 90 tablet 1    dexAMETHasone (DECADRON) 4 MG Tab Take 2 tablets (8 mg total) by mouth every 12 (twelve) hours. Take for 3 days starting day prior to chemotherapy 60 tablet 1    dronabinol (MARINOL) 5 MG capsule Take 1 capsule (5 mg total) by mouth 2 (two) times daily before meals. 60 capsule 0    fluticasone (FLONASE) 50 mcg/actuation nasal spray SHAKE LIQUID AND USE 2 SPRAYS IN EACH NOSTRIL EVERY DAY 16 g 1    mupirocin (BACTROBAN) 2 % ointment Apply topically 2 (two) times daily. To open areas 22 g 2    naproxen (NAPROSYN) 500 MG tablet Take 1 tablet (500 mg total) by mouth 2 (two) times daily as needed (headache). 60 tablet 0    ondansetron (ZOFRAN-ODT) 8 MG TbDL Take 1 tablet (8 mg total) by mouth every 12 (twelve) hours as needed. 30 tablet 3    triamcinolone acetonide 0.1% (KENALOG) 0.1 % ointment To all rashes BID 80 g 2    cetirizine (ZYRTEC) 10 MG tablet Take 1 tablet (10 mg total) by mouth once daily.  0     No current facility-administered medications for this visit.        Review of Systems   Constitutional: Positive for fatigue and unexpected weight change. Negative for activity change, appetite change, chills and fever.        Normal weight 220   HENT: Negative for dental problem, nosebleeds, sinus pressure, sneezing and trouble swallowing.    Eyes: Negative for visual disturbance.   Respiratory: Positive for cough and shortness of breath. Negative for choking, chest tightness and wheezing.    Cardiovascular: Negative for chest pain and leg swelling.   Gastrointestinal: Negative for abdominal pain, blood in stool, constipation, diarrhea and nausea.   Genitourinary: Negative for difficulty urinating and dysuria.   Musculoskeletal: Positive for myalgias. Negative for arthralgias and back pain.   Skin: Negative for rash and wound.   Neurological: Negative for dizziness, light-headedness and headaches.   Hematological: Negative for adenopathy. Does not  "bruise/bleed easily.   Psychiatric/Behavioral: Negative for sleep disturbance. The patient is not nervous/anxious.        ECOG Performance Status: 1   Objective:      Vitals:   Vitals:    09/18/19 0928   BP: (!) 125/94   BP Location: Right arm   Patient Position: Sitting   BP Method: Large (Automatic)   Pulse: 99   Resp: 16   Temp: 97.5 °F (36.4 °C)   TempSrc: Oral   SpO2: 96%   Weight: 76.6 kg (168 lb 14 oz)   Height: 6' 4" (1.93 m)     BMI: Body mass index is 20.56 kg/m².    Physical Exam   Constitutional: He is oriented to person, place, and time. He appears cachectic.   HENT:   Head: Normocephalic and atraumatic.   Eyes: Pupils are equal, round, and reactive to light.   Neck: Normal range of motion. Neck supple.   Cardiovascular: Normal rate and regular rhythm.   Pulmonary/Chest: Effort normal. No respiratory distress. He has no rales.   Abdominal: Soft. He exhibits no distension. There is no tenderness.   Musculoskeletal: He exhibits no edema or tenderness.   Lymphadenopathy:     He has no cervical adenopathy.   Neurological: He is alert and oriented to person, place, and time. No cranial nerve deficit.   Skin: Skin is warm and dry. No rash noted.   Psychiatric: He has a normal mood and affect. His behavior is normal.       Laboratory Data:  Lab Visit on 09/18/2019   Component Date Value Ref Range Status    WBC 09/18/2019 8.35  3.90 - 12.70 K/uL Final    RBC 09/18/2019 4.76  4.60 - 6.20 M/uL Final    Hemoglobin 09/18/2019 12.7* 14.0 - 18.0 g/dL Final    Hematocrit 09/18/2019 41.7  40.0 - 54.0 % Final    Mean Corpuscular Volume 09/18/2019 88  82 - 98 fL Final    Mean Corpuscular Hemoglobin 09/18/2019 26.7* 27.0 - 31.0 pg Final    Mean Corpuscular Hemoglobin Conc 09/18/2019 30.5* 32.0 - 36.0 g/dL Final    RDW 09/18/2019 16.2* 11.5 - 14.5 % Final    Platelets 09/18/2019 473* 150 - 350 K/uL Final    MPV 09/18/2019 9.4  9.2 - 12.9 fL Final    Gran # (ANC) 09/18/2019 6.2  1.8 - 7.7 K/uL Final    Comment: " The ANC is based on a white cell differential from an   automated cell counter. It has not been microscopically   reviewed for the presence of abnormal cells. Clinical   correlation is required.      Immature Grans (Abs) 09/18/2019 0.06* 0.00 - 0.04 K/uL Final    Comment: Mild elevation in immature granulocytes is non specific and   can be seen in a variety of conditions including stress response,   acute inflammation, trauma and pregnancy. Correlation with other   laboratory and clinical findings is essential.      Sodium 09/18/2019 143  136 - 145 mmol/L Final    Potassium 09/18/2019 4.8  3.5 - 5.1 mmol/L Final    Chloride 09/18/2019 106  95 - 110 mmol/L Final    CO2 09/18/2019 29  23 - 29 mmol/L Final    Glucose 09/18/2019 86  70 - 110 mg/dL Final    BUN, Bld 09/18/2019 9  8 - 23 mg/dL Final    Creatinine 09/18/2019 1.1  0.5 - 1.4 mg/dL Final    Calcium 09/18/2019 9.4  8.7 - 10.5 mg/dL Final    Total Protein 09/18/2019 7.0  6.0 - 8.4 g/dL Final    Albumin 09/18/2019 3.1* 3.5 - 5.2 g/dL Final    Total Bilirubin 09/18/2019 0.2  0.1 - 1.0 mg/dL Final    Comment: For infants and newborns, interpretation of results should be based  on gestational age, weight and in agreement with clinical  observations.  Premature Infant recommended reference ranges:  Up to 24 hours.............<8.0 mg/dL  Up to 48 hours............<12.0 mg/dL  3-5 days..................<15.0 mg/dL  6-29 days.................<15.0 mg/dL      Alkaline Phosphatase 09/18/2019 63  55 - 135 U/L Final    AST 09/18/2019 23  10 - 40 U/L Final    ALT 09/18/2019 21  10 - 44 U/L Final    Anion Gap 09/18/2019 8  8 - 16 mmol/L Final    eGFR if African American 09/18/2019 >60.0  >60 mL/min/1.73 m^2 Final    eGFR if non African American 09/18/2019 >60.0  >60 mL/min/1.73 m^2 Final    Comment: Calculation used to obtain the estimated glomerular filtration  rate (eGFR) is the CKD-EPI equation.      Lab Visit on 08/26/2019   Component Date Value Ref  Range Status    WBC 08/26/2019 5.31  3.90 - 12.70 K/uL Final    RBC 08/26/2019 4.99  4.60 - 6.20 M/uL Final    Hemoglobin 08/26/2019 13.5* 14.0 - 18.0 g/dL Final    Hematocrit 08/26/2019 43.4  40.0 - 54.0 % Final    Mean Corpuscular Volume 08/26/2019 87  82 - 98 fL Final    Mean Corpuscular Hemoglobin 08/26/2019 27.1  27.0 - 31.0 pg Final    Mean Corpuscular Hemoglobin Conc 08/26/2019 31.1* 32.0 - 36.0 g/dL Final    RDW 08/26/2019 15.9* 11.5 - 14.5 % Final    Platelets 08/26/2019 333  150 - 350 K/uL Final    MPV 08/26/2019 10.8  9.2 - 12.9 fL Final    Gran # (ANC) 08/26/2019 2.9  1.8 - 7.7 K/uL Final    Comment: The ANC is based on a white cell differential from an   automated cell counter. It has not been microscopically   reviewed for the presence of abnormal cells. Clinical   correlation is required.      Immature Grans (Abs) 08/26/2019 0.02  0.00 - 0.04 K/uL Final    Comment: Mild elevation in immature granulocytes is non specific and   can be seen in a variety of conditions including stress response,   acute inflammation, trauma and pregnancy. Correlation with other   laboratory and clinical findings is essential.      Sodium 08/26/2019 139  136 - 145 mmol/L Final    Potassium 08/26/2019 4.1  3.5 - 5.1 mmol/L Final    Chloride 08/26/2019 105  95 - 110 mmol/L Final    CO2 08/26/2019 26  23 - 29 mmol/L Final    Glucose 08/26/2019 85  70 - 110 mg/dL Final    BUN, Bld 08/26/2019 12  8 - 23 mg/dL Final    Creatinine 08/26/2019 0.8  0.5 - 1.4 mg/dL Final    Calcium 08/26/2019 9.6  8.7 - 10.5 mg/dL Final    Total Protein 08/26/2019 7.3  6.0 - 8.4 g/dL Final    Albumin 08/26/2019 3.7  3.5 - 5.2 g/dL Final    Total Bilirubin 08/26/2019 0.3  0.1 - 1.0 mg/dL Final    Comment: For infants and newborns, interpretation of results should be based  on gestational age, weight and in agreement with clinical  observations.  Premature Infant recommended reference ranges:  Up to 24 hours.............<8.0  mg/dL  Up to 48 hours............<12.0 mg/dL  3-5 days..................<15.0 mg/dL  6-29 days.................<15.0 mg/dL      Alkaline Phosphatase 08/26/2019 77  55 - 135 U/L Final    AST 08/26/2019 19  10 - 40 U/L Final    ALT 08/26/2019 16  10 - 44 U/L Final    Anion Gap 08/26/2019 8  8 - 16 mmol/L Final    eGFR if African American 08/26/2019 >60.0  >60 mL/min/1.73 m^2 Final    eGFR if non African American 08/26/2019 >60.0  >60 mL/min/1.73 m^2 Final    Comment: Calculation used to obtain the estimated glomerular filtration  rate (eGFR) is the CKD-EPI equation.       TSH 08/26/2019 4.142* 0.400 - 4.000 uIU/mL Final    Free T4 08/26/2019 1.15  0.71 - 1.51 ng/dL Final     Supplemental Diagnosis 6/30/16  Immunohistochemical stains are completed on the Station 4L lymph node cell block material and reveal the tumor  cells to stain positively with cytokeratin 7 and TTF-1. The tumor cells show nonspecific blush staining with  cytokeratin 5/6 and are negative for p63. This staining profile supports a diagnosis of non-small cell carcinoma  consistent with adenocarcinoma.  Cell block material will be sent for EGFR, ALK and ROS-1 testing and results will follow in a supplemental report.  (Electronically Signed: 2016-06-30 11:50:31 )  Diagnosed by: Irene Amaro M.D.  FINAL PATHOLOGIC DIAGNOSIS  1. Lymph node, Station 4L, EBUS guided fine needle aspiration:  Positive for malignant cells, non-small cell carcinoma.  Rare background lymphocytes are present.  Immunohistochemical staining be completed to further characterize this malignancy and results will follow in a  supplemental report.  2. Lymph node, Station 7, EBUS guided fine needle aspiration:  Positive for malignant cells, non-small cell carcinoma tumor identical to that seen in specimen #1.  Background lymphocytes are present.    Imaging:   CT 08/21/2019    COMPARISON:  CT 06/17/2019    FINDINGS:  Base of neck and thoracic soft tissues are  unremarkable.    Left-sided aortic arch.  Thoracic aorta is normal in caliber with mild calcific atherosclerosis.  Heart is normal in size without pericardial effusion.  Coronary artery atherosclerosis.    No mediastinal adenopathy.    Emphysematous changes of the lungs.  Left lung is clear.  Chronic right pleural effusion, similar.  Geographic region of volume loss and consolidation within the right perihilar region which may represent a treated lesion versus post radiation changes.  This region measures slightly smaller comparison to prior examination at 2.9 x 3.1 cm (series 2, image 71).  Stable adjacent solid pulmonary nodule within the right lower lobe measuring 0.8 cm.  Inferior and peripheral to the perihilar consolidation there is a region of airspace opacification/nodular densities within the right lung base which appears similar in comparison to prior exam, though of uncertain etiology.    Liver is normal in size and attenuation.  Several hepatic cysts.  Several additional subcentimeter hepatic hypodensities too small to characterize however likely represent cysts.  No gallstones or biliary ductal dilatation.  Portal vein is patent.    Spleen, right adrenal gland, and pancreas are unremarkable.  Stable 1.2 cm left adrenal nodule.    Small and large bowel are normal caliber.  No evidence of bowel obstruction or inflammation.  No ascites or free air.    Kidneys are normal in size and demonstrate appropriate concentration of contrast.  No hydronephrosis or ureteral dilatation.  Focal contour change at the bladder dome measuring 2.5 cm with wall thickening and enhancement, progressed in comparison to prior exam (series 2, image 175).  Mild prostatomegaly.    No retroperitoneal or mesenteric adenopathy.    Normal caliber abdominal aorta with moderate atherosclerosis along its course and branch vessels.  No aneurysm.    Osseous structures demonstrate numerous scattered lytic and blastic lesions, overall similar  to prior examination.  T2 vertebral body lesion measures 1.7 cm (series 2, image 9).  Right iliac wing lesion measures 2.2 cm (series 2, image 160).  Healing left 9th rib fracture.  New acute appearing right 11th rib fracture.  Vertebral body heights are maintained without evidence of compression fracture.      Impression       1. In this patient with history of lung cancer there is a geographic region of volume loss and consolidation within the right perihilar region which may represent a treated lesion versus post radiation changes.  Additional nonspecific airspace opacification/nodular densities within the right lung base.  Chronic right pleural effusion.  Pulmonary findings are grossly similar in comparison prior exam.  2. Numerous scattered lytic and blastic osseous metastatic lesions, grossly similar to prior exam.  New acute appearing right 11th rib fracture.  Healing left 9th rib fracture.  3. Contour change of the bladder dome with wall thickening and enhancement, progressed from prior exam.  Finding may represent tumor versus cystitis, consider urology consult and cystoscopy.  4. Additional findings as above.  5.  This report was flagged in Epic as abnormal.  RECIST SUMMARY:    Date of prior examination for comparison: 06/17/2019    Lesion 1: Right middle lobe.  3.1 cm. Series 2 image 71.  Prior measurement 3.7 cm.    Lesion 2: T2 lytic lesion.  1.7 cm. Series 2 image 9.  Prior measurement 1.6 cm.    Lesion 3: Right iliac wing.  2.2 cm. Series 2 image 160.  Prior measurement 2.1 cm.                Assessment:       1. Primary cancer of right middle lobe of lung    2. Secondary adenocarcinoma of bone           Plan:     Mr. Torres continues to do better clinically.  I will decrease the dose of docetaxel to 40 milligrams/meter squared.  Proceed on to next cycle today and will plan to see back in another 3 weeks.  Report any new symptoms in the interim.  All questions were answered and he is agreeable with  this plan.    Dano Fernandez DO, FACP  Hematology & Oncology  [address] [place] [address]  ph. [phone]  Fax. [phone]    25 minutes were spent in coordination of patient's care, record review and counseling.  More than 50% of the time was face-to-face.

## 2019-09-18 NOTE — PLAN OF CARE
Problem: Adult Inpatient Plan of Care  Goal: Plan of Care Review  Outcome: Ongoing (interventions implemented as appropriate)  1350:  Patient tolerated Cyramza & Taxotere infusions well, NAD noted, denies any changes.  PIV removed intact, AVS given.  Released in stable condition, via wheelchair (d/t BURDEN) accompanied by his sister.

## 2019-09-18 NOTE — Clinical Note
Docetaxel/ramicirumab today (I have decreased the dose of docetaxel)See me in [place] in 3 weeks with CBC, CMP and arrange for next cycle at that time.

## 2019-09-24 ENCOUNTER — TELEPHONE (OUTPATIENT)
Dept: NEUROLOGY | Facility: HOSPITAL | Age: 64
End: 2019-09-24

## 2019-09-24 NOTE — TELEPHONE ENCOUNTER
----- Message from Edel Fay sent at 9/24/2019  2:31 PM CDT -----  Contact: Pt wife Flavia   Pt wife Flavia called to speak with nurse about FMLA Paper work have some questions   Callback#[phone]  Thank You  ABRAHAN Fay

## 2019-10-01 ENCOUNTER — TELEPHONE (OUTPATIENT)
Dept: HEMATOLOGY/ONCOLOGY | Facility: CLINIC | Age: 64
End: 2019-10-01

## 2019-10-01 ENCOUNTER — TELEPHONE (OUTPATIENT)
Dept: NEUROLOGY | Facility: HOSPITAL | Age: 64
End: 2019-10-01

## 2019-10-01 NOTE — TELEPHONE ENCOUNTER
----- Message from Lisa Lau sent at 10/1/2019  9:55 AM CDT -----  Contact: Matilda  Diamond was returning Lauren phone call.    Pt# [phone]

## 2019-10-01 NOTE — TELEPHONE ENCOUNTER
tried reaching out to pt on today in regards to upcoming appointments, but no answer,a detail message left on v/m.

## 2019-10-06 DIAGNOSIS — J44.9 CHRONIC OBSTRUCTIVE PULMONARY DISEASE, UNSPECIFIED COPD TYPE: ICD-10-CM

## 2019-10-07 RX ORDER — ALBUTEROL SULFATE 90 UG/1
AEROSOL, METERED RESPIRATORY (INHALATION)
Qty: 8.5 G | Refills: 0 | Status: SHIPPED | OUTPATIENT
Start: 2019-10-07

## 2019-10-08 ENCOUNTER — TELEPHONE (OUTPATIENT)
Dept: HEMATOLOGY/ONCOLOGY | Facility: CLINIC | Age: 64
End: 2019-10-08

## 2019-10-08 NOTE — TELEPHONE ENCOUNTER
----- Message from Nat Ila sent at 10/8/2019  2:19 PM CDT -----  Contact: Wife:  Diamond    tel:   [phone]   Caller says pt. Needs to have all of his appts. On the same day, only on a Wednesday.  Wife  Says  She needs  You to move that infusion to the same day with his other appts. On Wednesday.    Pls call today. As per caller.

## 2019-10-16 ENCOUNTER — LAB VISIT (OUTPATIENT)
Dept: LAB | Facility: HOSPITAL | Age: 64
End: 2019-10-16
Attending: INTERNAL MEDICINE
Payer: MEDICAID

## 2019-10-16 ENCOUNTER — OFFICE VISIT (OUTPATIENT)
Dept: HEMATOLOGY/ONCOLOGY | Facility: CLINIC | Age: 64
End: 2019-10-16
Payer: MEDICAID

## 2019-10-16 VITALS
TEMPERATURE: 98 F | OXYGEN SATURATION: 98 % | BODY MASS INDEX: 19.79 KG/M2 | WEIGHT: 162.5 LBS | SYSTOLIC BLOOD PRESSURE: 119 MMHG | RESPIRATION RATE: 20 BRPM | DIASTOLIC BLOOD PRESSURE: 89 MMHG | HEART RATE: 111 BPM | HEIGHT: 76 IN

## 2019-10-16 DIAGNOSIS — C34.2 PRIMARY CANCER OF RIGHT MIDDLE LOBE OF LUNG: Primary | ICD-10-CM

## 2019-10-16 DIAGNOSIS — C34.2 PRIMARY CANCER OF RIGHT MIDDLE LOBE OF LUNG: ICD-10-CM

## 2019-10-16 DIAGNOSIS — R21 RASH AND NONSPECIFIC SKIN ERUPTION: ICD-10-CM

## 2019-10-16 DIAGNOSIS — J44.9 CHRONIC OBSTRUCTIVE PULMONARY DISEASE, UNSPECIFIED COPD TYPE: ICD-10-CM

## 2019-10-16 DIAGNOSIS — C79.51 SECONDARY ADENOCARCINOMA OF BONE: ICD-10-CM

## 2019-10-16 DIAGNOSIS — R53.0 NEOPLASTIC MALIGNANT RELATED FATIGUE: ICD-10-CM

## 2019-10-16 LAB
ALBUMIN SERPL BCP-MCNC: 3.5 G/DL (ref 3.5–5.2)
ALP SERPL-CCNC: 59 U/L (ref 55–135)
ALT SERPL W/O P-5'-P-CCNC: 15 U/L (ref 10–44)
ANION GAP SERPL CALC-SCNC: 10 MMOL/L (ref 8–16)
AST SERPL-CCNC: 19 U/L (ref 10–40)
BACTERIA #/AREA URNS AUTO: NORMAL /HPF
BILIRUB SERPL-MCNC: 0.4 MG/DL (ref 0.1–1)
BILIRUB UR QL STRIP: NEGATIVE
BUN SERPL-MCNC: 14 MG/DL (ref 8–23)
CALCIUM SERPL-MCNC: 9.6 MG/DL (ref 8.7–10.5)
CHLORIDE SERPL-SCNC: 105 MMOL/L (ref 95–110)
CLARITY UR REFRACT.AUTO: CLEAR
CO2 SERPL-SCNC: 26 MMOL/L (ref 23–29)
COLOR UR AUTO: YELLOW
CREAT SERPL-MCNC: 1.1 MG/DL (ref 0.5–1.4)
ERYTHROCYTE [DISTWIDTH] IN BLOOD BY AUTOMATED COUNT: 18 % (ref 11.5–14.5)
EST. GFR  (AFRICAN AMERICAN): >60 ML/MIN/1.73 M^2
EST. GFR  (NON AFRICAN AMERICAN): >60 ML/MIN/1.73 M^2
GLUCOSE SERPL-MCNC: 77 MG/DL (ref 70–110)
GLUCOSE UR QL STRIP: NEGATIVE
HCT VFR BLD AUTO: 42.3 % (ref 40–54)
HGB BLD-MCNC: 12.9 G/DL (ref 14–18)
HGB UR QL STRIP: NEGATIVE
IMM GRANULOCYTES # BLD AUTO: 0.02 K/UL (ref 0–0.04)
KETONES UR QL STRIP: NEGATIVE
LEUKOCYTE ESTERASE UR QL STRIP: NEGATIVE
MCH RBC QN AUTO: 26.5 PG (ref 27–31)
MCHC RBC AUTO-ENTMCNC: 30.5 G/DL (ref 32–36)
MCV RBC AUTO: 87 FL (ref 82–98)
MICROSCOPIC COMMENT: NORMAL
NEUTROPHILS # BLD AUTO: 5 K/UL (ref 1.8–7.7)
NITRITE UR QL STRIP: NEGATIVE
PH UR STRIP: 5 [PH] (ref 5–8)
PLATELET # BLD AUTO: 398 K/UL (ref 150–350)
PMV BLD AUTO: 9.9 FL (ref 9.2–12.9)
POTASSIUM SERPL-SCNC: 4.5 MMOL/L (ref 3.5–5.1)
PROT SERPL-MCNC: 7.2 G/DL (ref 6–8.4)
PROT UR QL STRIP: NEGATIVE
RBC # BLD AUTO: 4.86 M/UL (ref 4.6–6.2)
RBC #/AREA URNS AUTO: 1 /HPF (ref 0–4)
SODIUM SERPL-SCNC: 141 MMOL/L (ref 136–145)
SP GR UR STRIP: 1.02 (ref 1–1.03)
TSH SERPL DL<=0.005 MIU/L-ACNC: 1.76 UIU/ML (ref 0.4–4)
URN SPEC COLLECT METH UR: NORMAL
WBC # BLD AUTO: 6.81 K/UL (ref 3.9–12.7)
WBC #/AREA URNS AUTO: 2 /HPF (ref 0–5)

## 2019-10-16 PROCEDURE — 36415 COLL VENOUS BLD VENIPUNCTURE: CPT

## 2019-10-16 PROCEDURE — 99999 PR PBB SHADOW E&M-EST. PATIENT-LVL III: ICD-10-PCS | Mod: PBBFAC,,, | Performed by: INTERNAL MEDICINE

## 2019-10-16 PROCEDURE — 99999 PR PBB SHADOW E&M-EST. PATIENT-LVL III: CPT | Mod: PBBFAC,,, | Performed by: INTERNAL MEDICINE

## 2019-10-16 PROCEDURE — 99213 OFFICE O/P EST LOW 20 MIN: CPT | Mod: PBBFAC | Performed by: INTERNAL MEDICINE

## 2019-10-16 PROCEDURE — 81001 URINALYSIS AUTO W/SCOPE: CPT

## 2019-10-16 PROCEDURE — 85027 COMPLETE CBC AUTOMATED: CPT

## 2019-10-16 PROCEDURE — 84443 ASSAY THYROID STIM HORMONE: CPT

## 2019-10-16 PROCEDURE — 99214 PR OFFICE/OUTPT VISIT, EST, LEVL IV, 30-39 MIN: ICD-10-PCS | Mod: S$PBB,,, | Performed by: INTERNAL MEDICINE

## 2019-10-16 PROCEDURE — 99214 OFFICE O/P EST MOD 30 MIN: CPT | Mod: S$PBB,,, | Performed by: INTERNAL MEDICINE

## 2019-10-16 PROCEDURE — 80053 COMPREHEN METABOLIC PANEL: CPT

## 2019-10-16 RX ORDER — TRIAMCINOLONE ACETONIDE 1 MG/G
OINTMENT TOPICAL
Qty: 80 G | Refills: 4 | Status: SHIPPED | OUTPATIENT
Start: 2019-10-16

## 2019-10-16 RX ORDER — HEPARIN 100 UNIT/ML
500 SYRINGE INTRAVENOUS
Status: CANCELLED | OUTPATIENT
Start: 2019-10-30

## 2019-10-16 RX ORDER — SODIUM CHLORIDE 0.9 % (FLUSH) 0.9 %
10 SYRINGE (ML) INJECTION
Status: CANCELLED | OUTPATIENT
Start: 2019-10-30

## 2019-10-16 RX ORDER — IPRATROPIUM BROMIDE AND ALBUTEROL SULFATE 2.5; .5 MG/3ML; MG/3ML
SOLUTION RESPIRATORY (INHALATION)
Qty: 270 ML | Refills: 0 | Status: SHIPPED | OUTPATIENT
Start: 2019-10-16 | End: 2019-10-30 | Stop reason: SDUPTHER

## 2019-10-16 NOTE — Clinical Note
- please schedule CT CAP 10/23- RTC at 2 PM 10/25 with CBC, CMP- will give patient a treatment break and make decision on next cycle next visit.

## 2019-10-16 NOTE — PROGRESS NOTES
ONCOLOGY FOLLOW UP VISIT.     Reason for visit: Toxicity visit for docetaxel + ramucirumab for stage IV non small cell lung cancer    Best Contact Phone Number(s): [phone] (home) [phone] (work)     Cancer/Stage/TNM:   Cancer Staging  No matching staging information was found for the patient.        Primary cancer of right middle lobe of lung    7/11/2016 Initial Diagnosis     Primary cancer of right middle lobe of lung      7/10/2019 -  Chemotherapy     Treatment Summary   Plan Name: OP NSCLC RAMUCIRUMAB DOCETAXEL FOR NSCL METS Q3W  Treatment Goal: Palliative  Status: Active  Start Date: 7/17/2019  End Date: 10/23/2019 (Planned)  Provider: Dano Fernandez DO, FACP  Chemotherapy: DOCEtaxel (TAXOTERE) 75 mg/m2 = 155 mg in sodium chloride 0.9% 250 mL chemo infusion, 75 mg/m2 = 155 mg, Intravenous, Clinic/HOD 1 time, 4 of 6 cycles  Dose modification: 60 mg/m2 (original dose 75 mg/m2, Cycle 2), 40 mg/m2 (original dose 75 mg/m2, Cycle 4)  Administration: 155 mg (7/17/2019), 125 mg (8/7/2019), 125 mg (8/28/2019), 80 mg (9/18/2019)  ramucirumab (CYRAMZA) 778 mg in sodium chloride 0.9% 250 mL chemo infusion, 10 mg/kg = 778 mg, Intravenous, Clinic/HOD 1 time, 4 of 6 cycles  Administration: 778 mg (7/17/2019), 778 mg (8/7/2019), 778 mg (8/28/2019), 778 mg (9/18/2019)        Secondary adenocarcinoma of bone    7/26/2017 Initial Diagnosis     Secondary adenocarcinoma of bone      7/10/2019 -  Chemotherapy     Treatment Summary   Plan Name: OP NSCLC RAMUCIRUMAB DOCETAXEL FOR NSCL METS Q3W  Treatment Goal: Palliative  Status: Active  Start Date: 7/17/2019  End Date: 10/23/2019 (Planned)  Provider: Dano Fernandez DO, FACP  Chemotherapy: DOCEtaxel (TAXOTERE) 75 mg/m2 = 155 mg in sodium chloride 0.9% 250 mL chemo infusion, 75 mg/m2 = 155 mg, Intravenous, Clinic/HOD 1 time, 4 of 6 cycles  Dose modification: 60 mg/m2 (original dose 75 mg/m2, Cycle 2), 40 mg/m2 (original dose 75 mg/m2, Cycle 4)  Administration: 155 mg  (7/17/2019), 125 mg (8/7/2019), 125 mg (8/28/2019), 80 mg (9/18/2019)  ramucirumab (CYRAMZA) 778 mg in sodium chloride 0.9% 250 mL chemo infusion, 10 mg/kg = 778 mg, Intravenous, Clinic/HOD 1 time, 4 of 6 cycles  Administration: 778 mg (7/17/2019), 778 mg (8/7/2019), 778 mg (8/28/2019), 778 mg (9/18/2019)          Interval History:   Today patient reports significant fatigue and body aches from chemotherapy.  He is requesting a break in treatment.  He also refused a port placement originally for personal reasons, and now has significant phlebitis and cutaneous reactions from chemotherapy.     ROS:  Review of Systems   Constitutional: Positive for activity change and fatigue. Negative for chills and fever.   HENT: Negative for mouth sores, nosebleeds and sore throat.    Respiratory: Negative for cough, shortness of breath and wheezing.    Cardiovascular: Negative for chest pain, palpitations and leg swelling.   Gastrointestinal: Negative for abdominal distention, abdominal pain and blood in stool.   Endocrine: Negative for cold intolerance and heat intolerance.   Genitourinary: Negative for dysuria, flank pain and frequency.   Musculoskeletal: Positive for arthralgias and myalgias. Negative for joint swelling.   Skin: Positive for rash. Negative for color change and wound.   Neurological: Negative for dizziness, light-headedness and headaches.   Hematological: Negative for adenopathy. Does not bruise/bleed easily.      A complete 12-point review of systems was reviewed and is negative except as mentioned above.     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     Skin infection 8/8/2018        Allergies:   Review of patient's allergies indicates:  No Known Allergies     Medications:   Current Outpatient Medications   Medication Sig Dispense Refill    albuterol (PROAIR HFA) 90 mcg/actuation inhaler INHALE 2 PUFFS INTO THE LUNGS EVERY 6 HOURS AS NEEDED FOR WHEEZING 17 g 0    albuterol (PROVENTIL/VENTOLIN HFA) 90 mcg/actuation inhaler INHALE 2 PUFFS INTO THE LUNGS EVERY 6 HOURS AS NEEDED FOR WHEEZING 8.5 g 0    albuterol-ipratropium (DUO-NEB) 2.5 mg-0.5 mg/3 mL nebulizer solution Rescue 270 mL 0    dexAMETHasone (DECADRON) 4 MG Tab Take 2 tablets (8 mg total) by mouth every 12 (twelve) hours. Take for 3 days starting day prior to chemotherapy 60 tablet 1    fluticasone (FLONASE) 50 mcg/actuation nasal spray SHAKE LIQUID AND USE 2 SPRAYS IN EACH NOSTRIL EVERY DAY 16 g 1    mupirocin (BACTROBAN) 2 % ointment Apply topically 2 (two) times daily. To open areas 22 g 2    naproxen (NAPROSYN) 500 MG tablet Take 1 tablet (500 mg total) by mouth 2 (two) times daily as needed (headache). 60 tablet 0    triamcinolone acetonide 0.1% (KENALOG) 0.1 % ointment To all rashes BID 80 g 4    amLODIPine (NORVASC) 10 MG tablet Take 1 tablet (10 mg total) by mouth once daily. (Patient not taking: Reported on 10/16/2019) 90 tablet 3    cetirizine (ZYRTEC) 10 MG tablet Take 1 tablet (10 mg total) by mouth once daily.  0    cyproheptadine (PERIACTIN) 4 mg tablet Take 1 tablet (4 mg total) by mouth 3 (three) times daily as needed. (Patient not taking: Reported on 10/16/2019) 90 tablet 1    dronabinol (MARINOL) 5 MG capsule Take 1 capsule (5 mg total) by mouth 2 (two) times daily before meals. (Patient not taking: Reported on 10/16/2019) 60 capsule 0    ondansetron (ZOFRAN-ODT) 8 MG TbDL Take 1 tablet (8 mg total) by mouth every 12 (twelve) hours as needed. (Patient not taking: Reported on 10/16/2019) 30 tablet 3     No current facility-administered medications for this visit.      "    Physical Exam:   /89 (BP Location: Left arm, Patient Position: Sitting, BP Method: Medium (Automatic))   Pulse (!) 111   Temp 97.5 °F (36.4 °C) (Oral)   Resp 20   Ht 6' 4" (1.93 m)   Wt 73.7 kg (162 lb 7.7 oz)   SpO2 98%   BMI 19.78 kg/m²      ECOG Performance Status: (foot note - ECOG PS provided by Eastern Cooperative Oncology Group) 2 - Symptomatic, <50% confined to bed    Physical Exam   Constitutional: He is oriented to person, place, and time. No distress.   Cachectic, temporal wasting   HENT:   Head: Normocephalic and atraumatic.   Eyes: Pupils are equal, round, and reactive to light. Conjunctivae and EOM are normal.   Neck: Normal range of motion. Neck supple.   Cardiovascular: Normal rate and regular rhythm. Exam reveals no friction rub.   No murmur heard.  Pulmonary/Chest: Effort normal and breath sounds normal.   Abdominal: Soft. Bowel sounds are normal. There is no tenderness.   Musculoskeletal: Normal range of motion. He exhibits no edema.   Lymphadenopathy:     He has no cervical adenopathy.     He has no axillary adenopathy.   Neurological: He is alert and oriented to person, place, and time.   Skin: Skin is warm and dry. Rash (psoriatic lesion over L hand, tracking plaque lesions along veins used for chemotherapy) noted.   Psychiatric: He has a normal mood and affect. His behavior is normal.         Labs:   Recent Results (from the past 48 hour(s))   CBC ONCOLOGY    Collection Time: 10/16/19 11:06 AM   Result Value Ref Range    WBC 6.81 3.90 - 12.70 K/uL    RBC 4.86 4.60 - 6.20 M/uL    Hemoglobin 12.9 (L) 14.0 - 18.0 g/dL    Hematocrit 42.3 40.0 - 54.0 %    Mean Corpuscular Volume 87 82 - 98 fL    Mean Corpuscular Hemoglobin 26.5 (L) 27.0 - 31.0 pg    Mean Corpuscular Hemoglobin Conc 30.5 (L) 32.0 - 36.0 g/dL    RDW 18.0 (H) 11.5 - 14.5 %    Platelets 398 (H) 150 - 350 K/uL    MPV 9.9 9.2 - 12.9 fL    Gran # (ANC) 5.0 1.8 - 7.7 K/uL    Immature Grans (Abs) 0.02 0.00 - 0.04 K/uL "   Comprehensive metabolic panel    Collection Time: 10/16/19 11:06 AM   Result Value Ref Range    Sodium 141 136 - 145 mmol/L    Potassium 4.5 3.5 - 5.1 mmol/L    Chloride 105 95 - 110 mmol/L    CO2 26 23 - 29 mmol/L    Glucose 77 70 - 110 mg/dL    BUN, Bld 14 8 - 23 mg/dL    Creatinine 1.1 0.5 - 1.4 mg/dL    Calcium 9.6 8.7 - 10.5 mg/dL    Total Protein 7.2 6.0 - 8.4 g/dL    Albumin 3.5 3.5 - 5.2 g/dL    Total Bilirubin 0.4 0.1 - 1.0 mg/dL    Alkaline Phosphatase 59 55 - 135 U/L    AST 19 10 - 40 U/L    ALT 15 10 - 44 U/L    Anion Gap 10 8 - 16 mmol/L    eGFR if African American >60.0 >60 mL/min/1.73 m^2    eGFR if non African American >60.0 >60 mL/min/1.73 m^2   TSH    Collection Time: 10/16/19 11:06 AM   Result Value Ref Range    TSH 1.757 0.400 - 4.000 uIU/mL   Urinalysis    Collection Time: 10/16/19 11:33 AM   Result Value Ref Range    Specimen UA Urine, Clean Catch     Color, UA Yellow Yellow, Straw, Lauren    Appearance, UA Clear Clear    pH, UA 5.0 5.0 - 8.0    Specific Gravity, UA 1.020 1.005 - 1.030    Protein, UA Negative Negative    Glucose, UA Negative Negative    Ketones, UA Negative Negative    Bilirubin (UA) Negative Negative    Occult Blood UA Negative Negative    Nitrite, UA Negative Negative    Leukocytes, UA Negative Negative   Urinalysis Microscopic    Collection Time: 10/16/19 11:33 AM   Result Value Ref Range    RBC, UA 1 0 - 4 /hpf    WBC, UA 2 0 - 5 /hpf    Bacteria Occasional None-Occ /hpf    Microscopic Comment SEE COMMENT       I have reviewed above labs significant for mild anemia, but good kidney/liver function.    CT Chest Abdoment Pelvis With Contrast  Narrative: EXAMINATION:  CT CHEST ABDOMEN PELVIS WITH CONTRAST (XPD)    CLINICAL HISTORY:  Neoplasm: abdomen, metastatic, recurrence, suspected/known;Neoplasm of unspecified behavior of other specified sites    TECHNIQUE:  Low dose axial images, sagittal and coronal reformations were obtained from the thoracic inlet to the pubic  synthesis following the intravenous administration of 100 mL of Omnipaque 350.    COMPARISON:  CT 06/17/2019    FINDINGS:  Base of neck and thoracic soft tissues are unremarkable.    Left-sided aortic arch.  Thoracic aorta is normal in caliber with mild calcific atherosclerosis.  Heart is normal in size without pericardial effusion.  Coronary artery atherosclerosis.    No mediastinal adenopathy.    Emphysematous changes of the lungs.  Left lung is clear.  Chronic right pleural effusion, similar.  Geographic region of volume loss and consolidation within the right perihilar region which may represent a treated lesion versus post radiation changes.  This region measures slightly smaller comparison to prior examination at 2.9 x 3.1 cm (series 2, image 71).  Stable adjacent solid pulmonary nodule within the right lower lobe measuring 0.8 cm.  Inferior and peripheral to the perihilar consolidation there is a region of airspace opacification/nodular densities within the right lung base which appears similar in comparison to prior exam, though of uncertain etiology.    Liver is normal in size and attenuation.  Several hepatic cysts.  Several additional subcentimeter hepatic hypodensities too small to characterize however likely represent cysts.  No gallstones or biliary ductal dilatation.  Portal vein is patent.    Spleen, right adrenal gland, and pancreas are unremarkable.  Stable 1.2 cm left adrenal nodule.    Small and large bowel are normal caliber.  No evidence of bowel obstruction or inflammation.  No ascites or free air.    Kidneys are normal in size and demonstrate appropriate concentration of contrast.  No hydronephrosis or ureteral dilatation.  Focal contour change at the bladder dome measuring 2.5 cm with wall thickening and enhancement, progressed in comparison to prior exam (series 2, image 175).  Mild prostatomegaly.    No retroperitoneal or mesenteric adenopathy.    Normal caliber abdominal aorta with  moderate atherosclerosis along its course and branch vessels.  No aneurysm.    Osseous structures demonstrate numerous scattered lytic and blastic lesions, overall similar to prior examination.  T2 vertebral body lesion measures 1.7 cm (series 2, image 9).  Right iliac wing lesion measures 2.2 cm (series 2, image 160).  Healing left 9th rib fracture.  New acute appearing right 11th rib fracture.  Vertebral body heights are maintained without evidence of compression fracture.  Impression: 1. In this patient with history of lung cancer there is a geographic region of volume loss and consolidation within the right perihilar region which may represent a treated lesion versus post radiation changes.  Additional nonspecific airspace opacification/nodular densities within the right lung base.  Chronic right pleural effusion.  Pulmonary findings are grossly similar in comparison prior exam.  2. Numerous scattered lytic and blastic osseous metastatic lesions, grossly similar to prior exam.  New acute appearing right 11th rib fracture.  Healing left 9th rib fracture.  3. Contour change of the bladder dome with wall thickening and enhancement, progressed from prior exam.  Finding may represent tumor versus cystitis, consider urology consult and cystoscopy.  4. Additional findings as above.  5.  This report was flagged in Epic as abnormal.  RECIST SUMMARY:    Date of prior examination for comparison: 06/17/2019    Lesion 1: Right middle lobe.  3.1 cm. Series 2 image 71.  Prior measurement 3.7 cm.    Lesion 2: T2 lytic lesion.  1.7 cm. Series 2 image 9.  Prior measurement 1.6 cm.    Lesion 3: Right iliac wing.  2.2 cm. Series 2 image 160.  Prior measurement 2.1 cm.    Electronically signed by resident: Basil Terrell  Date:    08/22/2019  Time:    07:07    Electronically signed by: Ian Simental MD  Date:    08/22/2019  Time:    08:01            Diagnoses:       1. Primary cancer of right middle lobe of lung    2. Rash and  nonspecific skin eruption    3. Secondary adenocarcinoma of bone    4. Chronic obstructive pulmonary disease, unspecified COPD type          Assessment and Plan:         1. Stage IV Lung Adenocarcinoma:   -Sites of Disease: RML, bones   - Patient is requesting a break from treatment because of fatigue and phlebitis from docetaxel.  Discussed that this phlebitis can be prevented if a port is used.  Will get re-staging scans and follow up patient in 1 week.  If patient is still having a response to treatment, he is willing to have a port placed for next cycle of docetaxel ramucirumab.     2. Bone Metastases  Will add Zometa every 4 weeks.    3. Lichenoid Dermatitis  Likely 2/2 keytruda, but improved with topical.  Will prescribe triamcinolone again as patient has new rash over arms related to docetaxel.    4. Phlebitis  From docetaxel. See problem 1.    5. COPD  Stable      he will return to clinic on 10/25/19, but knows to call in the interim if symptoms change or should a problem arise.    Nilo Begum MD  Medical Oncology [facility]

## 2019-10-18 ENCOUNTER — TELEPHONE (OUTPATIENT)
Dept: HEMATOLOGY/ONCOLOGY | Facility: CLINIC | Age: 64
End: 2019-10-18

## 2019-10-18 NOTE — TELEPHONE ENCOUNTER
tried reaching out to pt on today in regards to upcoming appointment, but no answer,a detail message was left on v/m.

## 2019-10-23 ENCOUNTER — HOSPITAL ENCOUNTER (OUTPATIENT)
Dept: RADIOLOGY | Facility: HOSPITAL | Age: 64
Discharge: HOME | End: 2019-10-23
Attending: INTERNAL MEDICINE
Payer: MEDICAID

## 2019-10-23 DIAGNOSIS — C34.2 PRIMARY CANCER OF RIGHT MIDDLE LOBE OF LUNG: ICD-10-CM

## 2019-10-23 DIAGNOSIS — C79.51 SECONDARY ADENOCARCINOMA OF BONE: ICD-10-CM

## 2019-10-23 PROCEDURE — 25500020 PHARM REV CODE 255: Performed by: INTERNAL MEDICINE

## 2019-10-23 PROCEDURE — 74177 CT ABD & PELVIS W/CONTRAST: CPT | Mod: 26,,, | Performed by: RADIOLOGY

## 2019-10-23 PROCEDURE — 71260 CT CHEST ABDOMEN PELVIS WITH CONTRAST (XPD): ICD-10-PCS | Mod: 26,,, | Performed by: RADIOLOGY

## 2019-10-23 PROCEDURE — 74177 CT ABD & PELVIS W/CONTRAST: CPT | Mod: TC

## 2019-10-23 PROCEDURE — 74177 CT CHEST ABDOMEN PELVIS WITH CONTRAST (XPD): ICD-10-PCS | Mod: 26,,, | Performed by: RADIOLOGY

## 2019-10-23 PROCEDURE — 71260 CT THORAX DX C+: CPT | Mod: 26,,, | Performed by: RADIOLOGY

## 2019-10-23 RX ADMIN — IOHEXOL 15 ML: 350 INJECTION, SOLUTION INTRAVENOUS at 03:10

## 2019-10-23 RX ADMIN — IOHEXOL 15 ML: 350 INJECTION, SOLUTION INTRAVENOUS at 02:10

## 2019-10-23 RX ADMIN — IOHEXOL 75 ML: 350 INJECTION, SOLUTION INTRAVENOUS at 04:10

## 2019-10-29 ENCOUNTER — TELEPHONE (OUTPATIENT)
Dept: HEMATOLOGY/ONCOLOGY | Facility: CLINIC | Age: 64
End: 2019-10-29

## 2019-10-29 NOTE — TELEPHONE ENCOUNTER
Contacted pt about upcoming appt. Pt lab appt was not rescheduled. I asked pt to come for 10 am to get his labs drawn and he will be seen at 1030 by MD. Pt was agreeable.

## 2019-10-30 ENCOUNTER — OFFICE VISIT (OUTPATIENT)
Dept: HEMATOLOGY/ONCOLOGY | Facility: CLINIC | Age: 64
End: 2019-10-30
Payer: MEDICAID

## 2019-10-30 VITALS
SYSTOLIC BLOOD PRESSURE: 124 MMHG | RESPIRATION RATE: 20 BRPM | HEIGHT: 76 IN | OXYGEN SATURATION: 98 % | DIASTOLIC BLOOD PRESSURE: 84 MMHG | HEART RATE: 108 BPM | WEIGHT: 162.25 LBS | TEMPERATURE: 97 F | BODY MASS INDEX: 19.76 KG/M2

## 2019-10-30 DIAGNOSIS — C34.2 PRIMARY CANCER OF RIGHT MIDDLE LOBE OF LUNG: Primary | ICD-10-CM

## 2019-10-30 DIAGNOSIS — R63.0 DECREASED APPETITE: ICD-10-CM

## 2019-10-30 DIAGNOSIS — C79.51 SECONDARY ADENOCARCINOMA OF BONE: ICD-10-CM

## 2019-10-30 DIAGNOSIS — G47.00 INSOMNIA, UNSPECIFIED TYPE: Primary | ICD-10-CM

## 2019-10-30 DIAGNOSIS — C34.2 PRIMARY CANCER OF RIGHT MIDDLE LOBE OF LUNG: ICD-10-CM

## 2019-10-30 DIAGNOSIS — R21 RASH AND NONSPECIFIC SKIN ERUPTION: ICD-10-CM

## 2019-10-30 DIAGNOSIS — J44.9 CHRONIC OBSTRUCTIVE PULMONARY DISEASE, UNSPECIFIED COPD TYPE: ICD-10-CM

## 2019-10-30 PROCEDURE — 99213 OFFICE O/P EST LOW 20 MIN: CPT | Mod: PBBFAC | Performed by: INTERNAL MEDICINE

## 2019-10-30 PROCEDURE — 99213 PR OFFICE/OUTPT VISIT, EST, LEVL III, 20-29 MIN: ICD-10-PCS | Mod: S$PBB,,, | Performed by: INTERNAL MEDICINE

## 2019-10-30 PROCEDURE — 99999 PR PBB SHADOW E&M-EST. PATIENT-LVL III: ICD-10-PCS | Mod: PBBFAC,,, | Performed by: INTERNAL MEDICINE

## 2019-10-30 PROCEDURE — 99999 PR PBB SHADOW E&M-EST. PATIENT-LVL III: CPT | Mod: PBBFAC,,, | Performed by: INTERNAL MEDICINE

## 2019-10-30 PROCEDURE — 99213 OFFICE O/P EST LOW 20 MIN: CPT | Mod: S$PBB,,, | Performed by: INTERNAL MEDICINE

## 2019-10-30 RX ORDER — MIRTAZAPINE 30 MG/1
30 TABLET, ORALLY DISINTEGRATING ORAL NIGHTLY
Qty: 30 TABLET | Refills: 2 | Status: SHIPPED | OUTPATIENT
Start: 2019-10-30 | End: 2020-10-29

## 2019-10-30 RX ORDER — MUPIROCIN 20 MG/G
OINTMENT TOPICAL 2 TIMES DAILY
Qty: 22 G | Refills: 2 | Status: SHIPPED | OUTPATIENT
Start: 2019-10-30

## 2019-10-30 RX ORDER — DRONABINOL 5 MG/1
5 CAPSULE ORAL
Qty: 60 CAPSULE | Refills: 0 | Status: SHIPPED | OUTPATIENT
Start: 2019-10-30

## 2019-10-30 RX ORDER — IPRATROPIUM BROMIDE AND ALBUTEROL SULFATE 2.5; .5 MG/3ML; MG/3ML
SOLUTION RESPIRATORY (INHALATION)
Qty: 270 ML | Refills: 0 | Status: SHIPPED | OUTPATIENT
Start: 2019-10-30 | End: 2019-11-22 | Stop reason: SDUPTHER

## 2019-10-30 NOTE — PROGRESS NOTES
ONCOLOGY FOLLOW UP VISIT.     Reason for visit: Toxicity visit for docetaxel + ramucirumab for stage IV non small cell lung cancer    Best Contact Phone Number(s): [phone] (home) [phone] (work)     Cancer/Stage/TNM:   Cancer Staging  No matching staging information was found for the patient.        Primary cancer of right middle lobe of lung    7/11/2016 Initial Diagnosis     Primary cancer of right middle lobe of lung      7/10/2019 -  Chemotherapy     Treatment Summary   Plan Name: OP NSCLC RAMUCIRUMAB DOCETAXEL FOR NSCL METS Q3W  Treatment Goal: Palliative  Status: Active  Start Date: 7/17/2019  End Date: 10/23/2019 (Planned)  Provider: Dano Fernandez DO, FACP  Chemotherapy: DOCEtaxel (TAXOTERE) 75 mg/m2 = 155 mg in sodium chloride 0.9% 250 mL chemo infusion, 75 mg/m2 = 155 mg, Intravenous, Clinic/HOD 1 time, 4 of 6 cycles  Dose modification: 60 mg/m2 (original dose 75 mg/m2, Cycle 2), 40 mg/m2 (original dose 75 mg/m2, Cycle 4)  Administration: 155 mg (7/17/2019), 125 mg (8/7/2019), 125 mg (8/28/2019), 80 mg (9/18/2019)  ramucirumab (CYRAMZA) 778 mg in sodium chloride 0.9% 250 mL chemo infusion, 10 mg/kg = 778 mg, Intravenous, Clinic/HOD 1 time, 4 of 6 cycles  Administration: 778 mg (7/17/2019), 778 mg (8/7/2019), 778 mg (8/28/2019), 778 mg (9/18/2019)        Secondary adenocarcinoma of bone    7/26/2017 Initial Diagnosis     Secondary adenocarcinoma of bone      7/10/2019 -  Chemotherapy     Treatment Summary   Plan Name: OP NSCLC RAMUCIRUMAB DOCETAXEL FOR NSCL METS Q3W  Treatment Goal: Palliative  Status: Active  Start Date: 7/17/2019  End Date: 10/23/2019 (Planned)  Provider: Dano Fernandez DO, FACP  Chemotherapy: DOCEtaxel (TAXOTERE) 75 mg/m2 = 155 mg in sodium chloride 0.9% 250 mL chemo infusion, 75 mg/m2 = 155 mg, Intravenous, Clinic/HOD 1 time, 4 of 6 cycles  Dose modification: 60 mg/m2 (original dose 75 mg/m2, Cycle 2), 40 mg/m2 (original dose 75 mg/m2, Cycle 4)  Administration: 155 mg  (7/17/2019), 125 mg (8/7/2019), 125 mg (8/28/2019), 80 mg (9/18/2019)  ramucirumab (CYRAMZA) 778 mg in sodium chloride 0.9% 250 mL chemo infusion, 10 mg/kg = 778 mg, Intravenous, Clinic/HOD 1 time, 4 of 6 cycles  Administration: 778 mg (7/17/2019), 778 mg (8/7/2019), 778 mg (8/28/2019), 778 mg (9/18/2019)          Interval History:   Patient's phlebitis and associated rash improving.  Still with poor appetite, insomnia, and low energy.  Also still with peripheral neuropathy in BLE causing pain.    ROS:  Review of Systems   Constitutional: Positive for activity change and fatigue. Negative for chills and fever.   HENT: Negative for mouth sores, nosebleeds and sore throat.    Respiratory: Negative for cough, shortness of breath and wheezing.    Cardiovascular: Negative for chest pain, palpitations and leg swelling.   Gastrointestinal: Negative for abdominal distention, abdominal pain and blood in stool.   Endocrine: Negative for cold intolerance and heat intolerance.   Genitourinary: Negative for dysuria, flank pain and frequency.   Musculoskeletal: Positive for arthralgias and myalgias. Negative for joint swelling.   Skin: Positive for rash. Negative for color change and wound.   Neurological: Positive for numbness (grade 2 peripheral neuropathy). Negative for dizziness, light-headedness and headaches.   Hematological: Negative for adenopathy. Does not bruise/bleed easily.      A complete 12-point review of systems was reviewed and is negative except as mentioned above.     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    Skin infection 8/8/2018         Allergies:   Review of patient's allergies indicates:  No Known Allergies     Medications:   Current Outpatient Medications   Medication Sig Dispense Refill    albuterol (PROAIR HFA) 90 mcg/actuation inhaler INHALE 2 PUFFS INTO THE LUNGS EVERY 6 HOURS AS NEEDED FOR WHEEZING 17 g 0    albuterol (PROVENTIL/VENTOLIN HFA) 90 mcg/actuation inhaler INHALE 2 PUFFS INTO THE LUNGS EVERY 6 HOURS AS NEEDED FOR WHEEZING 8.5 g 0    albuterol-ipratropium (DUO-NEB) 2.5 mg-0.5 mg/3 mL nebulizer solution Rescue 270 mL 0    cyproheptadine (PERIACTIN) 4 mg tablet Take 1 tablet (4 mg total) by mouth 3 (three) times daily as needed. 90 tablet 1    dexAMETHasone (DECADRON) 4 MG Tab Take 2 tablets (8 mg total) by mouth every 12 (twelve) hours. Take for 3 days starting day prior to chemotherapy 60 tablet 1    dronabinol (MARINOL) 5 MG capsule Take 1 capsule (5 mg total) by mouth 2 (two) times daily before meals. 60 capsule 0    fluticasone (FLONASE) 50 mcg/actuation nasal spray SHAKE LIQUID AND USE 2 SPRAYS IN EACH NOSTRIL EVERY DAY 16 g 1    mupirocin (BACTROBAN) 2 % ointment Apply topically 2 (two) times daily. To open areas 22 g 2    naproxen (NAPROSYN) 500 MG tablet Take 1 tablet (500 mg total) by mouth 2 (two) times daily as needed (headache). 60 tablet 0    ondansetron (ZOFRAN-ODT) 8 MG TbDL Take 1 tablet (8 mg total) by mouth every 12 (twelve) hours as needed. 30 tablet 3    triamcinolone acetonide 0.1% (KENALOG) 0.1 % ointment To all rashes BID 80 g 4    amLODIPine (NORVASC) 10 MG tablet Take 1 tablet (10 mg total) by mouth once daily. (Patient not taking: Reported on 10/16/2019) 90 tablet 3    cetirizine (ZYRTEC) 10 MG tablet Take 1 tablet (10 mg total) by mouth once daily.  0    mirtazapine (REMERON SOL-TAB) 30 MG disintegrating tablet Take 1 tablet (30 mg total) by mouth nightly. 30 tablet 2     No current facility-administered medications for this visit.         Physical Exam:   /84 (BP Location: Right  "arm, Patient Position: Sitting, BP Method: Medium (Automatic))   Pulse 108   Temp 97.4 °F (36.3 °C) (Oral)   Resp 20   Ht 6' 4" (1.93 m)   Wt 73.6 kg (162 lb 4.1 oz)   SpO2 98%   BMI 19.75 kg/m²      ECOG Performance Status: (foot note - ECOG PS provided by [employer]cology [employer]) 1    Physical Exam   Constitutional: He is oriented to person, place, and time. No distress.   Cachectic, temporal wasting   HENT:   Head: Normocephalic and atraumatic.   Eyes: Pupils are equal, round, and reactive to light. Conjunctivae and EOM are normal.   Neck: Normal range of motion. Neck supple.   Cardiovascular: Normal rate and regular rhythm. Exam reveals no friction rub.   No murmur heard.  Pulmonary/Chest: Effort normal and breath sounds normal.   Abdominal: Soft. Bowel sounds are normal. There is no tenderness.   Musculoskeletal: Normal range of motion. He exhibits no edema.   Lymphadenopathy:     He has no cervical adenopathy.     He has no axillary adenopathy.   Neurological: He is alert and oriented to person, place, and time.   Skin: Skin is warm and dry. Rash (psoriatic lesion over L hand, tracking plaque lesions along veins used for chemotherapy) noted.   Psychiatric: He has a normal mood and affect. His behavior is normal.         Labs:   Recent Results (from the past 48 hour(s))   CBC ONCOLOGY    Collection Time: 10/30/19 10:00 AM   Result Value Ref Range    WBC 5.93 3.90 - 12.70 K/uL    RBC 5.04 4.60 - 6.20 M/uL    Hemoglobin 13.7 (L) 14.0 - 18.0 g/dL    Hematocrit 44.2 40.0 - 54.0 %    Mean Corpuscular Volume 88 82 - 98 fL    Mean Corpuscular Hemoglobin 27.2 27.0 - 31.0 pg    Mean Corpuscular Hemoglobin Conc 31.0 (L) 32.0 - 36.0 g/dL    RDW 18.7 (H) 11.5 - 14.5 %    Platelets 321 150 - 350 K/uL    MPV 10.5 9.2 - 12.9 fL    Gran # (ANC) 4.1 1.8 - 7.7 K/uL    Immature Grans (Abs) 0.01 0.00 - 0.04 K/uL   Comprehensive metabolic panel    Collection Time: 10/30/19 10:00 AM   Result Value Ref Range    " Sodium 138 136 - 145 mmol/L    Potassium 4.4 3.5 - 5.1 mmol/L    Chloride 105 95 - 110 mmol/L    CO2 24 23 - 29 mmol/L    Glucose 77 70 - 110 mg/dL    BUN, Bld 14 8 - 23 mg/dL    Creatinine 0.9 0.5 - 1.4 mg/dL    Calcium 9.4 8.7 - 10.5 mg/dL    Total Protein 7.5 6.0 - 8.4 g/dL    Albumin 3.8 3.5 - 5.2 g/dL    Total Bilirubin 0.5 0.1 - 1.0 mg/dL    Alkaline Phosphatase 58 55 - 135 U/L    AST 19 10 - 40 U/L    ALT 15 10 - 44 U/L    Anion Gap 9 8 - 16 mmol/L    eGFR if African American >60.0 >60 mL/min/1.73 m^2    eGFR if non African American >60.0 >60 mL/min/1.73 m^2      I have reviewed above labs significant for mild anemia, but good kidney/liver function.    CT Chest Abdomen Pelvis With Contrast  Narrative: EXAMINATION:  CT CHEST ABDOMEN PELVIS WITH CONTRAST (XPD)    CLINICAL HISTORY:  Re-staging stage IV lung cancer on treatment;  Malignant neoplasm of middle lobe, bronchus or lung    TECHNIQUE:  The patient was surveyed from the lung apices through the pelvis after the administration of 75 cc Omni 350 IV contrast as well as oral contrast and data was reconstructed for coronal, sagittal, and axial images.    COMPARISON:  CT chest 08/21/2019, 06/17/2019, 03/27/2019, 12/26/2018    FINDINGS:  Examination of the vascular and soft tissue structures at the base of the neck is unremarkable.    A left sided aortic arch with 4 branch vessels is identified.  The thoracic aorta maintains normal caliber, contour, and course without significant atherosclerotic calcification within its course.  The heart is not enlarged. No pericardial effusion is seen.    The trachea is midline, the proximal airways are patent, and the lungs are symmetrically expanded.  Examination of the lung fields demonstrates emphysematous changes of the lung.  Chronic right pleural effusion similar to prior.  Geographic region of volume loss and consolidation within the right perihilar region which could represent a treated lesion versus post radiation  changes.  Region measures similar to prior, today's exam 2.9 x 2.9 cm (axial series 2, image 74).  Inferior peripheral to the perihilar consolidation is region of airspace opacification/nodular densities within the right lung base which appears similar in comparison to prior exam, though of uncertain etiology.    No axillary or mediastinal lymph node enlargement is seen.    The esophagus maintains a normal course and caliber.    The liver is normal in size and attenuation.  Several up attic cysts.  Several subcentimeter hypodensities that are too small to characterize however likely represent cyst..  The gallbladder is unremarkable.  No intrahepatic or extrahepatic biliary ductal dilatation is noted.    The stomach, spleen, and pancreas are unremarkable.  Stable 1.3 cm left adrenal nodule.    The kidneys are normal in size and location.  They concentrate and excrete contrast appropriately.  No hydronephrosis is seen.  The ureters appear normal in course and caliber without evidence of ureteral dilatation. Focal contour change the bladder dome measuring 1.7 cm today, previously measuring 2.5 cm.  Mildly enlarged prostate.    The visualized loops of small and large bowel show no evidence of obstruction or inflammation.    No ascites, free fluid, or intraperitoneal free air is identified.    There is no evidence of lymph node enlargement in the abdomen or pelvis.    The abdominal aorta is normal in course and caliber, with extensive calcific atherosclerosis and extension into the branch vessels..    The osseus structures demonstrate numerous scattered list lytic and blastic lesions., similar to prior.  T2 vertebral body lesion measures 1.7 cm (axial series 2, image 16).  Iliac wing lesion measures 2.1 cm (axial series 2, image 165).  Healing left 9th and right 11th rib fractures.  No new osseous lesions identified.    The extraperitoneal soft tissues are unremarkable.  Impression: This patient with history of lung  cancer, there is geographic region of volume loss and consolidation within the right perihilar region which could represent attributed lesion versus post radiation changes.  Additional non-specific airspace opacification/nodular densities within the right lung base.  Stable chronic right pleural effusion.    Numerous scattered lytic and blastic osseous lead lesions, grossly similar to prior exam.    Contour change to the bladder dome with wall thickening, decreased in size from prior exam.    Additional findings as above    RECIST SUMMARY:    Date of prior examination for comparison: 08/21/2019    Lesion 1: Right middle lobe.  2.9 cm. Series 2 image 74.  Prior measurement 3.1 cm.    Lesion 2: T2 lytic lesion.  1.7 cm. Series 2 image 6.  Prior measurement 1.7 cm.    Lesion 3: Right iliac wing.  2.1 cm. Series 2 image 165.  Prior measurement 2.2 cm.    This report was flagged in Epic as abnormal.    Electronically signed by resident: Tejinder Cole  Date:    10/23/2019  Time:    16:22    Electronically signed by: Roderick Cano MD  Date:    10/23/2019  Time:    18:30            Diagnoses:       1. Insomnia, unspecified type    2. Primary cancer of right middle lobe of lung    3. Secondary adenocarcinoma of bone    4. Chronic obstructive pulmonary disease, unspecified COPD type    5. Decreased appetite    6. Rash and nonspecific skin eruption          Assessment and Plan:         1. Stage IV Lung Adenocarcinoma:   -Sites of Disease: RML, bones.  CT scan 10/23 with continued response/stable lesions to treatment.  Patient is still with significant neuropathy and wants a continued treatment break.  With persistent neuropathy affecting quality of life, we may discontinued this treatment.  - will discuss with IR whether this patient has a lesion taht could be biopsied.  If we could biopsy one of his lesions, may be candidate for IMV trial.    2. Bone Metastases  Will add Zometa every 4 weeks.    3. Lichenoid  Dermatitis  Likely 2/2 keytruda, but improved with topical steroids.  Phlebitis from docetaxel also improving with triamcinolone.    4. Phlebitis  From docetaxel. See problem 1.    5. COPD  Stable      he will return to clinic in 3 weeks, but knows to call in the interim if symptoms change or should a problem arise.    Nilo Begum MD  Medical Oncology [facility]

## 2019-11-06 DIAGNOSIS — C79.51 SECONDARY ADENOCARCINOMA OF BONE: ICD-10-CM

## 2019-11-06 DIAGNOSIS — C34.2 PRIMARY CANCER OF RIGHT MIDDLE LOBE OF LUNG: Primary | ICD-10-CM

## 2019-11-07 ENCOUNTER — TELEPHONE (OUTPATIENT)
Dept: INTERVENTIONAL RADIOLOGY/VASCULAR | Facility: HOSPITAL | Age: 64
End: 2019-11-07

## 2019-11-10 ENCOUNTER — HOSPITAL ENCOUNTER (INPATIENT)
Facility: HOSPITAL | Age: 64
LOS: 10 days | Discharge: HOME | DRG: 100 | End: 2019-11-21
Attending: EMERGENCY MEDICINE | Admitting: PSYCHIATRY & NEUROLOGY

## 2019-11-10 DIAGNOSIS — I10 ESSENTIAL HYPERTENSION: Chronic | ICD-10-CM

## 2019-11-10 DIAGNOSIS — G40.901 STATUS EPILEPTICUS: Primary | ICD-10-CM

## 2019-11-10 DIAGNOSIS — C79.31 BRAIN METASTASES: ICD-10-CM

## 2019-11-10 DIAGNOSIS — R59.0 MEDIASTINAL ADENOPATHY: ICD-10-CM

## 2019-11-10 DIAGNOSIS — E43 SEVERE MALNUTRITION: ICD-10-CM

## 2019-11-10 DIAGNOSIS — C34.2 PRIMARY CANCER OF RIGHT MIDDLE LOBE OF LUNG: ICD-10-CM

## 2019-11-10 DIAGNOSIS — R05.3 CHRONIC COUGH: ICD-10-CM

## 2019-11-10 DIAGNOSIS — R21 RASH: ICD-10-CM

## 2019-11-10 DIAGNOSIS — Z71.89 GOALS OF CARE, COUNSELING/DISCUSSION: ICD-10-CM

## 2019-11-10 DIAGNOSIS — Z51.5 PALLIATIVE CARE ENCOUNTER: ICD-10-CM

## 2019-11-10 DIAGNOSIS — R06.02 SHORTNESS OF BREATH: ICD-10-CM

## 2019-11-10 DIAGNOSIS — R53.0 NEOPLASTIC MALIGNANT RELATED FATIGUE: ICD-10-CM

## 2019-11-10 DIAGNOSIS — J43.2 CENTRILOBULAR EMPHYSEMA: ICD-10-CM

## 2019-11-10 DIAGNOSIS — L08.9 SKIN INFECTION: ICD-10-CM

## 2019-11-10 DIAGNOSIS — R41.82 ALTERED MENTAL STATUS: ICD-10-CM

## 2019-11-10 DIAGNOSIS — B88.8 INFESTATION BY BED BUG: ICD-10-CM

## 2019-11-10 DIAGNOSIS — F41.9 ANXIETY: ICD-10-CM

## 2019-11-10 DIAGNOSIS — J96.01 ACUTE RESPIRATORY FAILURE WITH HYPOXIA: ICD-10-CM

## 2019-11-10 DIAGNOSIS — C79.51 SECONDARY ADENOCARCINOMA OF BONE: ICD-10-CM

## 2019-11-10 DIAGNOSIS — R91.1 LUNG NODULE: ICD-10-CM

## 2019-11-10 DIAGNOSIS — J30.1 SEASONAL ALLERGIC RHINITIS DUE TO POLLEN: ICD-10-CM

## 2019-11-10 DIAGNOSIS — R63.0 DECREASED APPETITE: ICD-10-CM

## 2019-11-10 LAB
BASOPHILS # BLD AUTO: 0.08 K/UL (ref 0–0.2)
BASOPHILS NFR BLD: 0.5 % (ref 0–1.9)
DIFFERENTIAL METHOD: ABNORMAL
EOSINOPHIL # BLD AUTO: 0.3 K/UL (ref 0–0.5)
EOSINOPHIL NFR BLD: 1.9 % (ref 0–8)
ERYTHROCYTE [DISTWIDTH] IN BLOOD BY AUTOMATED COUNT: 19.1 % (ref 11.5–14.5)
HCT VFR BLD AUTO: 48.4 % (ref 40–54)
HGB BLD-MCNC: 14.1 G/DL (ref 14–18)
IMM GRANULOCYTES # BLD AUTO: 0.07 K/UL (ref 0–0.04)
IMM GRANULOCYTES NFR BLD AUTO: 0.4 % (ref 0–0.5)
LYMPHOCYTES # BLD AUTO: 3.1 K/UL (ref 1–4.8)
LYMPHOCYTES NFR BLD: 19.1 % (ref 18–48)
MCH RBC QN AUTO: 27.5 PG (ref 27–31)
MCHC RBC AUTO-ENTMCNC: 29.1 G/DL (ref 32–36)
MCV RBC AUTO: 94 FL (ref 82–98)
MONOCYTES # BLD AUTO: 1.1 K/UL (ref 0.3–1)
MONOCYTES NFR BLD: 6.7 % (ref 4–15)
NEUTROPHILS # BLD AUTO: 11.5 K/UL (ref 1.8–7.7)
NEUTROPHILS NFR BLD: 71.4 % (ref 38–73)
NRBC BLD-RTO: 0 /100 WBC
PLATELET # BLD AUTO: 331 K/UL (ref 150–350)
PMV BLD AUTO: 10.5 FL (ref 9.2–12.9)
RBC # BLD AUTO: 5.13 M/UL (ref 4.6–6.2)
WBC # BLD AUTO: 16.16 K/UL (ref 3.9–12.7)

## 2019-11-10 PROCEDURE — 31500 PR INSERT, EMERGENCY ENDOTRACH AIRWAY: ICD-10-PCS | Mod: ,,, | Performed by: EMERGENCY MEDICINE

## 2019-11-10 PROCEDURE — 99291 CRITICAL CARE FIRST HOUR: CPT | Mod: 25

## 2019-11-10 PROCEDURE — 83690 ASSAY OF LIPASE: CPT

## 2019-11-10 PROCEDURE — 80047 BASIC METABLC PNL IONIZED CA: CPT

## 2019-11-10 PROCEDURE — 63600175 PHARM REV CODE 636 W HCPCS

## 2019-11-10 PROCEDURE — 85025 COMPLETE CBC W/AUTO DIFF WBC: CPT

## 2019-11-10 PROCEDURE — 94002 VENT MGMT INPAT INIT DAY: CPT

## 2019-11-10 PROCEDURE — 83605 ASSAY OF LACTIC ACID: CPT

## 2019-11-10 PROCEDURE — 84443 ASSAY THYROID STIM HORMONE: CPT

## 2019-11-10 PROCEDURE — 94761 N-INVAS EAR/PLS OXIMETRY MLT: CPT

## 2019-11-10 PROCEDURE — 87040 BLOOD CULTURE FOR BACTERIA: CPT | Mod: 59

## 2019-11-10 PROCEDURE — 27000221 HC OXYGEN, UP TO 24 HOURS

## 2019-11-10 PROCEDURE — 31500 INSERT EMERGENCY AIRWAY: CPT | Mod: ,,, | Performed by: EMERGENCY MEDICINE

## 2019-11-10 PROCEDURE — 99291 PR CRITICAL CARE, E/M 30-74 MINUTES: ICD-10-PCS | Mod: 25,,, | Performed by: EMERGENCY MEDICINE

## 2019-11-10 PROCEDURE — 63600175 PHARM REV CODE 636 W HCPCS: Performed by: EMERGENCY MEDICINE

## 2019-11-10 PROCEDURE — 83735 ASSAY OF MAGNESIUM: CPT

## 2019-11-10 PROCEDURE — 96365 THER/PROPH/DIAG IV INF INIT: CPT

## 2019-11-10 PROCEDURE — 51702 INSERT TEMP BLADDER CATH: CPT

## 2019-11-10 PROCEDURE — 31500 INSERT EMERGENCY AIRWAY: CPT

## 2019-11-10 PROCEDURE — 80053 COMPREHEN METABOLIC PANEL: CPT

## 2019-11-10 PROCEDURE — 93010 EKG 12-LEAD: ICD-10-PCS | Mod: ,,, | Performed by: INTERNAL MEDICINE

## 2019-11-10 PROCEDURE — 84484 ASSAY OF TROPONIN QUANT: CPT

## 2019-11-10 PROCEDURE — 93005 ELECTROCARDIOGRAM TRACING: CPT

## 2019-11-10 PROCEDURE — 99291 CRITICAL CARE FIRST HOUR: CPT | Mod: 25,,, | Performed by: EMERGENCY MEDICINE

## 2019-11-10 PROCEDURE — 25000003 PHARM REV CODE 250

## 2019-11-10 PROCEDURE — 93010 ELECTROCARDIOGRAM REPORT: CPT | Mod: ,,, | Performed by: INTERNAL MEDICINE

## 2019-11-10 RX ORDER — ETOMIDATE 2 MG/ML
INJECTION INTRAVENOUS
Status: COMPLETED
Start: 2019-11-10 | End: 2019-11-10

## 2019-11-10 RX ORDER — ROCURONIUM BROMIDE 10 MG/ML
80 INJECTION, SOLUTION INTRAVENOUS
Status: COMPLETED | OUTPATIENT
Start: 2019-11-11 | End: 2019-11-10

## 2019-11-10 RX ORDER — ETOMIDATE 2 MG/ML
15 INJECTION INTRAVENOUS
Status: COMPLETED | OUTPATIENT
Start: 2019-11-11 | End: 2019-11-10

## 2019-11-10 RX ORDER — ROCURONIUM BROMIDE 10 MG/ML
INJECTION, SOLUTION INTRAVENOUS
Status: COMPLETED
Start: 2019-11-10 | End: 2019-11-10

## 2019-11-10 RX ORDER — LEVETIRACETAM 10 MG/ML
1000 INJECTION INTRAVASCULAR
Status: COMPLETED | OUTPATIENT
Start: 2019-11-10 | End: 2019-11-11

## 2019-11-10 RX ORDER — PROPOFOL 10 MG/ML
15 INJECTION, EMULSION INTRAVENOUS CONTINUOUS
Status: DISCONTINUED | OUTPATIENT
Start: 2019-11-11 | End: 2019-11-11

## 2019-11-10 RX ORDER — PROPOFOL 10 MG/ML
INJECTION, EMULSION INTRAVENOUS
Status: COMPLETED
Start: 2019-11-10 | End: 2019-11-10

## 2019-11-10 RX ADMIN — PROPOFOL 15 MCG/KG/MIN: 10 INJECTION, EMULSION INTRAVENOUS at 11:11

## 2019-11-10 RX ADMIN — LEVETIRACETAM 1000 MG: 10 INJECTION INTRAVENOUS at 11:11

## 2019-11-10 RX ADMIN — ETOMIDATE 15 MG: 2 INJECTION INTRAVENOUS at 11:11

## 2019-11-10 RX ADMIN — ROCURONIUM BROMIDE 80 MG: 10 INJECTION, SOLUTION INTRAVENOUS at 11:11

## 2019-11-11 ENCOUNTER — TELEPHONE (OUTPATIENT)
Dept: HEMATOLOGY/ONCOLOGY | Facility: CLINIC | Age: 64
End: 2019-11-11

## 2019-11-11 PROBLEM — B88.8 INFESTATION BY BED BUG: Status: ACTIVE | Noted: 2019-11-11

## 2019-11-11 PROBLEM — G40.901 STATUS EPILEPTICUS: Status: ACTIVE | Noted: 2019-11-11

## 2019-11-11 LAB
ABO + RH BLD: NORMAL
ALBUMIN SERPL BCP-MCNC: 3.7 G/DL (ref 3.5–5.2)
ALBUMIN SERPL BCP-MCNC: 4.1 G/DL (ref 3.5–5.2)
ALLENS TEST: ABNORMAL
ALP SERPL-CCNC: 57 U/L (ref 55–135)
ALP SERPL-CCNC: 64 U/L (ref 55–135)
ALT SERPL W/O P-5'-P-CCNC: 15 U/L (ref 10–44)
ALT SERPL W/O P-5'-P-CCNC: 16 U/L (ref 10–44)
AMPHET+METHAMPHET UR QL: NEGATIVE
ANION GAP SERPL CALC-SCNC: 24 MMOL/L (ref 8–16)
ANION GAP SERPL CALC-SCNC: 7 MMOL/L (ref 8–16)
APTT BLDCRRT: <21 SEC (ref 21–32)
AST SERPL-CCNC: 16 U/L (ref 10–40)
AST SERPL-CCNC: 21 U/L (ref 10–40)
BACTERIA #/AREA URNS AUTO: NORMAL /HPF
BARBITURATES UR QL SCN>200 NG/ML: NEGATIVE
BASOPHILS # BLD AUTO: 0.02 K/UL (ref 0–0.2)
BASOPHILS NFR BLD: 0.2 % (ref 0–1.9)
BENZODIAZ UR QL SCN>200 NG/ML: NORMAL
BILIRUB SERPL-MCNC: 0.4 MG/DL (ref 0.1–1)
BILIRUB SERPL-MCNC: 0.5 MG/DL (ref 0.1–1)
BILIRUB UR QL STRIP: NEGATIVE
BLD GP AB SCN CELLS X3 SERPL QL: NORMAL
BUN SERPL-MCNC: 15 MG/DL (ref 8–23)
BUN SERPL-MCNC: 15 MG/DL (ref 8–23)
BUN SERPL-MCNC: 17 MG/DL (ref 6–30)
BZE UR QL SCN: NEGATIVE
CALCIUM SERPL-MCNC: 9 MG/DL (ref 8.7–10.5)
CALCIUM SERPL-MCNC: 9.7 MG/DL (ref 8.7–10.5)
CANNABINOIDS UR QL SCN: NEGATIVE
CHLORIDE SERPL-SCNC: 104 MMOL/L (ref 95–110)
CHLORIDE SERPL-SCNC: 105 MMOL/L (ref 95–110)
CHLORIDE SERPL-SCNC: 108 MMOL/L (ref 95–110)
CLARITY UR REFRACT.AUTO: ABNORMAL
CO2 SERPL-SCNC: 10 MMOL/L (ref 23–29)
CO2 SERPL-SCNC: 26 MMOL/L (ref 23–29)
COLOR UR AUTO: YELLOW
CREAT SERPL-MCNC: 0.9 MG/DL (ref 0.5–1.4)
CREAT SERPL-MCNC: 0.9 MG/DL (ref 0.5–1.4)
CREAT SERPL-MCNC: 1.2 MG/DL (ref 0.5–1.4)
CREAT UR-MCNC: 218 MG/DL (ref 23–375)
DELSYS: ABNORMAL
DIFFERENTIAL METHOD: ABNORMAL
EOSINOPHIL # BLD AUTO: 0 K/UL (ref 0–0.5)
EOSINOPHIL NFR BLD: 0.2 % (ref 0–8)
ERYTHROCYTE [DISTWIDTH] IN BLOOD BY AUTOMATED COUNT: 18.2 % (ref 11.5–14.5)
ERYTHROCYTE [SEDIMENTATION RATE] IN BLOOD BY WESTERGREN METHOD: 16 MM/H
EST. GFR  (AFRICAN AMERICAN): >60 ML/MIN/1.73 M^2
EST. GFR  (AFRICAN AMERICAN): >60 ML/MIN/1.73 M^2
EST. GFR  (NON AFRICAN AMERICAN): >60 ML/MIN/1.73 M^2
EST. GFR  (NON AFRICAN AMERICAN): >60 ML/MIN/1.73 M^2
ETHANOL SERPL-MCNC: <10 MG/DL
ETHANOL UR-MCNC: <10 MG/DL
FIO2: 50
GLUCOSE SERPL-MCNC: 162 MG/DL (ref 70–110)
GLUCOSE SERPL-MCNC: 164 MG/DL (ref 70–110)
GLUCOSE SERPL-MCNC: 95 MG/DL (ref 70–110)
GLUCOSE UR QL STRIP: NEGATIVE
HCO3 UR-SCNC: 26.1 MMOL/L (ref 24–28)
HCT VFR BLD AUTO: 41.6 % (ref 40–54)
HCT VFR BLD CALC: 43 %PCV (ref 36–54)
HGB BLD-MCNC: 13 G/DL (ref 14–18)
HGB UR QL STRIP: NEGATIVE
HYALINE CASTS UR QL AUTO: 0 /LPF
IMM GRANULOCYTES # BLD AUTO: 0.02 K/UL (ref 0–0.04)
IMM GRANULOCYTES NFR BLD AUTO: 0.2 % (ref 0–0.5)
INR PPP: 1 (ref 0.8–1.2)
KETONES UR QL STRIP: NEGATIVE
LACTATE SERPL-SCNC: >12 MMOL/L (ref 0.5–2.2)
LEUKOCYTE ESTERASE UR QL STRIP: NEGATIVE
LIPASE SERPL-CCNC: 11 U/L (ref 4–60)
LYMPHOCYTES # BLD AUTO: 0.7 K/UL (ref 1–4.8)
LYMPHOCYTES NFR BLD: 7.7 % (ref 18–48)
MAGNESIUM SERPL-MCNC: 2.1 MG/DL (ref 1.6–2.6)
MAGNESIUM SERPL-MCNC: 2.2 MG/DL (ref 1.6–2.6)
MCH RBC QN AUTO: 27.2 PG (ref 27–31)
MCHC RBC AUTO-ENTMCNC: 31.3 G/DL (ref 32–36)
MCV RBC AUTO: 87 FL (ref 82–98)
METHADONE UR QL SCN>300 NG/ML: NEGATIVE
MICROSCOPIC COMMENT: NORMAL
MODE: ABNORMAL
MONOCYTES # BLD AUTO: 0.7 K/UL (ref 0.3–1)
MONOCYTES NFR BLD: 7 % (ref 4–15)
NEUTROPHILS # BLD AUTO: 8.2 K/UL (ref 1.8–7.7)
NEUTROPHILS NFR BLD: 84.7 % (ref 38–73)
NITRITE UR QL STRIP: NEGATIVE
NRBC BLD-RTO: 0 /100 WBC
OPIATES UR QL SCN: NEGATIVE
PCO2 BLDA: 52.3 MMHG (ref 35–45)
PCP UR QL SCN>25 NG/ML: NEGATIVE
PEEP: 5
PH SMN: 7.31 [PH] (ref 7.35–7.45)
PH UR STRIP: 6 [PH] (ref 5–8)
PHOSPHATE SERPL-MCNC: 4.3 MG/DL (ref 2.7–4.5)
PLATELET # BLD AUTO: 294 K/UL (ref 150–350)
PMV BLD AUTO: 10 FL (ref 9.2–12.9)
PO2 BLDA: 168 MMHG (ref 80–100)
POC BE: 0 MMOL/L
POC IONIZED CALCIUM: 1.23 MMOL/L (ref 1.06–1.42)
POC SATURATED O2: 99 % (ref 95–100)
POC TCO2 (MEASURED): 24 MMOL/L (ref 23–29)
POC TCO2: 28 MMOL/L (ref 23–27)
POTASSIUM BLD-SCNC: 4.3 MMOL/L (ref 3.5–5.1)
POTASSIUM SERPL-SCNC: 4.2 MMOL/L (ref 3.5–5.1)
POTASSIUM SERPL-SCNC: 4.3 MMOL/L (ref 3.5–5.1)
PROT SERPL-MCNC: 7.2 G/DL (ref 6–8.4)
PROT SERPL-MCNC: 7.9 G/DL (ref 6–8.4)
PROT UR QL STRIP: ABNORMAL
PROTHROMBIN TIME: 10.4 SEC (ref 9–12.5)
RBC # BLD AUTO: 4.78 M/UL (ref 4.6–6.2)
RBC #/AREA URNS AUTO: 1 /HPF (ref 0–4)
SAMPLE: ABNORMAL
SAMPLE: ABNORMAL
SITE: ABNORMAL
SODIUM BLD-SCNC: 141 MMOL/L (ref 136–145)
SODIUM SERPL-SCNC: 138 MMOL/L (ref 136–145)
SODIUM SERPL-SCNC: 142 MMOL/L (ref 136–145)
SP GR UR STRIP: 1.02 (ref 1–1.03)
TOXICOLOGY INFORMATION: NORMAL
TROPONIN I SERPL DL<=0.01 NG/ML-MCNC: <0.006 NG/ML (ref 0–0.03)
TSH SERPL DL<=0.005 MIU/L-ACNC: 1.5 UIU/ML (ref 0.4–4)
URN SPEC COLLECT METH UR: ABNORMAL
VT: 450
WBC # BLD AUTO: 9.63 K/UL (ref 3.9–12.7)
WBC #/AREA URNS AUTO: 2 /HPF (ref 0–5)

## 2019-11-11 PROCEDURE — 25500020 PHARM REV CODE 255: Performed by: PSYCHIATRY & NEUROLOGY

## 2019-11-11 PROCEDURE — 36600 WITHDRAWAL OF ARTERIAL BLOOD: CPT

## 2019-11-11 PROCEDURE — 27200966 HC CLOSED SUCTION SYSTEM

## 2019-11-11 PROCEDURE — 81001 URINALYSIS AUTO W/SCOPE: CPT

## 2019-11-11 PROCEDURE — 25000003 PHARM REV CODE 250: Performed by: INTERNAL MEDICINE

## 2019-11-11 PROCEDURE — 95951 HC EEG MONITORING/VIDEO RECORD: CPT

## 2019-11-11 PROCEDURE — 80307 DRUG TEST PRSMV CHEM ANLYZR: CPT

## 2019-11-11 PROCEDURE — 25500020 PHARM REV CODE 255: Performed by: EMERGENCY MEDICINE

## 2019-11-11 PROCEDURE — 84100 ASSAY OF PHOSPHORUS: CPT

## 2019-11-11 PROCEDURE — 82803 BLOOD GASES ANY COMBINATION: CPT

## 2019-11-11 PROCEDURE — 85025 COMPLETE CBC W/AUTO DIFF WBC: CPT

## 2019-11-11 PROCEDURE — 25000242 PHARM REV CODE 250 ALT 637 W/ HCPCS: Performed by: INTERNAL MEDICINE

## 2019-11-11 PROCEDURE — 82800 BLOOD PH: CPT

## 2019-11-11 PROCEDURE — 95951 PR EEG MONITORING/VIDEORECORD: CPT | Mod: 26,,, | Performed by: PSYCHIATRY & NEUROLOGY

## 2019-11-11 PROCEDURE — 63600175 PHARM REV CODE 636 W HCPCS: Performed by: NURSE PRACTITIONER

## 2019-11-11 PROCEDURE — 94003 VENT MGMT INPAT SUBQ DAY: CPT

## 2019-11-11 PROCEDURE — 99900035 HC TECH TIME PER 15 MIN (STAT)

## 2019-11-11 PROCEDURE — 86850 RBC ANTIBODY SCREEN: CPT

## 2019-11-11 PROCEDURE — 99900026 HC AIRWAY MAINTENANCE (STAT)

## 2019-11-11 PROCEDURE — 99291 PR CRITICAL CARE, E/M 30-74 MINUTES: ICD-10-PCS | Mod: ,,, | Performed by: PSYCHIATRY & NEUROLOGY

## 2019-11-11 PROCEDURE — 27000221 HC OXYGEN, UP TO 24 HOURS

## 2019-11-11 PROCEDURE — 80320 DRUG SCREEN QUANTALCOHOLS: CPT

## 2019-11-11 PROCEDURE — 99291 CRITICAL CARE FIRST HOUR: CPT | Mod: ,,, | Performed by: PSYCHIATRY & NEUROLOGY

## 2019-11-11 PROCEDURE — 94640 AIRWAY INHALATION TREATMENT: CPT

## 2019-11-11 PROCEDURE — 63600175 PHARM REV CODE 636 W HCPCS: Performed by: STUDENT IN AN ORGANIZED HEALTH CARE EDUCATION/TRAINING PROGRAM

## 2019-11-11 PROCEDURE — A9585 GADOBUTROL INJECTION: HCPCS | Performed by: PSYCHIATRY & NEUROLOGY

## 2019-11-11 PROCEDURE — 63600175 PHARM REV CODE 636 W HCPCS: Performed by: INTERNAL MEDICINE

## 2019-11-11 PROCEDURE — 25000003 PHARM REV CODE 250: Performed by: PSYCHIATRY & NEUROLOGY

## 2019-11-11 PROCEDURE — 94761 N-INVAS EAR/PLS OXIMETRY MLT: CPT

## 2019-11-11 PROCEDURE — 83735 ASSAY OF MAGNESIUM: CPT

## 2019-11-11 PROCEDURE — 85730 THROMBOPLASTIN TIME PARTIAL: CPT

## 2019-11-11 PROCEDURE — 80053 COMPREHEN METABOLIC PANEL: CPT

## 2019-11-11 PROCEDURE — 85610 PROTHROMBIN TIME: CPT

## 2019-11-11 PROCEDURE — 95951 PR EEG MONITORING/VIDEORECORD: ICD-10-PCS | Mod: 26,,, | Performed by: PSYCHIATRY & NEUROLOGY

## 2019-11-11 PROCEDURE — 20000000 HC ICU ROOM

## 2019-11-11 RX ORDER — GADOBUTROL 604.72 MG/ML
8 INJECTION INTRAVENOUS
Status: COMPLETED | OUTPATIENT
Start: 2019-11-11 | End: 2019-11-11

## 2019-11-11 RX ORDER — MIRTAZAPINE 15 MG/1
30 TABLET, ORALLY DISINTEGRATING ORAL NIGHTLY
Status: DISCONTINUED | OUTPATIENT
Start: 2019-11-11 | End: 2019-11-21 | Stop reason: HOSPADM

## 2019-11-11 RX ORDER — SODIUM CHLORIDE 9 MG/ML
INJECTION, SOLUTION INTRAVENOUS CONTINUOUS
Status: DISCONTINUED | OUTPATIENT
Start: 2019-11-11 | End: 2019-11-17

## 2019-11-11 RX ORDER — MIRTAZAPINE 15 MG/1
30 TABLET, ORALLY DISINTEGRATING ORAL NIGHTLY
Status: DISCONTINUED | OUTPATIENT
Start: 2019-11-11 | End: 2019-11-11

## 2019-11-11 RX ORDER — FAMOTIDINE 20 MG/1
20 TABLET, FILM COATED ORAL 2 TIMES DAILY
Status: DISCONTINUED | OUTPATIENT
Start: 2019-11-11 | End: 2019-11-12

## 2019-11-11 RX ORDER — DEXMEDETOMIDINE HYDROCHLORIDE 4 UG/ML
0.2 INJECTION, SOLUTION INTRAVENOUS CONTINUOUS
Status: DISCONTINUED | OUTPATIENT
Start: 2019-11-11 | End: 2019-11-12

## 2019-11-11 RX ORDER — POTASSIUM CHLORIDE 7.45 MG/ML
40 INJECTION INTRAVENOUS
Status: DISCONTINUED | OUTPATIENT
Start: 2019-11-11 | End: 2019-11-18

## 2019-11-11 RX ORDER — MAGNESIUM SULFATE HEPTAHYDRATE 40 MG/ML
4 INJECTION, SOLUTION INTRAVENOUS
Status: DISCONTINUED | OUTPATIENT
Start: 2019-11-11 | End: 2019-11-20

## 2019-11-11 RX ORDER — HEPARIN SODIUM 5000 [USP'U]/ML
5000 INJECTION, SOLUTION INTRAVENOUS; SUBCUTANEOUS EVERY 8 HOURS
Status: DISCONTINUED | OUTPATIENT
Start: 2019-11-11 | End: 2019-11-21 | Stop reason: HOSPADM

## 2019-11-11 RX ORDER — FAMOTIDINE 20 MG/1
20 TABLET, FILM COATED ORAL 2 TIMES DAILY
Status: DISCONTINUED | OUTPATIENT
Start: 2019-11-11 | End: 2019-11-11

## 2019-11-11 RX ORDER — DRONABINOL 2.5 MG/1
5 CAPSULE ORAL
Status: DISCONTINUED | OUTPATIENT
Start: 2019-11-11 | End: 2019-11-11

## 2019-11-11 RX ORDER — SODIUM CHLORIDE 0.9 % (FLUSH) 0.9 %
10 SYRINGE (ML) INJECTION
Status: DISCONTINUED | OUTPATIENT
Start: 2019-11-11 | End: 2019-11-21 | Stop reason: HOSPADM

## 2019-11-11 RX ORDER — IPRATROPIUM BROMIDE AND ALBUTEROL SULFATE 2.5; .5 MG/3ML; MG/3ML
3 SOLUTION RESPIRATORY (INHALATION) EVERY 4 HOURS
Status: DISCONTINUED | OUTPATIENT
Start: 2019-11-11 | End: 2019-11-14

## 2019-11-11 RX ORDER — PROPOFOL 10 MG/ML
25 INJECTION, EMULSION INTRAVENOUS CONTINUOUS
Status: DISCONTINUED | OUTPATIENT
Start: 2019-11-11 | End: 2019-11-12

## 2019-11-11 RX ORDER — FENTANYL CITRATE 50 UG/ML
50 INJECTION, SOLUTION INTRAMUSCULAR; INTRAVENOUS
Status: DISCONTINUED | OUTPATIENT
Start: 2019-11-11 | End: 2019-11-12

## 2019-11-11 RX ORDER — PROPOFOL 10 MG/ML
5 INJECTION, EMULSION INTRAVENOUS CONTINUOUS
Status: DISCONTINUED | OUTPATIENT
Start: 2019-11-11 | End: 2019-11-11

## 2019-11-11 RX ORDER — AMLODIPINE BESYLATE 10 MG/1
10 TABLET ORAL DAILY
Status: DISCONTINUED | OUTPATIENT
Start: 2019-11-11 | End: 2019-11-12

## 2019-11-11 RX ORDER — LEVETIRACETAM 5 MG/ML
500 INJECTION INTRAVASCULAR EVERY 12 HOURS
Status: DISCONTINUED | OUTPATIENT
Start: 2019-11-11 | End: 2019-11-12

## 2019-11-11 RX ORDER — AMLODIPINE BESYLATE 10 MG/1
10 TABLET ORAL DAILY
Status: DISCONTINUED | OUTPATIENT
Start: 2019-11-11 | End: 2019-11-11

## 2019-11-11 RX ORDER — CHLORHEXIDINE GLUCONATE ORAL RINSE 1.2 MG/ML
15 SOLUTION DENTAL 2 TIMES DAILY
Status: DISCONTINUED | OUTPATIENT
Start: 2019-11-11 | End: 2019-11-12

## 2019-11-11 RX ORDER — MIDAZOLAM HYDROCHLORIDE 1 MG/ML
2 INJECTION INTRAMUSCULAR; INTRAVENOUS ONCE AS NEEDED
Status: COMPLETED | OUTPATIENT
Start: 2019-11-11 | End: 2019-11-11

## 2019-11-11 RX ORDER — HYDROCODONE BITARTRATE AND ACETAMINOPHEN 500; 5 MG/1; MG/1
TABLET ORAL CONTINUOUS
Status: DISCONTINUED | OUTPATIENT
Start: 2019-11-11 | End: 2019-11-12

## 2019-11-11 RX ORDER — MAGNESIUM SULFATE HEPTAHYDRATE 40 MG/ML
2 INJECTION, SOLUTION INTRAVENOUS
Status: DISCONTINUED | OUTPATIENT
Start: 2019-11-11 | End: 2019-11-20

## 2019-11-11 RX ADMIN — MIRTAZAPINE 30 MG: 15 TABLET, ORALLY DISINTEGRATING ORAL at 09:11

## 2019-11-11 RX ADMIN — SODIUM CHLORIDE 250 ML: 0.9 INJECTION, SOLUTION INTRAVENOUS at 09:11

## 2019-11-11 RX ADMIN — IPRATROPIUM BROMIDE AND ALBUTEROL SULFATE 3 ML: .5; 3 SOLUTION RESPIRATORY (INHALATION) at 04:11

## 2019-11-11 RX ADMIN — LEVETIRACETAM 500 MG: 5 INJECTION INTRAVENOUS at 08:11

## 2019-11-11 RX ADMIN — DRONABINOL 5 MG: 2.5 CAPSULE ORAL at 06:11

## 2019-11-11 RX ADMIN — IPRATROPIUM BROMIDE AND ALBUTEROL SULFATE 3 ML: .5; 3 SOLUTION RESPIRATORY (INHALATION) at 11:11

## 2019-11-11 RX ADMIN — IOHEXOL 75 ML: 350 INJECTION, SOLUTION INTRAVENOUS at 02:11

## 2019-11-11 RX ADMIN — FAMOTIDINE 20 MG: 20 TABLET ORAL at 08:11

## 2019-11-11 RX ADMIN — HEPARIN SODIUM 5000 UNITS: 5000 INJECTION, SOLUTION INTRAVENOUS; SUBCUTANEOUS at 06:11

## 2019-11-11 RX ADMIN — DEXMEDETOMIDINE HYDROCHLORIDE 0.6 MCG/KG/HR: 4 INJECTION, SOLUTION INTRAVENOUS at 04:11

## 2019-11-11 RX ADMIN — IPRATROPIUM BROMIDE AND ALBUTEROL SULFATE 3 ML: .5; 3 SOLUTION RESPIRATORY (INHALATION) at 07:11

## 2019-11-11 RX ADMIN — PROPOFOL 50 MCG/KG/MIN: 10 INJECTION, EMULSION INTRAVENOUS at 04:11

## 2019-11-11 RX ADMIN — SODIUM CHLORIDE: 0.9 INJECTION, SOLUTION INTRAVENOUS at 01:11

## 2019-11-11 RX ADMIN — LEVETIRACETAM 500 MG: 5 INJECTION INTRAVENOUS at 09:11

## 2019-11-11 RX ADMIN — MIDAZOLAM HYDROCHLORIDE 2 MG: 1 INJECTION, SOLUTION INTRAMUSCULAR; INTRAVENOUS at 04:11

## 2019-11-11 RX ADMIN — FAMOTIDINE 20 MG: 20 TABLET ORAL at 09:11

## 2019-11-11 RX ADMIN — FENTANYL CITRATE 50 MCG: 50 INJECTION INTRAMUSCULAR; INTRAVENOUS at 11:11

## 2019-11-11 RX ADMIN — GADOBUTROL 8 ML: 604.72 INJECTION INTRAVENOUS at 03:11

## 2019-11-11 RX ADMIN — FENTANYL CITRATE 50 MCG: 50 INJECTION INTRAMUSCULAR; INTRAVENOUS at 01:11

## 2019-11-11 RX ADMIN — HEPARIN SODIUM 5000 UNITS: 5000 INJECTION, SOLUTION INTRAVENOUS; SUBCUTANEOUS at 10:11

## 2019-11-11 RX ADMIN — CHLORHEXIDINE GLUCONATE 0.12% ORAL RINSE 15 ML: 1.2 LIQUID ORAL at 09:11

## 2019-11-11 NOTE — CODE/ RAPID DOCUMENTATION
Rapid Response Nurse Chart Check     Chart check completed, abnormal VS noted. Charge RN contacted. Patient intubated with orders for ICU going for CT scan before transfer. No concerns verbalized at this time, instructed to call [phone] for further concerns or assistance.

## 2019-11-11 NOTE — ED PROVIDER NOTES
Source of History:  EMS    Chief complaint:  Unresponsive (Pt to ER via EMS. Pt was found unresponsive sitting up in chair gasping, wheezing, and drooling. Ems states patient had a seizure in route; gave 10mg IM versed. )    HPI:  Angel Torres is a 64 y.o. 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  Per EMS, they were called for seizure.  On arrival patient was obtunded, and they witnessed a 20 sec generalized tonic-clonic seizure that abated with 10 mg of midazolam.  Since then he has remained obtunded.  He was hypertensive but otherwise had no abnormal vital signs.    Per patient's family, he is full code.  No known mets to brain.  No brain imaging available.  No seizure history.  Patient was acting normally throughout the day, watched football game with family.  This evening they noted that they could not wake him up.  The daughter who is not currently present in hospital noted that the patient had a short seizure and called 911.  I clarified with family that this means that the patient had a total of 2 seizures this evening, did not return to his baseline mental status in between seizures.    ROS: As per HPI and below:  Unable to obtain secondary to patient's critical illness    Review of patient's allergies indicates:  No Known Allergies    No current facility-administered medications on file prior to encounter.      Current Outpatient Medications on File Prior to Encounter   Medication Sig Dispense Refill    albuterol (PROAIR HFA) 90 mcg/actuation inhaler INHALE 2 PUFFS INTO THE LUNGS EVERY 6 HOURS AS NEEDED FOR WHEEZING 17 g 0    albuterol (PROVENTIL/VENTOLIN HFA) 90 mcg/actuation inhaler INHALE 2 PUFFS INTO THE LUNGS EVERY 6 HOURS AS NEEDED FOR WHEEZING 8.5 g 0    albuterol-ipratropium (DUO-NEB) 2.5 mg-0.5 mg/3 mL nebulizer solution Rescue 270 mL 0    amLODIPine (NORVASC) 10 MG tablet Take 1  tablet (10 mg total) by mouth once daily. (Patient not taking: Reported on 10/16/2019) 90 tablet 3    cetirizine (ZYRTEC) 10 MG tablet Take 1 tablet (10 mg total) by mouth once daily.  0    cyproheptadine (PERIACTIN) 4 mg tablet Take 1 tablet (4 mg total) by mouth 3 (three) times daily as needed. 90 tablet 1    dexAMETHasone (DECADRON) 4 MG Tab Take 2 tablets (8 mg total) by mouth every 12 (twelve) hours. Take for 3 days starting day prior to chemotherapy 60 tablet 1    dronabinol (MARINOL) 5 MG capsule Take 1 capsule (5 mg total) by mouth 2 (two) times daily before meals. 60 capsule 0    fluticasone (FLONASE) 50 mcg/actuation nasal spray SHAKE LIQUID AND USE 2 SPRAYS IN EACH NOSTRIL EVERY DAY 16 g 1    mirtazapine (REMERON SOL-TAB) 30 MG disintegrating tablet Take 1 tablet (30 mg total) by mouth nightly. 30 tablet 2    mupirocin (BACTROBAN) 2 % ointment Apply topically 2 (two) times daily. To open areas 22 g 2    naproxen (NAPROSYN) 500 MG tablet Take 1 tablet (500 mg total) by mouth 2 (two) times daily as needed (headache). 60 tablet 0    ondansetron (ZOFRAN-ODT) 8 MG TbDL Take 1 tablet (8 mg total) by mouth every 12 (twelve) hours as needed. 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    Skin infection 8/8/2018     Past Surgical History:   Procedure Laterality Date    INGUINAL HERNIA REPAIR Bilateral     KNEE SURGERY         Social History     Socioeconomic History    Marital status:      Spouse name: Not on file    Number of  children: Not on file    Years of education: Not on file    Highest education level: Not on file   Occupational History     Employer: [employer]   Social Needs    Financial resource strain: Not on file    Food insecurity:     Worry: Not on file     Inability: Not on file    Transportation needs:     Medical: Not on file     Non-medical: Not on file   Tobacco Use    Smoking status: Former Smoker     Packs/day: 2.00     Years: 40.00     Pack years: 80.00     Last attempt to quit: 2013     Years since quittin.9    Smokeless tobacco: Former User   Substance and Sexual Activity    Alcohol use: No    Drug use: No    Sexual activity: Yes     Partners: Female   Lifestyle    Physical activity:     Days per week: Not on file     Minutes per session: Not on file    Stress: Not on file   Relationships    Social connections:     Talks on phone: Not on file     Gets together: Not on file     Attends [employer] service: Not on file     Active member of club or organization: Not on file     Attends meetings of clubs or organizations: Not on file     Relationship status: Not on file   Other Topics Concern    Not on file   Social History Narrative    Not on file       Family History   Problem Relation Age of Onset    Cancer Mother         lung, breast    Cancer Sister         lung    Cancer Maternal Grandfather     No Known Problems Father     No Known Problems Brother     No Known Problems Maternal Aunt     No Known Problems Maternal Uncle     No Known Problems Paternal Aunt     No Known Problems Paternal Uncle     No Known Problems Maternal Grandmother     No Known Problems Paternal Grandmother     No Known Problems Paternal Grandfather     Amblyopia Neg Hx     Blindness Neg Hx     Cataracts Neg Hx     Diabetes Neg Hx     Glaucoma Neg Hx     Hypertension Neg Hx     Macular degeneration Neg Hx     Retinal detachment Neg Hx     Strabismus Neg Hx     Stroke Neg Hx     Thyroid  disease Neg Hx      Physical Exam:    Vitals:    11/11/19 0406   BP:    Pulse: 98   Resp: 18   Temp:      Gen:  Unresponsive to painful stimuli.  Sonorous respirations.  Skin: Cool, dry. No rashes seen.  ENT: PERRL.Oropharynx clear.    Pulm:  Sonorous respirations.  Gurgling.  Hypoxia.  CV:  Tachycardic, regular. No lower extremity edema.  Abd:  Firm, patient grimaces with palpation.   MSK: No deformities.    Neck supple.  No meningismus.    Laboratory Studies:  Labs Reviewed   CBC W/ AUTO DIFFERENTIAL - Abnormal; Notable for the following components:       Result Value    WBC 16.16 (*)     Mean Corpuscular Hemoglobin Conc 29.1 (*)     RDW 19.1 (*)     Gran # (ANC) 11.5 (*)     Immature Grans (Abs) 0.07 (*)     Mono # 1.1 (*)     All other components within normal limits   COMPREHENSIVE METABOLIC PANEL - Abnormal; Notable for the following components:    CO2 10 (*)     Glucose 162 (*)     Anion Gap 24 (*)     All other components within normal limits   LACTIC ACID, PLASMA - Abnormal; Notable for the following components:    Lactate (Lactic Acid) >12.0 (*)     All other components within normal limits   URINALYSIS, REFLEX TO URINE CULTURE - Abnormal; Notable for the following components:    Appearance, UA Hazy (*)     Protein, UA 1+ (*)     All other components within normal limits    Narrative:     Preferred Collection Type->Urine, Clean Catch   ISTAT PROCEDURE - Abnormal; Notable for the following components:    POC Glucose 164 (*)     All other components within normal limits   ISTAT PROCEDURE - Abnormal; Notable for the following components:    POC PH 7.306 (*)     POC PCO2 52.3 (*)     POC PO2 168 (*)     POC TCO2 28 (*)     All other components within normal limits   CULTURE, BLOOD   CULTURE, BLOOD   LIPASE   MAGNESIUM   TSH   TROPONIN I   ALCOHOL,MEDICAL (ETHANOL)   TOXICOLOGY SCREEN, URINE, RANDOM (COMPLIANCE)    Narrative:     Preferred Collection Type->Urine, Clean Catch   APTT   PROTIME-INR   URINALYSIS  MICROSCOPIC    Narrative:     Preferred Collection Type->Urine, Clean Catch   COMPREHENSIVE METABOLIC PANEL   MAGNESIUM   PHOSPHORUS   TYPE & SCREEN   ISTAT CHEM8     EKG (independently interpreted by me):  Sinus tachycardia, rate 127.  Right bundle branch block present.  No STEMI.    X-rays (independently interpreted by me):  ET tube in adequate position.    Chart reviewed.     Imaging Results          CT Abdomen Pelvis With Contrast (Final result)  Result time 11/11/19 03:20:11    Final result by All Davalos MD (11/11/19 03:20:11)                 Impression:      There is moderate to prominent gastric distention with fluid and air with appearance of wall and fold prominence along the pylorus duodenal junction and proximal duodenum this may relate to contraction or mild inflammation although significant surrounding inflammatory change is not seen, possibility of ulcer disease is a consideration as well.  Clinical and historical correlation is needed if indicated upper GI or endoscopy may be helpful for further evaluation.    Intrathoracic findings appear stable when compared to the prior study and likely relate to the history of malignancy.    Findings consistent with osseous metastatic disease again noted.    Mild prominence of the colon with air and stool with moderate prominence of the rectosigmoid colon with stool without inflammatory appearance, correlation for constipation is needed.      Electronically signed by: All Davalos  Date:    11/11/2019  Time:    03:20             Narrative:    EXAMINATION:  CT ABDOMEN PELVIS WITH CONTRAST    CLINICAL HISTORY:  Abdominal distension;    TECHNIQUE:  Low dose axial images, sagittal and coronal reformations were obtained from the lung bases to the pubic symphysis following the IV administration of 75 mL of Omnipaque 350 .  Oral contrast was not given.    COMPARISON:  October 23, 2019    FINDINGS:  Single-phase CT examination of the abdomen and pelvis was  performed.  There is persistent abnormal appearance of the right lung base, there is abnormal parenchymal change and there is a small to moderate pleural effusion noted, these findings appear similar to the prior examination and likely relate to the history of lung cancer.    An enteric tube is noted extending to the level of the stomach.  There is moderate to prominent distention of the stomach with fluid and air, there is appearance of mild thickening at the level of the pylorus duodenal junction and proximal duodenum this may relate to a contraction however could relate to mild inflammatory change or ulcer disease however significant surrounding edema or inflammation is not seen.  Clinical and historical correlation is needed if indicated upper GI or endoscopy may be helpful.  There is no evidence for small bowel obstructive process.  The appendix is difficult to identified the visualized aspects do not appear inflamed.  Mild prominence of the colon throughout its course with air and stool noted without inflammatory appearance, there is moderate prominence of the rectum and rectosigmoid colon with stool without inflammatory appearance.  There is no free intraperitoneal air.    Hepatic hypodensities are again noted appearing stable there is no evidence for acute process of the gallbladder, or pancreas or adrenal glands.  Heterogeneity of the spleen may relate to timing of imaging after contrast administration.  There is no evidence for hydronephrosis or obstructive uropathy bilaterally.  The abdominal aorta demonstrates appropriate opacification, atherosclerotic change noted.  The urinary bladder is decompressed with Muse catheter and not well evaluated.  The osseous structures demonstrate findings consistent with osseous metastatic disease as on the prior study.                               CT Head Without Contrast (Final result)  Result time 11/11/19 02:47:01    Final result by Noman Rasmussen MD (11/11/19  02:47:01)                 Impression:      No acute intracranial abnormalities      Electronically signed by: Noman Rasmussen MD  Date:    11/11/2019  Time:    02:47             Narrative:    EXAMINATION:  CT HEAD WITHOUT CONTRAST    CLINICAL HISTORY:  Seizures new or progressive;Hx metastatic lung CA;    TECHNIQUE:  Low dose axial images were obtained through the head.  Coronal and sagittal reformations were also performed. Contrast was not administered.    COMPARISON:  None.    FINDINGS:  The brain parenchyma appears normal for age with good corticomedullary differentiation.  There is no evidence of acute infarct, hemorrhage, or mass.  The ventricular system is normal in size.  No mass-effect or midline shift.  There are no abnormal extra-axial fluid collections.  The paranasal sinuses and mastoid air cells are clear.  The calvarium appears intact.  .                               X-ray chest AP portable (Final result)  Result time 11/11/19 01:22:46    Final result by Noman Rasmussen MD (11/11/19 01:22:46)                 Impression:      No significant change from prior study.      Electronically signed by: Noman Rasmussen MD  Date:    11/11/2019  Time:    01:22             Narrative:    EXAMINATION:  XR CHEST AP PORTABLE    CLINICAL HISTORY:  Intubated;    TECHNIQUE:  Single frontal view of the chest was performed.    COMPARISON:  None    FINDINGS:  ET tube and enteric tube appear unchanged from prior.    Heart and lungs  appear unchanged when allowing for differences in technique and positioning.                               X-Ray Chest AP Portable (Final result)  Result time 11/11/19 00:22:06    Final result by Noman Rasmussen MD (11/11/19 00:22:06)                 Impression:      Enteric tube extends below the diaphragm and off the inferior edge of the image.    ET tube tip 6 cm above the debby.    Interval decrease in size of mass like opacity at the right base.    Interval development of small to  moderate pleural effusion at the right base with associated compressive atelectatic change.    No other significant change from prior study.      Electronically signed by: Noman Rasmussen MD  Date:    11/11/2019  Time:    00:22             Narrative:    EXAMINATION:  XR CHEST AP PORTABLE    CLINICAL HISTORY:  altered mental status;    TECHNIQUE:  Single frontal view of the chest was performed.    COMPARISON:  February 5, 2017.    FINDINGS:  Enteric tube extends below the diaphragm and off the inferior edge of the image.  ET tube tip 6 cm above the debby.    Interval decrease in size of mass like opacity at the right base.  Interval development of small to moderate pleural effusion at the right base with associated compressive atelectatic change.    Heart and lungs otherwise appear unchanged when allowing for differences in technique and positioning.                                Medications Given:  Medications   sodium chloride 0.9% flush 10 mL (has no administration in time range)   heparin (porcine) injection 5,000 Units (has no administration in time range)   potassium chloride 10 mEq in 100 mL IVPB (has no administration in time range)     And   potassium chloride 10 mEq in 100 mL IVPB (has no administration in time range)     And   potassium chloride 10 mEq in 100 mL IVPB (has no administration in time range)   magnesium sulfate 2g in water 50mL IVPB (premix) (has no administration in time range)   magnesium sulfate 2g in water 50mL IVPB (premix) (has no administration in time range)   sodium phosphate 15 mmol in dextrose 5 % 250 mL IVPB (has no administration in time range)   sodium phosphate 20.01 mmol in dextrose 5 % 250 mL IVPB (has no administration in time range)   sodium phosphate 30 mmol in dextrose 5 % 250 mL IVPB (has no administration in time range)   calcium gluconate 1g in dextrose 5% 100mL (ready to mix system) (has no administration in time range)   calcium gluconate 1g in dextrose 5% 100mL  (ready to mix system) (has no administration in time range)   calcium gluconate 1g in dextrose 5% 100mL (ready to mix system) (has no administration in time range)   levETIRAcetam in NaCl (iso-os) IVPB 500 mg (has no administration in time range)   albuterol-ipratropium 2.5 mg-0.5 mg/3 mL nebulizer solution 3 mL (3 mLs Nebulization Given 11/11/19 0406)   dronabinol capsule 5 mg (has no administration in time range)   0.9%  NaCl infusion (CRRT USE ONLY) (has no administration in time range)   amLODIPine tablet 10 mg (has no administration in time range)   famotidine tablet 20 mg (has no administration in time range)   mirtazapine disintegrating tablet 30 mg (has no administration in time range)   propofol (DIPRIVAN) 10 mg/mL infusion (has no administration in time range)   dexmedetomidine (PRECEDEX) 400mcg/100mL 0.9% NaCL infusion (has no administration in time range)   levETIRAcetam in NaCl (iso-os) IVPB 1,000 mg (0 mg Intravenous Stopped 11/11/19 0054)   etomidate injection 15 mg (15 mg Intravenous Given 11/10/19 2339)   rocuronium injection 80 mg (80 mg Intravenous Given 11/10/19 2338)   iohexol (OMNIPAQUE 350) injection 75 mL (75 mLs Intravenous Given 11/11/19 0238)     Discussed with:  Neuro ICU    MDM:    64 y.o. male with metastatic adenocarcinoma of the lung presenting to ER with complaint of seizure x2 without return to baseline mental status in between.  On arrival he is unresponsive to painful stimuli, with sonorous respirations and gurgling.  Patient was intubated for airway protection and hypoxia.    Tube was confirmed to be in good position.  Sedation it will be propofol due to the status epilepticus.  Will also Keppra load.  I called neuro ICU to obtain EEG monitoring.    Patient was noted to be infested with bedbugs.  Therefore he was placed on contact isolation. CT head is negative. Due to the tenderness in grimace on abdominal exam during my initial evaluation, a CT scan of the abdomen was ordered.   It does not demonstrate any acute finding.    Case discussed with neuro ICU who accepts the patient to their service.      Intubation  Date/Time: 11/11/2019 4:28 AM  Location procedure was performed: [facility] EMERGENCY DEPARTMENT  Performed by: Jayashree David MD  Authorized by: Jaime Gonzales MD   Pre-operative diagnosis: Acute respiratory failure, hypoxia  Consent Done: Emergent Situation  Indications: airway protection and  hypoxemia  Intubation method: video-assisted  Patient status: paralyzed (RSI)  Preoxygenation: nonrebreather mask, BVM and nasal cannula  Pretreatment medications: none  Sedatives: etomidate  Paralytic: rocuronium  Tube size: 7.5 mm  Tube type: uncuffed  Number of attempts: 1  Cricoid pressure: no  Cords visualized: yes  Post-procedure assessment: chest rise,  ETCO2 monitor and CO2 detector  Chest x-ray findings: endotracheal tube in appropriate position  Complications: No  Estimated blood loss (mL): 0  Specimens: No  Implants: No        Critical Care Procedure Note    Authorized and Performed by: Jayashree David    Total critical care time: Approximately 35 minutes    Due to a high probability of clinically significant, life threatening deterioration, the patient required my highest level of preparedness to intervene emergently and I personally spent this critical care time directly and personally managing the patient. This critical care time included obtaining a history; examining the patient; pulse oximetry; ordering and review of studies; arranging urgent treatment with development of a management plan; evaluation of patient's response to treatment; frequent reassessment; and, discussions with other providers.    This critical care time was performed to assess and manage the high probability of imminent, life-threatening deterioration that could result in multi-organ failure. It was exclusive of separately billable procedures and treating other patients and teaching time.     Diagnostic  Impression:    1. Status epilepticus    2. Altered mental status        Patient and/or family understands the plan and is in agreement, verbalized understanding, questions answered    Jayashree David MD  Emergency Medicine           Jayashree David MD  11/11/19 3680

## 2019-11-11 NOTE — PLAN OF CARE
CM met with patient and wife, Diamond, for Discharge Planning Assessment.  Patient intubated on vent and unable to participate.  Per wife, patient resides with her in a 2nd floor apartment with 16 steps to enter.  Per wife, patient was independent and uses a nebulizer.  Wife stated that she will assist patient upon discharge if needed.  Per wife, patient has [facility] Medicaid.  Admit dept informed.        11/11/19 3054   Discharge Assessment   Assessment Type Discharge Planning Assessment   Assessment information obtained from? Caregiver  (wife)   Expected Length of Stay (days) 5   Communicated expected length of stay with patient/caregiver yes   Prior to hospitilization cognitive status: Alert/Oriented   Prior to hospitalization functional status: Independent   Current cognitive status: Coma/Sedated/Intubated   Current Functional Status: Completely Dependent   Lives With spouse   Able to Return to Prior Arrangements yes   Is patient able to care for self after discharge? Unable to determine at this time (comments)   Who are your caregiver(s) and their phone number(s)? Diamond Gil (wife) [phone]   Patient's perception of discharge disposition other (comments)  (kate)   Readmission Within the Last 30 Days no previous admission in last 30 days   Patient currently being followed by outpatient case management? No   Patient currently receives any other outside agency services? No   Equipment Currently Used at Home nebulizer   Do you have any problems affording any of your prescribed medications? No   Is the patient taking medications as prescribed? yes   Does the patient have transportation home? Yes   Transportation Anticipated family or friend will provide   Discharge Plan A Home with family   Discharge Plan B Rehab;Skilled Nursing Facility   DME Needed Upon Discharge  other (see comments)  (tbd)   Patient/Family in Agreement with Plan yes             PCP:  Lc Beltran MD        Pharmacy:    [id number] - [place], [place] - [address] [facility] [address]  [address]  [facility] [address]  Phone: [phone] Fax: [phone]        Emergency Contacts:  Extended Emergency Contact Information  Primary Emergency Contact: Magaly Gil  Address: [address] [place] [address] [id number] [place] [address]  Home Phone: [phone]  Mobile Phone: [phone]  Relation: Spouse  Preferred language: English      Insurance:  Payor: /       Latisha Dixon RN, CCRN-K, CCM  Neuro-Critical Care   X [phone]

## 2019-11-11 NOTE — TELEPHONE ENCOUNTER
----- Message from Majo Poe sent at 11/11/2019  9:41 AM CST -----  Contact: Diamond  Need Advice:      Reason For Call: Patient is in ICU. Diamond has a few questions      Communication Preference: [phone]       Additional Information:

## 2019-11-11 NOTE — ASSESSMENT & PLAN NOTE
-Contact isolation and standard precautions    -Gown, cap, gloves, tie patient's clothing in tight plastic bag and shoe booties etc   -Patient should be showered or bathed  Or changed into hospital-laundered clothing and moved to an isolated room   -Close the old room and Notify  immediately and infection control    -Place all patient's clothes and belongings in a sealed plastic bag for 24 hours

## 2019-11-11 NOTE — CARE UPDATE
Pt arrived intubated with size 7.5 ETT taped at 22 cm lip with the following vent setting: rate 18, , PEEP 5 and Fi02 50%. Will continue to monitor.

## 2019-11-11 NOTE — ASSESSMENT & PLAN NOTE
Patient intubated and sedated on propofol  Vent settings noted below  Vent Mode: A/C  Oxygen Concentration (%):  [70] 70  Resp Rate Total:  [16 br/min] 16 br/min  Vt Set:  [450 mL] 450 mL  PEEP/CPAP:  [5 cmH20] 5 cmH20  Pressure Support:  [0 cmH20] 0 cmH20  Mean Airway Pressure:  [8.6 cmH20] 8.6 cmH20

## 2019-11-11 NOTE — HPI
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 Patient was watching the football game earlier in the evening and had no issues during the day. Later in the evening he was somnolent and his daughter noted that he had a seizure and she called EMS. Patient was brought to the ED by EMS who were called for a seizure-like activity. All history was obtained from ED chart as patient was intubated and sedated on propofol on admission. Patient was obtunded when EMS arrived on the scene and they witnessed a generalized tonic clonic seizure lasting 20 seconds which was abated with IV Versed 10mg stat. Patient was intubated in the ED and had elevated blood pressure but otherwise stable vital signs. He was placed on mechanical ventilation and sedated with propofol. Video EEG ordered and patient was admitted to Neuro Critical care unit for higher level of care. Patient has no known history of seizures, no known history of metastasis to brain and no previous imaging. Patient had a CT scan done in the ED prior to admission in Neuro ICU. Upon evaluation in the ED patient was infested with bed bugs crawling all over him. Patient was placed on contact isolation immediately and  as well as charge nurse notified.

## 2019-11-11 NOTE — ASSESSMENT & PLAN NOTE
Seizures/Epilepsy Monitoring Evaluation:  - Continue current management.  - Consult Epilepsy service  - Seizure Precautions  - Continue vEEG monitoring   - NCC will follow clinically and address any medical issues.  - Patient loaded with IV Kepppra 1000mg stat then Continue AEDs Keppra 500mg BID  - Pending recommendations per Epilepsy Service  -Pending CT head report

## 2019-11-11 NOTE — HOSPITAL COURSE
11/11/2019: Patient admitted to Neuro ICU for status epilepticus. Patient intubated, sedated with propofol and video EEG monitoring.  11/12/2019: pt extubated today-on NC  11/13/19: consult radiation oncology, d/c phillip cath, repeat BMP, SBP <180, start coreg BID  11/14/19: Pt agitated overnight. Placed in restraints. Eventually calmed on his own  11/15/19: PACO. Palliative on board for GOC and possible hospice  11/16/19: [facility]ION. Family requesting rehab. Oncology rec pursuing home hospice. Patient remains tachycardic on 3L NC.

## 2019-11-11 NOTE — NURSING
Patient returned from MRI via bed with ambubag, portable monitor, and extra propofol by 1 RN, 1 RT, and one PCT. Patient tolerated well, VSS

## 2019-11-11 NOTE — CARE UPDATE
Patient intubated with 7.5 Ettube 22cm @ lip. Patient placed on vent of settings VC 16/450/5/%. Will wean FiO2 as tolerated. Will continue to monitor.

## 2019-11-11 NOTE — H&P
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 Patient was watching the football game earlier in the evening and had no issues during the day. Later in the evening he was somnolent and his daughter noted that he had a seizure and she called EMS. Patient was brought to the ED by EMS who were called for a seizure-like activity. All history was obtained from ED chart as patient was intubated and sedated on propofol on admission. Patient was obtunded when EMS arrived on the scene and they witnessed a generalized tonic clonic seizure lasting 20 seconds which was abated with IV Versed 10mg stat. Patient was intubated in the ED and had elevated blood pressure but otherwise stable vital signs. He was placed on mechanical ventilation and sedated with propofol. Video EEG ordered and patient was admitted to Neuro Critical care unit for higher level of care. Patient has no known history of seizures, no known history of metastasis to brain and no previous imaging. Patient had a CT scan done in the ED prior to admission in Neuro ICU. Upon evaluation in the ED patient was infested with bed bugs crawling all over him. Patient was placed on contact isolation immediately and  as well as charge nurse notified.    Past Medical History:   Diagnosis Date    Chemotherapy follow-up examination 9/7/2016    Chemotherapy follow-up examination 12/6/2017    COPD (chronic obstructive pulmonary disease)     Decreased appetite 11/8/2017    Hearing loss     Hypertension 2/9/2015    Primary cancer of right middle lobe of lung 7/11/2016    Rash 9/7/2016    Secondary  adenocarcinoma of bone 7/26/2017    Skin infection 8/8/2018     Past Surgical History:   Procedure Laterality Date    INGUINAL HERNIA REPAIR Bilateral     KNEE SURGERY        No current facility-administered medications on file prior to encounter.      Current Outpatient Medications on File Prior to Encounter   Medication Sig Dispense Refill    albuterol (PROAIR HFA) 90 mcg/actuation inhaler INHALE 2 PUFFS INTO THE LUNGS EVERY 6 HOURS AS NEEDED FOR WHEEZING 17 g 0    albuterol (PROVENTIL/VENTOLIN HFA) 90 mcg/actuation inhaler INHALE 2 PUFFS INTO THE LUNGS EVERY 6 HOURS AS NEEDED FOR WHEEZING 8.5 g 0    albuterol-ipratropium (DUO-NEB) 2.5 mg-0.5 mg/3 mL nebulizer solution Rescue 270 mL 0    amLODIPine (NORVASC) 10 MG tablet Take 1 tablet (10 mg total) by mouth once daily. (Patient not taking: Reported on 10/16/2019) 90 tablet 3    cetirizine (ZYRTEC) 10 MG tablet Take 1 tablet (10 mg total) by mouth once daily.  0    cyproheptadine (PERIACTIN) 4 mg tablet Take 1 tablet (4 mg total) by mouth 3 (three) times daily as needed. 90 tablet 1    dexAMETHasone (DECADRON) 4 MG Tab Take 2 tablets (8 mg total) by mouth every 12 (twelve) hours. Take for 3 days starting day prior to chemotherapy 60 tablet 1    dronabinol (MARINOL) 5 MG capsule Take 1 capsule (5 mg total) by mouth 2 (two) times daily before meals. 60 capsule 0    fluticasone (FLONASE) 50 mcg/actuation nasal spray SHAKE LIQUID AND USE 2 SPRAYS IN EACH NOSTRIL EVERY DAY 16 g 1    mirtazapine (REMERON SOL-TAB) 30 MG disintegrating tablet Take 1 tablet (30 mg total) by mouth nightly. 30 tablet 2    mupirocin (BACTROBAN) 2 % ointment Apply topically 2 (two) times daily. To open areas 22 g 2    naproxen (NAPROSYN) 500 MG tablet Take 1 tablet (500 mg total) by mouth 2 (two) times daily as needed (headache). 60 tablet 0    ondansetron (ZOFRAN-ODT) 8 MG TbDL Take 1 tablet (8 mg total) by mouth every 12 (twelve) hours as needed. 30 tablet 3    triamcinolone  acetonide 0.1% (KENALOG) 0.1 % ointment To all rashes BID 80 g 4      Allergies: Patient has no known allergies.    Family History   Problem Relation Age of Onset    Cancer Mother         lung, breast    Cancer Sister         lung    Cancer Maternal Grandfather     No Known Problems Father     No Known Problems Brother     No Known Problems Maternal Aunt     No Known Problems Maternal Uncle     No Known Problems Paternal Aunt     No Known Problems Paternal Uncle     No Known Problems Maternal Grandmother     No Known Problems Paternal Grandmother     No Known Problems Paternal Grandfather     Amblyopia Neg Hx     Blindness Neg Hx     Cataracts Neg Hx     Diabetes Neg Hx     Glaucoma Neg Hx     Hypertension Neg Hx     Macular degeneration Neg Hx     Retinal detachment Neg Hx     Strabismus Neg Hx     Stroke Neg Hx     Thyroid disease Neg Hx      Social History     Tobacco Use    Smoking status: Former Smoker     Packs/day: 2.00     Years: 40.00     Pack years: 80.00     Last attempt to quit: 2013     Years since quittin.9    Smokeless tobacco: Former User   Substance Use Topics    Alcohol use: No    Drug use: No     Review of Systems   Unable to perform ROS: Intubated     Unable to test orientation, language, memory, judgment, insight, fund of knowledge, hearing, shoulder shrug, tongue protrusion, coordination, gait due to level of consciousness.    Today I personally reviewed pertinent medications, lines/drains/airways, imaging, cardiology results, laboratory results, microbiology results, notably:    Admit Date: 11/10/2019  LOS: 0    CC: <principal problem not specified>    Code Status: Full Code     SUBJECTIVE:     Medications:  Continuous Infusions:   propofol 15 mcg/kg/min (11/10/19 8768)     Scheduled Meds:   famotidine  20 mg Oral BID    heparin (porcine)  5,000 Units Subcutaneous Q8H    levetiracetam IVPB  500 mg Intravenous Q12H     PRN Meds:.calcium gluconate IVPB,  calcium gluconate IVPB, calcium gluconate IVPB, magnesium sulfate IVPB, magnesium sulfate IVPB, potassium chloride in water **AND** potassium chloride in water **AND** potassium chloride in water, sodium chloride 0.9%, sodium phosphate IVPB, sodium phosphate IVPB, sodium phosphate IVPB    OBJECTIVE:   Vital Signs (Most Recent):   Pulse: (!) 114 (11/11/19 0035)  Resp: 16 (11/10/19 2325)  BP: 133/88 (11/11/19 0035)  SpO2: 100 % (11/11/19 0035)    Vital Signs (24h Range):   Pulse:  [114-125] 114  Resp:  [16] 16  SpO2:  [89 %-100 %] 100 %  BP: (133-183)/() 133/88    ICP/CPP (Last 24h):        I & O (Last 24h):     Intake/Output Summary (Last 24 hours) at 11/11/2019 0107  Last data filed at 11/11/2019 0054  Gross per 24 hour   Intake 100 ml   Output --   Net 100 ml     Physical Exam: Patient placed on contact isolation for bed bug infestation  GA: Intubated, on mechanical ventilation, vital signs stable, crawling with bed bugs.   HEENT: No scleral icterus or JVD.   Pulmonary: gurgling sounds + rhonchi.  Cardiac: tachycardia w/o rubs/murmurs/gallops.   Abdominal: Bowel sounds present x 4. Grimaces to pain, No appreciable hepatosplenomegaly.  Skin: No jaundice, rashes, or visible lesions.  Neuro:  --GCS: E1V1 M4  --Mental Status:  Sedated on  propofol  --CN II-XII grossly intact.   --Pupils 3mm, PERRL.   --Corneal reflex, gag, cough intact.  --LUE strength: Unresponsive to pain  --RUE strength: unresponsive to pain  --LLE strength: Unresponsive to pain  --RLE strength: Unresponsive to pain    Vent Data:   Vent Mode: A/C  Oxygen Concentration (%):  [70] 70  Resp Rate Total:  [16 br/min] 16 br/min  Vt Set:  [450 mL] 450 mL  PEEP/CPAP:  [5 cmH20] 5 cmH20  Pressure Support:  [0 cmH20] 0 cmH20  Mean Airway Pressure:  [8.6 cmH20] 8.6 cmH20    Lines/Drains/Airway:        Airway - Non-Surgical 11/10/19 2340 Endotracheal Tube (Active)               NG/OG Tube 11/10/19 2340 Benito wallace 18 Fr. Right mouth (Active)             Urethral Catheter 11/11/19 0008 Non-latex 16 Fr. (Active)     Nutrition/Tube Feeds (if NPO state why): tube feeds    Labs:  ABG: No results for input(s): PH, PO2, PCO2, HCO3, POCSATURATED, BE in the last 24 hours.  BMP:  Recent Labs   Lab 11/10/19  2338      K 4.2      CO2 10*   BUN 15   CREATININE 1.2   *   MG 2.1     LFT:   Lab Results   Component Value Date    AST 16 11/10/2019    ALT 15 11/10/2019    ALKPHOS 64 11/10/2019    BILITOT 0.4 11/10/2019    ALBUMIN 4.1 11/10/2019    PROT 7.9 11/10/2019     CBC:   Lab Results   Component Value Date    WBC 16.16 (H) 11/10/2019    HGB 14.1 11/10/2019    HCT 43 11/11/2019    MCV 94 11/10/2019     11/10/2019     Microbiology x 7d:   Microbiology Results (last 7 days)     Procedure Component Value Units Date/Time    Blood culture #1 **CANNOT BE ORDERED STAT** [[id number]] Collected:  11/10/19 2339    Order Status:  Sent Specimen:  Blood from Peripheral, Hand, Right Updated:  11/10/19 2347    Blood culture #2 **CANNOT BE ORDERED STAT** [[id number]] Collected:  11/10/19 2339    Order Status:  Sent Specimen:  Blood from Peripheral, Antecubital, Right Updated:  11/10/19 2344        Imaging:  Chest Xray 11/11/19  Impression       Enteric tube extends below the diaphragm and off the inferior edge of the image.    ET tube tip 6 cm above the debby.    Interval decrease in size of mass like opacity at the right base.    Interval development of small to moderate pleural effusion at the right base with associated compressive atelectatic change.         I personally reviewed the above image.    ASSESSMENT/PLAN:     Active Hospital Problems    Diagnosis    Status epilepticus      Neuro: Status Epilepticus, Patient sedated with propofol   Video EEG monitoring   Patient loaded with Keppra 1000mg stat and 500mg BID      Pulmonary: Patient intubated on mechanical ventilation Vent settings noted below  Vent Mode: A/C  Oxygen Concentration (%):  [70] 70  Resp Rate  Total:  [16 br/min] 16 br/min  Vt Set:  [450 mL] 450 mL  PEEP/CPAP:  [5 cmH20] 5 cmH20  Pressure Support:  [0 cmH20] 0 cmH20  Mean Airway Pressure:  [8.6 cmH20] 8.6 cmH20      Cardiac: Tachycardic, maintain MAP >65      Renal:  Monitor input and output      ID: Patient infested with bedbugs, please ensure standard precautions, contact isolation, gown, cap, shoe disposable booties etc      Hem/Onc: Monitor CBC daily      Endocrine:  No hemoglobin A1C on file      Fluids/Electrolytes/Nutrition/GI: Monitor electrolytes and replace accordingly    Lines: Peripheral lines  Art- N/A  CVC- N/A  ETT- Patient intubated on 11/10/19 - 1 day  Muse- 11/10/19  NG- 11/10/19  PEG- N/A    Proph:  DVT: heparin SC 5000 IU  Constipation:  Last output:  PUP: Famotidine 20mg BID  VAP: Chlorhexidine oral care      Uninterrupted Critical Care/Counseling Time (not including procedures): 30 minutes    Assessment/Plan:     Neuro  * Status epilepticus   Seizures/Epilepsy Monitoring Evaluation:  - Continue current management.  - Consult Epilepsy service  - Seizure Precautions  - Continue vEEG monitoring   - NCC will follow clinically and address any medical issues.  - Patient loaded with IV Kepppra 1000mg stat then Continue AEDs Keppra 500mg BID  - Pending recommendations per Epilepsy Service  -Pending CT head report      Derm  Infestation by bed bug   -Contact isolation and standard precautions    -Gown, cap, gloves, tie patient's clothing in tight plastic bag and shoe booties etc   -Patient should be showered or bathed  Or changed into hospital-laundered clothing and moved to an isolated room   -Close the old room and Notify  immediately and infection control    -Place all patient's clothes and belongings in a sealed plastic bag for 24 hours    Pulmonary  Acute respiratory failure with hypoxia  Patient intubated and sedated on propofol  Vent settings noted below  Vent Mode: A/C  Oxygen Concentration (%):  [70] 70  Resp  Rate Total:  [16 br/min] 16 br/min  Vt Set:  [450 mL] 450 mL  PEEP/CPAP:  [5 cmH20] 5 cmH20  Pressure Support:  [0 cmH20] 0 cmH20  Mean Airway Pressure:  [8.6 cmH20] 8.6 cmH20    Cardiac/Vascular  Essential hypertension  Keep MAP >65  Continue home antihypertensive Amlodipine 10mg daily    Oncology  Secondary adenocarcinoma of bone  Continue present mgt per Heme-onc  Dronabinol 5mg AC  F/U CT abdomen, pelvis     Primary cancer of right middle lobe of lung  Patient with right middle lobe lung cancer stage IV with mets to bone on chemotherapy       Lactic acidosis >12  IVF N/S @ 75cc/hr    The patient is being Prophylaxed for:  Venous Thromboembolism with: Mechanical or Chemical  Stress Ulcer with: H2B  Ventilator Pneumonia with: chlorhexidine oral care    Activity Orders          None        Full Code    Lc Byrne MD  Neurocritical Care  [facility]

## 2019-11-11 NOTE — CARE UPDATE
Patient transported to CT on transport vent at previous settings. Ambu bag and 2 spare O2 tanks at bedside. Tolerated transport. Report given to Luly HERRERA.

## 2019-11-11 NOTE — NURSING
Patient transported to MRI via bed with a portable monitor, ambubag, and extra bottle of propofol by 1 RN, 1 PCT, and 1 RT. Pt tolerated well until moving over to bed. Patient restless and started moving. Debi with NCC notified and Versaid ordered for restlessness. Patient sedated and tolerated scan.

## 2019-11-11 NOTE — ED PROVIDER NOTES
Intubation  Date/Time: 11/11/2019 4:28 AM  Performed by: Juan Clarke MD  Authorized by: Jaime Gonzales MD   Consent Done: Emergent Situation  Indications: airway protection  Intubation method: video-assisted  Patient status: paralyzed (RSI)  Preoxygenation: bag valve mask  Pretreatment medications: none  Sedatives: etomidate  Paralytic: rocuronium  Laryngoscope size: Mac 4  Tube size: 7.0 mm  Tube type: cuffed  Number of attempts: 1  Cricoid pressure: no  Cords visualized: yes  Post-procedure assessment: chest rise,  ETCO2 monitor and CO2 detector  Breath sounds: clear  Cuff inflated: yes  ETT to lip: 21 cm  Tube secured with: adhesive tape and ETT jiang  Chest x-ray interpreted by me.  Chest x-ray findings: endotracheal tube in appropriate position  Patient tolerance: Patient tolerated the procedure well with no immediate complications  Complications: No  Comments: As the attending I was personally present and supervising for the entire procedure.           Juan Clarke MD  Resident  11/11/19 [id number]       Jayashree David MD  11/11/19 1559

## 2019-11-11 NOTE — SUBJECTIVE & OBJECTIVE
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      Current Outpatient Medications on File Prior to Encounter   Medication Sig Dispense Refill    albuterol (PROAIR HFA) 90 mcg/actuation inhaler INHALE 2 PUFFS INTO THE LUNGS EVERY 6 HOURS AS NEEDED FOR WHEEZING 17 g 0    albuterol (PROVENTIL/VENTOLIN HFA) 90 mcg/actuation inhaler INHALE 2 PUFFS INTO THE LUNGS EVERY 6 HOURS AS NEEDED FOR WHEEZING 8.5 g 0    albuterol-ipratropium (DUO-NEB) 2.5 mg-0.5 mg/3 mL nebulizer solution Rescue 270 mL 0    amLODIPine (NORVASC) 10 MG tablet Take 1 tablet (10 mg total) by mouth once daily. (Patient not taking: Reported on 10/16/2019) 90 tablet 3    cetirizine (ZYRTEC) 10 MG tablet Take 1 tablet (10 mg total) by mouth once daily.  0    cyproheptadine (PERIACTIN) 4 mg tablet Take 1 tablet (4 mg total) by mouth 3 (three) times daily as needed. 90 tablet 1    dexAMETHasone (DECADRON) 4 MG Tab Take 2 tablets (8 mg total) by mouth every 12 (twelve) hours. Take for 3 days starting day prior to chemotherapy 60 tablet 1    dronabinol (MARINOL) 5 MG capsule Take 1 capsule (5 mg total) by mouth 2 (two) times daily before meals. 60 capsule 0    fluticasone (FLONASE) 50 mcg/actuation nasal spray SHAKE LIQUID AND USE 2 SPRAYS IN EACH NOSTRIL EVERY DAY 16 g 1    mirtazapine (REMERON SOL-TAB) 30 MG disintegrating tablet Take 1 tablet (30 mg total) by mouth nightly. 30 tablet 2    mupirocin  (BACTROBAN) 2 % ointment Apply topically 2 (two) times daily. To open areas 22 g 2    naproxen (NAPROSYN) 500 MG tablet Take 1 tablet (500 mg total) by mouth 2 (two) times daily as needed (headache). 60 tablet 0    ondansetron (ZOFRAN-ODT) 8 MG TbDL Take 1 tablet (8 mg total) by mouth every 12 (twelve) hours as needed. 30 tablet 3    triamcinolone acetonide 0.1% (KENALOG) 0.1 % ointment To all rashes BID 80 g 4      Allergies: Patient has no known allergies.    Family History   Problem Relation Age of Onset    Cancer Mother         lung, breast    Cancer Sister         lung    Cancer Maternal Grandfather     No Known Problems Father     No Known Problems Brother     No Known Problems Maternal Aunt     No Known Problems Maternal Uncle     No Known Problems Paternal Aunt     No Known Problems Paternal Uncle     No Known Problems Maternal Grandmother     No Known Problems Paternal Grandmother     No Known Problems Paternal Grandfather     Amblyopia Neg Hx     Blindness Neg Hx     Cataracts Neg Hx     Diabetes Neg Hx     Glaucoma Neg Hx     Hypertension Neg Hx     Macular degeneration Neg Hx     Retinal detachment Neg Hx     Strabismus Neg Hx     Stroke Neg Hx     Thyroid disease Neg Hx      Social History     Tobacco Use    Smoking status: Former Smoker     Packs/day: 2.00     Years: 40.00     Pack years: 80.00     Last attempt to quit: 2013     Years since quittin.9    Smokeless tobacco: Former User   Substance Use Topics    Alcohol use: No    Drug use: No     Review of Systems   Unable to perform ROS: Intubated     Unable to test orientation, language, memory, judgment, insight, fund of knowledge, hearing, shoulder shrug, tongue protrusion, coordination, gait due to level of consciousness.    Today I personally reviewed pertinent medications, lines/drains/airways, imaging, cardiology results, laboratory results, microbiology results, notably:    Admit Date: 11/10/2019  LOS:  0    CC: <principal problem not specified>    Code Status: Full Code     SUBJECTIVE:     Medications:  Continuous Infusions:   propofol 15 mcg/kg/min (11/10/19 2341)     Scheduled Meds:   famotidine  20 mg Oral BID    heparin (porcine)  5,000 Units Subcutaneous Q8H    levetiracetam IVPB  500 mg Intravenous Q12H     PRN Meds:.calcium gluconate IVPB, calcium gluconate IVPB, calcium gluconate IVPB, magnesium sulfate IVPB, magnesium sulfate IVPB, potassium chloride in water **AND** potassium chloride in water **AND** potassium chloride in water, sodium chloride 0.9%, sodium phosphate IVPB, sodium phosphate IVPB, sodium phosphate IVPB    OBJECTIVE:   Vital Signs (Most Recent):   Pulse: (!) 114 (11/11/19 0035)  Resp: 16 (11/10/19 2325)  BP: 133/88 (11/11/19 0035)  SpO2: 100 % (11/11/19 0035)    Vital Signs (24h Range):   Pulse:  [114-125] 114  Resp:  [16] 16  SpO2:  [89 %-100 %] 100 %  BP: (133-183)/() 133/88    ICP/CPP (Last 24h):        I & O (Last 24h):     Intake/Output Summary (Last 24 hours) at 11/11/2019 0107  Last data filed at 11/11/2019 0054  Gross per 24 hour   Intake 100 ml   Output --   Net 100 ml     Physical Exam: Patient placed on contact isolation for bed bug infestation  GA: Intubated, on mechanical ventilation, vital signs stable, crawling with bed bugs.   HEENT: No scleral icterus or JVD.   Pulmonary: gurgling sounds + rhonchi.  Cardiac: tachycardia w/o rubs/murmurs/gallops.   Abdominal: Bowel sounds present x 4. Grimaces to pain, No appreciable hepatosplenomegaly.  Skin: No jaundice, rashes, or visible lesions.  Neuro:  --GCS: E1V1 M4  --Mental Status:  Sedated on  propofol  --CN II-XII grossly intact.   --Pupils 3mm, PERRL.   --Corneal reflex, gag, cough intact.  --LUE strength: Unresponsive to pain  --RUE strength: unresponsive to pain  --LLE strength: Unresponsive to pain  --RLE strength: Unresponsive to pain    Vent Data:   Vent Mode: A/C  Oxygen Concentration (%):  [70] 70  Resp Rate  Total:  [16 br/min] 16 br/min  Vt Set:  [450 mL] 450 mL  PEEP/CPAP:  [5 cmH20] 5 cmH20  Pressure Support:  [0 cmH20] 0 cmH20  Mean Airway Pressure:  [8.6 cmH20] 8.6 cmH20    Lines/Drains/Airway:        Airway - Non-Surgical 11/10/19 2340 Endotracheal Tube (Active)               NG/OG Tube 11/10/19 2340 [place] sump 18 Fr. Right mouth (Active)            Urethral Catheter 11/11/19 0008 Non-latex 16 Fr. (Active)     Nutrition/Tube Feeds (if NPO state why): tube feeds    Labs:  ABG: No results for input(s): PH, PO2, PCO2, HCO3, POCSATURATED, BE in the last 24 hours.  BMP:  Recent Labs   Lab 11/10/19  2338      K 4.2      CO2 10*   BUN 15   CREATININE 1.2   *   MG 2.1     LFT:   Lab Results   Component Value Date    AST 16 11/10/2019    ALT 15 11/10/2019    ALKPHOS 64 11/10/2019    BILITOT 0.4 11/10/2019    ALBUMIN 4.1 11/10/2019    PROT 7.9 11/10/2019     CBC:   Lab Results   Component Value Date    WBC 16.16 (H) 11/10/2019    HGB 14.1 11/10/2019    HCT 43 11/11/2019    MCV 94 11/10/2019     11/10/2019     Microbiology x 7d:   Microbiology Results (last 7 days)     Procedure Component Value Units Date/Time    Blood culture #1 **CANNOT BE ORDERED STAT** [[id number]] Collected:  11/10/19 2339    Order Status:  Sent Specimen:  Blood from Peripheral, Hand, Right Updated:  11/10/19 2347    Blood culture #2 **CANNOT BE ORDERED STAT** [[id number]] Collected:  11/10/19 2339    Order Status:  Sent Specimen:  Blood from Peripheral, Antecubital, Right Updated:  11/10/19 2344        Imaging:  Chest Xray 11/11/19  Impression       Enteric tube extends below the diaphragm and off the inferior edge of the image.    ET tube tip 6 cm above the debby.    Interval decrease in size of mass like opacity at the right base.    Interval development of small to moderate pleural effusion at the right base with associated compressive atelectatic change.         I personally reviewed the above image.    ASSESSMENT/PLAN:      Active Hospital Problems    Diagnosis    Status epilepticus      Neuro: Status Epilepticus, Patient sedated with propofol   Video EEG monitoring   Patient loaded with Keppra 1000mg stat and 500mg BID      Pulmonary: Patient intubated on mechanical ventilation Vent settings noted below  Vent Mode: A/C  Oxygen Concentration (%):  [70] 70  Resp Rate Total:  [16 br/min] 16 br/min  Vt Set:  [450 mL] 450 mL  PEEP/CPAP:  [5 cmH20] 5 cmH20  Pressure Support:  [0 cmH20] 0 cmH20  Mean Airway Pressure:  [8.6 cmH20] 8.6 cmH20      Cardiac: Tachycardic, maintain MAP >65      Renal:  Monitor input and output      ID: Patient infested with bedbugs, please ensure standard precautions, contact isolation, gown, cap, shoe disposable booties etc      Hem/Onc: Monitor CBC daily      Endocrine:  No hemoglobin A1C on file      Fluids/Electrolytes/Nutrition/GI: Monitor electrolytes and replace accordingly    Lines: Peripheral lines  Art- N/A  CVC- N/A  ETT- Patient intubated on 11/10/19 - 1 day  Muse- 11/10/19  NG- 11/10/19  PEG- N/A    Proph:  DVT: heparin SC 5000 IU  Constipation:  Last output:  PUP: Famotidine 20mg BID  VAP: Chlorhexidine oral care      Uninterrupted Critical Care/Counseling Time (not including procedures): 30 minutes

## 2019-11-11 NOTE — PLAN OF CARE
POC reviewed with pt at 0500. Pt intubated. No acute events overnight. Pt progressing toward goals. Will continue to monitor. See flowsheets for full assessment and VS info.  Pt on propofol drip, not following commands.

## 2019-11-11 NOTE — CONSULTS
Wound consult received on patient per nursing :generalized patchy red and white skin discoloration of unknown origin.  Scattered light pink to white discolored tissue in comparison to patient's darker complexion skin noted to patient's right and left arm and bilateral feet. Areas appear to resemble vitiligo and other areas appears to be scar tissue of unknown etiology. No open wounds or wound care needs noted at this time, nursing to continue care. re consult wound care if needed.

## 2019-11-11 NOTE — NURSING
Patient arrived to [facility] from home --> [facility] by ambulance    Type of stroke/diagnosis: seizures    TPA start and end time (if applicable) N/A    Thrombectomy start and end time (if applicable) N/A    Current symptoms: AMS    Skin assessment done: Yes    Wounds noted: bug bites all over body, patchy red and white skin discoloration    CEE Armband Applied: (yes or no & why not) YES    NCC notified: MD Lc

## 2019-11-11 NOTE — ED NOTES
Upon removing Pt's clothing numerous bed bugs were seen. Provider made aware and PT put on contact precautions.

## 2019-11-12 LAB
ALBUMIN SERPL BCP-MCNC: 3.3 G/DL (ref 3.5–5.2)
ALLENS TEST: ABNORMAL
ALP SERPL-CCNC: 58 U/L (ref 55–135)
ALT SERPL W/O P-5'-P-CCNC: 13 U/L (ref 10–44)
ANION GAP SERPL CALC-SCNC: 9 MMOL/L (ref 8–16)
AST SERPL-CCNC: 24 U/L (ref 10–40)
BACTERIA #/AREA URNS AUTO: ABNORMAL /HPF
BASOPHILS # BLD AUTO: 0.03 K/UL (ref 0–0.2)
BASOPHILS NFR BLD: 0.4 % (ref 0–1.9)
BILIRUB SERPL-MCNC: 0.5 MG/DL (ref 0.1–1)
BILIRUB UR QL STRIP: NEGATIVE
BUN SERPL-MCNC: 11 MG/DL (ref 8–23)
CALCIUM SERPL-MCNC: 8.6 MG/DL (ref 8.7–10.5)
CHLORIDE SERPL-SCNC: 108 MMOL/L (ref 95–110)
CLARITY UR REFRACT.AUTO: ABNORMAL
CO2 SERPL-SCNC: 22 MMOL/L (ref 23–29)
COLOR UR AUTO: ABNORMAL
CREAT SERPL-MCNC: 0.9 MG/DL (ref 0.5–1.4)
DELSYS: ABNORMAL
DIFFERENTIAL METHOD: ABNORMAL
EOSINOPHIL # BLD AUTO: 0.2 K/UL (ref 0–0.5)
EOSINOPHIL NFR BLD: 1.9 % (ref 0–8)
ERYTHROCYTE [DISTWIDTH] IN BLOOD BY AUTOMATED COUNT: 18.6 % (ref 11.5–14.5)
ERYTHROCYTE [SEDIMENTATION RATE] IN BLOOD BY WESTERGREN METHOD: 18 MM/H
EST. GFR  (AFRICAN AMERICAN): >60 ML/MIN/1.73 M^2
EST. GFR  (NON AFRICAN AMERICAN): >60 ML/MIN/1.73 M^2
FIO2: 0.5
GLUCOSE SERPL-MCNC: 69 MG/DL (ref 70–110)
GLUCOSE UR QL STRIP: NEGATIVE
HCO3 UR-SCNC: 25.5 MMOL/L (ref 24–28)
HCT VFR BLD AUTO: 41.3 % (ref 40–54)
HGB BLD-MCNC: 12.3 G/DL (ref 14–18)
HGB UR QL STRIP: ABNORMAL
HYALINE CASTS UR QL AUTO: 0 /LPF
IMM GRANULOCYTES # BLD AUTO: 0.02 K/UL (ref 0–0.04)
IMM GRANULOCYTES NFR BLD AUTO: 0.3 % (ref 0–0.5)
KETONES UR QL STRIP: ABNORMAL
LEUKOCYTE ESTERASE UR QL STRIP: ABNORMAL
LYMPHOCYTES # BLD AUTO: 0.8 K/UL (ref 1–4.8)
LYMPHOCYTES NFR BLD: 10.4 % (ref 18–48)
MAGNESIUM SERPL-MCNC: 2.1 MG/DL (ref 1.6–2.6)
MCH RBC QN AUTO: 26.7 PG (ref 27–31)
MCHC RBC AUTO-ENTMCNC: 29.8 G/DL (ref 32–36)
MCV RBC AUTO: 90 FL (ref 82–98)
MICROSCOPIC COMMENT: ABNORMAL
MIN VOL: 13
MODE: ABNORMAL
MONOCYTES # BLD AUTO: 0.6 K/UL (ref 0.3–1)
MONOCYTES NFR BLD: 8.1 % (ref 4–15)
NEUTROPHILS # BLD AUTO: 6.3 K/UL (ref 1.8–7.7)
NEUTROPHILS NFR BLD: 78.9 % (ref 38–73)
NITRITE UR QL STRIP: NEGATIVE
NRBC BLD-RTO: 0 /100 WBC
PCO2 BLDA: 45.8 MMHG (ref 35–45)
PEEP: 5
PH SMN: 7.35 [PH] (ref 7.35–7.45)
PH UR STRIP: 5 [PH] (ref 5–8)
PHOSPHATE SERPL-MCNC: 3.5 MG/DL (ref 2.7–4.5)
PIP: 11
PLATELET # BLD AUTO: 257 K/UL (ref 150–350)
PMV BLD AUTO: 11.2 FL (ref 9.2–12.9)
PO2 BLDA: 52 MMHG (ref 40–60)
POC BE: 0 MMOL/L
POC SATURATED O2: 84 % (ref 95–100)
POC TCO2: 27 MMOL/L (ref 24–29)
POCT GLUCOSE: 103 MG/DL (ref 70–110)
POCT GLUCOSE: 63 MG/DL (ref 70–110)
POCT GLUCOSE: 73 MG/DL (ref 70–110)
POCT GLUCOSE: 77 MG/DL (ref 70–110)
POTASSIUM SERPL-SCNC: 4.4 MMOL/L (ref 3.5–5.1)
PROT SERPL-MCNC: 6.5 G/DL (ref 6–8.4)
PROT UR QL STRIP: ABNORMAL
RBC # BLD AUTO: 4.61 M/UL (ref 4.6–6.2)
RBC #/AREA URNS AUTO: >100 /HPF (ref 0–4)
SAMPLE: ABNORMAL
SITE: ABNORMAL
SODIUM SERPL-SCNC: 139 MMOL/L (ref 136–145)
SP GR UR STRIP: 1.02 (ref 1–1.03)
SP02: 100
URN SPEC COLLECT METH UR: ABNORMAL
VT: 310
WBC # BLD AUTO: 7.91 K/UL (ref 3.9–12.7)
WBC #/AREA URNS AUTO: >100 /HPF (ref 0–5)
WBC CLUMPS UR QL AUTO: ABNORMAL

## 2019-11-12 PROCEDURE — 63600175 PHARM REV CODE 636 W HCPCS: Performed by: INTERNAL MEDICINE

## 2019-11-12 PROCEDURE — 80053 COMPREHEN METABOLIC PANEL: CPT

## 2019-11-12 PROCEDURE — 85025 COMPLETE CBC W/AUTO DIFF WBC: CPT

## 2019-11-12 PROCEDURE — 81001 URINALYSIS AUTO W/SCOPE: CPT

## 2019-11-12 PROCEDURE — 99291 CRITICAL CARE FIRST HOUR: CPT | Mod: ,,, | Performed by: PSYCHIATRY & NEUROLOGY

## 2019-11-12 PROCEDURE — 25000242 PHARM REV CODE 250 ALT 637 W/ HCPCS: Performed by: INTERNAL MEDICINE

## 2019-11-12 PROCEDURE — 27000221 HC OXYGEN, UP TO 24 HOURS

## 2019-11-12 PROCEDURE — 95951 HC EEG MONITORING/VIDEO RECORD: CPT

## 2019-11-12 PROCEDURE — 94640 AIRWAY INHALATION TREATMENT: CPT

## 2019-11-12 PROCEDURE — 63600175 PHARM REV CODE 636 W HCPCS: Performed by: NURSE PRACTITIONER

## 2019-11-12 PROCEDURE — 94761 N-INVAS EAR/PLS OXIMETRY MLT: CPT

## 2019-11-12 PROCEDURE — 25000003 PHARM REV CODE 250: Performed by: PSYCHIATRY & NEUROLOGY

## 2019-11-12 PROCEDURE — 82803 BLOOD GASES ANY COMBINATION: CPT

## 2019-11-12 PROCEDURE — 25000003 PHARM REV CODE 250: Performed by: INTERNAL MEDICINE

## 2019-11-12 PROCEDURE — 20000000 HC ICU ROOM

## 2019-11-12 PROCEDURE — 99291 PR CRITICAL CARE, E/M 30-74 MINUTES: ICD-10-PCS | Mod: ,,, | Performed by: PSYCHIATRY & NEUROLOGY

## 2019-11-12 PROCEDURE — 83735 ASSAY OF MAGNESIUM: CPT

## 2019-11-12 PROCEDURE — 84100 ASSAY OF PHOSPHORUS: CPT

## 2019-11-12 PROCEDURE — 94003 VENT MGMT INPAT SUBQ DAY: CPT

## 2019-11-12 PROCEDURE — 27200966 HC CLOSED SUCTION SYSTEM

## 2019-11-12 PROCEDURE — 25000003 PHARM REV CODE 250: Performed by: NURSE PRACTITIONER

## 2019-11-12 PROCEDURE — 99900026 HC AIRWAY MAINTENANCE (STAT)

## 2019-11-12 PROCEDURE — 99900035 HC TECH TIME PER 15 MIN (STAT)

## 2019-11-12 RX ORDER — DEXMEDETOMIDINE HYDROCHLORIDE 4 UG/ML
0.2 INJECTION, SOLUTION INTRAVENOUS CONTINUOUS
Status: DISCONTINUED | OUTPATIENT
Start: 2019-11-12 | End: 2019-11-12

## 2019-11-12 RX ORDER — AMLODIPINE BESYLATE 10 MG/1
10 TABLET ORAL DAILY
Status: DISCONTINUED | OUTPATIENT
Start: 2019-11-13 | End: 2019-11-21 | Stop reason: HOSPADM

## 2019-11-12 RX ORDER — GLUCAGON 1 MG
1 KIT INJECTION
Status: DISCONTINUED | OUTPATIENT
Start: 2019-11-12 | End: 2019-11-18

## 2019-11-12 RX ORDER — INSULIN ASPART 100 [IU]/ML
1-10 INJECTION, SOLUTION INTRAVENOUS; SUBCUTANEOUS EVERY 6 HOURS PRN
Status: DISCONTINUED | OUTPATIENT
Start: 2019-11-12 | End: 2019-11-18

## 2019-11-12 RX ORDER — FENTANYL CITRATE 50 UG/ML
25 INJECTION, SOLUTION INTRAMUSCULAR; INTRAVENOUS
Status: DISCONTINUED | OUTPATIENT
Start: 2019-11-12 | End: 2019-11-13

## 2019-11-12 RX ADMIN — FAMOTIDINE 20 MG: 20 TABLET ORAL at 08:11

## 2019-11-12 RX ADMIN — DEXTROSE 750 MG: 50 INJECTION, SOLUTION INTRAVENOUS at 09:11

## 2019-11-12 RX ADMIN — IPRATROPIUM BROMIDE AND ALBUTEROL SULFATE 3 ML: .5; 3 SOLUTION RESPIRATORY (INHALATION) at 08:11

## 2019-11-12 RX ADMIN — HEPARIN SODIUM 5000 UNITS: 5000 INJECTION, SOLUTION INTRAVENOUS; SUBCUTANEOUS at 09:11

## 2019-11-12 RX ADMIN — IPRATROPIUM BROMIDE AND ALBUTEROL SULFATE 3 ML: .5; 3 SOLUTION RESPIRATORY (INHALATION) at 03:11

## 2019-11-12 RX ADMIN — IPRATROPIUM BROMIDE AND ALBUTEROL SULFATE 3 ML: .5; 3 SOLUTION RESPIRATORY (INHALATION) at 11:11

## 2019-11-12 RX ADMIN — HEPARIN SODIUM 5000 UNITS: 5000 INJECTION, SOLUTION INTRAVENOUS; SUBCUTANEOUS at 05:11

## 2019-11-12 RX ADMIN — MIRTAZAPINE 30 MG: 15 TABLET, ORALLY DISINTEGRATING ORAL at 10:11

## 2019-11-12 RX ADMIN — IPRATROPIUM BROMIDE AND ALBUTEROL SULFATE 3 ML: .5; 3 SOLUTION RESPIRATORY (INHALATION) at 07:11

## 2019-11-12 RX ADMIN — LEVETIRACETAM 500 MG: 5 INJECTION INTRAVENOUS at 08:11

## 2019-11-12 RX ADMIN — HEPARIN SODIUM 5000 UNITS: 5000 INJECTION, SOLUTION INTRAVENOUS; SUBCUTANEOUS at 01:11

## 2019-11-12 RX ADMIN — CHLORHEXIDINE GLUCONATE 0.12% ORAL RINSE 15 ML: 1.2 LIQUID ORAL at 08:11

## 2019-11-12 RX ADMIN — AMLODIPINE BESYLATE 10 MG: 10 TABLET ORAL at 08:11

## 2019-11-12 RX ADMIN — PROPOFOL 50 MCG/KG/MIN: 10 INJECTION, EMULSION INTRAVENOUS at 08:11

## 2019-11-12 RX ADMIN — DEXTROSE 125 ML: 10 SOLUTION INTRAVENOUS at 09:11

## 2019-11-12 NOTE — PLAN OF CARE
POC reviewed with pt at 0500. Pt intubated. Questions and concerns addressed. No acute events overnight. Pt progressing toward goals. Will continue to monitor. See flowsheets for full assessment and VS info.  Pt on NS drip, propofol drip.

## 2019-11-12 NOTE — CARE UPDATE
Patient extubated w/o parameters.  Patient RSBI calculated @ 30. Patient is currently on 5L NC.  Sats are 98%.  Will continue to monitor.

## 2019-11-12 NOTE — NURSING
Urine noted to be blood tinged after MRI at 4pm. NCC notified. Orders to monitor for a darker shade of red and clots. 2 hours later clots noted and NCC notified. No new orders. Will continue to monitor.

## 2019-11-12 NOTE — ASSESSMENT & PLAN NOTE
Seizures/Epilepsy Monitoring Evaluation:  - Continue current management.  - Consult Epilepsy service  - Seizure Precautions  - Continue vEEG monitoring   - NCC will follow clinically and address any medical issues.  - Patient loaded with IV Kepppra 1000mg stat then Continue AEDs Keppra 500mg BID  - Pending recommendations per Epilepsy Service  -MRI brain-Multiple scattered small to punctate size foci of abnormal parenchymal enhancement throughout the brain parenchyma as detailed above.  In light of history concerning for parenchymal metastases largest within the left parietal lobe

## 2019-11-12 NOTE — PROGRESS NOTES
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 Patient was watching the football game earlier in the evening and had no issues during the day. Later in the evening he was somnolent and his daughter noted that he had a seizure and she called EMS. Patient was brought to the ED by EMS who were called for a seizure-like activity. All history was obtained from ED chart as patient was intubated and sedated on propofol on admission. Patient was obtunded when EMS arrived on the scene and they witnessed a generalized tonic clonic seizure lasting 20 seconds which was abated with IV Versed 10mg stat. Patient was intubated in the ED and had elevated blood pressure but otherwise stable vital signs. He was placed on mechanical ventilation and sedated with propofol. Video EEG ordered and patient was admitted to Neuro Critical care unit for higher level of care. Patient has no known history of seizures, no known history of metastasis to brain and no previous imaging. Patient had a CT scan done in the ED prior to admission in Neuro ICU. Upon evaluation in the ED patient was infested with bed bugs crawling all over him. Patient was placed on contact isolation immediately and  as well as charge nurse notified.    Hospital Course: 11/11/2019: Patient admitted to Neuro ICU for status epilepticus. Patient intubated, sedated with propofol and video EEG monitoring.  11/12/2019: pt extubated today-on NC        Review of Systems    Unable to obtain a complete ROS due to level of consciousness.  Objective:     Vitals:  Temp: 98.8 °F (37.1 °C)  Pulse: 105  Rhythm: normal sinus  rhythm  BP: 118/78  MAP (mmHg): 94  Resp: 19  SpO2: 98 %  Oxygen Concentration (%): 50  O2 Device (Oxygen Therapy): nasal cannula  Vent Mode: Spont  Set Rate: 0 bmp  Vt Set: 310 mL  Pressure Support: 5 cmH20  PEEP/CPAP: 5 cmH20  Peak Airway Pressure: 11 cmH2O  Mean Airway Pressure: 8.2 cmH20  Plateau Pressure: 0 cmH20    Temp  Min: 97.9 °F (36.6 °C)  Max: 98.8 °F (37.1 °C)  Pulse  Min: 82  Max: 124  BP  Min: 97/64  Max: 145/94  MAP (mmHg)  Min: 75  Max: 115  Resp  Min: 16  Max: 33  SpO2  Min: 96 %  Max: 100 %  Oxygen Concentration (%)  Min: 50  Max: 50    11/11 0701 - 11/12 0700  In: 2227.6 [I.V.:1797.6]  Out: 981 [Urine:981]           Physical Exam  GA: Alert, comfortable, no acute distress.   HEENT: No scleral icterus or JVD.   Pulmonary: Clear to auscultation A/L.   Cardiac: RRR S1 & S2 w/o rubs/murmurs/gallops.   Abdominal: Bowel sounds present x 4. No appreciable hepatosplenomegaly.  Skin: No jaundice, rashes, or visible lesions.  Neuro:  --GCS: E4 V4 M6  --Mental Status:  Opens eye spont,  Oriented to self only,  Intermit. Follows simple commands  --Pupils 3mm, PERRL.   --Corneal reflex, gag, cough intact.  --ANGELA spont    Medications:  Continuous  sodium chloride 0.9% Last Rate: 75 mL/hr at 11/11/19 1335   sodium chloride 0.9% Last Rate: 75 mL/hr at 11/12/19 1405   dexmedetomidine (PRECEDEX) infusion Last Rate: Stopped (11/11/19 0600)   propofol Last Rate: Stopped (11/12/19 0927)   Scheduled  albuterol-ipratropium 3 mL Q4H   amLODIPine 10 mg Daily   chlorhexidine 15 mL BID   famotidine 20 mg BID   heparin (porcine) 5,000 Units Q8H   levetiracetam IVPB 500 mg Q12H   mirtazapine 30 mg Nightly   PRN  calcium gluconate IVPB 1 g PRN   calcium gluconate IVPB 1 g PRN   calcium gluconate IVPB 1 g PRN   Dextrose 10% Bolus 12.5 g PRN   fentaNYL 50 mcg Q2H PRN   glucagon (human recombinant) 1 mg PRN   insulin aspart U-100 1-10 Units Q6H PRN   magnesium sulfate IVPB 2 g PRN   magnesium sulfate IVPB 4 g PRN   potassium  chloride in water 40 mEq PRN   And     potassium chloride in water 40 mEq PRN   And     potassium chloride in water 40 mEq PRN   sodium chloride 0.9% 10 mL PRN   sodium phosphate IVPB 15 mmol PRN   sodium phosphate IVPB 20.01 mmol PRN   sodium phosphate IVPB 30 mmol PRN     Today I personally reviewed pertinent medications, lines/drains/airways, imaging, laboratory results,     Diet  No diet orders on file        Assessment/Plan:     Neuro  * Status epilepticus   Seizures/Epilepsy Monitoring Evaluation:  - Continue current management.  - Consult Epilepsy service  - Seizure Precautions  - Continue vEEG monitoring   - NCC will follow clinically and address any medical issues.  - Patient loaded with IV Kepppra 1000mg stat then Continue AEDs Keppra 500mg BID  - Pending recommendations per Epilepsy Service  -MRI brain-Multiple scattered small to punctate size foci of abnormal parenchymal enhancement throughout the brain parenchyma as detailed above.  In light of history concerning for parenchymal metastases largest within the left parietal lobe      Pulmonary  Acute respiratory failure with hypoxia  Patient extubated today  Tolerating NC %    Cardiac/Vascular  Essential hypertension  Keep MAP >65  Continue home antihypertensive Amlodipine 10mg daily    Oncology  Primary cancer of right middle lobe of lung  Patient with right middle lobe lung cancer stage IV with mets to bone on chemotherapy          The patient is being Prophylaxed for:  Venous Thromboembolism with: Chemical  Stress Ulcer with: None  Ventilator Pneumonia with: not applicable    Activity Orders          None        Full Code    Debi Lincoln NP  Neurocritical Care  [facility]

## 2019-11-12 NOTE — SUBJECTIVE & OBJECTIVE
Review of Systems    Unable to obtain a complete ROS due to level of consciousness.  Objective:     Vitals:  Temp: 98.8 °F (37.1 °C)  Pulse: 105  Rhythm: normal sinus rhythm  BP: 118/78  MAP (mmHg): 94  Resp: 19  SpO2: 98 %  Oxygen Concentration (%): 50  O2 Device (Oxygen Therapy): nasal cannula  Vent Mode: Spont  Set Rate: 0 bmp  Vt Set: 310 mL  Pressure Support: 5 cmH20  PEEP/CPAP: 5 cmH20  Peak Airway Pressure: 11 cmH2O  Mean Airway Pressure: 8.2 cmH20  Plateau Pressure: 0 cmH20    Temp  Min: 97.9 °F (36.6 °C)  Max: 98.8 °F (37.1 °C)  Pulse  Min: 82  Max: 124  BP  Min: 97/64  Max: 145/94  MAP (mmHg)  Min: 75  Max: 115  Resp  Min: 16  Max: 33  SpO2  Min: 96 %  Max: 100 %  Oxygen Concentration (%)  Min: 50  Max: 50    11/11 0701 - 11/12 0700  In: 2227.6 [I.V.:1797.6]  Out: 981 [Urine:981]           Physical Exam  GA: Alert, comfortable, no acute distress.   HEENT: No scleral icterus or JVD.   Pulmonary: Clear to auscultation A/L.   Cardiac: RRR S1 & S2 w/o rubs/murmurs/gallops.   Abdominal: Bowel sounds present x 4. No appreciable hepatosplenomegaly.  Skin: No jaundice, rashes, or visible lesions.  Neuro:  --GCS: E4 VT1 M6  --Mental Status:  Opens eyes, doesn't follow commands, agitated when off sedation  --Pupils 3mm, PERRL.   --Corneal reflex, gag, cough intact.  --ANGELA spont    Medications:  Continuous  sodium chloride 0.9% Last Rate: 75 mL/hr at 11/11/19 1335   sodium chloride 0.9% Last Rate: 75 mL/hr at 11/12/19 1405   dexmedetomidine (PRECEDEX) infusion Last Rate: Stopped (11/11/19 0600)   propofol Last Rate: Stopped (11/12/19 0927)   Scheduled  albuterol-ipratropium 3 mL Q4H   amLODIPine 10 mg Daily   chlorhexidine 15 mL BID   famotidine 20 mg BID   heparin (porcine) 5,000 Units Q8H   levetiracetam IVPB 500 mg Q12H   mirtazapine 30 mg Nightly   PRN  calcium gluconate IVPB 1 g PRN   calcium gluconate IVPB 1 g PRN   calcium gluconate IVPB 1 g PRN   Dextrose 10% Bolus 12.5 g PRN   fentaNYL 50 mcg Q2H PRN    glucagon (human recombinant) 1 mg PRN   insulin aspart U-100 1-10 Units Q6H PRN   magnesium sulfate IVPB 2 g PRN   magnesium sulfate IVPB 4 g PRN   potassium chloride in water 40 mEq PRN   And     potassium chloride in water 40 mEq PRN   And     potassium chloride in water 40 mEq PRN   sodium chloride 0.9% 10 mL PRN   sodium phosphate IVPB 15 mmol PRN   sodium phosphate IVPB 20.01 mmol PRN   sodium phosphate IVPB 30 mmol PRN     Today I personally reviewed pertinent medications, lines/drains/airways, imaging, laboratory results,     Diet  No diet orders on file

## 2019-11-12 NOTE — SUBJECTIVE & OBJECTIVE
Review of Systems  Review of Symptoms:  Constitutional: Denies fevers, weight loss, chills, or weakness.  Eyes: Denies changes in vision.  ENT: Denies dysphagia, nasal discharge, ear pain or discharge.  Cardiovascular: Denies chest pain, palpitations, orthopnea, or claudication.  Respiratory: Denies shortness of breath, cough, hemoptysis, or wheezing.  GI: Denies nausea/vomitting, hematochezia, melena, abd pain, or changes in appetite.  : Denies dysuria, incontinence, or hematuria.  Musculoskeletal: Denies joint pain or myalgias.  Skin/breast: Denies rashes, lumps, lesions, or discharge.  Neurologic: Denies headache, dizziness, vertigo, or paresthesias.  Psychiatric: Denies changes in mood or hallucinations.  Endocrine: Denies polyuria, polydipsia, heat/cold intolerance.  Hematologic/Lymph: Denies lymphadenopathy, easy bruising or easy bleeding.  Allergic/Immunologic: Denies rash, rhinitis.   Objective:     Vitals:  Temp: 98.6 °F (37 °C)  Pulse: (!) 111  Rhythm: normal sinus rhythm  BP: 135/76  MAP (mmHg): 96  Resp: 19  SpO2: 97 %  Oxygen Concentration (%): 50  O2 Device (Oxygen Therapy): nasal cannula  Vent Mode: Spont  Set Rate: 0 bmp  Vt Set: 310 mL  Pressure Support: 5 cmH20  PEEP/CPAP: 5 cmH20  Peak Airway Pressure: 11 cmH2O  Mean Airway Pressure: 8.2 cmH20  Plateau Pressure: 0 cmH20    Temp  Min: 97.9 °F (36.6 °C)  Max: 98.8 °F (37.1 °C)  Pulse  Min: 82  Max: 124  BP  Min: 97/64  Max: 145/94  MAP (mmHg)  Min: 75  Max: 115  Resp  Min: 16  Max: 33  SpO2  Min: 96 %  Max: 100 %  Oxygen Concentration (%)  Min: 50  Max: 50    11/11 0701 - 11/12 0700  In: 2227.6 [I.V.:1797.6]  Out: 981 [Urine:981]           Physical Exam  GA: Alert, comfortable, no acute distress.   HEENT: No scleral icterus or JVD.   Pulmonary: Clear to auscultation A/L. No wheezing, crackles, or rhonchi.  Cardiac: RRR S1 & S2 w/o rubs/murmurs/gallops.   Abdominal: Bowel sounds present x 4. No appreciable hepatosplenomegaly.  Skin: No  jaundice, rashes, or visible lesions.  Neuro:  --GCS: E4 V5 M6  --Mental Status:  Aaox4, follows all commands, delayed responses at times, mildly slurred  --Pupils 3->2mm, PERRL.   --Corneal reflex, gag, cough intact.  --ANGELA spont and against gravity    Medications:  Continuous  sodium chloride 0.9% Last Rate: 75 mL/hr at 11/12/19 1505   dexmedetomidine (PRECEDEX) infusion    Scheduled  albuterol-ipratropium 3 mL Q4H   amLODIPine 10 mg Daily   heparin (porcine) 5,000 Units Q8H   levetiracetam IVPB 500 mg Q12H   mirtazapine 30 mg Nightly   PRN  calcium gluconate IVPB 1 g PRN   calcium gluconate IVPB 1 g PRN   calcium gluconate IVPB 1 g PRN   Dextrose 10% Bolus 12.5 g PRN   fentaNYL 50 mcg Q2H PRN   glucagon (human recombinant) 1 mg PRN   insulin aspart U-100 1-10 Units Q6H PRN   magnesium sulfate IVPB 2 g PRN   magnesium sulfate IVPB 4 g PRN   potassium chloride in water 40 mEq PRN   And     potassium chloride in water 40 mEq PRN   And     potassium chloride in water 40 mEq PRN   sodium chloride 0.9% 10 mL PRN   sodium phosphate IVPB 15 mmol PRN   sodium phosphate IVPB 20.01 mmol PRN   sodium phosphate IVPB 30 mmol PRN     Today I personally reviewed pertinent medications, lines/drains/airways, imaging, laboratory results,    Diet  No diet orders on file

## 2019-11-12 NOTE — PLAN OF CARE
POC reviewed with pt and family at 1600. Pt confused, but family was able to verbalize understanding. Questions and concerns addressed. Pt extubated at 1244 and has been tolerating well in 5L NC with SAT>98. NGT placed to start tube feeds with goal of 45ml/hr. NS going at 75ml/hr. Will continue to monitor. See flowsheets for full assessment and VS info.

## 2019-11-13 ENCOUNTER — TELEPHONE (OUTPATIENT)
Dept: HEMATOLOGY/ONCOLOGY | Facility: CLINIC | Age: 64
End: 2019-11-13

## 2019-11-13 PROBLEM — E43 SEVERE MALNUTRITION: Status: ACTIVE | Noted: 2019-11-13

## 2019-11-13 LAB
ALBUMIN SERPL BCP-MCNC: 3.2 G/DL (ref 3.5–5.2)
ALLENS TEST: ABNORMAL
ALP SERPL-CCNC: 61 U/L (ref 55–135)
ALT SERPL W/O P-5'-P-CCNC: 13 U/L (ref 10–44)
ANION GAP SERPL CALC-SCNC: 11 MMOL/L (ref 8–16)
ANION GAP SERPL CALC-SCNC: 11 MMOL/L (ref 8–16)
ANION GAP SERPL CALC-SCNC: 8 MMOL/L (ref 8–16)
AST SERPL-CCNC: 34 U/L (ref 10–40)
BASOPHILS # BLD AUTO: 0.05 K/UL (ref 0–0.2)
BASOPHILS NFR BLD: 0.6 % (ref 0–1.9)
BILIRUB SERPL-MCNC: 0.7 MG/DL (ref 0.1–1)
BUN SERPL-MCNC: 7 MG/DL (ref 8–23)
BUN SERPL-MCNC: 8 MG/DL (ref 8–23)
BUN SERPL-MCNC: 9 MG/DL (ref 8–23)
CALCIUM SERPL-MCNC: 8.4 MG/DL (ref 8.7–10.5)
CALCIUM SERPL-MCNC: 8.6 MG/DL (ref 8.7–10.5)
CALCIUM SERPL-MCNC: 9.4 MG/DL (ref 8.7–10.5)
CHLORIDE SERPL-SCNC: 109 MMOL/L (ref 95–110)
CHLORIDE SERPL-SCNC: 110 MMOL/L (ref 95–110)
CHLORIDE SERPL-SCNC: 110 MMOL/L (ref 95–110)
CO2 SERPL-SCNC: 20 MMOL/L (ref 23–29)
CO2 SERPL-SCNC: 21 MMOL/L (ref 23–29)
CO2 SERPL-SCNC: 26 MMOL/L (ref 23–29)
CREAT SERPL-MCNC: 0.8 MG/DL (ref 0.5–1.4)
DELSYS: ABNORMAL
DIFFERENTIAL METHOD: ABNORMAL
EOSINOPHIL # BLD AUTO: 0.1 K/UL (ref 0–0.5)
EOSINOPHIL NFR BLD: 0.6 % (ref 0–8)
ERYTHROCYTE [DISTWIDTH] IN BLOOD BY AUTOMATED COUNT: 18.6 % (ref 11.5–14.5)
ERYTHROCYTE [SEDIMENTATION RATE] IN BLOOD BY WESTERGREN METHOD: 20 MM/H
EST. GFR  (AFRICAN AMERICAN): >60 ML/MIN/1.73 M^2
EST. GFR  (NON AFRICAN AMERICAN): >60 ML/MIN/1.73 M^2
FIO2: 32
FLOW: 3
GLUCOSE SERPL-MCNC: 122 MG/DL (ref 70–110)
GLUCOSE SERPL-MCNC: 82 MG/DL (ref 70–110)
GLUCOSE SERPL-MCNC: 93 MG/DL (ref 70–110)
HCO3 UR-SCNC: 25.1 MMOL/L (ref 24–28)
HCT VFR BLD AUTO: 40.9 % (ref 40–54)
HCT VFR BLD CALC: 39 %PCV (ref 36–54)
HGB BLD-MCNC: 13.3 G/DL (ref 14–18)
HYPOCHROMIA BLD QL SMEAR: ABNORMAL
IMM GRANULOCYTES # BLD AUTO: 0.02 K/UL (ref 0–0.04)
IMM GRANULOCYTES NFR BLD AUTO: 0.2 % (ref 0–0.5)
LYMPHOCYTES # BLD AUTO: 0.8 K/UL (ref 1–4.8)
LYMPHOCYTES NFR BLD: 9.2 % (ref 18–48)
MAGNESIUM SERPL-MCNC: 1.9 MG/DL (ref 1.6–2.6)
MCH RBC QN AUTO: 27.8 PG (ref 27–31)
MCHC RBC AUTO-ENTMCNC: 32.5 G/DL (ref 32–36)
MCV RBC AUTO: 86 FL (ref 82–98)
MODE: ABNORMAL
MONOCYTES # BLD AUTO: 0.7 K/UL (ref 0.3–1)
MONOCYTES NFR BLD: 8.1 % (ref 4–15)
NEUTROPHILS # BLD AUTO: 7.4 K/UL (ref 1.8–7.7)
NEUTROPHILS NFR BLD: 81.3 % (ref 38–73)
NRBC BLD-RTO: 0 /100 WBC
OVALOCYTES BLD QL SMEAR: ABNORMAL
PCO2 BLDA: 33.7 MMHG (ref 35–45)
PH SMN: 7.48 [PH] (ref 7.35–7.45)
PHOSPHATE SERPL-MCNC: 2.5 MG/DL (ref 2.7–4.5)
PLATELET # BLD AUTO: 164 K/UL (ref 150–350)
PLATELET BLD QL SMEAR: ABNORMAL
PMV BLD AUTO: 10.9 FL (ref 9.2–12.9)
PO2 BLDA: 65 MMHG (ref 80–100)
POC BE: 2 MMOL/L
POC IONIZED CALCIUM: 1.21 MMOL/L (ref 1.06–1.42)
POC SATURATED O2: 94 % (ref 95–100)
POC TCO2: 26 MMOL/L (ref 23–27)
POCT GLUCOSE: 131 MG/DL (ref 70–110)
POCT GLUCOSE: 81 MG/DL (ref 70–110)
POCT GLUCOSE: 89 MG/DL (ref 70–110)
POIKILOCYTOSIS BLD QL SMEAR: SLIGHT
POTASSIUM BLD-SCNC: 3.6 MMOL/L (ref 3.5–5.1)
POTASSIUM SERPL-SCNC: 4.2 MMOL/L (ref 3.5–5.1)
POTASSIUM SERPL-SCNC: 5.5 MMOL/L (ref 3.5–5.1)
POTASSIUM SERPL-SCNC: 6 MMOL/L (ref 3.5–5.1)
PROT SERPL-MCNC: 7 G/DL (ref 6–8.4)
RBC # BLD AUTO: 4.78 M/UL (ref 4.6–6.2)
SAMPLE: ABNORMAL
SITE: ABNORMAL
SODIUM BLD-SCNC: 142 MMOL/L (ref 136–145)
SODIUM SERPL-SCNC: 141 MMOL/L (ref 136–145)
SODIUM SERPL-SCNC: 142 MMOL/L (ref 136–145)
SODIUM SERPL-SCNC: 143 MMOL/L (ref 136–145)
SP02: 94
WBC # BLD AUTO: 9.09 K/UL (ref 3.9–12.7)

## 2019-11-13 PROCEDURE — 25000242 PHARM REV CODE 250 ALT 637 W/ HCPCS: Performed by: INTERNAL MEDICINE

## 2019-11-13 PROCEDURE — 99900035 HC TECH TIME PER 15 MIN (STAT)

## 2019-11-13 PROCEDURE — 92523 SPEECH SOUND LANG COMPREHEN: CPT

## 2019-11-13 PROCEDURE — 97166 OT EVAL MOD COMPLEX 45 MIN: CPT

## 2019-11-13 PROCEDURE — 63600175 PHARM REV CODE 636 W HCPCS: Performed by: NURSE PRACTITIONER

## 2019-11-13 PROCEDURE — 92610 EVALUATE SWALLOWING FUNCTION: CPT

## 2019-11-13 PROCEDURE — 80048 BASIC METABOLIC PNL TOTAL CA: CPT | Mod: 91

## 2019-11-13 PROCEDURE — 80048 BASIC METABOLIC PNL TOTAL CA: CPT

## 2019-11-13 PROCEDURE — 99233 SBSQ HOSP IP/OBS HIGH 50: CPT | Mod: ,,, | Performed by: NURSE PRACTITIONER

## 2019-11-13 PROCEDURE — 25000003 PHARM REV CODE 250: Performed by: PSYCHIATRY & NEUROLOGY

## 2019-11-13 PROCEDURE — 99233 PR SUBSEQUENT HOSPITAL CARE,LEVL III: ICD-10-PCS | Mod: ,,, | Performed by: NURSE PRACTITIONER

## 2019-11-13 PROCEDURE — 99253 IP/OBS CNSLTJ NEW/EST LOW 45: CPT | Mod: ,,, | Performed by: RADIOLOGY

## 2019-11-13 PROCEDURE — 20000000 HC ICU ROOM

## 2019-11-13 PROCEDURE — 94640 AIRWAY INHALATION TREATMENT: CPT

## 2019-11-13 PROCEDURE — 99253 PR INITIAL INPATIENT CONSULT,LEVL III: ICD-10-PCS | Mod: ,,, | Performed by: RADIOLOGY

## 2019-11-13 PROCEDURE — 80053 COMPREHEN METABOLIC PANEL: CPT

## 2019-11-13 PROCEDURE — 85025 COMPLETE CBC W/AUTO DIFF WBC: CPT

## 2019-11-13 PROCEDURE — 84100 ASSAY OF PHOSPHORUS: CPT

## 2019-11-13 PROCEDURE — 27000221 HC OXYGEN, UP TO 24 HOURS

## 2019-11-13 PROCEDURE — 94761 N-INVAS EAR/PLS OXIMETRY MLT: CPT

## 2019-11-13 PROCEDURE — 83735 ASSAY OF MAGNESIUM: CPT

## 2019-11-13 PROCEDURE — 97162 PT EVAL MOD COMPLEX 30 MIN: CPT

## 2019-11-13 PROCEDURE — 25000003 PHARM REV CODE 250: Performed by: NURSE PRACTITIONER

## 2019-11-13 PROCEDURE — 63600175 PHARM REV CODE 636 W HCPCS: Performed by: INTERNAL MEDICINE

## 2019-11-13 PROCEDURE — 36415 COLL VENOUS BLD VENIPUNCTURE: CPT

## 2019-11-13 RX ORDER — LEVETIRACETAM 750 MG/1
750 TABLET ORAL 2 TIMES DAILY
Status: DISCONTINUED | OUTPATIENT
Start: 2019-11-13 | End: 2019-11-13

## 2019-11-13 RX ORDER — AMOXICILLIN 250 MG
1 CAPSULE ORAL DAILY
Status: DISCONTINUED | OUTPATIENT
Start: 2019-11-13 | End: 2019-11-21 | Stop reason: HOSPADM

## 2019-11-13 RX ORDER — POLYETHYLENE GLYCOL 3350 17 G/17G
17 POWDER, FOR SOLUTION ORAL DAILY
Status: DISCONTINUED | OUTPATIENT
Start: 2019-11-13 | End: 2019-11-21 | Stop reason: HOSPADM

## 2019-11-13 RX ORDER — CARVEDILOL 3.12 MG/1
3.12 TABLET ORAL 2 TIMES DAILY
Status: DISCONTINUED | OUTPATIENT
Start: 2019-11-13 | End: 2019-11-21 | Stop reason: HOSPADM

## 2019-11-13 RX ADMIN — CARVEDILOL 3.12 MG: 3.12 TABLET, FILM COATED ORAL at 09:11

## 2019-11-13 RX ADMIN — IPRATROPIUM BROMIDE AND ALBUTEROL SULFATE 3 ML: .5; 3 SOLUTION RESPIRATORY (INHALATION) at 08:11

## 2019-11-13 RX ADMIN — POLYETHYLENE GLYCOL 3350 17 G: 17 POWDER, FOR SOLUTION ORAL at 09:11

## 2019-11-13 RX ADMIN — IPRATROPIUM BROMIDE AND ALBUTEROL SULFATE 3 ML: .5; 3 SOLUTION RESPIRATORY (INHALATION) at 12:11

## 2019-11-13 RX ADMIN — AMLODIPINE BESYLATE 10 MG: 10 TABLET ORAL at 09:11

## 2019-11-13 RX ADMIN — SENNOSIDES AND DOCUSATE SODIUM 1 TABLET: 8.6; 5 TABLET ORAL at 09:11

## 2019-11-13 RX ADMIN — DEXTROSE 750 MG: 50 INJECTION, SOLUTION INTRAVENOUS at 09:11

## 2019-11-13 RX ADMIN — IPRATROPIUM BROMIDE AND ALBUTEROL SULFATE 3 ML: .5; 3 SOLUTION RESPIRATORY (INHALATION) at 03:11

## 2019-11-13 RX ADMIN — HEPARIN SODIUM 5000 UNITS: 5000 INJECTION, SOLUTION INTRAVENOUS; SUBCUTANEOUS at 09:11

## 2019-11-13 RX ADMIN — HEPARIN SODIUM 5000 UNITS: 5000 INJECTION, SOLUTION INTRAVENOUS; SUBCUTANEOUS at 05:11

## 2019-11-13 RX ADMIN — IPRATROPIUM BROMIDE AND ALBUTEROL SULFATE 3 ML: .5; 3 SOLUTION RESPIRATORY (INHALATION) at 04:11

## 2019-11-13 RX ADMIN — HEPARIN SODIUM 5000 UNITS: 5000 INJECTION, SOLUTION INTRAVENOUS; SUBCUTANEOUS at 02:11

## 2019-11-13 NOTE — PLAN OF CARE
Pt seen for clinical swallow assessment this date. Pt does not appear safe to any oral means of nutrition/hydration/medication. SLP dicussed with medical team underlying diagnosis stage IV lung CA difficult to truly assess swallow safety at the bedside. Would recommend continuing alternate means of nutrition/hydration/medication vis NG tube and offering ice chips and tastes of puree for pleasure/comfort. Speech to continue to monitor.     Cecilia Flores MS, CCC-SLP  Speech Language Pathologist  Pager: [phone]  Date 11/13/2019

## 2019-11-13 NOTE — PT/OT/SLP EVAL
Speech Language Pathology Evaluation  Cognitive/Bedside Swallow    Patient Name:  Angel Torres   MRN:  [id number]  Admitting Diagnosis: Status epilepticus    Recommendations:                  General Recommendations:  Dysphagia therapy, Speech/language therapy and Cognitive-linguistic therapy  Diet recommendations:  NPO(puree for pleasure/comfort if team in agreement ), NPO(ice chips for pleasure/comfort if team in agreement)   Aspiration Precautions: Continue alternate means of nutrition, Ice chips sparingly, No straws, Puree for pleasure and Strict aspiration precautions   General Precautions: Standard,    Communication strategies:  none    History:     Past Medical History:   Diagnosis Date    Chemotherapy follow-up examination 9/7/2016    Chemotherapy follow-up examination 12/6/2017    COPD (chronic obstructive pulmonary disease)     Decreased appetite 11/8/2017    Hearing loss     Hypertension 2/9/2015    Primary cancer of right middle lobe of lung 7/11/2016    Rash 9/7/2016    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    Modified Barium Swallow: none previously documented     Prior diet: unknown; pt a poor historian suspect regular solids and thin liquids     Occupation/hobbies/homemaking: unknown; additional background information will be obtained in future therapy sessions     Subjective     Pt awake yet lethargic     Pain/Comfort:  · Pain Rating 1: 0/10  · Pain Rating Post-Intervention 1: 0/10    Objective:     Cognitive Status:    Unable to assess  Attention Sustained attention deficit    Orientation Person and Place meaning type of building, only   Additional assessment limited by pt poor attention and comprehension deficits    Pt with unknown baseline and advanced CA diagnosis     Receptive  Language:   Comprehension:      Commands  One step 60% inconsistently with SLP model and frequent repetition    Inconsistent yes /no responses to questions ~30% acc   Pt with unknown baseline and advanced CA diagnosis     Pragmatics:    flat affect     Expressive Language:  Verbal:    Automatic Speech  Counting 60% acc, pt limited by poor attention and trailing off from task    Pt with unknown baseline and advanced CA diagnosis       Motor Speech:  Dysarthria evident; speech intelligiblity ~ 90% during conversational exchanges     Voice:   wet in nature ; hoarse     Visual-Spatial:  appearing to prefer right side gaze preference    Reading:   unable to assess      Written Expression:   unable to assess     Oral Musculature Evaluation  · Oral Musculature: general weakness, unable to assess due to poor participation/comprehension  · Dentition: edentulous  · Secretion Management: problems swallowing secretions, coughing on secretions  · Oral Labial Strength and Mobility: impaired retraction, impaired coordination, impaired seal, impaired pursing  · Lingual Strength and Mobility: impaired strength  · Volitional Cough: weak; wet; unproductive   · Volitional Swallow: unable to elicit   · Voice Prior to PO Intake: wet    Bedside Swallow Eval:   Consistencies Assessed:  · Thin liquids ice chips x3  · Nectar thick liquids single cup sip x4  · Puree 1/2 tsp x3     Oral Phase:   · Decreased closure around utensil  · Slow oral transit time    Pharyngeal Phase:   · coughing/choking  · decreased hyolaryngeal excursion to palpation  · delayed swallow initation  · wet vocal quality after swallow   · Following all trials     Pt seen for clinical swallow assessment this date. Pt does not appear safe to any oral means of nutrition/hydration/medication. SLP dicussed with medical team underlying diagnosis stage IV lung CA difficult to truly assess swallow safety at the bedside. Would recommend continuing alternate means of  nutrition/hydration/medication vis NG tube and offering ice chips and tastes of puree for pleasure/comfort. Speech to continue to monitor.     Treatment:  Education provided to Pt re: SLP role in acute care setting, overall impressions and therapeutic goals ongoing education and support warranted and family education warranted given no family at the bedside. Whiteboard updated.    Assessment:     Angel Torres is a 64 y.o. male with an SLP diagnosis of Dysphagia, Dysarthria and Cognitive-Linguistic Impairment.      Goals:   Multidisciplinary Problems     SLP Goals        Problem: SLP Goal    Goal Priority Disciplines Outcome   SLP Goal     SLP Ongoing, Progressing   Description:  Speech Language Pathology Goals  Goals expected to be met by 11/20    1.Pt will participate in ongoing assessment of swallow function to help determine least restrictive diet    2. Pt will participate in ongoing assessment of speech language and cognitive linguistic skills to help rule out deficits and determine therapeutic plan of care                       Plan:     · Patient to be seen:  4 x/week   · Plan of Care expires:  12/12/19  · SLP Follow-Up:  Yes       Discharge recommendations:  Discharge Facility/Level of Care Needs: (TBD)   Barriers to Discharge:  Level of Skilled Assistance Needed      Time Tracking:     SLP Treatment Date:   11/13/19  Speech Start Time:  0839  Speech Stop Time:  0856    Speech Total Time (min):  17 min    Billable Minutes: Eval 9  and Eval Swallow and Oral Function 8    Cecilia Flores CCC-SLP  11/13/2019

## 2019-11-13 NOTE — PLAN OF CARE
Problem: Occupational Therapy Goal  Goal: Occupational Therapy Goal  Description  Goals to be met by: 11/23     Patient will increase functional independence with ADLs by performing:    UE Dressing with Moderate Assistance.  LE Dressing with Maximum Assistance.  Grooming while standing with Stand-by Assistance.  Toileting from bedside commode with Moderate Assistance for hygiene and clothing management.   Rolling to Bilateral with Stand-by Assistance.   Supine to sit with Supervision.  Stand pivot transfers with Supervision.     Outcome: Ongoing, Progressing     OT eval completed.

## 2019-11-13 NOTE — PT/OT/SLP EVAL
Occupational Therapy   Evaluation    Name: Angel Torres  MRN: [id number]  Admitting Diagnosis:  Status epilepticus      Recommendations:     Discharge Recommendations: rehabilitation facility  Discharge Equipment Recommendations:  (TBD)  Barriers to discharge:  (unknown)    Assessment:     Angel Torres is a 64 y.o. male with a medical diagnosis of Status epilepticus.  He presents with decreased independence with ADL's & mobility.  Pt with noted deficits in visual attention & cognition. Performance deficits affecting function: weakness, impaired endurance, impaired self care skills, impaired functional mobilty, impaired balance, visual deficits, impaired cognition, decreased lower extremity function, decreased upper extremity function, decreased coordination, decreased safety awareness, impaired coordination, impaired fine motor.      Rehab Prognosis: Fair; patient would benefit from acute skilled OT services to address these deficits and reach maximum level of function.       Plan:     Patient to be seen 4 x/week to address the above listed problems via self-care/home management, therapeutic activities, therapeutic exercises, sensory integration, cognitive retraining, neuromuscular re-education  · Plan of Care Expires: 12/13/19  · Plan of Care Reviewed with: patient, daughter, friend    Subjective     Chief Complaint: none stated by pt  Patient/Family Comments/goals: for pt to get better    Occupational Profile:  Living Environment & PLOF: Pt resides with daughter in 2nd floor apartment with ~ 16 steps to enter.  Pt was independent with ADL's & mobility PTA.  Pt is an active .  Pt works as an [employer] liu  & enjoys gambling.    Equipment Used at Home:  none  Assistance upon Discharge: unknown    Pain/Comfort:  · Pain Rating 1: 0/10  · Pain Rating Post-Intervention 1: 0/10    Patients cultural, spiritual, Oriental orthodox conflicts given the current situation: no    Objective:     Communicated with: RN prior to  session.  Patient found supine with blood pressure cuff, pulse ox (continuous), telemetry, peripheral IV, oxygen, phillip catheter upon OT entry to room. Daughter & another female visitor present during session.    General Precautions: Standard, fall, hearing impaired, NPO, contact(bed bug isolation)     Occupational Performance:    Bed Mobility:    · Patient completed Supine to Sit with stand by assistance  · Patient completed Sit to Supine with stand by assistance    Functional Mobility/Transfers:  · Patient completed Sit <> Stand Transfer with contact guard assistance  with  no assistive device  from EOB    Activities of Daily Living:  · Upper Body Dressing: total assistance seated EOB  · Lower Body Dressing: total assistance donning socks seated EOB    Cognitive/Visual Perceptual:  Cognitive/Psychosocial Skills:     -       Oriented to: Person, Place and and year   -       Follows Commands/attention:inconsistent following of commands even with demonstration at times  -       Communication: dysarthria  -       Memory: questionable  -       Safety awareness/insight to disability: impaired   -       Mood/Affect/Coping skills/emotional control: Appropriate to situation  Visual/Perceptual:      -noted decreased attention visually to the right during all activities      Physical Exam:  Postural examination/scapula alignment:    -       Rounded shoulders  -       Forward head  -       Posterior pelvic tilt  Sensation:    -       Intact  Dominant hand:    -       right  Upper Extremity Range of Motion:     -       Right Upper Extremity: WFL except shoulder flexion ~80*, noted decreased coordination of all movements; questionable apraxia  -       Left Upper Extremity: WFL except 70* shoulder flexion  Upper Extremity Strength:    -       Right Upper Extremity: 3-/5 shoulder flexion, all others 3+/5  -       Left Upper Extremity: WFL except 3-/5 shoulder flexion, all others WFL   Strength:    -       Right Upper  Extremity: WFL  -       Left Upper Extremity: WFL  Fine Motor Coordination:    -       Impaired  Left hand thumb/finger opposition skills  , Right hand thumb/finger opposition skills  , Left hand, manipulation of objects  , Right hand, manipulation of objects  , Left hand, diadochokinesis skill   and Right hand, diadochokinesis skill      AMPAC 6 Click ADL:  AMPA Total Score: 7    Treatment & Education:  Provided education regarding role of OT, POC, & discharge recommendations with pt & family verbalizing understanding.  Pt had no further questions & when asked whether there were any concerns pt reported none.    Education:  Patient left supine with all lines intact, call button in reach, bed alarm on, RN notified, family present and white board updated.    GOALS:   Multidisciplinary Problems     Occupational Therapy Goals        Problem: Occupational Therapy Goal    Goal Priority Disciplines Outcome Interventions   Occupational Therapy Goal     OT, PT/OT Ongoing, Progressing    Description:  Goals to be met by: 11/23     Patient will increase functional independence with ADLs by performing:    UE Dressing with Moderate Assistance.  LE Dressing with Maximum Assistance.  Grooming while standing with Stand-by Assistance.  Toileting from bedside commode with Moderate Assistance for hygiene and clothing management.   Rolling to Bilateral with Stand-by Assistance.   Supine to sit with Supervision.  Stand pivot transfers with Supervision.                      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

## 2019-11-13 NOTE — TELEPHONE ENCOUNTER
Spoke to patient's wife. Discussed MRI, she would like to know next steps. Let her know that a referral to radiation oncology had been placed. They will determine if they can treat his cancer. She will wait and speak to the doctor when they come to the room. She will call with any other needs.

## 2019-11-13 NOTE — PROGRESS NOTES
ABG with lytes checked per RT at bedside d/t labs result may be interfered by visible hemolysis. Per ISTAT result K-3.6

## 2019-11-13 NOTE — PROGRESS NOTES
RN received phone call from male stating that he was the son of the patient Mr. Torres. Password has been established with family members. Male on phone unable to give password. RN informed person on phone of the policy of an established family password as well as HIPPA violations. Male then became irate on phone, stated that he is  and the patient's son, and cursed at RN. Again, HIPPA policy was repeated to person on phone and RN informed male that he may reach out to other family members to gain access to patient's record. Daughter at bedside updated on interaction.

## 2019-11-13 NOTE — CONSULTS
Patient Name: Angel Torres  MRN: [id number]  Admission Date: 11/10/2019   Hospital Length of Stay: 2       Inpatient consultation to Radiation Oncology    Chief complaint:   Chief Complaint   Patient presents with    Unresponsive     Pt to ER via EMS. Pt was found unresponsive sitting up in chair gasping, wheezing, and drooling. Ems states patient had a seizure in route; gave 10mg IM versed.         HPI: Mr. 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 Our team consulted for additional treatment recommendations.    Multiple family members were present at bedside and helped with discussion, as patient unable to appropriately phonate and participate in conversation. They noted that his seizures had abated and his mental status had overall improved since starting Keppra. He was resting comfortably in his hospital bed during today's discussion.    Prior Radiation History: Concurrent chemoradiation, 64 Gy in 32 fractions to right lung, hilum and mediastinum with Dr. Gaming at [facility], -16.    Past Medical History:   Diagnosis Date    Chemotherapy follow-up examination 2016    Chemotherapy follow-up examination 2017    COPD (chronic obstructive pulmonary disease)     Decreased appetite 2017    Hearing loss     Hypertension 2015    Primary cancer of right middle lobe of lung 2016    Rash 2016    Secondary adenocarcinoma of bone 2017    Skin infection 2018       Past Surgical History:   Procedure Laterality Date    INGUINAL HERNIA REPAIR Bilateral     KNEE SURGERY         Social History     Tobacco Use    Smoking status: Former Smoker     Packs/day: 2.00     Years: 40.00     Pack years: 80.00     Last attempt to quit: 2013     Years since quittin.9    Smokeless tobacco: Former User   Substance Use  Topics    Alcohol use: No    Drug use: No       Cancer-related family history includes Cancer in his maternal grandfather, mother, and sister.    No current facility-administered medications on file prior to encounter.      Current Outpatient Medications on File Prior to Encounter   Medication Sig Dispense Refill    albuterol (PROAIR HFA) 90 mcg/actuation inhaler INHALE 2 PUFFS INTO THE LUNGS EVERY 6 HOURS AS NEEDED FOR WHEEZING 17 g 0    albuterol (PROVENTIL/VENTOLIN HFA) 90 mcg/actuation inhaler INHALE 2 PUFFS INTO THE LUNGS EVERY 6 HOURS AS NEEDED FOR WHEEZING 8.5 g 0    albuterol-ipratropium (DUO-NEB) 2.5 mg-0.5 mg/3 mL nebulizer solution Rescue 270 mL 0    amLODIPine (NORVASC) 10 MG tablet Take 1 tablet (10 mg total) by mouth once daily. (Patient not taking: Reported on 10/16/2019) 90 tablet 3    cetirizine (ZYRTEC) 10 MG tablet Take 1 tablet (10 mg total) by mouth once daily.  0    cyproheptadine (PERIACTIN) 4 mg tablet Take 1 tablet (4 mg total) by mouth 3 (three) times daily as needed. 90 tablet 1    dexAMETHasone (DECADRON) 4 MG Tab Take 2 tablets (8 mg total) by mouth every 12 (twelve) hours. Take for 3 days starting day prior to chemotherapy 60 tablet 1    dronabinol (MARINOL) 5 MG capsule Take 1 capsule (5 mg total) by mouth 2 (two) times daily before meals. 60 capsule 0    fluticasone (FLONASE) 50 mcg/actuation nasal spray SHAKE LIQUID AND USE 2 SPRAYS IN EACH NOSTRIL EVERY DAY 16 g 1    mirtazapine (REMERON SOL-TAB) 30 MG disintegrating tablet Take 1 tablet (30 mg total) by mouth nightly. 30 tablet 2    mupirocin (BACTROBAN) 2 % ointment Apply topically 2 (two) times daily. To open areas 22 g 2    naproxen (NAPROSYN) 500 MG tablet Take 1 tablet (500 mg total) by mouth 2 (two) times daily as needed (headache). 60 tablet 0    ondansetron (ZOFRAN-ODT) 8 MG TbDL Take 1 tablet (8 mg total) by mouth every 12 (twelve) hours as needed. 30 tablet 3    triamcinolone acetonide 0.1% (KENALOG) 0.1 %  "ointment To all rashes BID 80 g 4       Review of patient's allergies indicates:  No Known Allergies    Review of Systems   Unable to perform ROS: Patient nonverbal   Constitutional: Negative for fatigue and unexpected weight change.   HENT: Negative for congestion.    Eyes: Negative for visual disturbance.   Respiratory: Negative for cough, shortness of breath and wheezing.    Cardiovascular: Negative for chest pain and palpitations.   Gastrointestinal: Negative for abdominal pain, nausea and vomiting.   Genitourinary: Negative for difficulty urinating.   Musculoskeletal: Negative for back pain.   Neurological: Positive for seizures, weakness and headaches.   Psychiatric/Behavioral: Negative for decreased concentration. The patient is not nervous/anxious.         Vital Signs: BP (!) 173/98 (BP Location: Right arm, Patient Position: Lying)   Pulse (!) 119   Temp 98.7 °F (37.1 °C) (Oral)   Resp (!) 29   Ht 6' 4" (1.93 m)   Wt 70.5 kg (155 lb 6.8 oz)   SpO2 (!) 93%   BMI 18.92 kg/m²     ECOG Performance Status: 3 - Confined to bed or chair 50% of waking hours    Physical exam:    Constitutional:  On contact precautions due to bed bug infestation; ill-appearing male, have difficulty with phonation, no acute distress.  Head: Normocephalic, atraumatic.    Eyes: Sclerae are non-icteric.  Pupils are equal and round.  Extraocular movements are intact.  ENMT: Mucous membranes are moist.  Neck: Supple.    Pulmonary: No evidence of work of breathing.  Cardiovascular: No edema.  Extremities: No clubbing, cyanosis, or edema.    Skin: No visible lesions.  Neurologic: Poor mentation, gait not tested.  Psychiatric: Patient is alert and oriented x 3.  Normal mood and affect.    Labs: I have personally reviewed the patient's available labs and reports and summarized pertinent findings above in HPI.     Imaging: I have personally reviewed the patient's available images and reports and summarized pertinent findings above in HPI. "     Pathology: I have personally reviewed the patient's available pathology and summarized pertinent findings above in HPI.     Assessment: Mr. Torres is a 64 year old man with history of stage IV NSCLC s/p chemoradiation in 2016, now with progressive disease and presenting to [facility] in status epilepticus, found to have multiple (~10) punctate brain metastases.    Plan: I discussed radiation treatment options with the patient and his family. Due to his burden of disease, stereotactic radiotherapy to his brain metastases is not an appropriate option for the patient at this time. We then discussed whole brain radiotherapy versus best supportive care. I discussed the results of the QUARTZ trial with the patient and his family, stating that whole brain radiotherapy increased patient drowsiness and nausea while not demonstrating a survival benefit versus patients receiving best supportive care. Following this discussion, the patient and his family declined whole brain radiotherapy and would like to continue best supportive care and symptom management in the setting of Mr. Torres poor performance status.    I finally discussed palliative care and home hospice options with the family and they were interested in additional discussion with these teams while the patient is at [facility].    Thank you for allowing me to participate in the care of this patient. Please feel free to contact me with any questions or concerns. I will sign off at this time.    Alejandro Parrish MD  Radiation Oncology  [facility]

## 2019-11-13 NOTE — ASSESSMENT & PLAN NOTE
Hx of lung Ca  Pt on NC  Dup nebs q 4 h  CXR today-Continued right pleural effusion and adjacent lung base consolidation/atelectasis.  Minimal consolidation now present in left upper lobe

## 2019-11-13 NOTE — PROCEDURES
DATE OF STUDY:  11/11/2019    DURATION:  4 hours and 50 minutes.    ICU EEG AND VIDEO MONITORING REPORT    METHODOLOGY:  Electroencephalographic (EEG) is recorded with electrodes placed   according to the International 10-20 placement system.  Thirty two (32) channels   of digital signal (sampling rate of 512/sec), including T1 and T2, were   simultaneously recorded from the scalp and may include EKG, EMG, and/or eye   monitors.  Recording band pass was 0.1 to 512 Hz.  Digital video recording of   the patient is simultaneously recorded with the EEG.  The patient is instructed   to report clinical symptoms which may occur during the recording session.  EEG   and video recording are stored and archived in digital format.  Activation   procedures, which include photic stimulation, hyperventilation and instructing   patients to perform simple tasks, are done in selected patients.    The EEG is displayed on a monitor screen and can be reviewed using different   montages.  Computer-assisted analysis is employed to detect spike and   electrographic seizure activity.  The entire record is submitted for computer   analysis.  The entire recording is visually reviewed, and the times identified   by computer analysis as being spikes or seizures are reviewed again.    Compressed spectral analysis (CSA) is also performed on the activity recorded   from each individual channel.  This is displayed as a power display of   frequencies from 0 to 30 Hz over time.  The CSA is reviewed looking for   asymmetries in power between homologous areas of the scalp, then compared with   the original EEG recording.    [employer] software was also utilized in the review of this study.  This software   suite analyzes the EEG recording in multiple domains.  Coherence and rhythmicity   are computed to identify EEG sections which may contain organized seizures.    Each channel undergoes analysis to detect the presence of spike and sharp waves   which  have special and morphological characteristics of epileptic activity.  The   routine EEG recording is converted from special into frequency domain.  This is   then displayed comparing homologous areas to identify areas of significant   asymmetry.  Algorithm to identify non-cortically generated artifact is used to   separate artifact from the EEG.    RECORDING TIMES:  Start on 11/11/2019, at 03:17.  Stop on 11/11/2019, at 08:11.  A total of 4 hours and 50 minutes of EEG were recorded.  This is electrode cap   study.    EEG FINDINGS:  The study reveals low-to-medium amplitude activity with diffuse   beta activity in the 15 Hz to 20 Hz range seen in abundance with a light   burst-suppression pattern seen at times with higher amplitude bursts of beta   activity interchanged with brief 1 to 3-second relative suppressions with mostly   suppressed delta activity and a minimum of faster frequencies.  The record is   symmetrical overall.  Suppressions are brief compared to the bursts, which tend   to last several seconds to even minutes at a time.  Intermixed with the beta   activity are multiple faster frequencies in the alpha and theta band.  No   seizures were seen.  No consistent focal findings were seen.  Page to page   variability was seen, but no significant evolution over this 4-hour electrode   cap study.    INTERPRETATION:  Abnormal EEG due to excessive beta activity along with   background diffuse slowing and disorganization suggesting a diffuse   encephalopathic process.  No evidence of seizures or focal dysfunction is seen.      NBB/IN  dd: 11/13/2019 15:15:32 (CST)  td: 11/13/2019 15:50:49 (CST)  Doc ID   #[id number]  Job ID #[id number]    CC:

## 2019-11-13 NOTE — TELEPHONE ENCOUNTER
----- Message from Sp Rothman sent at 11/13/2019 10:29 AM CST -----  Contact: Diamond (wife)  Staff Message     Caller name: Diamond     Reason for call: Returning a call from Robin, wants to follow up with Dr Begum in regards to pt being admitted in the hospital.        Communication Preference: [phone]    Additional Information:

## 2019-11-13 NOTE — PT/OT/SLP EVAL
"Physical Therapy Evaluation    Patient Name:  Angel Torres  MRN: [id number]  Recent Surgery: * No surgery found *      Recommendations:     Discharge Recommendations: Inpatient Rehabilitation Facility   Equipment recommendations: TBD pending pt progress  Barriers to discharge: Increased level of skilled assistance required and Fall risk     Assessment:     Angel Torres is a 64 y.o. male admitted to [facility] on 11/10/2019 with medical diagnosis of Status epilepticus. Pt presents with weakness, R inattention, impaired balance, decreased endurance, impaired cardiopulmonary response to activity, decreased safety awareness, altered gait mechanics, impaired coordination and impaired functional mobility . Angel Torres would benefit from acute PT intervention to address listed functional deficits, provide patient/caregiver education, reduce fall risk, and maximize (I) and safety with functional mobility. Once medically stable, recommending pt discharge to inpatient rehab facility.     Rehab Prognosis: Good; based on positive indicators including strong caregiver support and potential for functional gains within an intensive rehab program     Plan:     During this hospitalization, patient to be seen 4 x/week to address the identified rehab impairments via gait training, therapeutic activities, therapeutic exercises, neuromuscular re-education and progress towards stated goals.     Plan of Care Expires:  12/13/19  Plan of Care reviewed with: patient and family    This plan of care has been discussed with the patient/caregiver, who was included in its development and is in agreement with the identified goals and treatment plan.     Subjective     Communicated with RN prior to session.  Patient agreeable to participate. Pt's family at bedside.     Patient comments/goals: "Am I being too loud?" "Did I do good?" --pt's speech is slurred, moderately difficult to understand     Pain/Comfort:  · Location: no pain reported    Patients cultural, " spiritual, Mormonism conflicts given the current situation: No known conflicts     Patient History: information obtained from pt and family     Living Environment: Pt lives with daughter and significant other in 2nd floor apartment with flight of [place].   Prior Level of Function: independent with mobility and ADLs; drives trucks for work 2 days/week   DME owned: none  Caregiver Assistance: (A) from family- unsure if 24/7 assist is available     Objective:     Patient found with: oxygen, peripheral IV, NG tube, bed alarm, restraints, phillip catheter, telemetry, blood pressure cuff, SCD, continuous pulse oximeter and arterial line    General Precautions: Fall, Standard, Seizure and Aspiration  Orthopedic Precautions: N/A  Braces: N/A  Oxygen Device: nasal cannula     Exams:     Cognition:  o Pt is alert and oriented x person, place and time   o Pt's ability to follow single step commands: intact   o Fair safety awareness     Sensation:   o Grossly intact light touch sensation BLEs     Gross Motor Coordination: Impaired RUE      Postural examination/scapula alignment: Rounded shoulder, Head forward, decreased midline orientation, R inattention      Lower Extremity Range of Motion:  o Right Lower Extremity: WFL  o Left Lower Extremity: WFL     Lower Extremity Strength:  o Right Lower Extremity: WFL  o Left Lower Extremity: WFL    Functional Mobility:    Bed Mobility:  · Supine > Sit: Stand-by Assistance  · Sit > Supine: Contact Guard Assistance    Sitting Balance:   · Assistance level: Contact Guard Assistance     Transfers:   · Sit <> Stand Transfer: Contact Guard Assistance x 2 trials from EOB with no AD                  Gait:  · Distance: pt performed marching in place x 5 trials followed by ~4 side steps to R, 2 steps forward and backward   · Assistance level: Contact Guard Assistance  · Assistive Device: none  · Gait Assessment: decreased step length , decreased stride length , narrow base of support, unsteady  gait  and decreased toe-to-floor clearance     Outcome Measure: AM-PAC 6 CLICK MOBILITY  Total Score:15     Patient/Caregiver Education:     Therapist educated pt/caregiver regarding:    PT POC and goals for therapy    Safety with mobility and fall risk    Instruction on use of call button and importance of calling nursing staff for assistance with mobility     Patient/caregiver able to verbalize understanding; will follow-up with pt/caregiver during current admit for additional questions/concerns within scope of practice.     White board updated.    Patient left HOB elevated with all lines intact, call button in reach, bed alarm on, restraints reapplied at end of session, RN notified and family present.    GOALS:   Multidisciplinary Problems     Physical Therapy Goals        Problem: Physical Therapy Goal    Goal Priority Disciplines Outcome Goal Variances Interventions   Physical Therapy Goal     PT, PT/OT Ongoing, Progressing     Description:  Goals to be met by: 2019    Patient will increase functional independence with mobility by performin. Supine to sit with Modified [place]  2. Sit to supine with Modified [place]  3. Sit to stand transfer with Supervision  4. Gait  x 100 feet with Stand-by Assistance with or without using least restrictive AD.   5. Stand for 3 minutes with Stand-by Assistance while performing dynamic balance activities to reduce fall risk   6. Lower extremity exercise program x15 reps per handout, with assistance as needed                        History:     Past Medical History:   Diagnosis Date    Chemotherapy follow-up examination 2016    Chemotherapy follow-up examination 2017    COPD (chronic obstructive pulmonary disease)     Decreased appetite 2017    Hearing loss     Hypertension 2015    Primary cancer of right middle lobe of lung 2016    Rash 2016    Secondary adenocarcinoma of bone 2017    Skin infection 2018        Past Surgical History:   Procedure Laterality Date    INGUINAL HERNIA REPAIR Bilateral     KNEE SURGERY         Time Tracking:     PT Received On: 11/13/19  PT Start Time: 1021     PT Stop Time: 1049  PT Total Time (min): 28 min     Billable Minutes: Evaluation 28 min    Orquidea Shell, PT  11/13/2019

## 2019-11-13 NOTE — PLAN OF CARE
POC reviewed with pt at 0500. Pt verbalized understanding. Questions and concerns addressed. No acute events overnight. Muse in place, dark red urine. TF at goal. IVF continued. Pt progressing toward goals. Will continue to monitor. See flowsheets for full assessment and VS info

## 2019-11-13 NOTE — ASSESSMENT & PLAN NOTE
" Seizures/Epilepsy Monitoring Evaluation:  - Continue current management.  - Consult Epilepsy service  - Seizure Precautions  - Continue vEEG monitoring   - NCC will follow clinically and address any medical issues.  - Patient loaded with IV Kepppra 1000mg stat then Continue AEDs Keppra 500mg BID  - Pending recommendations per Epilepsy Service  -MRI brain-Multiple scattered small to punctate size foci of abnormal parenchymal enhancement throughout the brain parenchyma as detailed above.  In light of history concerning for parenchymal metastases largest within the left parietal lobe  -11/13: radiation oncology consulted-per rad onc" the patient and his family declined whole brain radiotherapy and would like to continue best supportive care and symptom management in the setting of Mr. Torres poor performance status"  -Continue Keppra 750 BID  "

## 2019-11-13 NOTE — PROGRESS NOTES
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 Patient was watching the football game earlier in the evening and had no issues during the day. Later in the evening he was somnolent and his daughter noted that he had a seizure and she called EMS. Patient was brought to the ED by EMS who were called for a seizure-like activity. All history was obtained from ED chart as patient was intubated and sedated on propofol on admission. Patient was obtunded when EMS arrived on the scene and they witnessed a generalized tonic clonic seizure lasting 20 seconds which was abated with IV Versed 10mg stat. Patient was intubated in the ED and had elevated blood pressure but otherwise stable vital signs. He was placed on mechanical ventilation and sedated with propofol. Video EEG ordered and patient was admitted to Neuro Critical care unit for higher level of care. Patient has no known history of seizures, no known history of metastasis to brain and no previous imaging. Patient had a CT scan done in the ED prior to admission in Neuro ICU. Upon evaluation in the ED patient was infested with bed bugs crawling all over him. Patient was placed on contact isolation immediately and  as well as charge nurse notified.    Hospital Course: 11/11/2019: Patient admitted to Neuro ICU for status epilepticus. Patient intubated, sedated with propofol and video EEG monitoring.  11/12/2019: pt extubated today-on NC  11/13/19: consult radiation oncology, d/c phillip cath, repeat BMP, SBP <180, start coreg BID        Review of Systems  Review of Symptoms:  Constitutional: Denies fevers,  weight loss, chills, or weakness.  Eyes: Denies changes in vision.  ENT: Denies dysphagia, nasal discharge, ear pain or discharge.  Cardiovascular: Denies chest pain, palpitations, orthopnea, or claudication.  Respiratory: Denies shortness of breath, cough, hemoptysis, or wheezing.  GI: Denies nausea/vomitting, hematochezia, melena, abd pain, or changes in appetite.  : Denies dysuria, incontinence, or hematuria.  Musculoskeletal: Denies joint pain or myalgias.  Skin/breast: Denies rashes, lumps, lesions, or discharge.  Neurologic: Denies headache, dizziness, vertigo, or paresthesias.  Psychiatric: Denies changes in mood or hallucinations.  Endocrine: Denies polyuria, polydipsia, heat/cold intolerance.  Hematologic/Lymph: Denies lymphadenopathy, easy bruising or easy bleeding.  Allergic/Immunologic: Denies rash, rhinitis.   Objective:     Vitals:  Temp: 98.6 °F (37 °C)  Pulse: (!) 111  Rhythm: normal sinus rhythm  BP: 135/76  MAP (mmHg): 96  Resp: 19  SpO2: 97 %  Oxygen Concentration (%): 50  O2 Device (Oxygen Therapy): nasal cannula  Vent Mode: Spont  Set Rate: 0 bmp  Vt Set: 310 mL  Pressure Support: 5 cmH20  PEEP/CPAP: 5 cmH20  Peak Airway Pressure: 11 cmH2O  Mean Airway Pressure: 8.2 cmH20  Plateau Pressure: 0 cmH20    Temp  Min: 97.9 °F (36.6 °C)  Max: 98.8 °F (37.1 °C)  Pulse  Min: 82  Max: 124  BP  Min: 97/64  Max: 145/94  MAP (mmHg)  Min: 75  Max: 115  Resp  Min: 16  Max: 33  SpO2  Min: 96 %  Max: 100 %  Oxygen Concentration (%)  Min: 50  Max: 50    11/11 0701 - 11/12 0700  In: 2227.6 [I.V.:1797.6]  Out: 981 [Urine:981]           Physical Exam  GA: Alert, comfortable, no acute distress.   HEENT: No scleral icterus or JVD.   Pulmonary: Clear to auscultation A/L. No wheezing, crackles, or rhonchi.  Cardiac: RRR S1 & S2 w/o rubs/murmurs/gallops.   Abdominal: Bowel sounds present x 4. No appreciable hepatosplenomegaly.  Skin: No jaundice, rashes, or visible lesions.  Neuro:  --GCS: E4 V5 M6  --Mental Status:   "Aaox4, follows all commands, delayed responses at times, mildly slurred  --Pupils 3->2mm, PERRL.   --Corneal reflex, gag, cough intact.  --ANGELA spont and against gravity    Medications:  Continuous  sodium chloride 0.9% Last Rate: 75 mL/hr at 11/12/19 1505   dexmedetomidine (PRECEDEX) infusion    Scheduled  albuterol-ipratropium 3 mL Q4H   amLODIPine 10 mg Daily   heparin (porcine) 5,000 Units Q8H   levetiracetam IVPB 500 mg Q12H   mirtazapine 30 mg Nightly   PRN  calcium gluconate IVPB 1 g PRN   calcium gluconate IVPB 1 g PRN   calcium gluconate IVPB 1 g PRN   Dextrose 10% Bolus 12.5 g PRN   fentaNYL 50 mcg Q2H PRN   glucagon (human recombinant) 1 mg PRN   insulin aspart U-100 1-10 Units Q6H PRN   magnesium sulfate IVPB 2 g PRN   magnesium sulfate IVPB 4 g PRN   potassium chloride in water 40 mEq PRN   And     potassium chloride in water 40 mEq PRN   And     potassium chloride in water 40 mEq PRN   sodium chloride 0.9% 10 mL PRN   sodium phosphate IVPB 15 mmol PRN   sodium phosphate IVPB 20.01 mmol PRN   sodium phosphate IVPB 30 mmol PRN     Today I personally reviewed pertinent medications, lines/drains/airways, imaging, laboratory results,    Diet  No diet orders on file      Assessment/Plan:     Neuro  * Status epilepticus   Seizures/Epilepsy Monitoring Evaluation:  - Continue current management.  - Consult Epilepsy service  - Seizure Precautions  - Continue vEEG monitoring   - NCC will follow clinically and address any medical issues.  - Patient loaded with IV Kepppra 1000mg stat then Continue AEDs Keppra 500mg BID  - Pending recommendations per Epilepsy Service  -MRI brain-Multiple scattered small to punctate size foci of abnormal parenchymal enhancement throughout the brain parenchyma as detailed above.  In light of history concerning for parenchymal metastases largest within the left parietal lobe  -11/13: radiation oncology consulted-per rad onc" the patient and his family declined whole brain radiotherapy " "and would like to continue best supportive care and symptom management in the setting of Mr. Torres poor performance status"  -Continue Keppra 750 BID    Pulmonary  Acute respiratory failure with hypoxia  Hx of lung Ca  Pt on NC  Dup nebs q 4 h  CXR today-Continued right pleural effusion and adjacent lung base consolidation/atelectasis.  Minimal consolidation now present in left upper lobe    Cardiac/Vascular  Essential hypertension  -180  Continue home antihypertensive Amlodipine 10mg daily  Add Coreg 3.125 BID          The patient is being Prophylaxed for:  Venous Thromboembolism with: Chemical  Stress Ulcer with: None  Ventilator Pneumonia with: not applicable    Activity Orders          None        Full Code    Debi Lincoln NP  Neurocritical Care  [facility]    "

## 2019-11-13 NOTE — PLAN OF CARE
Problem: Physical Therapy Goal  Goal: Physical Therapy Goal  Description  Goals to be met by: 2019    Patient will increase functional independence with mobility by performin. Supine to sit with Modified [place]  2. Sit to supine with Modified [place]  3. Sit to stand transfer with Supervision  4. Gait  x 100 feet with Stand-by Assistance with or without using least restrictive AD.   5. Stand for 3 minutes with Stand-by Assistance while performing dynamic balance activities to reduce fall risk   6. Lower extremity exercise program x15 reps per handout, with assistance as needed     Outcome: Ongoing, Progressing     PT evaluation completed- see note for details. Goals established and POC initiated.     Orquidea Shell, PT, DPT   2019  Pager: [phone]

## 2019-11-13 NOTE — PROCEDURES
DATE OF STUDY:  11/11/2019, to 11/12/2019    ICU EEG AND VIDEO MONITORING REPORT    METHODOLOGY:  Electroencephalographic (EEG) is recorded with electrodes placed   according to the International 10-20 placement system.  Thirty two (32) channels   of digital signal (sampling rate of 512/sec), including T1 and T2, were   simultaneously recorded from the scalp and may include EKG, EMG, and/or eye   monitors.  Recording band pass was 0.1 to 512 Hz.  Digital video recording of   the patient is simultaneously recorded with the EEG.  The patient is instructed   to report clinical symptoms which may occur during the recording session.  EEG   and video recording are stored and archived in digital format.  Activation   procedures, which include photic stimulation, hyperventilation and instructing   patients to perform simple tasks, are done in selected patients.    The EEG is displayed on a monitor screen and can be reviewed using different   montages.  Computer-assisted analysis is employed to detect spike and   electrographic seizure activity.  The entire record is submitted for computer   analysis.  The entire recording is visually reviewed, and the times identified   by computer analysis as being spikes or seizures are reviewed again.    Compressed spectral analysis (CSA) is also performed on the activity recorded   from each individual channel.  This is displayed as a power display of   frequencies from 0 to 30 Hz over time.  The CSA is reviewed looking for   asymmetries in power between homologous areas of the scalp, then compared with   the original EEG recording.    [employer] software was also utilized in the review of this study.  This software   suite analyzes the EEG recording in multiple domains.  Coherence and rhythmicity   are computed to identify EEG sections which may contain organized seizures.    Each channel undergoes analysis to detect the presence of spike and sharp waves   which have special and  morphological characteristics of epileptic activity.  The   routine EEG recording is converted from special into frequency domain.  This is   then displayed comparing homologous areas to identify areas of significant   asymmetry.  Algorithm to identify non-cortically generated artifact is used to   separate artifact from the EEG.    RECORDING TIMES:  Start on 11/11/2019, at 16:54.  Stop on 11/12/2019, at 15:12.  A total of 22 hours and 11 minutes of EEG were recorded.    EEG FINDINGS:  This is a continuation although broken in time from the electrode   cap study of earlier on the morning of 11/11/2019.  The study begins similar to   the electrode cap with a light burst-suppression pattern dominated by beta   activity superimposed on low amplitude delta in the 2 Hz range.  This pattern   continues to dominate the record in the first several hours of the full   recording including into the overnight hours and the early morning hours of   11/12/2019.  After approximately 0800 hours on 11/12/2019, less delta activity   begins to be seen with a more mixed frequency background of 4 Hz to 6 Hz   activity with some admixed alpha frequencies becoming more prominent and   continuous.  Some occasional rhythmic delta activity is seen during this part,   but no more suppressions are seen.  The record remains symmetric overall.    Sharply contoured waveforms are occasionally seen in the temporal channels   bilaterally and no clear epileptiform discharges are seen and no seizures are   seen.  The end of the record contains a significant amount of alpha activity   mixed with beta and represents an improvement from the beginning of the study.    IMPRESSION:  Abnormal EEG due to moderate diffuse encephalopathy with some   improvement over the course of the study and a mild encephalopathy evident at   the end.  No focal findings or epileptic findings were seen on the recording.      NBB/IN  dd: 11/13/2019 15:24:47 (CST)  td:  11/13/2019 15:41:34 (CST)  Doc ID   #[id number]  Job ID #[id number]    CC:

## 2019-11-13 NOTE — PROGRESS NOTES
"  [facility]  Adult Nutrition  Consult Note    SUMMARY     Recommendations    Recommendation/Intervention:   1. Recommend increasing TF goal rate to best meet pt's needs.   Isosource 1.5 @ 60mL/hr.   - Initiate @ 10mL/hr and increase by 10mL/hr q4hrs, or as tolerated, until goal rate is reached.   - Provides 2160kcals, 98g protein and 1100mL free water.     2. If able to advance diet, recommend Regular with texture per SLP recommendations.     RD to monitor.    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 SLP recs for NPO with puree for pleasure. TF at goal, tolerating appropriately. Per dtgr at bedside, pt with weight loss over previous year with most weight loss over prior 6 months. Approx 30lb over 1 year with 19lb weight loss in last 6 months. Pt's dtgr reports intake and appetite stable over previous year however weight loss noted with chemotherapy. NFPE completed - pt with moderate to severe fat and muscle wasting in temples, clavicles, calves, triceps and scapular. Pt meets criteria for chronic severe malnutrition.   Nutrition Discharge Planning: unable to determine at this time    Nutrition Risk Screen    Nutrition Risk Screen: dysphagia or difficulty swallowing    Nutrition/Diet History    Spiritual, Cultural Beliefs, Jainism Practices, Values that Affect Care: no  Factors Affecting Nutritional Intake: NPO, chewing difficulties/inability to chew food, difficulty/impaired swallowing    Anthropometrics    Temp: 98.9 °F (37.2 °C)  Height: 6' 4" (193 cm)  Height (inches): 76 in  Weight Method: Bed Scale  Weight: 70.5 kg (155 lb 6.8 oz)  Weight (lb): 155.43 lb  Ideal Body Weight (IBW), " Male: 202 lb  % Ideal Body Weight, Male (lb): 76.95 lb  BMI (Calculated): 18.9  BMI Grade: 18.5-24.9 - normal  Weight Loss: unintentional  Usual Body Weight (UBW), k.3 kg  % Usual Body Weight: 83.8  % Weight Change From Usual Weight: -16.37 %       Lab/Procedures/Meds    Pertinent Labs Reviewed: reviewed  Pertinent Medications Reviewed: reviewed  Pertinent Medications Comments: IVF, heparin, insulin    Estimated/Assessed Needs    Weight Used For Calorie Calculations: 70.5 kg (155 lb 6.8 oz)  Energy Calorie Requirements (kcal): 1899-3958  Energy Need Method: Kcal/kg(30-35kcal/kg)  Protein Requirements: 85-99g(1.2-1.4g/kg)  Weight Used For Protein Calculations: 70.5 kg (155 lb 6.8 oz)  Fluid Requirements (mL): 1mL/kcal or per MD     RDA Method (mL):          Nutrition Prescription Ordered    Current Diet Order: NPO  Nutrition Order Comments: TF at goal  Current Nutrition Support Formula Ordered: Isosource 1.5  Current Nutrition Support Rate Ordered: 45 (ml)  Current Nutrition Support Frequency Ordered: mL/hr    Evaluation of Received Nutrient/Fluid Intake    Enteral Calories (kcal): 1620  Enteral Protein (gm): 73  Enteral (Free Water) Fluid (mL): 825    % Kcal Needs: 77  % Protein Needs: 86    IV Fluid (mL): 1800  I/O: +2.3L since admit, good UOP, LBM 11/10    Energy Calories Required: not meeting needs  Protein Required: meeting needs  Fluid Required: other (see comments)(per MD)    Tolerance: tolerating    % Intake of Estimated Energy Needs: 75 - 100 %  % Meal Intake: NPO    Nutrition Risk    Level of Risk/Frequency of Follow-up: high(f/u 2x/week)     Assessment and Plan    Severe malnutrition  Malnutrition in the context of Chronic Illness/Injury    Related to (etiology):  St 4 lung adenocarcinoma    Signs and Symptoms (as evidenced by):  Energy Intake: <75% of estimated energy requirement for > 1 month  Body Fat Depletion: severe depletion of orbitals, triceps and thoracic and lumbar region   Muscle Mass  Depletion: severe depletion of temples, clavicle region, scapular region and lower extremities   Weight Loss: 11% x 6 months     Interventions:  Collaboration of care with providers    Nutrition Diagnosis Status:  New             Monitor and Evaluation    Food and Nutrient Intake: energy intake, food and beverage intake, enteral nutrition intake  Food and Nutrient Adminstration: diet order, enteral and parenteral nutrition administration  Anthropometric Measurements: weight, weight change, body mass index  Biochemical Data, Medical Tests and Procedures: electrolyte and renal panel, gastrointestinal profile, glucose/endocrine profile, inflammatory profile, lipid profile  Nutrition-Focused Physical Findings: overall appearance     Malnutrition Assessment  Malnutrition Type: chronic illness          Weight Loss (Malnutrition): greater than 10% in 6 months  Energy Intake (Malnutrition): less than or equal to 75% for greater than or equal to 1 month   Orbital Region (Subcutaneous Fat Loss): severe depletion  Upper Arm Region (Subcutaneous Fat Loss): severe depletion  Thoracic and Lumbar Region: severe depletion   Caodaism Region (Muscle Loss): severe depletion  Clavicle Bone Region (Muscle Loss): severe depletion  Clavicle and Acromion Bone Region (Muscle Loss): severe depletion  Scapular Bone Region (Muscle Loss): severe depletion  Dorsal Hand (Muscle Loss): well nourished  Patellar Region (Muscle Loss): severe depletion  Anterior Thigh Region (Muscle Loss): severe depletion  Posterior Calf Region (Muscle Loss): severe depletion                 Nutrition Follow-Up    RD Follow-up?: Yes

## 2019-11-13 NOTE — PLAN OF CARE
Nutrition assessment completed. Please see RD note for details.    Recommendation/Intervention:   1. Recommend increasing TF goal rate to best meet pt's needs.   Isosource 1.5 @ 60mL/hr.   - Initiate @ 10mL/hr and increase by 10mL/hr q4hrs, or as tolerated, until goal rate is reached.   - Provides 2160kcals, 98g protein and 1100mL free water.     2. If able to advance diet, recommend Regular with texture per SLP recommendations.     RD to monitor.    Goals: Pt to receive nutrition by RD follow up  Nutrition Goal Status: new  Communication of RD Recs: reviewed with RN  Problem: Malnutrition  Goal: Improved Nutritional Intake  Outcome: Ongoing, Progressing

## 2019-11-13 NOTE — ASSESSMENT & PLAN NOTE
Malnutrition in the context of Chronic Illness/Injury    Related to (etiology):  St 4 lung adenocarcinoma    Signs and Symptoms (as evidenced by):  Energy Intake: <75% of estimated energy requirement for > 1 month  Body Fat Depletion: severe depletion of orbitals, triceps and thoracic and lumbar region   Muscle Mass Depletion: severe depletion of temples, clavicle region, scapular region and lower extremities   Weight Loss: 11% x 6 months     Interventions:  Collaboration of care with providers    Nutrition Diagnosis Status:  New

## 2019-11-13 NOTE — PLAN OF CARE
11/13/19 1502   Post-Acute Status   Post-Acute Authorization Placement   Post-Acute Placement Status Family Barriers   Discharge Delays   (Medicaid relapsed-no insurance )     SW reviewed chart for dc planning needs. Pt therapy recs are for rehab, however, Pt let his Medicaid plan lapse and it has not been approved for reinstatement yet. When this is completed, SW will provide a rehab list.     Ila Donnelly LMSW  Neurocritical Care   [facility]  [id number]

## 2019-11-13 NOTE — PLAN OF CARE
Problem: Adult Inpatient Plan of Care  Goal: Plan of Care Review  Outcome: Ongoing (interventions implemented as appropriate)  Plan of care reviewed with patient and family at 1200. All questions and concerns addressed. Education provided throughout shift. No acute events today. See vital signs and assessments in flowsheets. See below for updates on today's progress.      Neuro: AAOx4, Neuro assessments q4h    Pulmonary: 3L NC humidified sattings > 93%. Patient had one episode this afternoon of tachypnea and decreased sats of 88%. O2 increased to 5L NC and mucous plug suctioned from patient's mouth. Patient stated relief of SOB and normal breathing pattern resumed     Cardiovascular: SBP < 180 parameters ordered today, patient remains tachycardic, Coreg 3.125mg started today     Gastrointestinal: Isosource 1.5 TF remains and goal increased to 60mL/hr per rec from dietition. SLP eval today recommends to keep NPO.      Genitourinary: Muse d/c and genevieve cath placed    Heme/Labs: BNP obtained today to verify skewed potassium results    Endocrine: Accu-checks every 6 hours     Skin/Bath: HAPI bundle in place    Date of last CHG bath given: 11/13/2019 overnight     Infusions: NS at 75mL/hr    Plan of Care: HemOc consulted today. Continue neuro checkx q4h. Frequent oral care and breathing treatments and monitor oxygenation status. Keep HOB > 30 degrees to prevent aspiration.

## 2019-11-13 NOTE — PROGRESS NOTES
Patient making wet gurgling noises continuing to sat 93-94% on 5L O2 NC. Patient suctioned and large, thick, tenacious mucous plug removed from back of throat and mouth. Patient was able to clear throat and speech sounded clearer. Patient currently satting 96% on 5L NC. Will continue to monitor closely.

## 2019-11-14 LAB
ALBUMIN SERPL BCP-MCNC: 3.4 G/DL (ref 3.5–5.2)
ALLENS TEST: ABNORMAL
ALP SERPL-CCNC: 63 U/L (ref 55–135)
ALT SERPL W/O P-5'-P-CCNC: 11 U/L (ref 10–44)
ANION GAP SERPL CALC-SCNC: 10 MMOL/L (ref 8–16)
AST SERPL-CCNC: 18 U/L (ref 10–40)
BASOPHILS # BLD AUTO: 0.04 K/UL (ref 0–0.2)
BASOPHILS NFR BLD: 0.4 % (ref 0–1.9)
BILIRUB SERPL-MCNC: 0.8 MG/DL (ref 0.1–1)
BUN SERPL-MCNC: 8 MG/DL (ref 8–23)
CALCIUM SERPL-MCNC: 9.4 MG/DL (ref 8.7–10.5)
CHLORIDE SERPL-SCNC: 108 MMOL/L (ref 95–110)
CO2 SERPL-SCNC: 24 MMOL/L (ref 23–29)
CREAT SERPL-MCNC: 0.7 MG/DL (ref 0.5–1.4)
DELSYS: ABNORMAL
DIFFERENTIAL METHOD: ABNORMAL
EOSINOPHIL # BLD AUTO: 0.1 K/UL (ref 0–0.5)
EOSINOPHIL NFR BLD: 0.8 % (ref 0–8)
ERYTHROCYTE [DISTWIDTH] IN BLOOD BY AUTOMATED COUNT: 18.2 % (ref 11.5–14.5)
EST. GFR  (AFRICAN AMERICAN): >60 ML/MIN/1.73 M^2
EST. GFR  (NON AFRICAN AMERICAN): >60 ML/MIN/1.73 M^2
FIO2: 32
FLOW: 3
GLUCOSE SERPL-MCNC: 93 MG/DL (ref 70–110)
HCO3 UR-SCNC: 23 MMOL/L (ref 24–28)
HCT VFR BLD AUTO: 42.4 % (ref 40–54)
HGB BLD-MCNC: 13.5 G/DL (ref 14–18)
IMM GRANULOCYTES # BLD AUTO: 0.04 K/UL (ref 0–0.04)
IMM GRANULOCYTES NFR BLD AUTO: 0.4 % (ref 0–0.5)
LYMPHOCYTES # BLD AUTO: 1.4 K/UL (ref 1–4.8)
LYMPHOCYTES NFR BLD: 12.1 % (ref 18–48)
MAGNESIUM SERPL-MCNC: 1.8 MG/DL (ref 1.6–2.6)
MCH RBC QN AUTO: 27.6 PG (ref 27–31)
MCHC RBC AUTO-ENTMCNC: 31.8 G/DL (ref 32–36)
MCV RBC AUTO: 87 FL (ref 82–98)
MODE: ABNORMAL
MONOCYTES # BLD AUTO: 0.9 K/UL (ref 0.3–1)
MONOCYTES NFR BLD: 7.6 % (ref 4–15)
NEUTROPHILS # BLD AUTO: 8.9 K/UL (ref 1.8–7.7)
NEUTROPHILS NFR BLD: 78.7 % (ref 38–73)
NRBC BLD-RTO: 0 /100 WBC
PCO2 BLDA: 35 MMHG (ref 35–45)
PH SMN: 7.43 [PH] (ref 7.35–7.45)
PHOSPHATE SERPL-MCNC: 2 MG/DL (ref 2.7–4.5)
PLATELET # BLD AUTO: 294 K/UL (ref 150–350)
PMV BLD AUTO: 10.7 FL (ref 9.2–12.9)
PO2 BLDA: 73 MMHG (ref 80–100)
POC BE: -1 MMOL/L
POC SATURATED O2: 95 % (ref 95–100)
POC TCO2: 24 MMOL/L (ref 23–27)
POCT GLUCOSE: 115 MG/DL (ref 70–110)
POCT GLUCOSE: 91 MG/DL (ref 70–110)
POCT GLUCOSE: 95 MG/DL (ref 70–110)
POTASSIUM SERPL-SCNC: 4.1 MMOL/L (ref 3.5–5.1)
PROT SERPL-MCNC: 7.2 G/DL (ref 6–8.4)
RBC # BLD AUTO: 4.9 M/UL (ref 4.6–6.2)
SAMPLE: ABNORMAL
SITE: ABNORMAL
SODIUM SERPL-SCNC: 142 MMOL/L (ref 136–145)
SP02: 100
WBC # BLD AUTO: 11.31 K/UL (ref 3.9–12.7)

## 2019-11-14 PROCEDURE — 94761 N-INVAS EAR/PLS OXIMETRY MLT: CPT

## 2019-11-14 PROCEDURE — 97535 SELF CARE MNGMENT TRAINING: CPT

## 2019-11-14 PROCEDURE — 63600175 PHARM REV CODE 636 W HCPCS: Performed by: NURSE PRACTITIONER

## 2019-11-14 PROCEDURE — 84100 ASSAY OF PHOSPHORUS: CPT

## 2019-11-14 PROCEDURE — 99232 PR SUBSEQUENT HOSPITAL CARE,LEVL II: ICD-10-PCS | Mod: ,,, | Performed by: PSYCHIATRY & NEUROLOGY

## 2019-11-14 PROCEDURE — 20000000 HC ICU ROOM

## 2019-11-14 PROCEDURE — 82803 BLOOD GASES ANY COMBINATION: CPT

## 2019-11-14 PROCEDURE — 25000003 PHARM REV CODE 250: Performed by: INTERNAL MEDICINE

## 2019-11-14 PROCEDURE — 25000242 PHARM REV CODE 250 ALT 637 W/ HCPCS: Performed by: INTERNAL MEDICINE

## 2019-11-14 PROCEDURE — 99900035 HC TECH TIME PER 15 MIN (STAT)

## 2019-11-14 PROCEDURE — 83735 ASSAY OF MAGNESIUM: CPT

## 2019-11-14 PROCEDURE — 25000242 PHARM REV CODE 250 ALT 637 W/ HCPCS: Performed by: PSYCHIATRY & NEUROLOGY

## 2019-11-14 PROCEDURE — 92526 ORAL FUNCTION THERAPY: CPT

## 2019-11-14 PROCEDURE — 80053 COMPREHEN METABOLIC PANEL: CPT

## 2019-11-14 PROCEDURE — 36600 WITHDRAWAL OF ARTERIAL BLOOD: CPT

## 2019-11-14 PROCEDURE — 97110 THERAPEUTIC EXERCISES: CPT

## 2019-11-14 PROCEDURE — 63600175 PHARM REV CODE 636 W HCPCS: Performed by: INTERNAL MEDICINE

## 2019-11-14 PROCEDURE — 25000003 PHARM REV CODE 250: Performed by: STUDENT IN AN ORGANIZED HEALTH CARE EDUCATION/TRAINING PROGRAM

## 2019-11-14 PROCEDURE — 85025 COMPLETE CBC W/AUTO DIFF WBC: CPT

## 2019-11-14 PROCEDURE — 99900026 HC AIRWAY MAINTENANCE (STAT)

## 2019-11-14 PROCEDURE — 94640 AIRWAY INHALATION TREATMENT: CPT

## 2019-11-14 PROCEDURE — 99232 SBSQ HOSP IP/OBS MODERATE 35: CPT | Mod: ,,, | Performed by: PSYCHIATRY & NEUROLOGY

## 2019-11-14 PROCEDURE — 27000221 HC OXYGEN, UP TO 24 HOURS

## 2019-11-14 PROCEDURE — 82800 BLOOD PH: CPT

## 2019-11-14 PROCEDURE — 97530 THERAPEUTIC ACTIVITIES: CPT

## 2019-11-14 PROCEDURE — 25000003 PHARM REV CODE 250: Performed by: NURSE PRACTITIONER

## 2019-11-14 RX ORDER — LEVETIRACETAM 750 MG/1
750 TABLET ORAL 2 TIMES DAILY
Status: DISCONTINUED | OUTPATIENT
Start: 2019-11-14 | End: 2019-11-21 | Stop reason: HOSPADM

## 2019-11-14 RX ORDER — BUDESONIDE 0.25 MG/2ML
0.25 INHALANT ORAL 2 TIMES DAILY
Status: DISCONTINUED | OUTPATIENT
Start: 2019-11-14 | End: 2019-11-21 | Stop reason: HOSPADM

## 2019-11-14 RX ORDER — IPRATROPIUM BROMIDE AND ALBUTEROL SULFATE 2.5; .5 MG/3ML; MG/3ML
3 SOLUTION RESPIRATORY (INHALATION)
Status: DISCONTINUED | OUTPATIENT
Start: 2019-11-14 | End: 2019-11-21 | Stop reason: HOSPADM

## 2019-11-14 RX ORDER — OLANZAPINE 10 MG/2ML
2.5 INJECTION, POWDER, FOR SOLUTION INTRAMUSCULAR ONCE AS NEEDED
Status: DISCONTINUED | OUTPATIENT
Start: 2019-11-14 | End: 2019-11-21 | Stop reason: HOSPADM

## 2019-11-14 RX ADMIN — HEPARIN SODIUM 5000 UNITS: 5000 INJECTION, SOLUTION INTRAVENOUS; SUBCUTANEOUS at 09:11

## 2019-11-14 RX ADMIN — SODIUM PHOSPHATE, MONOBASIC, MONOHYDRATE 20.01 MMOL: 276; 142 INJECTION, SOLUTION INTRAVENOUS at 10:11

## 2019-11-14 RX ADMIN — MIRTAZAPINE 30 MG: 15 TABLET, ORALLY DISINTEGRATING ORAL at 09:11

## 2019-11-14 RX ADMIN — IPRATROPIUM BROMIDE AND ALBUTEROL SULFATE 3 ML: .5; 3 SOLUTION RESPIRATORY (INHALATION) at 12:11

## 2019-11-14 RX ADMIN — LEVETIRACETAM 750 MG: 750 TABLET, FILM COATED ORAL at 09:11

## 2019-11-14 RX ADMIN — AMLODIPINE BESYLATE 10 MG: 10 TABLET ORAL at 03:11

## 2019-11-14 RX ADMIN — CARVEDILOL 3.12 MG: 3.12 TABLET, FILM COATED ORAL at 09:11

## 2019-11-14 RX ADMIN — DEXTROSE 750 MG: 50 INJECTION, SOLUTION INTRAVENOUS at 08:11

## 2019-11-14 RX ADMIN — IPRATROPIUM BROMIDE AND ALBUTEROL SULFATE 3 ML: .5; 3 SOLUTION RESPIRATORY (INHALATION) at 04:11

## 2019-11-14 RX ADMIN — BUDESONIDE 0.25 MG: 0.25 INHALANT RESPIRATORY (INHALATION) at 08:11

## 2019-11-14 RX ADMIN — HEPARIN SODIUM 5000 UNITS: 5000 INJECTION, SOLUTION INTRAVENOUS; SUBCUTANEOUS at 02:11

## 2019-11-14 RX ADMIN — IPRATROPIUM BROMIDE AND ALBUTEROL SULFATE 3 ML: .5; 3 SOLUTION RESPIRATORY (INHALATION) at 09:11

## 2019-11-14 RX ADMIN — HEPARIN SODIUM 5000 UNITS: 5000 INJECTION, SOLUTION INTRAVENOUS; SUBCUTANEOUS at 05:11

## 2019-11-14 NOTE — PLAN OF CARE
Continue keppra  No plans for radiation per patient wishes  Consult palliative care   Continue IVFs  NPO per slp  Resume TF

## 2019-11-14 NOTE — PROGRESS NOTES
Upon entering pts room, pt sitting up in bed with NGT in hand. Pt still bilaterally restrained. Attempts by 2 RNs to replace NGT, unsuccessful. David Ballard NP notified. Ordered to hold PO meds.

## 2019-11-14 NOTE — PLAN OF CARE
Pt seen for ongoing swallow assessment. Continue NPO at this time with aspiration precautions. Ice chips OK for pleasure joana 3-4 hours. Continue oral hygiene. ST to continue to follow according to POC.     Crystal Corcoran M.S., CCC-SLP  Speech Language Pathologist  [phone] - pager   11/14/2019

## 2019-11-14 NOTE — PT/OT/SLP PROGRESS
Physical Therapy   Progress Note    Patient Name:  Angel Torres  MRN: [id number]  Recent Surgery: * No surgery found *      Recommendations:     Discharge Recommendations: Inpatient Rehabilitation Facility   Equipment recommendations: TBD pending progress  Barriers to discharge: Increased level of skilled assistance required and Fall risk     Assessment:     Angel Torres is a 64 y.o. male admitted to [facility] on 11/10/2019 with medical diagnosis of Status epilepticus. Pt presents with weakness, impaired balance, decreased endurance, impaired cardiopulmonary response to activity, decreased safety awareness, altered gait mechanics and impaired functional mobility . Pt is progressing well; he tolerated activity well today with no adverse effects noted, vital signs stable.  Angel Torres would benefit from continued acute PT intervention to address listed functional deficits, provide patient/caregiver education, reduce fall risk, and maximize (I) and safety with functional mobility. Once medically stable, recommending pt discharge to inpatient rehab facility.     Rehab Prognosis: Good; based on positive indicators including strong caregiver support and potential for functional gains within an intensive rehab program     Plan:     During this hospitalization, patient to be seen 4 x/week to address the identified rehab impairments via gait training, therapeutic activities, therapeutic exercises, neuromuscular re-education and progress towards stated goals.     Plan of Care Expires:  19  Plan of Care reviewed with: patient and spouse    This plan of care has been discussed with the patient/caregiver, who was included in its development and is in agreement with the identified goals and treatment plan.     Subjective     Communicated with RN prior to session.  Patient agreeable to participate. Pt's spouse at bedside.     Patient comments/goals: return home     Pain/Comfort:  · Location: no pain   · Pain Ratin/10   · Pain Rating  Post-Intervention: 0/10     Patients cultural, spiritual, Mandaen conflicts given the current situation: No known conflicts     Objective:     Patient found with: oxygen, peripheral IV , bed alarm, restraints, condom catheter, telemetry, blood pressure cuff, SCD and continuous pulse oximeter    General Precautions: Fall and Standard  Orthopedic Precautions: N/A  Braces: N/A  Oxygen Device: nasal cannula      Cognition:   Pt is alert and able to follow simple commands promptly    Mild impulsivity noted; cueing required for safety with line management     Functional Mobility:    Bed Mobility:  · Supine > Sit: Stand By Assistance  · Sit > Supine: Contact Guard Assistance    Sitting Balance:   · Assistance level: Supervision at EOB   · Able to perform forward reaching outside [place] and across midline with RUE x 3 trials     Transfers:   · Sit <> Stand Transfer: Contact Guard Assistance from EOB x 3 trials with no AD     Standing Balance:   · Pt tolerated 2 trials of static standing x 30 seconds with no UE support, no LOB; SBA provided. Performed marching in place with no UE support, CGA, no LOB.                  Gait:  · Distance: pt performed marching in place x 10 reps with no UE support, CGA; performed trial of patterned gait x 3 steps forward, to L, and to R   · Assistance level: Contact Guard Assistance  · Assistive Device: none  · Gait Assessment: decreased step length, unsteady gait, and narrow base of support    Outcome Measure: AM-PAC 6 CLICK MOBILITY  Total Score:15     Patient/Caregiver Education and Therapeutic Activities/Exercises     Therapeutic activities aimed to:  - increase pt's independence, safety, and efficiency with bed mobility and functional transfers. See above for assistance levels.   - improve activity tolerance for upright/functional activities while seated EOB. Vital signs monitored and remained stable throughout. No SOB or dizziness reported by pt.   - educate pt and spouse on PT POC,  safety with mobility, and therapy recommendations.     Patient/caregiver able to verbalize understanding; will follow-up with pt/caregiver during current admit for additional questions/concerns within scope of practice.     White board updated.     Patient left HOB elevated with all lines intact, call button in reach, bed alarm on, restraints reapplied at end of session, RN notified and spouse present.    Goals:     Multidisciplinary Problems     Physical Therapy Goals        Problem: Physical Therapy Goal    Goal Priority Disciplines Outcome Goal Variances Interventions   Physical Therapy Goal     PT, PT/OT Ongoing, Progressing     Description:  Goals to be met by: 2019    Patient will increase functional independence with mobility by performin. Supine to sit with Modified [place]  2. Sit to supine with Modified [place]  3. Sit to stand transfer with Supervision  4. Gait  x 100 feet with Stand-by Assistance with or without using least restrictive AD.   5. Stand for 3 minutes with Stand-by Assistance while performing dynamic balance activities to reduce fall risk   6. Lower extremity exercise program x15 reps per handout, with assistance as needed                      Time Tracking:       PT Received On: 19  PT Start Time: 1315     PT Stop Time: 1338  PT Total Time (min): 23 min     Billable Minutes: Therapeutic Activity 23 min    Orquidea Shell, PT  2019

## 2019-11-14 NOTE — PROGRESS NOTES
"David Ballard NP called to bedside d/t pt increasingly agitated; pulling lines, climbing out of bed, and trying to hit male RN. Pt repeatedly saying "please help me".  2 RNs at bedside to restrain pt. Pt remains oriented. STAT ABG ordered-blood gases remain stable; EKG ordered; Telesitter ordered. Pt bladder scanned, 230cc in bladder, per JESSICA Ballard ok to straight cath.     0600-pt calm and resting. Bilateral restraints/mitts remain in place.   "

## 2019-11-14 NOTE — PT/OT/SLP PROGRESS
Occupational Therapy   Treatment    Name: Angel Torres  MRN: [id number]  Admitting Diagnosis:  Status epilepticus       Recommendations:     Discharge Recommendations: rehabilitation facility  Discharge Equipment Recommendations:  (TBD)  Barriers to discharge:  (unknown)    Assessment:     Angel Torres is a 64 y.o. male with a medical diagnosis of Status epilepticus.  He presents with improved mobility & focus on activities on this date.  Pt with decreased inattention to the right visually. Performance deficits affecting function are weakness, impaired endurance, impaired self care skills, impaired functional mobilty, impaired balance, visual deficits, impaired cognition, decreased lower extremity function, decreased upper extremity function, decreased coordination, decreased safety awareness.     Rehab Prognosis:  Fair; patient would benefit from acute skilled OT services to address these deficits and reach maximum level of function.       Plan:     Patient to be seen 4 x/week to address the above listed problems via self-care/home management, therapeutic activities, therapeutic exercises, neuromuscular re-education, cognitive retraining, sensory integration  · Plan of Care Expires: 12/13/19  · Plan of Care Reviewed with: patient, family    Subjective     Pain/Comfort:  · Pain Rating 1: 0/10  · Pain Rating Post-Intervention 1: 0/10    Objective:     Communicated with: RN prior to session.  Patient found supine with blood pressure cuff, pulse ox (continuous), restraints, telemetry, peripheral IV, oxygen, Condom Catheter, bed alarm upon OT entry to room. Family present in room.    General Precautions: Standard, fall, contact, NPO(bed bugs)     Occupational Performance:     Bed Mobility:    · Patient completed Supine to Sit with minimum assistance  · Patient completed Sit to Supine with minimum assistance     Functional Mobility/Transfers:  · Patient completed Sit <> Stand Transfer with contact guard assistance  with  no  assistive device     Activities of Daily Living:  · Upper Body Dressing: moderate assistance seated EOB  · Lower Body Dressing: minimum assistance donning socks seated EOB      [facility] 6 Click ADL: 13    Treatment & Education:  Pt performed AROM exercises with BUE in 5 planes x 10 reps each while seated EOB.  Pt followed 3/3 one step commands.  Pt was oriented accurately.  Pt performed cognitive & visual scanning activities while seated EOB.  Pt had no further questions & when asked whether there were any concerns pt reported none.      Patient left supine with all lines intact, call button in reach, bed alarm on, restraints reapplied at end of session, RN notified, family present and white board updated.Education:      GOALS:   Multidisciplinary Problems     Occupational Therapy Goals        Problem: Occupational Therapy Goal    Goal Priority Disciplines Outcome Interventions   Occupational Therapy Goal     OT, PT/OT Ongoing, Progressing    Description:  Goals to be met by: 11/23     Patient will increase functional independence with ADLs by performing:    UE Dressing with SBA.  LE Dressing with SBA.  Grooming while standing with Stand-by Assistance.  Toileting from bedside commode with Moderate Assistance for hygiene and clothing management.   Rolling to Bilateral with Stand-by Assistance.   Supine to sit with Supervision.  Stand pivot transfers with Supervision.                       Time Tracking:     OT Date of Treatment: 11/14/19  OT Start Time: 1141  OT Stop Time: 1213  OT Total Time (min): 32 min    Billable Minutes:Self Care/Home Management 16  Therapeutic Exercise 16    MELLISSA Pollard  11/14/2019

## 2019-11-14 NOTE — PLAN OF CARE
Problem: Physical Therapy Goal  Goal: Physical Therapy Goal  Description  Goals to be met by: 2019    Patient will increase functional independence with mobility by performin. Supine to sit with Modified [place]  2. Sit to supine with Modified [place]  3. Sit to stand transfer with Supervision  4. Gait  x 100 feet with Stand-by Assistance with or without using least restrictive AD.   5. Stand for 3 minutes with Stand-by Assistance while performing dynamic balance activities to reduce fall risk   6. Lower extremity exercise program x15 reps per handout, with assistance as needed     Outcome: Ongoing, Progressing     Goals remain appropriate. Continue current POC, progressing as tolerated.     Orquidea Shell, PT, DPT   2019  Pager: [phone]

## 2019-11-14 NOTE — PLAN OF CARE
Therapy is recommending inpatient rehab for patient, however, patient has no health insurance at this time.  [facility] [place], but per notes, he may not qualify.  Per SLP: Diet recommendations:  NPO, Liquid Diet Level: NPO.  Not medically stable for discharge.     CM will follow for needs.        11/14/19 5124   Discharge Reassessment   Assessment Type Discharge Planning Reassessment   Provided patient/caregiver education on the expected discharge date and the discharge plan Yes   Do you have any problems affording any of your prescribed medications? No   Discharge Plan A Rehab   Discharge Plan B Home with family   DME Needed Upon Discharge    (tbd)   Patient choice form signed by patient/caregiver N/A   Anticipated Discharge Disposition Rehab   Can the patient answer the patient profile reliably? Yes, cognitively intact   How does the patient rate their overall health at the present time? Fair   Describe the patient's ability to walk at the present time. Major restrictions/daily assistance from another person   How often would a person be available to care for the patient? Whenever needed   Number of comorbid conditions (as recorded on the chart) Five or more   During the past month, has the patient often been bothered by feeling down, depressed or hopeless? No   During the past month, has the patient often been bothered by little interest or pleasure in doing things? No   Post-Acute Status   Post-Acute Authorization Placement   Post-Acute Placement Status Awaiting Internal Medical Clearance   Discharge Delays (!) Other  (patient has no insurance )       Latisha Dixon RN, CCRN-K, [facility]  Neuro-Critical Care   [phone]

## 2019-11-14 NOTE — PLAN OF CARE
Problem: Occupational Therapy Goal  Goal: Occupational Therapy Goal  Description  Goals to be met by: 11/23     Patient will increase functional independence with ADLs by performing:    UE Dressing with SBA.  LE Dressing with SBA.  Grooming while standing with Stand-by Assistance.  Toileting from bedside commode with Moderate Assistance for hygiene and clothing management.   Rolling to Bilateral with Stand-by Assistance.   Supine to sit with Supervision.  Stand pivot transfers with Supervision.      Outcome: Ongoing, Progressing       Goals updated

## 2019-11-14 NOTE — PLAN OF CARE
POC reviewed with pt at 0400. Pt verbalized understanding. Questions and concerns addressed. Pt restless overnight; trying to climb out of bed and pulling at lines. Pt pulled NGT, verbal order from NCC team to hold PO meds for now. Muse removed, condom cath in place. IVF continued. SBP < 180, no issues. Pt progressing toward goals. Will continue to monitor. See flowsheets for full assessment and VS info

## 2019-11-14 NOTE — PT/OT/SLP PROGRESS
"Speech Language Pathology Treatment    Patient Name:  Angel Torres   MRN:  [id number]  Admitting Diagnosis: Status epilepticus    Recommendations:                 General Recommendations:  Dysphagia therapy, Speech/language therapy and Cognitive-linguistic therapy  Diet recommendations:  NPO, Liquid Diet Level: NPO   Aspiration Precautions: Continue alternate means of nutrition, Ice chips sparingly, No straws and Strict aspiration precautions     ICE CHIPS FOR PLEASURE ONLY**    Continue to monitor for signs and symptoms of aspiration and discontinue oral feeding should you notice any of the following: watery eyes, reddened facial area, wet vocal quality, increased work of breathing, change in respiratory status, increased congestion, coughing, fever, etc.    General Precautions: Standard, contact, aspiration, fall, NPO(ice chips sparingly every 3-4 hours)  Communication strategies:  go to room if call light pushed    Subjective     "I'm fine."   Patient goals: to eat/drink    Pain/Comfort:  · Pain Rating 1: 0/10  · Pain Rating Post-Intervention 1: 0/10    Objective:     Has the patient been evaluated by SLP for swallowing?   Yes  Keep patient NPO? Yes   Current Respiratory Status:        Pt seen bedside with spouse present for ongoing swallowing assessment. Prior to entering room, SLP communicated with nurse who stated pt pulled NG tube last time. Nurse cleared ST to administer po trials. Pt offered and accepted the following po trials: thin liquids via cup X4, puree X2, and cracker X1/4. Pt demonstrated delayed cough X1 following cracker trials. Of note, as trials progressed, pt's O2 continued to drop into mid 80s. Given drop in sats and delayed coughing, airway compromise is not ruled out and silent aspiration is possible. Once po trials discontinued, pt's O2 ratings remained consistently in high 90s. ST recommending continued NPO at this time with ice chips sparingly for pleasure and frequent oral care. Pt left with " spouse present. SLP communicated with nurse following session.     Assessment:     Angel Torres is a 64 y.o. male with an SLP diagnosis of possible silent aspiration and Dysphagia.      Goals:   Multidisciplinary Problems     SLP Goals        Problem: SLP Goal    Goal Priority Disciplines Outcome   SLP Goal     SLP Ongoing, Progressing   Description:  Speech Language Pathology Goals  Goals expected to be met by 11/20    1.Pt will participate in ongoing assessment of swallow function to help determine least restrictive diet    2. Pt will participate in ongoing assessment of speech language and cognitive linguistic skills to help rule out deficits and determine therapeutic plan of care                       Plan:     · Patient to be seen:  4 x/week   · Plan of Care expires:  12/12/19  · Plan of Care reviewed with:  patient, spouse   · SLP Follow-Up:  Yes       Discharge recommendations:  rehabilitation facility       Time Tracking:     SLP Treatment Date:   11/14/19  Speech Start Time:  0926  Speech Stop Time:  0943     Speech Total Time (min):  17 min    Billable Minutes: Treatment Swallowing Dysfunction 17     Crystal Corcoran M.S., CCC-SLP  Speech Language Pathologist  [phone] - pager   11/14/2019      Crystal Corcoran, MS CCC-SLP  11/14/2019

## 2019-11-15 PROBLEM — Z51.5 PALLIATIVE CARE ENCOUNTER: Status: ACTIVE | Noted: 2019-11-15

## 2019-11-15 LAB
ALBUMIN SERPL BCP-MCNC: 3.4 G/DL (ref 3.5–5.2)
ALP SERPL-CCNC: 57 U/L (ref 55–135)
ALT SERPL W/O P-5'-P-CCNC: 17 U/L (ref 10–44)
ANION GAP SERPL CALC-SCNC: 9 MMOL/L (ref 8–16)
AST SERPL-CCNC: 27 U/L (ref 10–40)
BASOPHILS # BLD AUTO: 0.05 K/UL (ref 0–0.2)
BASOPHILS NFR BLD: 0.4 % (ref 0–1.9)
BILIRUB SERPL-MCNC: 0.8 MG/DL (ref 0.1–1)
BUN SERPL-MCNC: 11 MG/DL (ref 8–23)
CALCIUM SERPL-MCNC: 9.5 MG/DL (ref 8.7–10.5)
CHLORIDE SERPL-SCNC: 104 MMOL/L (ref 95–110)
CO2 SERPL-SCNC: 26 MMOL/L (ref 23–29)
CREAT SERPL-MCNC: 0.8 MG/DL (ref 0.5–1.4)
DIFFERENTIAL METHOD: ABNORMAL
EOSINOPHIL # BLD AUTO: 0.1 K/UL (ref 0–0.5)
EOSINOPHIL NFR BLD: 0.7 % (ref 0–8)
ERYTHROCYTE [DISTWIDTH] IN BLOOD BY AUTOMATED COUNT: 18 % (ref 11.5–14.5)
EST. GFR  (AFRICAN AMERICAN): >60 ML/MIN/1.73 M^2
EST. GFR  (NON AFRICAN AMERICAN): >60 ML/MIN/1.73 M^2
GLUCOSE SERPL-MCNC: 94 MG/DL (ref 70–110)
HCT VFR BLD AUTO: 42.5 % (ref 40–54)
HGB BLD-MCNC: 13.3 G/DL (ref 14–18)
IMM GRANULOCYTES # BLD AUTO: 0.04 K/UL (ref 0–0.04)
IMM GRANULOCYTES NFR BLD AUTO: 0.3 % (ref 0–0.5)
LYMPHOCYTES # BLD AUTO: 1 K/UL (ref 1–4.8)
LYMPHOCYTES NFR BLD: 8.1 % (ref 18–48)
MAGNESIUM SERPL-MCNC: 1.7 MG/DL (ref 1.6–2.6)
MCH RBC QN AUTO: 27.4 PG (ref 27–31)
MCHC RBC AUTO-ENTMCNC: 31.3 G/DL (ref 32–36)
MCV RBC AUTO: 87 FL (ref 82–98)
MONOCYTES # BLD AUTO: 0.9 K/UL (ref 0.3–1)
MONOCYTES NFR BLD: 7.2 % (ref 4–15)
NEUTROPHILS # BLD AUTO: 10 K/UL (ref 1.8–7.7)
NEUTROPHILS NFR BLD: 83.3 % (ref 38–73)
NRBC BLD-RTO: 0 /100 WBC
PHOSPHATE SERPL-MCNC: 3.5 MG/DL (ref 2.7–4.5)
PLATELET # BLD AUTO: 325 K/UL (ref 150–350)
PMV BLD AUTO: 10.1 FL (ref 9.2–12.9)
POCT GLUCOSE: 106 MG/DL (ref 70–110)
POCT GLUCOSE: 108 MG/DL (ref 70–110)
POCT GLUCOSE: 109 MG/DL (ref 70–110)
POCT GLUCOSE: 97 MG/DL (ref 70–110)
POTASSIUM SERPL-SCNC: 4 MMOL/L (ref 3.5–5.1)
PROT SERPL-MCNC: 7.2 G/DL (ref 6–8.4)
RBC # BLD AUTO: 4.86 M/UL (ref 4.6–6.2)
SODIUM SERPL-SCNC: 139 MMOL/L (ref 136–145)
WBC # BLD AUTO: 12.03 K/UL (ref 3.9–12.7)

## 2019-11-15 PROCEDURE — 99252 PR INITIAL INPATIENT CONSULT,LEVL II: ICD-10-PCS | Mod: ,,, | Performed by: INTERNAL MEDICINE

## 2019-11-15 PROCEDURE — 99232 SBSQ HOSP IP/OBS MODERATE 35: CPT | Mod: ,,, | Performed by: PSYCHIATRY & NEUROLOGY

## 2019-11-15 PROCEDURE — 99223 PR INITIAL HOSPITAL CARE,LEVL III: ICD-10-PCS | Mod: ,,, | Performed by: CLINICAL NURSE SPECIALIST

## 2019-11-15 PROCEDURE — 25000003 PHARM REV CODE 250: Performed by: NURSE PRACTITIONER

## 2019-11-15 PROCEDURE — 63600175 PHARM REV CODE 636 W HCPCS: Performed by: INTERNAL MEDICINE

## 2019-11-15 PROCEDURE — 94761 N-INVAS EAR/PLS OXIMETRY MLT: CPT

## 2019-11-15 PROCEDURE — 25000003 PHARM REV CODE 250: Performed by: STUDENT IN AN ORGANIZED HEALTH CARE EDUCATION/TRAINING PROGRAM

## 2019-11-15 PROCEDURE — 20000000 HC ICU ROOM

## 2019-11-15 PROCEDURE — 99252 IP/OBS CONSLTJ NEW/EST SF 35: CPT | Mod: ,,, | Performed by: INTERNAL MEDICINE

## 2019-11-15 PROCEDURE — 92526 ORAL FUNCTION THERAPY: CPT

## 2019-11-15 PROCEDURE — 25000242 PHARM REV CODE 250 ALT 637 W/ HCPCS: Performed by: PSYCHIATRY & NEUROLOGY

## 2019-11-15 PROCEDURE — 84100 ASSAY OF PHOSPHORUS: CPT

## 2019-11-15 PROCEDURE — 85025 COMPLETE CBC W/AUTO DIFF WBC: CPT

## 2019-11-15 PROCEDURE — 99232 PR SUBSEQUENT HOSPITAL CARE,LEVL II: ICD-10-PCS | Mod: ,,, | Performed by: PSYCHIATRY & NEUROLOGY

## 2019-11-15 PROCEDURE — 27000221 HC OXYGEN, UP TO 24 HOURS

## 2019-11-15 PROCEDURE — 99223 1ST HOSP IP/OBS HIGH 75: CPT | Mod: ,,, | Performed by: CLINICAL NURSE SPECIALIST

## 2019-11-15 PROCEDURE — 25000003 PHARM REV CODE 250: Performed by: INTERNAL MEDICINE

## 2019-11-15 PROCEDURE — 83735 ASSAY OF MAGNESIUM: CPT

## 2019-11-15 PROCEDURE — 25000003 PHARM REV CODE 250: Performed by: PSYCHIATRY & NEUROLOGY

## 2019-11-15 PROCEDURE — 97535 SELF CARE MNGMENT TRAINING: CPT

## 2019-11-15 PROCEDURE — 80053 COMPREHEN METABOLIC PANEL: CPT

## 2019-11-15 PROCEDURE — 94640 AIRWAY INHALATION TREATMENT: CPT

## 2019-11-15 RX ADMIN — CARVEDILOL 3.12 MG: 3.12 TABLET, FILM COATED ORAL at 09:11

## 2019-11-15 RX ADMIN — SENNOSIDES AND DOCUSATE SODIUM 1 TABLET: 8.6; 5 TABLET ORAL at 09:11

## 2019-11-15 RX ADMIN — BUDESONIDE 0.25 MG: 0.25 INHALANT RESPIRATORY (INHALATION) at 08:11

## 2019-11-15 RX ADMIN — LEVETIRACETAM 750 MG: 750 TABLET, FILM COATED ORAL at 08:11

## 2019-11-15 RX ADMIN — HEPARIN SODIUM 5000 UNITS: 5000 INJECTION, SOLUTION INTRAVENOUS; SUBCUTANEOUS at 06:11

## 2019-11-15 RX ADMIN — CARVEDILOL 3.12 MG: 3.12 TABLET, FILM COATED ORAL at 08:11

## 2019-11-15 RX ADMIN — MIRTAZAPINE 30 MG: 15 TABLET, ORALLY DISINTEGRATING ORAL at 08:11

## 2019-11-15 RX ADMIN — AMLODIPINE BESYLATE 10 MG: 10 TABLET ORAL at 09:11

## 2019-11-15 RX ADMIN — HEPARIN SODIUM 5000 UNITS: 5000 INJECTION, SOLUTION INTRAVENOUS; SUBCUTANEOUS at 02:11

## 2019-11-15 RX ADMIN — MAGNESIUM SULFATE IN WATER 2 G: 40 INJECTION, SOLUTION INTRAVENOUS at 06:11

## 2019-11-15 RX ADMIN — LEVETIRACETAM 750 MG: 750 TABLET, FILM COATED ORAL at 09:11

## 2019-11-15 RX ADMIN — POLYETHYLENE GLYCOL 3350 17 G: 17 POWDER, FOR SOLUTION ORAL at 09:11

## 2019-11-15 RX ADMIN — HEPARIN SODIUM 5000 UNITS: 5000 INJECTION, SOLUTION INTRAVENOUS; SUBCUTANEOUS at 10:11

## 2019-11-15 RX ADMIN — IPRATROPIUM BROMIDE AND ALBUTEROL SULFATE 3 ML: .5; 3 SOLUTION RESPIRATORY (INHALATION) at 08:11

## 2019-11-15 NOTE — CONSULTS
[facility]  Palliative Medicine  Consult Note    Patient Name: Angel Torres  MRN: [id number]  Admission Date: 11/10/2019  Hospital Length of Stay: 4 days  Code Status: Full Code   Attending Provider: Gee Dutton MD  Consulting Provider: TRESSA Eastman  Primary Care Physician: Lc Beltran MD  Principal Problem:Status epilepticus    Patient information was obtained from patient, spouse/SO and ER records.      Inpatient consult to Palliative Care  Consult performed by: Gabby Lawson, CNS  Consult ordered by: Xavier De La Torre MD        Assessment/Plan:     Palliative care encounter  Impression: Pt is a 63 y/o male with Mr. 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  Pt on Keppra. Pt has NG tube in place. Pt is NPO.  Pt is alert, oriented to person, place, and situation. Pt is Knik. Pt is full code.     Reason for consult: Goal of care/hospice. Spoke to MD Huber with NCC and Dr. Dutton. Pt hospice appropriate.     Goals of care/advance care planning: Met with pt and pt's significant other, Diamond Gil. Pt reports Diamond is his wife. Pt has adult children. On child lives with pt and significant other.   Diamond and pt verbalize that NCC team has spoken to them about comfort care/hospice care. Per Diamond, pt declined whole brain XRT. Per pt, he wants to go home. Per Diamond, plan is home hospice care but she wants to speak to Hem/Onc MDs prior to going home. Per Diamond, pt will either go back to his house or sister's house to help with care needs. Family will be able to provide 24 hour care at home.   Hospice education done with pt and Diamond. Spoke to pt and Diamond about pleasure feeds with hospice care.     Code status discussion: Pt is a full code. Spoke to Pt and Diamond about code status. Pt would not want to be on vent or have CPR but he and  Diamond wants to speak to his children about before making decisions.     Symptom management:     Pt denies pain/anxiety. Pt reports he does not like NG tube in place.     Secretions:  Pt has audible secretions noted. Bedside nurse suctioning.   Consider glycopyrrolate 0.2 mg tid prn secretions.        Spoke to Dr. Reyes and NCC team about above conversation and that significant other waiting to speak to Oncology team.     Plan/Recs:  Pt home hospice appropriate.   Spoke to BARBARA Lanier and JESUSITA Sinclair case manager about plan of care.   Code status discussed. Pt and Significant other to speak to pt's adult children.   See above for secretion rec.   Will follow.               Thank you for your consult. I will follow-up with patient. Please contact us if you have any additional questions.    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 Patient was watching the football game earlier in the evening and had no issues during the day. Later in the evening he was somnolent and his daughter noted that he had a seizure and she called EMS. Per chart review, patient was brought to the ED by EMS who were called for a seizure-like activity. All history was obtained from ED chart as patient was intubated and sedated on propofol on admission. Patient was obtunded when EMS arrived on the scene and they witnessed a generalized tonic clonic seizure lasting 20 seconds which was abated with IV Versed 10mg stat. Patient was intubated in the ED and had elevated blood pressure but otherwise stable vital signs. He was placed on mechanical ventilation and sedated with propofol. Video EEG ordered and patient was admitted to Neuro Critical care unit for higher level of care. Patient has no known history of seizures, no known history of metastasis to brain and no previous imaging. Patient had a CT scan  done in the ED prior to admission in Neuro ICU. Upon evaluation in the ED patient was infested with bed bugs crawling all over him. Patient was placed on contact isolation immediately and  as well as charge nurse notified.       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    Skin infection 8/8/2018       Past Surgical History:   Procedure Laterality Date    INGUINAL HERNIA REPAIR Bilateral     KNEE SURGERY         Review of patient's allergies indicates:  No Known Allergies    Medications:  Continuous Infusions:   sodium chloride 0.9% 75 mL/hr at 11/15/19 1000     Scheduled Meds:   amLODIPine  10 mg Per NG tube Daily    budesonide  0.25 mg Nebulization BID    carvedilol  3.125 mg Per NG tube BID    heparin (porcine)  5,000 Units Subcutaneous Q8H    levETIRAcetam  750 mg Per NG tube BID    mirtazapine  30 mg Per NG tube Nightly    polyethylene glycol  17 g Per NG tube Daily    senna-docusate 8.6-50 mg  1 tablet Per NG tube Daily     PRN Meds:albuterol-ipratropium, calcium gluconate IVPB, calcium gluconate IVPB, calcium gluconate IVPB, Dextrose 10% Bolus, glucagon (human recombinant), insulin aspart U-100, magnesium sulfate IVPB, magnesium sulfate IVPB, OLANZapine, potassium chloride in water **AND** potassium chloride in water **AND** potassium chloride in water, sodium chloride 0.9%, sodium phosphate IVPB, sodium phosphate IVPB, sodium phosphate IVPB    Family History     Problem Relation (Age of Onset)    Cancer Mother, Sister, Maternal  Grandfather    No Known Problems Father, Brother, Maternal Aunt, Maternal Uncle, Paternal Aunt, Paternal Uncle, Maternal Grandmother, Paternal Grandmother, Paternal Grandfather        Tobacco Use    Smoking status: Former Smoker     Packs/day: 2.00     Years: 40.00     Pack years: 80.00     Last attempt to quit: 2013     Years since quittin.0    Smokeless tobacco: Former User   Substance and Sexual Activity    Alcohol use: No    Drug use: No    Sexual activity: Yes     Partners: Female       Review of Systems   Constitutional: Positive for activity change and fatigue.   HENT: Positive for hearing loss and trouble swallowing.    Respiratory: Negative for shortness of breath.    Skin: Positive for wound.   Neurological: Positive for weakness.     Objective:     Vital Signs (Most Recent):  Temp: 98.7 °F (37.1 °C) (11/15/19 0705)  Pulse: 109 (11/15/19 1000)  Resp: (!) 27 (11/15/19 1000)  BP: 117/78 (11/15/19 1000)  SpO2: 96 % (11/15/19 1000) Vital Signs (24h Range):  Temp:  [98.3 °F (36.8 °C)-98.7 °F (37.1 °C)] 98.7 °F (37.1 °C)  Pulse:  [108-126] 109  Resp:  [19-48] 27  SpO2:  [95 %-100 %] 96 %  BP: (117-181)/() 117/78     Weight: 70.5 kg (155 lb 6.8 oz)  Body mass index is 18.92 kg/m².    Review of Symptoms  Symptom Assessment (ESAS 0-10 scale)   ESAS 0 1 2 3 4 5 6 7 8 9 10   Pain              Dyspnea              Anxiety              Nausea              Depression               Anorexia              Fatigue              Insomnia              Restlessness               Agitation              CAM / Delirium __ --  ___+   Constipation     __ --  ___+   Diarrhea           __ --  ___+  Bowel Management Plan (BMP): Yes    Pain Assessment: Pt reports not pain.         Performance Status: 10    ECOG Performance Status Grade: 4 - Completely disabled    Physical Exam   Constitutional: He is oriented to person, place, and time. Nasal cannula in place.   HENT:   Head: Normocephalic and atraumatic.    Pulmonary/Chest: Effort normal.   Abdominal:   NG tube in place with tube feeds.    Neurological: He is alert and oriented to person, place, and time.   Skin: Skin is warm and dry.   Sores noted on arms        Significant Labs: All pertinent labs within the past 24 hours have been reviewed.  CBC:   Recent Labs   Lab 11/15/19  0426   WBC 12.03   HGB 13.3*   HCT 42.5   MCV 87        BMP:  Recent Labs   Lab 11/15/19  0426   GLU 94      K 4.0      CO2 26   BUN 11   CREATININE 0.8   CALCIUM 9.5   MG 1.7     LFT:  Lab Results   Component Value Date    AST 27 11/15/2019    ALKPHOS 57 11/15/2019    BILITOT 0.8 11/15/2019     Albumin:   Albumin   Date Value Ref Range Status   11/15/2019 3.4 (L) 3.5 - 5.2 g/dL Final     Protein:   Total Protein   Date Value Ref Range Status   11/15/2019 7.2 6.0 - 8.4 g/dL Final     Lactic acid:   Lab Results   Component Value Date    LACTATE >12.0 (HH) 11/10/2019    LACTATE 1.0 01/05/2017       Significant Imaging: I have reviewed all pertinent imaging results/findings within the past 24 hours.    Advance Care Planning   Advanced Directives::  Living Will: No  LaPOST: No  Do Not Resuscitate Status: No  Medical Power of : Pt reports, Diamond Gil is his wife. Pt has adult children.     Decision-Making Capacity: Patient answered questions/Diamond Gil       Living Arrangements: Lives with spouse, Lives with family    Psychosocial/Cultural:  Pt was a truck-. 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 Pt has adult children. One of pt's sons lives with pt and Diamond Gil.     Spiritual: yes    F- Payton and Belief: Juan Carlos    I - Importance: yes  .  C - Community: yes    A - Address in Care: Pt's  has visited. Will have  see.       > 50% of 70 min visit spent in chart review, face to face discussion of goals of care,  symptom assessment, coordination of care and emotional support.    Gabby Lawson,  CNS  Palliative Medicine  [facility]

## 2019-11-15 NOTE — SUBJECTIVE & OBJECTIVE
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    Skin infection 8/8/2018       Past Surgical History:   Procedure Laterality Date    INGUINAL HERNIA REPAIR Bilateral     KNEE SURGERY         Review of patient's allergies indicates:  No Known Allergies    Medications:  Continuous Infusions:   sodium chloride 0.9% 75 mL/hr at 11/15/19 1000     Scheduled Meds:   amLODIPine  10 mg Per NG tube Daily    budesonide  0.25 mg Nebulization BID    carvedilol  3.125 mg Per NG tube BID    heparin (porcine)  5,000 Units Subcutaneous Q8H    levETIRAcetam  750 mg Per NG tube BID    mirtazapine  30 mg Per NG tube Nightly    polyethylene glycol  17 g Per NG tube Daily    senna-docusate 8.6-50 mg  1 tablet Per NG tube Daily     PRN Meds:albuterol-ipratropium, calcium gluconate IVPB, calcium gluconate IVPB, calcium gluconate IVPB, Dextrose 10% Bolus, glucagon (human recombinant), insulin aspart U-100, magnesium sulfate IVPB, magnesium sulfate IVPB, OLANZapine, potassium chloride in water **AND** potassium chloride in water **AND** potassium chloride in water, sodium chloride 0.9%, sodium phosphate IVPB, sodium phosphate IVPB, sodium phosphate IVPB    Family History     Problem Relation (Age of Onset)    Cancer Mother, Sister, Maternal Grandfather    No Known Problems Father, Brother, Maternal Aunt, Maternal Uncle, Paternal Aunt, Paternal Uncle, Maternal Grandmother, Paternal Grandmother, Paternal Grandfather        Tobacco Use    Smoking status: Former Smoker     Packs/day: 2.00     Years: 40.00     Pack years: 80.00     Last  attempt to quit: 2013     Years since quittin.0    Smokeless tobacco: Former User   Substance and Sexual Activity    Alcohol use: No    Drug use: No    Sexual activity: Yes     Partners: Female       Review of Systems   Constitutional: Positive for activity change and fatigue.   HENT: Positive for hearing loss and trouble swallowing.    Respiratory: Negative for shortness of breath.    Skin: Positive for wound.   Neurological: Positive for weakness.     Objective:     Vital Signs (Most Recent):  Temp: 98.7 °F (37.1 °C) (11/15/19 0705)  Pulse: 109 (11/15/19 1000)  Resp: (!) 27 (11/15/19 1000)  BP: 117/78 (11/15/19 1000)  SpO2: 96 % (11/15/19 1000) Vital Signs (24h Range):  Temp:  [98.3 °F (36.8 °C)-98.7 °F (37.1 °C)] 98.7 °F (37.1 °C)  Pulse:  [108-126] 109  Resp:  [19-48] 27  SpO2:  [95 %-100 %] 96 %  BP: (117-181)/() 117/78     Weight: 70.5 kg (155 lb 6.8 oz)  Body mass index is 18.92 kg/m².    Review of Symptoms  Symptom Assessment (ESAS 0-10 scale)   ESAS 0 1 2 3 4 5 6 7 8 9 10   Pain              Dyspnea              Anxiety              Nausea              Depression               Anorexia              Fatigue              Insomnia              Restlessness               Agitation              CAM / Delirium __ --  ___+   Constipation     __ --  ___+   Diarrhea           __ --  ___+  Bowel Management Plan (BMP): Yes    Pain Assessment: Pt reports not pain.         Performance Status: 10    ECOG Performance Status Grade: 4 - Completely disabled    Physical Exam   Constitutional: He is oriented to person, place, and time. Nasal cannula in place.   HENT:   Head: Normocephalic and atraumatic.   Pulmonary/Chest: Effort normal.   Abdominal:   NG tube in place with tube feeds.    Neurological: He is alert and oriented to person, place, and time.   Skin: Skin is warm and dry.   Sores noted on arms        Significant Labs: All pertinent labs within the past 24 hours have been reviewed.  CBC:   Recent  Labs   Lab 11/15/19  0426   WBC 12.03   HGB 13.3*   HCT 42.5   MCV 87        BMP:  Recent Labs   Lab 11/15/19  0426   GLU 94      K 4.0      CO2 26   BUN 11   CREATININE 0.8   CALCIUM 9.5   MG 1.7     LFT:  Lab Results   Component Value Date    AST 27 11/15/2019    ALKPHOS 57 11/15/2019    BILITOT 0.8 11/15/2019     Albumin:   Albumin   Date Value Ref Range Status   11/15/2019 3.4 (L) 3.5 - 5.2 g/dL Final     Protein:   Total Protein   Date Value Ref Range Status   11/15/2019 7.2 6.0 - 8.4 g/dL Final     Lactic acid:   Lab Results   Component Value Date    LACTATE >12.0 (HH) 11/10/2019    LACTATE 1.0 01/05/2017       Significant Imaging: I have reviewed all pertinent imaging results/findings within the past 24 hours.    Advance Care Planning   Advanced Directives::  Living Will: No  LaPOST: No  Do Not Resuscitate Status: No  Medical Power of : Pt reports, Diamond Gil is his wife. Pt has adult children.     Decision-Making Capacity: Patient answered questions/Diamond Gil       Living Arrangements: Lives with spouse, Lives with family    Psychosocial/Cultural:  Pt was a truck-. 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 Pt has adult children. One of pt's sons lives with pt and Diamond Gil.     Spiritual: yes    F- Payton and Belief: Juan Carlos    I - Importance: yes  .  C - Community: yes    A - Address in Care: Pt's  has visited. Will have  see.

## 2019-11-15 NOTE — PLAN OF CARE
POC reviewed with pt and family at 1600. Pt verbalized understanding. Pt's family verbalized understanding. Questions and concerns addressed. Palliative and oncology teams spoke with patient today. Will continue to monitor. See flowsheets for full assessment and VS info.

## 2019-11-15 NOTE — ASSESSMENT & PLAN NOTE
" Seizures/Epilepsy Monitoring Evaluation:  - Continue current management.  - Consult Epilepsy service  - Seizure Precautions  - Off EEG monitoring   - Patient loaded with IV Kepppra 1000mg stat then Continue AEDs Keppra 500mg BID  - Pending recommendations per Epilepsy Service  -MRI brain-Multiple scattered small to punctate size foci of abnormal parenchymal enhancement throughout the brain parenchyma as detailed above.  In light of history concerning for parenchymal metastases largest within the left parietal lobe  -11/13: radiation oncology consulted-per rad onc" the patient and his family declined whole brain radiotherapy and would like to continue best supportive care and symptom management in the setting of Mr. Torres poor performance status"  - Consult made to palliative care for hospice planning and GOC  -Continue Keppra 750 BID  - NPO per SLP, can give feeds through NG  "

## 2019-11-15 NOTE — PT/OT/SLP PROGRESS
"Speech Language Pathology Treatment    Patient Name:  Angel Torres   MRN:  [id number]  Admitting Diagnosis: Status epilepticus    Recommendations:                 General Recommendations:  ongoing swallowing assessment; ongoing speech/language/cognitive assessment  Diet recommendations:  Pleasure Feeding(thin liquids and purees via tsp) (SLP is unable to fully r/o aspiration at the bedside, though MBSS to r/o aspiration does not appear to be appropriate at this time. These recommendations for oral itnake may be considered from a quality of life standpoint.)  Aspiration Precautions: 1 bite/sip at a time, Alternating bites/sips, Avoid talking while eating, Eliminate distractions, Feed only when awake/alert, Frequent oral care, HOB to 90 degrees, Ice chips sparingly, Monitor for s/s of aspiration, Remain upright 30 minutes post meal, Small bites/sips and Strict aspiration precautions   General Precautions: Standard, contact, fall(pleasure feeds)  Communication strategies:  speak with increased volume and relatively close proximity to pt 2/2 Kiana    Subjective     "You got it." pt stated after SLP suctioned oropharynx to clear secretions following spontaneous wet cough.     Pain/Comfort:  · Pain Rating 1: 0/10    Objective:     Has the patient been evaluated by SLP for swallowing?   Yes  Keep patient NPO? No(pleasure feeds)   Current Respiratory Status: nasal cannula(96-97%)      Pt seen for ongoing swallowing assessment while sitting upright in bed. Wife present at bedside.  Pt with spontaneous wet cough, which was followed by suctioning with [place]er prior to and during PO trials.  Vocal quality was dry despite presence of wet cough.  Pt accepted the following PO trials: ice chip x 1, 1/2 tsp x 1, full tsp x 6, cup sip x 1, and 1/2 tsp applesauce x 1 and full tsp applesauce x 4.  Multiple uncoordinated swallows followed by overt wet coughing present after cup sip of thin water.  Delayed cough was present after 1 out of 6 " full tsp trials of thin water, but difficult to determine if this cough was related to PO or not.  No coughing related to PO trials observed after pureed trials.  Education was provided to pt and wife regarding recommendations for pt to advance to teaspoon sips of thin liquid and teaspoon bites of purees for pleasure/comfort.  SLP explained that the team may wish to decide whether to follow these recommendations after decisions are made regarding the direction of pt's treatment/goals of care.  Understanding was expressed, though continued reinforcement will be beneficial.     Assessment:     Angel Torres is a 64 y.o. male with an SLP diagnosis of Dysphagia.      Goals:   Multidisciplinary Problems     SLP Goals        Problem: SLP Goal    Goal Priority Disciplines Outcome   SLP Goal     SLP Ongoing, Progressing   Description:  Speech Language Pathology Goals  Goals expected to be met by 11/20    1.Pt will participate in ongoing assessment of swallow function to help determine least restrictive diet    2. Pt will participate in ongoing assessment of speech language and cognitive linguistic skills to help rule out deficits and determine therapeutic plan of care                       Plan:     · Patient to be seen:  4 x/week   · Plan of Care expires:  12/12/19  · Plan of Care reviewed with:  patient, spouse   · SLP Follow-Up:  Yes       Discharge recommendations:  (tbd pending progress and plan for GOC)     Time Tracking:     SLP Treatment Date:   11/15/19  Speech Start Time:  1336  Speech Stop Time:  1357     Speech Total Time (min):  21 min    Billable Minutes: Treatment Swallowing Dysfunction 13 and Seld Care/Home Management Training 8    HENRIETTA Herrera, CCC-SLP  11/15/2019     HENRIETTA Herrera, CCC-SLP  Speech Language Pathologist  [phone]  11/15/2019

## 2019-11-15 NOTE — PLAN OF CARE
POC reviewed with pt and pt's daughter at 1400. Pt and pt's daughter verbalized understanding. Questions and concerns addressed. No acute events today. NG tube replaced this AM.  Pt progressing toward goals. Will continue to monitor. See flowsheets for full assessment and VS info.

## 2019-11-15 NOTE — SUBJECTIVE & OBJECTIVE
Pt agitated overnight. Placed in restraints. Calmed on his own after    Review of Systems    Review of Symptoms:  Constitutional: Denies fevers, weight loss, chills, or weakness.  Eyes: Denies changes in vision.  ENT: Denies dysphagia, nasal discharge, ear pain or discharge.  Cardiovascular: Denies chest pain, palpitations, orthopnea, or claudication.  Respiratory: Denies shortness of breath, cough, hemoptysis, or wheezing.  GI: Denies nausea/vomitting, hematochezia, melena, abd pain, or changes in appetite.  : Denies dysuria, incontinence, or hematuria.  Musculoskeletal: Denies joint pain or myalgias.  Skin/breast: Denies rashes, lumps, lesions, or discharge.  Neurologic: Denies headache, dizziness, vertigo, or paresthesias.  Psychiatric: Denies changes in mood or hallucinations.  Endocrine: Denies polyuria, polydipsia, heat/cold intolerance.  Hematologic/Lymph: Denies lymphadenopathy, easy bruising or easy bleeding.  Allergic/Immunologic: Denies rash, rhinitis.   Objective:     Vitals:  Temp: 98.3 °F (36.8 °C)  Pulse: (!) 115  Rhythm: sinus tachycardia  BP: (!) 150/93  MAP (mmHg): 114  Resp: (!) 28  SpO2: 100 %  Oxygen Concentration (%): 32  O2 Device (Oxygen Therapy): nasal cannula    Temp  Min: 98.3 °F (36.8 °C)  Max: 99.2 °F (37.3 °C)  Pulse  Min: 108  Max: 128  BP  Min: 115/93  Max: 172/108  MAP (mmHg)  Min: 102  Max: 133  Resp  Min: 15  Max: 51  SpO2  Min: 93 %  Max: 100 %  Oxygen Concentration (%)  Min: 32  Max: 32    11/13 0701 - 11/14 0700  In: 2775 [I.V.:1800]  Out: 1725 [Urine:1725]   Unmeasured Output  Stool Occurrence: 1  Pad Count: 1       Physical Exam    GA: Alert, comfortable, no acute distress.   HEENT: No scleral icterus or JVD.   Pulmonary: Clear to auscultation A/L. No wheezing, crackles, or rhonchi.  Cardiac: RRR S1 & S2 w/o rubs/murmurs/gallops.   Abdominal: Bowel sounds present x 4. No appreciable hepatosplenomegaly.  Skin: No jaundice, rashes, or visible lesions.  Neuro:  --GCS: E4 V5  M6  --Mental Status:  A&Ox, follows all commands, delayed responses at times, mildly slurred  --Pupils 3->2mm, PERRL.   --Corneal reflex, gag, cough intact.  --ANGELA spont and against gravity    Medications:  Continuous    sodium chloride 0.9% Last Rate: 75 mL/hr at 11/14/19 1800   Scheduled    amLODIPine 10 mg Daily   budesonide 0.25 mg BID   carvedilol 3.125 mg BID   heparin (porcine) 5,000 Units Q8H   levETIRAcetam 750 mg BID   mirtazapine 30 mg Nightly   polyethylene glycol 17 g Daily   senna-docusate 8.6-50 mg 1 tablet Daily   PRN    albuterol-ipratropium 3 mL PRN   calcium gluconate IVPB 1 g PRN   calcium gluconate IVPB 1 g PRN   calcium gluconate IVPB 1 g PRN   Dextrose 10% Bolus 12.5 g PRN   glucagon (human recombinant) 1 mg PRN   insulin aspart U-100 1-10 Units Q6H PRN   magnesium sulfate IVPB 2 g PRN   magnesium sulfate IVPB 4 g PRN   OLANZapine 2.5 mg Once PRN   potassium chloride in water 40 mEq PRN   And     potassium chloride in water 40 mEq PRN   And     potassium chloride in water 40 mEq PRN   sodium chloride 0.9% 10 mL PRN   sodium phosphate IVPB 15 mmol PRN   sodium phosphate IVPB 20.01 mmol PRN   sodium phosphate IVPB 30 mmol PRN     Today I personally reviewed pertinent medications, lines/drains/airways, imaging, laboratory results,    Diet  No diet orders on file

## 2019-11-15 NOTE — PLAN OF CARE
Problem: SLP Goal  Goal: SLP Goal  Description  Speech Language Pathology Goals  Goals expected to be met by 11/20    1.Pt will participate in ongoing assessment of swallow function to help determine least restrictive diet    2. Pt will participate in ongoing assessment of speech language and cognitive linguistic skills to help rule out deficits and determine therapeutic plan of care      Outcome: Ongoing, Progressing  Pt appears able to tolerate teaspoon sips of thin liquid and tsp bites of puree for pleasure/comfort.  Will continue ongoing assessment.  Once family and team make decision regarding goals of care, SLP would recommend proceeding with pleasure/comfort feeds of thin liquids and purees via tsp.   HENRIETTA Herrera, CCC-SLP  Speech Language Pathologist  [phone]  11/15/2019

## 2019-11-15 NOTE — ASSESSMENT & PLAN NOTE
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  Pt on Keppra. Pt has NG tube in place. Pt is NPO.  Pt is alert, oriented to person, place, and situation. Pt is [place]. Pt is full code.     Reason for consult: Goal of care/hospice. Spoke to MD Huber with NCC and Dr. Dutton. Pt hospice appropriate.     Goals of care/advance care planning: Met with pt and pt's significant other, Diamond Gil. Pt reports Diamond is his wife. Pt has adult children. On child lives with pt and significant other.   Diamond and pt verbalize that NCC team has spoken to them about comfort care/hospice care. Per Diamond, pt declined whole brain XRT. Per pt, he wants to go home. Per Diamond, plan is home hospice care but she wants to speak to Hem/Onc MDs prior to going home. Per Diamond, pt will either go back to his house or sister's house to help with care needs. Family will be able to provide 24 hour care at home.   Hospice education done with pt and Diamond. Spoke to pt and Diamond about pleasure feeds with hospice care.     Code status discussion: Pt is a full code. Spoke to Pt and Diamond about code status. Pt would not want to be on vent or have CPR but he and Diamond wants to speak to his children about before making decisions.     Symptom management:     Pt denies pain/anxiety. Pt reports he does not like NG tube in place.     Secretions:  Pt has audible secretions noted. Bedside nurse suctioning.   Consider glycopyrrolate 0.2 mg tid prn secretions.        Spoke to Dr. Reyes and NCC team about above conversation and that significant other waiting to speak to Oncology team.     Plan/Recs:  Pt home hospice appropriate.   Spoke to BARBARA Lanier and JESUSITA Sinclair case manager about plan of care.   Code status discussed. Pt and Significant other to speak to pt's adult children.    See above for secretion rec.   Will follow.

## 2019-11-15 NOTE — PROGRESS NOTES
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 Patient was watching the football game earlier in the evening and had no issues during the day. Later in the evening he was somnolent and his daughter noted that he had a seizure and she called EMS. Patient was brought to the ED by EMS who were called for a seizure-like activity. All history was obtained from ED chart as patient was intubated and sedated on propofol on admission. Patient was obtunded when EMS arrived on the scene and they witnessed a generalized tonic clonic seizure lasting 20 seconds which was abated with IV Versed 10mg stat. Patient was intubated in the ED and had elevated blood pressure but otherwise stable vital signs. He was placed on mechanical ventilation and sedated with propofol. Video EEG ordered and patient was admitted to Neuro Critical care unit for higher level of care. Patient has no known history of seizures, no known history of metastasis to brain and no previous imaging. Patient had a CT scan done in the ED prior to admission in Neuro ICU. Upon evaluation in the ED patient was infested with bed bugs crawling all over him. Patient was placed on contact isolation immediately and  as well as charge nurse notified.    Hospital Course: 11/11/2019: Patient admitted to Neuro ICU for status epilepticus. Patient intubated, sedated with propofol and video EEG monitoring.  11/12/2019: pt extubated today-on NC  11/13/19: consult radiation oncology, d/c phillip cath, repeat BMP, SBP <180, start coreg BID  11/14/19: Pt agitated overnight. Placed in restraints. Eventually calmed on his  own    Pt agitated overnight. Placed in restraints. Calmed on his own after    Review of Systems    Review of Symptoms:  Constitutional: Denies fevers, weight loss, chills, or weakness.  Eyes: Denies changes in vision.  ENT: Denies dysphagia, nasal discharge, ear pain or discharge.  Cardiovascular: Denies chest pain, palpitations, orthopnea, or claudication.  Respiratory: Denies shortness of breath, cough, hemoptysis, or wheezing.  GI: Denies nausea/vomitting, hematochezia, melena, abd pain, or changes in appetite.  : Denies dysuria, incontinence, or hematuria.  Musculoskeletal: Denies joint pain or myalgias.  Skin/breast: Denies rashes, lumps, lesions, or discharge.  Neurologic: Denies headache, dizziness, vertigo, or paresthesias.  Psychiatric: Denies changes in mood or hallucinations.  Endocrine: Denies polyuria, polydipsia, heat/cold intolerance.  Hematologic/Lymph: Denies lymphadenopathy, easy bruising or easy bleeding.  Allergic/Immunologic: Denies rash, rhinitis.   Objective:     Vitals:  Temp: 98.3 °F (36.8 °C)  Pulse: (!) 115  Rhythm: sinus tachycardia  BP: (!) 150/93  MAP (mmHg): 114  Resp: (!) 28  SpO2: 100 %  Oxygen Concentration (%): 32  O2 Device (Oxygen Therapy): nasal cannula    Temp  Min: 98.3 °F (36.8 °C)  Max: 99.2 °F (37.3 °C)  Pulse  Min: 108  Max: 128  BP  Min: 115/93  Max: 172/108  MAP (mmHg)  Min: 102  Max: 133  Resp  Min: 15  Max: 51  SpO2  Min: 93 %  Max: 100 %  Oxygen Concentration (%)  Min: 32  Max: 32    11/13 0701 - 11/14 0700  In: 2775 [I.V.:1800]  Out: 1725 [Urine:1725]   Unmeasured Output  Stool Occurrence: 1  Pad Count: 1       Physical Exam    GA: Alert, comfortable, no acute distress.   HEENT: No scleral icterus or JVD.   Pulmonary: Clear to auscultation A/L. No wheezing, crackles, or rhonchi.  Cardiac: RRR S1 & S2 w/o rubs/murmurs/gallops.   Abdominal: Bowel sounds present x 4. No appreciable hepatosplenomegaly.  Skin: No jaundice, rashes, or visible lesions.  Neuro:  --GCS: E4  "V5 M6  --Mental Status:  A&Ox, follows all commands, delayed responses at times, mildly slurred  --Pupils 3->2mm, PERRL.   --Corneal reflex, gag, cough intact.  --ANGELA spont and against gravity    Medications:  Continuous    sodium chloride 0.9% Last Rate: 75 mL/hr at 11/14/19 1800   Scheduled    amLODIPine 10 mg Daily   budesonide 0.25 mg BID   carvedilol 3.125 mg BID   heparin (porcine) 5,000 Units Q8H   levETIRAcetam 750 mg BID   mirtazapine 30 mg Nightly   polyethylene glycol 17 g Daily   senna-docusate 8.6-50 mg 1 tablet Daily   PRN    albuterol-ipratropium 3 mL PRN   calcium gluconate IVPB 1 g PRN   calcium gluconate IVPB 1 g PRN   calcium gluconate IVPB 1 g PRN   Dextrose 10% Bolus 12.5 g PRN   glucagon (human recombinant) 1 mg PRN   insulin aspart U-100 1-10 Units Q6H PRN   magnesium sulfate IVPB 2 g PRN   magnesium sulfate IVPB 4 g PRN   OLANZapine 2.5 mg Once PRN   potassium chloride in water 40 mEq PRN   And     potassium chloride in water 40 mEq PRN   And     potassium chloride in water 40 mEq PRN   sodium chloride 0.9% 10 mL PRN   sodium phosphate IVPB 15 mmol PRN   sodium phosphate IVPB 20.01 mmol PRN   sodium phosphate IVPB 30 mmol PRN     Today I personally reviewed pertinent medications, lines/drains/airways, imaging, laboratory results,    Diet  No diet orders on file      Assessment/Plan:     Neuro  * Status epilepticus   Seizures/Epilepsy Monitoring Evaluation:  - Continue current management.  - Consult Epilepsy service  - Seizure Precautions  - Off EEG monitoring   - Patient loaded with IV Kepppra 1000mg stat then Continue AEDs Keppra 500mg BID  - Pending recommendations per Epilepsy Service  -MRI brain-Multiple scattered small to punctate size foci of abnormal parenchymal enhancement throughout the brain parenchyma as detailed above.  In light of history concerning for parenchymal metastases largest within the left parietal lobe  -11/13: radiation oncology consulted-per rad onc" the patient and " "his family declined whole brain radiotherapy and would like to continue best supportive care and symptom management in the setting of Mr. Torres poor performance status"  - Consult made to palliative care for hospice planning and GOC  -Continue Keppra 750 BID  - NPO per SLP, can give feeds through NG    Derm  Infestation by bed bug   -Contact isolation and standard precautions    -Gown, cap, gloves, tie patient's clothing in tight plastic bag and shoe booties etc   -Patient should be showered or bathed  Or changed into hospital-laundered clothing and moved to an isolated room   -Close the old room and Notify  immediately and infection control    -Place all patient's clothes and belongings in a sealed plastic bag for 24 hours    Pulmonary  Acute respiratory failure with hypoxia  Hx of lung Ca  Pt on NC  Dup nebs q 4 h  CXR today-Continued right pleural effusion and adjacent lung base consolidation/atelectasis.  Minimal consolidation now present in left upper lobe    Cardiac/Vascular  Essential hypertension  -180  Continue home antihypertensive Amlodipine 10mg daily  Add Coreg 3.125 BID    Oncology  Brain metastases  See Status Epilepticus    Secondary adenocarcinoma of bone  Continue present mgt per Heme-onc  Dronabinol 5mg AC  F/U CT abdomen, pelvis     Primary cancer of right middle lobe of lung  Patient with right middle lobe lung cancer stage IV with mets to bone on chemotherapy  Likely mets to the brain  Per pt wishes, will not pursue radiation therapy  Consult palliative care for hospice              Activity Orders          None        Full Code    Xavier De La Torre MD  Neurocritical Care  [facility]    "

## 2019-11-15 NOTE — ASSESSMENT & PLAN NOTE
Patient with right middle lobe lung cancer stage IV with mets to bone on chemotherapy  Likely mets to the brain  Per pt wishes, will not pursue radiation therapy  Consult palliative care for hospice

## 2019-11-15 NOTE — CONSULTS
Consult Note    Inpatient consult to Hematology/Oncology  Consult performed by: Maury Machuca MD  Consult ordered by: Xavier De La Torre MD        SUBJECTIVE:     History of Present Illness:  Angel Torres is a 64 y.o. male patient of Dr. Fabian with stage IV non small cell lung cancer who was most recently receiving third line treatment with Docetaxel and Ramucirumab. He had been on checkpoint inhibitor treatment in the past, this was stopped due to disease progression. He presented to the hospital in status epilepticus and was admitted to neuro ICU. MRI brain revealed multiple scattered parenchymal lesions. He was seen by radiation oncology who offered him whole brain radiation, patient and family declined this.      Review of patient's allergies indicates:  No Known Allergies  Past Medical History:   Diagnosis Date    Chemotherapy follow-up examination 9/7/2016    Chemotherapy follow-up examination 12/6/2017    COPD (chronic obstructive pulmonary disease)     Decreased appetite 11/8/2017    Hearing loss     Hypertension 2/9/2015    Primary cancer of right middle lobe of lung 7/11/2016    Rash 9/7/2016    Secondary adenocarcinoma of bone 7/26/2017    Skin infection 8/8/2018     Past Surgical History:   Procedure Laterality Date    INGUINAL HERNIA REPAIR Bilateral     KNEE SURGERY       Family History   Problem Relation Age of Onset    Cancer Mother         lung, breast    Cancer Sister         lung    Cancer Maternal Grandfather     No Known Problems Father     No Known Problems Brother     No Known Problems Maternal Aunt     No Known Problems Maternal Uncle     No Known Problems Paternal Aunt     No Known Problems Paternal Uncle     No Known Problems Maternal Grandmother     No Known Problems Paternal Grandmother     No Known Problems Paternal Grandfather     Amblyopia Neg Hx     Blindness Neg Hx     Cataracts Neg Hx     Diabetes Neg Hx     Glaucoma Neg Hx     Hypertension Neg Hx      Macular degeneration Neg Hx     Retinal detachment Neg Hx     Strabismus Neg Hx     Stroke Neg Hx     Thyroid disease Neg Hx      Social History     Tobacco Use    Smoking status: Former Smoker     Packs/day: 2.00     Years: 40.00     Pack years: 80.00     Last attempt to quit: 2013     Years since quittin.0    Smokeless tobacco: Former User   Substance Use Topics    Alcohol use: No    Drug use: No     Review of Systems   Unable to perform ROS: Mental status change     OBJECTIVE:     Vital Signs:  Temp:  [98.2 °F (36.8 °C)-98.7 °F (37.1 °C)]   Pulse:  [106-119]   Resp:  [22-40]   BP: (116-143)/(68-97)   SpO2:  [95 %-98 %]     Physical Exam   Constitutional:   Frail appearing male, somewhat responsive    Eyes: Right eye exhibits no discharge. Left eye exhibits no discharge. No scleral icterus.   Neck: No JVD present.   Cardiovascular: Normal rate and regular rhythm.   Pulmonary/Chest: Effort normal. No respiratory distress.   Abdominal: Soft. He exhibits no distension.   Neurological: He is alert.   Nursing note and vitals reviewed.    Laboratory:  CBC:   Recent Labs   Lab 11/15/19  0426   WBC 12.03   RBC 4.86   HGB 13.3*   HCT 42.5      MCV 87   MCH 27.4   MCHC 31.3*     CMP:   Recent Labs   Lab 11/15/19  0426   GLU 94   CALCIUM 9.5   ALBUMIN 3.4*   PROT 7.2      K 4.0   CO2 26      BUN 11   CREATININE 0.8   ALKPHOS 57   ALT 17   AST 27   BILITOT 0.8       Diagnostic Results:  Ct Head Without Contrast    Result Date: 2019  EXAMINATION: CT HEAD WITHOUT CONTRAST CLINICAL HISTORY: Seizures new or progressive;Hx metastatic lung CA; TECHNIQUE: Low dose axial images were obtained through the head.  Coronal and sagittal reformations were also performed. Contrast was not administered. COMPARISON: None. FINDINGS: The brain parenchyma appears normal for age with good corticomedullary differentiation.  There is no evidence of acute infarct, hemorrhage, or mass.  The ventricular  system is normal in size.  No mass-effect or midline shift.  There are no abnormal extra-axial fluid collections.  The paranasal sinuses and mastoid air cells are clear.  The calvarium appears intact.  .     No acute intracranial abnormalities Electronically signed by: Noman Rasmussen MD Date:    11/11/2019 Time:    02:47    Mri Brain W Wo Contrast    Result Date: 11/11/2019  EXAMINATION: MRI BRAIN W WO CONTRAST CLINICAL HISTORY: hx of lung ca, new onset seizure, r/o mets;. TECHNIQUE: Multiplanar multisequence MR imaging of the brain was performed before and after the administration of 8 mL Gadavist intravenous contrast. COMPARISON: CT head without contrast 11/11/2019 MRI brain with and without contrast 06/28/2017. FINDINGS: Intracranial compartment: Ventricles and sulci are stable in size without evidence of hydrocephalus.  There is no restricted diffusion to suggest acute infarction. There are multiple small to punctate size regions of intraparenchymal enhancement concerning for metastatic disease.  The largest of these measures 1.5 x 1.0 cm as seen on series 13, image 130 in the left parietal lobe.  Additional foci are seen in the left temporal lobe, midbrain, and right frontal and parietal lobes, as seen on series 13 images 58, 85, 103, 113, and 137.  There are additional scattered punctate foci of enhancement visible only on a single slice image that are indeterminate and may represent artifact, however additional foci of metastatic disease cannot be excluded.  No significant mass effect associated with these foci with subtle corresponding T2 flair signal hyperintensity associated with several lesions most prominent within the left posterior parietal lesion. Normal vascular flow voids are preserved. This report was flagged in Epic as abnormal.     Multiple scattered small to punctate size foci of abnormal parenchymal enhancement throughout the brain parenchyma as detailed above.  In light of history concerning  for parenchymal metastases largest within the left parietal lobe.  No significant mass effect or corresponding hemorrhage. Clinical correlation and follow-up advised Electronically signed by resident: Rayshawn Platt Date:    11/11/2019 Time:    15:52 Electronically signed by: Ezequiel Cordero DO       ASSESSMENT/PLAN:   Angel Torres is a 64 y.o. male with metastatic non small cell lung cancer most recently on third line Docetaxel and Ramucirumab. Unfortunately he progressed with new brain mets. He declined whole brain radiation which is reasonable. I had a long discussion with him and his family at the bedside. I explained that he has progressed through multiple lines of therapy. I also explained that his poor performance status precludes him from enrolling in clinical trials. I broached the option of hospice care. Patient and family appeared to be agreeable to this.     Appreciate palliative care consult.       Patient will be discussed with attending Dr. Machuca.       ATTENDING NOTE, ONCOLOGY XONSULT TEAM    As above.  Patient seen and examined, chart reviewed.  Appears comfortable, in NAD, unable to coomunicate.  Lungs have scattered ronchi  Abdomen is soft, nontender.  Labs reviewed.    PLAN  He is no longer a candidate for chemotherapy.  His prognosis is poor, and a referral to hospice would be appropriate.  Please reconsult prn of there are any specific questions you would like our Service to address.  We will sign off for now.    Maury Machuca MD

## 2019-11-15 NOTE — HPI
Pt is a 64 year old AA male with history of stage IV lung adenocarcinoma metastatic to bone who presented to the ED with altered mental status after a seizure. Patient was watching the football game earlier in the evening and had no issues during the day. Later in the evening he was somnolent and his daughter noted that he had a seizure and she called EMS. Per chart review, patient was brought to the ED by EMS who were called for a seizure-like activity. All history was obtained from ED chart as patient was intubated and sedated on propofol on admission. Patient was obtunded when EMS arrived on the scene and they witnessed a generalized tonic clonic seizure lasting 20 seconds which was abated with IV Versed 10mg stat. Patient was intubated in the ED and had elevated blood pressure but otherwise stable vital signs. He was placed on mechanical ventilation and sedated with propofol. Video EEG ordered and patient was admitted to Neuro Critical care unit for higher level of care. Patient has no known history of seizures, no known history of metastasis to brain and no previous imaging. Patient had a CT scan done in the ED prior to admission in Neuro ICU. Upon evaluation in the ED patient was infested with bed bugs crawling all over him. Patient was placed on contact isolation immediately and  as well as charge nurse notified.

## 2019-11-16 LAB
ALBUMIN SERPL BCP-MCNC: 2.9 G/DL (ref 3.5–5.2)
ALP SERPL-CCNC: 53 U/L (ref 55–135)
ALT SERPL W/O P-5'-P-CCNC: 13 U/L (ref 10–44)
ANION GAP SERPL CALC-SCNC: 9 MMOL/L (ref 8–16)
AST SERPL-CCNC: 17 U/L (ref 10–40)
BACTERIA BLD CULT: NORMAL
BACTERIA BLD CULT: NORMAL
BASOPHILS # BLD AUTO: 0.03 K/UL (ref 0–0.2)
BASOPHILS NFR BLD: 0.3 % (ref 0–1.9)
BILIRUB SERPL-MCNC: 0.5 MG/DL (ref 0.1–1)
BUN SERPL-MCNC: 15 MG/DL (ref 8–23)
CALCIUM SERPL-MCNC: 9 MG/DL (ref 8.7–10.5)
CHLORIDE SERPL-SCNC: 104 MMOL/L (ref 95–110)
CO2 SERPL-SCNC: 25 MMOL/L (ref 23–29)
CREAT SERPL-MCNC: 0.8 MG/DL (ref 0.5–1.4)
DIFFERENTIAL METHOD: ABNORMAL
EOSINOPHIL # BLD AUTO: 0 K/UL (ref 0–0.5)
EOSINOPHIL NFR BLD: 0.3 % (ref 0–8)
ERYTHROCYTE [DISTWIDTH] IN BLOOD BY AUTOMATED COUNT: 17.5 % (ref 11.5–14.5)
EST. GFR  (AFRICAN AMERICAN): >60 ML/MIN/1.73 M^2
EST. GFR  (NON AFRICAN AMERICAN): >60 ML/MIN/1.73 M^2
GLUCOSE SERPL-MCNC: 114 MG/DL (ref 70–110)
HCT VFR BLD AUTO: 38.9 % (ref 40–54)
HGB BLD-MCNC: 12.3 G/DL (ref 14–18)
IMM GRANULOCYTES # BLD AUTO: 0.04 K/UL (ref 0–0.04)
IMM GRANULOCYTES NFR BLD AUTO: 0.4 % (ref 0–0.5)
LYMPHOCYTES # BLD AUTO: 0.8 K/UL (ref 1–4.8)
LYMPHOCYTES NFR BLD: 7.8 % (ref 18–48)
MAGNESIUM SERPL-MCNC: 2 MG/DL (ref 1.6–2.6)
MCH RBC QN AUTO: 27.8 PG (ref 27–31)
MCHC RBC AUTO-ENTMCNC: 31.6 G/DL (ref 32–36)
MCV RBC AUTO: 88 FL (ref 82–98)
MONOCYTES # BLD AUTO: 0.9 K/UL (ref 0.3–1)
MONOCYTES NFR BLD: 8.7 % (ref 4–15)
NEUTROPHILS # BLD AUTO: 8.5 K/UL (ref 1.8–7.7)
NEUTROPHILS NFR BLD: 82.5 % (ref 38–73)
NRBC BLD-RTO: 0 /100 WBC
PHOSPHATE SERPL-MCNC: 3.8 MG/DL (ref 2.7–4.5)
PLATELET # BLD AUTO: 269 K/UL (ref 150–350)
PMV BLD AUTO: 10.1 FL (ref 9.2–12.9)
POCT GLUCOSE: 102 MG/DL (ref 70–110)
POCT GLUCOSE: 112 MG/DL (ref 70–110)
POTASSIUM SERPL-SCNC: 4.1 MMOL/L (ref 3.5–5.1)
PROT SERPL-MCNC: 6.3 G/DL (ref 6–8.4)
RBC # BLD AUTO: 4.42 M/UL (ref 4.6–6.2)
SODIUM SERPL-SCNC: 138 MMOL/L (ref 136–145)
WBC # BLD AUTO: 10.25 K/UL (ref 3.9–12.7)

## 2019-11-16 PROCEDURE — 84100 ASSAY OF PHOSPHORUS: CPT

## 2019-11-16 PROCEDURE — 94640 AIRWAY INHALATION TREATMENT: CPT

## 2019-11-16 PROCEDURE — 63600175 PHARM REV CODE 636 W HCPCS: Performed by: INTERNAL MEDICINE

## 2019-11-16 PROCEDURE — 85025 COMPLETE CBC W/AUTO DIFF WBC: CPT

## 2019-11-16 PROCEDURE — 25000003 PHARM REV CODE 250: Performed by: NURSE PRACTITIONER

## 2019-11-16 PROCEDURE — 25000003 PHARM REV CODE 250: Performed by: STUDENT IN AN ORGANIZED HEALTH CARE EDUCATION/TRAINING PROGRAM

## 2019-11-16 PROCEDURE — 31720 CLEARANCE OF AIRWAYS: CPT

## 2019-11-16 PROCEDURE — 27000221 HC OXYGEN, UP TO 24 HOURS

## 2019-11-16 PROCEDURE — 63600175 PHARM REV CODE 636 W HCPCS: Performed by: NURSE PRACTITIONER

## 2019-11-16 PROCEDURE — 25000242 PHARM REV CODE 250 ALT 637 W/ HCPCS: Performed by: PSYCHIATRY & NEUROLOGY

## 2019-11-16 PROCEDURE — 94761 N-INVAS EAR/PLS OXIMETRY MLT: CPT

## 2019-11-16 PROCEDURE — 99232 SBSQ HOSP IP/OBS MODERATE 35: CPT | Mod: ,,, | Performed by: PSYCHIATRY & NEUROLOGY

## 2019-11-16 PROCEDURE — 83735 ASSAY OF MAGNESIUM: CPT

## 2019-11-16 PROCEDURE — 11000001 HC ACUTE MED/SURG PRIVATE ROOM

## 2019-11-16 PROCEDURE — 25000003 PHARM REV CODE 250: Performed by: PSYCHIATRY & NEUROLOGY

## 2019-11-16 PROCEDURE — 25000003 PHARM REV CODE 250: Performed by: INTERNAL MEDICINE

## 2019-11-16 PROCEDURE — 99232 PR SUBSEQUENT HOSPITAL CARE,LEVL II: ICD-10-PCS | Mod: ,,, | Performed by: PSYCHIATRY & NEUROLOGY

## 2019-11-16 PROCEDURE — 80053 COMPREHEN METABOLIC PANEL: CPT

## 2019-11-16 PROCEDURE — 36415 COLL VENOUS BLD VENIPUNCTURE: CPT

## 2019-11-16 RX ORDER — ACETAMINOPHEN 325 MG/1
650 TABLET ORAL EVERY 6 HOURS PRN
Status: DISCONTINUED | OUTPATIENT
Start: 2019-11-16 | End: 2019-11-21 | Stop reason: HOSPADM

## 2019-11-16 RX ADMIN — SENNOSIDES AND DOCUSATE SODIUM 1 TABLET: 8.6; 5 TABLET ORAL at 09:11

## 2019-11-16 RX ADMIN — SODIUM CHLORIDE: 0.9 INJECTION, SOLUTION INTRAVENOUS at 11:11

## 2019-11-16 RX ADMIN — IPRATROPIUM BROMIDE AND ALBUTEROL SULFATE 3 ML: .5; 3 SOLUTION RESPIRATORY (INHALATION) at 12:11

## 2019-11-16 RX ADMIN — CARVEDILOL 3.12 MG: 3.12 TABLET, FILM COATED ORAL at 09:11

## 2019-11-16 RX ADMIN — BUDESONIDE 0.25 MG: 0.25 INHALANT RESPIRATORY (INHALATION) at 11:11

## 2019-11-16 RX ADMIN — LEVETIRACETAM 750 MG: 750 TABLET, FILM COATED ORAL at 09:11

## 2019-11-16 RX ADMIN — AMLODIPINE BESYLATE 10 MG: 10 TABLET ORAL at 09:11

## 2019-11-16 RX ADMIN — HEPARIN SODIUM 5000 UNITS: 5000 INJECTION, SOLUTION INTRAVENOUS; SUBCUTANEOUS at 10:11

## 2019-11-16 RX ADMIN — ACETAMINOPHEN 650 MG: 325 TABLET ORAL at 12:11

## 2019-11-16 RX ADMIN — POLYETHYLENE GLYCOL 3350 17 G: 17 POWDER, FOR SOLUTION ORAL at 09:11

## 2019-11-16 RX ADMIN — MIRTAZAPINE 30 MG: 15 TABLET, ORALLY DISINTEGRATING ORAL at 09:11

## 2019-11-16 RX ADMIN — IPRATROPIUM BROMIDE AND ALBUTEROL SULFATE 3 ML: .5; 3 SOLUTION RESPIRATORY (INHALATION) at 05:11

## 2019-11-16 RX ADMIN — BUDESONIDE 0.25 MG: 0.25 INHALANT RESPIRATORY (INHALATION) at 09:11

## 2019-11-16 RX ADMIN — HEPARIN SODIUM 5000 UNITS: 5000 INJECTION, SOLUTION INTRAVENOUS; SUBCUTANEOUS at 03:11

## 2019-11-16 RX ADMIN — HEPARIN SODIUM 5000 UNITS: 5000 INJECTION, SOLUTION INTRAVENOUS; SUBCUTANEOUS at 05:11

## 2019-11-16 NOTE — SUBJECTIVE & OBJECTIVE
PACO    Review of Systems    Review of Symptoms:  Constitutional: Denies fevers, weight loss, chills, or weakness.  Eyes: Denies changes in vision.  ENT: Denies dysphagia, nasal discharge, ear pain or discharge.  Cardiovascular: Denies chest pain, palpitations, orthopnea, or claudication.  Respiratory: Denies shortness of breath, cough, hemoptysis, or wheezing.  GI: Denies nausea/vomitting, hematochezia, melena, abd pain, or changes in appetite.  : Denies dysuria, incontinence, or hematuria.  Musculoskeletal: Denies joint pain or myalgias.  Skin/breast: Denies rashes, lumps, lesions, or discharge.  Neurologic: Denies headache, dizziness, vertigo, or paresthesias.  Psychiatric: Denies changes in mood or hallucinations.  Endocrine: Denies polyuria, polydipsia, heat/cold intolerance.  Hematologic/Lymph: Denies lymphadenopathy, easy bruising or easy bleeding.  Allergic/Immunologic: Denies rash, rhinitis.   Objective:     Vitals:  Temp: 98.5 °F (36.9 °C)  Pulse: (!) 113  Rhythm: sinus tachycardia  BP: (!) 143/85  MAP (mmHg): 108  Resp: (!) 22  SpO2: 96 %  O2 Device (Oxygen Therapy): nasal cannula w/ humidification    Temp  Min: 98.2 °F (36.8 °C)  Max: 98.7 °F (37.1 °C)  Pulse  Min: 106  Max: 126  BP  Min: 116/73  Max: 181/84  MAP (mmHg)  Min: 87  Max: 130  Resp  Min: 19  Max: 48  SpO2  Min: 95 %  Max: 100 %    11/14 0701 - 11/15 0700  In: 2736.3 [I.V.:1936.3]  Out: 1680 [Urine:1680]   Unmeasured Output  Stool Occurrence: 1  Pad Count: 1       Physical Exam    GA: Alert, comfortable, no acute distress.   HEENT: No scleral icterus or JVD.   Pulmonary: Clear to auscultation A/L. No wheezing, crackles, or rhonchi.  Cardiac: RRR S1 & S2 w/o rubs/murmurs/gallops.   Abdominal: Bowel sounds present x 4. No appreciable hepatosplenomegaly.  Skin: No jaundice, rashes, or visible lesions.  Neuro:  --GCS: E4 V5 M6  --Mental Status:  A&Ox, follows all commands, delayed responses at times, mildly slurred  --Pupils 3->2mm, PERRL.    --Corneal reflex, gag, cough intact.  --ANGELA spont and against gravity    Medications:  Continuous    sodium chloride 0.9% Last Rate: 75 mL/hr at 11/15/19 1800   Scheduled    amLODIPine 10 mg Daily   budesonide 0.25 mg BID   carvedilol 3.125 mg BID   heparin (porcine) 5,000 Units Q8H   levETIRAcetam 750 mg BID   mirtazapine 30 mg Nightly   polyethylene glycol 17 g Daily   senna-docusate 8.6-50 mg 1 tablet Daily   PRN    albuterol-ipratropium 3 mL PRN   calcium gluconate IVPB 1 g PRN   calcium gluconate IVPB 1 g PRN   calcium gluconate IVPB 1 g PRN   Dextrose 10% Bolus 12.5 g PRN   glucagon (human recombinant) 1 mg PRN   insulin aspart U-100 1-10 Units Q6H PRN   magnesium sulfate IVPB 2 g PRN   magnesium sulfate IVPB 4 g PRN   OLANZapine 2.5 mg Once PRN   potassium chloride in water 40 mEq PRN   And     potassium chloride in water 40 mEq PRN   And     potassium chloride in water 40 mEq PRN   sodium chloride 0.9% 10 mL PRN   sodium phosphate IVPB 15 mmol PRN   sodium phosphate IVPB 20.01 mmol PRN   sodium phosphate IVPB 30 mmol PRN     Today I personally reviewed pertinent medications, lines/drains/airways, imaging, laboratory results,    Diet  No diet orders on file

## 2019-11-16 NOTE — PROGRESS NOTES
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 Patient was watching the football game earlier in the evening and had no issues during the day. Later in the evening he was somnolent and his daughter noted that he had a seizure and she called EMS. Patient was brought to the ED by EMS who were called for a seizure-like activity. All history was obtained from ED chart as patient was intubated and sedated on propofol on admission. Patient was obtunded when EMS arrived on the scene and they witnessed a generalized tonic clonic seizure lasting 20 seconds which was abated with IV Versed 10mg stat. Patient was intubated in the ED and had elevated blood pressure but otherwise stable vital signs. He was placed on mechanical ventilation and sedated with propofol. Video EEG ordered and patient was admitted to Neuro Critical care unit for higher level of care. Patient has no known history of seizures, no known history of metastasis to brain and no previous imaging. Patient had a CT scan done in the ED prior to admission in Neuro ICU. Upon evaluation in the ED patient was infested with bed bugs crawling all over him. Patient was placed on contact isolation immediately and  as well as charge nurse notified.    Hospital Course: 11/11/2019: Patient admitted to Neuro ICU for status epilepticus. Patient intubated, sedated with propofol and video EEG monitoring.  11/12/2019: pt extubated today-on NC  11/13/19: consult radiation oncology, d/c phillip cath, repeat BMP, SBP <180, start coreg BID  11/14/19: Pt agitated overnight. Placed in restraints. Eventually calmed on his  own  11/15/19: PACO. Palliative on board for GOC and possible hospice    PACO    Review of Systems    Review of Symptoms:  Constitutional: Denies fevers, weight loss, chills, or weakness.  Eyes: Denies changes in vision.  ENT: Denies dysphagia, nasal discharge, ear pain or discharge.  Cardiovascular: Denies chest pain, palpitations, orthopnea, or claudication.  Respiratory: Denies shortness of breath, cough, hemoptysis, or wheezing.  GI: Denies nausea/vomitting, hematochezia, melena, abd pain, or changes in appetite.  : Denies dysuria, incontinence, or hematuria.  Musculoskeletal: Denies joint pain or myalgias.  Skin/breast: Denies rashes, lumps, lesions, or discharge.  Neurologic: Denies headache, dizziness, vertigo, or paresthesias.  Psychiatric: Denies changes in mood or hallucinations.  Endocrine: Denies polyuria, polydipsia, heat/cold intolerance.  Hematologic/Lymph: Denies lymphadenopathy, easy bruising or easy bleeding.  Allergic/Immunologic: Denies rash, rhinitis.   Objective:     Vitals:  Temp: 98.5 °F (36.9 °C)  Pulse: (!) 113  Rhythm: sinus tachycardia  BP: (!) 143/85  MAP (mmHg): 108  Resp: (!) 22  SpO2: 96 %  O2 Device (Oxygen Therapy): nasal cannula w/ humidification    Temp  Min: 98.2 °F (36.8 °C)  Max: 98.7 °F (37.1 °C)  Pulse  Min: 106  Max: 126  BP  Min: 116/73  Max: 181/84  MAP (mmHg)  Min: 87  Max: 130  Resp  Min: 19  Max: 48  SpO2  Min: 95 %  Max: 100 %    11/14 0701 - 11/15 0700  In: 2736.3 [I.V.:1936.3]  Out: 1680 [Urine:1680]   Unmeasured Output  Stool Occurrence: 1  Pad Count: 1       Physical Exam    GA: Alert, comfortable, no acute distress.   HEENT: No scleral icterus or JVD.   Pulmonary: Clear to auscultation A/L. No wheezing, crackles, or rhonchi.  Cardiac: RRR S1 & S2 w/o rubs/murmurs/gallops.   Abdominal: Bowel sounds present x 4. No appreciable hepatosplenomegaly.  Skin: No jaundice, rashes, or visible lesions.  Neuro:  --GCS: E4 V5 M6  --Mental Status:  A&Ox, follows all  "commands, delayed responses at times, mildly slurred  --Pupils 3->2mm, PERRL.   --Corneal reflex, gag, cough intact.  --ANGELA spont and against gravity    Medications:  Continuous    sodium chloride 0.9% Last Rate: 75 mL/hr at 11/15/19 1800   Scheduled    amLODIPine 10 mg Daily   budesonide 0.25 mg BID   carvedilol 3.125 mg BID   heparin (porcine) 5,000 Units Q8H   levETIRAcetam 750 mg BID   mirtazapine 30 mg Nightly   polyethylene glycol 17 g Daily   senna-docusate 8.6-50 mg 1 tablet Daily   PRN    albuterol-ipratropium 3 mL PRN   calcium gluconate IVPB 1 g PRN   calcium gluconate IVPB 1 g PRN   calcium gluconate IVPB 1 g PRN   Dextrose 10% Bolus 12.5 g PRN   glucagon (human recombinant) 1 mg PRN   insulin aspart U-100 1-10 Units Q6H PRN   magnesium sulfate IVPB 2 g PRN   magnesium sulfate IVPB 4 g PRN   OLANZapine 2.5 mg Once PRN   potassium chloride in water 40 mEq PRN   And     potassium chloride in water 40 mEq PRN   And     potassium chloride in water 40 mEq PRN   sodium chloride 0.9% 10 mL PRN   sodium phosphate IVPB 15 mmol PRN   sodium phosphate IVPB 20.01 mmol PRN   sodium phosphate IVPB 30 mmol PRN     Today I personally reviewed pertinent medications, lines/drains/airways, imaging, laboratory results,    Diet  No diet orders on file      Assessment/Plan:     Neuro  * Status epilepticus   Seizures/Epilepsy Monitoring Evaluation:  - Continue current management.  - Consult Epilepsy service  - Seizure Precautions  - Off EEG monitoring   - Patient loaded with IV Kepppra 1000mg stat then Continue AEDs Keppra 500mg BID  - Pending recommendations per Epilepsy Service  -MRI brain-Multiple scattered small to punctate size foci of abnormal parenchymal enhancement throughout the brain parenchyma as detailed above.  In light of history concerning for parenchymal metastases largest within the left parietal lobe  -11/13: radiation oncology consulted-per rad onc" the patient and his family declined whole brain " "radiotherapy and would like to continue best supportive care and symptom management in the setting of Mr. Torres poor performance status"  - Consult made to palliative care for hospice planning and GOC  -Continue Keppra 750 BID  - per SLP: Pleasure Feeding(thin liquids and purees via tsp) (SLP is unable to fully r/o aspiration at the bedside, though MBSS to r/o aspiration does not appear to be appropriate at this time. These recommendations for oral itnake may be considered from a quality of life standpoint.)  - NPO. Can get TF    Derm  Infestation by bed bug   -Contact isolation and standard precautions    -Gown, cap, gloves, tie patient's clothing in tight plastic bag and shoe booties etc   -Patient should be showered or bathed  Or changed into hospital-laundered clothing and moved to an isolated room   -Close the old room and Notify  immediately and infection control    -Place all patient's clothes and belongings in a sealed plastic bag for 24 hours    Pulmonary  Acute respiratory failure with hypoxia  Hx of lung Ca  Pt on NC  Dup nebs q 4 h  CXR -Continued right pleural effusion and adjacent lung base consolidation/atelectasis.  Minimal consolidation now present in left upper lobe    Cardiac/Vascular  Essential hypertension  -180  Continue home antihypertensive Amlodipine 10mg daily  C/w Coreg 3.125 BID    Oncology  Brain metastases  See Status Epilepticus    Secondary adenocarcinoma of bone  Continue present mgt per Heme-onc  Dronabinol 5mg AC       Primary cancer of right middle lobe of lung  Patient with right middle lobe lung cancer stage IV with mets to bone on chemotherapy  Likely mets to the brain  Per pt wishes, will not pursue radiation therapy. Medical oncology also unable to provide further treatment recommendations  Palliative care consulted for GOC and hospice    Other  Goals of care, counseling/discussion  See Primary Cancer    Palliative care encounter  See Primary " cancer          Activity Orders          None        Full Code    Xavier De La Torre MD  Neurocritical Care  [facility]

## 2019-11-16 NOTE — ASSESSMENT & PLAN NOTE
Patient with right middle lobe lung cancer stage IV with mets to bone on chemotherapy  Likely mets to the brain  Per pt wishes, will not pursue radiation therapy. Medical oncology also unable to provide further treatment recommendations  Palliative care consulted for GOC and hospice  Family requesting rehab prior to home hospice

## 2019-11-16 NOTE — CARE UPDATE
Rapid Response Nurse Chart Check     Chart check completed, abnormal VS noted. Bedside RN Grupo contacted, no concerns verbalized at this time, instructed to call [phone] for further concerns or assistance.

## 2019-11-16 NOTE — ASSESSMENT & PLAN NOTE
Hx of lung Ca  Pt on 3L NC  Dup nebs q 4 h  CXR -Continued right pleural effusion and adjacent lung base consolidation/atelectasis.  Minimal consolidation now present in left upper lobe

## 2019-11-16 NOTE — ASSESSMENT & PLAN NOTE
Patient with right middle lobe lung cancer stage IV with mets to bone on chemotherapy  Likely mets to the brain  Per pt wishes, will not pursue radiation therapy. Medical oncology also unable to provide further treatment recommendations  Palliative care consulted for GOC and hospice

## 2019-11-16 NOTE — PLAN OF CARE
11/15/19 2129   Post-Acute Status   Post-Acute Authorization Placement;Home Health/Hospice   Post-Acute Placement Status Patient List Provided  (Rehab list provided)   Home Health/Hospice Status   (Hospice list refused)     BARBARA advised by [facility] and the MD team over the day that Hospice was discussed with the Pt family as the MD onc team will not be treating the Pt cancer. Family had reported to [facility] they would want to take the Pt home.     BARBARA met with Pt wife, Pt daughter, and family at bedside regarding a home hospice list. Pt family appeared confused to this SW. They reported they would not be taking the Pt home. Attempted to discuss options Pt general need after dc. Addressed questions and discussed needs. Pt family asked questions about rehab. SW reported being unsure rehab can meet his needs. Pt daughter requested rehab list as they reported this would be needed before he could come home. Pt wife reported she would want [facility] presented back to Pt room with a rehab list, which Pt daughter accepted.     BARBARA sent referral via  to [facility]. Barrier will be that Pt is still currently pending Medicaid as he let his insurance lapse. This issue has been escalated to the Medicaid department.     Ila Donnelly LMSW  Neurocritical Care   [facility]  [id number]

## 2019-11-16 NOTE — PROVIDER TRANSFER
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 Patient was watching the football game earlier in the evening and had no issues during the day. Later in the evening he was somnolent and his daughter noted that he had a seizure and she called EMS. Patient was brought to the ED by EMS who were called for a seizure-like activity. All history was obtained from ED chart as patient was intubated and sedated on propofol on admission. Patient was obtunded when EMS arrived on the scene and they witnessed a generalized tonic clonic seizure lasting 20 seconds which was abated with IV Versed 10mg stat. Patient was intubated in the ED and had elevated blood pressure but otherwise stable vital signs. He was placed on mechanical ventilation and sedated with propofol. Video EEG ordered and patient was admitted to Neuro Critical care unit for higher level of care. Patient has no known history of seizures, no known history of metastasis to brain and no previous imaging. Patient had a CT scan done in the ED prior to admission in Neuro ICU. Upon evaluation in the ED patient was infested with bed bugs crawling all over him. Patient was placed on contact isolation immediately and  as well as charge nurse notified.     Hospital Course: 11/11/2019: Patient admitted to Neuro ICU for status epilepticus. Patient intubated, sedated with propofol and video EEG monitoring.  11/12/2019: pt extubated today-on NC  11/13/19: consult radiation oncology, d/c phillip cath, repeat BMP, SBP <180, start coreg BID  11/14/19: Pt agitated overnight. Placed in restraints. Eventually calmed on his own  11/15/19: NAONE. Palliative on board for GOC and possible  "hospice  11/16/19: MARION. Family requesting rehab. Oncology rec pursuing home hospice. Patient remains tachycardic on 3L NC.     Hospital Course By Problem with Pertinent Findings:     Neuro  * Status epilepticus   Seizures/Epilepsy Monitoring Evaluation:  - Off cEEG  - Seizure Precautions  -  Continue AEDs Keppra 500mg BID  -MRI brain-Multiple scattered small to punctate size foci of abnormal parenchymal enhancement throughout the brain parenchyma as detailed above.  In light of history concerning for parenchymal metastases largest within the left parietal lobe  -11/13: radiation oncology consulted-per rad onc" the patient and his family declined whole brain radiotherapy and would like to continue best supportive care and symptom management in the setting of Mr. Torres poor performance status"  - Consult made to palliative care for hospice planning and GOC; PT/OT rec IPR, referalls pending  -Continue Keppra 750 BID  - per SLP: Pleasure Feeding(thin liquids and purees via tsp) (SLP is unable to fully r/o aspiration at the bedside, though MBSS to r/o aspiration does not appear to be appropriate at this time. These recommendations for oral intake may be considered from a quality of life standpoint.)  - NPO. Can get TF     Derm  Infestation by bed bug          -Contact isolation and standard precautions           -Gown, cap, gloves, tie patient's clothing in tight plastic bag and shoe booties etc          -Patient should be showered or bathed  Or changed into hospital-laundered clothing and moved to an isolated room          -Close the old room and Notify  immediately and infection control           -Place all patient's clothes and belongings in a sealed plastic bag for 24 hours     Pulmonary  Acute respiratory failure with hypoxia  Hx of lung Ca  Pt on 3L NC  Dup nebs q 4 h  CXR -Continued right pleural effusion and adjacent lung base consolidation/atelectasis.  Minimal consolidation now present " in left upper lobe     Cardiac/Vascular  Essential hypertension  -180  Continue home antihypertensive Amlodipine 10mg daily  C/w Coreg 3.125 BID     Oncology  Brain metastases  See Status Epilepticus     Secondary adenocarcinoma of bone  Continue present mgt per Heme-onc  Dronabinol 5mg AC        Primary cancer of right middle lobe of lung  Patient with right middle lobe lung cancer stage IV with mets to bone on chemotherapy  Likely mets to the brain  Per pt wishes, will not pursue radiation therapy. Medical oncology also unable to provide further treatment recommendations  Palliative care consulted for GOC and hospice  Family requesting rehab prior to home hospice     Other  Goals of care, counseling/discussion  See Primary Cancer     Palliative care encounter  See Primary cancer        The patient is being Prophylaxed for:  Venous Thromboembolism with: Mechanical or Chemical  Stress Ulcer with: Not Applicable   Ventilator Pneumonia with: not applicable    Consultants and Procedures:     Consultants:  Neurosurgery  Hematology Oncology  Radiation Oncology  Palliative Care  PT/OT/SLP    Procedures:    Intubation on 11/11  Extubation on 11/12    Transfer Information:     Diet:  TF  Pleasure feeds    Physical Activity:  PT/OT      To Do / Pending Studies / Follow ups:  IPR placement    Jen Sandoval  Neuro Crtical Care

## 2019-11-16 NOTE — SUBJECTIVE & OBJECTIVE
Interval History:  NAEON. Family requesting rehab. Oncology rec pursuing home hospice. Patient remains tachycardic on 3L NC. Frequent oral/deep suctioning by patient and nursing/respiratory.     Review of Systems  Unable to obtain a complete ROS due to level of consciousness.  Objective:     Vitals:  Temp: 98.5 °F (36.9 °C)  Pulse: (!) 121  Rhythm: sinus tachycardia  BP: 126/76  MAP (mmHg): 93  Resp: (!) 24  SpO2: 97 %  O2 Device (Oxygen Therapy): nasal cannula    Temp  Min: 98.5 °F (36.9 °C)  Max: 100.7 °F (38.2 °C)  Pulse  Min: 106  Max: 125  BP  Min: 116/73  Max: 143/85  MAP (mmHg)  Min: 87  Max: 108  Resp  Min: 18  Max: 36  SpO2  Min: 94 %  Max: 100 %    11/15 0701 - 11/16 0700  In: 2330 [I.V.:1210]  Out: 1140 [Urine:1140]   Unmeasured Output  Stool Occurrence: 0  Pad Count: 1       Physical Exam  GA: Alert, comfortable, no acute distress. Suctioning himself  HEENT: No scleral icterus or JVD. NGT in place  Pulmonary: Coarse cough, coarse breath sounds, 3L Nc  Cardiac: RRR S1 & S2 w/o rubs/murmurs/gallops.   Abdominal: Bowel sounds present x 4. No appreciable hepatosplenomegaly.  Skin: No jaundice, rashes, or visible lesions.  Neuro:  --GCS: E4 V5 M6  --Mental Status:  A&Ox, follows all commands, delayed responses at times, mildly slurred  --Pupils 3->2mm, PERRL.   --Protecting airway  --ANGELA spont and against gravity    Medications:  Continuous  sodium chloride 0.9% Last Rate: 75 mL/hr at 11/15/19 2205   Scheduled  amLODIPine 10 mg Daily   budesonide 0.25 mg BID   carvedilol 3.125 mg BID   heparin (porcine) 5,000 Units Q8H   levETIRAcetam 750 mg BID   mirtazapine 30 mg Nightly   polyethylene glycol 17 g Daily   senna-docusate 8.6-50 mg 1 tablet Daily   PRN  acetaminophen 650 mg Q6H PRN   albuterol-ipratropium 3 mL PRN   calcium gluconate IVPB 1 g PRN   calcium gluconate IVPB 1 g PRN   calcium gluconate IVPB 1 g PRN   Dextrose 10% Bolus 12.5 g PRN   glucagon (human recombinant) 1 mg PRN   insulin aspart U-100 1-10  Units Q6H PRN   magnesium sulfate IVPB 2 g PRN   magnesium sulfate IVPB 4 g PRN   OLANZapine 2.5 mg Once PRN   potassium chloride in water 40 mEq PRN   And     potassium chloride in water 40 mEq PRN   And     potassium chloride in water 40 mEq PRN   sodium chloride 0.9% 10 mL PRN   sodium phosphate IVPB 15 mmol PRN   sodium phosphate IVPB 20.01 mmol PRN   sodium phosphate IVPB 30 mmol PRN         Diet  No diet orders on file  No diet orders on file    TF via NGT

## 2019-11-16 NOTE — ASSESSMENT & PLAN NOTE
" Seizures/Epilepsy Monitoring Evaluation:  - Continue current management.  - Off cEEG  - Seizure Precautions  -  Continue AEDs Keppra 500mg BID  -MRI brain-Multiple scattered small to punctate size foci of abnormal parenchymal enhancement throughout the brain parenchyma as detailed above.  In light of history concerning for parenchymal metastases largest within the left parietal lobe  -11/13: radiation oncology consulted-per rad onc" the patient and his family declined whole brain radiotherapy and would like to continue best supportive care and symptom management in the setting of Mr. Torres poor performance status"  - Consult made to palliative care for hospice planning and GOC; PT/OT rec IPR, referalls pending  -Continue Keppra 750 BID  - per SLP: Pleasure Feeding(thin liquids and purees via tsp) (SLP is unable to fully r/o aspiration at the bedside, though MBSS to r/o aspiration does not appear to be appropriate at this time. These recommendations for oral intake may be considered from a quality of life standpoint.)  - NPO. Can get TF  "

## 2019-11-16 NOTE — ASSESSMENT & PLAN NOTE
" Seizures/Epilepsy Monitoring Evaluation:  - Continue current management.  - Consult Epilepsy service  - Seizure Precautions  - Off EEG monitoring   - Patient loaded with IV Kepppra 1000mg stat then Continue AEDs Keppra 500mg BID  - Pending recommendations per Epilepsy Service  -MRI brain-Multiple scattered small to punctate size foci of abnormal parenchymal enhancement throughout the brain parenchyma as detailed above.  In light of history concerning for parenchymal metastases largest within the left parietal lobe  -11/13: radiation oncology consulted-per rad onc" the patient and his family declined whole brain radiotherapy and would like to continue best supportive care and symptom management in the setting of Mr. Torres poor performance status"  - Consult made to palliative care for hospice planning and GOC  -Continue Keppra 750 BID  - per SLP: Pleasure Feeding(thin liquids and purees via tsp) (SLP is unable to fully r/o aspiration at the bedside, though MBSS to r/o aspiration does not appear to be appropriate at this time. These recommendations for oral itnake may be considered from a quality of life standpoint.)  - NPO. Can get TF  "

## 2019-11-16 NOTE — PLAN OF CARE
Patient transferred last evening. Frequent oral and deep suction performed by nursing and respiratory staff. Tube feeds and IVF continue without issue. Patient to transition POC to hospice palliative care today and discuss code status with family.

## 2019-11-16 NOTE — PROGRESS NOTES
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 Patient was watching the football game earlier in the evening and had no issues during the day. Later in the evening he was somnolent and his daughter noted that he had a seizure and she called EMS. Patient was brought to the ED by EMS who were called for a seizure-like activity. All history was obtained from ED chart as patient was intubated and sedated on propofol on admission. Patient was obtunded when EMS arrived on the scene and they witnessed a generalized tonic clonic seizure lasting 20 seconds which was abated with IV Versed 10mg stat. Patient was intubated in the ED and had elevated blood pressure but otherwise stable vital signs. He was placed on mechanical ventilation and sedated with propofol. Video EEG ordered and patient was admitted to Neuro Critical care unit for higher level of care. Patient has no known history of seizures, no known history of metastasis to brain and no previous imaging. Patient had a CT scan done in the ED prior to admission in Neuro ICU. Upon evaluation in the ED patient was infested with bed bugs crawling all over him. Patient was placed on contact isolation immediately and  as well as charge nurse notified.     Hospital Course: 11/11/2019: Patient admitted to Neuro ICU for status epilepticus. Patient intubated, sedated with propofol and video EEG monitoring.  11/12/2019: pt  extubated today-on NC  11/13/19: consult radiation oncology, d/c phillip cath, repeat BMP, SBP <180, start coreg BID  11/14/19: Pt agitated overnight. Placed in restraints. Eventually calmed on his own  11/15/19: NAONE. Palliative on board for GOC and possible hospice  11/16/19: NAEON. Family requesting rehab. Oncology rec pursuing home hospice. Patient remains tachycardic on 3L NC.   Seizures well controlled.  S/p c EEG     Interval History:  NAEON. Family requesting rehab. Oncology rec pursuing home hospice. Patient remains tachycardic on 3L NC. Frequent oral/deep suctioning by patient and nursing/respiratory.        Impression:     Seizure, status epilepticus  - stable  -suspected brain mets    Stage 4 RML lung cancer metastatic to bone and brain  - oncology recommends hospice care  -s/p radiation oncology consult  -   Bed Bug infestation  - still on contact precautions  - treated on NCC    Right pleural effusion, s/p acute respiratory failure, COPD  - stable    Palliative care  - Hospice recommended  - family wants inpatient rehab      To Do List:   Continue PT/OT juan alberto;  Attempt Rehab placement  [place] goals of care and code status      Anticipated Discharge Disposition: Rehab Facility    Medicine will accept patient for transfer to a hospital medicine service but patient not to be transferred to medicine team until tomorrow, 11/17/19 at 7:00 am. I spoke with primary service who is requesting transfer and informed them they are responsible for patient's care until tomorrow morning. Patient to be assigned to a medicine team by nocturnist tonight and to be temporarily placed on IMT list for reassignment in the morning.       Thank you and please call if you have any further questions.      Shira Yancey M.D  Attending Staff Physician   [facility] Medicine  Pager: [phone]  Spectralink: [phone]

## 2019-11-16 NOTE — ASSESSMENT & PLAN NOTE
Hx of lung Ca  Pt on NC  Dup nebs q 4 h  CXR -Continued right pleural effusion and adjacent lung base consolidation/atelectasis.  Minimal consolidation now present in left upper lobe

## 2019-11-16 NOTE — PROGRESS NOTES
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 Patient was watching the football game earlier in the evening and had no issues during the day. Later in the evening he was somnolent and his daughter noted that he had a seizure and she called EMS. Patient was brought to the ED by EMS who were called for a seizure-like activity. All history was obtained from ED chart as patient was intubated and sedated on propofol on admission. Patient was obtunded when EMS arrived on the scene and they witnessed a generalized tonic clonic seizure lasting 20 seconds which was abated with IV Versed 10mg stat. Patient was intubated in the ED and had elevated blood pressure but otherwise stable vital signs. He was placed on mechanical ventilation and sedated with propofol. Video EEG ordered and patient was admitted to Neuro Critical care unit for higher level of care. Patient has no known history of seizures, no known history of metastasis to brain and no previous imaging. Patient had a CT scan done in the ED prior to admission in Neuro ICU. Upon evaluation in the ED patient was infested with bed bugs crawling all over him. Patient was placed on contact isolation immediately and  as well as charge nurse notified.    Hospital Course: 11/11/2019: Patient admitted to Neuro ICU for status epilepticus. Patient intubated, sedated with propofol and video EEG monitoring.  11/12/2019: pt extubated today-on NC  11/13/19: consult radiation oncology, d/c phillip cath, repeat BMP, SBP <180, start coreg BID  11/14/19: Pt agitated overnight. Placed in restraints. Eventually calmed on his  own  11/15/19: RUIZONE. Palliative on board for GOC and possible hospice  11/16/19: MARION. Family requesting rehab. Oncology rec pursuing home hospice. Patient remains tachycardic on 3L NC.     Interval History:  NAEON. Family requesting rehab. Oncology rec pursuing home hospice. Patient remains tachycardic on 3L NC. Frequent oral/deep suctioning by patient and nursing/respiratory.     Review of Systems  Unable to obtain a complete ROS due to level of consciousness.  Objective:     Vitals:  Temp: 98.5 °F (36.9 °C)  Pulse: (!) 121  Rhythm: sinus tachycardia  BP: 126/76  MAP (mmHg): 93  Resp: (!) 24  SpO2: 97 %  O2 Device (Oxygen Therapy): nasal cannula    Temp  Min: 98.5 °F (36.9 °C)  Max: 100.7 °F (38.2 °C)  Pulse  Min: 106  Max: 125  BP  Min: 116/73  Max: 143/85  MAP (mmHg)  Min: 87  Max: 108  Resp  Min: 18  Max: 36  SpO2  Min: 94 %  Max: 100 %    11/15 0701 - 11/16 0700  In: 2330 [I.V.:1210]  Out: 1140 [Urine:1140]   Unmeasured Output  Stool Occurrence: 0  Pad Count: 1       Physical Exam  GA: Alert, comfortable, no acute distress. Suctioning himself  HEENT: No scleral icterus or JVD. NGT in place  Pulmonary: Coarse cough, coarse breath sounds, 3L Nc  Cardiac: RRR S1 & S2 w/o rubs/murmurs/gallops.   Abdominal: Bowel sounds present x 4. No appreciable hepatosplenomegaly.  Skin: No jaundice, rashes, or visible lesions.  Neuro:  --GCS: E4 V5 M6  --Mental Status:  A&Ox, follows all commands, delayed responses at times, mildly slurred  --Pupils 3->2mm, PERRL.   --Protecting airway  --ANGELA spont and against gravity    Medications:  Continuous  sodium chloride 0.9% Last Rate: 75 mL/hr at 11/15/19 2205   Scheduled  amLODIPine 10 mg Daily   budesonide 0.25 mg BID   carvedilol 3.125 mg BID   heparin (porcine) 5,000 Units Q8H   levETIRAcetam 750 mg BID   mirtazapine 30 mg Nightly   polyethylene glycol 17 g Daily   senna-docusate 8.6-50 mg 1 tablet Daily   PRN  acetaminophen 650 mg Q6H PRN   albuterol-ipratropium 3 mL PRN  "  calcium gluconate IVPB 1 g PRN   calcium gluconate IVPB 1 g PRN   calcium gluconate IVPB 1 g PRN   Dextrose 10% Bolus 12.5 g PRN   glucagon (human recombinant) 1 mg PRN   insulin aspart U-100 1-10 Units Q6H PRN   magnesium sulfate IVPB 2 g PRN   magnesium sulfate IVPB 4 g PRN   OLANZapine 2.5 mg Once PRN   potassium chloride in water 40 mEq PRN   And     potassium chloride in water 40 mEq PRN   And     potassium chloride in water 40 mEq PRN   sodium chloride 0.9% 10 mL PRN   sodium phosphate IVPB 15 mmol PRN   sodium phosphate IVPB 20.01 mmol PRN   sodium phosphate IVPB 30 mmol PRN         Diet  No diet orders on file  No diet orders on file    TF via NGT    Assessment/Plan:     Neuro  * Status epilepticus   Seizures/Epilepsy Monitoring Evaluation:  - Continue current management.  - Off cEEG  - Seizure Precautions  -  Continue AEDs Keppra 500mg BID  -MRI brain-Multiple scattered small to punctate size foci of abnormal parenchymal enhancement throughout the brain parenchyma as detailed above.  In light of history concerning for parenchymal metastases largest within the left parietal lobe  -11/13: radiation oncology consulted-per rad onc" the patient and his family declined whole brain radiotherapy and would like to continue best supportive care and symptom management in the setting of Mr. Torres poor performance status"  - Consult made to palliative care for hospice planning and GOC; PT/OT rec IPR, referalls pending  -Continue Keppra 750 BID  - per SLP: Pleasure Feeding(thin liquids and purees via tsp) (SLP is unable to fully r/o aspiration at the bedside, though MBSS to r/o aspiration does not appear to be appropriate at this time. These recommendations for oral intake may be considered from a quality of life standpoint.)  - NPO. Can get TF    Derm  Infestation by bed bug   -Contact isolation and standard precautions    -Gown, cap, gloves, tie patient's clothing in tight plastic bag and shoe booties etc   -Patient " should be showered or bathed  Or changed into hospital-laundered clothing and moved to an isolated room   -Close the old room and Notify  immediately and infection control    -Place all patient's clothes and belongings in a sealed plastic bag for 24 hours    Pulmonary  Acute respiratory failure with hypoxia  Hx of lung Ca  Pt on 3L NC  Dup nebs q 4 h  CXR -Continued right pleural effusion and adjacent lung base consolidation/atelectasis.  Minimal consolidation now present in left upper lobe    Cardiac/Vascular  Essential hypertension  -180  Continue home antihypertensive Amlodipine 10mg daily  C/w Coreg 3.125 BID    Oncology  Brain metastases  See Status Epilepticus    Secondary adenocarcinoma of bone  Continue present mgt per Heme-onc  Dronabinol 5mg AC       Primary cancer of right middle lobe of lung  Patient with right middle lobe lung cancer stage IV with mets to bone on chemotherapy  Likely mets to the brain  Per pt wishes, will not pursue radiation therapy. Medical oncology also unable to provide further treatment recommendations  Palliative care consulted for GOC and hospice  Family requesting rehab prior to home hospice    Other  Goals of care, counseling/discussion  See Primary Cancer    Palliative care encounter  See Primary cancer        Will stepdown to  vs Onc team today, will discuss with staff.     The patient is being Prophylaxed for:  Venous Thromboembolism with: Mechanical or Chemical  Stress Ulcer with: Not Applicable   Ventilator Pneumonia with: not applicable    Activity Orders          None        Full Code    Jen Sandoval MD  Neurocritical Care  [facility]     [place]

## 2019-11-17 LAB
ALBUMIN SERPL BCP-MCNC: 2.7 G/DL (ref 3.5–5.2)
ALP SERPL-CCNC: 59 U/L (ref 55–135)
ALT SERPL W/O P-5'-P-CCNC: 17 U/L (ref 10–44)
ANION GAP SERPL CALC-SCNC: 9 MMOL/L (ref 8–16)
AST SERPL-CCNC: 17 U/L (ref 10–40)
BASOPHILS # BLD AUTO: 0.03 K/UL (ref 0–0.2)
BASOPHILS NFR BLD: 0.2 % (ref 0–1.9)
BILIRUB SERPL-MCNC: 0.4 MG/DL (ref 0.1–1)
BUN SERPL-MCNC: 15 MG/DL (ref 8–23)
CALCIUM SERPL-MCNC: 8.6 MG/DL (ref 8.7–10.5)
CHLORIDE SERPL-SCNC: 104 MMOL/L (ref 95–110)
CO2 SERPL-SCNC: 28 MMOL/L (ref 23–29)
CREAT SERPL-MCNC: 0.7 MG/DL (ref 0.5–1.4)
DIFFERENTIAL METHOD: ABNORMAL
EOSINOPHIL # BLD AUTO: 0 K/UL (ref 0–0.5)
EOSINOPHIL NFR BLD: 0.1 % (ref 0–8)
ERYTHROCYTE [DISTWIDTH] IN BLOOD BY AUTOMATED COUNT: 17.4 % (ref 11.5–14.5)
EST. GFR  (AFRICAN AMERICAN): >60 ML/MIN/1.73 M^2
EST. GFR  (NON AFRICAN AMERICAN): >60 ML/MIN/1.73 M^2
GLUCOSE SERPL-MCNC: 97 MG/DL (ref 70–110)
HCT VFR BLD AUTO: 38.8 % (ref 40–54)
HGB BLD-MCNC: 11.8 G/DL (ref 14–18)
IMM GRANULOCYTES # BLD AUTO: 0.05 K/UL (ref 0–0.04)
IMM GRANULOCYTES NFR BLD AUTO: 0.3 % (ref 0–0.5)
LYMPHOCYTES # BLD AUTO: 1 K/UL (ref 1–4.8)
LYMPHOCYTES NFR BLD: 6.9 % (ref 18–48)
MAGNESIUM SERPL-MCNC: 1.9 MG/DL (ref 1.6–2.6)
MCH RBC QN AUTO: 27.1 PG (ref 27–31)
MCHC RBC AUTO-ENTMCNC: 30.4 G/DL (ref 32–36)
MCV RBC AUTO: 89 FL (ref 82–98)
MONOCYTES # BLD AUTO: 1.3 K/UL (ref 0.3–1)
MONOCYTES NFR BLD: 9.1 % (ref 4–15)
NEUTROPHILS # BLD AUTO: 12 K/UL (ref 1.8–7.7)
NEUTROPHILS NFR BLD: 83.4 % (ref 38–73)
NRBC BLD-RTO: 0 /100 WBC
PHOSPHATE SERPL-MCNC: 3.2 MG/DL (ref 2.7–4.5)
PLATELET # BLD AUTO: 283 K/UL (ref 150–350)
PMV BLD AUTO: 11.3 FL (ref 9.2–12.9)
POCT GLUCOSE: 105 MG/DL (ref 70–110)
POCT GLUCOSE: 106 MG/DL (ref 70–110)
POCT GLUCOSE: 108 MG/DL (ref 70–110)
POCT GLUCOSE: 93 MG/DL (ref 70–110)
POCT GLUCOSE: 98 MG/DL (ref 70–110)
POTASSIUM SERPL-SCNC: 4.1 MMOL/L (ref 3.5–5.1)
PROT SERPL-MCNC: 6.4 G/DL (ref 6–8.4)
RBC # BLD AUTO: 4.35 M/UL (ref 4.6–6.2)
SODIUM SERPL-SCNC: 141 MMOL/L (ref 136–145)
WBC # BLD AUTO: 14.35 K/UL (ref 3.9–12.7)

## 2019-11-17 PROCEDURE — 36415 COLL VENOUS BLD VENIPUNCTURE: CPT

## 2019-11-17 PROCEDURE — 83735 ASSAY OF MAGNESIUM: CPT

## 2019-11-17 PROCEDURE — 25000003 PHARM REV CODE 250: Performed by: INTERNAL MEDICINE

## 2019-11-17 PROCEDURE — 94640 AIRWAY INHALATION TREATMENT: CPT

## 2019-11-17 PROCEDURE — 25000003 PHARM REV CODE 250: Performed by: HOSPITALIST

## 2019-11-17 PROCEDURE — 99233 SBSQ HOSP IP/OBS HIGH 50: CPT | Mod: ,,, | Performed by: HOSPITALIST

## 2019-11-17 PROCEDURE — 27000221 HC OXYGEN, UP TO 24 HOURS

## 2019-11-17 PROCEDURE — 25000003 PHARM REV CODE 250: Performed by: STUDENT IN AN ORGANIZED HEALTH CARE EDUCATION/TRAINING PROGRAM

## 2019-11-17 PROCEDURE — 25000003 PHARM REV CODE 250: Performed by: PSYCHIATRY & NEUROLOGY

## 2019-11-17 PROCEDURE — 11000001 HC ACUTE MED/SURG PRIVATE ROOM

## 2019-11-17 PROCEDURE — 85025 COMPLETE CBC W/AUTO DIFF WBC: CPT

## 2019-11-17 PROCEDURE — 25000242 PHARM REV CODE 250 ALT 637 W/ HCPCS: Performed by: PSYCHIATRY & NEUROLOGY

## 2019-11-17 PROCEDURE — 84100 ASSAY OF PHOSPHORUS: CPT

## 2019-11-17 PROCEDURE — 99233 PR SUBSEQUENT HOSPITAL CARE,LEVL III: ICD-10-PCS | Mod: ,,, | Performed by: HOSPITALIST

## 2019-11-17 PROCEDURE — 80053 COMPREHEN METABOLIC PANEL: CPT

## 2019-11-17 PROCEDURE — 99900035 HC TECH TIME PER 15 MIN (STAT)

## 2019-11-17 PROCEDURE — 25000003 PHARM REV CODE 250: Performed by: NURSE PRACTITIONER

## 2019-11-17 PROCEDURE — 94761 N-INVAS EAR/PLS OXIMETRY MLT: CPT

## 2019-11-17 PROCEDURE — 63600175 PHARM REV CODE 636 W HCPCS: Performed by: INTERNAL MEDICINE

## 2019-11-17 RX ORDER — OXYCODONE HYDROCHLORIDE 5 MG/1
5 TABLET ORAL EVERY 4 HOURS PRN
Status: DISCONTINUED | OUTPATIENT
Start: 2019-11-17 | End: 2019-11-21 | Stop reason: HOSPADM

## 2019-11-17 RX ADMIN — CARVEDILOL 3.12 MG: 3.12 TABLET, FILM COATED ORAL at 09:11

## 2019-11-17 RX ADMIN — SENNOSIDES AND DOCUSATE SODIUM 1 TABLET: 8.6; 5 TABLET ORAL at 10:11

## 2019-11-17 RX ADMIN — MIRTAZAPINE 30 MG: 15 TABLET, ORALLY DISINTEGRATING ORAL at 09:11

## 2019-11-17 RX ADMIN — CARVEDILOL 3.12 MG: 3.12 TABLET, FILM COATED ORAL at 10:11

## 2019-11-17 RX ADMIN — LEVETIRACETAM 750 MG: 750 TABLET, FILM COATED ORAL at 09:11

## 2019-11-17 RX ADMIN — POLYETHYLENE GLYCOL 3350 17 G: 17 POWDER, FOR SOLUTION ORAL at 10:11

## 2019-11-17 RX ADMIN — AMLODIPINE BESYLATE 10 MG: 10 TABLET ORAL at 10:11

## 2019-11-17 RX ADMIN — BUDESONIDE 0.25 MG: 0.25 INHALANT RESPIRATORY (INHALATION) at 10:11

## 2019-11-17 RX ADMIN — HEPARIN SODIUM 5000 UNITS: 5000 INJECTION, SOLUTION INTRAVENOUS; SUBCUTANEOUS at 06:11

## 2019-11-17 RX ADMIN — BUDESONIDE 0.25 MG: 0.25 INHALANT RESPIRATORY (INHALATION) at 09:11

## 2019-11-17 RX ADMIN — LEVETIRACETAM 750 MG: 750 TABLET, FILM COATED ORAL at 10:11

## 2019-11-17 RX ADMIN — HEPARIN SODIUM 5000 UNITS: 5000 INJECTION, SOLUTION INTRAVENOUS; SUBCUTANEOUS at 09:11

## 2019-11-17 RX ADMIN — HEPARIN SODIUM 5000 UNITS: 5000 INJECTION, SOLUTION INTRAVENOUS; SUBCUTANEOUS at 01:11

## 2019-11-17 RX ADMIN — ACETAMINOPHEN 650 MG: 325 TABLET ORAL at 06:11

## 2019-11-17 RX ADMIN — OXYCODONE HYDROCHLORIDE 5 MG: 5 TABLET ORAL at 03:11

## 2019-11-17 NOTE — PROGRESS NOTES
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 Patient was watching the football game earlier in the evening and had no issues during the day. Later in the evening he was somnolent and his daughter noted that he had a seizure and she called EMS. Patient was brought to the ED by EMS who were called for a seizure-like activity. All history was obtained from ED chart as patient was intubated and sedated on propofol on admission. Patient was obtunded when EMS arrived on the scene and they witnessed a generalized tonic clonic seizure lasting 20 seconds which was abated with IV Versed 10mg stat. Patient was intubated in the ED and had elevated blood pressure but otherwise stable vital signs. He was placed on mechanical ventilation and sedated with propofol. Video EEG ordered and patient was admitted to Neuro Critical care unit for higher level of care. Patient has no known history of seizures, no known history of metastasis to brain and no previous imaging. Patient had a CT scan done in the ED prior to admission in Neuro ICU.  MRI brain showed multiple scattered small to punctate size foci of abnormal parenchymal enhancement throughout the brain parenchyma.  These are concerning for parenchymal metastasis with the largest within the left parietal lobe.  Oncology was consulted and determined the patient is no longer a candidate for chemotherapy.  He has overall very poor prognosis.  Palliative Care has been consulted for possible discharge to home with hospice.  Upon evaluation in the ED patient was infested with bed bugs crawling all over him. Patient was placed on contact isolation immediately and   as well as charge nurse notified.    Interval hx:   Pt was seen and examined at bedside. Pt had no acute events overnight, and no new complaints this morning. Pt remained hemodynamically stable and afebrile.  NG tube remains in place.  Patient complaining of a dry mouth.  He denies any pain, shortness of breath.    ROS (Positive in Bold, otherwise negative)  Constitutional: fever, chills, night sweats  CV: chest pain, edema, palpitations  Resp: SOB, cough, sputum production  GI: changes in appetite, NVDC, pain, melena, hematochezia, GERD, hematemesis  : Dysuria, hematuria, urinary urgency, frequency  MSK: arthralgia/myalgia, joint swelling  Neuro/Psych: anxiety, depression    PEx   Temp:  [98.2 °F (36.8 °C)-100.6 °F (38.1 °C)]   Pulse:  []   Resp:  [18-24]   BP: (118-151)/(70-85)   SpO2:  [94 %-99 %]      I & O (Last 24H):     Intake/Output Summary (Last 24 hours) at 11/17/2019 1149  Last data filed at 11/17/2019 0846  Gross per 24 hour   Intake 2493.75 ml   Output 400 ml   Net 2093.75 ml       General:  male  in no acute distress. Nontoxic. Resting in bed. Cooperative.  Drowsy but easily arousable  HEENT: NCAT. PERRL. EOMI. Sclera Anicteric.  NG tube in place  CVS: RRR. Normal S1 S2. No murmurs  Pulm: CTAB. Normal respiratory effort. No wheezes, rhonchi, or crackles.  Abdomen: Soft. Non-distended. No tenderness to palpation. No rebound or guarding. +BS.  Extremities: No edema. No cyanosis. Full ROM.  Neuro: Alert, oriented x3, Spont mvt of all extremities with no focal deficits noted.    Recent Results (from the past 24 hour(s))   POCT glucose    Collection Time: 11/16/19 12:39 PM   Result Value Ref Range    POCT Glucose 112 (H) 70 - 110 mg/dL   POCT glucose    Collection Time: 11/17/19 12:07 AM   Result Value Ref Range    POCT Glucose 108 70 - 110 mg/dL   Comprehensive metabolic panel    Collection Time: 11/17/19  5:07 AM   Result Value Ref Range    Sodium 141 136 -  145 mmol/L    Potassium 4.1 3.5 - 5.1 mmol/L    Chloride 104 95 - 110 mmol/L    CO2 28 23 - 29 mmol/L    Glucose 97 70 - 110 mg/dL    BUN, Bld 15 8 - 23 mg/dL    Creatinine 0.7 0.5 - 1.4 mg/dL    Calcium 8.6 (L) 8.7 - 10.5 mg/dL    Total Protein 6.4 6.0 - 8.4 g/dL    Albumin 2.7 (L) 3.5 - 5.2 g/dL    Total Bilirubin 0.4 0.1 - 1.0 mg/dL    Alkaline Phosphatase 59 55 - 135 U/L    AST 17 10 - 40 U/L    ALT 17 10 - 44 U/L    Anion Gap 9 8 - 16 mmol/L    eGFR if African American >60.0 >60 mL/min/1.73 m^2    eGFR if non African American >60.0 >60 mL/min/1.73 m^2   Magnesium    Collection Time: 11/17/19  5:07 AM   Result Value Ref Range    Magnesium 1.9 1.6 - 2.6 mg/dL   Phosphorus    Collection Time: 11/17/19  5:07 AM   Result Value Ref Range    Phosphorus 3.2 2.7 - 4.5 mg/dL   CBC auto differential    Collection Time: 11/17/19  5:07 AM   Result Value Ref Range    WBC 14.35 (H) 3.90 - 12.70 K/uL    RBC 4.35 (L) 4.60 - 6.20 M/uL    Hemoglobin 11.8 (L) 14.0 - 18.0 g/dL    Hematocrit 38.8 (L) 40.0 - 54.0 %    Mean Corpuscular Volume 89 82 - 98 fL    Mean Corpuscular Hemoglobin 27.1 27.0 - 31.0 pg    Mean Corpuscular Hemoglobin Conc 30.4 (L) 32.0 - 36.0 g/dL    RDW 17.4 (H) 11.5 - 14.5 %    Platelets 283 150 - 350 K/uL    MPV 11.3 9.2 - 12.9 fL    Immature Granulocytes 0.3 0.0 - 0.5 %    Gran # (ANC) 12.0 (H) 1.8 - 7.7 K/uL    Immature Grans (Abs) 0.05 (H) 0.00 - 0.04 K/uL    Lymph # 1.0 1.0 - 4.8 K/uL    Mono # 1.3 (H) 0.3 - 1.0 K/uL    Eos # 0.0 0.0 - 0.5 K/uL    Baso # 0.03 0.00 - 0.20 K/uL    nRBC 0 0 /100 WBC    Gran% 83.4 (H) 38.0 - 73.0 %    Lymph% 6.9 (L) 18.0 - 48.0 %    Mono% 9.1 4.0 - 15.0 %    Eosinophil% 0.1 0.0 - 8.0 %    Basophil% 0.2 0.0 - 1.9 %    Differential Method Automated    POCT glucose    Collection Time: 11/17/19  6:44 AM   Result Value Ref Range    POCT Glucose 106 70 - 110 mg/dL       Recent Labs   Lab 11/15/19  1115 11/15/19  1731 11/16/19  0057 11/16/19  1239 11/17/19  0007 11/17/19  0644    POCTGLUCOSE 106 109 102 112* 108 106       Hemoglobin A1C   Date Value Ref Range Status   06/06/2018 5.3 4.0 - 5.6 % Final     Comment:     ADA Screening Guidelines:  5.7-6.4%  Consistent with prediabetes  >or=6.5%  Consistent with diabetes  High levels of fetal hemoglobin interfere with the HbA1C  assay. Heterozygous hemoglobin variants (HbS, HgC, etc)do  not significantly interfere with this assay.   However, presence of multiple variants may affect accuracy.          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          Assessment and Plan for problems addressed today:      amLODIPine  10 mg Per NG tube Daily    budesonide  0.25 mg Nebulization BID    carvedilol  3.125 mg Per NG tube BID    heparin (porcine)  5,000 Units Subcutaneous Q8H    levETIRAcetam  750 mg Per NG tube BID    mirtazapine  30 mg Per NG tube Nightly    polyethylene glycol  17 g Per NG tube Daily    senna-docusate 8.6-50 mg  1 tablet Per NG tube Daily     acetaminophen, albuterol-ipratropium, calcium gluconate IVPB, calcium gluconate IVPB, calcium gluconate IVPB, Dextrose 10% Bolus, glucagon (human recombinant), insulin aspart U-100, magnesium sulfate IVPB, magnesium sulfate IVPB, OLANZapine, potassium chloride in water **AND** potassium chloride in water **AND** potassium chloride in water, sodium chloride 0.9%, sodium phosphate IVPB, sodium phosphate IVPB, sodium phosphate  "IVPB    Status epilepticus:  Brain metastases:  -MRI brain-Multiple scattered small to punctate size foci of abnormal parenchymal enhancement throughout the brain parenchyma as detailed above.  In light of history concerning for parenchymal metastases largest within the left parietal lobe  -11/13: radiation oncology consulted-per rad onc" the patient and his family declined whole brain radiotherapy and would like to continue best supportive care and symptom management in the setting of Mr. Torres poor performance status"  -Consult made to palliative care for hospice planning and GOC; PT/OT rec IPR, referalls pending  -per SLP: Pleasure Feeding(thin liquids and purees via tsp) (SLP is unable to fully r/o aspiration at the bedside, though MBSS to r/o aspiration does not appear to be appropriate at this time. These recommendations for oral intake may be considered from a quality of life standpoint.)  -Seizure Precautions  -Continue AEDs Keppra 500mg BID  -NPO. Can get TF     Infestation by bed bug:  -Contact isolation and standard precautions   -Gown, cap, gloves, tie patient's clothing in tight plastic bag and shoe booties etc   -Patient should be showered or bathed or changed into hospital-laundered clothing and moved to an isolated room   -Close the old room and Notify  immediately and infection control    -Place all patient's clothes and belongings in a sealed plastic bag for 24 hours     Acute respiratory failure with hypoxia  -Hx of lung Ca  -Pt on 3L NC  -Duo nebs q 4 h  -CXR -Continued right pleural effusion and adjacent lung base consolidation/atelectasis.  Minimal consolidation now present in left upper lobe     Essential hypertension  --180  -Continue home antihypertensive Amlodipine 10mg daily  -Continue Coreg 3.125 BID     Secondary adenocarcinoma of bone  -Continue present mgt per Heme-onc  -Dronabinol 5mg AC     Primary cancer of right middle lobe of lung  -Patient with right " middle lobe lung cancer stage IV with mets to bone and now brain. Pt was previously on chemotherapy  -oncology consulted, patient is no longer a candidate for chemotherapy at this time  -Per pt wishes, will not pursue radiation therapy.  -Palliative care consulted for GOC and hospice    Goals of care, counseling/discussion:  Palliative care encounter:  -due to patient's adeno carcinoma of lung with mets to bone and brain, acute hypoxic respiratory failure, seizures, patient has overall very poor prognosis.  -palliative Care consulted for goals of care discussion and possible discharge to home with hospice  -continue goals of care discussion  -discuss code status with patient's wife         DVT PPx:  Heparin    Discharge plan and follow up: DC to rehab versus home with hospice once medically stable     Eve Serra MD  Hospital Medicine Staff  [phone] pager

## 2019-11-17 NOTE — PLAN OF CARE
Problem: Fall Injury Risk  Goal: Absence of Fall and Fall-Related Injury  Outcome: Ongoing, Progressing

## 2019-11-18 LAB
ALBUMIN SERPL BCP-MCNC: 2.6 G/DL (ref 3.5–5.2)
ALP SERPL-CCNC: 64 U/L (ref 55–135)
ALT SERPL W/O P-5'-P-CCNC: 16 U/L (ref 10–44)
ANION GAP SERPL CALC-SCNC: 8 MMOL/L (ref 8–16)
AST SERPL-CCNC: 16 U/L (ref 10–40)
BASOPHILS # BLD AUTO: 0.04 K/UL (ref 0–0.2)
BASOPHILS NFR BLD: 0.3 % (ref 0–1.9)
BILIRUB SERPL-MCNC: 0.4 MG/DL (ref 0.1–1)
BUN SERPL-MCNC: 17 MG/DL (ref 8–23)
CALCIUM SERPL-MCNC: 9.2 MG/DL (ref 8.7–10.5)
CHLORIDE SERPL-SCNC: 99 MMOL/L (ref 95–110)
CO2 SERPL-SCNC: 32 MMOL/L (ref 23–29)
CREAT SERPL-MCNC: 0.8 MG/DL (ref 0.5–1.4)
DIFFERENTIAL METHOD: ABNORMAL
EOSINOPHIL # BLD AUTO: 0.1 K/UL (ref 0–0.5)
EOSINOPHIL NFR BLD: 0.7 % (ref 0–8)
ERYTHROCYTE [DISTWIDTH] IN BLOOD BY AUTOMATED COUNT: 17.6 % (ref 11.5–14.5)
EST. GFR  (AFRICAN AMERICAN): >60 ML/MIN/1.73 M^2
EST. GFR  (NON AFRICAN AMERICAN): >60 ML/MIN/1.73 M^2
GLUCOSE SERPL-MCNC: 97 MG/DL (ref 70–110)
HCT VFR BLD AUTO: 39.9 % (ref 40–54)
HGB BLD-MCNC: 12.2 G/DL (ref 14–18)
IMM GRANULOCYTES # BLD AUTO: 0.04 K/UL (ref 0–0.04)
IMM GRANULOCYTES NFR BLD AUTO: 0.3 % (ref 0–0.5)
LYMPHOCYTES # BLD AUTO: 0.8 K/UL (ref 1–4.8)
LYMPHOCYTES NFR BLD: 6.1 % (ref 18–48)
MAGNESIUM SERPL-MCNC: 2 MG/DL (ref 1.6–2.6)
MCH RBC QN AUTO: 27.4 PG (ref 27–31)
MCHC RBC AUTO-ENTMCNC: 30.6 G/DL (ref 32–36)
MCV RBC AUTO: 90 FL (ref 82–98)
MONOCYTES # BLD AUTO: 1.1 K/UL (ref 0.3–1)
MONOCYTES NFR BLD: 8.4 % (ref 4–15)
NEUTROPHILS # BLD AUTO: 11.2 K/UL (ref 1.8–7.7)
NEUTROPHILS NFR BLD: 84.2 % (ref 38–73)
NRBC BLD-RTO: 0 /100 WBC
PHOSPHATE SERPL-MCNC: 3.3 MG/DL (ref 2.7–4.5)
PLATELET # BLD AUTO: 322 K/UL (ref 150–350)
PMV BLD AUTO: 11.4 FL (ref 9.2–12.9)
POTASSIUM SERPL-SCNC: 4.4 MMOL/L (ref 3.5–5.1)
PROT SERPL-MCNC: 6.7 G/DL (ref 6–8.4)
RBC # BLD AUTO: 4.45 M/UL (ref 4.6–6.2)
SODIUM SERPL-SCNC: 139 MMOL/L (ref 136–145)
WBC # BLD AUTO: 13.27 K/UL (ref 3.9–12.7)

## 2019-11-18 PROCEDURE — 11000001 HC ACUTE MED/SURG PRIVATE ROOM

## 2019-11-18 PROCEDURE — 99233 PR SUBSEQUENT HOSPITAL CARE,LEVL III: ICD-10-PCS | Mod: ,,, | Performed by: HOSPITALIST

## 2019-11-18 PROCEDURE — 25000003 PHARM REV CODE 250: Performed by: NURSE PRACTITIONER

## 2019-11-18 PROCEDURE — 84100 ASSAY OF PHOSPHORUS: CPT

## 2019-11-18 PROCEDURE — 25000003 PHARM REV CODE 250: Performed by: STUDENT IN AN ORGANIZED HEALTH CARE EDUCATION/TRAINING PROGRAM

## 2019-11-18 PROCEDURE — 85025 COMPLETE CBC W/AUTO DIFF WBC: CPT

## 2019-11-18 PROCEDURE — 25000003 PHARM REV CODE 250: Performed by: PSYCHIATRY & NEUROLOGY

## 2019-11-18 PROCEDURE — 27000221 HC OXYGEN, UP TO 24 HOURS

## 2019-11-18 PROCEDURE — 97530 THERAPEUTIC ACTIVITIES: CPT

## 2019-11-18 PROCEDURE — 80053 COMPREHEN METABOLIC PANEL: CPT

## 2019-11-18 PROCEDURE — 99233 SBSQ HOSP IP/OBS HIGH 50: CPT | Mod: ,,, | Performed by: HOSPITALIST

## 2019-11-18 PROCEDURE — 94640 AIRWAY INHALATION TREATMENT: CPT

## 2019-11-18 PROCEDURE — 99900026 HC AIRWAY MAINTENANCE (STAT)

## 2019-11-18 PROCEDURE — 25000242 PHARM REV CODE 250 ALT 637 W/ HCPCS: Performed by: PSYCHIATRY & NEUROLOGY

## 2019-11-18 PROCEDURE — 63600175 PHARM REV CODE 636 W HCPCS: Performed by: INTERNAL MEDICINE

## 2019-11-18 PROCEDURE — 25000003 PHARM REV CODE 250: Performed by: INTERNAL MEDICINE

## 2019-11-18 PROCEDURE — 99900035 HC TECH TIME PER 15 MIN (STAT)

## 2019-11-18 PROCEDURE — 25000003 PHARM REV CODE 250: Performed by: HOSPITALIST

## 2019-11-18 PROCEDURE — 36415 COLL VENOUS BLD VENIPUNCTURE: CPT

## 2019-11-18 PROCEDURE — 94761 N-INVAS EAR/PLS OXIMETRY MLT: CPT

## 2019-11-18 PROCEDURE — 83735 ASSAY OF MAGNESIUM: CPT

## 2019-11-18 RX ORDER — AMOXICILLIN 250 MG
1 CAPSULE ORAL DAILY
COMMUNITY
Start: 2019-11-19

## 2019-11-18 RX ORDER — BUDESONIDE 0.25 MG/2ML
0.25 INHALANT ORAL 2 TIMES DAILY
Qty: 60 ML | Refills: 3 | Status: SHIPPED | OUTPATIENT
Start: 2019-11-18 | End: 2020-11-17

## 2019-11-18 RX ORDER — POLYETHYLENE GLYCOL 3350 17 G/17G
17 POWDER, FOR SOLUTION ORAL DAILY
Qty: 30 EACH | Refills: 0 | Status: SHIPPED | OUTPATIENT
Start: 2019-11-19

## 2019-11-18 RX ORDER — CARVEDILOL 3.12 MG/1
3.12 TABLET ORAL 2 TIMES DAILY
Qty: 60 TABLET | Refills: 11 | Status: SHIPPED | OUTPATIENT
Start: 2019-11-18 | End: 2020-11-17

## 2019-11-18 RX ORDER — OXYCODONE HYDROCHLORIDE 5 MG/1
5 TABLET ORAL EVERY 4 HOURS PRN
Qty: 20 TABLET | Refills: 0
Start: 2019-11-18

## 2019-11-18 RX ORDER — LEVETIRACETAM 100 MG/ML
750 SOLUTION ORAL 2 TIMES DAILY
Qty: 450 ML | Refills: 11 | Status: SHIPPED | OUTPATIENT
Start: 2019-11-18 | End: 2020-11-17

## 2019-11-18 RX ADMIN — LEVETIRACETAM 750 MG: 750 TABLET, FILM COATED ORAL at 09:11

## 2019-11-18 RX ADMIN — POLYETHYLENE GLYCOL 3350 17 G: 17 POWDER, FOR SOLUTION ORAL at 09:11

## 2019-11-18 RX ADMIN — SENNOSIDES AND DOCUSATE SODIUM 1 TABLET: 8.6; 5 TABLET ORAL at 09:11

## 2019-11-18 RX ADMIN — BUDESONIDE 0.25 MG: 0.25 INHALANT RESPIRATORY (INHALATION) at 09:11

## 2019-11-18 RX ADMIN — CARVEDILOL 3.12 MG: 3.12 TABLET, FILM COATED ORAL at 09:11

## 2019-11-18 RX ADMIN — MIRTAZAPINE 30 MG: 15 TABLET, ORALLY DISINTEGRATING ORAL at 09:11

## 2019-11-18 RX ADMIN — OXYCODONE HYDROCHLORIDE 5 MG: 5 TABLET ORAL at 09:11

## 2019-11-18 RX ADMIN — OXYCODONE HYDROCHLORIDE 5 MG: 5 TABLET ORAL at 04:11

## 2019-11-18 RX ADMIN — HEPARIN SODIUM 5000 UNITS: 5000 INJECTION, SOLUTION INTRAVENOUS; SUBCUTANEOUS at 09:11

## 2019-11-18 RX ADMIN — HEPARIN SODIUM 5000 UNITS: 5000 INJECTION, SOLUTION INTRAVENOUS; SUBCUTANEOUS at 05:11

## 2019-11-18 RX ADMIN — IPRATROPIUM BROMIDE AND ALBUTEROL SULFATE 3 ML: .5; 3 SOLUTION RESPIRATORY (INHALATION) at 02:11

## 2019-11-18 RX ADMIN — HEPARIN SODIUM 5000 UNITS: 5000 INJECTION, SOLUTION INTRAVENOUS; SUBCUTANEOUS at 02:11

## 2019-11-18 RX ADMIN — AMLODIPINE BESYLATE 10 MG: 10 TABLET ORAL at 09:11

## 2019-11-18 RX ADMIN — ACETAMINOPHEN 650 MG: 325 TABLET ORAL at 05:11

## 2019-11-18 NOTE — PT/OT/SLP PROGRESS
Physical Therapy Treatment    Patient Name:  Angel Torres   MRN:  [id number]    Recommendations:     Discharge Recommendations:  rehabilitation facility   Discharge Equipment Recommendations: (TBD)   Barriers to discharge: decreased functional mobility requiring increased assist      Assessment:     Angel Torres is a 64 y.o. male admitted with a medical diagnosis of Status epilepticus.  He presents with the following impairments/functional limitations:  weakness, impaired self care skills, impaired balance, impaired endurance, impaired functional mobilty, gait instability. Pt tolerating session well on this date with focus on bed mobility, EOB sitting tolerance, and transfer training. Pt performing with mostly CGA/SBA but c/o SOB during static sitting following O2 removal. Pt would benefit from continued skilled acute PT 3x/wk to improve functional mobility.  Recommending pt receive PT services in Rehab setting following discharge from hospital once medically cleared.     Rehab Prognosis: Fair; patient would benefit from acute skilled PT services to address these deficits and reach maximum level of function.    Recent Surgery: * No surgery found *      Plan:     During this hospitalization, patient to be seen 4 x/week to address the identified rehab impairments via gait training, therapeutic activities, therapeutic exercises, neuromuscular re-education and progress toward the following goals:    · Plan of Care Expires:  12/13/19    Subjective     Chief Complaint: SOB without O2  Patient/Family Comments/goals: Pt pleasant and willing to participate with therapy on this date.    Pain/Comfort:  ·        Objective:     Communicated with RN prior to session.  Patient found HOB elevated with NG tube, oxygen upon PT entry to room.     General Precautions: Standard, contact, fall   Orthopedic Precautions:N/A   Braces: N/A     Functional Mobility:  · Bed Mobility:     · Rolling Right: stand by assistance  · Scooting: stand by  assistance  · Supine to Sit: stand by assistance  · Transfers:     · Sit to Stand:  contact guard assistance with rolling walker  · Bed to Chair: minimum assistance with  rolling walker  using  Step Transfer  · Gait: ~3ft forward and 3ft back with RW CGA, limited by fatigue/SOB  · Balance: Sitting: SBA    Standing: CGA      AM-PAC 6 CLICK MOBILITY  Turning over in bed (including adjusting bedclothes, sheets and blankets)?: 3  Sitting down on and standing up from a chair with arms (e.g., wheelchair, bedside commode, etc.): 3  Moving from lying on back to sitting on the side of the bed?: 3  Moving to and from a bed to a chair (including a wheelchair)?: 3  Need to walk in hospital room?: 3  Climbing 3-5 steps with a railing?: 2  Basic Mobility Total Score: 17       Therapeutic Activities and Exercises:   - STS x2 CGA with RW  - Standing Marches in RW x20 CGA  - Pt educated on:   -PT roles, expectations, and POC    -Safety with mobility   -Benefits of OOB activities to increase strength and functional mobility    -Performing ther ex for increasing LE ROM and strength   -Discharge recommendations     Patient left up in chair with all lines intact, call button in reach and RN notified..    GOALS:   Multidisciplinary Problems     Physical Therapy Goals        Problem: Physical Therapy Goal    Goal Priority Disciplines Outcome Goal Variances Interventions   Physical Therapy Goal     PT, PT/OT Ongoing, Progressing     Description:  Goals to be met by: 2019    Patient will increase functional independence with mobility by performin. Supine to sit with Modified [place]  2. Sit to supine with Modified [place]  3. Sit to stand transfer with Supervision  4. Gait  x 100 feet with Stand-by Assistance with or without using least restrictive AD.   5. Stand for 3 minutes with Stand-by Assistance while performing dynamic balance activities to reduce fall risk   6. Lower extremity exercise program x15 reps per  handout, with assistance as needed                      Time Tracking:     PT Received On: 11/18/19  PT Start Time: 1033     PT Stop Time: 1058  PT Total Time (min): 25 min     Billable Minutes: Therapeutic Activity 25    Treatment Type: Treatment  PT/PTA: PT     PTA Visit Number: 0     Claudio Franklin, PT  11/18/2019

## 2019-11-18 NOTE — PLAN OF CARE
Problem: Coping Ineffective  Goal: Effective Coping  Outcome: Ongoing, Progressing     Problem: Skin Injury Risk Increased  Goal: Skin Health and Integrity  Outcome: Ongoing, Progressing     Problem: Seizure, Active Management  Goal: Absence of Seizure/Seizure-Related Injury  Outcome: Ongoing, Progressing     PT is AAOX4, no acute changes noted during shift, pt is on bedrest and is repositioned q2, no skin breakdown noted,  VSS. No falls noted. Fall precautions remain Pain assessed. No pain noted. Pt resting comfortably in bed. Call light in reach. Will continue to monitor.

## 2019-11-18 NOTE — PT/OT/SLP PROGRESS
Occupational Therapy      Patient Name:  Angel Torres   MRN:  [id number]    Patient not seen today secondary to OT orders being discontinued. Plan is for patient to discharge to NH with hospice care per chart review . See OT note from 11/14/2019 for current level of functioning. Re-consult is situation changes.     MELLISSA Grove  11/18/2019

## 2019-11-18 NOTE — PLAN OF CARE
Pt would benefit from continued skilled acute PT services to improve functional mobility. POC is to continue towards current goals set to progress towards PLOF.

## 2019-11-18 NOTE — PLAN OF CARE
Problem: Malnutrition  Goal: Improved Nutritional Intake  Outcome: Ongoing, Progressing   Recommendations  Recommendation: Continue TF at goal rate to best meet pt's needs. Isosource 1.5 @ 60 mL/hr. Provides 2160kcals, 98g protein, and 1100mL free water. Suggest adding pureed pleasure feeds per SLP recommendations.  Goals: Pt to receive nutrition by RD follow up  Nutrition Goal Status: goal met  Communication of RD Recs: reviewed with RN

## 2019-11-18 NOTE — PROGRESS NOTES
No family at bedside. Plan is NH with hospice care. Spoke to Dr. Serra. Will follow.     Gabby STANFORD, APRN, AGCNS

## 2019-11-18 NOTE — PROGRESS NOTES
"[facility]  Adult Nutrition  Progress Note    SUMMARY       Recommendations  Recommendation: Continue TF at goal rate to best meet pt's needs. Isosource 1.5 @ 60 mL/hr. Provides 2160kcals, 98g protein, and 1100mL free water. Suggest adding pureed pleasure feeds per SLP recommendations.  Goals: Pt to receive nutrition by RD follow up  Nutrition Goal Status: goal met  Communication of RD Recs: reviewed with RN    Reason for Assessment  Reason For Assessment: RD follow-up  Diagnosis: seizures  Relevant Medical History: st 4 lung adenocarcinoma  Interdisciplinary Rounds: did not attend  General Information Comments: Pt NPO, NG tube in place. Tolerating TF at goal. Pt reports he wants "real food." Suggest pureed foods for pleasure. Per previous note, NFPE showed pt had moderate to severe fat and muscle wasting in temples, clavicles, calves, triceps and scapular. Pt meets criteria for chronic severe malnutrition.   Nutrition Discharge Planning: unable to determine at this time    Nutrition Risk Screen  Nutrition Risk Screen: dysphagia or difficulty swallowing    Nutrition/Diet History  Spiritual, Cultural Beliefs, Tenriism Practices, Values that Affect Care: no  Factors Affecting Nutritional Intake: NPO, chewing difficulties/inability to chew food, difficulty/impaired swallowing    Anthropometrics  Temp: 97.7 °F (36.5 °C)  Height: 6' 4" (193 cm)  Height (inches): 76 in  Weight Method: Bed Scale  Weight: 70.5 kg (155 lb 6.8 oz)  Weight (lb): 155.43 lb  Ideal Body Weight (IBW), Male: 202 lb  % Ideal Body Weight, Male (lb): 76.95 lb  BMI (Calculated): 18.9  BMI Grade: 18.5-24.9 - normal  Weight Loss: unintentional  Usual Body Weight (UBW), k.3 kg  % Usual Body Weight: 83.8  % Weight Change From Usual Weight: -16.37 %    Lab/Procedures/Meds  Pertinent Labs Reviewed: reviewed  Pertinent Labs Comments: HGH 4.45, HCT 12.2, MCHC 30.6, Albumin 2.6, CO2 32  Pertinent Medications Reviewed: " reviewed  Pertinent Medications Comments: IVF, heparin, insulin, senna-docusate    Estimated/Assessed Needs  Weight Used For Calorie Calculations: 70.5 kg (155 lb 6.8 oz)  Energy Calorie Requirements (kcal): 2115-2467  Energy Need Method: Kcal/kg(30-35kcal/kg)  Protein Requirements: 85-99g(1.2-1.4g/kg)  Weight Used For Protein Calculations: 70.5 kg (155 lb 6.8 oz)  Fluid Requirements (mL): 1mL/kcal or per MD  RDA Method (mL): 2115    Nutrition Prescription Ordered  Current Diet Order: NPO  Nutrition Order Comments: TF at goal  Current Nutrition Support Formula Ordered: Isosource 1.5  Current Nutrition Support Rate Ordered: 60 (ml)  Current Nutrition Support Frequency Ordered: mL/hr    Evaluation of Received Nutrient/Fluid Intake  Enteral Calories (kcal): 2160  Enteral Protein (gm): 98  Enteral (Free Water) Fluid (mL): 1100  % Kcal Needs: 100%  % Protein Needs: 100%  IV Fluid (mL): 1800  I/O: +7.5L since admit  Energy Calories Required: meeting needs  Protein Required: meeting needs  Fluid Required: other (see comments)(per MD)  Tolerance: tolerating  % Intake of Estimated Energy Needs: 75 - 100 %  % Meal Intake: NPO    Nutrition Risk  Level of Risk/Frequency of Follow-up: high     Assessment and Plan    Nutrition Problem   Severe malnutrition  Malnutrition in the context of Chronic Illness/Injury     Related to (etiology):  St 4 lung adenocarcinoma     Signs and Symptoms (as evidenced by):  Energy Intake: <75% of estimated energy requirement for > 1 month  Body Fat Depletion: severe depletion of orbitals, triceps and thoracic and lumbar region   Muscle Mass Depletion: severe depletion of temples, clavicle region, scapular region and lower extremities   Weight Loss: 11% x 6 months      Interventions:  Collaboration of care with providers     Nutrition Diagnosis Status:       Continuous    Monitor and Evaluation  Food and Nutrient Intake: energy intake  Food and Nutrient Adminstration: diet order, enteral and  parenteral nutrition administration  Anthropometric Measurements: weight, weight change, body mass index  Biochemical Data, Medical Tests and Procedures: electrolyte and renal panel, gastrointestinal profile, glucose/endocrine profile, inflammatory profile, lipid profile  Nutrition-Focused Physical Findings: overall appearance     Malnutrition Assessment  Malnutrition Type: chronic illness  Weight Loss (Malnutrition): greater than 10% in 6 months  Energy Intake (Malnutrition): less than or equal to 75% for greater than or equal to 1 month   Orbital Region (Subcutaneous Fat Loss): severe depletion  Upper Arm Region (Subcutaneous Fat Loss): severe depletion  Thoracic and Lumbar Region: severe depletion   Restoration Region (Muscle Loss): severe depletion  Clavicle Bone Region (Muscle Loss): severe depletion  Clavicle and Acromion Bone Region (Muscle Loss): severe depletion  Scapular Bone Region (Muscle Loss): severe depletion  Dorsal Hand (Muscle Loss): well nourished  Patellar Region (Muscle Loss): severe depletion  Anterior Thigh Region (Muscle Loss): severe depletion  Posterior Calf Region (Muscle Loss): severe depletion     Nutrition Follow-Up  RD Follow-up?: Yes

## 2019-11-18 NOTE — PROGRESS NOTES
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 Patient was watching the football game earlier in the evening and had no issues during the day. Later in the evening he was somnolent and his daughter noted that he had a seizure and she called EMS. Patient was brought to the ED by EMS who were called for a seizure-like activity. All history was obtained from ED chart as patient was intubated and sedated on propofol on admission. Patient was obtunded when EMS arrived on the scene and they witnessed a generalized tonic clonic seizure lasting 20 seconds which was abated with IV Versed 10mg stat. Patient was intubated in the ED and had elevated blood pressure but otherwise stable vital signs. He was placed on mechanical ventilation and sedated with propofol. Video EEG ordered and patient was admitted to Neuro Critical care unit for higher level of care. Patient has no known history of seizures, no known history of metastasis to brain and no previous imaging. Patient had a CT scan done in the ED prior to admission in Neuro ICU.  MRI brain showed multiple scattered small to punctate size foci of abnormal parenchymal enhancement throughout the brain parenchyma.  These are concerning for parenchymal metastasis with the largest within the left parietal lobe.  Oncology was consulted and determined the patient is no longer a candidate for chemotherapy.  He has overall very poor prognosis.  Palliative Care has been consulted for possible discharge to home with hospice.  Upon evaluation in the ED patient was infested with bed bugs crawling all over him. Patient was placed on contact isolation immediately and   as well as charge nurse notified.    Interval hx:   Pt was seen and examined at bedside. Pt had no acute events overnight, and no new complaints this morning. Pt remained hemodynamically stable and afebrile.  NG tube remains in place.  Patient did complain of increasing headache and chest pain this morning.  Called patient's wife and had a long discussion about goals of care.  Decision was made to transition patient to hospice with no escalation of care.  Code status was confirmed, changed to DNR.  Informed the wife that I will leave the paper for her to sign tonight when she comes to visit patient.  Wife is interested in discharge to nursing home with hospice.    ROS (Positive in Bold, otherwise negative)  Constitutional: fever, chills, night sweats, headache  CV: chest pain, edema, palpitations  Resp: SOB, cough, sputum production  GI: changes in appetite, NVDC, pain, melena, hematochezia, GERD, hematemesis  : Dysuria, hematuria, urinary urgency, frequency  MSK: arthralgia/myalgia, joint swelling  Neuro/Psych: anxiety, depression    PEx   Temp:  [97.8 °F (36.6 °C)-99.3 °F (37.4 °C)]   Pulse:  [104-119]   Resp:  [16-20]   BP: (109-139)/(71-81)   SpO2:  [89 %-99 %]      I & O (Last 24H):     Intake/Output Summary (Last 24 hours) at 11/18/2019 0957  Last data filed at 11/18/2019 0600  Gross per 24 hour   Intake 0 ml   Output 475 ml   Net -475 ml       General:  male  in no acute distress. Nontoxic. Resting in bed. Cooperative.  Drowsy but easily arousable  HEENT: NCAT. PERRL. EOMI. Sclera Anicteric.  NG tube in place  CVS: RRR. Normal S1 S2. No murmurs  Pulm: CTAB. Normal respiratory effort. No wheezes, rhonchi, or crackles.  Abdomen: Soft. Non-distended. No tenderness to palpation. No rebound or guarding. +BS.  Extremities: No edema. No cyanosis. Full ROM.  Neuro: Alert, oriented x3, Spont mvt of all extremities with no focal deficits noted.    Recent Results (from the past  24 hour(s))   POCT glucose    Collection Time: 11/17/19 11:55 AM   Result Value Ref Range    POCT Glucose 93 70 - 110 mg/dL   POCT glucose    Collection Time: 11/17/19  5:01 PM   Result Value Ref Range    POCT Glucose 105 70 - 110 mg/dL   POCT glucose    Collection Time: 11/17/19 10:02 PM   Result Value Ref Range    POCT Glucose 98 70 - 110 mg/dL   Comprehensive metabolic panel    Collection Time: 11/18/19  3:35 AM   Result Value Ref Range    Sodium 139 136 - 145 mmol/L    Potassium 4.4 3.5 - 5.1 mmol/L    Chloride 99 95 - 110 mmol/L    CO2 32 (H) 23 - 29 mmol/L    Glucose 97 70 - 110 mg/dL    BUN, Bld 17 8 - 23 mg/dL    Creatinine 0.8 0.5 - 1.4 mg/dL    Calcium 9.2 8.7 - 10.5 mg/dL    Total Protein 6.7 6.0 - 8.4 g/dL    Albumin 2.6 (L) 3.5 - 5.2 g/dL    Total Bilirubin 0.4 0.1 - 1.0 mg/dL    Alkaline Phosphatase 64 55 - 135 U/L    AST 16 10 - 40 U/L    ALT 16 10 - 44 U/L    Anion Gap 8 8 - 16 mmol/L    eGFR if African American >60.0 >60 mL/min/1.73 m^2    eGFR if non African American >60.0 >60 mL/min/1.73 m^2   Magnesium    Collection Time: 11/18/19  3:35 AM   Result Value Ref Range    Magnesium 2.0 1.6 - 2.6 mg/dL   Phosphorus    Collection Time: 11/18/19  3:35 AM   Result Value Ref Range    Phosphorus 3.3 2.7 - 4.5 mg/dL   CBC auto differential    Collection Time: 11/18/19  3:35 AM   Result Value Ref Range    WBC 13.27 (H) 3.90 - 12.70 K/uL    RBC 4.45 (L) 4.60 - 6.20 M/uL    Hemoglobin 12.2 (L) 14.0 - 18.0 g/dL    Hematocrit 39.9 (L) 40.0 - 54.0 %    Mean Corpuscular Volume 90 82 - 98 fL    Mean Corpuscular Hemoglobin 27.4 27.0 - 31.0 pg    Mean Corpuscular Hemoglobin Conc 30.6 (L) 32.0 - 36.0 g/dL    RDW 17.6 (H) 11.5 - 14.5 %    Platelets 322 150 - 350 K/uL    MPV 11.4 9.2 - 12.9 fL    Immature Granulocytes 0.3 0.0 - 0.5 %    Gran # (ANC) 11.2 (H) 1.8 - 7.7 K/uL    Immature Grans (Abs) 0.04 0.00 - 0.04 K/uL    Lymph # 0.8 (L) 1.0 - 4.8 K/uL    Mono # 1.1 (H) 0.3 - 1.0 K/uL    Eos # 0.1 0.0 - 0.5 K/uL    Baso #  0.04 0.00 - 0.20 K/uL    nRBC 0 0 /100 WBC    Gran% 84.2 (H) 38.0 - 73.0 %    Lymph% 6.1 (L) 18.0 - 48.0 %    Mono% 8.4 4.0 - 15.0 %    Eosinophil% 0.7 0.0 - 8.0 %    Basophil% 0.3 0.0 - 1.9 %    Differential Method Automated        Recent Labs   Lab 11/16/19  1239 11/17/19  0007 11/17/19  0644 11/17/19  1155 11/17/19  1701 11/17/19  2202   POCTGLUCOSE 112* 108 106 93 105 98       Hemoglobin A1C   Date Value Ref Range Status   06/06/2018 5.3 4.0 - 5.6 % Final     Comment:     ADA Screening Guidelines:  5.7-6.4%  Consistent with prediabetes  >or=6.5%  Consistent with diabetes  High levels of fetal hemoglobin interfere with the HbA1C  assay. Heterozygous hemoglobin variants (HbS, HgC, etc)do  not significantly interfere with this assay.   However, presence of multiple variants may affect accuracy.          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          Assessment and Plan for problems addressed today:      amLODIPine  10 mg Per NG tube Daily    budesonide  0.25 mg Nebulization BID    carvedilol  3.125 mg Per NG tube BID    heparin (porcine)  5,000 Units Subcutaneous Q8H    levETIRAcetam  750 mg Per NG tube BID    mirtazapine  30 mg Per NG tube Nightly    polyethylene glycol  17 g Per NG tube Daily    senna-docusate 8.6-50 mg  1 tablet Per NG tube Daily     acetaminophen, albuterol-ipratropium, Dextrose 10% Bolus, glucagon (human recombinant),  "insulin aspart U-100, magnesium sulfate IVPB, magnesium sulfate IVPB, OLANZapine, oxyCODONE, sodium chloride 0.9%, sodium phosphate IVPB, sodium phosphate IVPB, sodium phosphate IVPB    Status epilepticus:  Brain metastases:  -MRI brain-Multiple scattered small to punctate size foci of abnormal parenchymal enhancement throughout the brain parenchyma as detailed above.  In light of history concerning for parenchymal metastases largest within the left parietal lobe  -11/13: radiation oncology consulted-per rad onc" the patient and his family declined whole brain radiotherapy and would like to continue best supportive care and symptom management in the setting of Mr. Torres poor performance status"  -Consult made to palliative care for hospice planning and GOC; PT/OT rec IPR, referalls pending  -per SLP: Pleasure Feeding(thin liquids and purees via tsp) (SLP is unable to fully r/o aspiration at the bedside, though MBSS to r/o aspiration does not appear to be appropriate at this time. These recommendations for oral intake may be considered from a quality of life standpoint.)  -Seizure Precautions  -Continue AEDs Keppra 500mg BID  -NPO. Can get TF    Goals of care, counseling/discussion:  Palliative care encounter:  -due to patient's adeno carcinoma of lung with mets to bone and brain, acute hypoxic respiratory failure, seizures, patient has overall very poor prognosis.  -palliative Care consulted for goals of care discussion and possible discharge to home with hospice  -had long discussion with wife over the phone on 11/18 about patient's overall poor prognosis.  Decision was made to transition patient to hospice  -no escalation of care  -discontinue daily labs  -diet advanced to pureed foods with thin liquids per SLP recommendations for pleasure  -NG tube still in place for right now  -code status discussed, patient made DNR  -cm/sw on board to discharge patient to nursing home with hospice     Infestation by bed bug:  " Resolved  -was initially placed on Contact isolation and standard precautions .  Now resolved, off isolation  -Place all patient's clothes and belongings in a sealed plastic bag for 24 hours     Acute respiratory failure with hypoxia  -Hx of lung Ca  -Pt on 3L NC  -Duo nebs q 4 h  -CXR -Continued right pleural effusion and adjacent lung base consolidation/atelectasis.  Minimal consolidation now present in left upper lobe     Essential hypertension  --180  -Continue home antihypertensive Amlodipine 10mg daily  -Continue Coreg 3.125 BID     Secondary adenocarcinoma of bone  -Continue present mgt per Heme-onc  -Dronabinol 5mg AC     Primary cancer of right middle lobe of lung  -Patient with right middle lobe lung cancer stage IV with mets to bone and now brain. Pt was previously on chemotherapy  -oncology consulted, patient is no longer a candidate for chemotherapy at this time  -Per pt wishes, will not pursue radiation therapy.  -Palliative care consulted   -decision made to transition patient to hospice    DVT PPx:  Heparin    Discharge plan and follow up: DC to nursing home with hospice versus home hospice once arranged.  Patient medically stable for discharge.     Eve Serra MD  Hospital Medicine Staff  [phone] pager

## 2019-11-18 NOTE — PLAN OF CARE
[facility]     Department of Hospital Medicine     [address] [place] [address]     [phone] after hours  [phone] fax                                   HOSPICE  ORDERS     Patient Name: Angel Torres  YOB: [birth year]    Admit to Hospice: Nursing Home    Diagnoses:  Active Hospital Problems    Diagnosis  POA    *Status epilepticus [G40.901]  Yes    Palliative care encounter [Z51.5]  Not Applicable    Goals of care, counseling/discussion [Z71.89]  Not Applicable    Severe malnutrition [E43]  Yes    Brain metastases [C79.31]  Yes    Infestation by bed bug [B88.8]  Yes    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    Vital Signs: Routine per Hospice Protocol.    Allergies:Review of patient's allergies indicates:  No Known Allergies    Diet: regular diet     Activities: activity as tolerated    Nursing: Per Hospice Routine    Future Orders:  Hospice Medical Director may dictate new orders for comfortable care measures & sign death certificate.    Medications:            Angel Torres   Home Medication Instructions MAKSIM:[id number]    Printed on:11/18/19 7681   Medication Information                      albuterol (PROVENTIL/VENTOLIN HFA) 90 mcg/actuation inhaler  INHALE 2 PUFFS INTO THE LUNGS EVERY 6 HOURS AS NEEDED FOR WHEEZING             albuterol-ipratropium (DUO-NEB) 2.5 mg-0.5 mg/3 mL nebulizer solution  Rescue              amLODIPine (NORVASC) 10 MG tablet  Take 1 tablet (10 mg total) by mouth once daily.             budesonide (PULMICORT) 0.25 mg/2 mL nebulizer solution  Take 2 mLs (0.25 mg total) by nebulization 2 (two) times daily. Controller             carvedilol (COREG) 3.125 MG tablet  Take 1 tablet (3.125 mg total) by mouth 2 (two) times daily.             cetirizine (ZYRTEC) 10 MG tablet  Take 1 tablet (10 mg total) by mouth once daily.             cyproheptadine (PERIACTIN) 4 mg tablet  Take 1 tablet (4 mg total) by mouth 3 (three) times daily as needed.             dronabinol (MARINOL) 5 MG capsule  Take 1 capsule (5 mg total) by mouth 2 (two) times daily before meals.             fluticasone (FLONASE) 50 mcg/actuation nasal spray  SHAKE LIQUID AND USE 2 SPRAYS IN EACH NOSTRIL EVERY DAY             levETIRAcetam (KEPPRA) 100 mg/mL Soln  Take 7.5 mLs (750 mg total) by mouth 2 (two) times daily.             mirtazapine (REMERON SOL-TAB) 30 MG disintegrating tablet  Take 1 tablet (30 mg total) by mouth nightly.             mupirocin (BACTROBAN) 2 % ointment  Apply topically 2 (two) times daily. To open areas             ondansetron (ZOFRAN-ODT) 8 MG TbDL  Take 1 tablet (8 mg total) by mouth every 12 (twelve) hours as needed.             oxyCODONE (ROXICODONE) 5 MG immediate release tablet  Take 1 tablet (5 mg total) by mouth every 4 (four) hours as needed.             polyethylene glycol (GLYCOLAX) 17 gram PwPk  17 g by Per NG tube route once daily.             senna-docusate 8.6-50 mg (PERICOLACE) 8.6-50 mg per tablet  Take 1 tablet by mouth once daily.             triamcinolone acetonide 0.1% (KENALOG) 0.1 % ointment  To all rashes BID                           _________________________________  Eve Serra MD  11/18/2019

## 2019-11-18 NOTE — PLAN OF CARE
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       Patient is homebound due to:  Status epilepticus    Allergies:Review of patient's allergies indicates:  No Known Allergies    Vitals:       Every shift (Skilled Nursing patients)    Diet: Pureed diet, can advance as tolerated    Supplement:  1 can every three times a day with meals                         Type:  House      Acitivities:      - Up in a chair each morning as tolerated   - Ambulate with assistance to bathroom   - Scheduled walks once each shift (every 8 hours)    LABS:  Per facility protocol    Nursing Precautions:     - Aspiration precautions:             - Total assistance with meals            -  Upright 90 degrees befor during and after meals             -  Suction at bedside          - Fall precautions per nursing home protocol   - Seizure precaution per senior care protocol   - Decubitus precautions:        -  for positioning   - Pressure reducing foam mattress   - Turn patient every two hours. Use wedge pillows to anchor patient    CONSULTS:     Physical Therapy to evaluate and treat     Occupational Therapy to evaluate  and treat     Speech Therapy  to evaluate and treat     Nutrition to evaluate and recommend diet     Psychiatry to evaluate and follow patients for delirium     Hospice to evaluate and follow    MISCELLANEOUS CARE:                  Routine Skin for Bedridden Patients:  Apply moisture barrier cream to all    skin folds and wet areas in perineal area daily and after baths and                           all bowel movements.      Medications: Discontinue all previous medication orders, if any. See new list below.     Angel Torres   Home Medication Instructions MAKSIM:[id number]    Printed on:11/18/19 9586   Medication Information                      albuterol (PROVENTIL/VENTOLIN HFA) 90 mcg/actuation inhaler  INHALE 2 PUFFS INTO THE LUNGS EVERY 6 HOURS AS NEEDED FOR WHEEZING             albuterol-ipratropium (DUO-NEB) 2.5 mg-0.5 mg/3 mL nebulizer solution  Rescue             amLODIPine (NORVASC) 10 MG tablet  Take 1 tablet (10 mg total) by mouth once daily.             budesonide (PULMICORT) 0.25 mg/2 mL nebulizer solution  Take 2 mLs (0.25 mg total) by nebulization 2 (two) times daily. Controller             carvedilol (COREG) 3.125 MG tablet  Take 1 tablet (3.125 mg total) by mouth 2 (two) times daily.             cetirizine (ZYRTEC) 10 MG tablet  Take 1 tablet (10 mg total) by mouth once daily.             cyproheptadine (PERIACTIN) 4 mg tablet  Take 1 tablet (4 mg total) by mouth 3 (three) times daily as needed.             dronabinol (MARINOL) 5 MG capsule  Take 1 capsule (5 mg total) by mouth 2 (two) times daily before meals.             fluticasone (FLONASE) 50 mcg/actuation nasal spray  SHAKE LIQUID AND USE 2 SPRAYS IN EACH NOSTRIL EVERY DAY             levETIRAcetam (KEPPRA) 100 mg/mL Soln  Take 7.5 mLs (750 mg total) by mouth 2 (two) times daily.             mirtazapine (REMERON SOL-TAB) 30 MG disintegrating tablet  Take 1 tablet (30 mg total) by mouth nightly.             mupirocin (BACTROBAN) 2 %  ointment  Apply topically 2 (two) times daily. To open areas             ondansetron (ZOFRAN-ODT) 8 MG TbDL  Take 1 tablet (8 mg total) by mouth every 12 (twelve) hours as needed.             oxyCODONE (ROXICODONE) 5 MG immediate release tablet  Take 1 tablet (5 mg total) by mouth every 4 (four) hours as needed.             polyethylene glycol (GLYCOLAX) 17 gram PwPk  17 g by Per NG tube route once daily.             senna-docusate 8.6-50 mg (PERICOLACE) 8.6-50 mg per tablet  Take 1 tablet by mouth once daily.             triamcinolone acetonide 0.1% (KENALOG) 0.1 % ointment  To all rashes BID                       _________________________________  Eve Serra MD  11/18/2019

## 2019-11-19 ENCOUNTER — TELEPHONE (OUTPATIENT)
Dept: HEMATOLOGY/ONCOLOGY | Facility: CLINIC | Age: 64
End: 2019-11-19

## 2019-11-19 PROCEDURE — 99233 SBSQ HOSP IP/OBS HIGH 50: CPT | Mod: ,,, | Performed by: HOSPITALIST

## 2019-11-19 PROCEDURE — 25000003 PHARM REV CODE 250: Performed by: INTERNAL MEDICINE

## 2019-11-19 PROCEDURE — 99233 PR SUBSEQUENT HOSPITAL CARE,LEVL III: ICD-10-PCS | Mod: ,,, | Performed by: HOSPITALIST

## 2019-11-19 PROCEDURE — 27000221 HC OXYGEN, UP TO 24 HOURS

## 2019-11-19 PROCEDURE — 94640 AIRWAY INHALATION TREATMENT: CPT

## 2019-11-19 PROCEDURE — 25000003 PHARM REV CODE 250: Performed by: PSYCHIATRY & NEUROLOGY

## 2019-11-19 PROCEDURE — 25000242 PHARM REV CODE 250 ALT 637 W/ HCPCS: Performed by: PSYCHIATRY & NEUROLOGY

## 2019-11-19 PROCEDURE — 25000003 PHARM REV CODE 250: Performed by: NURSE PRACTITIONER

## 2019-11-19 PROCEDURE — 97535 SELF CARE MNGMENT TRAINING: CPT

## 2019-11-19 PROCEDURE — 11000001 HC ACUTE MED/SURG PRIVATE ROOM

## 2019-11-19 PROCEDURE — 94761 N-INVAS EAR/PLS OXIMETRY MLT: CPT

## 2019-11-19 PROCEDURE — 63600175 PHARM REV CODE 636 W HCPCS: Performed by: INTERNAL MEDICINE

## 2019-11-19 PROCEDURE — 25000003 PHARM REV CODE 250: Performed by: STUDENT IN AN ORGANIZED HEALTH CARE EDUCATION/TRAINING PROGRAM

## 2019-11-19 PROCEDURE — 25000003 PHARM REV CODE 250: Performed by: HOSPITALIST

## 2019-11-19 RX ADMIN — MIRTAZAPINE 30 MG: 15 TABLET, ORALLY DISINTEGRATING ORAL at 09:11

## 2019-11-19 RX ADMIN — SENNOSIDES AND DOCUSATE SODIUM 1 TABLET: 8.6; 5 TABLET ORAL at 08:11

## 2019-11-19 RX ADMIN — CARVEDILOL 3.12 MG: 3.12 TABLET, FILM COATED ORAL at 09:11

## 2019-11-19 RX ADMIN — CARVEDILOL 3.12 MG: 3.12 TABLET, FILM COATED ORAL at 08:11

## 2019-11-19 RX ADMIN — IPRATROPIUM BROMIDE AND ALBUTEROL SULFATE 3 ML: .5; 3 SOLUTION RESPIRATORY (INHALATION) at 09:11

## 2019-11-19 RX ADMIN — LEVETIRACETAM 750 MG: 750 TABLET, FILM COATED ORAL at 09:11

## 2019-11-19 RX ADMIN — HEPARIN SODIUM 5000 UNITS: 5000 INJECTION, SOLUTION INTRAVENOUS; SUBCUTANEOUS at 09:11

## 2019-11-19 RX ADMIN — BUDESONIDE 0.25 MG: 0.25 INHALANT RESPIRATORY (INHALATION) at 10:11

## 2019-11-19 RX ADMIN — HEPARIN SODIUM 5000 UNITS: 5000 INJECTION, SOLUTION INTRAVENOUS; SUBCUTANEOUS at 01:11

## 2019-11-19 RX ADMIN — ACETAMINOPHEN 650 MG: 325 TABLET ORAL at 08:11

## 2019-11-19 RX ADMIN — OXYCODONE HYDROCHLORIDE 5 MG: 5 TABLET ORAL at 05:11

## 2019-11-19 RX ADMIN — HEPARIN SODIUM 5000 UNITS: 5000 INJECTION, SOLUTION INTRAVENOUS; SUBCUTANEOUS at 05:11

## 2019-11-19 RX ADMIN — AMLODIPINE BESYLATE 10 MG: 10 TABLET ORAL at 08:11

## 2019-11-19 RX ADMIN — BUDESONIDE 0.25 MG: 0.25 INHALANT RESPIRATORY (INHALATION) at 09:11

## 2019-11-19 NOTE — PT/OT/SLP PROGRESS
Speech Language Pathology/ Discharge Summary       Angel Torres  MRN: [id number]    Plan is for pt to discharge to nursing home with hospice care. No further skilled speech therapy services warranted at this time. Would recommend pt continue with least restrictive diet of purees and thin liquids for comfort and quality of life.     14:10-14:20   Per physician request SLP to bedside to discuss diet recommendations with pt and pt family. Pt feels he is not ready for NG tube to be removed yet  As he needs his swallow to get a little stronger. SLP reviewed with pt swallow anatomy and function and overall prognosis for improvement. SLP discussed with pt safest recommendation is for patient to remain NPO at this time however for pleasure/quality of life, despite risk for aspiration, feel puree diet with thin liquids to be most comfortable. Pt and family demonstrated understanding and agreement with plan. Attending physician appreciative of SLP time spent with pt and education provided. No further skilled speech services warranted.     Cecilia Flores, CCC-SLP

## 2019-11-19 NOTE — PLAN OF CARE
Problem: Fall Injury Risk  Goal: Absence of Fall and Fall-Related Injury  Outcome: Ongoing, Progressing  Intervention: Identify and Manage Contributors to Fall Injury Risk  Flowsheets (Taken 11/19/2019 1432)  Self-Care Promotion: independence encouraged; BADL personal objects within reach; BADL personal routines maintained; meal setup provided; safe use of adaptive equipment encouraged  Medication Review/Management: medications reviewed  Intervention: Promote Injury-Free Environment  Flowsheets (Taken 11/19/2019 1432)  Safety Promotion/Fall Prevention: assistive device/personal item within reach; commode/urinal/bedpan at bedside; diversional activities provided; Fall Risk reviewed with patient/family; Fall Risk signage in place; medications reviewed; nonskid shoes/socks when out of bed; side rails raised x 3; instructed to call staff for mobility  Environmental Safety Modification: assistive device/personal items within reach; clutter free environment maintained; lighting adjusted

## 2019-11-19 NOTE — TELEPHONE ENCOUNTER
Contacted pt about upcoming appt. Pt is admitted in the hospital. Pt feels like he will be there for a few more days so I rescheduled him 2 weeks from now.. I advised the pt to give us a call when he is released and we will get his appt moved up if possible.

## 2019-11-19 NOTE — PLAN OF CARE
11/19/19 1606   Discharge Reassessment   Assessment Type Discharge Planning Reassessment   Provided patient/caregiver education on the expected discharge date and the discharge plan Yes   Do you have any problems affording any of your prescribed medications? TBD   Discharge Plan A Hospice/home   Discharge Plan B Home with family   DME Needed Upon Discharge  other (see comments)  (TBD)   Anticipated Discharge Disposition HospiceHome   Post-Acute Status   Post-Acute Authorization Home Health/Hospice   Home Health/Hospice Status Referrals Sent  ([facility])   Discharge Delays (!) Other  (Patient has no insurance.)

## 2019-11-19 NOTE — NURSING
NG tube pulled out by pt while sleeping. Notified MD. Okay to leave out as pt is tolerating oral nutrition. Will continue to monitor.

## 2019-11-19 NOTE — PLAN OF CARE
[facility]     Department of Hospital Medicine     [address] [place] [address]     [phone] after hours  [phone] fax                                   HOSPICE  ORDERS     Patient Name: Angel Torres  YOB: [birth year]    Admit to Hospice: Nursing Home    Diagnoses:  Active Hospital Problems    Diagnosis  POA    *Status epilepticus [G40.901]  Yes    Palliative care encounter [Z51.5]  Not Applicable    Goals of care, counseling/discussion [Z71.89]  Not Applicable    Severe malnutrition [E43]  Yes    Brain metastases [C79.31]  Yes    Infestation by bed bug [B88.8]  Yes    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    Vital Signs: Routine per Hospice Protocol.    Allergies:Review of patient's allergies indicates:  No Known Allergies    Diet: regular diet.     Activities: activity as tolerated    Nursing: Per Hospice Routine    Future Orders:  Hospice Medical Director may dictate new orders for comfortable care measures & sign death certificate.    Medications:            Angel Torres   Home Medication Instructions MAKSIM:[id number]    Printed on:11/18/19 2688   Medication Information                      albuterol (PROVENTIL/VENTOLIN HFA) 90 mcg/actuation inhaler  INHALE 2 PUFFS INTO THE LUNGS EVERY 6 HOURS AS NEEDED FOR WHEEZING             albuterol-ipratropium (DUO-NEB) 2.5 mg-0.5 mg/3 mL nebulizer solution  Rescue              amLODIPine (NORVASC) 10 MG tablet  Take 1 tablet (10 mg total) by mouth once daily.             budesonide (PULMICORT) 0.25 mg/2 mL nebulizer solution  Take 2 mLs (0.25 mg total) by nebulization 2 (two) times daily. Controller             carvedilol (COREG) 3.125 MG tablet  Take 1 tablet (3.125 mg total) by mouth 2 (two) times daily.             cetirizine (ZYRTEC) 10 MG tablet  Take 1 tablet (10 mg total) by mouth once daily.             cyproheptadine (PERIACTIN) 4 mg tablet  Take 1 tablet (4 mg total) by mouth 3 (three) times daily as needed.             dronabinol (MARINOL) 5 MG capsule  Take 1 capsule (5 mg total) by mouth 2 (two) times daily before meals.             fluticasone (FLONASE) 50 mcg/actuation nasal spray  SHAKE LIQUID AND USE 2 SPRAYS IN EACH NOSTRIL EVERY DAY             levETIRAcetam (KEPPRA) 100 mg/mL Soln  Take 7.5 mLs (750 mg total) by mouth 2 (two) times daily.             mirtazapine (REMERON SOL-TAB) 30 MG disintegrating tablet  Take 1 tablet (30 mg total) by mouth nightly.             mupirocin (BACTROBAN) 2 % ointment  Apply topically 2 (two) times daily. To open areas             ondansetron (ZOFRAN-ODT) 8 MG TbDL  Take 1 tablet (8 mg total) by mouth every 12 (twelve) hours as needed.             oxyCODONE (ROXICODONE) 5 MG immediate release tablet  Take 1 tablet (5 mg total) by mouth every 4 (four) hours as needed.             polyethylene glycol (GLYCOLAX) 17 gram PwPk  17 g by Per NG tube route once daily.             senna-docusate 8.6-50 mg (PERICOLACE) 8.6-50 mg per tablet  Take 1 tablet by mouth once daily.             triamcinolone acetonide 0.1% (KENALOG) 0.1 % ointment  To all rashes BID                           _________________________________  Eve Serra MD  11/19/2019

## 2019-11-19 NOTE — PROGRESS NOTES
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 Patient was watching the football game earlier in the evening and had no issues during the day. Later in the evening he was somnolent and his daughter noted that he had a seizure and she called EMS. Patient was brought to the ED by EMS who were called for a seizure-like activity. All history was obtained from ED chart as patient was intubated and sedated on propofol on admission. Patient was obtunded when EMS arrived on the scene and they witnessed a generalized tonic clonic seizure lasting 20 seconds which was abated with IV Versed 10mg stat. Patient was intubated in the ED and had elevated blood pressure but otherwise stable vital signs. He was placed on mechanical ventilation and sedated with propofol. Video EEG ordered and patient was admitted to Neuro Critical care unit for higher level of care. Patient has no known history of seizures, no known history of metastasis to brain and no previous imaging. Patient had a CT scan done in the ED prior to admission in Neuro ICU.  MRI brain showed multiple scattered small to punctate size foci of abnormal parenchymal enhancement throughout the brain parenchyma.  These are concerning for parenchymal metastasis with the largest within the left parietal lobe.  Oncology was consulted and determined the patient is no longer a candidate for chemotherapy.  He has overall very poor prognosis.  Palliative Care has been consulted for possible discharge to home with hospice.  Upon evaluation in the ED patient was infested with bed bugs crawling all over him. Patient was placed on contact isolation immediately and   as well as charge nurse notified. Called patient's wife and had a long discussion about goals of care.  Decision was made to transition patient to hospice with no escalation of care.  Code status was confirmed, changed to DNR.      Interval hx:   Pt was seen and examined at bedside. Pt had no acute events overnight, and no new complaints this morning. Pt remained hemodynamically stable and afebrile.  NG tube remains in place.  Patient reports he thinks he is getting stronger and his swallowing is improving.  He would like to stay in the hospital a few more days to see if his swelling can get even better.  In the meantime, he would like to keep his NG tube in place.  Discussed overall poor prognosis with patient from his terminal lung cancer with mets to brown and brain.  Patient reports he understands the overall poor prognosis but would like to be able to eat and drink better prior to being discharged home with hospice.    ROS (Positive in Bold, otherwise negative)  Constitutional: fever, chills, night sweats, headache  CV: chest pain, edema, palpitations  Resp: SOB, cough, sputum production  GI: changes in appetite, NVDC, pain, melena, hematochezia, GERD, hematemesis  : Dysuria, hematuria, urinary urgency, frequency  MSK: arthralgia/myalgia, joint swelling  Neuro/Psych: anxiety, depression    PEx   Temp:  [97.7 °F (36.5 °C)-98.8 °F (37.1 °C)]   Pulse:  [102-116]   Resp:  [16-20]   BP: (113-132)/(68-81)   SpO2:  [96 %-98 %]      I & O (Last 24H):     Intake/Output Summary (Last 24 hours) at 11/19/2019 1130  Last data filed at 11/19/2019 0400  Gross per 24 hour   Intake --   Output 600 ml   Net -600 ml       General:  male  in no acute distress. Nontoxic. Resting in bed. Cooperative.  Drowsy but easily arousable  HEENT: NCAT. PERRL. EOMI. Sclera Anicteric.  NG tube in place  CVS: RRR. Normal S1 S2. No murmurs  Pulm: CTAB. Normal respiratory effort. No wheezes, rhonchi, or  crackles.  Abdomen: Soft. Non-distended. No tenderness to palpation. No rebound or guarding. +BS.  Extremities: No edema. No cyanosis. Full ROM.  Neuro: Alert, oriented x3, Spont mvt of all extremities with no focal deficits noted.    No results found for this or any previous visit (from the past 24 hour(s)).    Recent Labs   Lab 11/16/19  1239 11/17/19  0007 11/17/19  0644 11/17/19  1155 11/17/19  1701 11/17/19  2202   POCTGLUCOSE 112* 108 106 93 105 98       Hemoglobin A1C   Date Value Ref Range Status   06/06/2018 5.3 4.0 - 5.6 % Final     Comment:     ADA Screening Guidelines:  5.7-6.4%  Consistent with prediabetes  >or=6.5%  Consistent with diabetes  High levels of fetal hemoglobin interfere with the HbA1C  assay. Heterozygous hemoglobin variants (HbS, HgC, etc)do  not significantly interfere with this assay.   However, presence of multiple variants may affect accuracy.          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          Assessment and Plan for problems addressed today:      amLODIPine  10 mg Per NG tube Daily    budesonide  0.25 mg Nebulization BID    carvedilol  3.125 mg Per NG tube BID    heparin (porcine)  5,000 Units Subcutaneous Q8H    levETIRAcetam  750 mg Per NG tube BID    mirtazapine  30 mg Per NG tube Nightly    polyethylene glycol  17 g Per NG tube Daily    senna-docusate 8.6-50 mg  1 tablet Per NG tube Daily     acetaminophen,  "albuterol-ipratropium, magnesium sulfate IVPB, magnesium sulfate IVPB, OLANZapine, oxyCODONE, sodium chloride 0.9%    Status epilepticus:  Brain metastases:  -MRI brain-Multiple scattered small to punctate size foci of abnormal parenchymal enhancement throughout the brain parenchyma as detailed above.  In light of history concerning for parenchymal metastases largest within the left parietal lobe  -11/13: radiation oncology consulted-per rad onc" the patient and his family declined whole brain radiotherapy and would like to continue best supportive care and symptom management in the setting of Mr. Torres poor performance status"  -Consult made to palliative care for hospice planning and GOC; PT/OT rec IPR, referalls pending  -Seizure Precautions  -Continue AEDs Keppra 500mg BID    Goals of care, counseling/discussion:  Palliative care encounter:  -due to patient's adeno carcinoma of lung with mets to bone and brain, acute hypoxic respiratory failure, seizures, patient has overall very poor prognosis.  -palliative Care consulted for goals of care discussion and possible discharge to home with hospice  -had long discussion with wife over the phone on 11/18 about patient's overall poor prognosis.  Decision was made to transition patient to hospice  -no escalation of care  -discontinue daily labs  -diet advanced to pureed foods with thin liquids per SLP recommendations for pleasure  -NG tube still in place for right now  -code status discussed, patient made DNR  -cm/sw on board to discharge patient to nursing home with hospice    Oropharyngeal dysphagia:  -patient currently has NG tube in place, receiving tube feeds due to overall high risk of aspiration  -SLP following.  per SLP: SLP is unable to fully r/o aspiration at the bedside, though MBSS to r/o aspiration does not appear to be appropriate at this time. -since decision was made to pursue comfort care, patient's diet was advanced for Pleasure Feeding with thin " liquids and purees via tsp,   -These recommendations for oral intake may be considered from a quality of life standpoint.  -patient reports his swallowing has improved today, would like to keep NG tube in place for now to swelling improved even further.     Infestation by bed bug:  Resolved  -was initially placed on Contact isolation and standard precautions .  Now resolved, off isolation  -Place all patient's clothes and belongings in a sealed plastic bag for 24 hours     Acute respiratory failure with hypoxia  -Hx of lung Ca  -Pt on 3L NC  -Duo nebs q 4 h  -CXR -Continued right pleural effusion and adjacent lung base consolidation/atelectasis.  Minimal consolidation now present in left upper lobe     Essential hypertension  --180  -Continue home antihypertensive Amlodipine 10mg daily  -Continue Coreg 3.125 BID     Secondary adenocarcinoma of bone  -Continue present mgt per Heme-onc  -Dronabinol 5mg AC     Primary cancer of right middle lobe of lung  -Patient with right middle lobe lung cancer stage IV with mets to bone and now brain. Pt was previously on chemotherapy  -oncology consulted, patient is no longer a candidate for chemotherapy at this time  -Per pt wishes, will not pursue radiation therapy.  -Palliative care consulted   -decision made to transition patient to hospice    DVT PPx:  Heparin    Discharge plan and follow up: DC to nursing home with hospice versus home hospice once arranged.  Patient medically stable for discharge.     Eve Serra MD  Hospital Medicine Staff  [phone] pager

## 2019-11-20 LAB
POCT GLUCOSE: 119 MG/DL (ref 70–110)
POCT GLUCOSE: 86 MG/DL (ref 70–110)
POCT GLUCOSE: 87 MG/DL (ref 70–110)

## 2019-11-20 PROCEDURE — 99232 SBSQ HOSP IP/OBS MODERATE 35: CPT | Mod: ,,, | Performed by: HOSPITALIST

## 2019-11-20 PROCEDURE — 27000221 HC OXYGEN, UP TO 24 HOURS

## 2019-11-20 PROCEDURE — 31720 CLEARANCE OF AIRWAYS: CPT

## 2019-11-20 PROCEDURE — 63600175 PHARM REV CODE 636 W HCPCS: Performed by: INTERNAL MEDICINE

## 2019-11-20 PROCEDURE — 99232 PR SUBSEQUENT HOSPITAL CARE,LEVL II: ICD-10-PCS | Mod: ,,, | Performed by: HOSPITALIST

## 2019-11-20 PROCEDURE — 25000003 PHARM REV CODE 250: Performed by: NURSE PRACTITIONER

## 2019-11-20 PROCEDURE — 25000242 PHARM REV CODE 250 ALT 637 W/ HCPCS: Performed by: PSYCHIATRY & NEUROLOGY

## 2019-11-20 PROCEDURE — 94761 N-INVAS EAR/PLS OXIMETRY MLT: CPT

## 2019-11-20 PROCEDURE — 11000001 HC ACUTE MED/SURG PRIVATE ROOM

## 2019-11-20 PROCEDURE — 25000003 PHARM REV CODE 250: Performed by: PSYCHIATRY & NEUROLOGY

## 2019-11-20 PROCEDURE — 25000003 PHARM REV CODE 250: Performed by: STUDENT IN AN ORGANIZED HEALTH CARE EDUCATION/TRAINING PROGRAM

## 2019-11-20 PROCEDURE — 25000003 PHARM REV CODE 250: Performed by: HOSPITALIST

## 2019-11-20 PROCEDURE — 25000003 PHARM REV CODE 250: Performed by: INTERNAL MEDICINE

## 2019-11-20 PROCEDURE — 94640 AIRWAY INHALATION TREATMENT: CPT

## 2019-11-20 RX ADMIN — AMLODIPINE BESYLATE 10 MG: 10 TABLET ORAL at 09:11

## 2019-11-20 RX ADMIN — POLYETHYLENE GLYCOL 3350 17 G: 17 POWDER, FOR SOLUTION ORAL at 09:11

## 2019-11-20 RX ADMIN — HEPARIN SODIUM 5000 UNITS: 5000 INJECTION, SOLUTION INTRAVENOUS; SUBCUTANEOUS at 05:11

## 2019-11-20 RX ADMIN — LEVETIRACETAM 750 MG: 750 TABLET, FILM COATED ORAL at 09:11

## 2019-11-20 RX ADMIN — MIRTAZAPINE 30 MG: 15 TABLET, ORALLY DISINTEGRATING ORAL at 09:11

## 2019-11-20 RX ADMIN — OXYCODONE HYDROCHLORIDE 5 MG: 5 TABLET ORAL at 09:11

## 2019-11-20 RX ADMIN — CARVEDILOL 3.12 MG: 3.12 TABLET, FILM COATED ORAL at 09:11

## 2019-11-20 RX ADMIN — HEPARIN SODIUM 5000 UNITS: 5000 INJECTION, SOLUTION INTRAVENOUS; SUBCUTANEOUS at 02:11

## 2019-11-20 RX ADMIN — HEPARIN SODIUM 5000 UNITS: 5000 INJECTION, SOLUTION INTRAVENOUS; SUBCUTANEOUS at 09:11

## 2019-11-20 RX ADMIN — BUDESONIDE 0.25 MG: 0.25 INHALANT RESPIRATORY (INHALATION) at 08:11

## 2019-11-20 RX ADMIN — IPRATROPIUM BROMIDE AND ALBUTEROL SULFATE 3 ML: .5; 3 SOLUTION RESPIRATORY (INHALATION) at 09:11

## 2019-11-20 RX ADMIN — OXYCODONE HYDROCHLORIDE 5 MG: 5 TABLET ORAL at 03:11

## 2019-11-20 RX ADMIN — SENNOSIDES AND DOCUSATE SODIUM 1 TABLET: 8.6; 5 TABLET ORAL at 09:11

## 2019-11-20 RX ADMIN — BUDESONIDE 0.25 MG: 0.25 INHALANT RESPIRATORY (INHALATION) at 09:11

## 2019-11-20 NOTE — PLAN OF CARE
Poc reviewed with pt. Pt verbalized understanding. No direct needs voiced. Bed in lowest position. Call bell within reach. Side rails up x 2. Nadn; will continue to monitor.

## 2019-11-20 NOTE — PLAN OF CARE
KRISHNA and Kristine, representative from [facility], met with patient's s.o. Diamond to discuss Mr. Torres discharge plan. Kristine explained to Diamond that tomorrow a  would meet with her to complete a financial assessment. If patient does not meet requirements, family will receive a bill for hospice services. Patient's s.o. Inquired if an aid would be provided through Medicaid and this CM explained that currently Mr. Torres does not have Medicaid as his insurance has lapsed. Diamond stated that she had filled out another application for patient ( at this time patient is still showing self-pay in Epic). After a lengthy conversation, Diamond expressed understanding and informed CM that she would be at the hospital tomorrow at 10:00AM to meet with the SW from [facility] to complete the financial assessment. Mr. Torres is expected to discharge to his home tomorrow after arrangements have been made.    Kristine Buckley RN  Ext [phone]

## 2019-11-20 NOTE — PROGRESS NOTES
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 Patient was watching the football game earlier in the evening and had no issues during the day. Later in the evening he was somnolent and his daughter noted that he had a seizure and she called EMS. Patient was brought to the ED by EMS who were called for a seizure-like activity. All history was obtained from ED chart as patient was intubated and sedated on propofol on admission. Patient was obtunded when EMS arrived on the scene and they witnessed a generalized tonic clonic seizure lasting 20 seconds which was abated with IV Versed 10mg stat. Patient was intubated in the ED and had elevated blood pressure but otherwise stable vital signs. He was placed on mechanical ventilation and sedated with propofol. Video EEG ordered and patient was admitted to Neuro Critical care unit for higher level of care. Patient has no known history of seizures, no known history of metastasis to brain and no previous imaging. Patient had a CT scan done in the ED prior to admission in Neuro ICU.  MRI brain showed multiple scattered small to punctate size foci of abnormal parenchymal enhancement throughout the brain parenchyma.  These are concerning for parenchymal metastasis with the largest within the left parietal lobe.  Oncology was consulted and determined the patient is no longer a candidate for chemotherapy.  He has overall very poor prognosis.  Palliative Care has been consulted for possible discharge to home with hospice.  Upon evaluation in the ED patient was infested with bed bugs crawling all over him. Patient was placed on contact isolation immediately and   as well as charge nurse notified. Called patient's wife and had a long discussion about goals of care.  Decision was made to transition patient to hospice with no escalation of care.  Code status was confirmed, changed to DNR.      Interval hx:   Pt was seen and examined at bedside. Pt had no acute events overnight, and no new complaints this morning. Pt remained hemodynamically stable and afebrile.  Patient pulled out his NG tube yesterday.  Oral diet was advanced per SLP recommendations for pleasure with accepting the risk of aspiration.  Patient was able to tolerate more p.o. intake.  He reports his swallowing has improved day by day.  This morning, patient was noted to be slightly more emotional but is happy that were trying to control his symptoms of pain. According to patient's wife, his house is being fumigated today due to bedbugs.  Will plan to DC home tomorrow with home hospice.    ROS (Positive in Bold, otherwise negative)  Constitutional: fever, chills, night sweats, headache  CV: chest pain, edema, palpitations  Resp: SOB, cough, sputum production  GI: changes in appetite, NVDC, pain, melena, hematochezia, GERD, hematemesis  : Dysuria, hematuria, urinary urgency, frequency  MSK: arthralgia/myalgia, joint swelling  Neuro/Psych: anxiety, depression    PEx   Temp:  [96.7 °F (35.9 °C)-99.6 °F (37.6 °C)]   Pulse:  []   Resp:  [17-20]   BP: (103-130)/(70-81)   SpO2:  [65 %-100 %]      I & O (Last 24H):     Intake/Output Summary (Last 24 hours) at 11/20/2019 1053  Last data filed at 11/20/2019 0400  Gross per 24 hour   Intake --   Output 1700 ml   Net -1700 ml       General:  male  in no acute distress. Nontoxic. Resting in bed. Cooperative.  Drowsy but easily arousable  HEENT: NCAT. PERRL. EOMI. Sclera Anicteric.  NG tube in place  CVS: RRR. Normal S1 S2. No murmurs  Pulm: CTAB. Normal respiratory effort. No wheezes, rhonchi, or crackles.  Abdomen: Soft.  Non-distended. No tenderness to palpation. No rebound or guarding. +BS.  Extremities: No edema. No cyanosis. Full ROM.  Neuro: Alert, oriented x3, Spont mvt of all extremities with no focal deficits noted.    Recent Results (from the past 24 hour(s))   POCT glucose    Collection Time: 11/20/19  9:41 AM   Result Value Ref Range    POCT Glucose 86 70 - 110 mg/dL       Recent Labs   Lab 11/17/19  0007 11/17/19  0644 11/17/19  1155 11/17/19  1701 11/17/19  2202 11/20/19  0941   POCTGLUCOSE 108 106 93 105 98 86       Hemoglobin A1C   Date Value Ref Range Status   06/06/2018 5.3 4.0 - 5.6 % Final     Comment:     ADA Screening Guidelines:  5.7-6.4%  Consistent with prediabetes  >or=6.5%  Consistent with diabetes  High levels of fetal hemoglobin interfere with the HbA1C  assay. Heterozygous hemoglobin variants (HbS, HgC, etc)do  not significantly interfere with this assay.   However, presence of multiple variants may affect accuracy.          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          Assessment and Plan for problems addressed today:      amLODIPine  10 mg Per NG tube Daily    budesonide  0.25 mg Nebulization BID    carvedilol  3.125 mg Per NG tube BID    heparin (porcine)  5,000 Units Subcutaneous Q8H    levETIRAcetam  750 mg Per NG tube BID    mirtazapine  30 mg Per NG tube Nightly    polyethylene glycol  17 g Per NG tube Daily    senna-docusate 8.6-50 mg  1  "tablet Per NG tube Daily     acetaminophen, albuterol-ipratropium, magnesium sulfate IVPB, magnesium sulfate IVPB, OLANZapine, oxyCODONE, sodium chloride 0.9%    Status epilepticus:  Brain metastases:  -MRI brain-Multiple scattered small to punctate size foci of abnormal parenchymal enhancement throughout the brain parenchyma as detailed above.  In light of history concerning for parenchymal metastases largest within the left parietal lobe  -11/13: radiation oncology consulted-per rad onc" the patient and his family declined whole brain radiotherapy and would like to continue best supportive care and symptom management in the setting of Mr. Torres poor performance status"  -Consult made to palliative care for hospice planning and GOC; PT/OT rec IPR, referalls pending  -Seizure Precautions  -Continue AEDs Keppra 500mg BID    Goals of care, counseling/discussion:  Palliative care encounter:  -due to patient's adeno carcinoma of lung with mets to bone and brain, acute hypoxic respiratory failure, seizures, patient has overall very poor prognosis.  -palliative Care consulted for goals of care discussion and possible discharge to home with hospice  -had long discussion with wife over the phone on 11/18 about patient's overall poor prognosis.  Decision was made to transition patient to hospice  -no escalation of care  -discontinue daily labs  -diet advanced to pureed foods with thin liquids per SLP recommendations for pleasure with accepting the risk of aspiration  -NG tube was pulled out, no need to place NG tube in again as patient is tolerating p.o. intake  -code status discussed, patient made DNR  -cm/sw on board to discharge patient to nursing home with hospice    Oropharyngeal dysphagia:  -during this admission, patient had NG tube in place, receiving tube feeds due to overall high risk of aspiration  -SLP following.  per SLP: SLP is unable to fully r/o aspiration at the bedside, though MBSS to r/o aspiration does " not appear to be appropriate at this time. -since decision was made to pursue comfort care, patient's diet was advanced for Pleasure Feeding with thin liquids and purees via tsp,   -These recommendations for oral intake may be considered from a quality of life standpoint.  -patient's diet was advanced and has been tolerating it well.  -patient reports his swallowing has improved each day.     Infestation by bed bug:  Resolved  -was initially placed on Contact isolation and standard precautions .  Now resolved, off isolation  -Place all patient's clothes and belongings in a sealed plastic bag for 24 hours  -patient's house is being fumigated on 11/20, plan to DC home on 11/21 with hospice     Acute respiratory failure with hypoxia  -Hx of lung Ca  -Pt on 3L NC, will continue O2 for comfort  -Duo nebs q 4 h  -CXR -Continued right pleural effusion and adjacent lung base consolidation/atelectasis.  Minimal consolidation now present in left upper lobe  -continue comfort measures as above     Essential hypertension  --180  -Continue home antihypertensive Amlodipine 10mg daily  -Continue Coreg 3.125 BID     Secondary adenocarcinoma of bone  -Continue present mgt per Heme-onc  -Dronabinol 5mg AC     Primary cancer of right middle lobe of lung  -Patient with right middle lobe lung cancer stage IV with mets to bone and now brain. Pt was previously on chemotherapy  -oncology consulted, patient is no longer a candidate for chemotherapy at this time  -Per pt wishes, will not pursue radiation therapy.  -Palliative care consulted   -decision made to transition patient to hospice    DVT PPx:  Heparin    Discharge plan and follow up: DC home hospice once arranged.  Patient medically stable for discharge.     Eve Serra MD  Hospital Medicine Staff  [phone] pager

## 2019-11-21 VITALS
HEART RATE: 94 BPM | SYSTOLIC BLOOD PRESSURE: 102 MMHG | BODY MASS INDEX: 18.93 KG/M2 | HEIGHT: 76 IN | WEIGHT: 155.44 LBS | DIASTOLIC BLOOD PRESSURE: 65 MMHG | TEMPERATURE: 99 F | RESPIRATION RATE: 18 BRPM | OXYGEN SATURATION: 97 %

## 2019-11-21 LAB
POCT GLUCOSE: 108 MG/DL (ref 70–110)
POCT GLUCOSE: 83 MG/DL (ref 70–110)

## 2019-11-21 PROCEDURE — 99239 HOSP IP/OBS DSCHRG MGMT >30: CPT | Mod: ,,, | Performed by: HOSPITALIST

## 2019-11-21 PROCEDURE — 99900035 HC TECH TIME PER 15 MIN (STAT)

## 2019-11-21 PROCEDURE — 94640 AIRWAY INHALATION TREATMENT: CPT

## 2019-11-21 PROCEDURE — 25000003 PHARM REV CODE 250: Performed by: NURSE PRACTITIONER

## 2019-11-21 PROCEDURE — 63600175 PHARM REV CODE 636 W HCPCS: Performed by: INTERNAL MEDICINE

## 2019-11-21 PROCEDURE — 25000003 PHARM REV CODE 250: Performed by: PSYCHIATRY & NEUROLOGY

## 2019-11-21 PROCEDURE — 94761 N-INVAS EAR/PLS OXIMETRY MLT: CPT

## 2019-11-21 PROCEDURE — 25000242 PHARM REV CODE 250 ALT 637 W/ HCPCS: Performed by: PSYCHIATRY & NEUROLOGY

## 2019-11-21 PROCEDURE — 99239 PR HOSPITAL DISCHARGE DAY,>30 MIN: ICD-10-PCS | Mod: ,,, | Performed by: HOSPITALIST

## 2019-11-21 PROCEDURE — 25000003 PHARM REV CODE 250: Performed by: STUDENT IN AN ORGANIZED HEALTH CARE EDUCATION/TRAINING PROGRAM

## 2019-11-21 PROCEDURE — 27000221 HC OXYGEN, UP TO 24 HOURS

## 2019-11-21 RX ADMIN — POLYETHYLENE GLYCOL 3350 17 G: 17 POWDER, FOR SOLUTION ORAL at 10:11

## 2019-11-21 RX ADMIN — LEVETIRACETAM 750 MG: 750 TABLET, FILM COATED ORAL at 10:11

## 2019-11-21 RX ADMIN — BUDESONIDE 0.25 MG: 0.25 INHALANT RESPIRATORY (INHALATION) at 08:11

## 2019-11-21 RX ADMIN — CARVEDILOL 3.12 MG: 3.12 TABLET, FILM COATED ORAL at 10:11

## 2019-11-21 RX ADMIN — SENNOSIDES AND DOCUSATE SODIUM 1 TABLET: 8.6; 5 TABLET ORAL at 10:11

## 2019-11-21 RX ADMIN — HEPARIN SODIUM 5000 UNITS: 5000 INJECTION, SOLUTION INTRAVENOUS; SUBCUTANEOUS at 06:11

## 2019-11-21 RX ADMIN — IPRATROPIUM BROMIDE AND ALBUTEROL SULFATE 3 ML: .5; 3 SOLUTION RESPIRATORY (INHALATION) at 06:11

## 2019-11-21 RX ADMIN — AMLODIPINE BESYLATE 10 MG: 10 TABLET ORAL at 10:11

## 2019-11-21 NOTE — DISCHARGE SUMMARY
Discharge Summary  Hospital Medicine       Name: Angel Torres  YOB: 1955 (Age: 64 y.o.)  Date of Admission: 11/10/2019  Date of Discharge: 11/21/2019  Attending Provider on Discharge: Eve Serra MD  [facility] Medicine Team: [facility]  Code status: Full Code    Primary Care Provider: Lc Beltran MD    Discharge Diagnosis:  Active Hospital Problems    Diagnosis  POA    *Status epilepticus [G40.901]  Yes    Palliative care encounter [Z51.5]  Not Applicable    Goals of care, counseling/discussion [Z71.89]  Not Applicable    Severe malnutrition [E43]  Yes    Brain metastases [C79.31]  Yes    Infestation by bed bug [B88.8]  Yes    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 Patient was watching the football game earlier in the evening and had no issues during the day. Later in the evening he was somnolent and his daughter noted that he had a seizure and she called EMS. Patient was brought to the ED by EMS who were called for a seizure-like activity. All history was obtained from ED chart as patient was intubated and sedated on propofol on admission. Patient was obtunded when EMS arrived on the scene and they witnessed a generalized tonic clonic seizure lasting 20 seconds which was abated with IV Versed 10mg stat. Patient was intubated in the ED and had elevated blood pressure but otherwise stable vital signs. He was placed on mechanical ventilation and sedated with propofol. Video EEG ordered and patient was admitted to Neuro Critical care unit for higher level of care. Patient has no  "known history of seizures, no known history of metastasis to brain and no previous imaging. Patient had a CT scan done in the ED prior to admission in Neuro ICU.  MRI brain showed multiple scattered small to punctate size foci of abnormal parenchymal enhancement throughout the brain parenchyma.  These are concerning for parenchymal metastasis with the largest within the left parietal lobe.  Oncology was consulted and determined the patient is no longer a candidate for chemotherapy.  He has overall very poor prognosis.  Palliative Care has been consulted for possible discharge to home with hospice.  Upon evaluation in the ED patient was infested with bed bugs crawling all over him. Patient was placed on contact isolation immediately and  as well as charge nurse notified. Called patient's wife and had a long discussion about goals of care.  Decision was made to transition patient to hospice with no escalation of care.  Code status was confirmed, changed to DNR.    Hospital Course:  Status epilepticus:  Brain metastases:  -MRI brain-Multiple scattered small to punctate size foci of abnormal parenchymal enhancement throughout the brain parenchyma as detailed above.  In light of history concerning for parenchymal metastases largest within the left parietal lobe  -11/13: radiation oncology consulted-per rad onc" the patient and his family declined whole brain radiotherapy and would like to continue best supportive care and symptom management in the setting of Mr. Torres poor performance status"  -Continue AEDs Keppra 500mg BID     Goals of care, counseling/discussion:  Palliative care encounter:  -due to patient's adeno carcinoma of lung with mets to bone and brain, acute hypoxic respiratory failure, seizures, patient has overall very poor prognosis.  -palliative Care consulted for goals of care discussion and possible discharge to home with hospice  -had long discussion with wife over the phone on " 11/18 about patient's overall poor prognosis.  Decision was made to transition patient to hospice  -no escalation of care  -discontinue daily labs  -diet advanced to pureed foods with thin liquids per SLP recommendations for pleasure with accepting the risk of aspiration  -NG tube was pulled out, no need to place NG tube in again as patient is tolerating p.o. intake  -code status discussed, patient made DNR  -discharge patient home with hospice     Oropharyngeal dysphagia:  -during this admission, patient had NG tube in place, receiving tube feeds due to overall high risk of aspiration  -SLP following.  per SLP: SLP is unable to fully r/o aspiration at the bedside, though MBSS to r/o aspiration does not appear to be appropriate at this time. -since decision was made to pursue comfort care, patient's diet was advanced for Pleasure Feeding with thin liquids and purees via tsp,   -These recommendations for oral intake may be considered from a quality of life standpoint.  -patient's diet was advanced and has been tolerating it well.  -patient reports his swallowing has improved each day.     Infestation by bed bug:  Resolved  -was initially placed on Contact isolation and standard precautions .  Now resolved, off isolation  -Place all patient's clothes and belongings in a sealed plastic bag for 24 hours  -patient's house is being fumigated on 11/20, plan to DC home on 11/21 with hospice     Acute respiratory failure with hypoxia  -Hx of lung Ca  -Pt on 3L NC, will continue O2 for comfort  -Duo nebs q 4 h  -CXR -Continued right pleural effusion and adjacent lung base consolidation/atelectasis.  Minimal consolidation now present in left upper lobe  -continue comfort measures as above     Essential hypertension  --180  -Continue home antihypertensive Amlodipine 10mg daily  -Continue Coreg 3.125 BID     Secondary adenocarcinoma of bone  -Continue present mgt per Heme-onc  -Dronabinol 5mg AC     Primary cancer of  right middle lobe of lung  -Patient with right middle lobe lung cancer stage IV with mets to bone and now brain. Pt was previously on chemotherapy  -oncology consulted, patient is no longer a candidate for chemotherapy at this time  -Per pt wishes, will not pursue radiation therapy.  -Palliative care consulted   -decision made to transition patient to hospice    Labs:  Recent Labs   Lab 11/16/19  0453 11/17/19  0507 11/18/19  0335   WBC 10.25 14.35* 13.27*   HGB 12.3* 11.8* 12.2*   HCT 38.9* 38.8* 39.9*    283 322     Recent Labs   Lab 11/16/19  0453 11/17/19  0507 11/18/19  0335    141 139   K 4.1 4.1 4.4    104 99   CO2 25 28 32*   BUN 15 15 17   CREATININE 0.8 0.7 0.8   * 97 97   CALCIUM 9.0 8.6* 9.2   MG 2.0 1.9 2.0   PHOS 3.8 3.2 3.3     Recent Labs   Lab 11/16/19  0453 11/17/19  0507 11/18/19  0335   ALKPHOS 53* 59 64   ALT 13 17 16   AST 17 17 16   ALBUMIN 2.9* 2.7* 2.6*   PROT 6.3 6.4 6.7   BILITOT 0.5 0.4 0.4      Recent Labs   Lab 11/17/19  1701 11/17/19  2202 11/20/19  0941 11/20/19  1313 11/20/19  1802 11/21/19  0748   POCTGLUCOSE 105 98 86 119* 87 83     No results for input(s): CPK, CPKMB, MB, TROPONINI in the last 72 hours.    ROS (Positive in Bold, otherwise negative)  Constitutional: fever, chills, night sweats  CV: chest pain, edema, palpitations  Resp: SOB, cough, sputum production  GI: changes in appetite, NVDC, pain, melena, hematochezia, GERD, hematemesis  : Dysuria, hematuria, urinary urgency, frequency  MSK: arthralgia/myalgia, joint swelling  Neuro/Psych: anxiety, depression    PEx   Temp:  [98.1 °F (36.7 °C)-98.8 °F (37.1 °C)]   Pulse:  [101-109]   Resp:  [16-20]   BP: (107-129)/(62-71)   SpO2:  [94 %-98 %]      General: no distress   Lungs: clear to ausculation anteriorly and posteriorly   Heart: regular rate and rhythm   Abdomen: normal bowel sounds, soft, no tenderness   Extremities: no edema. No clubbing or cyanosis     Procedures:  CT chest/abdomen/pelvis  with contrast, CXR, CT head, MRI brain, CXR, KUB    Consultants:  Neurocritical care, OT/PT, SLP, palliative care    Current Discharge Medication List      START taking these medications    Details   budesonide (PULMICORT) 0.25 mg/2 mL nebulizer solution Take 2 mLs (0.25 mg total) by nebulization 2 (two) times daily. Controller  Qty: 60 mL, Refills: 3      carvedilol (COREG) 3.125 MG tablet Take 1 tablet (3.125 mg total) by mouth 2 (two) times daily.  Qty: 60 tablet, Refills: 11      levETIRAcetam (KEPPRA) 100 mg/mL Soln Take 7.5 mLs (750 mg total) by mouth 2 (two) times daily.  Qty: 450 mL, Refills: 11      oxyCODONE (ROXICODONE) 5 MG immediate release tablet Take 1 tablet (5 mg total) by mouth every 4 (four) hours as needed.  Qty: 20 tablet, Refills: 0    Comments: Quantity prescribed more than 7 day supply? No      polyethylene glycol (GLYCOLAX) 17 gram PwPk 17 g by Per NG tube route once daily.  Qty: 30 each, Refills: 0      senna-docusate 8.6-50 mg (PERICOLACE) 8.6-50 mg per tablet Take 1 tablet by mouth once daily.         CONTINUE these medications which have NOT CHANGED    Details   albuterol (PROVENTIL/VENTOLIN HFA) 90 mcg/actuation inhaler INHALE 2 PUFFS INTO THE LUNGS EVERY 6 HOURS AS NEEDED FOR WHEEZING  Qty: 8.5 g, Refills: 0    Associated Diagnoses: Chronic obstructive pulmonary disease, unspecified COPD type      albuterol-ipratropium (DUO-NEB) 2.5 mg-0.5 mg/3 mL nebulizer solution Rescue  Qty: 270 mL, Refills: 0    Associated Diagnoses: Primary cancer of right middle lobe of lung; Secondary adenocarcinoma of bone; Chronic obstructive pulmonary disease, unspecified COPD type      amLODIPine (NORVASC) 10 MG tablet Take 1 tablet (10 mg total) by mouth once daily.  Qty: 90 tablet, Refills: 3    Associated Diagnoses: Essential hypertension      cetirizine (ZYRTEC) 10 MG tablet Take 1 tablet (10 mg total) by mouth once daily.  Refills: 0      cyproheptadine (PERIACTIN) 4 mg tablet Take 1 tablet (4 mg  total) by mouth 3 (three) times daily as needed.  Qty: 90 tablet, Refills: 1    Associated Diagnoses: Primary cancer of right middle lobe of lung; Secondary adenocarcinoma of bone; Shortness of breath; Decreased appetite      dronabinol (MARINOL) 5 MG capsule Take 1 capsule (5 mg total) by mouth 2 (two) times daily before meals.  Qty: 60 capsule, Refills: 0    Associated Diagnoses: Primary cancer of right middle lobe of lung; Secondary adenocarcinoma of bone; Decreased appetite      fluticasone (FLONASE) 50 mcg/actuation nasal spray SHAKE LIQUID AND USE 2 SPRAYS IN EACH NOSTRIL EVERY DAY  Qty: 16 g, Refills: 1    Associated Diagnoses: Allergic rhinitis      mirtazapine (REMERON SOL-TAB) 30 MG disintegrating tablet Take 1 tablet (30 mg total) by mouth nightly.  Qty: 30 tablet, Refills: 2    Associated Diagnoses: Decreased appetite; Insomnia, unspecified type      mupirocin (BACTROBAN) 2 % ointment Apply topically 2 (two) times daily. To open areas  Qty: 22 g, Refills: 2    Associated Diagnoses: Rash and nonspecific skin eruption      ondansetron (ZOFRAN-ODT) 8 MG TbDL Take 1 tablet (8 mg total) by mouth every 12 (twelve) hours as needed.  Qty: 30 tablet, Refills: 3    Associated Diagnoses: Chemotherapy-induced nausea      triamcinolone acetonide 0.1% (KENALOG) 0.1 % ointment To all rashes BID  Qty: 80 g, Refills: 4    Associated Diagnoses: Rash and nonspecific skin eruption         STOP taking these medications       dexAMETHasone (DECADRON) 4 MG Tab Comments:   Reason for Stopping:         naproxen (NAPROSYN) 500 MG tablet Comments:   Reason for Stopping:               The relevant and important risks, side effects, and benefits of their medications were reviewed with patient during hospitalization and at discharge. The patient was given the opportunity to discuss and ask questions about their medications, including target symptoms, potential risks, side effects and benefits of their medications, as well as their  expected prognosis if non-medication treatment options were chosen.  The patient expresses understanding of all these options and information and voluntarily consents to treatment.    Discharge Diet:regular diet with Normal Fluid intake of 1500 - 2000 mL per day    Activity: activity as tolerated    Discharge Condition: Serious    Disposition: Hospice/Home    Tests pending at the time of discharge: none      Time spent  on the discharge of the patient including review of hospital course with the patient. reviewing discharge medications and arranging follow-up care: 38 mins    Discharge examination Patient was seen and examined on the date of discharge and determined to be suitable for discharge.    Discharge plan and follow up:  It is critical that you make your follow-up appointment(s). If you are discharged on the weekend or after business hours, or if we are unable to schedule these appointments for you for any reason, you or a family member need to call during the next business day to schedule your appointment(s).    -Follow up with you PCP, Lc Beltran MD within 1-2 weeks as arranged with SW and treatment team prior to discharge  -Take all medications as prescribed and listed above.     - Please return to ED or call your physician if you have:         1. Fevers > 101.5 unresponsive to tylenol.       2. Abdominal pain and/or distention       3. Intractable nausea, vomiting or diarrhea       4. Inability to tolerate adequate oral intake of food       5. Neurologic changes, chest pain or shortness of breath         Future Appointments   Date Time Provider Department Center   12/4/2019  8:50 AM LAB, HEMONC CANCER [facility] LAB HO Memo Guzman   12/4/2019 10:00 AM Nilo Begum MD [facility] HEM ONC Memo Guzman     Further care per hospice team.    Eve Serra MD  Hospital Medicine Staff  [phone] pager

## 2019-11-21 NOTE — PLAN OF CARE
Hospice certification of terminal illness faxed to [facility] ([phone]).    Kristine Buckley RN  Ext [phone]

## 2019-11-21 NOTE — NURSING
Alert and oriented X4, assessment and VS completed see flow sheet.  No major health changes, no falls, no injuries to report during shift.

## 2019-11-21 NOTE — PLAN OF CARE
11/21/19 1058   Post-Acute Status   Post-Acute Authorization Home Health/Hospice  ([facility])   Home Health/Hospice Status Referrals Sent   Discharge Delays None known at this time

## 2019-11-21 NOTE — PLAN OF CARE
Problem: Fall Injury Risk  Goal: Absence of Fall and Fall-Related Injury  Outcome: Ongoing, Progressing     Problem: Infection  Goal: Infection Symptom Resolution  Outcome: Ongoing, Progressing     Problem: Adult Inpatient Plan of Care  Goal: Plan of Care Review  Outcome: Ongoing, Progressing  Goal: Patient-Specific Goal (Individualization)  Description  Admit Date: 11/11/2019    Admit Dx: Status Epilepticus     Past Medical History:  9/7/2016: Chemotherapy follow-up examination  12/6/2017: Chemotherapy follow-up examination  No date: COPD (chronic obstructive pulmonary disease)  11/8/2017: Decreased appetite  No date: Hearing loss  2/9/2015: Hypertension  7/11/2016: Primary cancer of right middle lobe of lung  9/7/2016: Rash  7/26/2017: Secondary adenocarcinoma of bone  8/8/2018: Skin infection    Past Surgical History:  No date: INGUINAL HERNIA REPAIR; Bilateral  No date: KNEE SURGERY    Individualization:   1. Patient prefers lights dimmed for low stimulation    Restraints: N/A   Outcome: Ongoing, Progressing  Goal: Absence of Hospital-Acquired Illness or Injury  Outcome: Ongoing, Progressing  Goal: Optimal Comfort and Wellbeing  Outcome: Ongoing, Progressing  Goal: Readiness for Transition of Care  Outcome: Ongoing, Progressing  Goal: Rounds/Family Conference  Outcome: Ongoing, Progressing  A&Ox4. POC reviewed with patient and family. Pt was given medications as ordered. Vitals stable. No complaints of or discomfort. Patient being discharged home with daughter.

## 2019-11-21 NOTE — PLAN OF CARE
Pt remains free from falls and injury. Provided with PRN analgesic with positive results. Pt makes position changes and assisted with changes as well. Bed low and locked, Call light within reach.

## 2019-11-22 DIAGNOSIS — J44.9 CHRONIC OBSTRUCTIVE PULMONARY DISEASE, UNSPECIFIED COPD TYPE: ICD-10-CM

## 2019-11-22 DIAGNOSIS — C34.2 PRIMARY CANCER OF RIGHT MIDDLE LOBE OF LUNG: ICD-10-CM

## 2019-11-22 DIAGNOSIS — C79.51 SECONDARY ADENOCARCINOMA OF BONE: ICD-10-CM

## 2019-11-22 NOTE — PLAN OF CARE
11/22/19 0828   Post-Acute Status   Post-Acute Authorization Home Health/Hospice  ([facility])   Home Health/Hospice Status Set-up Complete   Discharge Delays None known at this time

## 2019-11-22 NOTE — PLAN OF CARE
Future Appointments   Date Time Provider Department Center   12/4/2019  8:50 AM LAB, HEMONC CANCER [facility] LAB HO Memo Megan   12/4/2019 10:00 AM Nilo Begum MD [facility] HEM ONC Memo Megan        11/22/19 0826   Final Note   Assessment Type Final Discharge Note   Anticipated Discharge Disposition HospiceHome   What phone number can be called within the next 1-3 days to see how you are doing after discharge? [phone]   Hospital Follow Up  Appt(s) scheduled? Yes   Right Care Referral Info   Post Acute Recommendation Other   Facility Name [facility]

## 2019-11-25 RX ORDER — IPRATROPIUM BROMIDE AND ALBUTEROL SULFATE 2.5; .5 MG/3ML; MG/3ML
SOLUTION RESPIRATORY (INHALATION)
Qty: 270 ML | Refills: 0 | Status: SHIPPED | OUTPATIENT
Start: 2019-11-25

## 2019-12-02 ENCOUNTER — TELEPHONE (OUTPATIENT)
Dept: HEMATOLOGY/ONCOLOGY | Facility: CLINIC | Age: 64
End: 2019-12-02

## 2019-12-02 NOTE — TELEPHONE ENCOUNTER
----- Message from Albino Walsh sent at 12/2/2019  9:01 AM CST -----  Contact: Diamond jenkins wife   Pt was in ICU and was discharged home on hospice and would like to speak w/ a nurse     Contact: [phone]

## 2019-12-02 NOTE — TELEPHONE ENCOUNTER
Spoke to wife. Discussed that while patient is enrolled in hospice he cannot receive treatment. She thinks he is doing better. Let her know that it is not uncommon for patients to get bursts of energy. Some of it may be because he has not had treatment in a while. Wife will discuss with hospice nurse if patient should remain in their care of they feel he should come back to clinic.

## 2020-09-10 ENCOUNTER — TELEPHONE (OUTPATIENT)
Dept: NEUROLOGY | Facility: HOSPITAL | Age: 65
End: 2020-09-10

## 2021-06-23 NOTE — PROGRESS NOTES
1244; Pt extubated by RT with RN in room. Pt tolerated extubation well and on 5L NC. Will continue to monitor.   Star Wedge Flap Text: The defect edges were debeveled with a #15 scalpel blade.  Given the location of the defect, shape of the defect and the proximity to free margins a star wedge flap was deemed most appropriate.  Using a sterile surgical marker, an appropriate rotation flap was drawn incorporating the defect and placing the expected incisions within the relaxed skin tension lines where possible. The area thus outlined was incised deep to adipose tissue with a #15 scalpel blade.  The skin margins were undermined to an appropriate distance in all directions utilizing iris scissors.

## 2021-12-30 NOTE — PHYSICIAN QUERY
Assumed care of patient at this time, patient is resting in bed under 1:1  observation. Patient eyes are closed. Respirations are even and non labored. Will continue to monitor. PT Name: Angel Torres  MR #: [id number]    Physician Query Form - Respiratory Condition Clarification      CDS/: Arben Beavers RN              Contact information: [email or web address]    This form is a permanent document in the medical record.    Query Date: November 19, 2019    By submitting this query, we are merely seeking further clarification of documentation. Please utilize your independent clinical judgment when addressing the question(s) below.    The Medical record contains the following   Indicators   Supporting Clinical Findings Location in Medical Record      SOB, BURDEN, Wheezing, Productive Cough, Use of Accessory Muscles, etc.     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 H/P Neuro CC (Lc/Namir) 11/11   x   Radiology Findings Imaging:  Chest Xray 11/11/19  Impression         Enteric tube extends below the diaphragm and off the inferior edge of the image.    ET tube tip 6 cm above the debby.    Interval decrease in size of mass like opacity at the right base.    Interval development of small to moderate pleural effusion at the right base with associated compressive atelectatic change.      H/P Neuro CC (Lc/Namir) 11/11   x   Respiratory Distress or Failure presenting with new onset seizures and hypoxic respiratory failure. On my initial exam pt was intubated and sedated. Off propofol pt moves all ext purposefully, eyes closed and agitated, no commands    Pulmonary  Acute respiratory failure with hypoxia  Patient intubated and sedated on propofol  Vent settings noted below  Vent Mode: A/C  Oxygen Concentration (%):  [70] 70  Resp Rate Total:  [16 br/min] 16 br/min  Vt Set:  [450 mL] 450 mL  PEEP/CPAP:  [5 cmH20] 5 cmH20  Pressure Support:  [0 cmH20] 0 cmH20  Mean Airway Pressure:  [8.6 cmH20] 8.6 cmH20    Acute respiratory failure with hypoxia  -Hx of lung Ca  -Pt on 3L NC  -Duo  Report given by John Campbell RN. Pt does has dyspnea with exertion but otherwise, resting comfortably. A/Ox4, speaking in full clear sentences. Pt is on 6L HiFlo, with Spo2 of 88-91%. Per Pt, \"88-89% is my baseline. \" Pt is updated on current plan of care and verbalizes understanding. Bed placed at lowest position, locked, and call light in place. nebs q 4 h  -CXR -Continued right pleural effusion and adjacent lung base consolidation/atelectasis.  Minimal consolidation now present in left upper lobe H/P Neuro CC (Lc/Namir) 11/11                              Progress note HM 11/19 (Maryse)      Hypoxia or Hypercapnia        RR         ABGs         O2 sat     x   BiPAP/Intubation 64 y.o. male with metastatic adenocarcinoma of the lung presenting to ER with complaint of seizure x2 without return to baseline mental status in between.  On arrival he is unresponsive to painful stimuli, with sonorous respirations and gurgling.  Patient was intubated for airway protection and hypoxia.    1244; Pt extubated by RT with RN in room. Pt tolerated extubation well and on 5L NC     ED Note 11/10        RN Note 11/12      Supplemental O2     x   Home O2, Oxygen Dependence Oncology rec pursuing home hospice. Patient remains tachycardic on 3L NC. Frequent oral/deep suctioning by patient and nursing/respiratory Progress note Neuro Cc (Emmanuel/Marija) 11/16      Treatment        Other       Respiratory failure can be acute, chronic or both. It is generally further specified as hypoxic, hypercapnic or both. Lastly, it is important to identify an etiology, if known or suspected.   References::  https://www.acphospitalist.org/archives/2013/10/coding.htm http://[employer]/acute-respiratory-failure-know     The clinical guidelines noted below are only system guidelines, and do not replace the providers clinical judgment.    Provider, please specify diagnosis or diagnoses associated with above clinical findings.   [  x ] Acute Respiratory Failure with Hypoxia - ABG pO2 < 60 mmHg or O2 sat of 88% on RA and respiratory symptoms documented   [   ] No Respiratory Failure, Maintained on Vent for Routine Care or Airway Protection -  purposely intubated for airway protection (e.g.: angioedema, stroke, trauma); without meeting the criteria for respiratory failure.    [   ]  Other Respiratory Diagnosis (please specify): _________________________________   [   ]  Clinically Undetermined       Please document in your progress notes daily for the duration of treatment until resolved and include in your discharge summary.

## 2022-01-01 NOTE — ED PROVIDER NOTES
Encounter Date: 1/5/2017    SCRIBE #1 NOTE: I, Yessi Irwin, am scribing for, and in the presence of, Dr. Darling.       History     Chief Complaint   Patient presents with    Pneumonia transfer     from [facility] ED for further evaluation and treatment for Pneumonia. Pt has history lung cancer and COPD     Review of patient's allergies indicates:  No Known Allergies  The history is provided by the patient and medical records.   Time patient was seen by the provider: 3:28 AM      The patient is a 61 y.o. male with hx of: lung cancer and COPD that presents to the ED  after being  seen at [facility] ER with worsening SOB x 5 days. Pt diagnosed with PNA of right middle lobe. Given this and underlying COPD, physician felt pt warranted admission. Dr. Sol accepted [facility] further management of R middle lobe PNA. Pt given avelox at referring facility. Last dose of moxifloxacin at 11:30 PM on January 4th.    Past Medical History   Diagnosis Date    Chemotherapy follow-up examination 9/7/2016    COPD (chronic obstructive pulmonary disease)     Hearing loss     Hypertension 2/9/2015    Primary cancer of right middle lobe of lung 7/11/2016    Rash 9/7/2016     Past Medical History Pertinent Negatives   Diagnosis Date Noted    Amblyopia 11/10/2014    Arthritis 11/10/2014    Cataract 11/10/2014    Diabetes mellitus 11/10/2014    Diabetic retinopathy 11/10/2014    Glaucoma 11/10/2014    Macular degeneration 11/10/2014    Retinal detachment 11/10/2014    Sickle cell anemia 2/9/2015    Sickle cell trait 2/9/2015    Strabismus 11/10/2014    Uveitis 11/10/2014     Past Surgical History   Procedure Laterality Date    Knee surgery      Inguinal hernia repair Bilateral      Family History   Problem Relation Age of Onset    Cancer Mother      lung, breast    Cancer Sister      lung    Cancer Maternal Grandfather     No Known Problems Father     No Known Problems Brother      non-verbal indicators absent No Known Problems Maternal Aunt     No Known Problems Maternal Uncle     No Known Problems Paternal Aunt     No Known Problems Paternal Uncle     No Known Problems Maternal Grandmother     No Known Problems Paternal Grandmother     No Known Problems Paternal Grandfather     Amblyopia Neg Hx     Blindness Neg Hx     Cataracts Neg Hx     Diabetes Neg Hx     Glaucoma Neg Hx     Hypertension Neg Hx     Macular degeneration Neg Hx     Retinal detachment Neg Hx     Strabismus Neg Hx     Stroke Neg Hx     Thyroid disease Neg Hx      Social History   Substance Use Topics    Smoking status: Former Smoker     Packs/day: 2.00     Years: 40.00     Quit date: 11/16/2013    Smokeless tobacco: None    Alcohol use No     Review of Systems   HENT: Positive for congestion.    Respiratory: Positive for cough and shortness of breath.    All other systems reviewed and are negative.      Physical Exam   Initial Vitals   BP Pulse Resp Temp SpO2   01/05/17 0316 01/05/17 0316 01/05/17 0316 -- 01/05/17 0316   144/86 119 18  96 %     Physical Exam    Nursing note and vitals reviewed.    Gen/Constitutional: Interactive. No acute distress  Head: Normocephalic, Atraumatic  Neck: supple, no masses or LAD  Eyes: PERRLA, conjunctiva clear  Ears, Nose and Throat: No rhinorrhea or stridor.  Cardiac: Reg Rhythm, No murmur  Pulmonary: CTA Bilat, no wheezes. Rhonchi and rales in right lower lobe.  GI: Abdomen soft, non-tender, non-distended; no rebound or guarding  : No CVA tenderness.  Musculoskeletal: Extremities warm, well perfused, no erythema, no edema  Skin: No rashes  Neuro: Alert and Oriented x 3; No focal motor or sensory deficits.    Psych: Normal affect     ED Course   Procedures  Labs Reviewed   CBC W/ AUTO DIFFERENTIAL - Abnormal; Notable for the following:        Result Value    RBC 4.50 (*)     Hemoglobin 13.4 (*)     Hematocrit 39.5 (*)     Lymph # 0.6 (*)     Gran% 82.1 (*)     Lymph% 7.0 (*)     All other  components within normal limits   COMPREHENSIVE METABOLIC PANEL - Abnormal; Notable for the following:     CO2 18 (*)     Calcium 8.6 (*)     Albumin 3.1 (*)     All other components within normal limits   URINALYSIS - Abnormal; Notable for the following:     Ketones, UA 1+ (*)     All other components within normal limits   APTT   LACTIC ACID, PLASMA   MAGNESIUM   PHOSPHORUS   PROTIME-INR   TROPONIN I   B-TYPE NATRIURETIC PEPTIDE   INFLUENZA A AND B ANTIGEN   LIPASE   LACTIC ACID, PLASMA   TROPONIN I   TYPE & SCREEN                  X-Ray Chest AP Portable (Final result) Result time:  01/05/17 04:31:40    Final result by Mata Hong MD (01/05/17 04:31:40)    Impression:        Unchanged air space infiltrate within the right middle lobe concerning for pneumonia.  ______________________________________     Electronically signed by resident: MATA HONG MD  Date:     01/05/17  Time:    04:27            As the supervising and teaching physician, I personally reviewed the images and resident's interpretation and I agree with the findings.          Electronically signed by: PETEY GARSIA MD  Date:     01/05/17  Time:    04:31     Narrative:    Time of Procedure: 01/05/17 04:05:21  Accession # [id number]    Comparison: Chest radiograph 01/04/2017    Number of views: 1    Findings:  Mediastinal structures are midline.  Cardiomediastinal silhouette appears within normal limits.    There is again patchy opacification within the right middle lobe which may reflect pneumonia, aspiration or atelectasis.  No evidence of pneumothorax.     There is no free air beneath the diaphragm.    No significant osseous abnormality.             EKG: Sinus tachycardia at 118, RBBB, no PREM's or STD's, non-specific twave pattern, no STEMI      Medical Decision Making:   History:   Old Medical Records: I decided to obtain old medical records.  Independently Interpreted Test(s):   I have ordered and independently interpreted EKG Reading(s) -  see prior notes  Clinical Tests:   Lab Tests: Ordered and Reviewed  Radiological Study: Ordered and Reviewed  Medical Tests: Reviewed and Ordered  Other:   I have discussed this case with another health care provider.      Pt presents as transfer from [facility] with right middle lobe PNA.  Given PNA, tachycardia, SOB and underlying COPD, physician felt pt warranted admission. Pt accepted by medicine for admission. Also considered PE, I/ACS, and CHF exacerbation.       Scribe Attestation:   Scribe #1: I performed the above scribed service and the documentation accurately describes the services I performed. I attest to the accuracy of the note.    Attending Attestation:           Physician Attestation for Scribe:  Physician Attestation Statement for Scribe #1: I, Dr. Darling, reviewed documentation, as scribed by Yessi Irwin in my presence, and it is both accurate and complete.                 ED Course     Clinical Impression:   The primary encounter diagnosis was Pneumonia of right lower lobe due to infectious organism. Diagnoses of Sepsis, due to unspecified organism and Pneumonia were also pertinent to this visit.    Disposition:   Disposition: Admitted       Kurtis Darling MD  01/11/17 0541

## 2023-02-09 NOTE — Clinical Note
CT in 3 months Please add for thoracic tumor Board Double O-Z Flap Text: The defect edges were debeveled with a #15 scalpel blade. Given the location of the defect, shape of the defect and the proximity to free margins a Double O-Z flap was deemed most appropriate. Using a sterile surgical marker, an appropriate transposition flap was drawn incorporating the defect and placing the expected incisions within the relaxed skin tension lines where possible. The area thus outlined was incised deep to adipose tissue with a #15 scalpel blade. The skin margins were undermined to an appropriate distance in all directions utilizing iris scissors. Following this, the designed flap was placed into the primary defect and sutured into place.

## 2024-03-05 NOTE — ASSESSMENT & PLAN NOTE
- CXR shows right middle lobe opacity concerning for pneumonia, though there appears to be persistent consolidation in that region (scarring from radiation?). No pleural effusion  - CURB-65 score is 1, but patient failed outpatient therapy  - Will change moxifloxacin to doxycycline 100 mg q12     Multilayer Compression Wrap  (Not Unna) Below the Knee    NAME:  Tom Birch  YOB: 1969  MEDICAL RECORD NUMBER:  [id number]  DATE:  3/5/2024  Patient in for nurse visit.     03/05/24 0937   Right Leg Edema Point of Measurement   Compression Therapy 2 layer compression wrap   Left Leg Edema Point of Measurement   Compression Therapy 2 layer compression wrap   RLE Neurovascular Assessment   Capillary Refill Less than/Equal to 3 seconds   Color Appropriate for Ethnicity   Temperature Warm   R Pedal Pulse +2   LLE Neurovascular Assessment   Capillary Refill Less than/Equal to 3 seconds   Color Appropriate for Ethnicity   Temperature Warm   L Pedal Pulse +2   Wound 01/26/24 Leg Medial;Right Intact Blister    Date First Assessed/Time First Assessed: 01/26/24 1320   Present on Original Admission: No  Primary Wound Type: (c)   Location: Leg  Wound Location Orientation: Medial;Right  Wound Description (Comments): Intact Blister    Wound Etiology Venous   Dressing Status Intact   Wound Cleansed Soap and water   Wound Assessment Epithelialization   Drainage Amount None (dry)   Odor None   Wound 10/20/23 Leg Lateral;Left Ruptured blister    Date First Assessed: 10/20/23   Primary Wound Type: Pressure Injury  Location: Leg  Wound Location Orientation: Lateral;Left  Wound Description (Comments): Ruptured blister    Wound Etiology Venous   Dressing Status Intact   Wound Cleansed Soap and water   Wound Assessment Epithelialization   Drainage Amount None (dry)   Odor None   Pain Assessment   Pain Assessment None - Denies Pain   BP (!) 160/89   Pulse 97   Temp 97.8 °F (36.6 °C) (Temporal)   Resp 18      03/05/24 0938   Wound 01/26/24 Leg Medial;Right Intact Blister    Date First Assessed/Time First Assessed: 01/26/24 1320   Present on Original Admission: No  Primary Wound Type: (c)   Location: Leg  Wound Location Orientation: Medial;Right  Wound Description (Comments): Intact Blister    Dressing Status New

## 2024-06-20 NOTE — PLAN OF CARE
Problem: Fall Injury Risk  Goal: Absence of Fall and Fall-Related Injury  Outcome: Ongoing, Progressing  Intervention: Identify and Manage Contributors to Fall Injury Risk  Flowsheets (Taken 11/18/2019 1934)  Self-Care Promotion: independence encouraged; BADL personal objects within reach; meal setup provided; BADL personal routines maintained  Medication Review/Management: medications reviewed  Intervention: Promote Injury-Free Environment  Flowsheets (Taken 11/18/2019 1934)  Safety Promotion/Fall Prevention: assistive device/personal item within reach; diversional activities provided; Fall Risk reviewed with patient/family; Fall Risk signage in place; lighting adjusted; medications reviewed; nonskid shoes/socks when out of bed; side rails raised x 3; instructed to call staff for mobility  Environmental Safety Modification: assistive device/personal items within reach; clutter free environment maintained; lighting adjusted      Detail Level: Generalized Detail Level: Zone Detail Level: Detailed

## 2025-02-13 NOTE — PROGRESS NOTES
Received referral from clinic that patient needed assistance with transportation to upcoming chemo. I was asked to reach out to the patient's wife Diamond. I spoke with Diamond and let her know that I would be setting them up with [facility] for the transport. I provided her with the number for  to call when he is ready for  since it will be set up for a will call.    [Follow Up] : a follow up visit [Parents] : parents [FreeTextEntry1] : Right  hip congenital dislocation